# Patient Record
Sex: FEMALE | Race: WHITE | NOT HISPANIC OR LATINO | Employment: OTHER | URBAN - METROPOLITAN AREA
[De-identification: names, ages, dates, MRNs, and addresses within clinical notes are randomized per-mention and may not be internally consistent; named-entity substitution may affect disease eponyms.]

---

## 2017-01-11 ENCOUNTER — TRANSCRIBE ORDERS (OUTPATIENT)
Dept: ADMINISTRATIVE | Facility: HOSPITAL | Age: 72
End: 2017-01-11

## 2017-01-11 DIAGNOSIS — N63.0 BREAST MASS: Primary | ICD-10-CM

## 2017-01-11 DIAGNOSIS — M79.651 RIGHT THIGH PAIN: ICD-10-CM

## 2017-01-11 DIAGNOSIS — IMO0002 MASS: Primary | ICD-10-CM

## 2017-01-18 ENCOUNTER — HOSPITAL ENCOUNTER (OUTPATIENT)
Dept: MAMMOGRAPHY | Facility: HOSPITAL | Age: 72
Discharge: HOME | End: 2017-01-18
Attending: INTERNAL MEDICINE | Admitting: INTERNAL MEDICINE

## 2017-01-18 DIAGNOSIS — N63.0 BREAST MASS: ICD-10-CM

## 2017-01-18 PROCEDURE — G0204 DX MAMMO INCL CAD BI: HCPCS

## 2017-01-20 ENCOUNTER — HOSPITAL ENCOUNTER (OUTPATIENT)
Dept: MRI IMAGING | Facility: HOSPITAL | Age: 72
Discharge: HOME | End: 2017-01-20
Attending: INTERNAL MEDICINE | Admitting: INTERNAL MEDICINE

## 2017-01-20 DIAGNOSIS — M79.651 RIGHT THIGH PAIN: ICD-10-CM

## 2017-01-20 PROCEDURE — 72148 MRI LUMBAR SPINE W/O DYE: CPT

## 2017-02-07 ENCOUNTER — TRANSCRIBE ORDERS (OUTPATIENT)
Dept: ADMINISTRATIVE | Facility: HOSPITAL | Age: 72
End: 2017-02-07

## 2017-02-07 DIAGNOSIS — N63.10 MASS OF RIGHT BREAST: Primary | ICD-10-CM

## 2017-04-30 ENCOUNTER — HOSPITAL ENCOUNTER (INPATIENT)
Facility: HOSPITAL | Age: 72
LOS: 28 days | Discharge: HOME HEALTH | End: 2017-05-28
Attending: EMERGENCY MEDICINE | Admitting: INTERNAL MEDICINE

## 2017-04-30 ENCOUNTER — APPOINTMENT (OUTPATIENT)
Dept: CT IMAGING | Facility: HOSPITAL | Age: 72
End: 2017-04-30

## 2017-04-30 DIAGNOSIS — I10 ESSENTIAL HYPERTENSION: ICD-10-CM

## 2017-04-30 DIAGNOSIS — K57.32 DIVERTICULITIS LARGE INTESTINE: ICD-10-CM

## 2017-04-30 DIAGNOSIS — N39.0 ACUTE UTI (URINARY TRACT INFECTION): ICD-10-CM

## 2017-04-30 DIAGNOSIS — K56.609 COLON OBSTRUCTION (HCC): Primary | ICD-10-CM

## 2017-04-30 DIAGNOSIS — IMO0002 ABDOMINAL ABSCESS: ICD-10-CM

## 2017-04-30 DIAGNOSIS — R53.1 GENERALIZED WEAKNESS: ICD-10-CM

## 2017-04-30 DIAGNOSIS — K56.7 ILEUS (HCC): ICD-10-CM

## 2017-04-30 DIAGNOSIS — K57.32 SIGMOID DIVERTICULITIS: ICD-10-CM

## 2017-04-30 DIAGNOSIS — C90.00 ANEMIA ASSOCIATED WITH MULTIPLE MYELOMA TREATED WITH ERYTHROPOIETIN (HCC): ICD-10-CM

## 2017-04-30 DIAGNOSIS — D63.0 ANEMIA ASSOCIATED WITH MULTIPLE MYELOMA TREATED WITH ERYTHROPOIETIN (HCC): ICD-10-CM

## 2017-04-30 DIAGNOSIS — D72.829 LEUKOCYTOSIS, UNSPECIFIED TYPE: ICD-10-CM

## 2017-04-30 LAB
ALBUMIN SERPL-MCNC: 4.1 G/DL (ref 3.5–5.2)
ALBUMIN/GLOB SERPL: 1.1 G/DL
ALP SERPL-CCNC: 64 U/L (ref 39–117)
ALT SERPL W P-5'-P-CCNC: 25 U/L (ref 1–33)
ANION GAP SERPL CALCULATED.3IONS-SCNC: 13.3 MMOL/L
AST SERPL-CCNC: 38 U/L (ref 1–32)
BACTERIA UR QL AUTO: ABNORMAL /HPF
BASOPHILS # BLD AUTO: 0.02 10*3/MM3 (ref 0–0.2)
BASOPHILS NFR BLD AUTO: 0.1 % (ref 0–1.5)
BILIRUB SERPL-MCNC: 0.6 MG/DL (ref 0.1–1.2)
BILIRUB UR QL STRIP: NEGATIVE
BUN BLD-MCNC: 15 MG/DL (ref 8–23)
BUN/CREAT SERPL: 21.1 (ref 7–25)
CALCIUM SPEC-SCNC: 9.6 MG/DL (ref 8.6–10.5)
CEA SERPL-MCNC: 2.42 NG/ML
CHLORIDE SERPL-SCNC: 99 MMOL/L (ref 98–107)
CLARITY UR: ABNORMAL
CO2 SERPL-SCNC: 26.7 MMOL/L (ref 22–29)
COLOR UR: ABNORMAL
CREAT BLD-MCNC: 0.71 MG/DL (ref 0.57–1)
D-LACTATE SERPL-SCNC: 2 MMOL/L (ref 0.5–2)
D-LACTATE SERPL-SCNC: 2.5 MMOL/L (ref 0.5–2)
DEPRECATED RDW RBC AUTO: 43.6 FL (ref 37–54)
EOSINOPHIL # BLD AUTO: 0.05 10*3/MM3 (ref 0–0.7)
EOSINOPHIL NFR BLD AUTO: 0.3 % (ref 0.3–6.2)
ERYTHROCYTE [DISTWIDTH] IN BLOOD BY AUTOMATED COUNT: 13.2 % (ref 11.7–13)
GFR SERPL CREATININE-BSD FRML MDRD: 81 ML/MIN/1.73
GLOBULIN UR ELPH-MCNC: 3.8 GM/DL
GLUCOSE BLD-MCNC: 142 MG/DL (ref 65–99)
GLUCOSE UR STRIP-MCNC: NEGATIVE MG/DL
HCT VFR BLD AUTO: 44.1 % (ref 35.6–45.5)
HGB BLD-MCNC: 14.4 G/DL (ref 11.9–15.5)
HGB UR QL STRIP.AUTO: NEGATIVE
HOLD SPECIMEN: NORMAL
HYALINE CASTS UR QL AUTO: ABNORMAL /LPF
IMM GRANULOCYTES # BLD: 0.06 10*3/MM3 (ref 0–0.03)
IMM GRANULOCYTES NFR BLD: 0.3 % (ref 0–0.5)
KETONES UR QL STRIP: ABNORMAL
LEUKOCYTE ESTERASE UR QL STRIP.AUTO: ABNORMAL
LIPASE SERPL-CCNC: 24 U/L (ref 13–60)
LYMPHOCYTES # BLD AUTO: 1.51 10*3/MM3 (ref 0.9–4.8)
LYMPHOCYTES NFR BLD AUTO: 8 % (ref 19.6–45.3)
MCH RBC QN AUTO: 29.8 PG (ref 26.9–32)
MCHC RBC AUTO-ENTMCNC: 32.7 G/DL (ref 32.4–36.3)
MCV RBC AUTO: 91.1 FL (ref 80.5–98.2)
MONOCYTES # BLD AUTO: 1.73 10*3/MM3 (ref 0.2–1.2)
MONOCYTES NFR BLD AUTO: 9.1 % (ref 5–12)
NEUTROPHILS # BLD AUTO: 15.6 10*3/MM3 (ref 1.9–8.1)
NEUTROPHILS NFR BLD AUTO: 82.2 % (ref 42.7–76)
NITRITE UR QL STRIP: POSITIVE
PH UR STRIP.AUTO: 6 [PH] (ref 5–8)
PLATELET # BLD AUTO: 227 10*3/MM3 (ref 140–500)
PMV BLD AUTO: 12.9 FL (ref 6–12)
POTASSIUM BLD-SCNC: 4.9 MMOL/L (ref 3.5–5.2)
PROCALCITONIN SERPL-MCNC: 0.04 NG/ML (ref 0.1–0.25)
PROT SERPL-MCNC: 7.9 G/DL (ref 6–8.5)
PROT UR QL STRIP: ABNORMAL
RBC # BLD AUTO: 4.84 10*6/MM3 (ref 3.9–5.2)
RBC # UR: ABNORMAL /HPF
REF LAB TEST METHOD: ABNORMAL
SODIUM BLD-SCNC: 139 MMOL/L (ref 136–145)
SP GR UR STRIP: 1.02 (ref 1–1.03)
SQUAMOUS #/AREA URNS HPF: ABNORMAL /HPF
UROBILINOGEN UR QL STRIP: ABNORMAL
WBC NRBC COR # BLD: 18.97 10*3/MM3 (ref 4.5–10.7)
WBC UR QL AUTO: ABNORMAL /HPF
WHOLE BLOOD HOLD SPECIMEN: NORMAL

## 2017-04-30 PROCEDURE — 99283 EMERGENCY DEPT VISIT LOW MDM: CPT

## 2017-04-30 PROCEDURE — 87186 SC STD MICRODIL/AGAR DIL: CPT | Performed by: EMERGENCY MEDICINE

## 2017-04-30 PROCEDURE — 82378 CARCINOEMBRYONIC ANTIGEN: CPT | Performed by: INTERNAL MEDICINE

## 2017-04-30 PROCEDURE — 84145 PROCALCITONIN (PCT): CPT | Performed by: EMERGENCY MEDICINE

## 2017-04-30 PROCEDURE — 87040 BLOOD CULTURE FOR BACTERIA: CPT | Performed by: EMERGENCY MEDICINE

## 2017-04-30 PROCEDURE — 36415 COLL VENOUS BLD VENIPUNCTURE: CPT | Performed by: EMERGENCY MEDICINE

## 2017-04-30 PROCEDURE — 85025 COMPLETE CBC W/AUTO DIFF WBC: CPT | Performed by: EMERGENCY MEDICINE

## 2017-04-30 PROCEDURE — 81001 URINALYSIS AUTO W/SCOPE: CPT | Performed by: EMERGENCY MEDICINE

## 2017-04-30 PROCEDURE — 25010000002 HEPARIN (PORCINE) PER 1000 UNITS: Performed by: INTERNAL MEDICINE

## 2017-04-30 PROCEDURE — 25010000002 ONDANSETRON PER 1 MG: Performed by: INTERNAL MEDICINE

## 2017-04-30 PROCEDURE — 0 IOPAMIDOL 61 % SOLUTION: Performed by: EMERGENCY MEDICINE

## 2017-04-30 PROCEDURE — 83605 ASSAY OF LACTIC ACID: CPT | Performed by: EMERGENCY MEDICINE

## 2017-04-30 PROCEDURE — 87086 URINE CULTURE/COLONY COUNT: CPT | Performed by: EMERGENCY MEDICINE

## 2017-04-30 PROCEDURE — 25010000003 CEFTRIAXONE PER 250 MG: Performed by: EMERGENCY MEDICINE

## 2017-04-30 PROCEDURE — 83690 ASSAY OF LIPASE: CPT | Performed by: EMERGENCY MEDICINE

## 2017-04-30 PROCEDURE — 80053 COMPREHEN METABOLIC PANEL: CPT | Performed by: EMERGENCY MEDICINE

## 2017-04-30 PROCEDURE — 74177 CT ABD & PELVIS W/CONTRAST: CPT

## 2017-04-30 RX ORDER — HEPARIN SODIUM 5000 [USP'U]/ML
5000 INJECTION, SOLUTION INTRAVENOUS; SUBCUTANEOUS EVERY 12 HOURS SCHEDULED
Status: DISCONTINUED | OUTPATIENT
Start: 2017-04-30 | End: 2017-05-02

## 2017-04-30 RX ORDER — HYDROCODONE BITARTRATE AND ACETAMINOPHEN 10; 325 MG/1; MG/1
1 TABLET ORAL
COMMUNITY
Start: 2017-04-27 | End: 2017-05-28 | Stop reason: HOSPADM

## 2017-04-30 RX ORDER — SODIUM CHLORIDE 0.9 % (FLUSH) 0.9 %
10 SYRINGE (ML) INJECTION AS NEEDED
Status: DISCONTINUED | OUTPATIENT
Start: 2017-04-30 | End: 2017-05-28 | Stop reason: HOSPADM

## 2017-04-30 RX ORDER — LANOLIN ALCOHOL/MO/W.PET/CERES
5000 CREAM (GRAM) TOPICAL
COMMUNITY
End: 2017-09-11 | Stop reason: SDUPTHER

## 2017-04-30 RX ORDER — ASPIRIN 325 MG
325 TABLET ORAL DAILY
Status: DISCONTINUED | OUTPATIENT
Start: 2017-04-30 | End: 2017-05-01

## 2017-04-30 RX ORDER — ACETAMINOPHEN,DIPHENHYDRAMINE HCL 500; 25 MG/1; MG/1
1 TABLET, FILM COATED ORAL NIGHTLY PRN
Status: DISCONTINUED | OUTPATIENT
Start: 2017-04-30 | End: 2017-05-02 | Stop reason: CLARIF

## 2017-04-30 RX ORDER — CEFTRIAXONE SODIUM 1 G/50ML
1 INJECTION, SOLUTION INTRAVENOUS EVERY 24 HOURS
Status: DISCONTINUED | OUTPATIENT
Start: 2017-05-01 | End: 2017-05-01

## 2017-04-30 RX ORDER — MORPHINE SULFATE 2 MG/ML
1 INJECTION, SOLUTION INTRAMUSCULAR; INTRAVENOUS EVERY 4 HOURS PRN
Status: DISCONTINUED | OUTPATIENT
Start: 2017-04-30 | End: 2017-05-02

## 2017-04-30 RX ORDER — ASPIRIN 325 MG
325 TABLET ORAL DAILY PRN
COMMUNITY
End: 2017-05-28 | Stop reason: HOSPADM

## 2017-04-30 RX ORDER — SODIUM CHLORIDE 9 MG/ML
125 INJECTION, SOLUTION INTRAVENOUS CONTINUOUS
Status: DISCONTINUED | OUTPATIENT
Start: 2017-04-30 | End: 2017-05-03

## 2017-04-30 RX ORDER — NALOXONE HCL 0.4 MG/ML
0.4 VIAL (ML) INJECTION
Status: DISCONTINUED | OUTPATIENT
Start: 2017-04-30 | End: 2017-05-09 | Stop reason: SDUPTHER

## 2017-04-30 RX ORDER — ATENOLOL 25 MG/1
25 TABLET ORAL DAILY
Status: DISCONTINUED | OUTPATIENT
Start: 2017-04-30 | End: 2017-05-08

## 2017-04-30 RX ORDER — ONDANSETRON 2 MG/ML
4 INJECTION INTRAMUSCULAR; INTRAVENOUS EVERY 6 HOURS PRN
Status: DISCONTINUED | OUTPATIENT
Start: 2017-04-30 | End: 2017-05-02

## 2017-04-30 RX ORDER — ACETAMINOPHEN 325 MG/1
650 TABLET ORAL EVERY 4 HOURS PRN
Status: DISCONTINUED | OUTPATIENT
Start: 2017-04-30 | End: 2017-05-28 | Stop reason: HOSPADM

## 2017-04-30 RX ORDER — MECLIZINE HYDROCHLORIDE 25 MG/1
25 TABLET ORAL 3 TIMES DAILY PRN
Status: DISCONTINUED | OUTPATIENT
Start: 2017-04-30 | End: 2017-05-28 | Stop reason: HOSPADM

## 2017-04-30 RX ORDER — HYDROCODONE BITARTRATE AND ACETAMINOPHEN 5; 325 MG/1; MG/1
1 TABLET ORAL EVERY 6 HOURS PRN
Status: DISCONTINUED | OUTPATIENT
Start: 2017-04-30 | End: 2017-05-02

## 2017-04-30 RX ORDER — SODIUM CHLORIDE 0.9 % (FLUSH) 0.9 %
1-10 SYRINGE (ML) INJECTION AS NEEDED
Status: DISCONTINUED | OUTPATIENT
Start: 2017-04-30 | End: 2017-05-28 | Stop reason: HOSPADM

## 2017-04-30 RX ORDER — ATENOLOL 25 MG/1
25 TABLET ORAL
COMMUNITY
End: 2017-05-28 | Stop reason: HOSPADM

## 2017-04-30 RX ORDER — CEFTRIAXONE SODIUM 1 G/50ML
1 INJECTION, SOLUTION INTRAVENOUS ONCE
Status: COMPLETED | OUTPATIENT
Start: 2017-04-30 | End: 2017-04-30

## 2017-04-30 RX ORDER — ACETAMINOPHEN,DIPHENHYDRAMINE HCL 500; 25 MG/1; MG/1
1 TABLET, FILM COATED ORAL NIGHTLY PRN
COMMUNITY
End: 2017-05-28 | Stop reason: HOSPADM

## 2017-04-30 RX ORDER — CEFTRIAXONE SODIUM 1 G/50ML
1 INJECTION, SOLUTION INTRAVENOUS EVERY 24 HOURS
Status: DISCONTINUED | OUTPATIENT
Start: 2017-04-30 | End: 2017-04-30

## 2017-04-30 RX ADMIN — CEFTRIAXONE SODIUM 1 G: 1 INJECTION, SOLUTION INTRAVENOUS at 16:26

## 2017-04-30 RX ADMIN — SODIUM CHLORIDE 125 ML/HR: 9 INJECTION, SOLUTION INTRAVENOUS at 16:27

## 2017-04-30 RX ADMIN — SODIUM CHLORIDE 500 ML: 9 INJECTION, SOLUTION INTRAVENOUS at 16:27

## 2017-04-30 RX ADMIN — SODIUM CHLORIDE 125 ML/HR: 9 INJECTION, SOLUTION INTRAVENOUS at 20:47

## 2017-04-30 RX ADMIN — METRONIDAZOLE 500 MG: 500 INJECTION, SOLUTION INTRAVENOUS at 17:28

## 2017-04-30 RX ADMIN — IOPAMIDOL 85 ML: 612 INJECTION, SOLUTION INTRAVENOUS at 16:12

## 2017-04-30 RX ADMIN — HEPARIN SODIUM 5000 UNITS: 5000 INJECTION, SOLUTION INTRAVENOUS; SUBCUTANEOUS at 20:47

## 2017-04-30 RX ADMIN — ONDANSETRON 4 MG: 2 INJECTION INTRAMUSCULAR; INTRAVENOUS at 20:49

## 2017-04-30 RX ADMIN — METRONIDAZOLE 500 MG: 500 INJECTION, SOLUTION INTRAVENOUS at 23:53

## 2017-05-01 PROBLEM — I10 HTN (HYPERTENSION): Status: ACTIVE | Noted: 2017-05-01

## 2017-05-01 LAB
ALBUMIN SERPL-MCNC: 3.3 G/DL (ref 3.5–5.2)
ALBUMIN/GLOB SERPL: 1 G/DL
ALP SERPL-CCNC: 54 U/L (ref 39–117)
ALT SERPL W P-5'-P-CCNC: 24 U/L (ref 1–33)
ANION GAP SERPL CALCULATED.3IONS-SCNC: 14.5 MMOL/L
AST SERPL-CCNC: 27 U/L (ref 1–32)
BASOPHILS # BLD AUTO: 0.01 10*3/MM3 (ref 0–0.2)
BASOPHILS NFR BLD AUTO: 0.1 % (ref 0–1.5)
BILIRUB SERPL-MCNC: 0.6 MG/DL (ref 0.1–1.2)
BUN BLD-MCNC: 14 MG/DL (ref 8–23)
BUN/CREAT SERPL: 24.1 (ref 7–25)
CALCIUM SPEC-SCNC: 8.6 MG/DL (ref 8.6–10.5)
CHLORIDE SERPL-SCNC: 103 MMOL/L (ref 98–107)
CO2 SERPL-SCNC: 24.5 MMOL/L (ref 22–29)
CREAT BLD-MCNC: 0.58 MG/DL (ref 0.57–1)
DEPRECATED RDW RBC AUTO: 44.2 FL (ref 37–54)
EOSINOPHIL # BLD AUTO: 0.15 10*3/MM3 (ref 0–0.7)
EOSINOPHIL NFR BLD AUTO: 1.2 % (ref 0.3–6.2)
ERYTHROCYTE [DISTWIDTH] IN BLOOD BY AUTOMATED COUNT: 13.3 % (ref 11.7–13)
GFR SERPL CREATININE-BSD FRML MDRD: 102 ML/MIN/1.73
GLOBULIN UR ELPH-MCNC: 3.4 GM/DL
GLUCOSE BLD-MCNC: 126 MG/DL (ref 65–99)
HCT VFR BLD AUTO: 38.1 % (ref 35.6–45.5)
HGB BLD-MCNC: 12.3 G/DL (ref 11.9–15.5)
IMM GRANULOCYTES # BLD: 0.02 10*3/MM3 (ref 0–0.03)
IMM GRANULOCYTES NFR BLD: 0.2 % (ref 0–0.5)
LYMPHOCYTES # BLD AUTO: 1.9 10*3/MM3 (ref 0.9–4.8)
LYMPHOCYTES NFR BLD AUTO: 14.6 % (ref 19.6–45.3)
MCH RBC QN AUTO: 29.6 PG (ref 26.9–32)
MCHC RBC AUTO-ENTMCNC: 32.3 G/DL (ref 32.4–36.3)
MCV RBC AUTO: 91.6 FL (ref 80.5–98.2)
MONOCYTES # BLD AUTO: 1.27 10*3/MM3 (ref 0.2–1.2)
MONOCYTES NFR BLD AUTO: 9.8 % (ref 5–12)
NEUTROPHILS # BLD AUTO: 9.63 10*3/MM3 (ref 1.9–8.1)
NEUTROPHILS NFR BLD AUTO: 74.1 % (ref 42.7–76)
PLATELET # BLD AUTO: 187 10*3/MM3 (ref 140–500)
PMV BLD AUTO: 13.1 FL (ref 6–12)
POTASSIUM BLD-SCNC: 4.1 MMOL/L (ref 3.5–5.2)
PROT SERPL-MCNC: 6.7 G/DL (ref 6–8.5)
RBC # BLD AUTO: 4.16 10*6/MM3 (ref 3.9–5.2)
SODIUM BLD-SCNC: 142 MMOL/L (ref 136–145)
WBC NRBC COR # BLD: 12.98 10*3/MM3 (ref 4.5–10.7)

## 2017-05-01 PROCEDURE — 85025 COMPLETE CBC W/AUTO DIFF WBC: CPT | Performed by: INTERNAL MEDICINE

## 2017-05-01 PROCEDURE — 25010000002 ONDANSETRON PER 1 MG: Performed by: INTERNAL MEDICINE

## 2017-05-01 PROCEDURE — 25010000002 HEPARIN (PORCINE) PER 1000 UNITS: Performed by: INTERNAL MEDICINE

## 2017-05-01 PROCEDURE — 80053 COMPREHEN METABOLIC PANEL: CPT | Performed by: INTERNAL MEDICINE

## 2017-05-01 PROCEDURE — 25010000002 LEVOFLOXACIN PER 250 MG: Performed by: SURGERY

## 2017-05-01 PROCEDURE — 25010000002 MORPHINE PER 10 MG: Performed by: INTERNAL MEDICINE

## 2017-05-01 RX ORDER — POLYETHYLENE GLYCOL 3350 17 G/17G
34 POWDER, FOR SOLUTION ORAL 2 TIMES DAILY
Status: DISCONTINUED | OUTPATIENT
Start: 2017-05-01 | End: 2017-05-02

## 2017-05-01 RX ORDER — SENNA AND DOCUSATE SODIUM 50; 8.6 MG/1; MG/1
2 TABLET, FILM COATED ORAL NIGHTLY
Status: DISCONTINUED | OUTPATIENT
Start: 2017-05-01 | End: 2017-05-01

## 2017-05-01 RX ORDER — LEVOFLOXACIN 5 MG/ML
500 INJECTION, SOLUTION INTRAVENOUS EVERY 24 HOURS
Status: DISCONTINUED | OUTPATIENT
Start: 2017-05-01 | End: 2017-05-11

## 2017-05-01 RX ADMIN — LEVOFLOXACIN 500 MG: 5 INJECTION, SOLUTION INTRAVENOUS at 12:14

## 2017-05-01 RX ADMIN — METRONIDAZOLE 500 MG: 500 INJECTION, SOLUTION INTRAVENOUS at 15:56

## 2017-05-01 RX ADMIN — MORPHINE SULFATE 1 MG: 2 INJECTION, SOLUTION INTRAMUSCULAR; INTRAVENOUS at 22:55

## 2017-05-01 RX ADMIN — SODIUM CHLORIDE 100 ML/HR: 9 INJECTION, SOLUTION INTRAVENOUS at 18:52

## 2017-05-01 RX ADMIN — METRONIDAZOLE 500 MG: 500 INJECTION, SOLUTION INTRAVENOUS at 08:55

## 2017-05-01 RX ADMIN — ATENOLOL 25 MG: 25 TABLET ORAL at 08:55

## 2017-05-01 RX ADMIN — HEPARIN SODIUM 5000 UNITS: 5000 INJECTION, SOLUTION INTRAVENOUS; SUBCUTANEOUS at 20:14

## 2017-05-01 RX ADMIN — HEPARIN SODIUM 5000 UNITS: 5000 INJECTION, SOLUTION INTRAVENOUS; SUBCUTANEOUS at 08:55

## 2017-05-01 RX ADMIN — POLYETHYLENE GLYCOL 3350 34 G: 17 POWDER, FOR SOLUTION ORAL at 08:54

## 2017-05-01 RX ADMIN — METRONIDAZOLE 500 MG: 500 INJECTION, SOLUTION INTRAVENOUS at 23:59

## 2017-05-01 RX ADMIN — MORPHINE SULFATE 1 MG: 2 INJECTION, SOLUTION INTRAMUSCULAR; INTRAVENOUS at 06:04

## 2017-05-01 RX ADMIN — HYDROCODONE BITARTRATE AND ACETAMINOPHEN 1 TABLET: 5; 325 TABLET ORAL at 12:14

## 2017-05-01 RX ADMIN — ONDANSETRON 4 MG: 2 INJECTION INTRAMUSCULAR; INTRAVENOUS at 20:21

## 2017-05-01 RX ADMIN — HYDROCODONE BITARTRATE AND ACETAMINOPHEN 1 TABLET: 5; 325 TABLET ORAL at 20:14

## 2017-05-01 RX ADMIN — SODIUM CHLORIDE 125 ML/HR: 9 INJECTION, SOLUTION INTRAVENOUS at 05:59

## 2017-05-01 RX ADMIN — POLYETHYLENE GLYCOL 3350 34 G: 17 POWDER, FOR SOLUTION ORAL at 17:31

## 2017-05-02 ENCOUNTER — APPOINTMENT (OUTPATIENT)
Dept: GENERAL RADIOLOGY | Facility: HOSPITAL | Age: 72
End: 2017-05-02
Attending: SURGERY

## 2017-05-02 LAB
ABO GROUP BLD: NORMAL
ANION GAP SERPL CALCULATED.3IONS-SCNC: 13.4 MMOL/L
BACTERIA SPEC AEROBE CULT: ABNORMAL
BASOPHILS # BLD AUTO: 0.01 10*3/MM3 (ref 0–0.2)
BASOPHILS NFR BLD AUTO: 0.1 % (ref 0–1.5)
BLD GP AB SCN SERPL QL: NEGATIVE
BUN BLD-MCNC: 8 MG/DL (ref 8–23)
BUN/CREAT SERPL: 15.7 (ref 7–25)
CALCIUM SPEC-SCNC: 8.4 MG/DL (ref 8.6–10.5)
CHLORIDE SERPL-SCNC: 104 MMOL/L (ref 98–107)
CO2 SERPL-SCNC: 21.6 MMOL/L (ref 22–29)
CREAT BLD-MCNC: 0.51 MG/DL (ref 0.57–1)
DEPRECATED RDW RBC AUTO: 44.8 FL (ref 37–54)
EOSINOPHIL # BLD AUTO: 0.06 10*3/MM3 (ref 0–0.7)
EOSINOPHIL NFR BLD AUTO: 0.5 % (ref 0.3–6.2)
ERYTHROCYTE [DISTWIDTH] IN BLOOD BY AUTOMATED COUNT: 13.4 % (ref 11.7–13)
GFR SERPL CREATININE-BSD FRML MDRD: 119 ML/MIN/1.73
GLUCOSE BLD-MCNC: 134 MG/DL (ref 65–99)
HCT VFR BLD AUTO: 40.2 % (ref 35.6–45.5)
HGB BLD-MCNC: 12.7 G/DL (ref 11.9–15.5)
IMM GRANULOCYTES # BLD: 0.04 10*3/MM3 (ref 0–0.03)
IMM GRANULOCYTES NFR BLD: 0.3 % (ref 0–0.5)
LYMPHOCYTES # BLD AUTO: 1.86 10*3/MM3 (ref 0.9–4.8)
LYMPHOCYTES NFR BLD AUTO: 14.6 % (ref 19.6–45.3)
MCH RBC QN AUTO: 29.4 PG (ref 26.9–32)
MCHC RBC AUTO-ENTMCNC: 31.6 G/DL (ref 32.4–36.3)
MCV RBC AUTO: 93.1 FL (ref 80.5–98.2)
MONOCYTES # BLD AUTO: 0.96 10*3/MM3 (ref 0.2–1.2)
MONOCYTES NFR BLD AUTO: 7.5 % (ref 5–12)
NEUTROPHILS # BLD AUTO: 9.84 10*3/MM3 (ref 1.9–8.1)
NEUTROPHILS NFR BLD AUTO: 77 % (ref 42.7–76)
PLATELET # BLD AUTO: 189 10*3/MM3 (ref 140–500)
PMV BLD AUTO: 13.2 FL (ref 6–12)
POTASSIUM BLD-SCNC: 3.9 MMOL/L (ref 3.5–5.2)
RBC # BLD AUTO: 4.32 10*6/MM3 (ref 3.9–5.2)
RH BLD: POSITIVE
SODIUM BLD-SCNC: 139 MMOL/L (ref 136–145)
WBC NRBC COR # BLD: 12.77 10*3/MM3 (ref 4.5–10.7)

## 2017-05-02 PROCEDURE — 80048 BASIC METABOLIC PNL TOTAL CA: CPT | Performed by: INTERNAL MEDICINE

## 2017-05-02 PROCEDURE — 74270 X-RAY XM COLON 1CNTRST STD: CPT

## 2017-05-02 PROCEDURE — 86850 RBC ANTIBODY SCREEN: CPT | Performed by: SURGERY

## 2017-05-02 PROCEDURE — 25010000002 ONDANSETRON PER 1 MG: Performed by: INTERNAL MEDICINE

## 2017-05-02 PROCEDURE — 25010000002 LEVOFLOXACIN PER 250 MG: Performed by: SURGERY

## 2017-05-02 PROCEDURE — 63710000001 DIPHENHYDRAMINE PER 50 MG: Performed by: INTERNAL MEDICINE

## 2017-05-02 PROCEDURE — 25010000002 HEPARIN (PORCINE) PER 1000 UNITS: Performed by: INTERNAL MEDICINE

## 2017-05-02 PROCEDURE — 86900 BLOOD TYPING SEROLOGIC ABO: CPT | Performed by: SURGERY

## 2017-05-02 PROCEDURE — 85025 COMPLETE CBC W/AUTO DIFF WBC: CPT | Performed by: SURGERY

## 2017-05-02 PROCEDURE — 25010000002 HYDROMORPHONE PER 4 MG: Performed by: SURGERY

## 2017-05-02 PROCEDURE — 86901 BLOOD TYPING SEROLOGIC RH(D): CPT | Performed by: SURGERY

## 2017-05-02 RX ORDER — HYDROMORPHONE HYDROCHLORIDE 1 MG/ML
0.5 INJECTION, SOLUTION INTRAMUSCULAR; INTRAVENOUS; SUBCUTANEOUS
Status: DISCONTINUED | OUTPATIENT
Start: 2017-05-02 | End: 2017-05-03

## 2017-05-02 RX ORDER — DIPHENHYDRAMINE HCL 25 MG
25 CAPSULE ORAL NIGHTLY PRN
Status: DISCONTINUED | OUTPATIENT
Start: 2017-05-02 | End: 2017-05-28 | Stop reason: HOSPADM

## 2017-05-02 RX ORDER — HYDROCODONE BITARTRATE AND ACETAMINOPHEN 5; 325 MG/1; MG/1
1 TABLET ORAL EVERY 4 HOURS PRN
Status: DISCONTINUED | OUTPATIENT
Start: 2017-05-02 | End: 2017-05-02

## 2017-05-02 RX ORDER — ONDANSETRON 2 MG/ML
4 INJECTION INTRAMUSCULAR; INTRAVENOUS EVERY 4 HOURS PRN
Status: DISCONTINUED | OUTPATIENT
Start: 2017-05-02 | End: 2017-05-17

## 2017-05-02 RX ORDER — ACETAMINOPHEN 500 MG
500 TABLET ORAL NIGHTLY PRN
Status: DISCONTINUED | OUTPATIENT
Start: 2017-05-02 | End: 2017-05-28 | Stop reason: HOSPADM

## 2017-05-02 RX ADMIN — ONDANSETRON 4 MG: 2 INJECTION INTRAMUSCULAR; INTRAVENOUS at 13:56

## 2017-05-02 RX ADMIN — ACETAMINOPHEN, DIPHENHYDRAMINE HYDROCHLORIDE 1 TABLET: 500; 25 TABLET, FILM COATED ORAL at 01:51

## 2017-05-02 RX ADMIN — ONDANSETRON 4 MG: 2 INJECTION INTRAMUSCULAR; INTRAVENOUS at 09:27

## 2017-05-02 RX ADMIN — ONDANSETRON 4 MG: 2 INJECTION INTRAMUSCULAR; INTRAVENOUS at 05:19

## 2017-05-02 RX ADMIN — LEVOFLOXACIN 500 MG: 5 INJECTION, SOLUTION INTRAVENOUS at 07:45

## 2017-05-02 RX ADMIN — ATENOLOL 25 MG: 25 TABLET ORAL at 09:13

## 2017-05-02 RX ADMIN — HYDROCODONE BITARTRATE AND ACETAMINOPHEN 1 TABLET: 5; 325 TABLET ORAL at 01:12

## 2017-05-02 RX ADMIN — METRONIDAZOLE 500 MG: 500 INJECTION, SOLUTION INTRAVENOUS at 09:13

## 2017-05-02 RX ADMIN — HYDROMORPHONE HYDROCHLORIDE 0.5 MG: 1 INJECTION, SOLUTION INTRAMUSCULAR; INTRAVENOUS; SUBCUTANEOUS at 17:01

## 2017-05-02 RX ADMIN — ACETAMINOPHEN 500 MG: 500 TABLET ORAL at 23:29

## 2017-05-02 RX ADMIN — HEPARIN SODIUM 5000 UNITS: 5000 INJECTION, SOLUTION INTRAVENOUS; SUBCUTANEOUS at 09:13

## 2017-05-02 RX ADMIN — ONDANSETRON 4 MG: 2 INJECTION INTRAMUSCULAR; INTRAVENOUS at 01:12

## 2017-05-02 RX ADMIN — HYDROMORPHONE HYDROCHLORIDE 0.5 MG: 1 INJECTION, SOLUTION INTRAMUSCULAR; INTRAVENOUS; SUBCUTANEOUS at 07:02

## 2017-05-02 RX ADMIN — METRONIDAZOLE 500 MG: 500 INJECTION, SOLUTION INTRAVENOUS at 17:01

## 2017-05-02 RX ADMIN — DIPHENHYDRAMINE HYDROCHLORIDE 25 MG: 25 CAPSULE ORAL at 23:30

## 2017-05-02 RX ADMIN — SODIUM CHLORIDE 125 ML/HR: 9 INJECTION, SOLUTION INTRAVENOUS at 19:55

## 2017-05-02 RX ADMIN — HYDROMORPHONE HYDROCHLORIDE 0.5 MG: 1 INJECTION, SOLUTION INTRAMUSCULAR; INTRAVENOUS; SUBCUTANEOUS at 22:20

## 2017-05-02 RX ADMIN — HYDROCODONE BITARTRATE AND ACETAMINOPHEN 1 TABLET: 5; 325 TABLET ORAL at 05:19

## 2017-05-02 RX ADMIN — METRONIDAZOLE 500 MG: 500 INJECTION, SOLUTION INTRAVENOUS at 23:31

## 2017-05-02 RX ADMIN — HYDROMORPHONE HYDROCHLORIDE 0.5 MG: 1 INJECTION, SOLUTION INTRAMUSCULAR; INTRAVENOUS; SUBCUTANEOUS at 13:56

## 2017-05-02 RX ADMIN — SODIUM CHLORIDE 100 ML/HR: 9 INJECTION, SOLUTION INTRAVENOUS at 06:46

## 2017-05-02 RX ADMIN — ONDANSETRON 4 MG: 2 INJECTION INTRAMUSCULAR; INTRAVENOUS at 22:20

## 2017-05-02 RX ADMIN — ONDANSETRON 4 MG: 2 INJECTION INTRAMUSCULAR; INTRAVENOUS at 18:15

## 2017-05-02 RX ADMIN — HYDROMORPHONE HYDROCHLORIDE 0.5 MG: 1 INJECTION, SOLUTION INTRAMUSCULAR; INTRAVENOUS; SUBCUTANEOUS at 09:27

## 2017-05-03 ENCOUNTER — ANESTHESIA (OUTPATIENT)
Dept: PERIOP | Facility: HOSPITAL | Age: 72
End: 2017-05-03

## 2017-05-03 ENCOUNTER — ANESTHESIA EVENT (OUTPATIENT)
Dept: PERIOP | Facility: HOSPITAL | Age: 72
End: 2017-05-03

## 2017-05-03 PROBLEM — K56.609 COLON OBSTRUCTION (HCC): Status: ACTIVE | Noted: 2017-05-03

## 2017-05-03 LAB
ANION GAP SERPL CALCULATED.3IONS-SCNC: 15.2 MMOL/L
BASOPHILS # BLD AUTO: 0.01 10*3/MM3 (ref 0–0.2)
BASOPHILS NFR BLD AUTO: 0.1 % (ref 0–1.5)
BUN BLD-MCNC: 10 MG/DL (ref 8–23)
BUN/CREAT SERPL: 18.2 (ref 7–25)
CALCIUM SPEC-SCNC: 8.3 MG/DL (ref 8.6–10.5)
CHLORIDE SERPL-SCNC: 102 MMOL/L (ref 98–107)
CO2 SERPL-SCNC: 22.8 MMOL/L (ref 22–29)
CREAT BLD-MCNC: 0.55 MG/DL (ref 0.57–1)
DEPRECATED RDW RBC AUTO: 44.7 FL (ref 37–54)
EOSINOPHIL # BLD AUTO: 0.35 10*3/MM3 (ref 0–0.7)
EOSINOPHIL NFR BLD AUTO: 3.9 % (ref 0.3–6.2)
ERYTHROCYTE [DISTWIDTH] IN BLOOD BY AUTOMATED COUNT: 13.4 % (ref 11.7–13)
GFR SERPL CREATININE-BSD FRML MDRD: 109 ML/MIN/1.73
GLUCOSE BLD-MCNC: 120 MG/DL (ref 65–99)
HCT VFR BLD AUTO: 39.3 % (ref 35.6–45.5)
HGB BLD-MCNC: 12.5 G/DL (ref 11.9–15.5)
IMM GRANULOCYTES # BLD: 0.02 10*3/MM3 (ref 0–0.03)
IMM GRANULOCYTES NFR BLD: 0.2 % (ref 0–0.5)
LYMPHOCYTES # BLD AUTO: 1.6 10*3/MM3 (ref 0.9–4.8)
LYMPHOCYTES NFR BLD AUTO: 17.9 % (ref 19.6–45.3)
MCH RBC QN AUTO: 29.3 PG (ref 26.9–32)
MCHC RBC AUTO-ENTMCNC: 31.8 G/DL (ref 32.4–36.3)
MCV RBC AUTO: 92 FL (ref 80.5–98.2)
MONOCYTES # BLD AUTO: 1.11 10*3/MM3 (ref 0.2–1.2)
MONOCYTES NFR BLD AUTO: 12.4 % (ref 5–12)
NEUTROPHILS # BLD AUTO: 5.87 10*3/MM3 (ref 1.9–8.1)
NEUTROPHILS NFR BLD AUTO: 65.5 % (ref 42.7–76)
PLATELET # BLD AUTO: 203 10*3/MM3 (ref 140–500)
PMV BLD AUTO: 13 FL (ref 6–12)
POTASSIUM BLD-SCNC: 3.4 MMOL/L (ref 3.5–5.2)
RBC # BLD AUTO: 4.27 10*6/MM3 (ref 3.9–5.2)
SODIUM BLD-SCNC: 140 MMOL/L (ref 136–145)
WBC NRBC COR # BLD: 8.96 10*3/MM3 (ref 4.5–10.7)

## 2017-05-03 PROCEDURE — 25010000002 ONDANSETRON PER 1 MG: Performed by: INTERNAL MEDICINE

## 2017-05-03 PROCEDURE — 88307 TISSUE EXAM BY PATHOLOGIST: CPT | Performed by: SURGERY

## 2017-05-03 PROCEDURE — C1755 CATHETER, INTRASPINAL: HCPCS | Performed by: ANESTHESIOLOGY

## 2017-05-03 PROCEDURE — 25010000002 FENTANYL CITRATE (PF) 100 MCG/2ML SOLUTION 5 ML AMPULE: Performed by: ANESTHESIOLOGY

## 2017-05-03 PROCEDURE — 25010000002 PROMETHAZINE PER 50 MG: Performed by: NURSE ANESTHETIST, CERTIFIED REGISTERED

## 2017-05-03 PROCEDURE — 25010000003 POTASSIUM CHLORIDE 10 MEQ/100ML SOLUTION: Performed by: INTERNAL MEDICINE

## 2017-05-03 PROCEDURE — 85025 COMPLETE CBC W/AUTO DIFF WBC: CPT | Performed by: INTERNAL MEDICINE

## 2017-05-03 PROCEDURE — 25010000002 MIDAZOLAM PER 1 MG: Performed by: ANESTHESIOLOGY

## 2017-05-03 PROCEDURE — 25810000003 SODIUM CHLORIDE 0.9 % WITH KCL 20 MEQ 20-0.9 MEQ/L-% SOLUTION: Performed by: INTERNAL MEDICINE

## 2017-05-03 PROCEDURE — 0D1N0Z4 BYPASS SIGMOID COLON TO CUTANEOUS, OPEN APPROACH: ICD-10-PCS | Performed by: SURGERY

## 2017-05-03 PROCEDURE — 25010000002 PHENYLEPHRINE PER 1 ML: Performed by: NURSE ANESTHETIST, CERTIFIED REGISTERED

## 2017-05-03 PROCEDURE — 25010000002 ONDANSETRON PER 1 MG: Performed by: NURSE ANESTHETIST, CERTIFIED REGISTERED

## 2017-05-03 PROCEDURE — 25010000002 PROPOFOL 10 MG/ML EMULSION: Performed by: NURSE ANESTHETIST, CERTIFIED REGISTERED

## 2017-05-03 PROCEDURE — 25010000002 SUCCINYLCHOLINE PER 20 MG: Performed by: NURSE ANESTHETIST, CERTIFIED REGISTERED

## 2017-05-03 PROCEDURE — 0DTN0ZZ RESECTION OF SIGMOID COLON, OPEN APPROACH: ICD-10-PCS | Performed by: SURGERY

## 2017-05-03 PROCEDURE — 25010000002 FENTANYL CITRATE (PF) 100 MCG/2ML SOLUTION 20 ML AMPULE: Performed by: ANESTHESIOLOGY

## 2017-05-03 PROCEDURE — 25010000002 FENTANYL CITRATE (PF) 100 MCG/2ML SOLUTION: Performed by: ANESTHESIOLOGY

## 2017-05-03 PROCEDURE — 80048 BASIC METABOLIC PNL TOTAL CA: CPT | Performed by: INTERNAL MEDICINE

## 2017-05-03 PROCEDURE — 25010000002 FENTANYL CITRATE (PF) 100 MCG/2ML SOLUTION: Performed by: NURSE ANESTHETIST, CERTIFIED REGISTERED

## 2017-05-03 PROCEDURE — 25010000002 LEVOFLOXACIN PER 250 MG: Performed by: SURGERY

## 2017-05-03 PROCEDURE — 25010000002 HYDROMORPHONE PER 4 MG: Performed by: SURGERY

## 2017-05-03 RX ORDER — POTASSIUM CHLORIDE 7.45 MG/ML
10 INJECTION INTRAVENOUS
Status: DISCONTINUED | OUTPATIENT
Start: 2017-05-03 | End: 2017-05-09 | Stop reason: SDUPTHER

## 2017-05-03 RX ORDER — MIDAZOLAM HYDROCHLORIDE 1 MG/ML
1 INJECTION INTRAMUSCULAR; INTRAVENOUS
Status: DISCONTINUED | OUTPATIENT
Start: 2017-05-03 | End: 2017-05-03 | Stop reason: HOSPADM

## 2017-05-03 RX ORDER — FENTANYL CITRATE 50 UG/ML
INJECTION, SOLUTION INTRAMUSCULAR; INTRAVENOUS AS NEEDED
Status: DISCONTINUED | OUTPATIENT
Start: 2017-05-03 | End: 2017-05-03 | Stop reason: SURG

## 2017-05-03 RX ORDER — NALOXONE HCL 0.4 MG/ML
0.4 VIAL (ML) INJECTION
Status: DISCONTINUED | OUTPATIENT
Start: 2017-05-03 | End: 2017-05-09 | Stop reason: SDUPTHER

## 2017-05-03 RX ORDER — FAMOTIDINE 10 MG/ML
20 INJECTION, SOLUTION INTRAVENOUS ONCE
Status: COMPLETED | OUTPATIENT
Start: 2017-05-03 | End: 2017-05-03

## 2017-05-03 RX ORDER — PROMETHAZINE HYDROCHLORIDE 25 MG/1
25 TABLET ORAL ONCE AS NEEDED
Status: COMPLETED | OUTPATIENT
Start: 2017-05-03 | End: 2017-05-03

## 2017-05-03 RX ORDER — PROMETHAZINE HYDROCHLORIDE 25 MG/ML
INJECTION, SOLUTION INTRAMUSCULAR; INTRAVENOUS AS NEEDED
Status: DISCONTINUED | OUTPATIENT
Start: 2017-05-03 | End: 2017-05-03 | Stop reason: SURG

## 2017-05-03 RX ORDER — SODIUM CHLORIDE AND POTASSIUM CHLORIDE 150; 900 MG/100ML; MG/100ML
90 INJECTION, SOLUTION INTRAVENOUS CONTINUOUS
Status: DISCONTINUED | OUTPATIENT
Start: 2017-05-03 | End: 2017-05-15

## 2017-05-03 RX ORDER — SUCCINYLCHOLINE CHLORIDE 20 MG/ML
INJECTION INTRAMUSCULAR; INTRAVENOUS AS NEEDED
Status: DISCONTINUED | OUTPATIENT
Start: 2017-05-03 | End: 2017-05-03 | Stop reason: SURG

## 2017-05-03 RX ORDER — SODIUM CHLORIDE, SODIUM LACTATE, POTASSIUM CHLORIDE, CALCIUM CHLORIDE 600; 310; 30; 20 MG/100ML; MG/100ML; MG/100ML; MG/100ML
9 INJECTION, SOLUTION INTRAVENOUS CONTINUOUS
Status: DISCONTINUED | OUTPATIENT
Start: 2017-05-03 | End: 2017-05-13

## 2017-05-03 RX ORDER — LIDOCAINE HYDROCHLORIDE 20 MG/ML
INJECTION, SOLUTION INFILTRATION; PERINEURAL AS NEEDED
Status: DISCONTINUED | OUTPATIENT
Start: 2017-05-03 | End: 2017-05-03 | Stop reason: SURG

## 2017-05-03 RX ORDER — FENTANYL CITRATE 50 UG/ML
50 INJECTION, SOLUTION INTRAMUSCULAR; INTRAVENOUS
Status: DISCONTINUED | OUTPATIENT
Start: 2017-05-03 | End: 2017-05-03 | Stop reason: HOSPADM

## 2017-05-03 RX ORDER — PROPOFOL 10 MG/ML
VIAL (ML) INTRAVENOUS AS NEEDED
Status: DISCONTINUED | OUTPATIENT
Start: 2017-05-03 | End: 2017-05-03 | Stop reason: SURG

## 2017-05-03 RX ORDER — ONDANSETRON 2 MG/ML
INJECTION INTRAMUSCULAR; INTRAVENOUS AS NEEDED
Status: DISCONTINUED | OUTPATIENT
Start: 2017-05-03 | End: 2017-05-03 | Stop reason: SURG

## 2017-05-03 RX ORDER — POTASSIUM CHLORIDE 1.5 G/1.77G
40 POWDER, FOR SOLUTION ORAL AS NEEDED
Status: DISCONTINUED | OUTPATIENT
Start: 2017-05-03 | End: 2017-05-09 | Stop reason: SDUPTHER

## 2017-05-03 RX ORDER — NITROFURANTOIN 25; 75 MG/1; MG/1
100 CAPSULE ORAL EVERY 12 HOURS SCHEDULED
Status: DISCONTINUED | OUTPATIENT
Start: 2017-05-03 | End: 2017-05-05

## 2017-05-03 RX ORDER — LABETALOL HYDROCHLORIDE 5 MG/ML
5 INJECTION, SOLUTION INTRAVENOUS
Status: DISCONTINUED | OUTPATIENT
Start: 2017-05-03 | End: 2017-05-03 | Stop reason: HOSPADM

## 2017-05-03 RX ORDER — METOCLOPRAMIDE HYDROCHLORIDE 5 MG/ML
10 INJECTION INTRAMUSCULAR; INTRAVENOUS EVERY 6 HOURS
Status: DISCONTINUED | OUTPATIENT
Start: 2017-05-03 | End: 2017-05-21

## 2017-05-03 RX ORDER — PROMETHAZINE HYDROCHLORIDE 25 MG/1
25 SUPPOSITORY RECTAL ONCE AS NEEDED
Status: COMPLETED | OUTPATIENT
Start: 2017-05-03 | End: 2017-05-03

## 2017-05-03 RX ORDER — PROMETHAZINE HYDROCHLORIDE 25 MG/ML
6.25 INJECTION, SOLUTION INTRAMUSCULAR; INTRAVENOUS ONCE AS NEEDED
Status: COMPLETED | OUTPATIENT
Start: 2017-05-03 | End: 2017-05-03

## 2017-05-03 RX ORDER — MIDAZOLAM HYDROCHLORIDE 1 MG/ML
2 INJECTION INTRAMUSCULAR; INTRAVENOUS
Status: DISCONTINUED | OUTPATIENT
Start: 2017-05-03 | End: 2017-05-03 | Stop reason: HOSPADM

## 2017-05-03 RX ORDER — HYDROMORPHONE HYDROCHLORIDE 1 MG/ML
0.5 INJECTION, SOLUTION INTRAMUSCULAR; INTRAVENOUS; SUBCUTANEOUS EVERY 4 HOURS PRN
Status: DISCONTINUED | OUTPATIENT
Start: 2017-05-03 | End: 2017-05-06

## 2017-05-03 RX ORDER — MAGNESIUM HYDROXIDE 1200 MG/15ML
LIQUID ORAL AS NEEDED
Status: DISCONTINUED | OUTPATIENT
Start: 2017-05-03 | End: 2017-05-03 | Stop reason: HOSPADM

## 2017-05-03 RX ORDER — HYDROMORPHONE HYDROCHLORIDE 1 MG/ML
0.25 INJECTION, SOLUTION INTRAMUSCULAR; INTRAVENOUS; SUBCUTANEOUS
Status: DISCONTINUED | OUTPATIENT
Start: 2017-05-03 | End: 2017-05-03 | Stop reason: HOSPADM

## 2017-05-03 RX ORDER — POTASSIUM CHLORIDE 750 MG/1
40 CAPSULE, EXTENDED RELEASE ORAL AS NEEDED
Status: DISCONTINUED | OUTPATIENT
Start: 2017-05-03 | End: 2017-05-09 | Stop reason: SDUPTHER

## 2017-05-03 RX ORDER — HYDRALAZINE HYDROCHLORIDE 20 MG/ML
5 INJECTION INTRAMUSCULAR; INTRAVENOUS
Status: DISCONTINUED | OUTPATIENT
Start: 2017-05-03 | End: 2017-05-03 | Stop reason: HOSPADM

## 2017-05-03 RX ORDER — IPRATROPIUM BROMIDE AND ALBUTEROL SULFATE 2.5; .5 MG/3ML; MG/3ML
3 SOLUTION RESPIRATORY (INHALATION) ONCE AS NEEDED
Status: DISCONTINUED | OUTPATIENT
Start: 2017-05-03 | End: 2017-05-03 | Stop reason: HOSPADM

## 2017-05-03 RX ORDER — FENTANYL CITRATE 50 UG/ML
25 INJECTION, SOLUTION INTRAMUSCULAR; INTRAVENOUS
Status: DISCONTINUED | OUTPATIENT
Start: 2017-05-03 | End: 2017-05-03 | Stop reason: HOSPADM

## 2017-05-03 RX ORDER — SODIUM CHLORIDE 0.9 % (FLUSH) 0.9 %
1-10 SYRINGE (ML) INJECTION AS NEEDED
Status: DISCONTINUED | OUTPATIENT
Start: 2017-05-03 | End: 2017-05-03 | Stop reason: HOSPADM

## 2017-05-03 RX ORDER — BUPIVACAINE HYDROCHLORIDE AND EPINEPHRINE 2.5; 5 MG/ML; UG/ML
INJECTION, SOLUTION INFILTRATION; PERINEURAL AS NEEDED
Status: DISCONTINUED | OUTPATIENT
Start: 2017-05-03 | End: 2017-05-03 | Stop reason: HOSPADM

## 2017-05-03 RX ADMIN — BUPIVACAINE HYDROCHLORIDE: 5 INJECTION, SOLUTION EPIDURAL; INTRACAUDAL at 16:50

## 2017-05-03 RX ADMIN — SODIUM CHLORIDE, POTASSIUM CHLORIDE, SODIUM LACTATE AND CALCIUM CHLORIDE 9 ML/HR: 600; 310; 30; 20 INJECTION, SOLUTION INTRAVENOUS at 15:29

## 2017-05-03 RX ADMIN — SODIUM CHLORIDE 125 ML/HR: 9 INJECTION, SOLUTION INTRAVENOUS at 05:31

## 2017-05-03 RX ADMIN — POTASSIUM CHLORIDE 10 MEQ: 7.46 INJECTION, SOLUTION INTRAVENOUS at 12:24

## 2017-05-03 RX ADMIN — ATENOLOL 25 MG: 25 TABLET ORAL at 09:04

## 2017-05-03 RX ADMIN — SODIUM CHLORIDE, POTASSIUM CHLORIDE, SODIUM LACTATE AND CALCIUM CHLORIDE: 600; 310; 30; 20 INJECTION, SOLUTION INTRAVENOUS at 17:30

## 2017-05-03 RX ADMIN — PROPOFOL 150 MG: 10 INJECTION, EMULSION INTRAVENOUS at 17:00

## 2017-05-03 RX ADMIN — POTASSIUM CHLORIDE AND SODIUM CHLORIDE 100 ML/HR: 900; 150 INJECTION, SOLUTION INTRAVENOUS at 13:26

## 2017-05-03 RX ADMIN — MIDAZOLAM HYDROCHLORIDE 1 MG: 1 INJECTION, SOLUTION INTRAMUSCULAR; INTRAVENOUS at 15:48

## 2017-05-03 RX ADMIN — LIDOCAINE HYDROCHLORIDE 60 MG: 20 INJECTION, SOLUTION INFILTRATION; PERINEURAL at 17:00

## 2017-05-03 RX ADMIN — FENTANYL CITRATE 100 MCG: 50 INJECTION INTRAMUSCULAR; INTRAVENOUS at 17:00

## 2017-05-03 RX ADMIN — ONDANSETRON 4 MG: 2 INJECTION INTRAMUSCULAR; INTRAVENOUS at 09:29

## 2017-05-03 RX ADMIN — ONDANSETRON 4 MG: 2 INJECTION INTRAMUSCULAR; INTRAVENOUS at 18:14

## 2017-05-03 RX ADMIN — ONDANSETRON 4 MG: 2 INJECTION INTRAMUSCULAR; INTRAVENOUS at 06:03

## 2017-05-03 RX ADMIN — HYDROMORPHONE HYDROCHLORIDE 0.5 MG: 1 INJECTION, SOLUTION INTRAMUSCULAR; INTRAVENOUS; SUBCUTANEOUS at 13:26

## 2017-05-03 RX ADMIN — FAMOTIDINE 20 MG: 10 INJECTION, SOLUTION INTRAVENOUS at 15:36

## 2017-05-03 RX ADMIN — HYDROMORPHONE HYDROCHLORIDE 0.5 MG: 1 INJECTION, SOLUTION INTRAMUSCULAR; INTRAVENOUS; SUBCUTANEOUS at 09:29

## 2017-05-03 RX ADMIN — METRONIDAZOLE 500 MG: 500 INJECTION, SOLUTION INTRAVENOUS at 08:12

## 2017-05-03 RX ADMIN — METRONIDAZOLE 500 MG: 500 INJECTION, SOLUTION INTRAVENOUS at 15:11

## 2017-05-03 RX ADMIN — PROMETHAZINE HYDROCHLORIDE 6.25 MG: 25 INJECTION INTRAMUSCULAR; INTRAVENOUS at 18:20

## 2017-05-03 RX ADMIN — SUGAMMADEX 500 MG: 100 INJECTION, SOLUTION INTRAVENOUS at 18:08

## 2017-05-03 RX ADMIN — HYDROMORPHONE HYDROCHLORIDE 0.5 MG: 1 INJECTION, SOLUTION INTRAMUSCULAR; INTRAVENOUS; SUBCUTANEOUS at 06:02

## 2017-05-03 RX ADMIN — ONDANSETRON 4 MG: 2 INJECTION INTRAMUSCULAR; INTRAVENOUS at 13:27

## 2017-05-03 RX ADMIN — PHENYLEPHRINE HYDROCHLORIDE 100 MCG: 10 INJECTION INTRAVENOUS at 17:20

## 2017-05-03 RX ADMIN — PROMETHAZINE HYDROCHLORIDE 6.25 MG: 25 INJECTION INTRAMUSCULAR; INTRAVENOUS at 19:06

## 2017-05-03 RX ADMIN — SUCCINYLCHOLINE CHLORIDE 100 MG: 20 INJECTION, SOLUTION INTRAMUSCULAR; INTRAVENOUS; PARENTERAL at 17:00

## 2017-05-03 RX ADMIN — FENTANYL CITRATE 50 MCG: 50 INJECTION INTRAMUSCULAR; INTRAVENOUS at 15:48

## 2017-05-03 RX ADMIN — ONDANSETRON 4 MG: 2 INJECTION INTRAMUSCULAR; INTRAVENOUS at 21:54

## 2017-05-03 RX ADMIN — EPHEDRINE SULFATE 10 MG: 50 INJECTION INTRAMUSCULAR; INTRAVENOUS; SUBCUTANEOUS at 17:20

## 2017-05-03 RX ADMIN — LEVOFLOXACIN 500 MG: 5 INJECTION, SOLUTION INTRAVENOUS at 09:03

## 2017-05-04 ENCOUNTER — APPOINTMENT (OUTPATIENT)
Dept: GENERAL RADIOLOGY | Facility: HOSPITAL | Age: 72
End: 2017-05-04

## 2017-05-04 LAB
ANION GAP SERPL CALCULATED.3IONS-SCNC: 19.1 MMOL/L
BACTERIA UR QL AUTO: ABNORMAL /HPF
BASOPHILS # BLD AUTO: 0.01 10*3/MM3 (ref 0–0.2)
BASOPHILS NFR BLD AUTO: 0.1 % (ref 0–1.5)
BILIRUB UR QL STRIP: NEGATIVE
BUN BLD-MCNC: 9 MG/DL (ref 8–23)
BUN/CREAT SERPL: 19.1 (ref 7–25)
CALCIUM SPEC-SCNC: 7.9 MG/DL (ref 8.6–10.5)
CHLORIDE SERPL-SCNC: 100 MMOL/L (ref 98–107)
CLARITY UR: CLEAR
CO2 SERPL-SCNC: 18.9 MMOL/L (ref 22–29)
COLOR UR: ABNORMAL
CREAT BLD-MCNC: 0.47 MG/DL (ref 0.57–1)
DEPRECATED RDW RBC AUTO: 45.2 FL (ref 37–54)
EOSINOPHIL # BLD AUTO: 0 10*3/MM3 (ref 0–0.7)
EOSINOPHIL NFR BLD AUTO: 0 % (ref 0.3–6.2)
ERYTHROCYTE [DISTWIDTH] IN BLOOD BY AUTOMATED COUNT: 13.3 % (ref 11.7–13)
GFR SERPL CREATININE-BSD FRML MDRD: 130 ML/MIN/1.73
GLUCOSE BLD-MCNC: 139 MG/DL (ref 65–99)
GLUCOSE UR STRIP-MCNC: NEGATIVE MG/DL
HCT VFR BLD AUTO: 38.5 % (ref 35.6–45.5)
HGB BLD-MCNC: 12.1 G/DL (ref 11.9–15.5)
HGB UR QL STRIP.AUTO: ABNORMAL
HYALINE CASTS UR QL AUTO: ABNORMAL /LPF
IMM GRANULOCYTES # BLD: 0.04 10*3/MM3 (ref 0–0.03)
IMM GRANULOCYTES NFR BLD: 0.3 % (ref 0–0.5)
KETONES UR QL STRIP: ABNORMAL
LEUKOCYTE ESTERASE UR QL STRIP.AUTO: ABNORMAL
LYMPHOCYTES # BLD AUTO: 0.83 10*3/MM3 (ref 0.9–4.8)
LYMPHOCYTES NFR BLD AUTO: 6.5 % (ref 19.6–45.3)
MCH RBC QN AUTO: 29.3 PG (ref 26.9–32)
MCHC RBC AUTO-ENTMCNC: 31.4 G/DL (ref 32.4–36.3)
MCV RBC AUTO: 93.2 FL (ref 80.5–98.2)
MONOCYTES # BLD AUTO: 1.19 10*3/MM3 (ref 0.2–1.2)
MONOCYTES NFR BLD AUTO: 9.3 % (ref 5–12)
NEUTROPHILS # BLD AUTO: 10.66 10*3/MM3 (ref 1.9–8.1)
NEUTROPHILS NFR BLD AUTO: 83.8 % (ref 42.7–76)
NITRITE UR QL STRIP: NEGATIVE
PH UR STRIP.AUTO: 6 [PH] (ref 5–8)
PLATELET # BLD AUTO: 184 10*3/MM3 (ref 140–500)
PMV BLD AUTO: 12.8 FL (ref 6–12)
POTASSIUM BLD-SCNC: 2.8 MMOL/L (ref 3.5–5.2)
PROT UR QL STRIP: ABNORMAL
RBC # BLD AUTO: 4.13 10*6/MM3 (ref 3.9–5.2)
RBC # UR: ABNORMAL /HPF
REF LAB TEST METHOD: ABNORMAL
SODIUM BLD-SCNC: 138 MMOL/L (ref 136–145)
SP GR UR STRIP: >=1.03 (ref 1–1.03)
SQUAMOUS #/AREA URNS HPF: ABNORMAL /HPF
UROBILINOGEN UR QL STRIP: ABNORMAL
WBC NRBC COR # BLD: 12.73 10*3/MM3 (ref 4.5–10.7)
WBC UR QL AUTO: ABNORMAL /HPF

## 2017-05-04 PROCEDURE — 85025 COMPLETE CBC W/AUTO DIFF WBC: CPT | Performed by: INTERNAL MEDICINE

## 2017-05-04 PROCEDURE — 80048 BASIC METABOLIC PNL TOTAL CA: CPT | Performed by: INTERNAL MEDICINE

## 2017-05-04 PROCEDURE — 81001 URINALYSIS AUTO W/SCOPE: CPT | Performed by: HOSPITALIST

## 2017-05-04 PROCEDURE — 25010000002 FENTANYL CITRATE (PF) 100 MCG/2ML SOLUTION 20 ML VIAL: Performed by: ANESTHESIOLOGY

## 2017-05-04 PROCEDURE — 71010 HC CHEST PA OR AP: CPT

## 2017-05-04 PROCEDURE — 25010000003 POTASSIUM CHLORIDE 10 MEQ/100ML SOLUTION: Performed by: INTERNAL MEDICINE

## 2017-05-04 PROCEDURE — 25010000002 FENTANYL CITRATE (PF) 100 MCG/2ML SOLUTION 5 ML AMPULE: Performed by: ANESTHESIOLOGY

## 2017-05-04 PROCEDURE — 87040 BLOOD CULTURE FOR BACTERIA: CPT | Performed by: HOSPITALIST

## 2017-05-04 PROCEDURE — 25010000002 PROMETHAZINE PER 50 MG: Performed by: HOSPITALIST

## 2017-05-04 PROCEDURE — 25810000003 SODIUM CHLORIDE 0.9 % WITH KCL 20 MEQ 20-0.9 MEQ/L-% SOLUTION: Performed by: INTERNAL MEDICINE

## 2017-05-04 PROCEDURE — 25010000002 ONDANSETRON PER 1 MG: Performed by: INTERNAL MEDICINE

## 2017-05-04 PROCEDURE — 25010000002 METOCLOPRAMIDE PER 10 MG: Performed by: SURGERY

## 2017-05-04 PROCEDURE — 25010000002 LEVOFLOXACIN PER 250 MG: Performed by: SURGERY

## 2017-05-04 RX ORDER — POTASSIUM CHLORIDE 750 MG/1
40 CAPSULE, EXTENDED RELEASE ORAL AS NEEDED
Status: DISCONTINUED | OUTPATIENT
Start: 2017-05-04 | End: 2017-05-28 | Stop reason: HOSPADM

## 2017-05-04 RX ORDER — PROMETHAZINE HYDROCHLORIDE 25 MG/ML
12.5 INJECTION, SOLUTION INTRAMUSCULAR; INTRAVENOUS EVERY 6 HOURS PRN
Status: DISCONTINUED | OUTPATIENT
Start: 2017-05-04 | End: 2017-05-17

## 2017-05-04 RX ORDER — PANTOPRAZOLE SODIUM 40 MG/10ML
40 INJECTION, POWDER, LYOPHILIZED, FOR SOLUTION INTRAVENOUS
Status: DISCONTINUED | OUTPATIENT
Start: 2017-05-04 | End: 2017-05-05

## 2017-05-04 RX ORDER — POTASSIUM CHLORIDE 1.5 G/1.77G
40 POWDER, FOR SOLUTION ORAL AS NEEDED
Status: DISCONTINUED | OUTPATIENT
Start: 2017-05-04 | End: 2017-05-04 | Stop reason: CLARIF

## 2017-05-04 RX ORDER — POTASSIUM CHLORIDE 7.45 MG/ML
10 INJECTION INTRAVENOUS
Status: DISCONTINUED | OUTPATIENT
Start: 2017-05-04 | End: 2017-05-28 | Stop reason: HOSPADM

## 2017-05-04 RX ADMIN — METOCLOPRAMIDE 10 MG: 5 INJECTION, SOLUTION INTRAMUSCULAR; INTRAVENOUS at 03:04

## 2017-05-04 RX ADMIN — METRONIDAZOLE 500 MG: 500 INJECTION, SOLUTION INTRAVENOUS at 09:54

## 2017-05-04 RX ADMIN — METOCLOPRAMIDE 10 MG: 5 INJECTION, SOLUTION INTRAMUSCULAR; INTRAVENOUS at 21:07

## 2017-05-04 RX ADMIN — POTASSIUM CHLORIDE AND SODIUM CHLORIDE 100 ML/HR: 900; 150 INJECTION, SOLUTION INTRAVENOUS at 14:56

## 2017-05-04 RX ADMIN — ONDANSETRON 4 MG: 2 INJECTION INTRAMUSCULAR; INTRAVENOUS at 13:47

## 2017-05-04 RX ADMIN — POTASSIUM CHLORIDE 10 MEQ: 7.46 INJECTION, SOLUTION INTRAVENOUS at 22:03

## 2017-05-04 RX ADMIN — PROMETHAZINE HYDROCHLORIDE 12.5 MG: 25 INJECTION, SOLUTION INTRAMUSCULAR; INTRAVENOUS at 22:03

## 2017-05-04 RX ADMIN — BUPIVACAINE HYDROCHLORIDE: 5 INJECTION, SOLUTION EPIDURAL; INTRACAUDAL at 14:54

## 2017-05-04 RX ADMIN — NITROFURANTOIN MONOHYDRATE/MACROCRYSTALLINE 100 MG: 25; 75 CAPSULE ORAL at 02:28

## 2017-05-04 RX ADMIN — POTASSIUM CHLORIDE 40 MEQ: 750 CAPSULE, EXTENDED RELEASE ORAL at 13:24

## 2017-05-04 RX ADMIN — METOCLOPRAMIDE 10 MG: 5 INJECTION, SOLUTION INTRAMUSCULAR; INTRAVENOUS at 15:19

## 2017-05-04 RX ADMIN — METRONIDAZOLE 500 MG: 500 INJECTION, SOLUTION INTRAVENOUS at 01:57

## 2017-05-04 RX ADMIN — METOCLOPRAMIDE 10 MG: 5 INJECTION, SOLUTION INTRAMUSCULAR; INTRAVENOUS at 09:54

## 2017-05-04 RX ADMIN — ONDANSETRON 4 MG: 2 INJECTION INTRAMUSCULAR; INTRAVENOUS at 17:31

## 2017-05-04 RX ADMIN — ONDANSETRON 4 MG: 2 INJECTION INTRAMUSCULAR; INTRAVENOUS at 09:11

## 2017-05-04 RX ADMIN — POTASSIUM CHLORIDE AND SODIUM CHLORIDE 100 ML/HR: 900; 150 INJECTION, SOLUTION INTRAVENOUS at 04:55

## 2017-05-04 RX ADMIN — PANTOPRAZOLE SODIUM 40 MG: 40 INJECTION, POWDER, FOR SOLUTION INTRAVENOUS at 09:55

## 2017-05-04 RX ADMIN — SODIUM CHLORIDE 500 ML: 9 INJECTION, SOLUTION INTRAVENOUS at 18:29

## 2017-05-04 RX ADMIN — METRONIDAZOLE 500 MG: 500 INJECTION, SOLUTION INTRAVENOUS at 17:28

## 2017-05-04 RX ADMIN — POTASSIUM CHLORIDE 40 MEQ: 750 CAPSULE, EXTENDED RELEASE ORAL at 08:27

## 2017-05-04 RX ADMIN — ATENOLOL 25 MG: 25 TABLET ORAL at 08:27

## 2017-05-04 RX ADMIN — NITROFURANTOIN MONOHYDRATE/MACROCRYSTALLINE 100 MG: 25; 75 CAPSULE ORAL at 13:22

## 2017-05-04 RX ADMIN — LEVOFLOXACIN 500 MG: 5 INJECTION, SOLUTION INTRAVENOUS at 08:27

## 2017-05-05 ENCOUNTER — APPOINTMENT (OUTPATIENT)
Dept: GENERAL RADIOLOGY | Facility: HOSPITAL | Age: 72
End: 2017-05-05
Attending: SURGERY

## 2017-05-05 LAB
ANION GAP SERPL CALCULATED.3IONS-SCNC: 13.1 MMOL/L
BACTERIA SPEC AEROBE CULT: NORMAL
BACTERIA SPEC AEROBE CULT: NORMAL
BASOPHILS # BLD AUTO: 0.01 10*3/MM3 (ref 0–0.2)
BASOPHILS NFR BLD AUTO: 0.1 % (ref 0–1.5)
BUN BLD-MCNC: 14 MG/DL (ref 8–23)
BUN/CREAT SERPL: 30.4 (ref 7–25)
CALCIUM SPEC-SCNC: 8 MG/DL (ref 8.6–10.5)
CHLORIDE SERPL-SCNC: 105 MMOL/L (ref 98–107)
CO2 SERPL-SCNC: 21.9 MMOL/L (ref 22–29)
CREAT BLD-MCNC: 0.46 MG/DL (ref 0.57–1)
CYTO UR: NORMAL
DEPRECATED RDW RBC AUTO: 46 FL (ref 37–54)
EOSINOPHIL # BLD AUTO: 0 10*3/MM3 (ref 0–0.7)
EOSINOPHIL NFR BLD AUTO: 0 % (ref 0.3–6.2)
ERYTHROCYTE [DISTWIDTH] IN BLOOD BY AUTOMATED COUNT: 13.8 % (ref 11.7–13)
GFR SERPL CREATININE-BSD FRML MDRD: 134 ML/MIN/1.73
GLUCOSE BLD-MCNC: 149 MG/DL (ref 65–99)
HCT VFR BLD AUTO: 33.8 % (ref 35.6–45.5)
HGB BLD-MCNC: 11.1 G/DL (ref 11.9–15.5)
IMM GRANULOCYTES # BLD: 0.06 10*3/MM3 (ref 0–0.03)
IMM GRANULOCYTES NFR BLD: 0.5 % (ref 0–0.5)
LAB AP CASE REPORT: NORMAL
LYMPHOCYTES # BLD AUTO: 0.86 10*3/MM3 (ref 0.9–4.8)
LYMPHOCYTES NFR BLD AUTO: 6.9 % (ref 19.6–45.3)
Lab: NORMAL
MCH RBC QN AUTO: 29.9 PG (ref 26.9–32)
MCHC RBC AUTO-ENTMCNC: 32.8 G/DL (ref 32.4–36.3)
MCV RBC AUTO: 91.1 FL (ref 80.5–98.2)
MONOCYTES # BLD AUTO: 1.68 10*3/MM3 (ref 0.2–1.2)
MONOCYTES NFR BLD AUTO: 13.4 % (ref 5–12)
NEUTROPHILS # BLD AUTO: 9.94 10*3/MM3 (ref 1.9–8.1)
NEUTROPHILS NFR BLD AUTO: 79.1 % (ref 42.7–76)
PATH REPORT.FINAL DX SPEC: NORMAL
PATH REPORT.GROSS SPEC: NORMAL
PLATELET # BLD AUTO: 162 10*3/MM3 (ref 140–500)
PMV BLD AUTO: 12.7 FL (ref 6–12)
POTASSIUM BLD-SCNC: 3.5 MMOL/L (ref 3.5–5.2)
POTASSIUM BLD-SCNC: 3.5 MMOL/L (ref 3.5–5.2)
RBC # BLD AUTO: 3.71 10*6/MM3 (ref 3.9–5.2)
SODIUM BLD-SCNC: 140 MMOL/L (ref 136–145)
WBC NRBC COR # BLD: 12.55 10*3/MM3 (ref 4.5–10.7)

## 2017-05-05 PROCEDURE — 84132 ASSAY OF SERUM POTASSIUM: CPT | Performed by: INTERNAL MEDICINE

## 2017-05-05 PROCEDURE — 80048 BASIC METABOLIC PNL TOTAL CA: CPT | Performed by: INTERNAL MEDICINE

## 2017-05-05 PROCEDURE — 97110 THERAPEUTIC EXERCISES: CPT

## 2017-05-05 PROCEDURE — 25010000002 PROMETHAZINE PER 50 MG: Performed by: HOSPITALIST

## 2017-05-05 PROCEDURE — 97161 PT EVAL LOW COMPLEX 20 MIN: CPT

## 2017-05-05 PROCEDURE — 25010000002 ONDANSETRON PER 1 MG: Performed by: INTERNAL MEDICINE

## 2017-05-05 PROCEDURE — 85025 COMPLETE CBC W/AUTO DIFF WBC: CPT | Performed by: SURGERY

## 2017-05-05 PROCEDURE — 74000 HC ABDOMEN KUB: CPT

## 2017-05-05 PROCEDURE — 25010000003 POTASSIUM CHLORIDE 10 MEQ/100ML SOLUTION: Performed by: SURGERY

## 2017-05-05 PROCEDURE — 25010000002 FENTANYL CITRATE (PF) 100 MCG/2ML SOLUTION 5 ML AMPULE: Performed by: ANESTHESIOLOGY

## 2017-05-05 PROCEDURE — 25010000002 LEVOFLOXACIN PER 250 MG: Performed by: SURGERY

## 2017-05-05 PROCEDURE — 25810000003 SODIUM CHLORIDE 0.9 % WITH KCL 20 MEQ 20-0.9 MEQ/L-% SOLUTION: Performed by: INTERNAL MEDICINE

## 2017-05-05 PROCEDURE — 25010000002 METOCLOPRAMIDE PER 10 MG: Performed by: SURGERY

## 2017-05-05 PROCEDURE — 25010000003 POTASSIUM CHLORIDE 10 MEQ/100ML SOLUTION: Performed by: INTERNAL MEDICINE

## 2017-05-05 RX ADMIN — POTASSIUM CHLORIDE AND SODIUM CHLORIDE 100 ML/HR: 900; 150 INJECTION, SOLUTION INTRAVENOUS at 23:42

## 2017-05-05 RX ADMIN — ATENOLOL 25 MG: 25 TABLET ORAL at 08:57

## 2017-05-05 RX ADMIN — METOCLOPRAMIDE 10 MG: 5 INJECTION, SOLUTION INTRAMUSCULAR; INTRAVENOUS at 20:37

## 2017-05-05 RX ADMIN — ONDANSETRON 4 MG: 2 INJECTION INTRAMUSCULAR; INTRAVENOUS at 06:27

## 2017-05-05 RX ADMIN — POTASSIUM CHLORIDE 40 MEQ: 750 CAPSULE, EXTENDED RELEASE ORAL at 13:02

## 2017-05-05 RX ADMIN — ONDANSETRON 4 MG: 2 INJECTION INTRAMUSCULAR; INTRAVENOUS at 00:48

## 2017-05-05 RX ADMIN — POTASSIUM CHLORIDE 10 MEQ: 7.46 INJECTION, SOLUTION INTRAVENOUS at 09:00

## 2017-05-05 RX ADMIN — POTASSIUM CHLORIDE 10 MEQ: 7.46 INJECTION, SOLUTION INTRAVENOUS at 01:05

## 2017-05-05 RX ADMIN — POTASSIUM CHLORIDE AND SODIUM CHLORIDE 100 ML/HR: 900; 150 INJECTION, SOLUTION INTRAVENOUS at 15:10

## 2017-05-05 RX ADMIN — PANTOPRAZOLE SODIUM 40 MG: 40 INJECTION, POWDER, FOR SOLUTION INTRAVENOUS at 06:27

## 2017-05-05 RX ADMIN — POTASSIUM CHLORIDE 10 MEQ: 7.46 INJECTION, SOLUTION INTRAVENOUS at 11:35

## 2017-05-05 RX ADMIN — POTASSIUM CHLORIDE 40 MEQ: 750 CAPSULE, EXTENDED RELEASE ORAL at 19:14

## 2017-05-05 RX ADMIN — BUPIVACAINE HYDROCHLORIDE: 5 INJECTION, SOLUTION EPIDURAL; INTRACAUDAL at 11:19

## 2017-05-05 RX ADMIN — METOCLOPRAMIDE 10 MG: 5 INJECTION, SOLUTION INTRAMUSCULAR; INTRAVENOUS at 15:12

## 2017-05-05 RX ADMIN — POTASSIUM CHLORIDE 10 MEQ: 7.46 INJECTION, SOLUTION INTRAVENOUS at 03:28

## 2017-05-05 RX ADMIN — PROMETHAZINE HYDROCHLORIDE 12.5 MG: 25 INJECTION, SOLUTION INTRAMUSCULAR; INTRAVENOUS at 03:45

## 2017-05-05 RX ADMIN — POTASSIUM CHLORIDE AND SODIUM CHLORIDE 100 ML/HR: 900; 150 INJECTION, SOLUTION INTRAVENOUS at 00:59

## 2017-05-05 RX ADMIN — METOCLOPRAMIDE 10 MG: 5 INJECTION, SOLUTION INTRAMUSCULAR; INTRAVENOUS at 03:28

## 2017-05-05 RX ADMIN — METOCLOPRAMIDE 10 MG: 5 INJECTION, SOLUTION INTRAMUSCULAR; INTRAVENOUS at 09:02

## 2017-05-05 RX ADMIN — POTASSIUM CHLORIDE 10 MEQ: 7.46 INJECTION, SOLUTION INTRAVENOUS at 06:27

## 2017-05-05 RX ADMIN — LEVOFLOXACIN 500 MG: 5 INJECTION, SOLUTION INTRAVENOUS at 08:08

## 2017-05-05 RX ADMIN — PROMETHAZINE HYDROCHLORIDE 12.5 MG: 25 INJECTION, SOLUTION INTRAMUSCULAR; INTRAVENOUS at 21:55

## 2017-05-05 RX ADMIN — METRONIDAZOLE 500 MG: 500 INJECTION, SOLUTION INTRAVENOUS at 00:49

## 2017-05-06 ENCOUNTER — APPOINTMENT (OUTPATIENT)
Dept: GENERAL RADIOLOGY | Facility: HOSPITAL | Age: 72
End: 2017-05-06

## 2017-05-06 LAB
ANION GAP SERPL CALCULATED.3IONS-SCNC: 13.5 MMOL/L
BASOPHILS # BLD AUTO: 0.01 10*3/MM3 (ref 0–0.2)
BASOPHILS NFR BLD AUTO: 0.1 % (ref 0–1.5)
BUN BLD-MCNC: 10 MG/DL (ref 8–23)
BUN/CREAT SERPL: 26.3 (ref 7–25)
CALCIUM SPEC-SCNC: 8 MG/DL (ref 8.6–10.5)
CHLORIDE SERPL-SCNC: 104 MMOL/L (ref 98–107)
CO2 SERPL-SCNC: 22.5 MMOL/L (ref 22–29)
CREAT BLD-MCNC: 0.38 MG/DL (ref 0.57–1)
DEPRECATED RDW RBC AUTO: 45.5 FL (ref 37–54)
EOSINOPHIL # BLD AUTO: 0.04 10*3/MM3 (ref 0–0.7)
EOSINOPHIL NFR BLD AUTO: 0.4 % (ref 0.3–6.2)
ERYTHROCYTE [DISTWIDTH] IN BLOOD BY AUTOMATED COUNT: 13.6 % (ref 11.7–13)
GFR SERPL CREATININE-BSD FRML MDRD: >150 ML/MIN/1.73
GLUCOSE BLD-MCNC: 150 MG/DL (ref 65–99)
HCT VFR BLD AUTO: 32.8 % (ref 35.6–45.5)
HGB BLD-MCNC: 10.4 G/DL (ref 11.9–15.5)
IMM GRANULOCYTES # BLD: 0.04 10*3/MM3 (ref 0–0.03)
IMM GRANULOCYTES NFR BLD: 0.4 % (ref 0–0.5)
LYMPHOCYTES # BLD AUTO: 0.76 10*3/MM3 (ref 0.9–4.8)
LYMPHOCYTES NFR BLD AUTO: 7.4 % (ref 19.6–45.3)
MAGNESIUM SERPL-MCNC: 1.9 MG/DL (ref 1.6–2.4)
MCH RBC QN AUTO: 29.1 PG (ref 26.9–32)
MCHC RBC AUTO-ENTMCNC: 31.7 G/DL (ref 32.4–36.3)
MCV RBC AUTO: 91.6 FL (ref 80.5–98.2)
MONOCYTES # BLD AUTO: 1.67 10*3/MM3 (ref 0.2–1.2)
MONOCYTES NFR BLD AUTO: 16.2 % (ref 5–12)
NEUTROPHILS # BLD AUTO: 7.76 10*3/MM3 (ref 1.9–8.1)
NEUTROPHILS NFR BLD AUTO: 75.5 % (ref 42.7–76)
PHOSPHATE SERPL-MCNC: 1.5 MG/DL (ref 2.5–4.5)
PLATELET # BLD AUTO: 183 10*3/MM3 (ref 140–500)
PMV BLD AUTO: 12.8 FL (ref 6–12)
POTASSIUM BLD-SCNC: 3.8 MMOL/L (ref 3.5–5.2)
RBC # BLD AUTO: 3.58 10*6/MM3 (ref 3.9–5.2)
SODIUM BLD-SCNC: 140 MMOL/L (ref 136–145)
WBC NRBC COR # BLD: 10.28 10*3/MM3 (ref 4.5–10.7)

## 2017-05-06 PROCEDURE — 85025 COMPLETE CBC W/AUTO DIFF WBC: CPT | Performed by: SURGERY

## 2017-05-06 PROCEDURE — 25010000002 MORPHINE PER 10 MG: Performed by: SURGERY

## 2017-05-06 PROCEDURE — 74000 HC ABDOMEN KUB: CPT

## 2017-05-06 PROCEDURE — 94799 UNLISTED PULMONARY SVC/PX: CPT

## 2017-05-06 PROCEDURE — 80048 BASIC METABOLIC PNL TOTAL CA: CPT | Performed by: INTERNAL MEDICINE

## 2017-05-06 PROCEDURE — 25010000002 METOCLOPRAMIDE PER 10 MG: Performed by: SURGERY

## 2017-05-06 PROCEDURE — 25810000003 SODIUM CHLORIDE 0.9 % WITH KCL 20 MEQ 20-0.9 MEQ/L-% SOLUTION: Performed by: INTERNAL MEDICINE

## 2017-05-06 PROCEDURE — 25010000002 LEVOFLOXACIN PER 250 MG: Performed by: SURGERY

## 2017-05-06 PROCEDURE — 84100 ASSAY OF PHOSPHORUS: CPT | Performed by: SURGERY

## 2017-05-06 PROCEDURE — 25010000002 ONDANSETRON PER 1 MG: Performed by: INTERNAL MEDICINE

## 2017-05-06 PROCEDURE — 83735 ASSAY OF MAGNESIUM: CPT | Performed by: SURGERY

## 2017-05-06 PROCEDURE — 25010000002 PROMETHAZINE PER 50 MG: Performed by: HOSPITALIST

## 2017-05-06 RX ORDER — MORPHINE SULFATE IN 0.9 % NACL 50 MG/50ML
PATIENT CONTROLLED ANALGESIA SYRINGE INTRAVENOUS CONTINUOUS
Status: DISCONTINUED | OUTPATIENT
Start: 2017-05-06 | End: 2017-05-07

## 2017-05-06 RX ORDER — NALOXONE HCL 0.4 MG/ML
0.1 VIAL (ML) INJECTION
Status: DISCONTINUED | OUTPATIENT
Start: 2017-05-06 | End: 2017-05-28 | Stop reason: HOSPADM

## 2017-05-06 RX ORDER — PANTOPRAZOLE SODIUM 40 MG/10ML
40 INJECTION, POWDER, LYOPHILIZED, FOR SOLUTION INTRAVENOUS
Status: DISCONTINUED | OUTPATIENT
Start: 2017-05-06 | End: 2017-05-21

## 2017-05-06 RX ADMIN — PROMETHAZINE HYDROCHLORIDE 12.5 MG: 25 INJECTION, SOLUTION INTRAMUSCULAR; INTRAVENOUS at 09:11

## 2017-05-06 RX ADMIN — METOCLOPRAMIDE 10 MG: 5 INJECTION, SOLUTION INTRAMUSCULAR; INTRAVENOUS at 02:28

## 2017-05-06 RX ADMIN — METOCLOPRAMIDE 10 MG: 5 INJECTION, SOLUTION INTRAMUSCULAR; INTRAVENOUS at 10:50

## 2017-05-06 RX ADMIN — ATENOLOL 25 MG: 25 TABLET ORAL at 12:17

## 2017-05-06 RX ADMIN — PANTOPRAZOLE SODIUM 40 MG: 40 INJECTION, POWDER, FOR SOLUTION INTRAVENOUS at 10:56

## 2017-05-06 RX ADMIN — LEVOFLOXACIN 500 MG: 5 INJECTION, SOLUTION INTRAVENOUS at 09:10

## 2017-05-06 RX ADMIN — POTASSIUM CHLORIDE AND SODIUM CHLORIDE 100 ML/HR: 900; 150 INJECTION, SOLUTION INTRAVENOUS at 19:37

## 2017-05-06 RX ADMIN — Medication: at 10:39

## 2017-05-06 RX ADMIN — METOCLOPRAMIDE 10 MG: 5 INJECTION, SOLUTION INTRAMUSCULAR; INTRAVENOUS at 15:11

## 2017-05-06 RX ADMIN — METOCLOPRAMIDE 10 MG: 5 INJECTION, SOLUTION INTRAMUSCULAR; INTRAVENOUS at 20:55

## 2017-05-06 RX ADMIN — ONDANSETRON 4 MG: 2 INJECTION INTRAMUSCULAR; INTRAVENOUS at 11:03

## 2017-05-06 RX ADMIN — PROMETHAZINE HYDROCHLORIDE 12.5 MG: 25 INJECTION, SOLUTION INTRAMUSCULAR; INTRAVENOUS at 15:11

## 2017-05-07 ENCOUNTER — APPOINTMENT (OUTPATIENT)
Dept: GENERAL RADIOLOGY | Facility: HOSPITAL | Age: 72
End: 2017-05-07

## 2017-05-07 LAB
ANION GAP SERPL CALCULATED.3IONS-SCNC: 10.6 MMOL/L
BASOPHILS # BLD AUTO: 0.01 10*3/MM3 (ref 0–0.2)
BASOPHILS NFR BLD AUTO: 0.1 % (ref 0–1.5)
BUN BLD-MCNC: 9 MG/DL (ref 8–23)
BUN/CREAT SERPL: 24.3 (ref 7–25)
CALCIUM SPEC-SCNC: 8.2 MG/DL (ref 8.6–10.5)
CHLORIDE SERPL-SCNC: 102 MMOL/L (ref 98–107)
CO2 SERPL-SCNC: 28.4 MMOL/L (ref 22–29)
CREAT BLD-MCNC: 0.37 MG/DL (ref 0.57–1)
DEPRECATED RDW RBC AUTO: 46.9 FL (ref 37–54)
EOSINOPHIL # BLD AUTO: 0.38 10*3/MM3 (ref 0–0.7)
EOSINOPHIL NFR BLD AUTO: 4.6 % (ref 0.3–6.2)
ERYTHROCYTE [DISTWIDTH] IN BLOOD BY AUTOMATED COUNT: 13.9 % (ref 11.7–13)
GFR SERPL CREATININE-BSD FRML MDRD: >150 ML/MIN/1.73
GLUCOSE BLD-MCNC: 118 MG/DL (ref 65–99)
HCT VFR BLD AUTO: 32.4 % (ref 35.6–45.5)
HGB BLD-MCNC: 10.1 G/DL (ref 11.9–15.5)
IMM GRANULOCYTES # BLD: 0.03 10*3/MM3 (ref 0–0.03)
IMM GRANULOCYTES NFR BLD: 0.4 % (ref 0–0.5)
LYMPHOCYTES # BLD AUTO: 1.12 10*3/MM3 (ref 0.9–4.8)
LYMPHOCYTES NFR BLD AUTO: 13.4 % (ref 19.6–45.3)
MCH RBC QN AUTO: 29 PG (ref 26.9–32)
MCHC RBC AUTO-ENTMCNC: 31.2 G/DL (ref 32.4–36.3)
MCV RBC AUTO: 93.1 FL (ref 80.5–98.2)
MONOCYTES # BLD AUTO: 1.59 10*3/MM3 (ref 0.2–1.2)
MONOCYTES NFR BLD AUTO: 19 % (ref 5–12)
NEUTROPHILS # BLD AUTO: 5.22 10*3/MM3 (ref 1.9–8.1)
NEUTROPHILS NFR BLD AUTO: 62.5 % (ref 42.7–76)
PLATELET # BLD AUTO: 200 10*3/MM3 (ref 140–500)
PMV BLD AUTO: 12.4 FL (ref 6–12)
POTASSIUM BLD-SCNC: 3.9 MMOL/L (ref 3.5–5.2)
RBC # BLD AUTO: 3.48 10*6/MM3 (ref 3.9–5.2)
SODIUM BLD-SCNC: 141 MMOL/L (ref 136–145)
WBC NRBC COR # BLD: 8.35 10*3/MM3 (ref 4.5–10.7)

## 2017-05-07 PROCEDURE — 25010000002 LEVOFLOXACIN PER 250 MG: Performed by: SURGERY

## 2017-05-07 PROCEDURE — 25010000002 MORPHINE PER 10 MG: Performed by: SURGERY

## 2017-05-07 PROCEDURE — 74000 HC ABDOMEN KUB: CPT

## 2017-05-07 PROCEDURE — 97110 THERAPEUTIC EXERCISES: CPT | Performed by: PHYSICAL THERAPIST

## 2017-05-07 PROCEDURE — 25010000002 METOCLOPRAMIDE PER 10 MG: Performed by: SURGERY

## 2017-05-07 PROCEDURE — 85025 COMPLETE CBC W/AUTO DIFF WBC: CPT | Performed by: SURGERY

## 2017-05-07 PROCEDURE — 25810000003 SODIUM CHLORIDE 0.9 % WITH KCL 20 MEQ 20-0.9 MEQ/L-% SOLUTION: Performed by: INTERNAL MEDICINE

## 2017-05-07 PROCEDURE — 80048 BASIC METABOLIC PNL TOTAL CA: CPT | Performed by: SURGERY

## 2017-05-07 PROCEDURE — 84443 ASSAY THYROID STIM HORMONE: CPT | Performed by: INTERNAL MEDICINE

## 2017-05-07 RX ORDER — MORPHINE SULFATE IN 0.9 % NACL 50 MG/50ML
PATIENT CONTROLLED ANALGESIA SYRINGE INTRAVENOUS CONTINUOUS
Status: DISCONTINUED | OUTPATIENT
Start: 2017-05-07 | End: 2017-05-09

## 2017-05-07 RX ADMIN — LEVOFLOXACIN 500 MG: 5 INJECTION, SOLUTION INTRAVENOUS at 10:49

## 2017-05-07 RX ADMIN — POTASSIUM CHLORIDE AND SODIUM CHLORIDE 100 ML/HR: 900; 150 INJECTION, SOLUTION INTRAVENOUS at 04:48

## 2017-05-07 RX ADMIN — METOCLOPRAMIDE 10 MG: 5 INJECTION, SOLUTION INTRAMUSCULAR; INTRAVENOUS at 04:30

## 2017-05-07 RX ADMIN — ATENOLOL 25 MG: 25 TABLET ORAL at 10:17

## 2017-05-07 RX ADMIN — METOCLOPRAMIDE 10 MG: 5 INJECTION, SOLUTION INTRAMUSCULAR; INTRAVENOUS at 10:17

## 2017-05-07 RX ADMIN — PANTOPRAZOLE SODIUM 40 MG: 40 INJECTION, POWDER, FOR SOLUTION INTRAVENOUS at 05:48

## 2017-05-07 RX ADMIN — METOCLOPRAMIDE 10 MG: 5 INJECTION, SOLUTION INTRAMUSCULAR; INTRAVENOUS at 22:40

## 2017-05-07 RX ADMIN — METOCLOPRAMIDE 10 MG: 5 INJECTION, SOLUTION INTRAMUSCULAR; INTRAVENOUS at 16:12

## 2017-05-07 RX ADMIN — Medication: at 13:45

## 2017-05-07 RX ADMIN — POTASSIUM CHLORIDE AND SODIUM CHLORIDE 100 ML/HR: 900; 150 INJECTION, SOLUTION INTRAVENOUS at 16:10

## 2017-05-08 ENCOUNTER — APPOINTMENT (OUTPATIENT)
Dept: CARDIOLOGY | Facility: HOSPITAL | Age: 72
End: 2017-05-08
Attending: INTERNAL MEDICINE

## 2017-05-08 PROBLEM — I48.91 ATRIAL FIBRILLATION WITH RAPID VENTRICULAR RESPONSE: Status: ACTIVE | Noted: 2017-05-08

## 2017-05-08 LAB
CHOLEST SERPL-MCNC: 106 MG/DL (ref 0–200)
GLUCOSE BLDC GLUCOMTR-MCNC: 93 MG/DL (ref 70–130)
HDLC SERPL-MCNC: 28 MG/DL (ref 40–60)
LDLC SERPL CALC-MCNC: 48 MG/DL (ref 0–100)
LDLC/HDLC SERPL: 1.73 {RATIO}
MAGNESIUM SERPL-MCNC: 1.9 MG/DL (ref 1.6–2.4)
NT-PROBNP SERPL-MCNC: 437.2 PG/ML (ref 5–900)
T4 FREE SERPL-MCNC: 1.26 NG/DL (ref 0.93–1.7)
TRIGL SERPL-MCNC: 148 MG/DL (ref 0–150)
TROPONIN T SERPL-MCNC: <0.01 NG/ML (ref 0–0.03)
TSH SERPL DL<=0.05 MIU/L-ACNC: 3.61 MIU/ML (ref 0.27–4.2)
VLDLC SERPL-MCNC: 29.6 MG/DL (ref 5–40)

## 2017-05-08 PROCEDURE — 93306 TTE W/DOPPLER COMPLETE: CPT | Performed by: INTERNAL MEDICINE

## 2017-05-08 PROCEDURE — 99222 1ST HOSP IP/OBS MODERATE 55: CPT | Performed by: INTERNAL MEDICINE

## 2017-05-08 PROCEDURE — 93010 ELECTROCARDIOGRAM REPORT: CPT | Performed by: INTERNAL MEDICINE

## 2017-05-08 PROCEDURE — 25010000002 PROMETHAZINE PER 50 MG: Performed by: HOSPITALIST

## 2017-05-08 PROCEDURE — 25010000002 ENOXAPARIN PER 10 MG: Performed by: SURGERY

## 2017-05-08 PROCEDURE — 25010000002 METOCLOPRAMIDE PER 10 MG: Performed by: SURGERY

## 2017-05-08 PROCEDURE — 93306 TTE W/DOPPLER COMPLETE: CPT

## 2017-05-08 PROCEDURE — 84484 ASSAY OF TROPONIN QUANT: CPT | Performed by: INTERNAL MEDICINE

## 2017-05-08 PROCEDURE — 84439 ASSAY OF FREE THYROXINE: CPT | Performed by: NURSE PRACTITIONER

## 2017-05-08 PROCEDURE — 80061 LIPID PANEL: CPT | Performed by: NURSE PRACTITIONER

## 2017-05-08 PROCEDURE — 93005 ELECTROCARDIOGRAM TRACING: CPT | Performed by: INTERNAL MEDICINE

## 2017-05-08 PROCEDURE — 83880 ASSAY OF NATRIURETIC PEPTIDE: CPT | Performed by: NURSE PRACTITIONER

## 2017-05-08 PROCEDURE — 82962 GLUCOSE BLOOD TEST: CPT

## 2017-05-08 PROCEDURE — 25810000003 SODIUM CHLORIDE 0.9 % WITH KCL 20 MEQ 20-0.9 MEQ/L-% SOLUTION: Performed by: INTERNAL MEDICINE

## 2017-05-08 PROCEDURE — 25010000002 LEVOFLOXACIN PER 250 MG: Performed by: SURGERY

## 2017-05-08 PROCEDURE — 83735 ASSAY OF MAGNESIUM: CPT | Performed by: NURSE PRACTITIONER

## 2017-05-08 RX ORDER — DILTIAZEM HYDROCHLORIDE 5 MG/ML
5 INJECTION INTRAVENOUS ONCE
Status: COMPLETED | OUTPATIENT
Start: 2017-05-08 | End: 2017-05-08

## 2017-05-08 RX ADMIN — METOCLOPRAMIDE 10 MG: 5 INJECTION, SOLUTION INTRAMUSCULAR; INTRAVENOUS at 09:06

## 2017-05-08 RX ADMIN — METOCLOPRAMIDE 10 MG: 5 INJECTION, SOLUTION INTRAMUSCULAR; INTRAVENOUS at 04:05

## 2017-05-08 RX ADMIN — DILTIAZEM HYDROCHLORIDE 5 MG: 5 INJECTION INTRAVENOUS at 00:40

## 2017-05-08 RX ADMIN — METOROPROLOL TARTRATE 2.5 MG: 5 INJECTION, SOLUTION INTRAVENOUS at 23:21

## 2017-05-08 RX ADMIN — POTASSIUM CHLORIDE AND SODIUM CHLORIDE 100 ML/HR: 900; 150 INJECTION, SOLUTION INTRAVENOUS at 14:24

## 2017-05-08 RX ADMIN — POTASSIUM CHLORIDE AND SODIUM CHLORIDE 100 ML/HR: 900; 150 INJECTION, SOLUTION INTRAVENOUS at 02:22

## 2017-05-08 RX ADMIN — METOROPROLOL TARTRATE 2.5 MG: 5 INJECTION, SOLUTION INTRAVENOUS at 09:20

## 2017-05-08 RX ADMIN — LEVOFLOXACIN 500 MG: 5 INJECTION, SOLUTION INTRAVENOUS at 09:06

## 2017-05-08 RX ADMIN — PROMETHAZINE HYDROCHLORIDE 12.5 MG: 25 INJECTION, SOLUTION INTRAMUSCULAR; INTRAVENOUS at 23:22

## 2017-05-08 RX ADMIN — PANTOPRAZOLE SODIUM 40 MG: 40 INJECTION, POWDER, FOR SOLUTION INTRAVENOUS at 06:45

## 2017-05-08 RX ADMIN — METOCLOPRAMIDE 10 MG: 5 INJECTION, SOLUTION INTRAMUSCULAR; INTRAVENOUS at 23:21

## 2017-05-08 RX ADMIN — ENOXAPARIN SODIUM 90 MG: 100 INJECTION SUBCUTANEOUS at 23:21

## 2017-05-08 RX ADMIN — DILTIAZEM HYDROCHLORIDE 5 MG/HR: 100 INJECTION, POWDER, LYOPHILIZED, FOR SOLUTION INTRAVENOUS at 00:47

## 2017-05-08 RX ADMIN — METOROPROLOL TARTRATE 2.5 MG: 5 INJECTION, SOLUTION INTRAVENOUS at 18:11

## 2017-05-08 RX ADMIN — DILTIAZEM HYDROCHLORIDE 15 MG/HR: 5 INJECTION INTRAVENOUS at 09:18

## 2017-05-08 RX ADMIN — METOCLOPRAMIDE 10 MG: 5 INJECTION, SOLUTION INTRAMUSCULAR; INTRAVENOUS at 18:11

## 2017-05-08 RX ADMIN — DILTIAZEM HYDROCHLORIDE 15 MG/HR: 5 INJECTION INTRAVENOUS at 16:37

## 2017-05-08 RX ADMIN — METOROPROLOL TARTRATE 2.5 MG: 5 INJECTION, SOLUTION INTRAVENOUS at 12:31

## 2017-05-09 LAB
BACTERIA SPEC AEROBE CULT: NORMAL
BACTERIA SPEC AEROBE CULT: NORMAL
BASOPHILS # BLD AUTO: 0.01 10*3/MM3 (ref 0–0.2)
BASOPHILS NFR BLD AUTO: 0.1 % (ref 0–1.5)
BH CV ECHO MEAS - ACS: 2 CM
BH CV ECHO MEAS - AO MEAN PG (FULL): 2 MMHG
BH CV ECHO MEAS - AO MEAN PG: 6 MMHG
BH CV ECHO MEAS - AO ROOT AREA (BSA CORRECTED): 1.5
BH CV ECHO MEAS - AO ROOT AREA: 7.1 CM^2
BH CV ECHO MEAS - AO ROOT DIAM: 3 CM
BH CV ECHO MEAS - AO V2 MEAN: 110 CM/SEC
BH CV ECHO MEAS - AO V2 VTI: 24.6 CM
BH CV ECHO MEAS - AVA(I,A): 3 CM^2
BH CV ECHO MEAS - AVA(I,D): 3 CM^2
BH CV ECHO MEAS - BSA(HAYCOCK): 2.1 M^2
BH CV ECHO MEAS - BSA: 2 M^2
BH CV ECHO MEAS - BZI_BMI: 33.3 KILOGRAMS/M^2
BH CV ECHO MEAS - BZI_METRIC_HEIGHT: 165.1 CM
BH CV ECHO MEAS - BZI_METRIC_WEIGHT: 90.7 KG
BH CV ECHO MEAS - CONTRAST EF (2CH): 66.7 ML/M^2
BH CV ECHO MEAS - CONTRAST EF 4CH: 66.7 ML/M^2
BH CV ECHO MEAS - EDV(CUBED): 85.2 ML
BH CV ECHO MEAS - EDV(MOD-SP2): 66 ML
BH CV ECHO MEAS - EDV(MOD-SP4): 111 ML
BH CV ECHO MEAS - EDV(TEICH): 87.7 ML
BH CV ECHO MEAS - EF(CUBED): 76.9 %
BH CV ECHO MEAS - EF(MOD-SP2): 66.7 %
BH CV ECHO MEAS - EF(MOD-SP4): 66.7 %
BH CV ECHO MEAS - EF(TEICH): 69.2 %
BH CV ECHO MEAS - ESV(CUBED): 19.7 ML
BH CV ECHO MEAS - ESV(MOD-SP2): 22 ML
BH CV ECHO MEAS - ESV(MOD-SP4): 37 ML
BH CV ECHO MEAS - ESV(TEICH): 27 ML
BH CV ECHO MEAS - FS: 38.6 %
BH CV ECHO MEAS - IVS/LVPW: 1.2
BH CV ECHO MEAS - IVSD: 1.5 CM
BH CV ECHO MEAS - LAT PEAK E' VEL: 9 CM/SEC
BH CV ECHO MEAS - LV DIASTOLIC VOL/BSA (35-75): 56.1 ML/M^2
BH CV ECHO MEAS - LV MASS(C)D: 240.3 GRAMS
BH CV ECHO MEAS - LV MASS(C)DI: 121.5 GRAMS/M^2
BH CV ECHO MEAS - LV MEAN PG: 4 MMHG
BH CV ECHO MEAS - LV SYSTOLIC VOL/BSA (12-30): 18.7 ML/M^2
BH CV ECHO MEAS - LV V1 MEAN: 91 CM/SEC
BH CV ECHO MEAS - LV V1 VTI: 23.4 CM
BH CV ECHO MEAS - LVIDD: 4.4 CM
BH CV ECHO MEAS - LVIDS: 2.7 CM
BH CV ECHO MEAS - LVLD AP2: 6.1 CM
BH CV ECHO MEAS - LVLD AP4: 7.8 CM
BH CV ECHO MEAS - LVLS AP2: 4.9 CM
BH CV ECHO MEAS - LVLS AP4: 5.9 CM
BH CV ECHO MEAS - LVOT AREA (M): 3.1 CM^2
BH CV ECHO MEAS - LVOT AREA: 3.1 CM^2
BH CV ECHO MEAS - LVOT DIAM: 2 CM
BH CV ECHO MEAS - LVPWD: 1.3 CM
BH CV ECHO MEAS - MED PEAK E' VEL: 8 CM/SEC
BH CV ECHO MEAS - MV DEC SLOPE: 854 CM/SEC^2
BH CV ECHO MEAS - MV DEC TIME: 0.16 SEC
BH CV ECHO MEAS - MV E MAX VEL: 99.2 CM/SEC
BH CV ECHO MEAS - MV MEAN PG: 3 MMHG
BH CV ECHO MEAS - MV P1/2T MAX VEL: 128 CM/SEC
BH CV ECHO MEAS - MV P1/2T: 43.9 MSEC
BH CV ECHO MEAS - MV V2 MEAN: 81.3 CM/SEC
BH CV ECHO MEAS - MV V2 VTI: 21.3 CM
BH CV ECHO MEAS - MVA P1/2T LCG: 1.7 CM^2
BH CV ECHO MEAS - MVA(P1/2T): 5 CM^2
BH CV ECHO MEAS - MVA(VTI): 3.5 CM^2
BH CV ECHO MEAS - PA ACC SLOPE: 129 CM/SEC^2
BH CV ECHO MEAS - PA ACC TIME: 0.05 SEC
BH CV ECHO MEAS - PA MAX PG (FULL): 2.2 MMHG
BH CV ECHO MEAS - PA MAX PG: 5.5 MMHG
BH CV ECHO MEAS - PA PR(ACCEL): 55.2 MMHG
BH CV ECHO MEAS - PA V2 MAX: 117 CM/SEC
BH CV ECHO MEAS - PVA(V,A): 2.4 CM^2
BH CV ECHO MEAS - PVA(V,D): 2.4 CM^2
BH CV ECHO MEAS - QP/QS: 0.75
BH CV ECHO MEAS - RV MAX PG: 3.3 MMHG
BH CV ECHO MEAS - RV MEAN PG: 2 MMHG
BH CV ECHO MEAS - RV V1 MAX: 90.2 CM/SEC
BH CV ECHO MEAS - RV V1 MEAN: 61.5 CM/SEC
BH CV ECHO MEAS - RV V1 VTI: 17.5 CM
BH CV ECHO MEAS - RVOT AREA: 3.1 CM^2
BH CV ECHO MEAS - RVOT DIAM: 2 CM
BH CV ECHO MEAS - SI(AO): 87.9 ML/M^2
BH CV ECHO MEAS - SI(CUBED): 33.1 ML/M^2
BH CV ECHO MEAS - SI(LVOT): 37.2 ML/M^2
BH CV ECHO MEAS - SI(MOD-SP2): 22.2 ML/M^2
BH CV ECHO MEAS - SI(MOD-SP4): 37.4 ML/M^2
BH CV ECHO MEAS - SI(TEICH): 30.7 ML/M^2
BH CV ECHO MEAS - SUP REN AO DIAM: 2.06 CM
BH CV ECHO MEAS - SV(AO): 173.9 ML
BH CV ECHO MEAS - SV(CUBED): 65.5 ML
BH CV ECHO MEAS - SV(LVOT): 73.5 ML
BH CV ECHO MEAS - SV(MOD-SP2): 44 ML
BH CV ECHO MEAS - SV(MOD-SP4): 74 ML
BH CV ECHO MEAS - SV(RVOT): 55 ML
BH CV ECHO MEAS - SV(TEICH): 60.7 ML
BH CV ECHO MEAS - TAPSE (>1.6): 1.3 CM2
BH CV XLRA - RV BASE: 2.8 CM
BH CV XLRA - TDI S': 17 CM/SEC
DEPRECATED RDW RBC AUTO: 47.9 FL (ref 37–54)
E/E' RATIO: 12
EOSINOPHIL # BLD AUTO: 0.22 10*3/MM3 (ref 0–0.7)
EOSINOPHIL NFR BLD AUTO: 1.5 % (ref 0.3–6.2)
ERYTHROCYTE [DISTWIDTH] IN BLOOD BY AUTOMATED COUNT: 14.1 % (ref 11.7–13)
HCT VFR BLD AUTO: 32.6 % (ref 35.6–45.5)
HGB BLD-MCNC: 10.4 G/DL (ref 11.9–15.5)
IMM GRANULOCYTES # BLD: 0.11 10*3/MM3 (ref 0–0.03)
IMM GRANULOCYTES NFR BLD: 0.7 % (ref 0–0.5)
LEFT ATRIUM VOLUME INDEX: 27 ML/M2
LV EF 2D ECHO EST: 65 %
LYMPHOCYTES # BLD AUTO: 1.3 10*3/MM3 (ref 0.9–4.8)
LYMPHOCYTES NFR BLD AUTO: 8.6 % (ref 19.6–45.3)
MCH RBC QN AUTO: 29.4 PG (ref 26.9–32)
MCHC RBC AUTO-ENTMCNC: 31.9 G/DL (ref 32.4–36.3)
MCV RBC AUTO: 92.1 FL (ref 80.5–98.2)
MONOCYTES # BLD AUTO: 1.96 10*3/MM3 (ref 0.2–1.2)
MONOCYTES NFR BLD AUTO: 13 % (ref 5–12)
NEUTROPHILS # BLD AUTO: 11.51 10*3/MM3 (ref 1.9–8.1)
NEUTROPHILS NFR BLD AUTO: 76.1 % (ref 42.7–76)
PLATELET # BLD AUTO: 268 10*3/MM3 (ref 140–500)
PMV BLD AUTO: 12 FL (ref 6–12)
RBC # BLD AUTO: 3.54 10*6/MM3 (ref 3.9–5.2)
WBC NRBC COR # BLD: 15.11 10*3/MM3 (ref 4.5–10.7)

## 2017-05-09 PROCEDURE — 25010000002 PROMETHAZINE PER 50 MG: Performed by: HOSPITALIST

## 2017-05-09 PROCEDURE — 93005 ELECTROCARDIOGRAM TRACING: CPT | Performed by: NURSE PRACTITIONER

## 2017-05-09 PROCEDURE — 99233 SBSQ HOSP IP/OBS HIGH 50: CPT | Performed by: INTERNAL MEDICINE

## 2017-05-09 PROCEDURE — 93010 ELECTROCARDIOGRAM REPORT: CPT | Performed by: INTERNAL MEDICINE

## 2017-05-09 PROCEDURE — 25010000002 ENOXAPARIN PER 10 MG: Performed by: SURGERY

## 2017-05-09 PROCEDURE — 85025 COMPLETE CBC W/AUTO DIFF WBC: CPT | Performed by: NURSE PRACTITIONER

## 2017-05-09 PROCEDURE — 25810000003 SODIUM CHLORIDE 0.9 % WITH KCL 20 MEQ 20-0.9 MEQ/L-% SOLUTION: Performed by: SURGERY

## 2017-05-09 PROCEDURE — 97110 THERAPEUTIC EXERCISES: CPT

## 2017-05-09 PROCEDURE — 25010000002 METOCLOPRAMIDE PER 10 MG: Performed by: SURGERY

## 2017-05-09 PROCEDURE — 25010000002 LEVOFLOXACIN PER 250 MG: Performed by: SURGERY

## 2017-05-09 RX ORDER — BISACODYL 10 MG
10 SUPPOSITORY, RECTAL RECTAL ONCE
Status: COMPLETED | OUTPATIENT
Start: 2017-05-09 | End: 2017-05-09

## 2017-05-09 RX ORDER — MORPHINE SULFATE IN 0.9 % NACL 50 MG/50ML
PATIENT CONTROLLED ANALGESIA SYRINGE INTRAVENOUS CONTINUOUS
Status: DISCONTINUED | OUTPATIENT
Start: 2017-05-09 | End: 2017-05-10

## 2017-05-09 RX ADMIN — BISACODYL 10 MG: 10 SUPPOSITORY RECTAL at 08:43

## 2017-05-09 RX ADMIN — METOCLOPRAMIDE 10 MG: 5 INJECTION, SOLUTION INTRAMUSCULAR; INTRAVENOUS at 12:02

## 2017-05-09 RX ADMIN — DILTIAZEM HYDROCHLORIDE 15 MG/HR: 5 INJECTION INTRAVENOUS at 23:10

## 2017-05-09 RX ADMIN — DILTIAZEM HYDROCHLORIDE 15 MG/HR: 5 INJECTION INTRAVENOUS at 08:41

## 2017-05-09 RX ADMIN — PANTOPRAZOLE SODIUM 40 MG: 40 INJECTION, POWDER, FOR SOLUTION INTRAVENOUS at 06:27

## 2017-05-09 RX ADMIN — METOCLOPRAMIDE 10 MG: 5 INJECTION, SOLUTION INTRAMUSCULAR; INTRAVENOUS at 23:11

## 2017-05-09 RX ADMIN — PROMETHAZINE HYDROCHLORIDE 12.5 MG: 25 INJECTION, SOLUTION INTRAMUSCULAR; INTRAVENOUS at 23:10

## 2017-05-09 RX ADMIN — METOCLOPRAMIDE 10 MG: 5 INJECTION, SOLUTION INTRAMUSCULAR; INTRAVENOUS at 06:27

## 2017-05-09 RX ADMIN — LEVOFLOXACIN 500 MG: 5 INJECTION, SOLUTION INTRAVENOUS at 08:46

## 2017-05-09 RX ADMIN — METOROPROLOL TARTRATE 2.5 MG: 5 INJECTION, SOLUTION INTRAVENOUS at 18:37

## 2017-05-09 RX ADMIN — ENOXAPARIN SODIUM 90 MG: 100 INJECTION SUBCUTANEOUS at 08:45

## 2017-05-09 RX ADMIN — METOROPROLOL TARTRATE 2.5 MG: 5 INJECTION, SOLUTION INTRAVENOUS at 12:02

## 2017-05-09 RX ADMIN — METOROPROLOL TARTRATE 5 MG: 5 INJECTION, SOLUTION INTRAVENOUS at 23:10

## 2017-05-09 RX ADMIN — METOCLOPRAMIDE 10 MG: 5 INJECTION, SOLUTION INTRAMUSCULAR; INTRAVENOUS at 18:38

## 2017-05-09 RX ADMIN — ENOXAPARIN SODIUM 90 MG: 100 INJECTION SUBCUTANEOUS at 23:11

## 2017-05-09 RX ADMIN — DILTIAZEM HYDROCHLORIDE 15 MG/HR: 5 INJECTION INTRAVENOUS at 02:08

## 2017-05-09 RX ADMIN — DILTIAZEM HYDROCHLORIDE 15 MG/HR: 5 INJECTION INTRAVENOUS at 15:34

## 2017-05-09 RX ADMIN — POTASSIUM CHLORIDE AND SODIUM CHLORIDE 90 ML/HR: 900; 150 INJECTION, SOLUTION INTRAVENOUS at 23:10

## 2017-05-09 RX ADMIN — POTASSIUM CHLORIDE AND SODIUM CHLORIDE 90 ML/HR: 900; 150 INJECTION, SOLUTION INTRAVENOUS at 15:34

## 2017-05-09 RX ADMIN — METOROPROLOL TARTRATE 2.5 MG: 5 INJECTION, SOLUTION INTRAVENOUS at 06:26

## 2017-05-10 ENCOUNTER — APPOINTMENT (OUTPATIENT)
Dept: CT IMAGING | Facility: HOSPITAL | Age: 72
End: 2017-05-10
Attending: SURGERY

## 2017-05-10 ENCOUNTER — APPOINTMENT (OUTPATIENT)
Dept: GENERAL RADIOLOGY | Facility: HOSPITAL | Age: 72
End: 2017-05-10

## 2017-05-10 LAB
ALBUMIN SERPL-MCNC: 2.3 G/DL (ref 3.5–5.2)
ALBUMIN/GLOB SERPL: 0.7 G/DL
ALP SERPL-CCNC: 53 U/L (ref 39–117)
ALT SERPL W P-5'-P-CCNC: 25 U/L (ref 1–33)
ANION GAP SERPL CALCULATED.3IONS-SCNC: 16 MMOL/L
AST SERPL-CCNC: 33 U/L (ref 1–32)
BASOPHILS # BLD AUTO: 0.02 10*3/MM3 (ref 0–0.2)
BASOPHILS NFR BLD AUTO: 0.1 % (ref 0–1.5)
BILIRUB SERPL-MCNC: 0.3 MG/DL (ref 0.1–1.2)
BUN BLD-MCNC: 4 MG/DL (ref 8–23)
BUN/CREAT SERPL: 11.8 (ref 7–25)
CALCIUM SPEC-SCNC: 8.2 MG/DL (ref 8.6–10.5)
CHLORIDE SERPL-SCNC: 101 MMOL/L (ref 98–107)
CO2 SERPL-SCNC: 23 MMOL/L (ref 22–29)
CREAT BLD-MCNC: 0.34 MG/DL (ref 0.57–1)
DEPRECATED RDW RBC AUTO: 46.8 FL (ref 37–54)
EOSINOPHIL # BLD AUTO: 0.18 10*3/MM3 (ref 0–0.7)
EOSINOPHIL NFR BLD AUTO: 1.2 % (ref 0.3–6.2)
ERYTHROCYTE [DISTWIDTH] IN BLOOD BY AUTOMATED COUNT: 13.8 % (ref 11.7–13)
GFR SERPL CREATININE-BSD FRML MDRD: >150 ML/MIN/1.73
GLOBULIN UR ELPH-MCNC: 3.3 GM/DL
GLUCOSE BLD-MCNC: 105 MG/DL (ref 65–99)
HCT VFR BLD AUTO: 33.6 % (ref 35.6–45.5)
HGB BLD-MCNC: 10.6 G/DL (ref 11.9–15.5)
IMM GRANULOCYTES # BLD: 0.14 10*3/MM3 (ref 0–0.03)
IMM GRANULOCYTES NFR BLD: 0.9 % (ref 0–0.5)
INR PPP: 1.3 (ref 0.9–1.1)
LYMPHOCYTES # BLD AUTO: 1.47 10*3/MM3 (ref 0.9–4.8)
LYMPHOCYTES NFR BLD AUTO: 9.7 % (ref 19.6–45.3)
MCH RBC QN AUTO: 29.3 PG (ref 26.9–32)
MCHC RBC AUTO-ENTMCNC: 31.5 G/DL (ref 32.4–36.3)
MCV RBC AUTO: 92.8 FL (ref 80.5–98.2)
MONOCYTES # BLD AUTO: 1.84 10*3/MM3 (ref 0.2–1.2)
MONOCYTES NFR BLD AUTO: 12.1 % (ref 5–12)
NEUTROPHILS # BLD AUTO: 11.5 10*3/MM3 (ref 1.9–8.1)
NEUTROPHILS NFR BLD AUTO: 76 % (ref 42.7–76)
PLATELET # BLD AUTO: 292 10*3/MM3 (ref 140–500)
PMV BLD AUTO: 11.8 FL (ref 6–12)
POTASSIUM BLD-SCNC: 3.7 MMOL/L (ref 3.5–5.2)
PROT SERPL-MCNC: 5.6 G/DL (ref 6–8.5)
PROTHROMBIN TIME: 15.7 SECONDS (ref 11.7–14.2)
RBC # BLD AUTO: 3.62 10*6/MM3 (ref 3.9–5.2)
SODIUM BLD-SCNC: 140 MMOL/L (ref 136–145)
WBC NRBC COR # BLD: 15.15 10*3/MM3 (ref 4.5–10.7)

## 2017-05-10 PROCEDURE — C1751 CATH, INF, PER/CENT/MIDLINE: HCPCS

## 2017-05-10 PROCEDURE — 93010 ELECTROCARDIOGRAM REPORT: CPT | Performed by: INTERNAL MEDICINE

## 2017-05-10 PROCEDURE — 87015 SPECIMEN INFECT AGNT CONCNTJ: CPT | Performed by: INTERNAL MEDICINE

## 2017-05-10 PROCEDURE — 0W9G3ZX DRAINAGE OF PERITONEAL CAVITY, PERCUTANEOUS APPROACH, DIAGNOSTIC: ICD-10-PCS | Performed by: SURGERY

## 2017-05-10 PROCEDURE — 87205 SMEAR GRAM STAIN: CPT | Performed by: INTERNAL MEDICINE

## 2017-05-10 PROCEDURE — 80053 COMPREHEN METABOLIC PANEL: CPT | Performed by: INTERNAL MEDICINE

## 2017-05-10 PROCEDURE — 25010000002 ENOXAPARIN PER 10 MG: Performed by: SURGERY

## 2017-05-10 PROCEDURE — 87070 CULTURE OTHR SPECIMN AEROBIC: CPT | Performed by: INTERNAL MEDICINE

## 2017-05-10 PROCEDURE — 75989 ABSCESS DRAINAGE UNDER X-RAY: CPT

## 2017-05-10 PROCEDURE — 25810000003 SODIUM CHLORIDE 0.9 % WITH KCL 20 MEQ 20-0.9 MEQ/L-% SOLUTION: Performed by: SURGERY

## 2017-05-10 PROCEDURE — 74177 CT ABD & PELVIS W/CONTRAST: CPT

## 2017-05-10 PROCEDURE — 97110 THERAPEUTIC EXERCISES: CPT

## 2017-05-10 PROCEDURE — 25010000002 METOCLOPRAMIDE PER 10 MG: Performed by: SURGERY

## 2017-05-10 PROCEDURE — 85025 COMPLETE CBC W/AUTO DIFF WBC: CPT | Performed by: INTERNAL MEDICINE

## 2017-05-10 PROCEDURE — 25010000002 LEVOFLOXACIN PER 250 MG: Performed by: SURGERY

## 2017-05-10 PROCEDURE — 0 IOPAMIDOL 61 % SOLUTION: Performed by: INTERNAL MEDICINE

## 2017-05-10 PROCEDURE — 99233 SBSQ HOSP IP/OBS HIGH 50: CPT | Performed by: INTERNAL MEDICINE

## 2017-05-10 PROCEDURE — 85610 PROTHROMBIN TIME: CPT | Performed by: SURGERY

## 2017-05-10 PROCEDURE — 25010000002 PROMETHAZINE PER 50 MG: Performed by: HOSPITALIST

## 2017-05-10 PROCEDURE — 87186 SC STD MICRODIL/AGAR DIL: CPT | Performed by: INTERNAL MEDICINE

## 2017-05-10 PROCEDURE — 25010000002 MORPHINE PER 10 MG: Performed by: SURGERY

## 2017-05-10 PROCEDURE — 93005 ELECTROCARDIOGRAM TRACING: CPT | Performed by: NURSE PRACTITIONER

## 2017-05-10 RX ORDER — MORPHINE SULFATE IN 0.9 % NACL 50 MG/50ML
PATIENT CONTROLLED ANALGESIA SYRINGE INTRAVENOUS CONTINUOUS
Status: DISCONTINUED | OUTPATIENT
Start: 2017-05-10 | End: 2017-05-12

## 2017-05-10 RX ORDER — LIDOCAINE HYDROCHLORIDE 10 MG/ML
20 INJECTION, SOLUTION INFILTRATION; PERINEURAL ONCE
Status: COMPLETED | OUTPATIENT
Start: 2017-05-10 | End: 2017-05-10

## 2017-05-10 RX ORDER — SODIUM CHLORIDE 0.9 % (FLUSH) 0.9 %
10 SYRINGE (ML) INJECTION EVERY 12 HOURS SCHEDULED
Status: DISCONTINUED | OUTPATIENT
Start: 2017-05-10 | End: 2017-05-28 | Stop reason: HOSPADM

## 2017-05-10 RX ORDER — SODIUM CHLORIDE 0.9 % (FLUSH) 0.9 %
10 SYRINGE (ML) INJECTION AS NEEDED
Status: DISCONTINUED | OUTPATIENT
Start: 2017-05-10 | End: 2017-05-28 | Stop reason: HOSPADM

## 2017-05-10 RX ADMIN — METOROPROLOL TARTRATE 5 MG: 5 INJECTION, SOLUTION INTRAVENOUS at 11:46

## 2017-05-10 RX ADMIN — POTASSIUM CHLORIDE AND SODIUM CHLORIDE 90 ML/HR: 900; 150 INJECTION, SOLUTION INTRAVENOUS at 23:24

## 2017-05-10 RX ADMIN — ENOXAPARIN SODIUM 90 MG: 100 INJECTION SUBCUTANEOUS at 21:13

## 2017-05-10 RX ADMIN — DILTIAZEM HYDROCHLORIDE 15 MG/HR: 5 INJECTION INTRAVENOUS at 15:30

## 2017-05-10 RX ADMIN — PROMETHAZINE HYDROCHLORIDE 12.5 MG: 25 INJECTION, SOLUTION INTRAMUSCULAR; INTRAVENOUS at 15:52

## 2017-05-10 RX ADMIN — LIDOCAINE HYDROCHLORIDE 20 ML: 10 INJECTION, SOLUTION INFILTRATION; PERINEURAL at 15:16

## 2017-05-10 RX ADMIN — METOROPROLOL TARTRATE 5 MG: 5 INJECTION, SOLUTION INTRAVENOUS at 18:24

## 2017-05-10 RX ADMIN — ACETAMINOPHEN 650 MG: 325 TABLET ORAL at 23:25

## 2017-05-10 RX ADMIN — LEVOFLOXACIN 500 MG: 5 INJECTION, SOLUTION INTRAVENOUS at 08:53

## 2017-05-10 RX ADMIN — DILTIAZEM HYDROCHLORIDE 15 MG/HR: 5 INJECTION INTRAVENOUS at 05:44

## 2017-05-10 RX ADMIN — METOROPROLOL TARTRATE 5 MG: 5 INJECTION, SOLUTION INTRAVENOUS at 05:44

## 2017-05-10 RX ADMIN — Medication: at 08:32

## 2017-05-10 RX ADMIN — PROMETHAZINE HYDROCHLORIDE 12.5 MG: 25 INJECTION, SOLUTION INTRAMUSCULAR; INTRAVENOUS at 23:02

## 2017-05-10 RX ADMIN — PANTOPRAZOLE SODIUM 40 MG: 40 INJECTION, POWDER, FOR SOLUTION INTRAVENOUS at 06:27

## 2017-05-10 RX ADMIN — IOPAMIDOL 85 ML: 612 INJECTION, SOLUTION INTRAVENOUS at 10:48

## 2017-05-10 RX ADMIN — METOCLOPRAMIDE 10 MG: 5 INJECTION, SOLUTION INTRAMUSCULAR; INTRAVENOUS at 05:44

## 2017-05-10 RX ADMIN — METOCLOPRAMIDE 10 MG: 5 INJECTION, SOLUTION INTRAMUSCULAR; INTRAVENOUS at 18:24

## 2017-05-11 LAB
ALBUMIN SERPL-MCNC: 2.1 G/DL (ref 3.5–5.2)
ALBUMIN/GLOB SERPL: 0.6 G/DL
ALP SERPL-CCNC: 57 U/L (ref 39–117)
ALT SERPL W P-5'-P-CCNC: 42 U/L (ref 1–33)
ANION GAP SERPL CALCULATED.3IONS-SCNC: 18.7 MMOL/L
AST SERPL-CCNC: 49 U/L (ref 1–32)
BASOPHILS # BLD AUTO: 0.01 10*3/MM3 (ref 0–0.2)
BASOPHILS NFR BLD AUTO: 0.1 % (ref 0–1.5)
BILIRUB SERPL-MCNC: 0.5 MG/DL (ref 0.1–1.2)
BUN BLD-MCNC: 6 MG/DL (ref 8–23)
BUN/CREAT SERPL: 19.4 (ref 7–25)
CALCIUM SPEC-SCNC: 8.4 MG/DL (ref 8.6–10.5)
CHLORIDE SERPL-SCNC: 101 MMOL/L (ref 98–107)
CO2 SERPL-SCNC: 21.3 MMOL/L (ref 22–29)
CREAT BLD-MCNC: 0.31 MG/DL (ref 0.57–1)
D-LACTATE SERPL-SCNC: 0.9 MMOL/L (ref 0.5–2)
DEPRECATED RDW RBC AUTO: 45.1 FL (ref 37–54)
EOSINOPHIL # BLD AUTO: 0.01 10*3/MM3 (ref 0–0.7)
EOSINOPHIL NFR BLD AUTO: 0.1 % (ref 0.3–6.2)
ERYTHROCYTE [DISTWIDTH] IN BLOOD BY AUTOMATED COUNT: 13.7 % (ref 11.7–13)
GFR SERPL CREATININE-BSD FRML MDRD: >150 ML/MIN/1.73
GLOBULIN UR ELPH-MCNC: 3.6 GM/DL
GLUCOSE BLD-MCNC: 117 MG/DL (ref 65–99)
HCT VFR BLD AUTO: 35.2 % (ref 35.6–45.5)
HGB BLD-MCNC: 11.3 G/DL (ref 11.9–15.5)
IMM GRANULOCYTES # BLD: 0.21 10*3/MM3 (ref 0–0.03)
IMM GRANULOCYTES NFR BLD: 1.1 % (ref 0–0.5)
LYMPHOCYTES # BLD AUTO: 1.13 10*3/MM3 (ref 0.9–4.8)
LYMPHOCYTES NFR BLD AUTO: 5.9 % (ref 19.6–45.3)
MAGNESIUM SERPL-MCNC: 1.8 MG/DL (ref 1.6–2.4)
MCH RBC QN AUTO: 29 PG (ref 26.9–32)
MCHC RBC AUTO-ENTMCNC: 32.1 G/DL (ref 32.4–36.3)
MCV RBC AUTO: 90.3 FL (ref 80.5–98.2)
MONOCYTES # BLD AUTO: 1.65 10*3/MM3 (ref 0.2–1.2)
MONOCYTES NFR BLD AUTO: 8.6 % (ref 5–12)
NEUTROPHILS # BLD AUTO: 16.26 10*3/MM3 (ref 1.9–8.1)
NEUTROPHILS NFR BLD AUTO: 84.2 % (ref 42.7–76)
PHOSPHATE SERPL-MCNC: 3.3 MG/DL (ref 2.5–4.5)
PLATELET # BLD AUTO: 306 10*3/MM3 (ref 140–500)
PMV BLD AUTO: 12.2 FL (ref 6–12)
POTASSIUM BLD-SCNC: 3.4 MMOL/L (ref 3.5–5.2)
PROCALCITONIN SERPL-MCNC: 0.35 NG/ML (ref 0.1–0.25)
PROT SERPL-MCNC: 5.7 G/DL (ref 6–8.5)
RBC # BLD AUTO: 3.9 10*6/MM3 (ref 3.9–5.2)
SODIUM BLD-SCNC: 141 MMOL/L (ref 136–145)
WBC NRBC COR # BLD: 19.27 10*3/MM3 (ref 4.5–10.7)

## 2017-05-11 PROCEDURE — 25010000002 METOCLOPRAMIDE PER 10 MG: Performed by: SURGERY

## 2017-05-11 PROCEDURE — 87040 BLOOD CULTURE FOR BACTERIA: CPT | Performed by: HOSPITALIST

## 2017-05-11 PROCEDURE — 93010 ELECTROCARDIOGRAM REPORT: CPT | Performed by: INTERNAL MEDICINE

## 2017-05-11 PROCEDURE — 25010000002 MORPHINE PER 10 MG: Performed by: SURGERY

## 2017-05-11 PROCEDURE — 80053 COMPREHEN METABOLIC PANEL: CPT | Performed by: SURGERY

## 2017-05-11 PROCEDURE — 83735 ASSAY OF MAGNESIUM: CPT | Performed by: NURSE PRACTITIONER

## 2017-05-11 PROCEDURE — 25010000002 MAGNESIUM SULFATE PER 500 MG OF MAGNESIUM: Performed by: SURGERY

## 2017-05-11 PROCEDURE — 84100 ASSAY OF PHOSPHORUS: CPT | Performed by: SURGERY

## 2017-05-11 PROCEDURE — 93005 ELECTROCARDIOGRAM TRACING: CPT | Performed by: NURSE PRACTITIONER

## 2017-05-11 PROCEDURE — 83605 ASSAY OF LACTIC ACID: CPT | Performed by: HOSPITALIST

## 2017-05-11 PROCEDURE — 99232 SBSQ HOSP IP/OBS MODERATE 35: CPT | Performed by: INTERNAL MEDICINE

## 2017-05-11 PROCEDURE — 99222 1ST HOSP IP/OBS MODERATE 55: CPT | Performed by: SURGERY

## 2017-05-11 PROCEDURE — 25010000002 ONDANSETRON PER 1 MG: Performed by: INTERNAL MEDICINE

## 2017-05-11 PROCEDURE — 25010000002 FLUCONAZOLE PER 200 MG: Performed by: INTERNAL MEDICINE

## 2017-05-11 PROCEDURE — 25810000003 SODIUM CHLORIDE 0.9 % WITH KCL 20 MEQ 20-0.9 MEQ/L-% SOLUTION: Performed by: SURGERY

## 2017-05-11 PROCEDURE — 25010000002 ENOXAPARIN PER 10 MG: Performed by: SURGERY

## 2017-05-11 PROCEDURE — 25010000002 POTASSIUM CHLORIDE PER 2 MEQ OF POTASSIUM: Performed by: SURGERY

## 2017-05-11 PROCEDURE — 25010000002 LEVOFLOXACIN PER 250 MG: Performed by: SURGERY

## 2017-05-11 PROCEDURE — 85025 COMPLETE CBC W/AUTO DIFF WBC: CPT | Performed by: SURGERY

## 2017-05-11 PROCEDURE — 25010000002 CALCIUM GLUCONATE PER 10 ML: Performed by: SURGERY

## 2017-05-11 PROCEDURE — 25010000002 LINEZOLID 600 MG/300ML SOLUTION: Performed by: INTERNAL MEDICINE

## 2017-05-11 PROCEDURE — 84145 PROCALCITONIN (PCT): CPT | Performed by: HOSPITALIST

## 2017-05-11 RX ORDER — FLUCONAZOLE 2 MG/ML
400 INJECTION, SOLUTION INTRAVENOUS DAILY
Status: DISCONTINUED | OUTPATIENT
Start: 2017-05-11 | End: 2017-05-26

## 2017-05-11 RX ORDER — DEXTROSE MONOHYDRATE 100 MG/ML
75 INJECTION, SOLUTION INTRAVENOUS CONTINUOUS
Status: DISCONTINUED | OUTPATIENT
Start: 2017-05-11 | End: 2017-05-13

## 2017-05-11 RX ORDER — LINEZOLID 2 MG/ML
600 INJECTION, SOLUTION INTRAVENOUS EVERY 12 HOURS
Status: DISCONTINUED | OUTPATIENT
Start: 2017-05-11 | End: 2017-05-26

## 2017-05-11 RX ADMIN — ERTAPENEM SODIUM 1 G: 1 INJECTION, POWDER, LYOPHILIZED, FOR SOLUTION INTRAMUSCULAR; INTRAVENOUS at 12:39

## 2017-05-11 RX ADMIN — METOPROLOL TARTRATE 5 MG: 5 INJECTION, SOLUTION INTRAVENOUS at 15:58

## 2017-05-11 RX ADMIN — METRONIDAZOLE 500 MG: 500 INJECTION, SOLUTION INTRAVENOUS at 21:36

## 2017-05-11 RX ADMIN — Medication 10 ML: at 09:16

## 2017-05-11 RX ADMIN — METOPROLOL TARTRATE 5 MG: 5 INJECTION, SOLUTION INTRAVENOUS at 00:52

## 2017-05-11 RX ADMIN — LINEZOLID 600 MG: 600 INJECTION, SOLUTION INTRAVENOUS at 13:44

## 2017-05-11 RX ADMIN — METOPROLOL TARTRATE 5 MG: 5 INJECTION, SOLUTION INTRAVENOUS at 21:36

## 2017-05-11 RX ADMIN — LEVOFLOXACIN 500 MG: 5 INJECTION, SOLUTION INTRAVENOUS at 09:16

## 2017-05-11 RX ADMIN — Medication 10 ML: at 22:09

## 2017-05-11 RX ADMIN — ONDANSETRON 4 MG: 2 INJECTION INTRAMUSCULAR; INTRAVENOUS at 06:19

## 2017-05-11 RX ADMIN — DILTIAZEM HYDROCHLORIDE 5 MG/HR: 5 INJECTION INTRAVENOUS at 01:47

## 2017-05-11 RX ADMIN — ENOXAPARIN SODIUM 90 MG: 100 INJECTION SUBCUTANEOUS at 21:35

## 2017-05-11 RX ADMIN — POTASSIUM CHLORIDE: 2 INJECTION, SOLUTION, CONCENTRATE INTRAVENOUS at 18:50

## 2017-05-11 RX ADMIN — METOCLOPRAMIDE 10 MG: 5 INJECTION, SOLUTION INTRAMUSCULAR; INTRAVENOUS at 00:52

## 2017-05-11 RX ADMIN — FLUCONAZOLE 400 MG: 2 INJECTION INTRAVENOUS at 12:39

## 2017-05-11 RX ADMIN — ACETAMINOPHEN 650 MG: 325 TABLET ORAL at 22:09

## 2017-05-11 RX ADMIN — Medication 10 ML: at 00:52

## 2017-05-11 RX ADMIN — POTASSIUM CHLORIDE AND SODIUM CHLORIDE 90 ML/HR: 900; 150 INJECTION, SOLUTION INTRAVENOUS at 11:34

## 2017-05-11 RX ADMIN — METOCLOPRAMIDE 10 MG: 5 INJECTION, SOLUTION INTRAMUSCULAR; INTRAVENOUS at 12:38

## 2017-05-11 RX ADMIN — METRONIDAZOLE 500 MG: 500 INJECTION, SOLUTION INTRAVENOUS at 13:53

## 2017-05-11 RX ADMIN — ENOXAPARIN SODIUM 90 MG: 100 INJECTION SUBCUTANEOUS at 09:02

## 2017-05-11 RX ADMIN — PANTOPRAZOLE SODIUM 40 MG: 40 INJECTION, POWDER, FOR SOLUTION INTRAVENOUS at 06:19

## 2017-05-11 RX ADMIN — METOCLOPRAMIDE 10 MG: 5 INJECTION, SOLUTION INTRAMUSCULAR; INTRAVENOUS at 06:19

## 2017-05-11 RX ADMIN — METOPROLOL TARTRATE 5 MG: 5 INJECTION, SOLUTION INTRAVENOUS at 06:19

## 2017-05-11 RX ADMIN — METOPROLOL TARTRATE 5 MG: 5 INJECTION, SOLUTION INTRAVENOUS at 11:01

## 2017-05-11 RX ADMIN — Medication: at 16:46

## 2017-05-11 RX ADMIN — METOCLOPRAMIDE 10 MG: 5 INJECTION, SOLUTION INTRAMUSCULAR; INTRAVENOUS at 18:50

## 2017-05-11 RX ADMIN — Medication 10 ML: at 09:04

## 2017-05-12 LAB
ALBUMIN SERPL-MCNC: 1.8 G/DL (ref 3.5–5.2)
ALBUMIN/GLOB SERPL: 0.6 G/DL
ALP SERPL-CCNC: 50 U/L (ref 39–117)
ALT SERPL W P-5'-P-CCNC: 27 U/L (ref 1–33)
ANION GAP SERPL CALCULATED.3IONS-SCNC: 10.1 MMOL/L
AST SERPL-CCNC: 27 U/L (ref 1–32)
BACTERIA FLD CULT: ABNORMAL
BASOPHILS # BLD AUTO: 0.01 10*3/MM3 (ref 0–0.2)
BASOPHILS NFR BLD AUTO: 0.1 % (ref 0–1.5)
BILIRUB SERPL-MCNC: 0.3 MG/DL (ref 0.1–1.2)
BUN BLD-MCNC: 7 MG/DL (ref 8–23)
BUN/CREAT SERPL: 18.4 (ref 7–25)
CA-I BLD-MCNC: 4.7 MG/DL (ref 4.6–5.4)
CA-I SERPL ISE-MCNC: 1.18 MMOL/L (ref 1.1–1.35)
CALCIUM SPEC-SCNC: 7.5 MG/DL (ref 8.6–10.5)
CHLORIDE SERPL-SCNC: 102 MMOL/L (ref 98–107)
CHOLEST SERPL-MCNC: 82 MG/DL (ref 0–200)
CO2 SERPL-SCNC: 28.9 MMOL/L (ref 22–29)
CREAT BLD-MCNC: 0.38 MG/DL (ref 0.57–1)
CRP SERPL-MCNC: 24.3 MG/DL (ref 0–0.5)
DEPRECATED RDW RBC AUTO: 45.9 FL (ref 37–54)
EOSINOPHIL # BLD AUTO: 0.17 10*3/MM3 (ref 0–0.7)
EOSINOPHIL NFR BLD AUTO: 1.2 % (ref 0.3–6.2)
ERYTHROCYTE [DISTWIDTH] IN BLOOD BY AUTOMATED COUNT: 13.7 % (ref 11.7–13)
GFR SERPL CREATININE-BSD FRML MDRD: >150 ML/MIN/1.73
GLOBULIN UR ELPH-MCNC: 3 GM/DL
GLUCOSE BLD-MCNC: 157 MG/DL (ref 65–99)
GLUCOSE BLDC GLUCOMTR-MCNC: 146 MG/DL (ref 70–130)
GRAM STN SPEC: ABNORMAL
GRAM STN SPEC: ABNORMAL
HCT VFR BLD AUTO: 29.6 % (ref 35.6–45.5)
HGB BLD-MCNC: 9.3 G/DL (ref 11.9–15.5)
IMM GRANULOCYTES # BLD: 0.15 10*3/MM3 (ref 0–0.03)
IMM GRANULOCYTES NFR BLD: 1.1 % (ref 0–0.5)
LYMPHOCYTES # BLD AUTO: 0.81 10*3/MM3 (ref 0.9–4.8)
LYMPHOCYTES NFR BLD AUTO: 5.8 % (ref 19.6–45.3)
MAGNESIUM SERPL-MCNC: 1.9 MG/DL (ref 1.6–2.4)
MCH RBC QN AUTO: 28.5 PG (ref 26.9–32)
MCHC RBC AUTO-ENTMCNC: 31.4 G/DL (ref 32.4–36.3)
MCV RBC AUTO: 90.8 FL (ref 80.5–98.2)
MONOCYTES # BLD AUTO: 1.46 10*3/MM3 (ref 0.2–1.2)
MONOCYTES NFR BLD AUTO: 10.5 % (ref 5–12)
NEUTROPHILS # BLD AUTO: 11.26 10*3/MM3 (ref 1.9–8.1)
NEUTROPHILS NFR BLD AUTO: 81.3 % (ref 42.7–76)
PHOSPHATE SERPL-MCNC: 2.4 MG/DL (ref 2.5–4.5)
PLATELET # BLD AUTO: 234 10*3/MM3 (ref 140–500)
PMV BLD AUTO: 12.6 FL (ref 6–12)
POTASSIUM BLD-SCNC: 3 MMOL/L (ref 3.5–5.2)
PREALB SERPL-MCNC: 3.9 MG/DL (ref 20–40)
PROT SERPL-MCNC: 4.8 G/DL (ref 6–8.5)
RBC # BLD AUTO: 3.26 10*6/MM3 (ref 3.9–5.2)
SODIUM BLD-SCNC: 141 MMOL/L (ref 136–145)
TRIGL SERPL-MCNC: 137 MG/DL (ref 0–150)
WBC NRBC COR # BLD: 13.86 10*3/MM3 (ref 4.5–10.7)

## 2017-05-12 PROCEDURE — 93005 ELECTROCARDIOGRAM TRACING: CPT | Performed by: NURSE PRACTITIONER

## 2017-05-12 PROCEDURE — 97110 THERAPEUTIC EXERCISES: CPT

## 2017-05-12 PROCEDURE — 84478 ASSAY OF TRIGLYCERIDES: CPT | Performed by: SURGERY

## 2017-05-12 PROCEDURE — 82330 ASSAY OF CALCIUM: CPT | Performed by: SURGERY

## 2017-05-12 PROCEDURE — 86140 C-REACTIVE PROTEIN: CPT | Performed by: SURGERY

## 2017-05-12 PROCEDURE — 25010000002 PROMETHAZINE PER 50 MG: Performed by: HOSPITALIST

## 2017-05-12 PROCEDURE — 25010000003 POTASSIUM CHLORIDE 10 MEQ/100ML SOLUTION: Performed by: HOSPITALIST

## 2017-05-12 PROCEDURE — 82962 GLUCOSE BLOOD TEST: CPT

## 2017-05-12 PROCEDURE — 25010000002 MAGNESIUM SULFATE PER 500 MG OF MAGNESIUM: Performed by: SURGERY

## 2017-05-12 PROCEDURE — 25010000002 ENOXAPARIN PER 10 MG: Performed by: SURGERY

## 2017-05-12 PROCEDURE — 80053 COMPREHEN METABOLIC PANEL: CPT | Performed by: SURGERY

## 2017-05-12 PROCEDURE — 82465 ASSAY BLD/SERUM CHOLESTEROL: CPT | Performed by: SURGERY

## 2017-05-12 PROCEDURE — 25010000002 METOCLOPRAMIDE PER 10 MG: Performed by: SURGERY

## 2017-05-12 PROCEDURE — 99232 SBSQ HOSP IP/OBS MODERATE 35: CPT | Performed by: INTERNAL MEDICINE

## 2017-05-12 PROCEDURE — 84100 ASSAY OF PHOSPHORUS: CPT | Performed by: SURGERY

## 2017-05-12 PROCEDURE — 93010 ELECTROCARDIOGRAM REPORT: CPT | Performed by: INTERNAL MEDICINE

## 2017-05-12 PROCEDURE — 25010000002 LINEZOLID 600 MG/300ML SOLUTION: Performed by: INTERNAL MEDICINE

## 2017-05-12 PROCEDURE — 84134 ASSAY OF PREALBUMIN: CPT | Performed by: SURGERY

## 2017-05-12 PROCEDURE — 83735 ASSAY OF MAGNESIUM: CPT | Performed by: SURGERY

## 2017-05-12 PROCEDURE — 85025 COMPLETE CBC W/AUTO DIFF WBC: CPT | Performed by: SURGERY

## 2017-05-12 PROCEDURE — 25010000002 CALCIUM GLUCONATE PER 10 ML: Performed by: SURGERY

## 2017-05-12 PROCEDURE — 25010000003 POTASSIUM CHLORIDE 10 MEQ/100ML SOLUTION: Performed by: SURGERY

## 2017-05-12 PROCEDURE — 25010000002 POTASSIUM CHLORIDE PER 2 MEQ OF POTASSIUM: Performed by: SURGERY

## 2017-05-12 PROCEDURE — 25010000002 FLUCONAZOLE PER 200 MG: Performed by: INTERNAL MEDICINE

## 2017-05-12 PROCEDURE — 25010000002 MORPHINE PER 10 MG: Performed by: SURGERY

## 2017-05-12 PROCEDURE — 25810000003 SODIUM CHLORIDE 0.9 % WITH KCL 20 MEQ 20-0.9 MEQ/L-% SOLUTION: Performed by: SURGERY

## 2017-05-12 RX ORDER — LORAZEPAM 0.5 MG/1
0.25 TABLET ORAL EVERY 6 HOURS PRN
Status: DISPENSED | OUTPATIENT
Start: 2017-05-12 | End: 2017-05-22

## 2017-05-12 RX ORDER — MORPHINE SULFATE IN 0.9 % NACL 50 MG/50ML
PATIENT CONTROLLED ANALGESIA SYRINGE INTRAVENOUS CONTINUOUS
Status: DISCONTINUED | OUTPATIENT
Start: 2017-05-12 | End: 2017-05-15

## 2017-05-12 RX ORDER — POTASSIUM CHLORIDE 7.45 MG/ML
10 INJECTION INTRAVENOUS
Status: DISCONTINUED | OUTPATIENT
Start: 2017-05-12 | End: 2017-05-28 | Stop reason: HOSPADM

## 2017-05-12 RX ADMIN — PROMETHAZINE HYDROCHLORIDE 12.5 MG: 25 INJECTION, SOLUTION INTRAMUSCULAR; INTRAVENOUS at 00:31

## 2017-05-12 RX ADMIN — METOCLOPRAMIDE 10 MG: 5 INJECTION, SOLUTION INTRAMUSCULAR; INTRAVENOUS at 06:29

## 2017-05-12 RX ADMIN — METOPROLOL TARTRATE 5 MG: 5 INJECTION, SOLUTION INTRAVENOUS at 01:15

## 2017-05-12 RX ADMIN — METOCLOPRAMIDE 10 MG: 5 INJECTION, SOLUTION INTRAMUSCULAR; INTRAVENOUS at 00:35

## 2017-05-12 RX ADMIN — METOPROLOL TARTRATE 5 MG: 5 INJECTION, SOLUTION INTRAVENOUS at 04:38

## 2017-05-12 RX ADMIN — METRONIDAZOLE 500 MG: 500 INJECTION, SOLUTION INTRAVENOUS at 14:46

## 2017-05-12 RX ADMIN — ENOXAPARIN SODIUM 90 MG: 100 INJECTION SUBCUTANEOUS at 22:25

## 2017-05-12 RX ADMIN — LINEZOLID 600 MG: 600 INJECTION, SOLUTION INTRAVENOUS at 23:48

## 2017-05-12 RX ADMIN — POTASSIUM CHLORIDE: 2 INJECTION, SOLUTION, CONCENTRATE INTRAVENOUS at 17:51

## 2017-05-12 RX ADMIN — Medication: at 12:31

## 2017-05-12 RX ADMIN — METOPROLOL TARTRATE 5 MG: 5 INJECTION, SOLUTION INTRAVENOUS at 23:44

## 2017-05-12 RX ADMIN — LINEZOLID 600 MG: 600 INJECTION, SOLUTION INTRAVENOUS at 11:30

## 2017-05-12 RX ADMIN — POTASSIUM CHLORIDE 10 MEQ: 7.46 INJECTION, SOLUTION INTRAVENOUS at 19:51

## 2017-05-12 RX ADMIN — METRONIDAZOLE 500 MG: 500 INJECTION, SOLUTION INTRAVENOUS at 06:29

## 2017-05-12 RX ADMIN — METOPROLOL TARTRATE 5 MG: 5 INJECTION, SOLUTION INTRAVENOUS at 18:43

## 2017-05-12 RX ADMIN — Medication 10 ML: at 09:00

## 2017-05-12 RX ADMIN — FLUCONAZOLE 400 MG: 2 INJECTION INTRAVENOUS at 09:00

## 2017-05-12 RX ADMIN — METOPROLOL TARTRATE 5 MG: 5 INJECTION, SOLUTION INTRAVENOUS at 09:00

## 2017-05-12 RX ADMIN — METOPROLOL TARTRATE 5 MG: 5 INJECTION, SOLUTION INTRAVENOUS at 14:45

## 2017-05-12 RX ADMIN — PANTOPRAZOLE SODIUM 40 MG: 40 INJECTION, POWDER, FOR SOLUTION INTRAVENOUS at 06:29

## 2017-05-12 RX ADMIN — ENOXAPARIN SODIUM 100 MG: 100 INJECTION SUBCUTANEOUS at 11:30

## 2017-05-12 RX ADMIN — METRONIDAZOLE 500 MG: 500 INJECTION, SOLUTION INTRAVENOUS at 22:46

## 2017-05-12 RX ADMIN — POTASSIUM CHLORIDE AND SODIUM CHLORIDE 90 ML/HR: 900; 150 INJECTION, SOLUTION INTRAVENOUS at 14:34

## 2017-05-12 RX ADMIN — METOCLOPRAMIDE 10 MG: 5 INJECTION, SOLUTION INTRAMUSCULAR; INTRAVENOUS at 23:52

## 2017-05-12 RX ADMIN — POTASSIUM CHLORIDE 10 MEQ: 7.46 INJECTION, SOLUTION INTRAVENOUS at 22:36

## 2017-05-12 RX ADMIN — Medication 10 ML: at 22:25

## 2017-05-12 RX ADMIN — POTASSIUM CHLORIDE 10 MEQ: 7.46 INJECTION, SOLUTION INTRAVENOUS at 23:44

## 2017-05-12 RX ADMIN — POTASSIUM CHLORIDE AND SODIUM CHLORIDE 90 ML/HR: 900; 150 INJECTION, SOLUTION INTRAVENOUS at 00:31

## 2017-05-12 RX ADMIN — POTASSIUM CHLORIDE 10 MEQ: 7.46 INJECTION, SOLUTION INTRAVENOUS at 15:54

## 2017-05-12 RX ADMIN — METOCLOPRAMIDE 10 MG: 5 INJECTION, SOLUTION INTRAMUSCULAR; INTRAVENOUS at 11:31

## 2017-05-12 RX ADMIN — POTASSIUM CHLORIDE 10 MEQ: 7.46 INJECTION, SOLUTION INTRAVENOUS at 18:43

## 2017-05-12 RX ADMIN — LINEZOLID 600 MG: 600 INJECTION, SOLUTION INTRAVENOUS at 00:32

## 2017-05-12 RX ADMIN — Medication: at 16:01

## 2017-05-12 RX ADMIN — ERTAPENEM SODIUM 1 G: 1 INJECTION, POWDER, LYOPHILIZED, FOR SOLUTION INTRAMUSCULAR; INTRAVENOUS at 12:30

## 2017-05-12 RX ADMIN — METOCLOPRAMIDE 10 MG: 5 INJECTION, SOLUTION INTRAMUSCULAR; INTRAVENOUS at 18:43

## 2017-05-13 LAB
ALBUMIN SERPL-MCNC: 1.8 G/DL (ref 3.5–5.2)
ALBUMIN/GLOB SERPL: 0.6 G/DL
ALP SERPL-CCNC: 51 U/L (ref 39–117)
ALT SERPL W P-5'-P-CCNC: 20 U/L (ref 1–33)
ANION GAP SERPL CALCULATED.3IONS-SCNC: 10.2 MMOL/L
AST SERPL-CCNC: 17 U/L (ref 1–32)
BASOPHILS # BLD AUTO: 0.02 10*3/MM3 (ref 0–0.2)
BASOPHILS NFR BLD AUTO: 0.2 % (ref 0–1.5)
BILIRUB SERPL-MCNC: 0.2 MG/DL (ref 0.1–1.2)
BUN BLD-MCNC: 8 MG/DL (ref 8–23)
BUN/CREAT SERPL: 22.9 (ref 7–25)
CA-I BLD-MCNC: 4.6 MG/DL (ref 4.6–5.4)
CA-I SERPL ISE-MCNC: 1.16 MMOL/L (ref 1.1–1.35)
CALCIUM SPEC-SCNC: 7.7 MG/DL (ref 8.6–10.5)
CHLORIDE SERPL-SCNC: 101 MMOL/L (ref 98–107)
CO2 SERPL-SCNC: 28.8 MMOL/L (ref 22–29)
CREAT BLD-MCNC: 0.35 MG/DL (ref 0.57–1)
CRP SERPL-MCNC: 18.48 MG/DL (ref 0–0.5)
DEPRECATED RDW RBC AUTO: 46.6 FL (ref 37–54)
EOSINOPHIL # BLD AUTO: 0.33 10*3/MM3 (ref 0–0.7)
EOSINOPHIL NFR BLD AUTO: 2.7 % (ref 0.3–6.2)
ERYTHROCYTE [DISTWIDTH] IN BLOOD BY AUTOMATED COUNT: 13.9 % (ref 11.7–13)
GFR SERPL CREATININE-BSD FRML MDRD: >150 ML/MIN/1.73
GLOBULIN UR ELPH-MCNC: 3.2 GM/DL
GLUCOSE BLD-MCNC: 160 MG/DL (ref 65–99)
GLUCOSE BLDC GLUCOMTR-MCNC: 123 MG/DL (ref 70–130)
GLUCOSE BLDC GLUCOMTR-MCNC: 128 MG/DL (ref 70–130)
GLUCOSE BLDC GLUCOMTR-MCNC: 151 MG/DL (ref 70–130)
GLUCOSE BLDC GLUCOMTR-MCNC: 160 MG/DL (ref 70–130)
HCT VFR BLD AUTO: 29.4 % (ref 35.6–45.5)
HGB BLD-MCNC: 9.3 G/DL (ref 11.9–15.5)
IMM GRANULOCYTES # BLD: 0.17 10*3/MM3 (ref 0–0.03)
IMM GRANULOCYTES NFR BLD: 1.4 % (ref 0–0.5)
LYMPHOCYTES # BLD AUTO: 1.15 10*3/MM3 (ref 0.9–4.8)
LYMPHOCYTES NFR BLD AUTO: 9.3 % (ref 19.6–45.3)
MAGNESIUM SERPL-MCNC: 2 MG/DL (ref 1.6–2.4)
MCH RBC QN AUTO: 28.8 PG (ref 26.9–32)
MCHC RBC AUTO-ENTMCNC: 31.6 G/DL (ref 32.4–36.3)
MCV RBC AUTO: 91 FL (ref 80.5–98.2)
MONOCYTES # BLD AUTO: 1.27 10*3/MM3 (ref 0.2–1.2)
MONOCYTES NFR BLD AUTO: 10.2 % (ref 5–12)
NEUTROPHILS # BLD AUTO: 9.49 10*3/MM3 (ref 1.9–8.1)
NEUTROPHILS NFR BLD AUTO: 76.2 % (ref 42.7–76)
PHOSPHATE SERPL-MCNC: 2.7 MG/DL (ref 2.5–4.5)
PLATELET # BLD AUTO: 262 10*3/MM3 (ref 140–500)
PMV BLD AUTO: 12.7 FL (ref 6–12)
POTASSIUM BLD-SCNC: 3.7 MMOL/L (ref 3.5–5.2)
PROCALCITONIN SERPL-MCNC: 0.31 NG/ML (ref 0.1–0.25)
PROT SERPL-MCNC: 5 G/DL (ref 6–8.5)
RBC # BLD AUTO: 3.23 10*6/MM3 (ref 3.9–5.2)
SODIUM BLD-SCNC: 140 MMOL/L (ref 136–145)
WBC NRBC COR # BLD: 12.43 10*3/MM3 (ref 4.5–10.7)

## 2017-05-13 PROCEDURE — 84100 ASSAY OF PHOSPHORUS: CPT | Performed by: SURGERY

## 2017-05-13 PROCEDURE — 93005 ELECTROCARDIOGRAM TRACING: CPT | Performed by: NURSE PRACTITIONER

## 2017-05-13 PROCEDURE — 25810000003 SODIUM CHLORIDE 0.9 % WITH KCL 20 MEQ 20-0.9 MEQ/L-% SOLUTION: Performed by: SURGERY

## 2017-05-13 PROCEDURE — 25010000002 METOCLOPRAMIDE PER 10 MG: Performed by: SURGERY

## 2017-05-13 PROCEDURE — 25010000002 LINEZOLID 600 MG/300ML SOLUTION: Performed by: INTERNAL MEDICINE

## 2017-05-13 PROCEDURE — 25010000002 ENOXAPARIN PER 10 MG: Performed by: SURGERY

## 2017-05-13 PROCEDURE — 97110 THERAPEUTIC EXERCISES: CPT

## 2017-05-13 PROCEDURE — 80053 COMPREHEN METABOLIC PANEL: CPT | Performed by: SURGERY

## 2017-05-13 PROCEDURE — 25010000003 POTASSIUM CHLORIDE 10 MEQ/100ML SOLUTION: Performed by: SURGERY

## 2017-05-13 PROCEDURE — 83735 ASSAY OF MAGNESIUM: CPT | Performed by: SURGERY

## 2017-05-13 PROCEDURE — 85025 COMPLETE CBC W/AUTO DIFF WBC: CPT | Performed by: SURGERY

## 2017-05-13 PROCEDURE — 99232 SBSQ HOSP IP/OBS MODERATE 35: CPT | Performed by: INTERNAL MEDICINE

## 2017-05-13 PROCEDURE — 25010000002 CALCIUM GLUCONATE PER 10 ML: Performed by: SURGERY

## 2017-05-13 PROCEDURE — 84145 PROCALCITONIN (PCT): CPT | Performed by: HOSPITALIST

## 2017-05-13 PROCEDURE — 25010000002 FLUCONAZOLE PER 200 MG: Performed by: INTERNAL MEDICINE

## 2017-05-13 PROCEDURE — 93010 ELECTROCARDIOGRAM REPORT: CPT | Performed by: INTERNAL MEDICINE

## 2017-05-13 PROCEDURE — 86140 C-REACTIVE PROTEIN: CPT | Performed by: HOSPITALIST

## 2017-05-13 PROCEDURE — 25010000002 MAGNESIUM SULFATE PER 500 MG OF MAGNESIUM: Performed by: SURGERY

## 2017-05-13 PROCEDURE — 25010000002 POTASSIUM CHLORIDE PER 2 MEQ OF POTASSIUM: Performed by: SURGERY

## 2017-05-13 PROCEDURE — 82330 ASSAY OF CALCIUM: CPT | Performed by: SURGERY

## 2017-05-13 PROCEDURE — 25010000002 MORPHINE PER 10 MG: Performed by: SURGERY

## 2017-05-13 PROCEDURE — 82962 GLUCOSE BLOOD TEST: CPT

## 2017-05-13 RX ADMIN — METOCLOPRAMIDE 10 MG: 5 INJECTION, SOLUTION INTRAMUSCULAR; INTRAVENOUS at 11:58

## 2017-05-13 RX ADMIN — METOPROLOL TARTRATE 5 MG: 5 INJECTION, SOLUTION INTRAVENOUS at 12:54

## 2017-05-13 RX ADMIN — POTASSIUM CHLORIDE AND SODIUM CHLORIDE 90 ML/HR: 900; 150 INJECTION, SOLUTION INTRAVENOUS at 10:05

## 2017-05-13 RX ADMIN — METRONIDAZOLE 500 MG: 500 INJECTION, SOLUTION INTRAVENOUS at 22:09

## 2017-05-13 RX ADMIN — Medication 10 ML: at 22:08

## 2017-05-13 RX ADMIN — METOPROLOL TARTRATE 5 MG: 5 INJECTION, SOLUTION INTRAVENOUS at 16:47

## 2017-05-13 RX ADMIN — METOPROLOL TARTRATE 5 MG: 5 INJECTION, SOLUTION INTRAVENOUS at 05:19

## 2017-05-13 RX ADMIN — Medication: at 07:14

## 2017-05-13 RX ADMIN — POTASSIUM CHLORIDE: 2 INJECTION, SOLUTION, CONCENTRATE INTRAVENOUS at 18:05

## 2017-05-13 RX ADMIN — FLUCONAZOLE 400 MG: 2 INJECTION INTRAVENOUS at 10:05

## 2017-05-13 RX ADMIN — METRONIDAZOLE 500 MG: 500 INJECTION, SOLUTION INTRAVENOUS at 14:19

## 2017-05-13 RX ADMIN — POTASSIUM CHLORIDE 10 MEQ: 7.46 INJECTION, SOLUTION INTRAVENOUS at 02:21

## 2017-05-13 RX ADMIN — LINEZOLID 600 MG: 600 INJECTION, SOLUTION INTRAVENOUS at 12:54

## 2017-05-13 RX ADMIN — METOPROLOL TARTRATE 5 MG: 5 INJECTION, SOLUTION INTRAVENOUS at 10:05

## 2017-05-13 RX ADMIN — ENOXAPARIN SODIUM 90 MG: 100 INJECTION SUBCUTANEOUS at 10:06

## 2017-05-13 RX ADMIN — PANTOPRAZOLE SODIUM 40 MG: 40 INJECTION, POWDER, FOR SOLUTION INTRAVENOUS at 05:19

## 2017-05-13 RX ADMIN — ERTAPENEM SODIUM 1 G: 1 INJECTION, POWDER, LYOPHILIZED, FOR SOLUTION INTRAMUSCULAR; INTRAVENOUS at 11:58

## 2017-05-13 RX ADMIN — Medication: at 17:54

## 2017-05-13 RX ADMIN — I.V. FAT EMULSION 20 G: 20 EMULSION INTRAVENOUS at 18:05

## 2017-05-13 RX ADMIN — Medication 10 ML: at 10:06

## 2017-05-13 RX ADMIN — METOCLOPRAMIDE 10 MG: 5 INJECTION, SOLUTION INTRAMUSCULAR; INTRAVENOUS at 18:05

## 2017-05-13 RX ADMIN — ACETAMINOPHEN 650 MG: 325 TABLET ORAL at 22:09

## 2017-05-13 RX ADMIN — METOPROLOL TARTRATE 5 MG: 5 INJECTION, SOLUTION INTRAVENOUS at 19:57

## 2017-05-13 RX ADMIN — METOCLOPRAMIDE 10 MG: 5 INJECTION, SOLUTION INTRAMUSCULAR; INTRAVENOUS at 05:20

## 2017-05-13 RX ADMIN — METRONIDAZOLE 500 MG: 500 INJECTION, SOLUTION INTRAVENOUS at 05:20

## 2017-05-13 RX ADMIN — ENOXAPARIN SODIUM 90 MG: 100 INJECTION SUBCUTANEOUS at 22:09

## 2017-05-14 LAB
ALBUMIN SERPL-MCNC: 1.7 G/DL (ref 3.5–5.2)
ALBUMIN/GLOB SERPL: 0.5 G/DL
ALP SERPL-CCNC: 58 U/L (ref 39–117)
ALT SERPL W P-5'-P-CCNC: 46 U/L (ref 1–33)
ANION GAP SERPL CALCULATED.3IONS-SCNC: 10.1 MMOL/L
AST SERPL-CCNC: 100 U/L (ref 1–32)
BASOPHILS # BLD AUTO: 0.01 10*3/MM3 (ref 0–0.2)
BASOPHILS NFR BLD AUTO: 0.1 % (ref 0–1.5)
BILIRUB SERPL-MCNC: 0.2 MG/DL (ref 0.1–1.2)
BUN BLD-MCNC: 8 MG/DL (ref 8–23)
BUN/CREAT SERPL: 23.5 (ref 7–25)
CALCIUM SPEC-SCNC: 7.6 MG/DL (ref 8.6–10.5)
CHLORIDE SERPL-SCNC: 97 MMOL/L (ref 98–107)
CO2 SERPL-SCNC: 28.9 MMOL/L (ref 22–29)
CREAT BLD-MCNC: 0.34 MG/DL (ref 0.57–1)
DEPRECATED RDW RBC AUTO: 47.1 FL (ref 37–54)
EOSINOPHIL # BLD AUTO: 0.27 10*3/MM3 (ref 0–0.7)
EOSINOPHIL NFR BLD AUTO: 3 % (ref 0.3–6.2)
ERYTHROCYTE [DISTWIDTH] IN BLOOD BY AUTOMATED COUNT: 14 % (ref 11.7–13)
GFR SERPL CREATININE-BSD FRML MDRD: >150 ML/MIN/1.73
GLOBULIN UR ELPH-MCNC: 3.1 GM/DL
GLUCOSE BLD-MCNC: 151 MG/DL (ref 65–99)
GLUCOSE BLDC GLUCOMTR-MCNC: 137 MG/DL (ref 70–130)
GLUCOSE BLDC GLUCOMTR-MCNC: 145 MG/DL (ref 70–130)
HCT VFR BLD AUTO: 28.4 % (ref 35.6–45.5)
HGB BLD-MCNC: 8.9 G/DL (ref 11.9–15.5)
IMM GRANULOCYTES # BLD: 0.12 10*3/MM3 (ref 0–0.03)
IMM GRANULOCYTES NFR BLD: 1.3 % (ref 0–0.5)
LYMPHOCYTES # BLD AUTO: 1.14 10*3/MM3 (ref 0.9–4.8)
LYMPHOCYTES NFR BLD AUTO: 12.6 % (ref 19.6–45.3)
MAGNESIUM SERPL-MCNC: 1.9 MG/DL (ref 1.6–2.4)
MCH RBC QN AUTO: 28.9 PG (ref 26.9–32)
MCHC RBC AUTO-ENTMCNC: 31.3 G/DL (ref 32.4–36.3)
MCV RBC AUTO: 92.2 FL (ref 80.5–98.2)
MONOCYTES # BLD AUTO: 1.01 10*3/MM3 (ref 0.2–1.2)
MONOCYTES NFR BLD AUTO: 11.1 % (ref 5–12)
NEUTROPHILS # BLD AUTO: 6.53 10*3/MM3 (ref 1.9–8.1)
NEUTROPHILS NFR BLD AUTO: 71.9 % (ref 42.7–76)
PHOSPHATE SERPL-MCNC: 3.8 MG/DL (ref 2.5–4.5)
PLATELET # BLD AUTO: 221 10*3/MM3 (ref 140–500)
PMV BLD AUTO: 11.7 FL (ref 6–12)
POTASSIUM BLD-SCNC: 4 MMOL/L (ref 3.5–5.2)
PROT SERPL-MCNC: 4.8 G/DL (ref 6–8.5)
RBC # BLD AUTO: 3.08 10*6/MM3 (ref 3.9–5.2)
SODIUM BLD-SCNC: 136 MMOL/L (ref 136–145)
WBC NRBC COR # BLD: 9.08 10*3/MM3 (ref 4.5–10.7)

## 2017-05-14 PROCEDURE — 84100 ASSAY OF PHOSPHORUS: CPT | Performed by: SURGERY

## 2017-05-14 PROCEDURE — 80053 COMPREHEN METABOLIC PANEL: CPT | Performed by: HOSPITALIST

## 2017-05-14 PROCEDURE — 93010 ELECTROCARDIOGRAM REPORT: CPT | Performed by: INTERNAL MEDICINE

## 2017-05-14 PROCEDURE — 93005 ELECTROCARDIOGRAM TRACING: CPT | Performed by: NURSE PRACTITIONER

## 2017-05-14 PROCEDURE — 25010000002 LINEZOLID 600 MG/300ML SOLUTION: Performed by: INTERNAL MEDICINE

## 2017-05-14 PROCEDURE — 25010000002 POTASSIUM CHLORIDE PER 2 MEQ OF POTASSIUM: Performed by: SURGERY

## 2017-05-14 PROCEDURE — 25810000003 SODIUM CHLORIDE 0.9 % WITH KCL 20 MEQ 20-0.9 MEQ/L-% SOLUTION: Performed by: SURGERY

## 2017-05-14 PROCEDURE — 25010000002 METOCLOPRAMIDE PER 10 MG: Performed by: SURGERY

## 2017-05-14 PROCEDURE — 25010000002 MAGNESIUM SULFATE PER 500 MG OF MAGNESIUM: Performed by: SURGERY

## 2017-05-14 PROCEDURE — 82962 GLUCOSE BLOOD TEST: CPT

## 2017-05-14 PROCEDURE — 25010000002 FLUCONAZOLE PER 200 MG: Performed by: INTERNAL MEDICINE

## 2017-05-14 PROCEDURE — 83735 ASSAY OF MAGNESIUM: CPT | Performed by: SURGERY

## 2017-05-14 PROCEDURE — 97110 THERAPEUTIC EXERCISES: CPT

## 2017-05-14 PROCEDURE — 85025 COMPLETE CBC W/AUTO DIFF WBC: CPT | Performed by: SURGERY

## 2017-05-14 PROCEDURE — 99231 SBSQ HOSP IP/OBS SF/LOW 25: CPT | Performed by: INTERNAL MEDICINE

## 2017-05-14 PROCEDURE — 25010000002 CALCIUM GLUCONATE PER 10 ML: Performed by: SURGERY

## 2017-05-14 PROCEDURE — 25010000002 ENOXAPARIN PER 10 MG: Performed by: SURGERY

## 2017-05-14 PROCEDURE — 25010000002 MORPHINE PER 10 MG: Performed by: SURGERY

## 2017-05-14 RX ADMIN — METRONIDAZOLE 500 MG: 500 INJECTION, SOLUTION INTRAVENOUS at 21:10

## 2017-05-14 RX ADMIN — METOPROLOL TARTRATE 5 MG: 5 INJECTION, SOLUTION INTRAVENOUS at 00:18

## 2017-05-14 RX ADMIN — METOPROLOL TARTRATE 5 MG: 5 INJECTION, SOLUTION INTRAVENOUS at 05:29

## 2017-05-14 RX ADMIN — LINEZOLID 600 MG: 600 INJECTION, SOLUTION INTRAVENOUS at 00:17

## 2017-05-14 RX ADMIN — METRONIDAZOLE 500 MG: 500 INJECTION, SOLUTION INTRAVENOUS at 05:29

## 2017-05-14 RX ADMIN — METOCLOPRAMIDE 10 MG: 5 INJECTION, SOLUTION INTRAMUSCULAR; INTRAVENOUS at 11:23

## 2017-05-14 RX ADMIN — METOPROLOL TARTRATE 5 MG: 5 INJECTION, SOLUTION INTRAVENOUS at 12:59

## 2017-05-14 RX ADMIN — LINEZOLID 600 MG: 600 INJECTION, SOLUTION INTRAVENOUS at 11:23

## 2017-05-14 RX ADMIN — METOCLOPRAMIDE 10 MG: 5 INJECTION, SOLUTION INTRAMUSCULAR; INTRAVENOUS at 17:46

## 2017-05-14 RX ADMIN — PANTOPRAZOLE SODIUM 40 MG: 40 INJECTION, POWDER, FOR SOLUTION INTRAVENOUS at 05:29

## 2017-05-14 RX ADMIN — POTASSIUM CHLORIDE: 2 INJECTION, SOLUTION, CONCENTRATE INTRAVENOUS at 17:46

## 2017-05-14 RX ADMIN — Medication 10 ML: at 08:30

## 2017-05-14 RX ADMIN — ERTAPENEM SODIUM 1 G: 1 INJECTION, POWDER, LYOPHILIZED, FOR SOLUTION INTRAMUSCULAR; INTRAVENOUS at 12:59

## 2017-05-14 RX ADMIN — Medication 10 ML: at 21:10

## 2017-05-14 RX ADMIN — METOPROLOL TARTRATE 5 MG: 5 INJECTION, SOLUTION INTRAVENOUS at 08:30

## 2017-05-14 RX ADMIN — METOPROLOL TARTRATE 5 MG: 5 INJECTION, SOLUTION INTRAVENOUS at 16:54

## 2017-05-14 RX ADMIN — FLUCONAZOLE 400 MG: 2 INJECTION INTRAVENOUS at 08:29

## 2017-05-14 RX ADMIN — METRONIDAZOLE 500 MG: 500 INJECTION, SOLUTION INTRAVENOUS at 14:36

## 2017-05-14 RX ADMIN — METOCLOPRAMIDE 10 MG: 5 INJECTION, SOLUTION INTRAMUSCULAR; INTRAVENOUS at 05:29

## 2017-05-14 RX ADMIN — ENOXAPARIN SODIUM 90 MG: 100 INJECTION SUBCUTANEOUS at 08:30

## 2017-05-14 RX ADMIN — POTASSIUM CHLORIDE AND SODIUM CHLORIDE 90 ML/HR: 900; 150 INJECTION, SOLUTION INTRAVENOUS at 17:46

## 2017-05-14 RX ADMIN — METOPROLOL TARTRATE 5 MG: 5 INJECTION, SOLUTION INTRAVENOUS at 21:10

## 2017-05-14 RX ADMIN — ENOXAPARIN SODIUM 100 MG: 100 INJECTION SUBCUTANEOUS at 21:10

## 2017-05-14 RX ADMIN — METOCLOPRAMIDE 10 MG: 5 INJECTION, SOLUTION INTRAMUSCULAR; INTRAVENOUS at 00:18

## 2017-05-14 RX ADMIN — I.V. FAT EMULSION 20 G: 20 EMULSION INTRAVENOUS at 17:46

## 2017-05-14 RX ADMIN — POTASSIUM CHLORIDE AND SODIUM CHLORIDE 90 ML/HR: 900; 150 INJECTION, SOLUTION INTRAVENOUS at 05:31

## 2017-05-14 RX ADMIN — Medication: at 17:41

## 2017-05-14 RX ADMIN — Medication: at 00:26

## 2017-05-15 LAB
ALBUMIN SERPL-MCNC: 1.8 G/DL (ref 3.5–5.2)
ALBUMIN/GLOB SERPL: 0.5 G/DL
ALP SERPL-CCNC: 59 U/L (ref 39–117)
ALT SERPL W P-5'-P-CCNC: 44 U/L (ref 1–33)
ANION GAP SERPL CALCULATED.3IONS-SCNC: 8.6 MMOL/L
AST SERPL-CCNC: 60 U/L (ref 1–32)
BASOPHILS # BLD AUTO: 0.02 10*3/MM3 (ref 0–0.2)
BASOPHILS NFR BLD AUTO: 0.2 % (ref 0–1.5)
BILIRUB SERPL-MCNC: 0.2 MG/DL (ref 0.1–1.2)
BUN BLD-MCNC: 9 MG/DL (ref 8–23)
BUN/CREAT SERPL: 24.3 (ref 7–25)
CA-I BLD-MCNC: 4.4 MG/DL (ref 4.6–5.4)
CA-I SERPL ISE-MCNC: 1.1 MMOL/L (ref 1.1–1.35)
CALCIUM SPEC-SCNC: 7.8 MG/DL (ref 8.6–10.5)
CHLORIDE SERPL-SCNC: 97 MMOL/L (ref 98–107)
CO2 SERPL-SCNC: 27.4 MMOL/L (ref 22–29)
CREAT BLD-MCNC: 0.37 MG/DL (ref 0.57–1)
CRP SERPL-MCNC: 12.53 MG/DL (ref 0–0.5)
DEPRECATED RDW RBC AUTO: 46.3 FL (ref 37–54)
EOSINOPHIL # BLD AUTO: 0.29 10*3/MM3 (ref 0–0.7)
EOSINOPHIL NFR BLD AUTO: 3 % (ref 0.3–6.2)
ERYTHROCYTE [DISTWIDTH] IN BLOOD BY AUTOMATED COUNT: 13.9 % (ref 11.7–13)
GFR SERPL CREATININE-BSD FRML MDRD: >150 ML/MIN/1.73
GLOBULIN UR ELPH-MCNC: 3.4 GM/DL
GLUCOSE BLD-MCNC: 326 MG/DL (ref 65–99)
GLUCOSE BLDC GLUCOMTR-MCNC: 147 MG/DL (ref 70–130)
GLUCOSE BLDC GLUCOMTR-MCNC: 172 MG/DL (ref 70–130)
GLUCOSE BLDC GLUCOMTR-MCNC: 183 MG/DL (ref 70–130)
GLUCOSE BLDC GLUCOMTR-MCNC: 195 MG/DL (ref 70–130)
GLUCOSE BLDC GLUCOMTR-MCNC: 88 MG/DL (ref 70–130)
HCT VFR BLD AUTO: 28.9 % (ref 35.6–45.5)
HGB BLD-MCNC: 9 G/DL (ref 11.9–15.5)
IMM GRANULOCYTES # BLD: 0.16 10*3/MM3 (ref 0–0.03)
IMM GRANULOCYTES NFR BLD: 1.6 % (ref 0–0.5)
LYMPHOCYTES # BLD AUTO: 1.19 10*3/MM3 (ref 0.9–4.8)
LYMPHOCYTES NFR BLD AUTO: 12.2 % (ref 19.6–45.3)
MAGNESIUM SERPL-MCNC: 2 MG/DL (ref 1.6–2.4)
MCH RBC QN AUTO: 28 PG (ref 26.9–32)
MCHC RBC AUTO-ENTMCNC: 31.1 G/DL (ref 32.4–36.3)
MCV RBC AUTO: 90 FL (ref 80.5–98.2)
MONOCYTES # BLD AUTO: 1.3 10*3/MM3 (ref 0.2–1.2)
MONOCYTES NFR BLD AUTO: 13.3 % (ref 5–12)
NEUTROPHILS # BLD AUTO: 6.8 10*3/MM3 (ref 1.9–8.1)
NEUTROPHILS NFR BLD AUTO: 69.7 % (ref 42.7–76)
PHOSPHATE SERPL-MCNC: 3.7 MG/DL (ref 2.5–4.5)
PLATELET # BLD AUTO: 249 10*3/MM3 (ref 140–500)
PMV BLD AUTO: 12 FL (ref 6–12)
POTASSIUM BLD-SCNC: 4.4 MMOL/L (ref 3.5–5.2)
POTASSIUM BLD-SCNC: 5.3 MMOL/L (ref 3.5–5.2)
PREALB SERPL-MCNC: 7.2 MG/DL (ref 20–40)
PROT SERPL-MCNC: 5.2 G/DL (ref 6–8.5)
RBC # BLD AUTO: 3.21 10*6/MM3 (ref 3.9–5.2)
SODIUM BLD-SCNC: 133 MMOL/L (ref 136–145)
WBC NRBC COR # BLD: 9.76 10*3/MM3 (ref 4.5–10.7)

## 2017-05-15 PROCEDURE — 25010000002 MAGNESIUM SULFATE PER 500 MG OF MAGNESIUM: Performed by: SURGERY

## 2017-05-15 PROCEDURE — 25010000002 CALCIUM GLUCONATE PER 10 ML: Performed by: SURGERY

## 2017-05-15 PROCEDURE — 80053 COMPREHEN METABOLIC PANEL: CPT | Performed by: HOSPITALIST

## 2017-05-15 PROCEDURE — 82330 ASSAY OF CALCIUM: CPT | Performed by: SURGERY

## 2017-05-15 PROCEDURE — 86140 C-REACTIVE PROTEIN: CPT | Performed by: SURGERY

## 2017-05-15 PROCEDURE — 25010000002 FLUCONAZOLE PER 200 MG: Performed by: INTERNAL MEDICINE

## 2017-05-15 PROCEDURE — 25010000002 POTASSIUM CHLORIDE PER 2 MEQ OF POTASSIUM: Performed by: SURGERY

## 2017-05-15 PROCEDURE — 97110 THERAPEUTIC EXERCISES: CPT

## 2017-05-15 PROCEDURE — 83735 ASSAY OF MAGNESIUM: CPT | Performed by: SURGERY

## 2017-05-15 PROCEDURE — 99232 SBSQ HOSP IP/OBS MODERATE 35: CPT | Performed by: INTERNAL MEDICINE

## 2017-05-15 PROCEDURE — 25010000002 ENOXAPARIN PER 10 MG: Performed by: SURGERY

## 2017-05-15 PROCEDURE — 85025 COMPLETE CBC W/AUTO DIFF WBC: CPT | Performed by: SURGERY

## 2017-05-15 PROCEDURE — 82962 GLUCOSE BLOOD TEST: CPT

## 2017-05-15 PROCEDURE — 25010000002 MORPHINE PER 10 MG: Performed by: SURGERY

## 2017-05-15 PROCEDURE — 25010000002 METOCLOPRAMIDE PER 10 MG: Performed by: SURGERY

## 2017-05-15 PROCEDURE — 84132 ASSAY OF SERUM POTASSIUM: CPT | Performed by: HOSPITALIST

## 2017-05-15 PROCEDURE — 84100 ASSAY OF PHOSPHORUS: CPT | Performed by: SURGERY

## 2017-05-15 PROCEDURE — 25010000002 LINEZOLID 600 MG/300ML SOLUTION: Performed by: INTERNAL MEDICINE

## 2017-05-15 PROCEDURE — 84134 ASSAY OF PREALBUMIN: CPT | Performed by: SURGERY

## 2017-05-15 RX ORDER — SODIUM CHLORIDE 9 MG/ML
50 INJECTION, SOLUTION INTRAVENOUS CONTINUOUS
Status: DISCONTINUED | OUTPATIENT
Start: 2017-05-15 | End: 2017-05-25

## 2017-05-15 RX ORDER — MORPHINE SULFATE IN 0.9 % NACL 50 MG/50ML
PATIENT CONTROLLED ANALGESIA SYRINGE INTRAVENOUS CONTINUOUS
Status: DISCONTINUED | OUTPATIENT
Start: 2017-05-15 | End: 2017-05-16

## 2017-05-15 RX ADMIN — PANTOPRAZOLE SODIUM 40 MG: 40 INJECTION, POWDER, FOR SOLUTION INTRAVENOUS at 05:17

## 2017-05-15 RX ADMIN — POTASSIUM CHLORIDE: 2 INJECTION, SOLUTION, CONCENTRATE INTRAVENOUS at 17:54

## 2017-05-15 RX ADMIN — METRONIDAZOLE 500 MG: 500 INJECTION, SOLUTION INTRAVENOUS at 21:07

## 2017-05-15 RX ADMIN — METOCLOPRAMIDE 10 MG: 5 INJECTION, SOLUTION INTRAMUSCULAR; INTRAVENOUS at 17:55

## 2017-05-15 RX ADMIN — ENOXAPARIN SODIUM 100 MG: 100 INJECTION SUBCUTANEOUS at 20:55

## 2017-05-15 RX ADMIN — FLUCONAZOLE 400 MG: 2 INJECTION INTRAVENOUS at 09:31

## 2017-05-15 RX ADMIN — METRONIDAZOLE 500 MG: 500 INJECTION, SOLUTION INTRAVENOUS at 15:23

## 2017-05-15 RX ADMIN — ERTAPENEM SODIUM 1 G: 1 INJECTION, POWDER, LYOPHILIZED, FOR SOLUTION INTRAMUSCULAR; INTRAVENOUS at 12:34

## 2017-05-15 RX ADMIN — ACETAMINOPHEN 650 MG: 325 TABLET ORAL at 22:55

## 2017-05-15 RX ADMIN — I.V. FAT EMULSION 20 G: 20 EMULSION INTRAVENOUS at 18:24

## 2017-05-15 RX ADMIN — METOPROLOL TARTRATE 5 MG: 5 INJECTION, SOLUTION INTRAVENOUS at 09:16

## 2017-05-15 RX ADMIN — ENOXAPARIN SODIUM 100 MG: 100 INJECTION SUBCUTANEOUS at 09:16

## 2017-05-15 RX ADMIN — METOPROLOL TARTRATE 5 MG: 5 INJECTION, SOLUTION INTRAVENOUS at 20:55

## 2017-05-15 RX ADMIN — Medication: at 10:52

## 2017-05-15 RX ADMIN — METOPROLOL TARTRATE 5 MG: 5 INJECTION, SOLUTION INTRAVENOUS at 00:21

## 2017-05-15 RX ADMIN — LINEZOLID 600 MG: 600 INJECTION, SOLUTION INTRAVENOUS at 00:22

## 2017-05-15 RX ADMIN — METOCLOPRAMIDE 10 MG: 5 INJECTION, SOLUTION INTRAMUSCULAR; INTRAVENOUS at 12:34

## 2017-05-15 RX ADMIN — Medication 10 ML: at 09:17

## 2017-05-15 RX ADMIN — SODIUM CHLORIDE 50 ML/HR: 9 INJECTION, SOLUTION INTRAVENOUS at 15:22

## 2017-05-15 RX ADMIN — LINEZOLID 600 MG: 600 INJECTION, SOLUTION INTRAVENOUS at 12:48

## 2017-05-15 RX ADMIN — METOPROLOL TARTRATE 5 MG: 5 INJECTION, SOLUTION INTRAVENOUS at 05:17

## 2017-05-15 RX ADMIN — METOPROLOL TARTRATE 5 MG: 5 INJECTION, SOLUTION INTRAVENOUS at 12:34

## 2017-05-15 RX ADMIN — METOCLOPRAMIDE 10 MG: 5 INJECTION, SOLUTION INTRAMUSCULAR; INTRAVENOUS at 00:21

## 2017-05-15 RX ADMIN — Medication 10 ML: at 20:56

## 2017-05-15 RX ADMIN — METOCLOPRAMIDE 10 MG: 5 INJECTION, SOLUTION INTRAMUSCULAR; INTRAVENOUS at 05:17

## 2017-05-15 RX ADMIN — METRONIDAZOLE 500 MG: 500 INJECTION, SOLUTION INTRAVENOUS at 05:17

## 2017-05-15 RX ADMIN — METOPROLOL TARTRATE 5 MG: 5 INJECTION, SOLUTION INTRAVENOUS at 17:54

## 2017-05-16 ENCOUNTER — APPOINTMENT (OUTPATIENT)
Dept: GENERAL RADIOLOGY | Facility: HOSPITAL | Age: 72
End: 2017-05-16
Attending: INTERNAL MEDICINE

## 2017-05-16 LAB
ALBUMIN SERPL-MCNC: 1.9 G/DL (ref 3.5–5.2)
ALBUMIN/GLOB SERPL: 0.6 G/DL
ALP SERPL-CCNC: 56 U/L (ref 39–117)
ALT SERPL W P-5'-P-CCNC: 31 U/L (ref 1–33)
ANION GAP SERPL CALCULATED.3IONS-SCNC: 9.3 MMOL/L
AST SERPL-CCNC: 35 U/L (ref 1–32)
BACTERIA SPEC AEROBE CULT: NORMAL
BACTERIA SPEC AEROBE CULT: NORMAL
BASOPHILS # BLD AUTO: 0.02 10*3/MM3 (ref 0–0.2)
BASOPHILS NFR BLD AUTO: 0.2 % (ref 0–1.5)
BILIRUB SERPL-MCNC: <0.2 MG/DL (ref 0.1–1.2)
BUN BLD-MCNC: 10 MG/DL (ref 8–23)
BUN/CREAT SERPL: 33.3 (ref 7–25)
CALCIUM SPEC-SCNC: 7.6 MG/DL (ref 8.6–10.5)
CHLORIDE SERPL-SCNC: 99 MMOL/L (ref 98–107)
CO2 SERPL-SCNC: 26.7 MMOL/L (ref 22–29)
CREAT BLD-MCNC: 0.3 MG/DL (ref 0.57–1)
DEPRECATED RDW RBC AUTO: 47.5 FL (ref 37–54)
EOSINOPHIL # BLD AUTO: 0.34 10*3/MM3 (ref 0–0.7)
EOSINOPHIL NFR BLD AUTO: 3.7 % (ref 0.3–6.2)
ERYTHROCYTE [DISTWIDTH] IN BLOOD BY AUTOMATED COUNT: 14 % (ref 11.7–13)
GFR SERPL CREATININE-BSD FRML MDRD: >150 ML/MIN/1.73
GLOBULIN UR ELPH-MCNC: 3.1 GM/DL
GLUCOSE BLD-MCNC: 177 MG/DL (ref 65–99)
GLUCOSE BLDC GLUCOMTR-MCNC: 148 MG/DL (ref 70–130)
GLUCOSE BLDC GLUCOMTR-MCNC: 174 MG/DL (ref 70–130)
GLUCOSE BLDC GLUCOMTR-MCNC: 176 MG/DL (ref 70–130)
GLUCOSE BLDC GLUCOMTR-MCNC: 192 MG/DL (ref 70–130)
HCT VFR BLD AUTO: 27.9 % (ref 35.6–45.5)
HGB BLD-MCNC: 9 G/DL (ref 11.9–15.5)
IMM GRANULOCYTES # BLD: 0.2 10*3/MM3 (ref 0–0.03)
IMM GRANULOCYTES NFR BLD: 2.2 % (ref 0–0.5)
LYMPHOCYTES # BLD AUTO: 1.18 10*3/MM3 (ref 0.9–4.8)
LYMPHOCYTES NFR BLD AUTO: 12.8 % (ref 19.6–45.3)
MAGNESIUM SERPL-MCNC: 2.4 MG/DL (ref 1.6–2.4)
MCH RBC QN AUTO: 30 PG (ref 26.9–32)
MCHC RBC AUTO-ENTMCNC: 32.3 G/DL (ref 32.4–36.3)
MCV RBC AUTO: 93 FL (ref 80.5–98.2)
MONOCYTES # BLD AUTO: 1.31 10*3/MM3 (ref 0.2–1.2)
MONOCYTES NFR BLD AUTO: 14.2 % (ref 5–12)
NEUTROPHILS # BLD AUTO: 6.2 10*3/MM3 (ref 1.9–8.1)
NEUTROPHILS NFR BLD AUTO: 66.9 % (ref 42.7–76)
PHOSPHATE SERPL-MCNC: 3.6 MG/DL (ref 2.5–4.5)
PLATELET # BLD AUTO: 238 10*3/MM3 (ref 140–500)
PMV BLD AUTO: 11.9 FL (ref 6–12)
POTASSIUM BLD-SCNC: 4.3 MMOL/L (ref 3.5–5.2)
PROT SERPL-MCNC: 5 G/DL (ref 6–8.5)
RBC # BLD AUTO: 3 10*6/MM3 (ref 3.9–5.2)
SODIUM BLD-SCNC: 135 MMOL/L (ref 136–145)
WBC NRBC COR # BLD: 9.25 10*3/MM3 (ref 4.5–10.7)

## 2017-05-16 PROCEDURE — 97110 THERAPEUTIC EXERCISES: CPT

## 2017-05-16 PROCEDURE — 25010000002 LINEZOLID 600 MG/300ML SOLUTION: Performed by: INTERNAL MEDICINE

## 2017-05-16 PROCEDURE — 25010000002 METOCLOPRAMIDE PER 10 MG: Performed by: SURGERY

## 2017-05-16 PROCEDURE — 63710000001 INSULIN REGULAR HUMAN PER 5 UNITS: Performed by: INTERNAL MEDICINE

## 2017-05-16 PROCEDURE — 85025 COMPLETE CBC W/AUTO DIFF WBC: CPT | Performed by: SURGERY

## 2017-05-16 PROCEDURE — 25010000002 CALCIUM GLUCONATE PER 10 ML: Performed by: SURGERY

## 2017-05-16 PROCEDURE — 84100 ASSAY OF PHOSPHORUS: CPT | Performed by: SURGERY

## 2017-05-16 PROCEDURE — 25010000002 ENOXAPARIN PER 10 MG: Performed by: SURGERY

## 2017-05-16 PROCEDURE — 80053 COMPREHEN METABOLIC PANEL: CPT | Performed by: HOSPITALIST

## 2017-05-16 PROCEDURE — 71010 HC CHEST PA OR AP: CPT

## 2017-05-16 PROCEDURE — 25010000002 FLUCONAZOLE PER 200 MG: Performed by: INTERNAL MEDICINE

## 2017-05-16 PROCEDURE — 83735 ASSAY OF MAGNESIUM: CPT | Performed by: SURGERY

## 2017-05-16 PROCEDURE — 82962 GLUCOSE BLOOD TEST: CPT

## 2017-05-16 PROCEDURE — 93010 ELECTROCARDIOGRAM REPORT: CPT | Performed by: INTERNAL MEDICINE

## 2017-05-16 PROCEDURE — 25010000002 POTASSIUM CHLORIDE PER 2 MEQ OF POTASSIUM: Performed by: SURGERY

## 2017-05-16 PROCEDURE — 25010000002 MAGNESIUM SULFATE PER 500 MG OF MAGNESIUM: Performed by: SURGERY

## 2017-05-16 PROCEDURE — 93005 ELECTROCARDIOGRAM TRACING: CPT | Performed by: NURSE PRACTITIONER

## 2017-05-16 PROCEDURE — 99231 SBSQ HOSP IP/OBS SF/LOW 25: CPT | Performed by: INTERNAL MEDICINE

## 2017-05-16 PROCEDURE — 25010000002 MORPHINE PER 10 MG: Performed by: SURGERY

## 2017-05-16 RX ORDER — BISACODYL 10 MG
10 SUPPOSITORY, RECTAL RECTAL ONCE
Status: COMPLETED | OUTPATIENT
Start: 2017-05-16 | End: 2017-05-16

## 2017-05-16 RX ORDER — DEXTROSE MONOHYDRATE 25 G/50ML
25 INJECTION, SOLUTION INTRAVENOUS
Status: DISCONTINUED | OUTPATIENT
Start: 2017-05-16 | End: 2017-05-28 | Stop reason: HOSPADM

## 2017-05-16 RX ORDER — MORPHINE SULFATE IN 0.9 % NACL 50 MG/50ML
PATIENT CONTROLLED ANALGESIA SYRINGE INTRAVENOUS CONTINUOUS
Status: DISCONTINUED | OUTPATIENT
Start: 2017-05-16 | End: 2017-05-19

## 2017-05-16 RX ORDER — NICOTINE POLACRILEX 4 MG
15 LOZENGE BUCCAL
Status: DISCONTINUED | OUTPATIENT
Start: 2017-05-16 | End: 2017-05-28 | Stop reason: HOSPADM

## 2017-05-16 RX ADMIN — METOCLOPRAMIDE 10 MG: 5 INJECTION, SOLUTION INTRAMUSCULAR; INTRAVENOUS at 05:15

## 2017-05-16 RX ADMIN — BISACODYL 10 MG: 10 SUPPOSITORY RECTAL at 10:07

## 2017-05-16 RX ADMIN — METOCLOPRAMIDE 10 MG: 5 INJECTION, SOLUTION INTRAMUSCULAR; INTRAVENOUS at 00:03

## 2017-05-16 RX ADMIN — ACETAMINOPHEN 650 MG: 325 TABLET ORAL at 20:23

## 2017-05-16 RX ADMIN — METRONIDAZOLE 500 MG: 500 INJECTION, SOLUTION INTRAVENOUS at 15:01

## 2017-05-16 RX ADMIN — I.V. FAT EMULSION 20 G: 20 EMULSION INTRAVENOUS at 18:33

## 2017-05-16 RX ADMIN — FLUCONAZOLE 400 MG: 2 INJECTION INTRAVENOUS at 10:08

## 2017-05-16 RX ADMIN — METOPROLOL TARTRATE 12.5 MG: 25 TABLET ORAL at 16:43

## 2017-05-16 RX ADMIN — LINEZOLID 600 MG: 600 INJECTION, SOLUTION INTRAVENOUS at 12:45

## 2017-05-16 RX ADMIN — METOPROLOL TARTRATE 5 MG: 5 INJECTION, SOLUTION INTRAVENOUS at 05:15

## 2017-05-16 RX ADMIN — METOPROLOL TARTRATE 12.5 MG: 25 TABLET ORAL at 20:22

## 2017-05-16 RX ADMIN — Medication 10 ML: at 20:23

## 2017-05-16 RX ADMIN — METOCLOPRAMIDE 10 MG: 5 INJECTION, SOLUTION INTRAMUSCULAR; INTRAVENOUS at 18:34

## 2017-05-16 RX ADMIN — SODIUM CHLORIDE 50 ML/HR: 9 INJECTION, SOLUTION INTRAVENOUS at 05:38

## 2017-05-16 RX ADMIN — METOCLOPRAMIDE 10 MG: 5 INJECTION, SOLUTION INTRAMUSCULAR; INTRAVENOUS at 12:56

## 2017-05-16 RX ADMIN — METRONIDAZOLE 500 MG: 500 INJECTION, SOLUTION INTRAVENOUS at 22:33

## 2017-05-16 RX ADMIN — Medication: at 05:35

## 2017-05-16 RX ADMIN — LINEZOLID 600 MG: 600 INJECTION, SOLUTION INTRAVENOUS at 23:46

## 2017-05-16 RX ADMIN — ENOXAPARIN SODIUM 100 MG: 100 INJECTION SUBCUTANEOUS at 10:07

## 2017-05-16 RX ADMIN — ENOXAPARIN SODIUM 100 MG: 100 INJECTION SUBCUTANEOUS at 20:23

## 2017-05-16 RX ADMIN — METRONIDAZOLE 500 MG: 500 INJECTION, SOLUTION INTRAVENOUS at 05:15

## 2017-05-16 RX ADMIN — PANTOPRAZOLE SODIUM 40 MG: 40 INJECTION, POWDER, FOR SOLUTION INTRAVENOUS at 05:15

## 2017-05-16 RX ADMIN — Medication 10 ML: at 13:01

## 2017-05-16 RX ADMIN — METOPROLOL TARTRATE 12.5 MG: 25 TABLET ORAL at 10:07

## 2017-05-16 RX ADMIN — HUMAN INSULIN 2 UNITS: 100 INJECTION, SOLUTION SUBCUTANEOUS at 17:32

## 2017-05-16 RX ADMIN — ERTAPENEM SODIUM 1 G: 1 INJECTION, POWDER, LYOPHILIZED, FOR SOLUTION INTRAMUSCULAR; INTRAVENOUS at 12:46

## 2017-05-16 RX ADMIN — POTASSIUM CHLORIDE: 2 INJECTION, SOLUTION, CONCENTRATE INTRAVENOUS at 18:33

## 2017-05-16 RX ADMIN — METOPROLOL TARTRATE 5 MG: 5 INJECTION, SOLUTION INTRAVENOUS at 00:03

## 2017-05-16 RX ADMIN — LINEZOLID 600 MG: 600 INJECTION, SOLUTION INTRAVENOUS at 00:03

## 2017-05-16 RX ADMIN — METOCLOPRAMIDE 10 MG: 5 INJECTION, SOLUTION INTRAMUSCULAR; INTRAVENOUS at 23:46

## 2017-05-17 ENCOUNTER — APPOINTMENT (OUTPATIENT)
Dept: CT IMAGING | Facility: HOSPITAL | Age: 72
End: 2017-05-17

## 2017-05-17 LAB
ALBUMIN SERPL-MCNC: 2.3 G/DL (ref 3.5–5.2)
ALBUMIN/GLOB SERPL: 0.7 G/DL
ALP SERPL-CCNC: 61 U/L (ref 39–117)
ALT SERPL W P-5'-P-CCNC: 26 U/L (ref 1–33)
ANION GAP SERPL CALCULATED.3IONS-SCNC: 10.2 MMOL/L
AST SERPL-CCNC: 24 U/L (ref 1–32)
BASOPHILS # BLD AUTO: 0.02 10*3/MM3 (ref 0–0.2)
BASOPHILS NFR BLD AUTO: 0.2 % (ref 0–1.5)
BILIRUB SERPL-MCNC: 0.2 MG/DL (ref 0.1–1.2)
BUN BLD-MCNC: 9 MG/DL (ref 8–23)
BUN/CREAT SERPL: 24.3 (ref 7–25)
CALCIUM SPEC-SCNC: 8.1 MG/DL (ref 8.6–10.5)
CHLORIDE SERPL-SCNC: 94 MMOL/L (ref 98–107)
CO2 SERPL-SCNC: 26.8 MMOL/L (ref 22–29)
CREAT BLD-MCNC: 0.37 MG/DL (ref 0.57–1)
DEPRECATED RDW RBC AUTO: 45.5 FL (ref 37–54)
EOSINOPHIL # BLD AUTO: 0.32 10*3/MM3 (ref 0–0.7)
EOSINOPHIL NFR BLD AUTO: 2.8 % (ref 0.3–6.2)
ERYTHROCYTE [DISTWIDTH] IN BLOOD BY AUTOMATED COUNT: 13.6 % (ref 11.7–13)
GFR SERPL CREATININE-BSD FRML MDRD: >150 ML/MIN/1.73
GLOBULIN UR ELPH-MCNC: 3.5 GM/DL
GLUCOSE BLD-MCNC: 190 MG/DL (ref 65–99)
GLUCOSE BLDC GLUCOMTR-MCNC: 146 MG/DL (ref 70–130)
GLUCOSE BLDC GLUCOMTR-MCNC: 168 MG/DL (ref 70–130)
GLUCOSE BLDC GLUCOMTR-MCNC: 180 MG/DL (ref 70–130)
GLUCOSE BLDC GLUCOMTR-MCNC: 188 MG/DL (ref 70–130)
HBA1C MFR BLD: 5.83 % (ref 4.8–5.6)
HCT VFR BLD AUTO: 31.1 % (ref 35.6–45.5)
HGB BLD-MCNC: 9.6 G/DL (ref 11.9–15.5)
IMM GRANULOCYTES # BLD: 0.17 10*3/MM3 (ref 0–0.03)
IMM GRANULOCYTES NFR BLD: 1.5 % (ref 0–0.5)
LYMPHOCYTES # BLD AUTO: 1.18 10*3/MM3 (ref 0.9–4.8)
LYMPHOCYTES NFR BLD AUTO: 10.2 % (ref 19.6–45.3)
MAGNESIUM SERPL-MCNC: 2.2 MG/DL (ref 1.6–2.4)
MCH RBC QN AUTO: 28.4 PG (ref 26.9–32)
MCHC RBC AUTO-ENTMCNC: 30.9 G/DL (ref 32.4–36.3)
MCV RBC AUTO: 92 FL (ref 80.5–98.2)
MONOCYTES # BLD AUTO: 1.51 10*3/MM3 (ref 0.2–1.2)
MONOCYTES NFR BLD AUTO: 13.1 % (ref 5–12)
NEUTROPHILS # BLD AUTO: 8.33 10*3/MM3 (ref 1.9–8.1)
NEUTROPHILS NFR BLD AUTO: 72.2 % (ref 42.7–76)
PHOSPHATE SERPL-MCNC: 3.2 MG/DL (ref 2.5–4.5)
PLATELET # BLD AUTO: 285 10*3/MM3 (ref 140–500)
PMV BLD AUTO: 11.8 FL (ref 6–12)
POTASSIUM BLD-SCNC: 4.1 MMOL/L (ref 3.5–5.2)
PROT SERPL-MCNC: 5.8 G/DL (ref 6–8.5)
RBC # BLD AUTO: 3.38 10*6/MM3 (ref 3.9–5.2)
SODIUM BLD-SCNC: 131 MMOL/L (ref 136–145)
WBC NRBC COR # BLD: 11.53 10*3/MM3 (ref 4.5–10.7)

## 2017-05-17 PROCEDURE — 87205 SMEAR GRAM STAIN: CPT | Performed by: SURGERY

## 2017-05-17 PROCEDURE — 25010000002 LINEZOLID 600 MG/300ML SOLUTION: Performed by: INTERNAL MEDICINE

## 2017-05-17 PROCEDURE — 74177 CT ABD & PELVIS W/CONTRAST: CPT

## 2017-05-17 PROCEDURE — 0 IOPAMIDOL 61 % SOLUTION: Performed by: INTERNAL MEDICINE

## 2017-05-17 PROCEDURE — 25010000002 PROMETHAZINE PER 50 MG: Performed by: HOSPITALIST

## 2017-05-17 PROCEDURE — 87186 SC STD MICRODIL/AGAR DIL: CPT | Performed by: SURGERY

## 2017-05-17 PROCEDURE — 97110 THERAPEUTIC EXERCISES: CPT

## 2017-05-17 PROCEDURE — 25010000002 ENOXAPARIN PER 10 MG: Performed by: SURGERY

## 2017-05-17 PROCEDURE — 25010000002 PROMETHAZINE PER 50 MG: Performed by: SURGERY

## 2017-05-17 PROCEDURE — 25010000002 POTASSIUM CHLORIDE PER 2 MEQ OF POTASSIUM: Performed by: SURGERY

## 2017-05-17 PROCEDURE — 25010000002 ONDANSETRON PER 1 MG: Performed by: INTERNAL MEDICINE

## 2017-05-17 PROCEDURE — 94799 UNLISTED PULMONARY SVC/PX: CPT

## 2017-05-17 PROCEDURE — 83036 HEMOGLOBIN GLYCOSYLATED A1C: CPT | Performed by: INTERNAL MEDICINE

## 2017-05-17 PROCEDURE — 25010000002 FLUCONAZOLE PER 200 MG: Performed by: INTERNAL MEDICINE

## 2017-05-17 PROCEDURE — 25010000002 MORPHINE PER 10 MG: Performed by: SURGERY

## 2017-05-17 PROCEDURE — 25010000002 CALCIUM GLUCONATE PER 10 ML: Performed by: SURGERY

## 2017-05-17 PROCEDURE — 87070 CULTURE OTHR SPECIMN AEROBIC: CPT | Performed by: SURGERY

## 2017-05-17 PROCEDURE — 63710000001 INSULIN REGULAR HUMAN PER 5 UNITS: Performed by: INTERNAL MEDICINE

## 2017-05-17 PROCEDURE — 25010000002 METOCLOPRAMIDE PER 10 MG: Performed by: SURGERY

## 2017-05-17 PROCEDURE — 84100 ASSAY OF PHOSPHORUS: CPT | Performed by: SURGERY

## 2017-05-17 PROCEDURE — 85025 COMPLETE CBC W/AUTO DIFF WBC: CPT | Performed by: SURGERY

## 2017-05-17 PROCEDURE — 82962 GLUCOSE BLOOD TEST: CPT

## 2017-05-17 PROCEDURE — 83735 ASSAY OF MAGNESIUM: CPT | Performed by: SURGERY

## 2017-05-17 PROCEDURE — 25010000002 MAGNESIUM SULFATE PER 500 MG OF MAGNESIUM: Performed by: SURGERY

## 2017-05-17 PROCEDURE — 80053 COMPREHEN METABOLIC PANEL: CPT | Performed by: HOSPITALIST

## 2017-05-17 RX ORDER — PROMETHAZINE HYDROCHLORIDE 25 MG/ML
12.5 INJECTION, SOLUTION INTRAMUSCULAR; INTRAVENOUS EVERY 4 HOURS PRN
Status: DISCONTINUED | OUTPATIENT
Start: 2017-05-17 | End: 2017-05-28 | Stop reason: HOSPADM

## 2017-05-17 RX ADMIN — LINEZOLID 600 MG: 600 INJECTION, SOLUTION INTRAVENOUS at 12:54

## 2017-05-17 RX ADMIN — PROMETHAZINE HYDROCHLORIDE 12.5 MG: 25 INJECTION, SOLUTION INTRAMUSCULAR; INTRAVENOUS at 09:02

## 2017-05-17 RX ADMIN — METRONIDAZOLE 500 MG: 500 INJECTION, SOLUTION INTRAVENOUS at 06:47

## 2017-05-17 RX ADMIN — METRONIDAZOLE 500 MG: 500 INJECTION, SOLUTION INTRAVENOUS at 14:22

## 2017-05-17 RX ADMIN — PANTOPRAZOLE SODIUM 40 MG: 40 INJECTION, POWDER, FOR SOLUTION INTRAVENOUS at 06:47

## 2017-05-17 RX ADMIN — METOCLOPRAMIDE 10 MG: 5 INJECTION, SOLUTION INTRAMUSCULAR; INTRAVENOUS at 06:47

## 2017-05-17 RX ADMIN — HUMAN INSULIN 2 UNITS: 100 INJECTION, SOLUTION SUBCUTANEOUS at 12:43

## 2017-05-17 RX ADMIN — ERTAPENEM SODIUM 1 G: 1 INJECTION, POWDER, LYOPHILIZED, FOR SOLUTION INTRAMUSCULAR; INTRAVENOUS at 12:43

## 2017-05-17 RX ADMIN — ENOXAPARIN SODIUM 100 MG: 100 INJECTION SUBCUTANEOUS at 12:55

## 2017-05-17 RX ADMIN — METOPROLOL TARTRATE 12.5 MG: 25 TABLET ORAL at 09:02

## 2017-05-17 RX ADMIN — Medication: at 00:38

## 2017-05-17 RX ADMIN — METOPROLOL TARTRATE 12.5 MG: 25 TABLET ORAL at 21:14

## 2017-05-17 RX ADMIN — ONDANSETRON 4 MG: 2 INJECTION INTRAMUSCULAR; INTRAVENOUS at 12:44

## 2017-05-17 RX ADMIN — ONDANSETRON 4 MG: 2 INJECTION INTRAMUSCULAR; INTRAVENOUS at 03:47

## 2017-05-17 RX ADMIN — METOPROLOL TARTRATE 12.5 MG: 25 TABLET ORAL at 01:13

## 2017-05-17 RX ADMIN — PROMETHAZINE HYDROCHLORIDE 12.5 MG: 25 INJECTION, SOLUTION INTRAMUSCULAR; INTRAVENOUS at 14:22

## 2017-05-17 RX ADMIN — LORAZEPAM 0.25 MG: 0.5 TABLET ORAL at 12:43

## 2017-05-17 RX ADMIN — POTASSIUM CHLORIDE: 2 INJECTION, SOLUTION, CONCENTRATE INTRAVENOUS at 18:50

## 2017-05-17 RX ADMIN — METRONIDAZOLE 500 MG: 500 INJECTION, SOLUTION INTRAVENOUS at 21:14

## 2017-05-17 RX ADMIN — HUMAN INSULIN 2 UNITS: 100 INJECTION, SOLUTION SUBCUTANEOUS at 09:03

## 2017-05-17 RX ADMIN — LORAZEPAM 0.25 MG: 0.5 TABLET ORAL at 19:01

## 2017-05-17 RX ADMIN — I.V. FAT EMULSION 20 G: 20 EMULSION INTRAVENOUS at 18:50

## 2017-05-17 RX ADMIN — Medication 10 ML: at 09:03

## 2017-05-17 RX ADMIN — METOCLOPRAMIDE 10 MG: 5 INJECTION, SOLUTION INTRAMUSCULAR; INTRAVENOUS at 12:53

## 2017-05-17 RX ADMIN — FLUCONAZOLE 400 MG: 2 INJECTION INTRAVENOUS at 09:02

## 2017-05-17 RX ADMIN — IOPAMIDOL 85 ML: 612 INJECTION, SOLUTION INTRAVENOUS at 15:30

## 2017-05-17 RX ADMIN — PROMETHAZINE HYDROCHLORIDE 12.5 MG: 25 INJECTION, SOLUTION INTRAMUSCULAR; INTRAVENOUS at 21:14

## 2017-05-17 RX ADMIN — METOCLOPRAMIDE 10 MG: 5 INJECTION, SOLUTION INTRAMUSCULAR; INTRAVENOUS at 19:01

## 2017-05-17 RX ADMIN — HUMAN INSULIN 2 UNITS: 100 INJECTION, SOLUTION SUBCUTANEOUS at 18:51

## 2017-05-17 RX ADMIN — METOPROLOL TARTRATE 12.5 MG: 25 TABLET ORAL at 12:54

## 2017-05-17 RX ADMIN — METRONIDAZOLE 500 MG: 500 INJECTION, SOLUTION INTRAVENOUS at 12:43

## 2017-05-18 ENCOUNTER — APPOINTMENT (OUTPATIENT)
Dept: CT IMAGING | Facility: HOSPITAL | Age: 72
End: 2017-05-18

## 2017-05-18 LAB
ALBUMIN SERPL-MCNC: 2.4 G/DL (ref 3.5–5.2)
ALBUMIN/GLOB SERPL: 0.7 G/DL
ALP SERPL-CCNC: 64 U/L (ref 39–117)
ALT SERPL W P-5'-P-CCNC: 21 U/L (ref 1–33)
ANION GAP SERPL CALCULATED.3IONS-SCNC: 12.5 MMOL/L
APTT PPP: 33.1 SECONDS (ref 22.7–35.4)
AST SERPL-CCNC: 22 U/L (ref 1–32)
BASOPHILS # BLD AUTO: 0.02 10*3/MM3 (ref 0–0.2)
BASOPHILS NFR BLD AUTO: 0.2 % (ref 0–1.5)
BILIRUB SERPL-MCNC: <0.2 MG/DL (ref 0.1–1.2)
BUN BLD-MCNC: 10 MG/DL (ref 8–23)
BUN/CREAT SERPL: 27.8 (ref 7–25)
CALCIUM SPEC-SCNC: 8.1 MG/DL (ref 8.6–10.5)
CHLORIDE SERPL-SCNC: 95 MMOL/L (ref 98–107)
CO2 SERPL-SCNC: 25.5 MMOL/L (ref 22–29)
CREAT BLD-MCNC: 0.36 MG/DL (ref 0.57–1)
DEPRECATED RDW RBC AUTO: 45.6 FL (ref 37–54)
EOSINOPHIL # BLD AUTO: 0.17 10*3/MM3 (ref 0–0.7)
EOSINOPHIL NFR BLD AUTO: 1.4 % (ref 0.3–6.2)
ERYTHROCYTE [DISTWIDTH] IN BLOOD BY AUTOMATED COUNT: 13.7 % (ref 11.7–13)
GFR SERPL CREATININE-BSD FRML MDRD: >150 ML/MIN/1.73
GLOBULIN UR ELPH-MCNC: 3.4 GM/DL
GLUCOSE BLD-MCNC: 176 MG/DL (ref 65–99)
GLUCOSE BLDC GLUCOMTR-MCNC: 144 MG/DL (ref 70–130)
GLUCOSE BLDC GLUCOMTR-MCNC: 173 MG/DL (ref 70–130)
GLUCOSE BLDC GLUCOMTR-MCNC: 228 MG/DL (ref 70–130)
GLUCOSE BLDC GLUCOMTR-MCNC: 83 MG/DL (ref 70–130)
HCT VFR BLD AUTO: 30.5 % (ref 35.6–45.5)
HGB BLD-MCNC: 9.6 G/DL (ref 11.9–15.5)
IMM GRANULOCYTES # BLD: 0.13 10*3/MM3 (ref 0–0.03)
IMM GRANULOCYTES NFR BLD: 1.1 % (ref 0–0.5)
INR PPP: 1.17 (ref 0.9–1.1)
LYMPHOCYTES # BLD AUTO: 1.24 10*3/MM3 (ref 0.9–4.8)
LYMPHOCYTES NFR BLD AUTO: 10.3 % (ref 19.6–45.3)
MAGNESIUM SERPL-MCNC: 2.3 MG/DL (ref 1.6–2.4)
MCH RBC QN AUTO: 28.9 PG (ref 26.9–32)
MCHC RBC AUTO-ENTMCNC: 31.5 G/DL (ref 32.4–36.3)
MCV RBC AUTO: 91.9 FL (ref 80.5–98.2)
MONOCYTES # BLD AUTO: 1.41 10*3/MM3 (ref 0.2–1.2)
MONOCYTES NFR BLD AUTO: 11.7 % (ref 5–12)
NEUTROPHILS # BLD AUTO: 9.04 10*3/MM3 (ref 1.9–8.1)
NEUTROPHILS NFR BLD AUTO: 75.3 % (ref 42.7–76)
PHOSPHATE SERPL-MCNC: 3 MG/DL (ref 2.5–4.5)
PLATELET # BLD AUTO: 283 10*3/MM3 (ref 140–500)
PLATELET # BLD AUTO: 297 10*3/MM3 (ref 140–500)
PMV BLD AUTO: 11.5 FL (ref 6–12)
POTASSIUM BLD-SCNC: 4 MMOL/L (ref 3.5–5.2)
PROT SERPL-MCNC: 5.8 G/DL (ref 6–8.5)
PROTHROMBIN TIME: 14.4 SECONDS (ref 11.7–14.2)
RBC # BLD AUTO: 3.32 10*6/MM3 (ref 3.9–5.2)
SODIUM BLD-SCNC: 133 MMOL/L (ref 136–145)
WBC NRBC COR # BLD: 12.01 10*3/MM3 (ref 4.5–10.7)

## 2017-05-18 PROCEDURE — 0W9G3ZX DRAINAGE OF PERITONEAL CAVITY, PERCUTANEOUS APPROACH, DIAGNOSTIC: ICD-10-PCS | Performed by: SURGERY

## 2017-05-18 PROCEDURE — 25010000002 POTASSIUM CHLORIDE PER 2 MEQ OF POTASSIUM: Performed by: SURGERY

## 2017-05-18 PROCEDURE — 85049 AUTOMATED PLATELET COUNT: CPT | Performed by: SURGERY

## 2017-05-18 PROCEDURE — 85610 PROTHROMBIN TIME: CPT | Performed by: SURGERY

## 2017-05-18 PROCEDURE — 82962 GLUCOSE BLOOD TEST: CPT

## 2017-05-18 PROCEDURE — 63710000001 INSULIN REGULAR HUMAN PER 5 UNITS: Performed by: INTERNAL MEDICINE

## 2017-05-18 PROCEDURE — 80053 COMPREHEN METABOLIC PANEL: CPT | Performed by: HOSPITALIST

## 2017-05-18 PROCEDURE — 87015 SPECIMEN INFECT AGNT CONCNTJ: CPT | Performed by: INTERNAL MEDICINE

## 2017-05-18 PROCEDURE — 83735 ASSAY OF MAGNESIUM: CPT | Performed by: SURGERY

## 2017-05-18 PROCEDURE — 87070 CULTURE OTHR SPECIMN AEROBIC: CPT | Performed by: INTERNAL MEDICINE

## 2017-05-18 PROCEDURE — 25010000002 LINEZOLID 600 MG/300ML SOLUTION: Performed by: INTERNAL MEDICINE

## 2017-05-18 PROCEDURE — 75989 ABSCESS DRAINAGE UNDER X-RAY: CPT

## 2017-05-18 PROCEDURE — 85730 THROMBOPLASTIN TIME PARTIAL: CPT | Performed by: SURGERY

## 2017-05-18 PROCEDURE — 85025 COMPLETE CBC W/AUTO DIFF WBC: CPT | Performed by: SURGERY

## 2017-05-18 PROCEDURE — 25010000002 METOCLOPRAMIDE PER 10 MG: Performed by: SURGERY

## 2017-05-18 PROCEDURE — 84100 ASSAY OF PHOSPHORUS: CPT | Performed by: SURGERY

## 2017-05-18 PROCEDURE — 25010000002 MORPHINE PER 10 MG: Performed by: SURGERY

## 2017-05-18 PROCEDURE — 87205 SMEAR GRAM STAIN: CPT | Performed by: INTERNAL MEDICINE

## 2017-05-18 PROCEDURE — 25010000002 ALTEPLASE: Performed by: INTERNAL MEDICINE

## 2017-05-18 PROCEDURE — 25010000002 PROMETHAZINE PER 50 MG: Performed by: SURGERY

## 2017-05-18 PROCEDURE — 25010000002 CALCIUM GLUCONATE PER 10 ML: Performed by: SURGERY

## 2017-05-18 PROCEDURE — 25010000002 MAGNESIUM SULFATE PER 500 MG OF MAGNESIUM: Performed by: SURGERY

## 2017-05-18 PROCEDURE — 25010000002 ONDANSETRON PER 1 MG: Performed by: SURGERY

## 2017-05-18 PROCEDURE — 25010000002 FLUCONAZOLE PER 200 MG: Performed by: INTERNAL MEDICINE

## 2017-05-18 PROCEDURE — 97110 THERAPEUTIC EXERCISES: CPT

## 2017-05-18 RX ORDER — LIDOCAINE HYDROCHLORIDE 10 MG/ML
30 INJECTION, SOLUTION INFILTRATION; PERINEURAL ONCE
Status: COMPLETED | OUTPATIENT
Start: 2017-05-18 | End: 2017-05-18

## 2017-05-18 RX ORDER — LIDOCAINE HYDROCHLORIDE 10 MG/ML
20 INJECTION, SOLUTION INFILTRATION; PERINEURAL ONCE
Status: COMPLETED | OUTPATIENT
Start: 2017-05-18 | End: 2017-05-18

## 2017-05-18 RX ADMIN — LIDOCAINE HYDROCHLORIDE 20 ML: 10 INJECTION, SOLUTION INFILTRATION; PERINEURAL at 16:02

## 2017-05-18 RX ADMIN — METOCLOPRAMIDE 10 MG: 5 INJECTION, SOLUTION INTRAMUSCULAR; INTRAVENOUS at 23:54

## 2017-05-18 RX ADMIN — HUMAN INSULIN 3 UNITS: 100 INJECTION, SOLUTION SUBCUTANEOUS at 06:37

## 2017-05-18 RX ADMIN — METOCLOPRAMIDE 10 MG: 5 INJECTION, SOLUTION INTRAMUSCULAR; INTRAVENOUS at 00:39

## 2017-05-18 RX ADMIN — ALTEPLASE 2 MG: 2.2 INJECTION, POWDER, LYOPHILIZED, FOR SOLUTION INTRAVENOUS at 19:35

## 2017-05-18 RX ADMIN — Medication 10 ML: at 21:15

## 2017-05-18 RX ADMIN — Medication 10 ML: at 01:02

## 2017-05-18 RX ADMIN — POTASSIUM CHLORIDE: 2 INJECTION, SOLUTION, CONCENTRATE INTRAVENOUS at 23:55

## 2017-05-18 RX ADMIN — PROMETHAZINE HYDROCHLORIDE 12.5 MG: 25 INJECTION, SOLUTION INTRAMUSCULAR; INTRAVENOUS at 22:21

## 2017-05-18 RX ADMIN — ALTEPLASE 2 MG: 2.2 INJECTION, POWDER, LYOPHILIZED, FOR SOLUTION INTRAVENOUS at 22:04

## 2017-05-18 RX ADMIN — Medication: at 14:53

## 2017-05-18 RX ADMIN — METOCLOPRAMIDE 10 MG: 5 INJECTION, SOLUTION INTRAMUSCULAR; INTRAVENOUS at 17:26

## 2017-05-18 RX ADMIN — ONDANSETRON 8 MG: 2 INJECTION INTRAMUSCULAR; INTRAVENOUS at 16:01

## 2017-05-18 RX ADMIN — FLUCONAZOLE 400 MG: 2 INJECTION INTRAVENOUS at 08:10

## 2017-05-18 RX ADMIN — METOPROLOL TARTRATE 12.5 MG: 25 TABLET ORAL at 00:39

## 2017-05-18 RX ADMIN — METRONIDAZOLE 500 MG: 500 INJECTION, SOLUTION INTRAVENOUS at 06:18

## 2017-05-18 RX ADMIN — METOPROLOL TARTRATE 12.5 MG: 25 TABLET ORAL at 16:01

## 2017-05-18 RX ADMIN — ERTAPENEM SODIUM 1 G: 1 INJECTION, POWDER, LYOPHILIZED, FOR SOLUTION INTRAMUSCULAR; INTRAVENOUS at 14:02

## 2017-05-18 RX ADMIN — METOCLOPRAMIDE 10 MG: 5 INJECTION, SOLUTION INTRAMUSCULAR; INTRAVENOUS at 14:02

## 2017-05-18 RX ADMIN — PANTOPRAZOLE SODIUM 40 MG: 40 INJECTION, POWDER, FOR SOLUTION INTRAVENOUS at 06:19

## 2017-05-18 RX ADMIN — I.V. FAT EMULSION 20 G: 20 EMULSION INTRAVENOUS at 23:54

## 2017-05-18 RX ADMIN — METOPROLOL TARTRATE 12.5 MG: 25 TABLET ORAL at 10:29

## 2017-05-18 RX ADMIN — LINEZOLID 600 MG: 600 INJECTION, SOLUTION INTRAVENOUS at 15:27

## 2017-05-18 RX ADMIN — METOPROLOL TARTRATE 12.5 MG: 25 TABLET ORAL at 06:20

## 2017-05-18 RX ADMIN — LINEZOLID 600 MG: 600 INJECTION, SOLUTION INTRAVENOUS at 00:39

## 2017-05-18 RX ADMIN — METOPROLOL TARTRATE 12.5 MG: 25 TABLET ORAL at 21:15

## 2017-05-18 RX ADMIN — METOCLOPRAMIDE 10 MG: 5 INJECTION, SOLUTION INTRAMUSCULAR; INTRAVENOUS at 06:21

## 2017-05-18 RX ADMIN — Medication 10 ML: at 09:20

## 2017-05-18 RX ADMIN — HUMAN INSULIN 2 UNITS: 100 INJECTION, SOLUTION SUBCUTANEOUS at 17:26

## 2017-05-18 RX ADMIN — LIDOCAINE HYDROCHLORIDE 40 ML: 10 INJECTION, SOLUTION INFILTRATION; PERINEURAL at 12:32

## 2017-05-18 RX ADMIN — METRONIDAZOLE 500 MG: 500 INJECTION, SOLUTION INTRAVENOUS at 17:26

## 2017-05-18 RX ADMIN — PROMETHAZINE HYDROCHLORIDE 12.5 MG: 25 INJECTION, SOLUTION INTRAMUSCULAR; INTRAVENOUS at 13:37

## 2017-05-19 ENCOUNTER — APPOINTMENT (OUTPATIENT)
Dept: CT IMAGING | Facility: HOSPITAL | Age: 72
End: 2017-05-19

## 2017-05-19 ENCOUNTER — APPOINTMENT (OUTPATIENT)
Dept: GENERAL RADIOLOGY | Facility: HOSPITAL | Age: 72
End: 2017-05-19

## 2017-05-19 LAB
ALBUMIN SERPL-MCNC: 2.4 G/DL (ref 3.5–5.2)
ALBUMIN/GLOB SERPL: 0.6 G/DL
ALP SERPL-CCNC: 80 U/L (ref 39–117)
ALT SERPL W P-5'-P-CCNC: 23 U/L (ref 1–33)
ANION GAP SERPL CALCULATED.3IONS-SCNC: 13.7 MMOL/L
AST SERPL-CCNC: 48 U/L (ref 1–32)
BASOPHILS # BLD AUTO: 0.05 10*3/MM3 (ref 0–0.2)
BASOPHILS NFR BLD AUTO: 0.5 % (ref 0–1.5)
BILIRUB SERPL-MCNC: 0.2 MG/DL (ref 0.1–1.2)
BUN BLD-MCNC: 12 MG/DL (ref 8–23)
BUN/CREAT SERPL: 28.6 (ref 7–25)
CALCIUM SPEC-SCNC: 8.1 MG/DL (ref 8.6–10.5)
CHLORIDE SERPL-SCNC: 93 MMOL/L (ref 98–107)
CO2 SERPL-SCNC: 23.3 MMOL/L (ref 22–29)
CREAT BLD-MCNC: 0.42 MG/DL (ref 0.57–1)
D DIMER PPP FEU-MCNC: 9.22 MCGFEU/ML (ref 0–0.49)
DEPRECATED RDW RBC AUTO: 47.4 FL (ref 37–54)
EOSINOPHIL # BLD AUTO: 0.39 10*3/MM3 (ref 0–0.7)
EOSINOPHIL NFR BLD AUTO: 3.9 % (ref 0.3–6.2)
ERYTHROCYTE [DISTWIDTH] IN BLOOD BY AUTOMATED COUNT: 14.2 % (ref 11.7–13)
GFR SERPL CREATININE-BSD FRML MDRD: 148 ML/MIN/1.73
GLOBULIN UR ELPH-MCNC: 3.9 GM/DL
GLUCOSE BLD-MCNC: 133 MG/DL (ref 65–99)
GLUCOSE BLDC GLUCOMTR-MCNC: 119 MG/DL (ref 70–130)
GLUCOSE BLDC GLUCOMTR-MCNC: 124 MG/DL (ref 70–130)
GLUCOSE BLDC GLUCOMTR-MCNC: 141 MG/DL (ref 70–130)
GLUCOSE BLDC GLUCOMTR-MCNC: 158 MG/DL (ref 70–130)
GLUCOSE BLDC GLUCOMTR-MCNC: 187 MG/DL (ref 70–130)
GLUCOSE BLDC GLUCOMTR-MCNC: 91 MG/DL (ref 70–130)
HCT VFR BLD AUTO: 33 % (ref 35.6–45.5)
HGB BLD-MCNC: 10 G/DL (ref 11.9–15.5)
IMM GRANULOCYTES # BLD: 0.13 10*3/MM3 (ref 0–0.03)
IMM GRANULOCYTES NFR BLD: 1.3 % (ref 0–0.5)
LYMPHOCYTES # BLD AUTO: 1.43 10*3/MM3 (ref 0.9–4.8)
LYMPHOCYTES NFR BLD AUTO: 14.4 % (ref 19.6–45.3)
MAGNESIUM SERPL-MCNC: 2.1 MG/DL (ref 1.6–2.4)
MCH RBC QN AUTO: 27.5 PG (ref 26.9–32)
MCHC RBC AUTO-ENTMCNC: 30.3 G/DL (ref 32.4–36.3)
MCV RBC AUTO: 90.9 FL (ref 80.5–98.2)
MONOCYTES # BLD AUTO: 1.33 10*3/MM3 (ref 0.2–1.2)
MONOCYTES NFR BLD AUTO: 13.4 % (ref 5–12)
NEUTROPHILS # BLD AUTO: 6.62 10*3/MM3 (ref 1.9–8.1)
NEUTROPHILS NFR BLD AUTO: 66.5 % (ref 42.7–76)
PHOSPHATE SERPL-MCNC: 3.8 MG/DL (ref 2.5–4.5)
PLATELET # BLD AUTO: 302 10*3/MM3 (ref 140–500)
PMV BLD AUTO: 10.8 FL (ref 6–12)
POTASSIUM BLD-SCNC: 4.7 MMOL/L (ref 3.5–5.2)
PROT SERPL-MCNC: 6.3 G/DL (ref 6–8.5)
RBC # BLD AUTO: 3.63 10*6/MM3 (ref 3.9–5.2)
SODIUM BLD-SCNC: 130 MMOL/L (ref 136–145)
TRIGL SERPL-MCNC: 302 MG/DL (ref 0–150)
TROPONIN T SERPL-MCNC: <0.01 NG/ML (ref 0–0.03)
WBC NRBC COR # BLD: 9.95 10*3/MM3 (ref 4.5–10.7)

## 2017-05-19 PROCEDURE — 93010 ELECTROCARDIOGRAM REPORT: CPT | Performed by: INTERNAL MEDICINE

## 2017-05-19 PROCEDURE — 97110 THERAPEUTIC EXERCISES: CPT

## 2017-05-19 PROCEDURE — 80053 COMPREHEN METABOLIC PANEL: CPT | Performed by: HOSPITALIST

## 2017-05-19 PROCEDURE — 25010000002 CALCIUM GLUCONATE PER 10 ML: Performed by: SURGERY

## 2017-05-19 PROCEDURE — 25010000002 MORPHINE PER 10 MG: Performed by: SURGERY

## 2017-05-19 PROCEDURE — 84484 ASSAY OF TROPONIN QUANT: CPT | Performed by: INTERNAL MEDICINE

## 2017-05-19 PROCEDURE — 25010000002 FLUCONAZOLE PER 200 MG: Performed by: INTERNAL MEDICINE

## 2017-05-19 PROCEDURE — 71275 CT ANGIOGRAPHY CHEST: CPT

## 2017-05-19 PROCEDURE — 85025 COMPLETE CBC W/AUTO DIFF WBC: CPT | Performed by: SURGERY

## 2017-05-19 PROCEDURE — 82962 GLUCOSE BLOOD TEST: CPT

## 2017-05-19 PROCEDURE — 84478 ASSAY OF TRIGLYCERIDES: CPT | Performed by: SURGERY

## 2017-05-19 PROCEDURE — 25010000002 ENOXAPARIN PER 10 MG: Performed by: INTERNAL MEDICINE

## 2017-05-19 PROCEDURE — 63710000001 INSULIN REGULAR HUMAN PER 5 UNITS: Performed by: INTERNAL MEDICINE

## 2017-05-19 PROCEDURE — 25010000002 LINEZOLID 600 MG/300ML SOLUTION: Performed by: INTERNAL MEDICINE

## 2017-05-19 PROCEDURE — 83735 ASSAY OF MAGNESIUM: CPT | Performed by: SURGERY

## 2017-05-19 PROCEDURE — 25010000002 MAGNESIUM SULFATE PER 500 MG OF MAGNESIUM: Performed by: SURGERY

## 2017-05-19 PROCEDURE — 84100 ASSAY OF PHOSPHORUS: CPT | Performed by: SURGERY

## 2017-05-19 PROCEDURE — 85379 FIBRIN DEGRADATION QUANT: CPT | Performed by: INTERNAL MEDICINE

## 2017-05-19 PROCEDURE — 25010000002 ALTEPLASE: Performed by: INTERNAL MEDICINE

## 2017-05-19 PROCEDURE — 25010000002 METOCLOPRAMIDE PER 10 MG: Performed by: SURGERY

## 2017-05-19 PROCEDURE — 25010000002 PROMETHAZINE PER 50 MG: Performed by: SURGERY

## 2017-05-19 PROCEDURE — 93005 ELECTROCARDIOGRAM TRACING: CPT | Performed by: INTERNAL MEDICINE

## 2017-05-19 PROCEDURE — C1894 INTRO/SHEATH, NON-LASER: HCPCS

## 2017-05-19 PROCEDURE — 25010000002 POTASSIUM CHLORIDE PER 2 MEQ OF POTASSIUM: Performed by: SURGERY

## 2017-05-19 PROCEDURE — 0 IOPAMIDOL 61 % SOLUTION: Performed by: INTERNAL MEDICINE

## 2017-05-19 RX ORDER — MORPHINE SULFATE IN 0.9 % NACL 50 MG/50ML
PATIENT CONTROLLED ANALGESIA SYRINGE INTRAVENOUS CONTINUOUS
Status: DISCONTINUED | OUTPATIENT
Start: 2017-05-19 | End: 2017-05-21

## 2017-05-19 RX ORDER — SODIUM CHLORIDE 0.9 % (FLUSH) 0.9 %
10 SYRINGE (ML) INJECTION AS NEEDED
Status: DISCONTINUED | OUTPATIENT
Start: 2017-05-19 | End: 2017-05-28 | Stop reason: HOSPADM

## 2017-05-19 RX ORDER — NITROGLYCERIN 0.4 MG/1
0.4 TABLET SUBLINGUAL
Status: DISCONTINUED | OUTPATIENT
Start: 2017-05-19 | End: 2017-05-28 | Stop reason: HOSPADM

## 2017-05-19 RX ORDER — SODIUM CHLORIDE 0.9 % (FLUSH) 0.9 %
10 SYRINGE (ML) INJECTION EVERY 12 HOURS SCHEDULED
Status: DISCONTINUED | OUTPATIENT
Start: 2017-05-19 | End: 2017-05-28 | Stop reason: HOSPADM

## 2017-05-19 RX ADMIN — HUMAN INSULIN 2 UNITS: 100 INJECTION, SOLUTION SUBCUTANEOUS at 23:39

## 2017-05-19 RX ADMIN — METOPROLOL TARTRATE 12.5 MG: 25 TABLET ORAL at 00:32

## 2017-05-19 RX ADMIN — METOCLOPRAMIDE 10 MG: 5 INJECTION, SOLUTION INTRAMUSCULAR; INTRAVENOUS at 11:56

## 2017-05-19 RX ADMIN — METOPROLOL TARTRATE 12.5 MG: 25 TABLET ORAL at 08:01

## 2017-05-19 RX ADMIN — Medication 10 ML: at 15:40

## 2017-05-19 RX ADMIN — METRONIDAZOLE 500 MG: 500 INJECTION, SOLUTION INTRAVENOUS at 04:32

## 2017-05-19 RX ADMIN — PROMETHAZINE HYDROCHLORIDE 12.5 MG: 25 INJECTION, SOLUTION INTRAMUSCULAR; INTRAVENOUS at 03:02

## 2017-05-19 RX ADMIN — LORAZEPAM 0.25 MG: 0.5 TABLET ORAL at 16:26

## 2017-05-19 RX ADMIN — PROMETHAZINE HYDROCHLORIDE 12.5 MG: 25 INJECTION, SOLUTION INTRAMUSCULAR; INTRAVENOUS at 21:50

## 2017-05-19 RX ADMIN — Medication 10 ML: at 23:40

## 2017-05-19 RX ADMIN — ENOXAPARIN SODIUM 40 MG: 40 INJECTION SUBCUTANEOUS at 12:09

## 2017-05-19 RX ADMIN — LINEZOLID 600 MG: 600 INJECTION, SOLUTION INTRAVENOUS at 14:47

## 2017-05-19 RX ADMIN — I.V. FAT EMULSION 20 G: 20 EMULSION INTRAVENOUS at 18:33

## 2017-05-19 RX ADMIN — ACETAMINOPHEN 650 MG: 325 TABLET ORAL at 23:04

## 2017-05-19 RX ADMIN — IOPAMIDOL 95 ML: 612 INJECTION, SOLUTION INTRAVENOUS at 21:15

## 2017-05-19 RX ADMIN — METOCLOPRAMIDE 10 MG: 5 INJECTION, SOLUTION INTRAMUSCULAR; INTRAVENOUS at 18:34

## 2017-05-19 RX ADMIN — METOPROLOL TARTRATE 12.5 MG: 25 TABLET ORAL at 11:56

## 2017-05-19 RX ADMIN — LINEZOLID 600 MG: 600 INJECTION, SOLUTION INTRAVENOUS at 03:09

## 2017-05-19 RX ADMIN — HUMAN INSULIN 2 UNITS: 100 INJECTION, SOLUTION SUBCUTANEOUS at 11:55

## 2017-05-19 RX ADMIN — FLUCONAZOLE 400 MG: 2 INJECTION INTRAVENOUS at 10:20

## 2017-05-19 RX ADMIN — NITROGLYCERIN 0.4 MG: 0.4 TABLET SUBLINGUAL at 15:55

## 2017-05-19 RX ADMIN — POTASSIUM CHLORIDE: 2 INJECTION, SOLUTION, CONCENTRATE INTRAVENOUS at 18:34

## 2017-05-19 RX ADMIN — ACETAMINOPHEN 650 MG: 325 TABLET ORAL at 08:00

## 2017-05-19 RX ADMIN — METOPROLOL TARTRATE 12.5 MG: 25 TABLET ORAL at 23:00

## 2017-05-19 RX ADMIN — METOCLOPRAMIDE 10 MG: 5 INJECTION, SOLUTION INTRAMUSCULAR; INTRAVENOUS at 23:41

## 2017-05-19 RX ADMIN — METOCLOPRAMIDE 10 MG: 5 INJECTION, SOLUTION INTRAMUSCULAR; INTRAVENOUS at 06:43

## 2017-05-19 RX ADMIN — Medication: at 06:57

## 2017-05-19 RX ADMIN — Medication 10 ML: at 23:41

## 2017-05-19 RX ADMIN — ERTAPENEM SODIUM 1 G: 1 INJECTION, POWDER, LYOPHILIZED, FOR SOLUTION INTRAMUSCULAR; INTRAVENOUS at 12:45

## 2017-05-19 RX ADMIN — METRONIDAZOLE 500 MG: 500 INJECTION, SOLUTION INTRAVENOUS at 19:35

## 2017-05-19 RX ADMIN — PANTOPRAZOLE SODIUM 40 MG: 40 INJECTION, POWDER, FOR SOLUTION INTRAVENOUS at 06:43

## 2017-05-19 RX ADMIN — METOPROLOL TARTRATE 12.5 MG: 25 TABLET ORAL at 16:26

## 2017-05-19 RX ADMIN — ALTEPLASE 2 MG: 2.2 INJECTION, POWDER, LYOPHILIZED, FOR SOLUTION INTRAVENOUS at 05:40

## 2017-05-19 RX ADMIN — METRONIDAZOLE 500 MG: 500 INJECTION, SOLUTION INTRAVENOUS at 11:56

## 2017-05-19 RX ADMIN — METOPROLOL TARTRATE 12.5 MG: 25 TABLET ORAL at 04:32

## 2017-05-20 ENCOUNTER — APPOINTMENT (OUTPATIENT)
Dept: GENERAL RADIOLOGY | Facility: HOSPITAL | Age: 72
End: 2017-05-20

## 2017-05-20 LAB
ALBUMIN SERPL-MCNC: 2.5 G/DL (ref 3.5–5.2)
ALBUMIN/GLOB SERPL: 0.8 G/DL
ALP SERPL-CCNC: 75 U/L (ref 39–117)
ALT SERPL W P-5'-P-CCNC: 17 U/L (ref 1–33)
ANION GAP SERPL CALCULATED.3IONS-SCNC: 8.5 MMOL/L
AST SERPL-CCNC: 22 U/L (ref 1–32)
BACTERIA SPEC AEROBE CULT: ABNORMAL
BASOPHILS # BLD AUTO: 0.02 10*3/MM3 (ref 0–0.2)
BASOPHILS NFR BLD AUTO: 0.3 % (ref 0–1.5)
BILIRUB SERPL-MCNC: <0.2 MG/DL (ref 0.1–1.2)
BUN BLD-MCNC: 12 MG/DL (ref 8–23)
BUN/CREAT SERPL: 41.4 (ref 7–25)
CALCIUM SPEC-SCNC: 8.1 MG/DL (ref 8.6–10.5)
CHLORIDE SERPL-SCNC: 96 MMOL/L (ref 98–107)
CO2 SERPL-SCNC: 26.5 MMOL/L (ref 22–29)
CREAT BLD-MCNC: 0.29 MG/DL (ref 0.57–1)
DEPRECATED RDW RBC AUTO: 45 FL (ref 37–54)
EOSINOPHIL # BLD AUTO: 0.33 10*3/MM3 (ref 0–0.7)
EOSINOPHIL NFR BLD AUTO: 4.4 % (ref 0.3–6.2)
ERYTHROCYTE [DISTWIDTH] IN BLOOD BY AUTOMATED COUNT: 14 % (ref 11.7–13)
GFR SERPL CREATININE-BSD FRML MDRD: >150 ML/MIN/1.73
GLOBULIN UR ELPH-MCNC: 3.3 GM/DL
GLUCOSE BLD-MCNC: 153 MG/DL (ref 65–99)
GLUCOSE BLDC GLUCOMTR-MCNC: 121 MG/DL (ref 70–130)
GLUCOSE BLDC GLUCOMTR-MCNC: 190 MG/DL (ref 70–130)
GLUCOSE BLDC GLUCOMTR-MCNC: 194 MG/DL (ref 70–130)
GLUCOSE BLDC GLUCOMTR-MCNC: 196 MG/DL (ref 70–130)
GRAM STN SPEC: ABNORMAL
GRAM STN SPEC: ABNORMAL
HCT VFR BLD AUTO: 28.1 % (ref 35.6–45.5)
HGB BLD-MCNC: 8.6 G/DL (ref 11.9–15.5)
IMM GRANULOCYTES # BLD: 0.06 10*3/MM3 (ref 0–0.03)
IMM GRANULOCYTES NFR BLD: 0.8 % (ref 0–0.5)
LYMPHOCYTES # BLD AUTO: 1.02 10*3/MM3 (ref 0.9–4.8)
LYMPHOCYTES NFR BLD AUTO: 13.5 % (ref 19.6–45.3)
MAGNESIUM SERPL-MCNC: 2.4 MG/DL (ref 1.6–2.4)
MCH RBC QN AUTO: 27.1 PG (ref 26.9–32)
MCHC RBC AUTO-ENTMCNC: 30.6 G/DL (ref 32.4–36.3)
MCV RBC AUTO: 88.6 FL (ref 80.5–98.2)
MONOCYTES # BLD AUTO: 1.45 10*3/MM3 (ref 0.2–1.2)
MONOCYTES NFR BLD AUTO: 19.2 % (ref 5–12)
NEUTROPHILS # BLD AUTO: 4.68 10*3/MM3 (ref 1.9–8.1)
NEUTROPHILS NFR BLD AUTO: 61.8 % (ref 42.7–76)
NRBC BLD MANUAL-RTO: 0 /100 WBC (ref 0–0)
PHOSPHATE SERPL-MCNC: 3.2 MG/DL (ref 2.5–4.5)
PLATELET # BLD AUTO: 273 10*3/MM3 (ref 140–500)
PMV BLD AUTO: 10.7 FL (ref 6–12)
POTASSIUM BLD-SCNC: 4 MMOL/L (ref 3.5–5.2)
PROT SERPL-MCNC: 5.8 G/DL (ref 6–8.5)
RBC # BLD AUTO: 3.17 10*6/MM3 (ref 3.9–5.2)
SODIUM BLD-SCNC: 131 MMOL/L (ref 136–145)
WBC NRBC COR # BLD: 7.56 10*3/MM3 (ref 4.5–10.7)

## 2017-05-20 PROCEDURE — 94799 UNLISTED PULMONARY SVC/PX: CPT

## 2017-05-20 PROCEDURE — 25010000002 PROMETHAZINE PER 50 MG: Performed by: SURGERY

## 2017-05-20 PROCEDURE — 25010000002 MORPHINE PER 10 MG: Performed by: SURGERY

## 2017-05-20 PROCEDURE — 63710000001 INSULIN REGULAR HUMAN PER 5 UNITS: Performed by: INTERNAL MEDICINE

## 2017-05-20 PROCEDURE — 83735 ASSAY OF MAGNESIUM: CPT | Performed by: SURGERY

## 2017-05-20 PROCEDURE — 25010000002 LINEZOLID 600 MG/300ML SOLUTION: Performed by: INTERNAL MEDICINE

## 2017-05-20 PROCEDURE — 25010000002 METOCLOPRAMIDE PER 10 MG: Performed by: SURGERY

## 2017-05-20 PROCEDURE — 80053 COMPREHEN METABOLIC PANEL: CPT | Performed by: HOSPITALIST

## 2017-05-20 PROCEDURE — 99223 1ST HOSP IP/OBS HIGH 75: CPT | Performed by: THORACIC SURGERY (CARDIOTHORACIC VASCULAR SURGERY)

## 2017-05-20 PROCEDURE — 25010000002 MAGNESIUM SULFATE PER 500 MG OF MAGNESIUM: Performed by: SURGERY

## 2017-05-20 PROCEDURE — 85025 COMPLETE CBC W/AUTO DIFF WBC: CPT | Performed by: SURGERY

## 2017-05-20 PROCEDURE — 82962 GLUCOSE BLOOD TEST: CPT

## 2017-05-20 PROCEDURE — 25010000002 ENOXAPARIN PER 10 MG: Performed by: INTERNAL MEDICINE

## 2017-05-20 PROCEDURE — 74220 X-RAY XM ESOPHAGUS 1CNTRST: CPT

## 2017-05-20 PROCEDURE — 25010000002 FLUCONAZOLE PER 200 MG: Performed by: INTERNAL MEDICINE

## 2017-05-20 PROCEDURE — 25010000002 CALCIUM GLUCONATE PER 10 ML: Performed by: SURGERY

## 2017-05-20 PROCEDURE — 25010000002 POTASSIUM CHLORIDE PER 2 MEQ OF POTASSIUM: Performed by: SURGERY

## 2017-05-20 PROCEDURE — 84100 ASSAY OF PHOSPHORUS: CPT | Performed by: SURGERY

## 2017-05-20 RX ADMIN — METOCLOPRAMIDE 10 MG: 5 INJECTION, SOLUTION INTRAMUSCULAR; INTRAVENOUS at 11:19

## 2017-05-20 RX ADMIN — HUMAN INSULIN 2 UNITS: 100 INJECTION, SOLUTION SUBCUTANEOUS at 11:18

## 2017-05-20 RX ADMIN — METOCLOPRAMIDE 10 MG: 5 INJECTION, SOLUTION INTRAMUSCULAR; INTRAVENOUS at 18:03

## 2017-05-20 RX ADMIN — PANTOPRAZOLE SODIUM 40 MG: 40 INJECTION, POWDER, FOR SOLUTION INTRAVENOUS at 05:57

## 2017-05-20 RX ADMIN — METOPROLOL TARTRATE 12.5 MG: 25 TABLET ORAL at 05:59

## 2017-05-20 RX ADMIN — Medication 10 ML: at 22:10

## 2017-05-20 RX ADMIN — Medication 10 ML: at 22:11

## 2017-05-20 RX ADMIN — LINEZOLID 600 MG: 600 INJECTION, SOLUTION INTRAVENOUS at 03:42

## 2017-05-20 RX ADMIN — METRONIDAZOLE 500 MG: 500 INJECTION, SOLUTION INTRAVENOUS at 18:03

## 2017-05-20 RX ADMIN — METOCLOPRAMIDE 10 MG: 5 INJECTION, SOLUTION INTRAMUSCULAR; INTRAVENOUS at 07:45

## 2017-05-20 RX ADMIN — METOPROLOL TARTRATE 12.5 MG: 25 TABLET ORAL at 22:09

## 2017-05-20 RX ADMIN — Medication 10 ML: at 12:41

## 2017-05-20 RX ADMIN — METOPROLOL TARTRATE 12.5 MG: 25 TABLET ORAL at 16:04

## 2017-05-20 RX ADMIN — HUMAN INSULIN 2 UNITS: 100 INJECTION, SOLUTION SUBCUTANEOUS at 18:12

## 2017-05-20 RX ADMIN — METOPROLOL TARTRATE 12.5 MG: 25 TABLET ORAL at 02:22

## 2017-05-20 RX ADMIN — FLUCONAZOLE 400 MG: 2 INJECTION INTRAVENOUS at 11:17

## 2017-05-20 RX ADMIN — PROMETHAZINE HYDROCHLORIDE 12.5 MG: 25 INJECTION, SOLUTION INTRAMUSCULAR; INTRAVENOUS at 16:04

## 2017-05-20 RX ADMIN — METRONIDAZOLE 500 MG: 500 INJECTION, SOLUTION INTRAVENOUS at 02:23

## 2017-05-20 RX ADMIN — METOPROLOL TARTRATE 12.5 MG: 25 TABLET ORAL at 11:18

## 2017-05-20 RX ADMIN — ENOXAPARIN SODIUM 40 MG: 40 INJECTION SUBCUTANEOUS at 11:18

## 2017-05-20 RX ADMIN — POTASSIUM CHLORIDE: 2 INJECTION, SOLUTION, CONCENTRATE INTRAVENOUS at 18:02

## 2017-05-20 RX ADMIN — LINEZOLID 600 MG: 600 INJECTION, SOLUTION INTRAVENOUS at 15:17

## 2017-05-20 RX ADMIN — METRONIDAZOLE 500 MG: 500 INJECTION, SOLUTION INTRAVENOUS at 11:18

## 2017-05-20 RX ADMIN — Medication: at 16:24

## 2017-05-20 RX ADMIN — ERTAPENEM SODIUM 1 G: 1 INJECTION, POWDER, LYOPHILIZED, FOR SOLUTION INTRAMUSCULAR; INTRAVENOUS at 12:41

## 2017-05-21 ENCOUNTER — APPOINTMENT (OUTPATIENT)
Dept: ULTRASOUND IMAGING | Facility: HOSPITAL | Age: 72
End: 2017-05-21

## 2017-05-21 ENCOUNTER — APPOINTMENT (OUTPATIENT)
Dept: GENERAL RADIOLOGY | Facility: HOSPITAL | Age: 72
End: 2017-05-21

## 2017-05-21 LAB
ALBUMIN SERPL-MCNC: 2.4 G/DL (ref 3.5–5.2)
ALBUMIN/GLOB SERPL: 0.7 G/DL
ALP SERPL-CCNC: 67 U/L (ref 39–117)
ALT SERPL W P-5'-P-CCNC: 16 U/L (ref 1–33)
ANION GAP SERPL CALCULATED.3IONS-SCNC: 12.9 MMOL/L
APPEARANCE FLD: ABNORMAL
APTT PPP: 32.2 SECONDS (ref 22.7–35.4)
AST SERPL-CCNC: 21 U/L (ref 1–32)
BACTERIA FLD CULT: NORMAL
BASOPHILS # BLD AUTO: 0.02 10*3/MM3 (ref 0–0.2)
BASOPHILS NFR BLD AUTO: 0.3 % (ref 0–1.5)
BILIRUB SERPL-MCNC: <0.2 MG/DL (ref 0.1–1.2)
BUN BLD-MCNC: 11 MG/DL (ref 8–23)
BUN/CREAT SERPL: 29.7 (ref 7–25)
CALCIUM SPEC-SCNC: 8 MG/DL (ref 8.6–10.5)
CHLORIDE SERPL-SCNC: 98 MMOL/L (ref 98–107)
CO2 SERPL-SCNC: 25.1 MMOL/L (ref 22–29)
COLOR FLD: ABNORMAL
CREAT BLD-MCNC: 0.37 MG/DL (ref 0.57–1)
DEPRECATED RDW RBC AUTO: 47.1 FL (ref 37–54)
EOSINOPHIL # BLD AUTO: 0.37 10*3/MM3 (ref 0–0.7)
EOSINOPHIL NFR BLD AUTO: 5.9 % (ref 0.3–6.2)
ERYTHROCYTE [DISTWIDTH] IN BLOOD BY AUTOMATED COUNT: 14 % (ref 11.7–13)
GFR SERPL CREATININE-BSD FRML MDRD: >150 ML/MIN/1.73
GLOBULIN UR ELPH-MCNC: 3.4 GM/DL
GLUCOSE BLD-MCNC: 128 MG/DL (ref 65–99)
GLUCOSE BLDC GLUCOMTR-MCNC: 139 MG/DL (ref 70–130)
GLUCOSE BLDC GLUCOMTR-MCNC: 166 MG/DL (ref 70–130)
GLUCOSE BLDC GLUCOMTR-MCNC: 166 MG/DL (ref 70–130)
GLUCOSE BLDC GLUCOMTR-MCNC: 173 MG/DL (ref 70–130)
GLUCOSE BLDC GLUCOMTR-MCNC: 183 MG/DL (ref 70–130)
GLUCOSE FLD-MCNC: 183 MG/DL
GRAM STN SPEC: NORMAL
HCT VFR BLD AUTO: 27.7 % (ref 35.6–45.5)
HGB BLD-MCNC: 8.5 G/DL (ref 11.9–15.5)
IMM GRANULOCYTES # BLD: 0.05 10*3/MM3 (ref 0–0.03)
IMM GRANULOCYTES NFR BLD: 0.8 % (ref 0–0.5)
INR PPP: 1.26 (ref 0.9–1.1)
LDH FLD-CCNC: 64 U/L
LYMPHOCYTES # BLD AUTO: 1.2 10*3/MM3 (ref 0.9–4.8)
LYMPHOCYTES NFR BLD AUTO: 19.2 % (ref 19.6–45.3)
LYMPHOCYTES NFR FLD MANUAL: 4 %
MAGNESIUM SERPL-MCNC: 2 MG/DL (ref 1.6–2.4)
MCH RBC QN AUTO: 28.4 PG (ref 26.9–32)
MCHC RBC AUTO-ENTMCNC: 30.7 G/DL (ref 32.4–36.3)
MCV RBC AUTO: 92.6 FL (ref 80.5–98.2)
METHOD: ABNORMAL
MONOCYTES # BLD AUTO: 1.18 10*3/MM3 (ref 0.2–1.2)
MONOCYTES NFR BLD AUTO: 18.9 % (ref 5–12)
MONOCYTES NFR FLD: 2 %
MONOS+MACROS NFR FLD: 85 %
NEUTROPHILS # BLD AUTO: 3.43 10*3/MM3 (ref 1.9–8.1)
NEUTROPHILS NFR BLD AUTO: 54.9 % (ref 42.7–76)
NEUTROPHILS NFR FLD MANUAL: 9 %
NUC CELL # FLD: 733 /MM3
PH FLD: 7.5 [PH]
PHOSPHATE SERPL-MCNC: 2.7 MG/DL (ref 2.5–4.5)
PLATELET # BLD AUTO: 262 10*3/MM3 (ref 140–500)
PMV BLD AUTO: 10.9 FL (ref 6–12)
POTASSIUM BLD-SCNC: 3.6 MMOL/L (ref 3.5–5.2)
PROT FLD-MCNC: <1 G/DL
PROT SERPL-MCNC: 5.8 G/DL (ref 6–8.5)
PROTHROMBIN TIME: 15.3 SECONDS (ref 11.7–14.2)
RBC # BLD AUTO: 2.99 10*6/MM3 (ref 3.9–5.2)
RBC # FLD AUTO: 3000 /MM3
SODIUM BLD-SCNC: 136 MMOL/L (ref 136–145)
WBC NRBC COR # BLD: 6.25 10*3/MM3 (ref 4.5–10.7)

## 2017-05-21 PROCEDURE — 87070 CULTURE OTHR SPECIMN AEROBIC: CPT | Performed by: HOSPITALIST

## 2017-05-21 PROCEDURE — 25010000002 ONDANSETRON PER 1 MG: Performed by: SURGERY

## 2017-05-21 PROCEDURE — 80053 COMPREHEN METABOLIC PANEL: CPT | Performed by: HOSPITALIST

## 2017-05-21 PROCEDURE — 85610 PROTHROMBIN TIME: CPT | Performed by: THORACIC SURGERY (CARDIOTHORACIC VASCULAR SURGERY)

## 2017-05-21 PROCEDURE — 87015 SPECIMEN INFECT AGNT CONCNTJ: CPT | Performed by: HOSPITALIST

## 2017-05-21 PROCEDURE — 88112 CYTOPATH CELL ENHANCE TECH: CPT | Performed by: THORACIC SURGERY (CARDIOTHORACIC VASCULAR SURGERY)

## 2017-05-21 PROCEDURE — 76942 ECHO GUIDE FOR BIOPSY: CPT

## 2017-05-21 PROCEDURE — 85730 THROMBOPLASTIN TIME PARTIAL: CPT | Performed by: THORACIC SURGERY (CARDIOTHORACIC VASCULAR SURGERY)

## 2017-05-21 PROCEDURE — 99232 SBSQ HOSP IP/OBS MODERATE 35: CPT | Performed by: THORACIC SURGERY (CARDIOTHORACIC VASCULAR SURGERY)

## 2017-05-21 PROCEDURE — 94799 UNLISTED PULMONARY SVC/PX: CPT

## 2017-05-21 PROCEDURE — 83615 LACTATE (LD) (LDH) ENZYME: CPT | Performed by: THORACIC SURGERY (CARDIOTHORACIC VASCULAR SURGERY)

## 2017-05-21 PROCEDURE — 82945 GLUCOSE OTHER FLUID: CPT | Performed by: THORACIC SURGERY (CARDIOTHORACIC VASCULAR SURGERY)

## 2017-05-21 PROCEDURE — 88305 TISSUE EXAM BY PATHOLOGIST: CPT | Performed by: THORACIC SURGERY (CARDIOTHORACIC VASCULAR SURGERY)

## 2017-05-21 PROCEDURE — 83986 ASSAY PH BODY FLUID NOS: CPT | Performed by: THORACIC SURGERY (CARDIOTHORACIC VASCULAR SURGERY)

## 2017-05-21 PROCEDURE — 25010000002 LINEZOLID 600 MG/300ML SOLUTION: Performed by: INTERNAL MEDICINE

## 2017-05-21 PROCEDURE — 25010000002 FLUCONAZOLE PER 200 MG: Performed by: INTERNAL MEDICINE

## 2017-05-21 PROCEDURE — 85025 COMPLETE CBC W/AUTO DIFF WBC: CPT | Performed by: SURGERY

## 2017-05-21 PROCEDURE — 71010 HC CHEST PA OR AP: CPT

## 2017-05-21 PROCEDURE — 97110 THERAPEUTIC EXERCISES: CPT | Performed by: PHYSICAL THERAPIST

## 2017-05-21 PROCEDURE — 25010000002 POTASSIUM CHLORIDE PER 2 MEQ OF POTASSIUM: Performed by: SURGERY

## 2017-05-21 PROCEDURE — 89051 BODY FLUID CELL COUNT: CPT | Performed by: THORACIC SURGERY (CARDIOTHORACIC VASCULAR SURGERY)

## 2017-05-21 PROCEDURE — 84100 ASSAY OF PHOSPHORUS: CPT | Performed by: SURGERY

## 2017-05-21 PROCEDURE — 83735 ASSAY OF MAGNESIUM: CPT | Performed by: SURGERY

## 2017-05-21 PROCEDURE — 25010000002 METOCLOPRAMIDE PER 10 MG: Performed by: SURGERY

## 2017-05-21 PROCEDURE — 63710000001 INSULIN REGULAR HUMAN PER 5 UNITS: Performed by: INTERNAL MEDICINE

## 2017-05-21 PROCEDURE — 94640 AIRWAY INHALATION TREATMENT: CPT

## 2017-05-21 PROCEDURE — 87205 SMEAR GRAM STAIN: CPT | Performed by: HOSPITALIST

## 2017-05-21 PROCEDURE — 25010000002 MAGNESIUM SULFATE PER 500 MG OF MAGNESIUM: Performed by: SURGERY

## 2017-05-21 PROCEDURE — 87102 FUNGUS ISOLATION CULTURE: CPT | Performed by: THORACIC SURGERY (CARDIOTHORACIC VASCULAR SURGERY)

## 2017-05-21 PROCEDURE — 0W993ZZ DRAINAGE OF RIGHT PLEURAL CAVITY, PERCUTANEOUS APPROACH: ICD-10-PCS | Performed by: HOSPITALIST

## 2017-05-21 PROCEDURE — 25010000002 CALCIUM GLUCONATE PER 10 ML: Performed by: SURGERY

## 2017-05-21 PROCEDURE — 82962 GLUCOSE BLOOD TEST: CPT

## 2017-05-21 PROCEDURE — 84157 ASSAY OF PROTEIN OTHER: CPT | Performed by: THORACIC SURGERY (CARDIOTHORACIC VASCULAR SURGERY)

## 2017-05-21 RX ORDER — HYDROMORPHONE HYDROCHLORIDE 1 MG/ML
0.5 INJECTION, SOLUTION INTRAMUSCULAR; INTRAVENOUS; SUBCUTANEOUS
Status: DISCONTINUED | OUTPATIENT
Start: 2017-05-21 | End: 2017-05-26

## 2017-05-21 RX ORDER — PANTOPRAZOLE SODIUM 40 MG/1
40 TABLET, DELAYED RELEASE ORAL
Status: DISCONTINUED | OUTPATIENT
Start: 2017-05-21 | End: 2017-05-28 | Stop reason: HOSPADM

## 2017-05-21 RX ORDER — LIDOCAINE WITH 8.4% SOD BICARB 0.9%(10ML)
20 SYRINGE (ML) INJECTION ONCE
Status: COMPLETED | OUTPATIENT
Start: 2017-05-21 | End: 2017-05-21

## 2017-05-21 RX ORDER — OXYCODONE HYDROCHLORIDE AND ACETAMINOPHEN 5; 325 MG/1; MG/1
2 TABLET ORAL EVERY 4 HOURS PRN
Status: DISCONTINUED | OUTPATIENT
Start: 2017-05-21 | End: 2017-05-28 | Stop reason: HOSPADM

## 2017-05-21 RX ORDER — IPRATROPIUM BROMIDE AND ALBUTEROL SULFATE 2.5; .5 MG/3ML; MG/3ML
3 SOLUTION RESPIRATORY (INHALATION) EVERY 4 HOURS PRN
Status: DISCONTINUED | OUTPATIENT
Start: 2017-05-21 | End: 2017-05-26

## 2017-05-21 RX ORDER — LIDOCAINE HYDROCHLORIDE 10 MG/ML
20 INJECTION, SOLUTION INFILTRATION; PERINEURAL ONCE
Status: COMPLETED | OUTPATIENT
Start: 2017-05-21 | End: 2017-05-21

## 2017-05-21 RX ORDER — METOCLOPRAMIDE 10 MG/1
10 TABLET ORAL
Status: DISCONTINUED | OUTPATIENT
Start: 2017-05-21 | End: 2017-05-25

## 2017-05-21 RX ORDER — IPRATROPIUM BROMIDE AND ALBUTEROL SULFATE 2.5; .5 MG/3ML; MG/3ML
3 SOLUTION RESPIRATORY (INHALATION)
Status: DISCONTINUED | OUTPATIENT
Start: 2017-05-22 | End: 2017-05-26

## 2017-05-21 RX ORDER — ALBUTEROL SULFATE 2.5 MG/3ML
2.5 SOLUTION RESPIRATORY (INHALATION) EVERY 6 HOURS PRN
Status: DISCONTINUED | OUTPATIENT
Start: 2017-05-21 | End: 2017-05-26

## 2017-05-21 RX ADMIN — HUMAN INSULIN 2 UNITS: 100 INJECTION, SOLUTION SUBCUTANEOUS at 13:13

## 2017-05-21 RX ADMIN — METOPROLOL TARTRATE 12.5 MG: 25 TABLET ORAL at 01:59

## 2017-05-21 RX ADMIN — OXYCODONE HYDROCHLORIDE AND ACETAMINOPHEN 1 TABLET: 5; 325 TABLET ORAL at 13:39

## 2017-05-21 RX ADMIN — PANTOPRAZOLE SODIUM 40 MG: 40 INJECTION, POWDER, FOR SOLUTION INTRAVENOUS at 06:12

## 2017-05-21 RX ADMIN — LINEZOLID 600 MG: 600 INJECTION, SOLUTION INTRAVENOUS at 03:59

## 2017-05-21 RX ADMIN — LIDOCAINE HYDROCHLORIDE 10 ML: 10 INJECTION, SOLUTION INFILTRATION; PERINEURAL at 09:50

## 2017-05-21 RX ADMIN — HUMAN INSULIN 2 UNITS: 100 INJECTION, SOLUTION SUBCUTANEOUS at 18:37

## 2017-05-21 RX ADMIN — IPRATROPIUM BROMIDE AND ALBUTEROL SULFATE 3 ML: .5; 3 SOLUTION RESPIRATORY (INHALATION) at 12:53

## 2017-05-21 RX ADMIN — METRONIDAZOLE 500 MG: 500 INJECTION, SOLUTION INTRAVENOUS at 12:09

## 2017-05-21 RX ADMIN — ERTAPENEM SODIUM 1 G: 1 INJECTION, POWDER, LYOPHILIZED, FOR SOLUTION INTRAMUSCULAR; INTRAVENOUS at 13:13

## 2017-05-21 RX ADMIN — METOPROLOL TARTRATE 12.5 MG: 25 TABLET ORAL at 12:09

## 2017-05-21 RX ADMIN — METOPROLOL TARTRATE 12.5 MG: 25 TABLET ORAL at 16:27

## 2017-05-21 RX ADMIN — METOCLOPRAMIDE 10 MG: 5 INJECTION, SOLUTION INTRAMUSCULAR; INTRAVENOUS at 06:12

## 2017-05-21 RX ADMIN — METRONIDAZOLE 500 MG: 500 INJECTION, SOLUTION INTRAVENOUS at 02:00

## 2017-05-21 RX ADMIN — OXYCODONE HYDROCHLORIDE AND ACETAMINOPHEN 2 TABLET: 5; 325 TABLET ORAL at 23:26

## 2017-05-21 RX ADMIN — HUMAN INSULIN 2 UNITS: 100 INJECTION, SOLUTION SUBCUTANEOUS at 00:53

## 2017-05-21 RX ADMIN — METOPROLOL TARTRATE 12.5 MG: 25 TABLET ORAL at 22:01

## 2017-05-21 RX ADMIN — HUMAN INSULIN 2 UNITS: 100 INJECTION, SOLUTION SUBCUTANEOUS at 06:13

## 2017-05-21 RX ADMIN — ONDANSETRON 8 MG: 2 INJECTION INTRAMUSCULAR; INTRAVENOUS at 00:00

## 2017-05-21 RX ADMIN — METOCLOPRAMIDE 10 MG: 10 TABLET ORAL at 18:37

## 2017-05-21 RX ADMIN — Medication 10 ML: at 12:31

## 2017-05-21 RX ADMIN — METOPROLOL TARTRATE 12.5 MG: 25 TABLET ORAL at 06:12

## 2017-05-21 RX ADMIN — OXYCODONE HYDROCHLORIDE AND ACETAMINOPHEN 1 TABLET: 5; 325 TABLET ORAL at 14:32

## 2017-05-21 RX ADMIN — IPRATROPIUM BROMIDE AND ALBUTEROL SULFATE 3 ML: .5; 3 SOLUTION RESPIRATORY (INHALATION) at 17:41

## 2017-05-21 RX ADMIN — POTASSIUM CHLORIDE: 2 INJECTION, SOLUTION, CONCENTRATE INTRAVENOUS at 18:37

## 2017-05-21 RX ADMIN — Medication 10 ML: at 12:32

## 2017-05-21 RX ADMIN — Medication 10 ML: at 10:21

## 2017-05-21 RX ADMIN — METRONIDAZOLE 500 MG: 500 INJECTION, SOLUTION INTRAVENOUS at 18:37

## 2017-05-21 RX ADMIN — METOCLOPRAMIDE 10 MG: 10 TABLET ORAL at 12:09

## 2017-05-21 RX ADMIN — ACETAMINOPHEN 650 MG: 325 TABLET ORAL at 00:54

## 2017-05-21 RX ADMIN — LINEZOLID 600 MG: 600 INJECTION, SOLUTION INTRAVENOUS at 16:27

## 2017-05-21 RX ADMIN — IPRATROPIUM BROMIDE AND ALBUTEROL SULFATE 3 ML: .5; 3 SOLUTION RESPIRATORY (INHALATION) at 05:31

## 2017-05-21 RX ADMIN — FLUCONAZOLE 400 MG: 2 INJECTION INTRAVENOUS at 12:09

## 2017-05-21 RX ADMIN — METOCLOPRAMIDE 10 MG: 5 INJECTION, SOLUTION INTRAMUSCULAR; INTRAVENOUS at 00:54

## 2017-05-21 RX ADMIN — IPRATROPIUM BROMIDE AND ALBUTEROL SULFATE 3 ML: .5; 3 SOLUTION RESPIRATORY (INHALATION) at 01:17

## 2017-05-21 RX ADMIN — LORAZEPAM 0.25 MG: 0.5 TABLET ORAL at 22:00

## 2017-05-22 ENCOUNTER — APPOINTMENT (OUTPATIENT)
Dept: GENERAL RADIOLOGY | Facility: HOSPITAL | Age: 72
End: 2017-05-22

## 2017-05-22 LAB
ALBUMIN SERPL-MCNC: 2.7 G/DL (ref 3.5–5.2)
ALBUMIN/GLOB SERPL: 0.9 G/DL
ALP SERPL-CCNC: 65 U/L (ref 39–117)
ALT SERPL W P-5'-P-CCNC: 12 U/L (ref 1–33)
ANION GAP SERPL CALCULATED.3IONS-SCNC: 8.9 MMOL/L
AST SERPL-CCNC: 19 U/L (ref 1–32)
BASOPHILS # BLD AUTO: 0.01 10*3/MM3 (ref 0–0.2)
BASOPHILS NFR BLD AUTO: 0.1 % (ref 0–1.5)
BILIRUB SERPL-MCNC: <0.2 MG/DL (ref 0.1–1.2)
BUN BLD-MCNC: 12 MG/DL (ref 8–23)
BUN/CREAT SERPL: 29.3 (ref 7–25)
CALCIUM SPEC-SCNC: 8.3 MG/DL (ref 8.6–10.5)
CHLORIDE SERPL-SCNC: 103 MMOL/L (ref 98–107)
CO2 SERPL-SCNC: 29.1 MMOL/L (ref 22–29)
CREAT BLD-MCNC: 0.41 MG/DL (ref 0.57–1)
DEPRECATED RDW RBC AUTO: 48.6 FL (ref 37–54)
EOSINOPHIL # BLD AUTO: 0.52 10*3/MM3 (ref 0–0.7)
EOSINOPHIL NFR BLD AUTO: 7.4 % (ref 0.3–6.2)
ERYTHROCYTE [DISTWIDTH] IN BLOOD BY AUTOMATED COUNT: 14.3 % (ref 11.7–13)
GFR SERPL CREATININE-BSD FRML MDRD: >150 ML/MIN/1.73
GLOBULIN UR ELPH-MCNC: 3 GM/DL
GLUCOSE BLD-MCNC: 158 MG/DL (ref 65–99)
GLUCOSE BLDC GLUCOMTR-MCNC: 168 MG/DL (ref 70–130)
GLUCOSE BLDC GLUCOMTR-MCNC: 177 MG/DL (ref 70–130)
GLUCOSE BLDC GLUCOMTR-MCNC: 188 MG/DL (ref 70–130)
HCT VFR BLD AUTO: 28.6 % (ref 35.6–45.5)
HGB BLD-MCNC: 8.5 G/DL (ref 11.9–15.5)
IMM GRANULOCYTES # BLD: 0.04 10*3/MM3 (ref 0–0.03)
IMM GRANULOCYTES NFR BLD: 0.6 % (ref 0–0.5)
LYMPHOCYTES # BLD AUTO: 1.27 10*3/MM3 (ref 0.9–4.8)
LYMPHOCYTES NFR BLD AUTO: 18.1 % (ref 19.6–45.3)
MCH RBC QN AUTO: 28 PG (ref 26.9–32)
MCHC RBC AUTO-ENTMCNC: 29.7 G/DL (ref 32.4–36.3)
MCV RBC AUTO: 94.1 FL (ref 80.5–98.2)
MONOCYTES # BLD AUTO: 1.04 10*3/MM3 (ref 0.2–1.2)
MONOCYTES NFR BLD AUTO: 14.9 % (ref 5–12)
NEUTROPHILS # BLD AUTO: 4.12 10*3/MM3 (ref 1.9–8.1)
NEUTROPHILS NFR BLD AUTO: 58.9 % (ref 42.7–76)
PLATELET # BLD AUTO: 249 10*3/MM3 (ref 140–500)
PMV BLD AUTO: 10.3 FL (ref 6–12)
POTASSIUM BLD-SCNC: 4.1 MMOL/L (ref 3.5–5.2)
PROT SERPL-MCNC: 5.7 G/DL (ref 6–8.5)
RBC # BLD AUTO: 3.04 10*6/MM3 (ref 3.9–5.2)
SODIUM BLD-SCNC: 141 MMOL/L (ref 136–145)
WBC NRBC COR # BLD: 7 10*3/MM3 (ref 4.5–10.7)

## 2017-05-22 PROCEDURE — 99231 SBSQ HOSP IP/OBS SF/LOW 25: CPT | Performed by: NURSE PRACTITIONER

## 2017-05-22 PROCEDURE — 25010000002 LINEZOLID 600 MG/300ML SOLUTION: Performed by: INTERNAL MEDICINE

## 2017-05-22 PROCEDURE — 82962 GLUCOSE BLOOD TEST: CPT

## 2017-05-22 PROCEDURE — 25010000002 ENOXAPARIN PER 10 MG: Performed by: INTERNAL MEDICINE

## 2017-05-22 PROCEDURE — 25010000002 CALCIUM GLUCONATE PER 10 ML: Performed by: SURGERY

## 2017-05-22 PROCEDURE — 25010000002 FLUCONAZOLE PER 200 MG: Performed by: INTERNAL MEDICINE

## 2017-05-22 PROCEDURE — 94799 UNLISTED PULMONARY SVC/PX: CPT

## 2017-05-22 PROCEDURE — 25010000002 POTASSIUM CHLORIDE PER 2 MEQ OF POTASSIUM: Performed by: SURGERY

## 2017-05-22 PROCEDURE — 97110 THERAPEUTIC EXERCISES: CPT

## 2017-05-22 PROCEDURE — 63710000001 INSULIN REGULAR HUMAN PER 5 UNITS: Performed by: INTERNAL MEDICINE

## 2017-05-22 PROCEDURE — 71010 HC CHEST PA OR AP: CPT

## 2017-05-22 PROCEDURE — 25010000002 MAGNESIUM SULFATE PER 500 MG OF MAGNESIUM: Performed by: SURGERY

## 2017-05-22 PROCEDURE — 85025 COMPLETE CBC W/AUTO DIFF WBC: CPT | Performed by: SURGERY

## 2017-05-22 PROCEDURE — 80053 COMPREHEN METABOLIC PANEL: CPT | Performed by: HOSPITALIST

## 2017-05-22 RX ORDER — BENZONATATE 100 MG/1
100 CAPSULE ORAL 3 TIMES DAILY PRN
Status: DISCONTINUED | OUTPATIENT
Start: 2017-05-22 | End: 2017-05-28 | Stop reason: HOSPADM

## 2017-05-22 RX ORDER — SACCHAROMYCES BOULARDII 250 MG
250 CAPSULE ORAL 2 TIMES DAILY
Status: DISCONTINUED | OUTPATIENT
Start: 2017-05-22 | End: 2017-05-28 | Stop reason: HOSPADM

## 2017-05-22 RX ADMIN — METOPROLOL TARTRATE 12.5 MG: 25 TABLET ORAL at 20:58

## 2017-05-22 RX ADMIN — OXYCODONE HYDROCHLORIDE AND ACETAMINOPHEN 2 TABLET: 5; 325 TABLET ORAL at 20:59

## 2017-05-22 RX ADMIN — BENZONATATE 100 MG: 100 CAPSULE ORAL at 03:50

## 2017-05-22 RX ADMIN — Medication 10 ML: at 12:34

## 2017-05-22 RX ADMIN — FLUCONAZOLE 400 MG: 2 INJECTION INTRAVENOUS at 11:01

## 2017-05-22 RX ADMIN — OXYCODONE HYDROCHLORIDE AND ACETAMINOPHEN 2 TABLET: 5; 325 TABLET ORAL at 14:30

## 2017-05-22 RX ADMIN — IPRATROPIUM BROMIDE AND ALBUTEROL SULFATE 3 ML: .5; 3 SOLUTION RESPIRATORY (INHALATION) at 05:40

## 2017-05-22 RX ADMIN — IPRATROPIUM BROMIDE AND ALBUTEROL SULFATE 3 ML: .5; 3 SOLUTION RESPIRATORY (INHALATION) at 08:25

## 2017-05-22 RX ADMIN — Medication 10 ML: at 05:37

## 2017-05-22 RX ADMIN — METOCLOPRAMIDE 10 MG: 10 TABLET ORAL at 18:25

## 2017-05-22 RX ADMIN — METRONIDAZOLE 500 MG: 500 INJECTION, SOLUTION INTRAVENOUS at 12:33

## 2017-05-22 RX ADMIN — LINEZOLID 600 MG: 600 INJECTION, SOLUTION INTRAVENOUS at 05:30

## 2017-05-22 RX ADMIN — METOPROLOL TARTRATE 12.5 MG: 25 TABLET ORAL at 03:50

## 2017-05-22 RX ADMIN — PANTOPRAZOLE SODIUM 40 MG: 40 TABLET, DELAYED RELEASE ORAL at 07:54

## 2017-05-22 RX ADMIN — HUMAN INSULIN 2 UNITS: 100 INJECTION, SOLUTION SUBCUTANEOUS at 12:34

## 2017-05-22 RX ADMIN — METOPROLOL TARTRATE 12.5 MG: 25 TABLET ORAL at 11:01

## 2017-05-22 RX ADMIN — POTASSIUM CHLORIDE: 2 INJECTION, SOLUTION, CONCENTRATE INTRAVENOUS at 18:26

## 2017-05-22 RX ADMIN — IPRATROPIUM BROMIDE AND ALBUTEROL SULFATE 3 ML: .5; 3 SOLUTION RESPIRATORY (INHALATION) at 17:55

## 2017-05-22 RX ADMIN — METOPROLOL TARTRATE 12.5 MG: 25 TABLET ORAL at 15:35

## 2017-05-22 RX ADMIN — Medication 10 ML: at 21:02

## 2017-05-22 RX ADMIN — ENOXAPARIN SODIUM 40 MG: 40 INJECTION SUBCUTANEOUS at 12:33

## 2017-05-22 RX ADMIN — Medication 250 MG: at 11:01

## 2017-05-22 RX ADMIN — BENZONATATE 100 MG: 100 CAPSULE ORAL at 22:33

## 2017-05-22 RX ADMIN — Medication 10 ML: at 21:03

## 2017-05-22 RX ADMIN — METRONIDAZOLE 500 MG: 500 INJECTION, SOLUTION INTRAVENOUS at 18:25

## 2017-05-22 RX ADMIN — Medication 250 MG: at 18:25

## 2017-05-22 RX ADMIN — IPRATROPIUM BROMIDE AND ALBUTEROL SULFATE 3 ML: .5; 3 SOLUTION RESPIRATORY (INHALATION) at 13:47

## 2017-05-22 RX ADMIN — HUMAN INSULIN 2 UNITS: 100 INJECTION, SOLUTION SUBCUTANEOUS at 07:54

## 2017-05-22 RX ADMIN — METOCLOPRAMIDE 10 MG: 10 TABLET ORAL at 11:01

## 2017-05-22 RX ADMIN — HUMAN INSULIN 2 UNITS: 100 INJECTION, SOLUTION SUBCUTANEOUS at 18:26

## 2017-05-22 RX ADMIN — ERTAPENEM SODIUM 1 G: 1 INJECTION, POWDER, LYOPHILIZED, FOR SOLUTION INTRAMUSCULAR; INTRAVENOUS at 12:33

## 2017-05-22 RX ADMIN — OXYCODONE HYDROCHLORIDE AND ACETAMINOPHEN 2 TABLET: 5; 325 TABLET ORAL at 07:53

## 2017-05-22 RX ADMIN — LINEZOLID 600 MG: 600 INJECTION, SOLUTION INTRAVENOUS at 15:35

## 2017-05-22 RX ADMIN — BENZONATATE 100 MG: 100 CAPSULE ORAL at 16:36

## 2017-05-22 RX ADMIN — METRONIDAZOLE 500 MG: 500 INJECTION, SOLUTION INTRAVENOUS at 03:47

## 2017-05-23 ENCOUNTER — APPOINTMENT (OUTPATIENT)
Dept: GENERAL RADIOLOGY | Facility: HOSPITAL | Age: 72
End: 2017-05-23

## 2017-05-23 LAB
ALBUMIN SERPL-MCNC: 2.3 G/DL (ref 3.5–5.2)
ALBUMIN/GLOB SERPL: 0.7 G/DL
ALP SERPL-CCNC: 65 U/L (ref 39–117)
ALT SERPL W P-5'-P-CCNC: 13 U/L (ref 1–33)
ANION GAP SERPL CALCULATED.3IONS-SCNC: 8.3 MMOL/L
AST SERPL-CCNC: 23 U/L (ref 1–32)
BILIRUB SERPL-MCNC: <0.2 MG/DL (ref 0.1–1.2)
BUN BLD-MCNC: 11 MG/DL (ref 8–23)
BUN/CREAT SERPL: 30.6 (ref 7–25)
CALCIUM SPEC-SCNC: 8.5 MG/DL (ref 8.6–10.5)
CHLORIDE SERPL-SCNC: 97 MMOL/L (ref 98–107)
CO2 SERPL-SCNC: 29.7 MMOL/L (ref 22–29)
CREAT BLD-MCNC: 0.36 MG/DL (ref 0.57–1)
CYTO UR: NORMAL
GFR SERPL CREATININE-BSD FRML MDRD: >150 ML/MIN/1.73
GLOBULIN UR ELPH-MCNC: 3.5 GM/DL
GLUCOSE BLD-MCNC: 139 MG/DL (ref 65–99)
GLUCOSE BLDC GLUCOMTR-MCNC: 127 MG/DL (ref 70–130)
GLUCOSE BLDC GLUCOMTR-MCNC: 130 MG/DL (ref 70–130)
GLUCOSE BLDC GLUCOMTR-MCNC: 131 MG/DL (ref 70–130)
GLUCOSE BLDC GLUCOMTR-MCNC: 132 MG/DL (ref 70–130)
GLUCOSE BLDC GLUCOMTR-MCNC: 146 MG/DL (ref 70–130)
LAB AP CASE REPORT: NORMAL
Lab: NORMAL
MAGNESIUM SERPL-MCNC: 2 MG/DL (ref 1.6–2.4)
PATH REPORT.FINAL DX SPEC: NORMAL
PATH REPORT.GROSS SPEC: NORMAL
PHOSPHATE SERPL-MCNC: 4.2 MG/DL (ref 2.5–4.5)
POTASSIUM BLD-SCNC: 4 MMOL/L (ref 3.5–5.2)
PROT SERPL-MCNC: 5.8 G/DL (ref 6–8.5)
SODIUM BLD-SCNC: 135 MMOL/L (ref 136–145)

## 2017-05-23 PROCEDURE — 80053 COMPREHEN METABOLIC PANEL: CPT | Performed by: HOSPITALIST

## 2017-05-23 PROCEDURE — 94799 UNLISTED PULMONARY SVC/PX: CPT

## 2017-05-23 PROCEDURE — 83735 ASSAY OF MAGNESIUM: CPT | Performed by: SURGERY

## 2017-05-23 PROCEDURE — 25010000002 POTASSIUM CHLORIDE PER 2 MEQ OF POTASSIUM: Performed by: SURGERY

## 2017-05-23 PROCEDURE — 97110 THERAPEUTIC EXERCISES: CPT

## 2017-05-23 PROCEDURE — 84100 ASSAY OF PHOSPHORUS: CPT | Performed by: SURGERY

## 2017-05-23 PROCEDURE — 25010000002 MAGNESIUM SULFATE PER 500 MG OF MAGNESIUM: Performed by: SURGERY

## 2017-05-23 PROCEDURE — 71010 HC CHEST PA OR AP: CPT

## 2017-05-23 PROCEDURE — 25010000002 LINEZOLID 600 MG/300ML SOLUTION: Performed by: INTERNAL MEDICINE

## 2017-05-23 PROCEDURE — 25010000002 FLUCONAZOLE PER 200 MG: Performed by: INTERNAL MEDICINE

## 2017-05-23 PROCEDURE — 82962 GLUCOSE BLOOD TEST: CPT

## 2017-05-23 PROCEDURE — 25010000002 CALCIUM GLUCONATE PER 10 ML: Performed by: SURGERY

## 2017-05-23 PROCEDURE — 99231 SBSQ HOSP IP/OBS SF/LOW 25: CPT | Performed by: NURSE PRACTITIONER

## 2017-05-23 PROCEDURE — 25010000002 ENOXAPARIN PER 10 MG: Performed by: INTERNAL MEDICINE

## 2017-05-23 RX ADMIN — OXYCODONE HYDROCHLORIDE AND ACETAMINOPHEN 2 TABLET: 5; 325 TABLET ORAL at 04:02

## 2017-05-23 RX ADMIN — IPRATROPIUM BROMIDE AND ALBUTEROL SULFATE 3 ML: .5; 3 SOLUTION RESPIRATORY (INHALATION) at 14:16

## 2017-05-23 RX ADMIN — METOPROLOL TARTRATE 12.5 MG: 25 TABLET ORAL at 23:17

## 2017-05-23 RX ADMIN — METRONIDAZOLE 500 MG: 500 INJECTION, SOLUTION INTRAVENOUS at 18:25

## 2017-05-23 RX ADMIN — OXYCODONE HYDROCHLORIDE AND ACETAMINOPHEN 2 TABLET: 5; 325 TABLET ORAL at 12:27

## 2017-05-23 RX ADMIN — FLUCONAZOLE 400 MG: 2 INJECTION INTRAVENOUS at 10:37

## 2017-05-23 RX ADMIN — METOPROLOL TARTRATE 12.5 MG: 25 TABLET ORAL at 12:02

## 2017-05-23 RX ADMIN — METOPROLOL TARTRATE 12.5 MG: 25 TABLET ORAL at 00:51

## 2017-05-23 RX ADMIN — METRONIDAZOLE 500 MG: 500 INJECTION, SOLUTION INTRAVENOUS at 10:37

## 2017-05-23 RX ADMIN — OXYCODONE HYDROCHLORIDE AND ACETAMINOPHEN 2 TABLET: 5; 325 TABLET ORAL at 23:17

## 2017-05-23 RX ADMIN — Medication 250 MG: at 10:37

## 2017-05-23 RX ADMIN — METOCLOPRAMIDE 10 MG: 10 TABLET ORAL at 18:25

## 2017-05-23 RX ADMIN — POTASSIUM CHLORIDE: 2 INJECTION, SOLUTION, CONCENTRATE INTRAVENOUS at 18:25

## 2017-05-23 RX ADMIN — METOPROLOL TARTRATE 12.5 MG: 25 TABLET ORAL at 07:55

## 2017-05-23 RX ADMIN — METOCLOPRAMIDE 10 MG: 10 TABLET ORAL at 07:55

## 2017-05-23 RX ADMIN — Medication 10 ML: at 23:19

## 2017-05-23 RX ADMIN — METOPROLOL TARTRATE 12.5 MG: 25 TABLET ORAL at 18:25

## 2017-05-23 RX ADMIN — IPRATROPIUM BROMIDE AND ALBUTEROL SULFATE 3 ML: .5; 3 SOLUTION RESPIRATORY (INHALATION) at 07:00

## 2017-05-23 RX ADMIN — LINEZOLID 600 MG: 600 INJECTION, SOLUTION INTRAVENOUS at 03:51

## 2017-05-23 RX ADMIN — IPRATROPIUM BROMIDE AND ALBUTEROL SULFATE 3 ML: .5; 3 SOLUTION RESPIRATORY (INHALATION) at 19:59

## 2017-05-23 RX ADMIN — LINEZOLID 600 MG: 600 INJECTION, SOLUTION INTRAVENOUS at 15:28

## 2017-05-23 RX ADMIN — METOCLOPRAMIDE 10 MG: 10 TABLET ORAL at 10:37

## 2017-05-23 RX ADMIN — BENZONATATE 100 MG: 100 CAPSULE ORAL at 23:17

## 2017-05-23 RX ADMIN — PANTOPRAZOLE SODIUM 40 MG: 40 TABLET, DELAYED RELEASE ORAL at 06:08

## 2017-05-23 RX ADMIN — Medication 10 ML: at 10:37

## 2017-05-23 RX ADMIN — OXYCODONE HYDROCHLORIDE AND ACETAMINOPHEN 2 TABLET: 5; 325 TABLET ORAL at 18:49

## 2017-05-23 RX ADMIN — Medication 10 ML: at 10:38

## 2017-05-23 RX ADMIN — Medication 250 MG: at 18:25

## 2017-05-23 RX ADMIN — ENOXAPARIN SODIUM 40 MG: 40 INJECTION SUBCUTANEOUS at 12:03

## 2017-05-23 RX ADMIN — METRONIDAZOLE 500 MG: 500 INJECTION, SOLUTION INTRAVENOUS at 02:05

## 2017-05-23 RX ADMIN — ERTAPENEM SODIUM 1 G: 1 INJECTION, POWDER, LYOPHILIZED, FOR SOLUTION INTRAMUSCULAR; INTRAVENOUS at 12:03

## 2017-05-23 RX ADMIN — Medication 10 ML: at 23:18

## 2017-05-23 RX ADMIN — METOPROLOL TARTRATE 12.5 MG: 25 TABLET ORAL at 05:17

## 2017-05-24 ENCOUNTER — APPOINTMENT (OUTPATIENT)
Dept: GENERAL RADIOLOGY | Facility: HOSPITAL | Age: 72
End: 2017-05-24
Attending: HOSPITALIST

## 2017-05-24 LAB
ALBUMIN SERPL-MCNC: 2.8 G/DL (ref 3.5–5.2)
ALBUMIN/GLOB SERPL: 0.8 G/DL
ALP SERPL-CCNC: 73 U/L (ref 39–117)
ALT SERPL W P-5'-P-CCNC: 14 U/L (ref 1–33)
ANION GAP SERPL CALCULATED.3IONS-SCNC: 10.2 MMOL/L
AST SERPL-CCNC: 21 U/L (ref 1–32)
BILIRUB SERPL-MCNC: <0.2 MG/DL (ref 0.1–1.2)
BUN BLD-MCNC: 11 MG/DL (ref 8–23)
BUN/CREAT SERPL: 25.6 (ref 7–25)
CALCIUM SPEC-SCNC: 8.6 MG/DL (ref 8.6–10.5)
CHLORIDE SERPL-SCNC: 95 MMOL/L (ref 98–107)
CO2 SERPL-SCNC: 29.8 MMOL/L (ref 22–29)
CREAT BLD-MCNC: 0.43 MG/DL (ref 0.57–1)
GFR SERPL CREATININE-BSD FRML MDRD: 144 ML/MIN/1.73
GLOBULIN UR ELPH-MCNC: 3.4 GM/DL
GLUCOSE BLD-MCNC: 173 MG/DL (ref 65–99)
GLUCOSE BLDC GLUCOMTR-MCNC: 109 MG/DL (ref 70–130)
GLUCOSE BLDC GLUCOMTR-MCNC: 125 MG/DL (ref 70–130)
GLUCOSE BLDC GLUCOMTR-MCNC: 144 MG/DL (ref 70–130)
GLUCOSE BLDC GLUCOMTR-MCNC: 160 MG/DL (ref 70–130)
PHOSPHATE SERPL-MCNC: 4.3 MG/DL (ref 2.5–4.5)
POTASSIUM BLD-SCNC: 4.2 MMOL/L (ref 3.5–5.2)
PROT SERPL-MCNC: 6.2 G/DL (ref 6–8.5)
SODIUM BLD-SCNC: 135 MMOL/L (ref 136–145)

## 2017-05-24 PROCEDURE — 71010 HC CHEST PA OR AP: CPT

## 2017-05-24 PROCEDURE — 25010000002 FLUCONAZOLE PER 200 MG: Performed by: INTERNAL MEDICINE

## 2017-05-24 PROCEDURE — 97110 THERAPEUTIC EXERCISES: CPT

## 2017-05-24 PROCEDURE — 25010000002 PROMETHAZINE PER 50 MG: Performed by: SURGERY

## 2017-05-24 PROCEDURE — 80053 COMPREHEN METABOLIC PANEL: CPT | Performed by: HOSPITALIST

## 2017-05-24 PROCEDURE — 63710000001 DIPHENHYDRAMINE PER 50 MG: Performed by: INTERNAL MEDICINE

## 2017-05-24 PROCEDURE — 94799 UNLISTED PULMONARY SVC/PX: CPT

## 2017-05-24 PROCEDURE — 25010000002 LINEZOLID 600 MG/300ML SOLUTION: Performed by: INTERNAL MEDICINE

## 2017-05-24 PROCEDURE — 63710000001 INSULIN REGULAR HUMAN PER 5 UNITS: Performed by: INTERNAL MEDICINE

## 2017-05-24 PROCEDURE — 25010000002 ENOXAPARIN PER 10 MG: Performed by: INTERNAL MEDICINE

## 2017-05-24 PROCEDURE — 82962 GLUCOSE BLOOD TEST: CPT

## 2017-05-24 PROCEDURE — 84100 ASSAY OF PHOSPHORUS: CPT | Performed by: SURGERY

## 2017-05-24 PROCEDURE — 25010000002 FUROSEMIDE PER 20 MG: Performed by: HOSPITALIST

## 2017-05-24 RX ORDER — FUROSEMIDE 10 MG/ML
20 INJECTION INTRAMUSCULAR; INTRAVENOUS ONCE
Status: COMPLETED | OUTPATIENT
Start: 2017-05-24 | End: 2017-05-24

## 2017-05-24 RX ADMIN — OXYCODONE HYDROCHLORIDE AND ACETAMINOPHEN 2 TABLET: 5; 325 TABLET ORAL at 14:17

## 2017-05-24 RX ADMIN — FUROSEMIDE 20 MG: 10 INJECTION, SOLUTION INTRAMUSCULAR; INTRAVENOUS at 11:50

## 2017-05-24 RX ADMIN — METOPROLOL TARTRATE 12.5 MG: 25 TABLET ORAL at 07:05

## 2017-05-24 RX ADMIN — METOPROLOL TARTRATE 12.5 MG: 25 TABLET ORAL at 02:50

## 2017-05-24 RX ADMIN — METOPROLOL TARTRATE 12.5 MG: 25 TABLET ORAL at 16:56

## 2017-05-24 RX ADMIN — IPRATROPIUM BROMIDE AND ALBUTEROL SULFATE 3 ML: .5; 3 SOLUTION RESPIRATORY (INHALATION) at 16:37

## 2017-05-24 RX ADMIN — METRONIDAZOLE 500 MG: 500 INJECTION, SOLUTION INTRAVENOUS at 02:52

## 2017-05-24 RX ADMIN — FLUCONAZOLE 400 MG: 2 INJECTION INTRAVENOUS at 10:09

## 2017-05-24 RX ADMIN — ACETAMINOPHEN 650 MG: 325 TABLET ORAL at 12:01

## 2017-05-24 RX ADMIN — Medication 250 MG: at 18:56

## 2017-05-24 RX ADMIN — METOPROLOL TARTRATE 12.5 MG: 25 TABLET ORAL at 11:49

## 2017-05-24 RX ADMIN — LINEZOLID 600 MG: 600 INJECTION, SOLUTION INTRAVENOUS at 16:51

## 2017-05-24 RX ADMIN — ERTAPENEM SODIUM 1 G: 1 INJECTION, POWDER, LYOPHILIZED, FOR SOLUTION INTRAMUSCULAR; INTRAVENOUS at 14:17

## 2017-05-24 RX ADMIN — Medication 250 MG: at 10:08

## 2017-05-24 RX ADMIN — DIPHENHYDRAMINE HYDROCHLORIDE 25 MG: 25 CAPSULE ORAL at 02:49

## 2017-05-24 RX ADMIN — METOPROLOL TARTRATE 12.5 MG: 25 TABLET ORAL at 20:54

## 2017-05-24 RX ADMIN — BENZONATATE 100 MG: 100 CAPSULE ORAL at 20:54

## 2017-05-24 RX ADMIN — HUMAN INSULIN 2 UNITS: 100 INJECTION, SOLUTION SUBCUTANEOUS at 07:14

## 2017-05-24 RX ADMIN — ACETAMINOPHEN 500 MG: 500 TABLET ORAL at 02:50

## 2017-05-24 RX ADMIN — IPRATROPIUM BROMIDE AND ALBUTEROL SULFATE 3 ML: .5; 3 SOLUTION RESPIRATORY (INHALATION) at 09:07

## 2017-05-24 RX ADMIN — METOCLOPRAMIDE 10 MG: 10 TABLET ORAL at 16:51

## 2017-05-24 RX ADMIN — ENOXAPARIN SODIUM 40 MG: 40 INJECTION SUBCUTANEOUS at 11:48

## 2017-05-24 RX ADMIN — Medication 10 ML: at 10:10

## 2017-05-24 RX ADMIN — LINEZOLID 600 MG: 600 INJECTION, SOLUTION INTRAVENOUS at 04:17

## 2017-05-24 RX ADMIN — Medication 10 ML: at 10:11

## 2017-05-24 RX ADMIN — OXYCODONE HYDROCHLORIDE AND ACETAMINOPHEN 2 TABLET: 5; 325 TABLET ORAL at 20:55

## 2017-05-24 RX ADMIN — PROMETHAZINE HYDROCHLORIDE 12.5 MG: 25 INJECTION, SOLUTION INTRAMUSCULAR; INTRAVENOUS at 04:30

## 2017-05-24 RX ADMIN — METOCLOPRAMIDE 10 MG: 10 TABLET ORAL at 11:48

## 2017-05-24 RX ADMIN — Medication 10 ML: at 20:57

## 2017-05-24 RX ADMIN — METOCLOPRAMIDE 10 MG: 10 TABLET ORAL at 10:09

## 2017-05-24 RX ADMIN — METRONIDAZOLE 500 MG: 500 INJECTION, SOLUTION INTRAVENOUS at 12:46

## 2017-05-24 RX ADMIN — Medication 10 ML: at 20:56

## 2017-05-24 RX ADMIN — IPRATROPIUM BROMIDE AND ALBUTEROL SULFATE 3 ML: .5; 3 SOLUTION RESPIRATORY (INHALATION) at 19:38

## 2017-05-24 RX ADMIN — PANTOPRAZOLE SODIUM 40 MG: 40 TABLET, DELAYED RELEASE ORAL at 07:05

## 2017-05-24 RX ADMIN — METRONIDAZOLE 500 MG: 500 INJECTION, SOLUTION INTRAVENOUS at 20:54

## 2017-05-25 ENCOUNTER — APPOINTMENT (OUTPATIENT)
Dept: CT IMAGING | Facility: HOSPITAL | Age: 72
End: 2017-05-25

## 2017-05-25 LAB
ALBUMIN SERPL-MCNC: 2.9 G/DL (ref 3.5–5.2)
ALBUMIN/GLOB SERPL: 0.9 G/DL
ALP SERPL-CCNC: 75 U/L (ref 39–117)
ALT SERPL W P-5'-P-CCNC: 14 U/L (ref 1–33)
ANION GAP SERPL CALCULATED.3IONS-SCNC: 10.5 MMOL/L
AST SERPL-CCNC: 24 U/L (ref 1–32)
BASOPHILS # BLD AUTO: 0.02 10*3/MM3 (ref 0–0.2)
BASOPHILS NFR BLD AUTO: 0.3 % (ref 0–1.5)
BILIRUB SERPL-MCNC: 0.2 MG/DL (ref 0.1–1.2)
BUN BLD-MCNC: 10 MG/DL (ref 8–23)
BUN/CREAT SERPL: 20.4 (ref 7–25)
CALCIUM SPEC-SCNC: 8.8 MG/DL (ref 8.6–10.5)
CHLORIDE SERPL-SCNC: 97 MMOL/L (ref 98–107)
CO2 SERPL-SCNC: 28.5 MMOL/L (ref 22–29)
CREAT BLD-MCNC: 0.49 MG/DL (ref 0.57–1)
DEPRECATED RDW RBC AUTO: 49.4 FL (ref 37–54)
EOSINOPHIL # BLD AUTO: 0.76 10*3/MM3 (ref 0–0.7)
EOSINOPHIL NFR BLD AUTO: 10.2 % (ref 0.3–6.2)
ERYTHROCYTE [DISTWIDTH] IN BLOOD BY AUTOMATED COUNT: 14.8 % (ref 11.7–13)
GFR SERPL CREATININE-BSD FRML MDRD: 124 ML/MIN/1.73
GLOBULIN UR ELPH-MCNC: 3.3 GM/DL
GLUCOSE BLD-MCNC: 119 MG/DL (ref 65–99)
GLUCOSE BLDC GLUCOMTR-MCNC: 100 MG/DL (ref 70–130)
GLUCOSE BLDC GLUCOMTR-MCNC: 109 MG/DL (ref 70–130)
GLUCOSE BLDC GLUCOMTR-MCNC: 118 MG/DL (ref 70–130)
GLUCOSE BLDC GLUCOMTR-MCNC: 123 MG/DL (ref 70–130)
HCT VFR BLD AUTO: 27.9 % (ref 35.6–45.5)
HGB BLD-MCNC: 8.6 G/DL (ref 11.9–15.5)
IMM GRANULOCYTES # BLD: 0 10*3/MM3 (ref 0–0.03)
IMM GRANULOCYTES NFR BLD: 0 % (ref 0–0.5)
LYMPHOCYTES # BLD AUTO: 1.11 10*3/MM3 (ref 0.9–4.8)
LYMPHOCYTES NFR BLD AUTO: 14.9 % (ref 19.6–45.3)
MCH RBC QN AUTO: 28.7 PG (ref 26.9–32)
MCHC RBC AUTO-ENTMCNC: 30.8 G/DL (ref 32.4–36.3)
MCV RBC AUTO: 93 FL (ref 80.5–98.2)
MONOCYTES # BLD AUTO: 0.9 10*3/MM3 (ref 0.2–1.2)
MONOCYTES NFR BLD AUTO: 12.1 % (ref 5–12)
NEUTROPHILS # BLD AUTO: 4.64 10*3/MM3 (ref 1.9–8.1)
NEUTROPHILS NFR BLD AUTO: 62.5 % (ref 42.7–76)
NT-PROBNP SERPL-MCNC: 54.5 PG/ML (ref 5–900)
PLATELET # BLD AUTO: 279 10*3/MM3 (ref 140–500)
PMV BLD AUTO: 10.5 FL (ref 6–12)
POTASSIUM BLD-SCNC: 4 MMOL/L (ref 3.5–5.2)
PROT SERPL-MCNC: 6.2 G/DL (ref 6–8.5)
RBC # BLD AUTO: 3 10*6/MM3 (ref 3.9–5.2)
SODIUM BLD-SCNC: 136 MMOL/L (ref 136–145)
WBC NRBC COR # BLD: 7.43 10*3/MM3 (ref 4.5–10.7)

## 2017-05-25 PROCEDURE — 25010000002 LINEZOLID 600 MG/300ML SOLUTION: Performed by: INTERNAL MEDICINE

## 2017-05-25 PROCEDURE — 94799 UNLISTED PULMONARY SVC/PX: CPT

## 2017-05-25 PROCEDURE — 63710000001 DIPHENHYDRAMINE PER 50 MG: Performed by: INTERNAL MEDICINE

## 2017-05-25 PROCEDURE — 83880 ASSAY OF NATRIURETIC PEPTIDE: CPT | Performed by: HOSPITALIST

## 2017-05-25 PROCEDURE — 74177 CT ABD & PELVIS W/CONTRAST: CPT

## 2017-05-25 PROCEDURE — 25010000002 PROMETHAZINE PER 50 MG: Performed by: SURGERY

## 2017-05-25 PROCEDURE — 85025 COMPLETE CBC W/AUTO DIFF WBC: CPT | Performed by: HOSPITALIST

## 2017-05-25 PROCEDURE — 0 DIATRIZOATE MEGLUMINE & SODIUM PER 1 ML: Performed by: HOSPITALIST

## 2017-05-25 PROCEDURE — 97110 THERAPEUTIC EXERCISES: CPT

## 2017-05-25 PROCEDURE — 0 IOPAMIDOL 61 % SOLUTION: Performed by: HOSPITALIST

## 2017-05-25 PROCEDURE — 25010000002 FLUCONAZOLE PER 200 MG: Performed by: INTERNAL MEDICINE

## 2017-05-25 PROCEDURE — 80053 COMPREHEN METABOLIC PANEL: CPT | Performed by: HOSPITALIST

## 2017-05-25 PROCEDURE — 82962 GLUCOSE BLOOD TEST: CPT

## 2017-05-25 PROCEDURE — 25010000002 ENOXAPARIN PER 10 MG: Performed by: INTERNAL MEDICINE

## 2017-05-25 RX ORDER — FERROUS SULFATE 325(65) MG
325 TABLET ORAL 2 TIMES DAILY WITH MEALS
Status: DISCONTINUED | OUTPATIENT
Start: 2017-05-25 | End: 2017-05-28 | Stop reason: HOSPADM

## 2017-05-25 RX ORDER — METOCLOPRAMIDE 5 MG/1
5 TABLET ORAL
Status: DISCONTINUED | OUTPATIENT
Start: 2017-05-25 | End: 2017-05-26

## 2017-05-25 RX ADMIN — DIATRIZOATE MEGLUMINE AND DIATRIZOATE SODIUM 30 ML: 660; 100 LIQUID ORAL; RECTAL at 07:29

## 2017-05-25 RX ADMIN — Medication 10 ML: at 22:14

## 2017-05-25 RX ADMIN — PROMETHAZINE HYDROCHLORIDE 12.5 MG: 25 INJECTION, SOLUTION INTRAMUSCULAR; INTRAVENOUS at 05:29

## 2017-05-25 RX ADMIN — METOPROLOL TARTRATE 12.5 MG: 25 TABLET ORAL at 22:09

## 2017-05-25 RX ADMIN — Medication 10 ML: at 09:15

## 2017-05-25 RX ADMIN — FERROUS SULFATE TAB 325 MG (65 MG ELEMENTAL FE) 325 MG: 325 (65 FE) TAB at 18:04

## 2017-05-25 RX ADMIN — PANTOPRAZOLE SODIUM 40 MG: 40 TABLET, DELAYED RELEASE ORAL at 05:26

## 2017-05-25 RX ADMIN — METOCLOPRAMIDE 5 MG: 5 TABLET ORAL at 18:04

## 2017-05-25 RX ADMIN — OXYCODONE HYDROCHLORIDE AND ACETAMINOPHEN 2 TABLET: 5; 325 TABLET ORAL at 03:22

## 2017-05-25 RX ADMIN — METOCLOPRAMIDE 5 MG: 5 TABLET ORAL at 10:37

## 2017-05-25 RX ADMIN — METOPROLOL TARTRATE 12.5 MG: 25 TABLET ORAL at 16:06

## 2017-05-25 RX ADMIN — METRONIDAZOLE 500 MG: 500 INJECTION, SOLUTION INTRAVENOUS at 12:43

## 2017-05-25 RX ADMIN — DIPHENHYDRAMINE HYDROCHLORIDE 25 MG: 25 CAPSULE ORAL at 22:15

## 2017-05-25 RX ADMIN — FLUCONAZOLE 400 MG: 2 INJECTION INTRAVENOUS at 10:36

## 2017-05-25 RX ADMIN — OXYCODONE HYDROCHLORIDE AND ACETAMINOPHEN 2 TABLET: 5; 325 TABLET ORAL at 10:23

## 2017-05-25 RX ADMIN — LINEZOLID 600 MG: 600 INJECTION, SOLUTION INTRAVENOUS at 16:05

## 2017-05-25 RX ADMIN — IPRATROPIUM BROMIDE AND ALBUTEROL SULFATE 3 ML: .5; 3 SOLUTION RESPIRATORY (INHALATION) at 20:49

## 2017-05-25 RX ADMIN — ERTAPENEM SODIUM 1 G: 1 INJECTION, POWDER, LYOPHILIZED, FOR SOLUTION INTRAMUSCULAR; INTRAVENOUS at 12:47

## 2017-05-25 RX ADMIN — METRONIDAZOLE 500 MG: 500 INJECTION, SOLUTION INTRAVENOUS at 05:26

## 2017-05-25 RX ADMIN — METOPROLOL TARTRATE 12.5 MG: 25 TABLET ORAL at 12:44

## 2017-05-25 RX ADMIN — Medication 10 ML: at 10:24

## 2017-05-25 RX ADMIN — METOPROLOL TARTRATE 12.5 MG: 25 TABLET ORAL at 10:21

## 2017-05-25 RX ADMIN — IPRATROPIUM BROMIDE AND ALBUTEROL SULFATE 3 ML: .5; 3 SOLUTION RESPIRATORY (INHALATION) at 11:37

## 2017-05-25 RX ADMIN — LINEZOLID 600 MG: 600 INJECTION, SOLUTION INTRAVENOUS at 03:22

## 2017-05-25 RX ADMIN — METRONIDAZOLE 500 MG: 500 INJECTION, SOLUTION INTRAVENOUS at 22:14

## 2017-05-25 RX ADMIN — BENZONATATE 100 MG: 100 CAPSULE ORAL at 03:22

## 2017-05-25 RX ADMIN — IOPAMIDOL 85 ML: 612 INJECTION, SOLUTION INTRAVENOUS at 09:32

## 2017-05-25 RX ADMIN — ENOXAPARIN SODIUM 40 MG: 40 INJECTION SUBCUTANEOUS at 10:24

## 2017-05-25 RX ADMIN — OXYCODONE HYDROCHLORIDE AND ACETAMINOPHEN 2 TABLET: 5; 325 TABLET ORAL at 18:04

## 2017-05-25 RX ADMIN — FERROUS SULFATE TAB 325 MG (65 MG ELEMENTAL FE) 325 MG: 325 (65 FE) TAB at 10:21

## 2017-05-25 RX ADMIN — METOPROLOL TARTRATE 12.5 MG: 25 TABLET ORAL at 05:26

## 2017-05-25 RX ADMIN — Medication 250 MG: at 10:23

## 2017-05-25 RX ADMIN — Medication 250 MG: at 18:04

## 2017-05-25 RX ADMIN — METOPROLOL TARTRATE 12.5 MG: 25 TABLET ORAL at 01:12

## 2017-05-25 RX ADMIN — ACETAMINOPHEN 500 MG: 500 TABLET ORAL at 22:15

## 2017-05-26 PROBLEM — C90.00 ANEMIA ASSOCIATED WITH MULTIPLE MYELOMA TREATED WITH ERYTHROPOIETIN: Status: ACTIVE | Noted: 2017-05-26

## 2017-05-26 PROBLEM — J44.9 COPD (CHRONIC OBSTRUCTIVE PULMONARY DISEASE) (HCC): Status: ACTIVE | Noted: 2017-05-26

## 2017-05-26 PROBLEM — K57.92 DIVERTICULITIS: Status: ACTIVE | Noted: 2017-05-26

## 2017-05-26 PROBLEM — K57.32 SIGMOID DIVERTICULITIS: Status: RESOLVED | Noted: 2017-04-30 | Resolved: 2017-05-26

## 2017-05-26 PROBLEM — IMO0002 ABDOMINAL ABSCESS: Status: ACTIVE | Noted: 2017-05-26

## 2017-05-26 PROBLEM — D63.0 ANEMIA ASSOCIATED WITH MULTIPLE MYELOMA TREATED WITH ERYTHROPOIETIN: Status: ACTIVE | Noted: 2017-05-26

## 2017-05-26 PROBLEM — A41.9 SEPSIS (HCC): Status: ACTIVE | Noted: 2017-05-26

## 2017-05-26 PROBLEM — K56.609 COLON OBSTRUCTION: Status: RESOLVED | Noted: 2017-05-03 | Resolved: 2017-05-26

## 2017-05-26 PROBLEM — K56.7 ILEUS: Status: ACTIVE | Noted: 2017-05-26

## 2017-05-26 PROBLEM — J90 PLEURAL EFFUSION: Status: ACTIVE | Noted: 2017-05-26

## 2017-05-26 LAB
ALBUMIN SERPL-MCNC: 2.8 G/DL (ref 3.5–5.2)
ALBUMIN/GLOB SERPL: 0.8 G/DL
ALP SERPL-CCNC: 75 U/L (ref 39–117)
ALT SERPL W P-5'-P-CCNC: 19 U/L (ref 1–33)
ANION GAP SERPL CALCULATED.3IONS-SCNC: 11.3 MMOL/L
AST SERPL-CCNC: 30 U/L (ref 1–32)
BACTERIA FLD CULT: NORMAL
BILIRUB SERPL-MCNC: 0.2 MG/DL (ref 0.1–1.2)
BUN BLD-MCNC: 7 MG/DL (ref 8–23)
BUN/CREAT SERPL: 16.3 (ref 7–25)
CALCIUM SPEC-SCNC: 9 MG/DL (ref 8.6–10.5)
CHLORIDE SERPL-SCNC: 99 MMOL/L (ref 98–107)
CO2 SERPL-SCNC: 27.7 MMOL/L (ref 22–29)
CREAT BLD-MCNC: 0.43 MG/DL (ref 0.57–1)
GFR SERPL CREATININE-BSD FRML MDRD: 144 ML/MIN/1.73
GLOBULIN UR ELPH-MCNC: 3.4 GM/DL
GLUCOSE BLD-MCNC: 117 MG/DL (ref 65–99)
GLUCOSE BLDC GLUCOMTR-MCNC: 107 MG/DL (ref 70–130)
GLUCOSE BLDC GLUCOMTR-MCNC: 151 MG/DL (ref 70–130)
GLUCOSE BLDC GLUCOMTR-MCNC: 177 MG/DL (ref 70–130)
GLUCOSE BLDC GLUCOMTR-MCNC: 89 MG/DL (ref 70–130)
GRAM STN SPEC: NORMAL
GRAM STN SPEC: NORMAL
POTASSIUM BLD-SCNC: 4 MMOL/L (ref 3.5–5.2)
PROT SERPL-MCNC: 6.2 G/DL (ref 6–8.5)
SODIUM BLD-SCNC: 138 MMOL/L (ref 136–145)

## 2017-05-26 PROCEDURE — 25010000002 FLUCONAZOLE PER 200 MG: Performed by: INTERNAL MEDICINE

## 2017-05-26 PROCEDURE — 25010000002 ENOXAPARIN PER 10 MG: Performed by: INTERNAL MEDICINE

## 2017-05-26 PROCEDURE — 94799 UNLISTED PULMONARY SVC/PX: CPT

## 2017-05-26 PROCEDURE — 63710000001 INSULIN REGULAR HUMAN PER 5 UNITS: Performed by: SURGERY

## 2017-05-26 PROCEDURE — 97110 THERAPEUTIC EXERCISES: CPT

## 2017-05-26 PROCEDURE — 63710000001 PREDNISONE PER 5 MG: Performed by: HOSPITALIST

## 2017-05-26 PROCEDURE — 80053 COMPREHEN METABOLIC PANEL: CPT | Performed by: HOSPITALIST

## 2017-05-26 PROCEDURE — 82962 GLUCOSE BLOOD TEST: CPT

## 2017-05-26 PROCEDURE — 25010000002 PROMETHAZINE PER 50 MG: Performed by: SURGERY

## 2017-05-26 PROCEDURE — 25010000002 LINEZOLID 600 MG/300ML SOLUTION: Performed by: INTERNAL MEDICINE

## 2017-05-26 RX ORDER — PREDNISONE 50 MG/1
50 TABLET ORAL ONCE
Status: COMPLETED | OUTPATIENT
Start: 2017-05-26 | End: 2017-05-26

## 2017-05-26 RX ORDER — BUDESONIDE AND FORMOTEROL FUMARATE DIHYDRATE 160; 4.5 UG/1; UG/1
2 AEROSOL RESPIRATORY (INHALATION)
Status: DISCONTINUED | OUTPATIENT
Start: 2017-05-26 | End: 2017-05-28 | Stop reason: HOSPADM

## 2017-05-26 RX ORDER — ALBUTEROL SULFATE 2.5 MG/3ML
2.5 SOLUTION RESPIRATORY (INHALATION) EVERY 4 HOURS PRN
Status: DISCONTINUED | OUTPATIENT
Start: 2017-05-26 | End: 2017-05-28 | Stop reason: HOSPADM

## 2017-05-26 RX ADMIN — METOPROLOL TARTRATE 12.5 MG: 25 TABLET ORAL at 05:37

## 2017-05-26 RX ADMIN — OXYCODONE HYDROCHLORIDE AND ACETAMINOPHEN 2 TABLET: 5; 325 TABLET ORAL at 20:03

## 2017-05-26 RX ADMIN — Medication 250 MG: at 17:40

## 2017-05-26 RX ADMIN — PROMETHAZINE HYDROCHLORIDE 12.5 MG: 25 INJECTION, SOLUTION INTRAMUSCULAR; INTRAVENOUS at 11:26

## 2017-05-26 RX ADMIN — LINEZOLID 600 MG: 600 INJECTION, SOLUTION INTRAVENOUS at 14:48

## 2017-05-26 RX ADMIN — METRONIDAZOLE 500 MG: 500 INJECTION, SOLUTION INTRAVENOUS at 06:37

## 2017-05-26 RX ADMIN — TIOTROPIUM BROMIDE 1 CAPSULE: 18 CAPSULE ORAL; RESPIRATORY (INHALATION) at 14:00

## 2017-05-26 RX ADMIN — FLUCONAZOLE 400 MG: 2 INJECTION INTRAVENOUS at 08:40

## 2017-05-26 RX ADMIN — PREDNISONE 50 MG: 50 TABLET ORAL at 12:49

## 2017-05-26 RX ADMIN — Medication 10 ML: at 08:40

## 2017-05-26 RX ADMIN — FERROUS SULFATE TAB 325 MG (65 MG ELEMENTAL FE) 325 MG: 325 (65 FE) TAB at 17:40

## 2017-05-26 RX ADMIN — METRONIDAZOLE 500 MG: 500 INJECTION, SOLUTION INTRAVENOUS at 12:50

## 2017-05-26 RX ADMIN — Medication 10 ML: at 21:20

## 2017-05-26 RX ADMIN — Medication 250 MG: at 08:37

## 2017-05-26 RX ADMIN — METOPROLOL TARTRATE 12.5 MG: 25 TABLET ORAL at 08:39

## 2017-05-26 RX ADMIN — ENOXAPARIN SODIUM 40 MG: 40 INJECTION SUBCUTANEOUS at 14:47

## 2017-05-26 RX ADMIN — ERTAPENEM SODIUM 1 G: 1 INJECTION, POWDER, LYOPHILIZED, FOR SOLUTION INTRAMUSCULAR; INTRAVENOUS at 12:49

## 2017-05-26 RX ADMIN — BUDESONIDE AND FORMOTEROL FUMARATE DIHYDRATE 2 PUFF: 160; 4.5 AEROSOL RESPIRATORY (INHALATION) at 13:59

## 2017-05-26 RX ADMIN — IPRATROPIUM BROMIDE AND ALBUTEROL SULFATE 3 ML: .5; 3 SOLUTION RESPIRATORY (INHALATION) at 07:32

## 2017-05-26 RX ADMIN — METOPROLOL TARTRATE 12.5 MG: 25 TABLET ORAL at 01:00

## 2017-05-26 RX ADMIN — Medication 10 ML: at 21:19

## 2017-05-26 RX ADMIN — METOPROLOL TARTRATE 12.5 MG: 25 TABLET ORAL at 12:49

## 2017-05-26 RX ADMIN — FERROUS SULFATE TAB 325 MG (65 MG ELEMENTAL FE) 325 MG: 325 (65 FE) TAB at 08:37

## 2017-05-26 RX ADMIN — PANTOPRAZOLE SODIUM 40 MG: 40 TABLET, DELAYED RELEASE ORAL at 06:37

## 2017-05-26 RX ADMIN — LINEZOLID 600 MG: 600 INJECTION, SOLUTION INTRAVENOUS at 03:39

## 2017-05-26 RX ADMIN — HUMAN INSULIN 2 UNITS: 100 INJECTION, SOLUTION SUBCUTANEOUS at 21:19

## 2017-05-26 RX ADMIN — OXYCODONE HYDROCHLORIDE AND ACETAMINOPHEN 2 TABLET: 5; 325 TABLET ORAL at 06:36

## 2017-05-27 LAB
GLUCOSE BLDC GLUCOMTR-MCNC: 100 MG/DL (ref 70–130)
GLUCOSE BLDC GLUCOMTR-MCNC: 130 MG/DL (ref 70–130)
GLUCOSE BLDC GLUCOMTR-MCNC: 140 MG/DL (ref 70–130)

## 2017-05-27 PROCEDURE — 63710000001 DIPHENHYDRAMINE PER 50 MG: Performed by: INTERNAL MEDICINE

## 2017-05-27 PROCEDURE — 94799 UNLISTED PULMONARY SVC/PX: CPT

## 2017-05-27 PROCEDURE — 25010000002 ENOXAPARIN PER 10 MG: Performed by: INTERNAL MEDICINE

## 2017-05-27 PROCEDURE — 82962 GLUCOSE BLOOD TEST: CPT

## 2017-05-27 RX ADMIN — ACETAMINOPHEN 500 MG: 500 TABLET ORAL at 21:51

## 2017-05-27 RX ADMIN — DIPHENHYDRAMINE HYDROCHLORIDE 25 MG: 25 CAPSULE ORAL at 21:50

## 2017-05-27 RX ADMIN — METOPROLOL TARTRATE 25 MG: 25 TABLET ORAL at 21:50

## 2017-05-27 RX ADMIN — Medication 10 ML: at 13:07

## 2017-05-27 RX ADMIN — Medication 250 MG: at 17:41

## 2017-05-27 RX ADMIN — Medication 250 MG: at 09:12

## 2017-05-27 RX ADMIN — Medication 10 ML: at 23:48

## 2017-05-27 RX ADMIN — TIOTROPIUM BROMIDE 1 CAPSULE: 18 CAPSULE ORAL; RESPIRATORY (INHALATION) at 08:13

## 2017-05-27 RX ADMIN — PANTOPRAZOLE SODIUM 40 MG: 40 TABLET, DELAYED RELEASE ORAL at 06:45

## 2017-05-27 RX ADMIN — FERROUS SULFATE TAB 325 MG (65 MG ELEMENTAL FE) 325 MG: 325 (65 FE) TAB at 09:12

## 2017-05-27 RX ADMIN — OXYCODONE HYDROCHLORIDE AND ACETAMINOPHEN 2 TABLET: 5; 325 TABLET ORAL at 13:18

## 2017-05-27 RX ADMIN — Medication 10 ML: at 23:47

## 2017-05-27 RX ADMIN — Medication 10 ML: at 13:06

## 2017-05-27 RX ADMIN — OXYCODONE HYDROCHLORIDE AND ACETAMINOPHEN 2 TABLET: 5; 325 TABLET ORAL at 09:12

## 2017-05-27 RX ADMIN — ENOXAPARIN SODIUM 40 MG: 40 INJECTION SUBCUTANEOUS at 09:13

## 2017-05-27 RX ADMIN — BUDESONIDE AND FORMOTEROL FUMARATE DIHYDRATE 2 PUFF: 160; 4.5 AEROSOL RESPIRATORY (INHALATION) at 20:13

## 2017-05-27 RX ADMIN — BUDESONIDE AND FORMOTEROL FUMARATE DIHYDRATE 2 PUFF: 160; 4.5 AEROSOL RESPIRATORY (INHALATION) at 08:15

## 2017-05-27 RX ADMIN — METOPROLOL TARTRATE 25 MG: 25 TABLET ORAL at 09:12

## 2017-05-27 RX ADMIN — OXYCODONE HYDROCHLORIDE AND ACETAMINOPHEN 2 TABLET: 5; 325 TABLET ORAL at 17:54

## 2017-05-27 RX ADMIN — FERROUS SULFATE TAB 325 MG (65 MG ELEMENTAL FE) 325 MG: 325 (65 FE) TAB at 17:41

## 2017-05-28 VITALS
RESPIRATION RATE: 16 BRPM | HEART RATE: 77 BPM | TEMPERATURE: 97.7 F | HEIGHT: 65 IN | OXYGEN SATURATION: 95 % | WEIGHT: 206.3 LBS | SYSTOLIC BLOOD PRESSURE: 168 MMHG | BODY MASS INDEX: 34.37 KG/M2 | DIASTOLIC BLOOD PRESSURE: 77 MMHG

## 2017-05-28 LAB
ANION GAP SERPL CALCULATED.3IONS-SCNC: 11.7 MMOL/L
BUN BLD-MCNC: 11 MG/DL (ref 8–23)
BUN/CREAT SERPL: 22.4 (ref 7–25)
CALCIUM SPEC-SCNC: 9 MG/DL (ref 8.6–10.5)
CHLORIDE SERPL-SCNC: 101 MMOL/L (ref 98–107)
CO2 SERPL-SCNC: 27.3 MMOL/L (ref 22–29)
CREAT BLD-MCNC: 0.49 MG/DL (ref 0.57–1)
GFR SERPL CREATININE-BSD FRML MDRD: 124 ML/MIN/1.73
GLUCOSE BLD-MCNC: 100 MG/DL (ref 65–99)
GLUCOSE BLDC GLUCOMTR-MCNC: 121 MG/DL (ref 70–130)
POTASSIUM BLD-SCNC: 4.2 MMOL/L (ref 3.5–5.2)
SODIUM BLD-SCNC: 140 MMOL/L (ref 136–145)

## 2017-05-28 PROCEDURE — 94799 UNLISTED PULMONARY SVC/PX: CPT

## 2017-05-28 PROCEDURE — 82962 GLUCOSE BLOOD TEST: CPT

## 2017-05-28 PROCEDURE — 80048 BASIC METABOLIC PNL TOTAL CA: CPT | Performed by: HOSPITALIST

## 2017-05-28 PROCEDURE — 25010000002 ENOXAPARIN PER 10 MG: Performed by: INTERNAL MEDICINE

## 2017-05-28 RX ORDER — ASPIRIN 81 MG/1
81 TABLET ORAL DAILY
Qty: 30 TABLET | Refills: 0 | Status: SHIPPED | OUTPATIENT
Start: 2017-05-28 | End: 2017-09-11 | Stop reason: SDUPTHER

## 2017-05-28 RX ORDER — BUDESONIDE AND FORMOTEROL FUMARATE DIHYDRATE 160; 4.5 UG/1; UG/1
2 AEROSOL RESPIRATORY (INHALATION)
Qty: 2 INHALER | Refills: 0 | Status: SHIPPED | OUTPATIENT
Start: 2017-05-28 | End: 2017-09-11

## 2017-05-28 RX ORDER — SACCHAROMYCES BOULARDII 250 MG
250 CAPSULE ORAL 2 TIMES DAILY
Qty: 60 CAPSULE | Refills: 0 | Status: SHIPPED | OUTPATIENT
Start: 2017-05-28 | End: 2017-09-11 | Stop reason: DRUGHIGH

## 2017-05-28 RX ORDER — ONDANSETRON 4 MG/1
4 TABLET, FILM COATED ORAL EVERY 8 HOURS PRN
Qty: 24 TABLET | Refills: 0 | Status: SHIPPED | OUTPATIENT
Start: 2017-05-28 | End: 2017-09-11

## 2017-05-28 RX ORDER — PANTOPRAZOLE SODIUM 40 MG/1
40 TABLET, DELAYED RELEASE ORAL DAILY
Qty: 30 TABLET | Refills: 0 | Status: SHIPPED | OUTPATIENT
Start: 2017-05-28 | End: 2017-09-11 | Stop reason: DRUGHIGH

## 2017-05-28 RX ORDER — ALBUTEROL SULFATE 90 UG/1
2 AEROSOL, METERED RESPIRATORY (INHALATION) EVERY 4 HOURS PRN
Qty: 2 INHALER | Refills: 0 | Status: SHIPPED | OUTPATIENT
Start: 2017-05-28 | End: 2017-09-11

## 2017-05-28 RX ORDER — FERROUS SULFATE 325(65) MG
325 TABLET ORAL 2 TIMES DAILY WITH MEALS
Qty: 60 TABLET | Refills: 0 | Status: SHIPPED | OUTPATIENT
Start: 2017-05-28 | End: 2017-09-11 | Stop reason: SDUPTHER

## 2017-05-28 RX ADMIN — BUDESONIDE AND FORMOTEROL FUMARATE DIHYDRATE 2 PUFF: 160; 4.5 AEROSOL RESPIRATORY (INHALATION) at 07:48

## 2017-05-28 RX ADMIN — METOPROLOL TARTRATE 25 MG: 25 TABLET ORAL at 09:22

## 2017-05-28 RX ADMIN — Medication 250 MG: at 09:22

## 2017-05-28 RX ADMIN — OXYCODONE HYDROCHLORIDE AND ACETAMINOPHEN 2 TABLET: 5; 325 TABLET ORAL at 05:27

## 2017-05-28 RX ADMIN — PANTOPRAZOLE SODIUM 40 MG: 40 TABLET, DELAYED RELEASE ORAL at 05:12

## 2017-05-28 RX ADMIN — TIOTROPIUM BROMIDE 1 CAPSULE: 18 CAPSULE ORAL; RESPIRATORY (INHALATION) at 07:49

## 2017-05-28 RX ADMIN — Medication 10 ML: at 12:51

## 2017-05-28 RX ADMIN — OXYCODONE HYDROCHLORIDE AND ACETAMINOPHEN 2 TABLET: 5; 325 TABLET ORAL at 15:41

## 2017-05-28 RX ADMIN — FERROUS SULFATE TAB 325 MG (65 MG ELEMENTAL FE) 325 MG: 325 (65 FE) TAB at 09:21

## 2017-05-28 RX ADMIN — OXYCODONE HYDROCHLORIDE AND ACETAMINOPHEN 2 TABLET: 5; 325 TABLET ORAL at 09:22

## 2017-05-28 RX ADMIN — ENOXAPARIN SODIUM 40 MG: 40 INJECTION SUBCUTANEOUS at 12:48

## 2017-06-18 LAB — FUNGUS WND CULT: NORMAL

## 2017-07-05 ENCOUNTER — HOSPITAL ENCOUNTER (OUTPATIENT)
Dept: MAMMOGRAPHY | Facility: HOSPITAL | Age: 72
Discharge: HOME | End: 2017-07-05
Attending: INTERNAL MEDICINE | Admitting: INTERNAL MEDICINE

## 2017-07-05 DIAGNOSIS — N63.10 MASS OF RIGHT BREAST: ICD-10-CM

## 2017-07-05 PROCEDURE — G0206 DX MAMMO INCL CAD UNI: HCPCS

## 2017-08-25 ENCOUNTER — PREP FOR SURGERY (OUTPATIENT)
Dept: OTHER | Facility: HOSPITAL | Age: 72
End: 2017-08-25

## 2017-08-25 DIAGNOSIS — Z93.3 COLOSTOMY PRESENT (HCC): Primary | ICD-10-CM

## 2017-08-25 DIAGNOSIS — Z80.0 FAMILY HX OF COLON CANCER: Primary | ICD-10-CM

## 2017-08-25 RX ORDER — LEVOFLOXACIN 5 MG/ML
500 INJECTION, SOLUTION INTRAVENOUS
Status: CANCELLED | OUTPATIENT
Start: 2017-08-25

## 2017-08-28 PROBLEM — Z80.0 FAMILY HX OF COLON CANCER: Status: ACTIVE | Noted: 2017-08-28

## 2017-08-28 PROBLEM — Z93.3 COLOSTOMY PRESENT: Status: ACTIVE | Noted: 2017-08-28

## 2017-09-11 ENCOUNTER — APPOINTMENT (OUTPATIENT)
Dept: PREADMISSION TESTING | Facility: HOSPITAL | Age: 72
End: 2017-09-11

## 2017-09-11 VITALS
DIASTOLIC BLOOD PRESSURE: 86 MMHG | HEART RATE: 83 BPM | OXYGEN SATURATION: 95 % | TEMPERATURE: 96.7 F | SYSTOLIC BLOOD PRESSURE: 168 MMHG | RESPIRATION RATE: 20 BRPM | HEIGHT: 65 IN | BODY MASS INDEX: 32.72 KG/M2 | WEIGHT: 196.4 LBS

## 2017-09-11 DIAGNOSIS — Z93.3 COLOSTOMY PRESENT (HCC): ICD-10-CM

## 2017-09-11 LAB
ABO GROUP BLD: NORMAL
ANION GAP SERPL CALCULATED.3IONS-SCNC: 11.3 MMOL/L
BLD GP AB SCN SERPL QL: NEGATIVE
BUN BLD-MCNC: 17 MG/DL (ref 8–23)
BUN/CREAT SERPL: 29.3 (ref 7–25)
CALCIUM SPEC-SCNC: 9.8 MG/DL (ref 8.6–10.5)
CHLORIDE SERPL-SCNC: 102 MMOL/L (ref 98–107)
CO2 SERPL-SCNC: 25.7 MMOL/L (ref 22–29)
CREAT BLD-MCNC: 0.58 MG/DL (ref 0.57–1)
DEPRECATED RDW RBC AUTO: 43.3 FL (ref 37–54)
ERYTHROCYTE [DISTWIDTH] IN BLOOD BY AUTOMATED COUNT: 13.2 % (ref 11.7–13)
GFR SERPL CREATININE-BSD FRML MDRD: 102 ML/MIN/1.73
GLUCOSE BLD-MCNC: 103 MG/DL (ref 65–99)
HCT VFR BLD AUTO: 42.6 % (ref 35.6–45.5)
HGB BLD-MCNC: 13.4 G/DL (ref 11.9–15.5)
MCH RBC QN AUTO: 28.2 PG (ref 26.9–32)
MCHC RBC AUTO-ENTMCNC: 31.5 G/DL (ref 32.4–36.3)
MCV RBC AUTO: 89.5 FL (ref 80.5–98.2)
PLATELET # BLD AUTO: 190 10*3/MM3 (ref 140–500)
PMV BLD AUTO: 13.1 FL (ref 6–12)
POTASSIUM BLD-SCNC: 4.3 MMOL/L (ref 3.5–5.2)
RBC # BLD AUTO: 4.76 10*6/MM3 (ref 3.9–5.2)
RH BLD: POSITIVE
SODIUM BLD-SCNC: 139 MMOL/L (ref 136–145)
WBC NRBC COR # BLD: 9.57 10*3/MM3 (ref 4.5–10.7)

## 2017-09-11 RX ORDER — FERROUS SULFATE 325(65) MG
325 TABLET ORAL
COMMUNITY
End: 2018-08-09

## 2017-09-11 RX ORDER — METOPROLOL SUCCINATE 25 MG/1
25 TABLET, EXTENDED RELEASE ORAL DAILY
COMMUNITY
End: 2019-09-23 | Stop reason: HOSPADM

## 2017-09-11 RX ORDER — PANTOPRAZOLE SODIUM 20 MG/1
20 TABLET, DELAYED RELEASE ORAL DAILY
COMMUNITY
End: 2018-08-09

## 2017-09-13 ENCOUNTER — HOSPITAL ENCOUNTER (OUTPATIENT)
Facility: HOSPITAL | Age: 72
Setting detail: HOSPITAL OUTPATIENT SURGERY
Discharge: HOME | End: 2017-09-13
Attending: SURGERY | Admitting: SURGERY

## 2017-09-13 ENCOUNTER — ANESTHESIA (OUTPATIENT)
Dept: GASTROENTEROLOGY | Facility: HOSPITAL | Age: 72
End: 2017-09-13

## 2017-09-13 ENCOUNTER — ANESTHESIA EVENT (OUTPATIENT)
Dept: GASTROENTEROLOGY | Facility: HOSPITAL | Age: 72
End: 2017-09-13

## 2017-09-13 VITALS
OXYGEN SATURATION: 97 % | TEMPERATURE: 97.5 F | SYSTOLIC BLOOD PRESSURE: 136 MMHG | BODY MASS INDEX: 31.87 KG/M2 | HEIGHT: 65 IN | DIASTOLIC BLOOD PRESSURE: 74 MMHG | RESPIRATION RATE: 16 BRPM | HEART RATE: 72 BPM | WEIGHT: 191.3 LBS

## 2017-09-13 PROBLEM — Z80.0 FAMILY HX OF COLON CANCER: Status: ACTIVE | Noted: 2017-08-28

## 2017-09-13 RX ORDER — METRONIDAZOLE 500 MG/1
500 TABLET ORAL 3 TIMES DAILY
COMMUNITY
End: 2017-09-22 | Stop reason: HOSPADM

## 2017-09-13 RX ORDER — LIDOCAINE HYDROCHLORIDE 20 MG/ML
INJECTION, SOLUTION INFILTRATION; PERINEURAL AS NEEDED
Status: DISCONTINUED | OUTPATIENT
Start: 2017-09-13 | End: 2017-09-13 | Stop reason: SURG

## 2017-09-13 RX ORDER — PROPOFOL 10 MG/ML
VIAL (ML) INTRAVENOUS CONTINUOUS PRN
Status: DISCONTINUED | OUTPATIENT
Start: 2017-09-13 | End: 2017-09-13 | Stop reason: SURG

## 2017-09-13 RX ORDER — SODIUM CHLORIDE, SODIUM LACTATE, POTASSIUM CHLORIDE, CALCIUM CHLORIDE 600; 310; 30; 20 MG/100ML; MG/100ML; MG/100ML; MG/100ML
30 INJECTION, SOLUTION INTRAVENOUS CONTINUOUS PRN
Status: DISCONTINUED | OUTPATIENT
Start: 2017-09-13 | End: 2017-09-13 | Stop reason: HOSPADM

## 2017-09-13 RX ORDER — PROPOFOL 10 MG/ML
VIAL (ML) INTRAVENOUS AS NEEDED
Status: DISCONTINUED | OUTPATIENT
Start: 2017-09-13 | End: 2017-09-13 | Stop reason: SURG

## 2017-09-13 RX ORDER — SODIUM CHLORIDE 0.9 % (FLUSH) 0.9 %
1-10 SYRINGE (ML) INJECTION AS NEEDED
Status: DISCONTINUED | OUTPATIENT
Start: 2017-09-13 | End: 2017-09-13 | Stop reason: HOSPADM

## 2017-09-13 RX ORDER — ERYTHROMYCIN 500 MG/1
500 TABLET, DELAYED RELEASE ORAL 3 TIMES DAILY
COMMUNITY
End: 2017-09-22 | Stop reason: HOSPADM

## 2017-09-13 RX ADMIN — PROPOFOL 50 MG: 10 INJECTION, EMULSION INTRAVENOUS at 12:44

## 2017-09-13 RX ADMIN — PROPOFOL 200 MCG/KG/MIN: 10 INJECTION, EMULSION INTRAVENOUS at 12:44

## 2017-09-13 RX ADMIN — SODIUM CHLORIDE, POTASSIUM CHLORIDE, SODIUM LACTATE AND CALCIUM CHLORIDE: 600; 310; 30; 20 INJECTION, SOLUTION INTRAVENOUS at 12:42

## 2017-09-13 RX ADMIN — SODIUM CHLORIDE, POTASSIUM CHLORIDE, SODIUM LACTATE AND CALCIUM CHLORIDE 30 ML/HR: 600; 310; 30; 20 INJECTION, SOLUTION INTRAVENOUS at 12:11

## 2017-09-13 RX ADMIN — LIDOCAINE HYDROCHLORIDE 60 MG: 20 INJECTION, SOLUTION INFILTRATION; PERINEURAL at 12:44

## 2017-09-13 NOTE — ANESTHESIA PREPROCEDURE EVALUATION
Anesthesia Evaluation     history of anesthetic complications: PONV         Airway   Mallampati: II  TM distance: >3 FB  Neck ROM: full  Dental - normal exam     Pulmonary     breath sounds clear to auscultation  (+) asthma,   (-) COPD  Cardiovascular     Rhythm: regular  Rate: normal    (+) hypertension, dysrhythmias (history of a-fib during previous hospitalization, sinus now) Atrial Fib, hyperlipidemia      Neuro/Psych  (+) dizziness/light headedness,    GI/Hepatic/Renal/Endo      Musculoskeletal     Abdominal    Substance History      OB/GYN          Other   (+) arthritis                                     Anesthesia Plan    ASA 3     MAC     Anesthetic plan and risks discussed with patient.

## 2017-09-13 NOTE — ANESTHESIA POSTPROCEDURE EVALUATION
Patient: Kiley Last    Procedure Summary     Date Anesthesia Start Anesthesia Stop Room / Location    09/13/17 1242 1302  [facility] /  [facility]       Procedure Diagnosis Surgeon Provider    COLONOSCOPY VIA COLOSTOMY TO CECUM (N/A ) Family hx of colon cancer  (Family hx of colon cancer [Z80.0]) MD Juan Westfall MD          Anesthesia Type: MAC  Last vitals  BP   136/74 (09/13/17 1331)    Temp   36.4 °C (97.5 °F) (09/13/17 1314)    Pulse   72 (09/13/17 1331)   Resp   16 (09/13/17 1331)    SpO2   97 % (09/13/17 1331)      Post Anesthesia Care and Evaluation    Patient location during evaluation: PACU  Patient participation: complete - patient participated  Level of consciousness: awake and alert  Pain score: 0  Pain management: adequate  Airway patency: patent  Anesthetic complications: No anesthetic complications    Cardiovascular status: acceptable  Respiratory status: acceptable  Hydration status: acceptable

## 2017-09-14 ENCOUNTER — ANESTHESIA (OUTPATIENT)
Dept: PERIOP | Facility: HOSPITAL | Age: 72
End: 2017-09-14

## 2017-09-14 ENCOUNTER — HOSPITAL ENCOUNTER (INPATIENT)
Facility: HOSPITAL | Age: 72
LOS: 8 days | Discharge: HOME | End: 2017-09-22
Attending: SURGERY | Admitting: SURGERY

## 2017-09-14 ENCOUNTER — ANESTHESIA EVENT (OUTPATIENT)
Dept: PERIOP | Facility: HOSPITAL | Age: 72
End: 2017-09-14

## 2017-09-14 DIAGNOSIS — Z93.3 COLOSTOMY PRESENT (HCC): ICD-10-CM

## 2017-09-14 PROCEDURE — 25010000002 FENTANYL CITRATE (PF) 100 MCG/2ML SOLUTION 2 ML VIAL: Performed by: ANESTHESIOLOGY

## 2017-09-14 PROCEDURE — 25010000002 PROMETHAZINE PER 50 MG: Performed by: NURSE ANESTHETIST, CERTIFIED REGISTERED

## 2017-09-14 PROCEDURE — 25010000002 NEOSTIGMINE PER 0.5 MG: Performed by: NURSE ANESTHETIST, CERTIFIED REGISTERED

## 2017-09-14 PROCEDURE — 0DBP0ZZ EXCISION OF RECTUM, OPEN APPROACH: ICD-10-PCS | Performed by: SURGERY

## 2017-09-14 PROCEDURE — 25010000002 MIDAZOLAM PER 1 MG: Performed by: ANESTHESIOLOGY

## 2017-09-14 PROCEDURE — 25010000002 HYDROMORPHONE PER 4 MG: Performed by: NURSE ANESTHETIST, CERTIFIED REGISTERED

## 2017-09-14 PROCEDURE — 25010000002 PROPOFOL 10 MG/ML EMULSION: Performed by: NURSE ANESTHETIST, CERTIFIED REGISTERED

## 2017-09-14 PROCEDURE — 25010000002 FENTANYL CITRATE (PF) 100 MCG/2ML SOLUTION: Performed by: NURSE ANESTHETIST, CERTIFIED REGISTERED

## 2017-09-14 PROCEDURE — 25010000002 FENTANYL CITRATE (PF) 100 MCG/2ML SOLUTION: Performed by: ANESTHESIOLOGY

## 2017-09-14 PROCEDURE — 0DBM0ZZ EXCISION OF DESCENDING COLON, OPEN APPROACH: ICD-10-PCS | Performed by: SURGERY

## 2017-09-14 PROCEDURE — 25010000002 PROMETHAZINE PER 50 MG: Performed by: ANESTHESIOLOGY

## 2017-09-14 PROCEDURE — 25010000002 DEXAMETHASONE PER 1 MG: Performed by: NURSE ANESTHETIST, CERTIFIED REGISTERED

## 2017-09-14 PROCEDURE — C1755 CATHETER, INTRASPINAL: HCPCS | Performed by: ANESTHESIOLOGY

## 2017-09-14 PROCEDURE — 25010000002 KETOROLAC TROMETHAMINE PER 15 MG

## 2017-09-14 PROCEDURE — 0WJP0ZZ INSPECTION OF GASTROINTESTINAL TRACT, OPEN APPROACH: ICD-10-PCS | Performed by: SURGERY

## 2017-09-14 PROCEDURE — 94799 UNLISTED PULMONARY SVC/PX: CPT

## 2017-09-14 PROCEDURE — 88304 TISSUE EXAM BY PATHOLOGIST: CPT | Performed by: SURGERY

## 2017-09-14 PROCEDURE — 25010000002 LEVOFLOXACIN PER 250 MG: Performed by: SURGERY

## 2017-09-14 DEVICE — SEALANT FIBRIN TISSEEL FZ 4ML: Type: IMPLANTABLE DEVICE | Site: ABDOMEN | Status: FUNCTIONAL

## 2017-09-14 RX ORDER — ONDANSETRON 4 MG/1
4 TABLET, FILM COATED ORAL EVERY 6 HOURS PRN
Status: DISCONTINUED | OUTPATIENT
Start: 2017-09-14 | End: 2017-09-22 | Stop reason: HOSPADM

## 2017-09-14 RX ORDER — HYDROMORPHONE HYDROCHLORIDE 1 MG/ML
0.5 INJECTION, SOLUTION INTRAMUSCULAR; INTRAVENOUS; SUBCUTANEOUS
Status: DISCONTINUED | OUTPATIENT
Start: 2017-09-14 | End: 2017-09-20

## 2017-09-14 RX ORDER — DIPHENHYDRAMINE HCL 25 MG
25 CAPSULE ORAL EVERY 6 HOURS PRN
Status: COMPLETED | OUTPATIENT
Start: 2017-09-14 | End: 2017-09-17

## 2017-09-14 RX ORDER — PROMETHAZINE HYDROCHLORIDE 25 MG/1
25 SUPPOSITORY RECTAL ONCE AS NEEDED
Status: COMPLETED | OUTPATIENT
Start: 2017-09-14 | End: 2017-09-14

## 2017-09-14 RX ORDER — FLUMAZENIL 0.1 MG/ML
0.2 INJECTION INTRAVENOUS AS NEEDED
Status: DISCONTINUED | OUTPATIENT
Start: 2017-09-14 | End: 2017-09-14 | Stop reason: HOSPADM

## 2017-09-14 RX ORDER — ONDANSETRON 4 MG/1
4 TABLET, ORALLY DISINTEGRATING ORAL EVERY 6 HOURS PRN
Status: DISCONTINUED | OUTPATIENT
Start: 2017-09-14 | End: 2017-09-22 | Stop reason: HOSPADM

## 2017-09-14 RX ORDER — MIDAZOLAM HYDROCHLORIDE 1 MG/ML
1 INJECTION INTRAMUSCULAR; INTRAVENOUS
Status: DISCONTINUED | OUTPATIENT
Start: 2017-09-14 | End: 2017-09-14 | Stop reason: HOSPADM

## 2017-09-14 RX ORDER — MECLIZINE HYDROCHLORIDE 25 MG/1
25 TABLET ORAL 3 TIMES DAILY PRN
Status: DISCONTINUED | OUTPATIENT
Start: 2017-09-14 | End: 2017-09-22 | Stop reason: HOSPADM

## 2017-09-14 RX ORDER — HYDRALAZINE HYDROCHLORIDE 20 MG/ML
5 INJECTION INTRAMUSCULAR; INTRAVENOUS
Status: DISCONTINUED | OUTPATIENT
Start: 2017-09-14 | End: 2017-09-14 | Stop reason: HOSPADM

## 2017-09-14 RX ORDER — DEXAMETHASONE SODIUM PHOSPHATE 10 MG/ML
INJECTION INTRAMUSCULAR; INTRAVENOUS AS NEEDED
Status: DISCONTINUED | OUTPATIENT
Start: 2017-09-14 | End: 2017-09-14 | Stop reason: SURG

## 2017-09-14 RX ORDER — LEVOFLOXACIN 5 MG/ML
500 INJECTION, SOLUTION INTRAVENOUS
Status: COMPLETED | OUTPATIENT
Start: 2017-09-14 | End: 2017-09-14

## 2017-09-14 RX ORDER — NALOXONE HCL 0.4 MG/ML
0.2 VIAL (ML) INJECTION AS NEEDED
Status: DISCONTINUED | OUTPATIENT
Start: 2017-09-14 | End: 2017-09-14 | Stop reason: HOSPADM

## 2017-09-14 RX ORDER — PANTOPRAZOLE SODIUM 20 MG/1
20 TABLET, DELAYED RELEASE ORAL DAILY
Status: CANCELLED | OUTPATIENT
Start: 2017-09-14

## 2017-09-14 RX ORDER — EPHEDRINE SULFATE 50 MG/ML
5 INJECTION, SOLUTION INTRAVENOUS ONCE AS NEEDED
Status: DISCONTINUED | OUTPATIENT
Start: 2017-09-14 | End: 2017-09-14 | Stop reason: HOSPADM

## 2017-09-14 RX ORDER — SODIUM CHLORIDE, SODIUM LACTATE, POTASSIUM CHLORIDE, CALCIUM CHLORIDE 600; 310; 30; 20 MG/100ML; MG/100ML; MG/100ML; MG/100ML
9 INJECTION, SOLUTION INTRAVENOUS CONTINUOUS
Status: DISCONTINUED | OUTPATIENT
Start: 2017-09-14 | End: 2017-09-22 | Stop reason: HOSPADM

## 2017-09-14 RX ORDER — HYDROMORPHONE HCL 110MG/55ML
PATIENT CONTROLLED ANALGESIA SYRINGE INTRAVENOUS AS NEEDED
Status: DISCONTINUED | OUTPATIENT
Start: 2017-09-14 | End: 2017-09-14 | Stop reason: SURG

## 2017-09-14 RX ORDER — PROPOFOL 10 MG/ML
VIAL (ML) INTRAVENOUS AS NEEDED
Status: DISCONTINUED | OUTPATIENT
Start: 2017-09-14 | End: 2017-09-14 | Stop reason: SURG

## 2017-09-14 RX ORDER — LABETALOL HYDROCHLORIDE 5 MG/ML
5 INJECTION, SOLUTION INTRAVENOUS
Status: DISCONTINUED | OUTPATIENT
Start: 2017-09-14 | End: 2017-09-14 | Stop reason: HOSPADM

## 2017-09-14 RX ORDER — OXYCODONE AND ACETAMINOPHEN 7.5; 325 MG/1; MG/1
1 TABLET ORAL ONCE AS NEEDED
Status: DISCONTINUED | OUTPATIENT
Start: 2017-09-14 | End: 2017-09-14 | Stop reason: HOSPADM

## 2017-09-14 RX ORDER — METOCLOPRAMIDE HYDROCHLORIDE 5 MG/ML
10 INJECTION INTRAMUSCULAR; INTRAVENOUS EVERY 6 HOURS
Status: DISCONTINUED | OUTPATIENT
Start: 2017-09-14 | End: 2017-09-20

## 2017-09-14 RX ORDER — FENTANYL CITRATE 50 UG/ML
50 INJECTION, SOLUTION INTRAMUSCULAR; INTRAVENOUS
Status: DISCONTINUED | OUTPATIENT
Start: 2017-09-14 | End: 2017-09-14 | Stop reason: HOSPADM

## 2017-09-14 RX ORDER — LIDOCAINE HYDROCHLORIDE 20 MG/ML
INJECTION, SOLUTION INFILTRATION; PERINEURAL AS NEEDED
Status: DISCONTINUED | OUTPATIENT
Start: 2017-09-14 | End: 2017-09-14 | Stop reason: SURG

## 2017-09-14 RX ORDER — SODIUM CHLORIDE, SODIUM LACTATE, POTASSIUM CHLORIDE, CALCIUM CHLORIDE 600; 310; 30; 20 MG/100ML; MG/100ML; MG/100ML; MG/100ML
50 INJECTION, SOLUTION INTRAVENOUS CONTINUOUS
Status: DISCONTINUED | OUTPATIENT
Start: 2017-09-14 | End: 2017-09-21

## 2017-09-14 RX ORDER — METOPROLOL SUCCINATE 25 MG/1
25 TABLET, EXTENDED RELEASE ORAL DAILY
Status: DISCONTINUED | OUTPATIENT
Start: 2017-09-14 | End: 2017-09-17

## 2017-09-14 RX ORDER — ACETAMINOPHEN 650 MG/1
650 SUPPOSITORY RECTAL EVERY 4 HOURS PRN
Status: DISCONTINUED | OUTPATIENT
Start: 2017-09-14 | End: 2017-09-22 | Stop reason: HOSPADM

## 2017-09-14 RX ORDER — ONDANSETRON 2 MG/ML
4 INJECTION INTRAMUSCULAR; INTRAVENOUS EVERY 4 HOURS PRN
Status: DISCONTINUED | OUTPATIENT
Start: 2017-09-14 | End: 2017-09-19 | Stop reason: SDUPTHER

## 2017-09-14 RX ORDER — HYDROMORPHONE HYDROCHLORIDE 1 MG/ML
0.5 INJECTION, SOLUTION INTRAMUSCULAR; INTRAVENOUS; SUBCUTANEOUS
Status: DISCONTINUED | OUTPATIENT
Start: 2017-09-14 | End: 2017-09-14 | Stop reason: HOSPADM

## 2017-09-14 RX ORDER — ACETAMINOPHEN 325 MG/1
650 TABLET ORAL EVERY 4 HOURS PRN
Status: DISCONTINUED | OUTPATIENT
Start: 2017-09-14 | End: 2017-09-22 | Stop reason: HOSPADM

## 2017-09-14 RX ORDER — GLYCOPYRROLATE 0.2 MG/ML
INJECTION INTRAMUSCULAR; INTRAVENOUS AS NEEDED
Status: DISCONTINUED | OUTPATIENT
Start: 2017-09-14 | End: 2017-09-14 | Stop reason: SURG

## 2017-09-14 RX ORDER — HYDROCODONE BITARTRATE AND ACETAMINOPHEN 7.5; 325 MG/1; MG/1
1 TABLET ORAL ONCE AS NEEDED
Status: DISCONTINUED | OUTPATIENT
Start: 2017-09-14 | End: 2017-09-14 | Stop reason: HOSPADM

## 2017-09-14 RX ORDER — ONDANSETRON 2 MG/ML
4 INJECTION INTRAMUSCULAR; INTRAVENOUS EVERY 6 HOURS PRN
Status: DISCONTINUED | OUTPATIENT
Start: 2017-09-14 | End: 2017-09-22 | Stop reason: HOSPADM

## 2017-09-14 RX ORDER — ROCURONIUM BROMIDE 10 MG/ML
INJECTION, SOLUTION INTRAVENOUS AS NEEDED
Status: DISCONTINUED | OUTPATIENT
Start: 2017-09-14 | End: 2017-09-14 | Stop reason: SURG

## 2017-09-14 RX ORDER — PROMETHAZINE HYDROCHLORIDE 25 MG/1
25 TABLET ORAL ONCE AS NEEDED
Status: COMPLETED | OUTPATIENT
Start: 2017-09-14 | End: 2017-09-14

## 2017-09-14 RX ORDER — MIDAZOLAM HYDROCHLORIDE 1 MG/ML
2 INJECTION INTRAMUSCULAR; INTRAVENOUS
Status: DISCONTINUED | OUTPATIENT
Start: 2017-09-14 | End: 2017-09-14 | Stop reason: HOSPADM

## 2017-09-14 RX ORDER — LEVOFLOXACIN 5 MG/ML
500 INJECTION, SOLUTION INTRAVENOUS EVERY 24 HOURS
Status: COMPLETED | OUTPATIENT
Start: 2017-09-15 | End: 2017-09-15

## 2017-09-14 RX ORDER — PROMETHAZINE HYDROCHLORIDE 25 MG/ML
12.5 INJECTION, SOLUTION INTRAMUSCULAR; INTRAVENOUS ONCE AS NEEDED
Status: COMPLETED | OUTPATIENT
Start: 2017-09-14 | End: 2017-09-14

## 2017-09-14 RX ORDER — LIDOCAINE HYDROCHLORIDE 40 MG/ML
SOLUTION TOPICAL AS NEEDED
Status: DISCONTINUED | OUTPATIENT
Start: 2017-09-14 | End: 2017-09-14 | Stop reason: SURG

## 2017-09-14 RX ORDER — NALOXONE HCL 0.4 MG/ML
0.2 VIAL (ML) INJECTION
Status: DISCONTINUED | OUTPATIENT
Start: 2017-09-14 | End: 2017-09-22 | Stop reason: HOSPADM

## 2017-09-14 RX ORDER — BUPIVACAINE HYDROCHLORIDE AND EPINEPHRINE 2.5; 5 MG/ML; UG/ML
INJECTION, SOLUTION EPIDURAL; INFILTRATION; INTRACAUDAL; PERINEURAL AS NEEDED
Status: DISCONTINUED | OUTPATIENT
Start: 2017-09-14 | End: 2017-09-14 | Stop reason: SURG

## 2017-09-14 RX ORDER — FENTANYL CITRATE 50 UG/ML
INJECTION, SOLUTION INTRAMUSCULAR; INTRAVENOUS AS NEEDED
Status: DISCONTINUED | OUTPATIENT
Start: 2017-09-14 | End: 2017-09-14 | Stop reason: SURG

## 2017-09-14 RX ORDER — DIPHENHYDRAMINE HYDROCHLORIDE 50 MG/ML
12.5 INJECTION INTRAMUSCULAR; INTRAVENOUS EVERY 6 HOURS PRN
Status: COMPLETED | OUTPATIENT
Start: 2017-09-14 | End: 2017-09-20

## 2017-09-14 RX ORDER — FERROUS SULFATE 325(65) MG
325 TABLET ORAL
Status: CANCELLED | OUTPATIENT
Start: 2017-09-15

## 2017-09-14 RX ORDER — SODIUM CHLORIDE, SODIUM LACTATE, POTASSIUM CHLORIDE, CALCIUM CHLORIDE 600; 310; 30; 20 MG/100ML; MG/100ML; MG/100ML; MG/100ML
150 INJECTION, SOLUTION INTRAVENOUS CONTINUOUS
Status: DISCONTINUED | OUTPATIENT
Start: 2017-09-14 | End: 2017-09-15

## 2017-09-14 RX ORDER — SODIUM CHLORIDE 0.9 % (FLUSH) 0.9 %
1-10 SYRINGE (ML) INJECTION AS NEEDED
Status: DISCONTINUED | OUTPATIENT
Start: 2017-09-14 | End: 2017-09-22 | Stop reason: HOSPADM

## 2017-09-14 RX ORDER — ONDANSETRON 2 MG/ML
4 INJECTION INTRAMUSCULAR; INTRAVENOUS ONCE AS NEEDED
Status: DISCONTINUED | OUTPATIENT
Start: 2017-09-14 | End: 2017-09-14 | Stop reason: HOSPADM

## 2017-09-14 RX ORDER — PROMETHAZINE HYDROCHLORIDE 25 MG/ML
12.5 INJECTION, SOLUTION INTRAMUSCULAR; INTRAVENOUS EVERY 6 HOURS PRN
Status: DISCONTINUED | OUTPATIENT
Start: 2017-09-14 | End: 2017-09-18

## 2017-09-14 RX ORDER — FAMOTIDINE 10 MG/ML
20 INJECTION, SOLUTION INTRAVENOUS ONCE
Status: COMPLETED | OUTPATIENT
Start: 2017-09-14 | End: 2017-09-14

## 2017-09-14 RX ORDER — SODIUM CHLORIDE 0.9 % (FLUSH) 0.9 %
1-10 SYRINGE (ML) INJECTION AS NEEDED
Status: DISCONTINUED | OUTPATIENT
Start: 2017-09-14 | End: 2017-09-14 | Stop reason: HOSPADM

## 2017-09-14 RX ORDER — PROMETHAZINE HYDROCHLORIDE 25 MG/1
12.5 TABLET ORAL ONCE AS NEEDED
Status: DISCONTINUED | OUTPATIENT
Start: 2017-09-14 | End: 2017-09-14 | Stop reason: HOSPADM

## 2017-09-14 RX ORDER — KETOROLAC TROMETHAMINE 15 MG/ML
INJECTION, SOLUTION INTRAMUSCULAR; INTRAVENOUS
Status: COMPLETED
Start: 2017-09-14 | End: 2017-09-14

## 2017-09-14 RX ORDER — DIPHENHYDRAMINE HYDROCHLORIDE 50 MG/ML
12.5 INJECTION INTRAMUSCULAR; INTRAVENOUS
Status: DISCONTINUED | OUTPATIENT
Start: 2017-09-14 | End: 2017-09-14 | Stop reason: HOSPADM

## 2017-09-14 RX ORDER — KETOROLAC TROMETHAMINE 30 MG/ML
15 INJECTION, SOLUTION INTRAMUSCULAR; INTRAVENOUS EVERY 6 HOURS PRN
Status: DISCONTINUED | OUTPATIENT
Start: 2017-09-14 | End: 2017-09-16

## 2017-09-14 RX ADMIN — LIDOCAINE HYDROCHLORIDE 60 MG: 20 INJECTION, SOLUTION INFILTRATION; PERINEURAL at 13:44

## 2017-09-14 RX ADMIN — METOPROLOL SUCCINATE 25 MG: 25 TABLET, FILM COATED, EXTENDED RELEASE ORAL at 20:30

## 2017-09-14 RX ADMIN — DEXAMETHASONE SODIUM PHOSPHATE 10 MG: 10 INJECTION INTRAMUSCULAR; INTRAVENOUS at 13:51

## 2017-09-14 RX ADMIN — SODIUM CHLORIDE, POTASSIUM CHLORIDE, SODIUM LACTATE AND CALCIUM CHLORIDE 9 ML/HR: 600; 310; 30; 20 INJECTION, SOLUTION INTRAVENOUS at 11:52

## 2017-09-14 RX ADMIN — HYDROMORPHONE HYDROCHLORIDE 0.5 MG: 2 INJECTION, SOLUTION INTRAMUSCULAR; INTRAVENOUS; SUBCUTANEOUS at 15:04

## 2017-09-14 RX ADMIN — FAMOTIDINE 20 MG: 10 INJECTION, SOLUTION INTRAVENOUS at 20:30

## 2017-09-14 RX ADMIN — SODIUM CHLORIDE, POTASSIUM CHLORIDE, SODIUM LACTATE AND CALCIUM CHLORIDE 100 ML/HR: 600; 310; 30; 20 INJECTION, SOLUTION INTRAVENOUS at 17:53

## 2017-09-14 RX ADMIN — FENTANYL CITRATE 50 MCG: 50 INJECTION INTRAMUSCULAR; INTRAVENOUS at 12:33

## 2017-09-14 RX ADMIN — SODIUM CHLORIDE, POTASSIUM CHLORIDE, SODIUM LACTATE AND CALCIUM CHLORIDE: 600; 310; 30; 20 INJECTION, SOLUTION INTRAVENOUS at 14:20

## 2017-09-14 RX ADMIN — GLYCOPYRROLATE 0.6 MG: 0.2 INJECTION INTRAMUSCULAR; INTRAVENOUS at 15:21

## 2017-09-14 RX ADMIN — LIDOCAINE HYDROCHLORIDE 1 EACH: 40 SPRAY LARYNGEAL; TRANSTRACHEAL at 13:49

## 2017-09-14 RX ADMIN — FAMOTIDINE 20 MG: 10 INJECTION, SOLUTION INTRAVENOUS at 11:52

## 2017-09-14 RX ADMIN — BUPIVACAINE HYDROCHLORIDE AND EPINEPHRINE BITARTRATE 6 ML: 2.5; .0091 INJECTION, SOLUTION EPIDURAL; INFILTRATION; INTRACAUDAL; PERINEURAL at 15:17

## 2017-09-14 RX ADMIN — FENTANYL CITRATE 50 MCG: 50 INJECTION INTRAMUSCULAR; INTRAVENOUS at 14:39

## 2017-09-14 RX ADMIN — LEVOFLOXACIN 500 MG: 5 INJECTION, SOLUTION INTRAVENOUS at 13:51

## 2017-09-14 RX ADMIN — SODIUM CHLORIDE, POTASSIUM CHLORIDE, SODIUM LACTATE AND CALCIUM CHLORIDE 150 ML/HR: 600; 310; 30; 20 INJECTION, SOLUTION INTRAVENOUS at 18:25

## 2017-09-14 RX ADMIN — HYDROMORPHONE HYDROCHLORIDE 0.5 MG: 1 INJECTION, SOLUTION INTRAMUSCULAR; INTRAVENOUS; SUBCUTANEOUS at 16:06

## 2017-09-14 RX ADMIN — MIDAZOLAM 1 MG: 1 INJECTION INTRAMUSCULAR; INTRAVENOUS at 11:53

## 2017-09-14 RX ADMIN — METRONIDAZOLE 500 MG: 500 INJECTION, SOLUTION INTRAVENOUS at 20:30

## 2017-09-14 RX ADMIN — NEOSTIGMINE METHYLSULFATE 3 MG: 1 INJECTION INTRAMUSCULAR; INTRAVENOUS; SUBCUTANEOUS at 15:21

## 2017-09-14 RX ADMIN — KETOROLAC TROMETHAMINE 15 MG: 15 INJECTION, SOLUTION INTRAMUSCULAR; INTRAVENOUS at 16:06

## 2017-09-14 RX ADMIN — METRONIDAZOLE 500 MG: 500 INJECTION, SOLUTION INTRAVENOUS at 11:52

## 2017-09-14 RX ADMIN — MIDAZOLAM 1 MG: 1 INJECTION INTRAMUSCULAR; INTRAVENOUS at 12:33

## 2017-09-14 RX ADMIN — PROMETHAZINE HYDROCHLORIDE 12.5 MG: 25 INJECTION INTRAMUSCULAR; INTRAVENOUS at 16:05

## 2017-09-14 RX ADMIN — PROMETHAZINE HYDROCHLORIDE 12.5 MG: 25 INJECTION INTRAMUSCULAR; INTRAVENOUS at 16:00

## 2017-09-14 RX ADMIN — PROPOFOL 150 MG: 10 INJECTION, EMULSION INTRAVENOUS at 13:44

## 2017-09-14 RX ADMIN — ROCURONIUM BROMIDE 35 MG: 10 INJECTION INTRAVENOUS at 13:44

## 2017-09-14 RX ADMIN — KETOROLAC TROMETHAMINE 15 MG: 30 INJECTION, SOLUTION INTRAMUSCULAR; INTRAVENOUS at 16:06

## 2017-09-14 RX ADMIN — FENTANYL CITRATE: 50 INJECTION INTRAMUSCULAR; INTRAVENOUS at 15:55

## 2017-09-14 RX ADMIN — FENTANYL CITRATE 100 MCG: 50 INJECTION INTRAMUSCULAR; INTRAVENOUS at 13:39

## 2017-09-14 NOTE — ANESTHESIA POSTPROCEDURE EVALUATION
"Patient: Kiley Last    Procedure Summary     Date Anesthesia Start Anesthesia Stop Room / Location    09/14/17 2342 [id number]  [facility] / [facility]       Procedure Diagnosis Surgeon Provider    COLOSTOMY TAKEDOWN AND CLOSURE, WITH RESECTION (N/A Abdomen) Colostomy present  (Colostomy present [Z93.3]) MD Jose Westfall MD          Anesthesia Type: general, epidural  Last vitals  BP   127/65 (09/14/17 1705)    Temp        Pulse   67 (09/14/17 1705)   Resp   20 (09/14/17 1705)    SpO2   100 % (09/14/17 1705)      Post Anesthesia Care and Evaluation    Patient location during evaluation: bedside  Patient participation: complete - patient participated  Level of consciousness: awake  Pain score: 2  Pain management: adequate  Airway patency: patent  Anesthetic complications: No anesthetic complications  PONV Status: none  Cardiovascular status: acceptable  Respiratory status: acceptable  Hydration status: acceptable    Comments: /65  Pulse 67  Temp 36.5 °C (97.7 °F) (Oral)   Resp 20  Ht 65\" (165.1 cm)  Wt 192 lb 6 oz (87.3 kg)  LMP  (LMP Unknown)  SpO2 100%  BMI 32.01 kg/m2        "

## 2017-09-14 NOTE — ANESTHESIA PREPROCEDURE EVALUATION
Anesthesia Evaluation     Patient summary reviewed and Nursing notes reviewed   history of anesthetic complications: PONV  NPO Solid Status: > 8 hours       Airway   Mallampati: II  TM distance: >3 FB  Neck ROM: full  Dental - normal exam     Pulmonary - normal exam    breath sounds clear to auscultation  (+) COPD, asthma,   Cardiovascular - normal exam    ECG reviewed  Patient on routine beta blocker  Rhythm: regular  Rate: normal    (+) hypertension, dysrhythmias Atrial Fib,   (-) angina, orthopnea, PND, OLSON      Neuro/Psych  (+) dizziness/light headedness,    GI/Hepatic/Renal/Endo    (+) obesity,      Musculoskeletal     Abdominal    Substance History - negative use     OB/GYN negative ob/gyn ROS         Other   (+) arthritis                                   Anesthesia Plan    ASA 3     general and epidural   (Epidural for PO pain)  intravenous induction   Anesthetic plan and risks discussed with patient.

## 2017-09-14 NOTE — ANESTHESIA PROCEDURE NOTES
Airway  Urgency: elective    Date/Time: 9/14/2017 1:49 PM  Airway not difficult    General Information and Staff    Patient location during procedure: OR  Anesthesiologist: FLEX DANGELO  CRNA: MAYRA NIELSEN    Indications and Patient Condition  Indications for airway management: airway protection    Preoxygenated: yes  Mask difficulty assessment: 2 - vent by mask + OA or adjuvant +/- NMBA    Final Airway Details  Final airway type: endotracheal airway      Successful airway: ETT  Cuffed: yes   Successful intubation technique: direct laryngoscopy  Facilitating devices/methods: intubating stylet and cricoid pressure  Endotracheal tube insertion site: oral  Blade: Bradley  Blade size: #2  ETT size: 7.0 mm  Cormack-Lehane Classification: grade I - full view of glottis  Placement verified by: chest auscultation and capnometry   Measured from: teeth  ETT to teeth (cm): 19  Number of attempts at approach: 1    Additional Comments  Atraumatic. No dental damage noted.

## 2017-09-14 NOTE — ANESTHESIA PROCEDURE NOTES
Epidural Block    Patient location during procedure: holding area  Start Time: 9/14/2017 12:38 PM  Stop Time: 9/14/2017 12:52 PM  Indication:at surgeon's request and post-op pain management  Performed By  Anesthesiologist: CARRIE TAPIA  Preanesthetic Checklist  Completed: patient identified, site marked, surgical consent, pre-op evaluation, timeout performed, IV checked, risks and benefits discussed and monitors and equipment checked  Prep:  Pt Position:sitting  Sterile Tech:cap, gloves, mask and sterile barrier  Prep:chlorhexidine gluconate and isopropyl alcohol  Monitoring:blood pressure monitoring, continuous pulse oximetry and EKG  Epidural Block Procedure:  Approach:right paramedian  Guidance:landmark technique  Location:thoracic  Level:11-12  Needle Type:Tuohy  Needle Gauge:17  Loss of Resistance: 7cm  Paresthesia: none  Aspiration:negative  Test Dose:negative  Post Assessment:  Dressing:occlusive dressing applied and secured with tape  Pt Tolerance:patient tolerated the procedure well with no apparent complications  Complications:no

## 2017-09-15 LAB
ANION GAP SERPL CALCULATED.3IONS-SCNC: 13.6 MMOL/L
BASOPHILS # BLD AUTO: 0 10*3/MM3 (ref 0–0.2)
BASOPHILS NFR BLD AUTO: 0 % (ref 0–1.5)
BUN BLD-MCNC: 11 MG/DL (ref 8–23)
BUN/CREAT SERPL: 25 (ref 7–25)
CALCIUM SPEC-SCNC: 8.7 MG/DL (ref 8.6–10.5)
CHLORIDE SERPL-SCNC: 103 MMOL/L (ref 98–107)
CO2 SERPL-SCNC: 23.4 MMOL/L (ref 22–29)
CREAT BLD-MCNC: 0.44 MG/DL (ref 0.57–1)
DEPRECATED RDW RBC AUTO: 43.6 FL (ref 37–54)
EOSINOPHIL # BLD AUTO: 0 10*3/MM3 (ref 0–0.7)
EOSINOPHIL NFR BLD AUTO: 0 % (ref 0.3–6.2)
ERYTHROCYTE [DISTWIDTH] IN BLOOD BY AUTOMATED COUNT: 13.2 % (ref 11.7–13)
GFR SERPL CREATININE-BSD FRML MDRD: 141 ML/MIN/1.73
GLUCOSE BLD-MCNC: 144 MG/DL (ref 65–99)
HCT VFR BLD AUTO: 37.3 % (ref 35.6–45.5)
HGB BLD-MCNC: 11.7 G/DL (ref 11.9–15.5)
IMM GRANULOCYTES # BLD: 0.03 10*3/MM3 (ref 0–0.03)
IMM GRANULOCYTES NFR BLD: 0.2 % (ref 0–0.5)
LYMPHOCYTES # BLD AUTO: 0.84 10*3/MM3 (ref 0.9–4.8)
LYMPHOCYTES NFR BLD AUTO: 6.2 % (ref 19.6–45.3)
MCH RBC QN AUTO: 28.4 PG (ref 26.9–32)
MCHC RBC AUTO-ENTMCNC: 31.4 G/DL (ref 32.4–36.3)
MCV RBC AUTO: 90.5 FL (ref 80.5–98.2)
MONOCYTES # BLD AUTO: 1.08 10*3/MM3 (ref 0.2–1.2)
MONOCYTES NFR BLD AUTO: 7.9 % (ref 5–12)
NEUTROPHILS # BLD AUTO: 11.66 10*3/MM3 (ref 1.9–8.1)
NEUTROPHILS NFR BLD AUTO: 85.7 % (ref 42.7–76)
PLATELET # BLD AUTO: 170 10*3/MM3 (ref 140–500)
PMV BLD AUTO: 12.7 FL (ref 6–12)
POTASSIUM BLD-SCNC: 3.8 MMOL/L (ref 3.5–5.2)
RBC # BLD AUTO: 4.12 10*6/MM3 (ref 3.9–5.2)
SODIUM BLD-SCNC: 140 MMOL/L (ref 136–145)
WBC NRBC COR # BLD: 13.61 10*3/MM3 (ref 4.5–10.7)

## 2017-09-15 PROCEDURE — 25010000002 FENTANYL CITRATE (PF) 100 MCG/2ML SOLUTION 2 ML VIAL: Performed by: ANESTHESIOLOGY

## 2017-09-15 PROCEDURE — 25010000002 LEVOFLOXACIN PER 250 MG: Performed by: SURGERY

## 2017-09-15 PROCEDURE — 25010000002 DIPHENHYDRAMINE PER 50 MG: Performed by: ANESTHESIOLOGY

## 2017-09-15 PROCEDURE — 85025 COMPLETE CBC W/AUTO DIFF WBC: CPT | Performed by: SURGERY

## 2017-09-15 PROCEDURE — 80048 BASIC METABOLIC PNL TOTAL CA: CPT | Performed by: SURGERY

## 2017-09-15 PROCEDURE — 63710000001 DIPHENHYDRAMINE PER 50 MG: Performed by: ANESTHESIOLOGY

## 2017-09-15 PROCEDURE — 25010000002 METOCLOPRAMIDE PER 10 MG: Performed by: SURGERY

## 2017-09-15 RX ADMIN — LEVOFLOXACIN 500 MG: 5 INJECTION, SOLUTION INTRAVENOUS at 12:27

## 2017-09-15 RX ADMIN — DIPHENHYDRAMINE HYDROCHLORIDE 25 MG: 25 CAPSULE ORAL at 21:09

## 2017-09-15 RX ADMIN — FAMOTIDINE 20 MG: 10 INJECTION, SOLUTION INTRAVENOUS at 09:30

## 2017-09-15 RX ADMIN — DIPHENHYDRAMINE HYDROCHLORIDE 12.5 MG: 50 INJECTION, SOLUTION INTRAMUSCULAR; INTRAVENOUS at 01:35

## 2017-09-15 RX ADMIN — METRONIDAZOLE 500 MG: 500 INJECTION, SOLUTION INTRAVENOUS at 15:19

## 2017-09-15 RX ADMIN — METOCLOPRAMIDE 10 MG: 5 INJECTION, SOLUTION INTRAMUSCULAR; INTRAVENOUS at 12:27

## 2017-09-15 RX ADMIN — SODIUM CHLORIDE, POTASSIUM CHLORIDE, SODIUM LACTATE AND CALCIUM CHLORIDE 100 ML/HR: 600; 310; 30; 20 INJECTION, SOLUTION INTRAVENOUS at 09:30

## 2017-09-15 RX ADMIN — SODIUM CHLORIDE, POTASSIUM CHLORIDE, SODIUM LACTATE AND CALCIUM CHLORIDE 100 ML/HR: 600; 310; 30; 20 INJECTION, SOLUTION INTRAVENOUS at 20:07

## 2017-09-15 RX ADMIN — SODIUM CHLORIDE, POTASSIUM CHLORIDE, SODIUM LACTATE AND CALCIUM CHLORIDE 150 ML/HR: 600; 310; 30; 20 INJECTION, SOLUTION INTRAVENOUS at 01:44

## 2017-09-15 RX ADMIN — METRONIDAZOLE 500 MG: 500 INJECTION, SOLUTION INTRAVENOUS at 09:30

## 2017-09-15 RX ADMIN — FENTANYL CITRATE: 50 INJECTION INTRAMUSCULAR; INTRAVENOUS at 21:10

## 2017-09-15 RX ADMIN — METRONIDAZOLE 500 MG: 500 INJECTION, SOLUTION INTRAVENOUS at 01:42

## 2017-09-15 RX ADMIN — FAMOTIDINE 20 MG: 10 INJECTION, SOLUTION INTRAVENOUS at 20:09

## 2017-09-16 LAB
ANION GAP SERPL CALCULATED.3IONS-SCNC: 10.2 MMOL/L
BASOPHILS # BLD AUTO: 0.01 10*3/MM3 (ref 0–0.2)
BASOPHILS NFR BLD AUTO: 0.1 % (ref 0–1.5)
BUN BLD-MCNC: 11 MG/DL (ref 8–23)
BUN/CREAT SERPL: 19.3 (ref 7–25)
CALCIUM SPEC-SCNC: 8.5 MG/DL (ref 8.6–10.5)
CHLORIDE SERPL-SCNC: 105 MMOL/L (ref 98–107)
CO2 SERPL-SCNC: 26.8 MMOL/L (ref 22–29)
CREAT BLD-MCNC: 0.57 MG/DL (ref 0.57–1)
DEPRECATED RDW RBC AUTO: 45.3 FL (ref 37–54)
EOSINOPHIL # BLD AUTO: 0.11 10*3/MM3 (ref 0–0.7)
EOSINOPHIL NFR BLD AUTO: 0.9 % (ref 0.3–6.2)
ERYTHROCYTE [DISTWIDTH] IN BLOOD BY AUTOMATED COUNT: 13.5 % (ref 11.7–13)
GFR SERPL CREATININE-BSD FRML MDRD: 104 ML/MIN/1.73
GLUCOSE BLD-MCNC: 98 MG/DL (ref 65–99)
HCT VFR BLD AUTO: 34.3 % (ref 35.6–45.5)
HGB BLD-MCNC: 10.5 G/DL (ref 11.9–15.5)
IMM GRANULOCYTES # BLD: 0.03 10*3/MM3 (ref 0–0.03)
IMM GRANULOCYTES NFR BLD: 0.2 % (ref 0–0.5)
LAB AP CASE REPORT: NORMAL
LYMPHOCYTES # BLD AUTO: 1.92 10*3/MM3 (ref 0.9–4.8)
LYMPHOCYTES NFR BLD AUTO: 16 % (ref 19.6–45.3)
Lab: NORMAL
MCH RBC QN AUTO: 28.3 PG (ref 26.9–32)
MCHC RBC AUTO-ENTMCNC: 30.6 G/DL (ref 32.4–36.3)
MCV RBC AUTO: 92.5 FL (ref 80.5–98.2)
MONOCYTES # BLD AUTO: 1.38 10*3/MM3 (ref 0.2–1.2)
MONOCYTES NFR BLD AUTO: 11.5 % (ref 5–12)
NEUTROPHILS # BLD AUTO: 8.56 10*3/MM3 (ref 1.9–8.1)
NEUTROPHILS NFR BLD AUTO: 71.3 % (ref 42.7–76)
PATH REPORT.FINAL DX SPEC: NORMAL
PATH REPORT.GROSS SPEC: NORMAL
PLATELET # BLD AUTO: 144 10*3/MM3 (ref 140–500)
PMV BLD AUTO: 12.9 FL (ref 6–12)
POTASSIUM BLD-SCNC: 4 MMOL/L (ref 3.5–5.2)
RBC # BLD AUTO: 3.71 10*6/MM3 (ref 3.9–5.2)
SODIUM BLD-SCNC: 142 MMOL/L (ref 136–145)
WBC NRBC COR # BLD: 12.01 10*3/MM3 (ref 4.5–10.7)

## 2017-09-16 PROCEDURE — 25010000002 KETOROLAC TROMETHAMINE PER 15 MG: Performed by: ANESTHESIOLOGY

## 2017-09-16 PROCEDURE — 80048 BASIC METABOLIC PNL TOTAL CA: CPT | Performed by: SURGERY

## 2017-09-16 PROCEDURE — 85025 COMPLETE CBC W/AUTO DIFF WBC: CPT | Performed by: SURGERY

## 2017-09-16 RX ORDER — KETOROLAC TROMETHAMINE 30 MG/ML
15 INJECTION, SOLUTION INTRAMUSCULAR; INTRAVENOUS EVERY 6 HOURS
Status: DISCONTINUED | OUTPATIENT
Start: 2017-09-16 | End: 2017-09-16

## 2017-09-16 RX ORDER — KETOROLAC TROMETHAMINE 30 MG/ML
15 INJECTION, SOLUTION INTRAMUSCULAR; INTRAVENOUS EVERY 6 HOURS
Status: COMPLETED | OUTPATIENT
Start: 2017-09-16 | End: 2017-09-17

## 2017-09-16 RX ADMIN — KETOROLAC TROMETHAMINE 15 MG: 30 INJECTION, SOLUTION INTRAMUSCULAR at 20:50

## 2017-09-16 RX ADMIN — FAMOTIDINE 20 MG: 10 INJECTION, SOLUTION INTRAVENOUS at 20:50

## 2017-09-16 RX ADMIN — SODIUM CHLORIDE, POTASSIUM CHLORIDE, SODIUM LACTATE AND CALCIUM CHLORIDE 100 ML/HR: 600; 310; 30; 20 INJECTION, SOLUTION INTRAVENOUS at 05:18

## 2017-09-16 RX ADMIN — FAMOTIDINE 20 MG: 10 INJECTION, SOLUTION INTRAVENOUS at 09:03

## 2017-09-16 RX ADMIN — KETOROLAC TROMETHAMINE 15 MG: 30 INJECTION, SOLUTION INTRAMUSCULAR at 16:15

## 2017-09-16 RX ADMIN — KETOROLAC TROMETHAMINE 15 MG: 30 INJECTION, SOLUTION INTRAMUSCULAR at 09:15

## 2017-09-16 RX ADMIN — METOPROLOL SUCCINATE 25 MG: 25 TABLET, FILM COATED, EXTENDED RELEASE ORAL at 09:03

## 2017-09-16 RX ADMIN — SODIUM CHLORIDE, POTASSIUM CHLORIDE, SODIUM LACTATE AND CALCIUM CHLORIDE 100 ML/HR: 600; 310; 30; 20 INJECTION, SOLUTION INTRAVENOUS at 15:09

## 2017-09-16 NOTE — ADDENDUM NOTE
Addendum  created 09/16/17 0921 by Juan Ramirez MD    Anesthesia Event edited, Procedure Event Log accessed

## 2017-09-17 LAB
ANION GAP SERPL CALCULATED.3IONS-SCNC: 17 MMOL/L
BASOPHILS # BLD AUTO: 0.02 10*3/MM3 (ref 0–0.2)
BASOPHILS NFR BLD AUTO: 0.2 % (ref 0–1.5)
BUN BLD-MCNC: 7 MG/DL (ref 8–23)
BUN/CREAT SERPL: 14.9 (ref 7–25)
CALCIUM SPEC-SCNC: 8.8 MG/DL (ref 8.6–10.5)
CHLORIDE SERPL-SCNC: 101 MMOL/L (ref 98–107)
CO2 SERPL-SCNC: 24 MMOL/L (ref 22–29)
CREAT BLD-MCNC: 0.47 MG/DL (ref 0.57–1)
DEPRECATED RDW RBC AUTO: 44.5 FL (ref 37–54)
EOSINOPHIL # BLD AUTO: 0.26 10*3/MM3 (ref 0–0.7)
EOSINOPHIL NFR BLD AUTO: 2.2 % (ref 0.3–6.2)
ERYTHROCYTE [DISTWIDTH] IN BLOOD BY AUTOMATED COUNT: 13.4 % (ref 11.7–13)
GFR SERPL CREATININE-BSD FRML MDRD: 130 ML/MIN/1.73
GLUCOSE BLD-MCNC: 103 MG/DL (ref 65–99)
HCT VFR BLD AUTO: 36.6 % (ref 35.6–45.5)
HGB BLD-MCNC: 11.3 G/DL (ref 11.9–15.5)
IMM GRANULOCYTES # BLD: 0.02 10*3/MM3 (ref 0–0.03)
IMM GRANULOCYTES NFR BLD: 0.2 % (ref 0–0.5)
LYMPHOCYTES # BLD AUTO: 1.44 10*3/MM3 (ref 0.9–4.8)
LYMPHOCYTES NFR BLD AUTO: 12.2 % (ref 19.6–45.3)
MAGNESIUM SERPL-MCNC: 1.7 MG/DL (ref 1.6–2.4)
MCH RBC QN AUTO: 28.1 PG (ref 26.9–32)
MCHC RBC AUTO-ENTMCNC: 30.9 G/DL (ref 32.4–36.3)
MCV RBC AUTO: 91 FL (ref 80.5–98.2)
MONOCYTES # BLD AUTO: 1.31 10*3/MM3 (ref 0.2–1.2)
MONOCYTES NFR BLD AUTO: 11.1 % (ref 5–12)
NEUTROPHILS # BLD AUTO: 8.79 10*3/MM3 (ref 1.9–8.1)
NEUTROPHILS NFR BLD AUTO: 74.1 % (ref 42.7–76)
PLATELET # BLD AUTO: 160 10*3/MM3 (ref 140–500)
PMV BLD AUTO: 12.8 FL (ref 6–12)
POTASSIUM BLD-SCNC: 3.2 MMOL/L (ref 3.5–5.2)
RBC # BLD AUTO: 4.02 10*6/MM3 (ref 3.9–5.2)
SODIUM BLD-SCNC: 142 MMOL/L (ref 136–145)
WBC NRBC COR # BLD: 11.84 10*3/MM3 (ref 4.5–10.7)

## 2017-09-17 PROCEDURE — 99221 1ST HOSP IP/OBS SF/LOW 40: CPT | Performed by: INTERNAL MEDICINE

## 2017-09-17 PROCEDURE — 25010000002 PROMETHAZINE PER 50 MG: Performed by: ANESTHESIOLOGY

## 2017-09-17 PROCEDURE — 93010 ELECTROCARDIOGRAM REPORT: CPT | Performed by: INTERNAL MEDICINE

## 2017-09-17 PROCEDURE — 93005 ELECTROCARDIOGRAM TRACING: CPT | Performed by: SURGERY

## 2017-09-17 PROCEDURE — 25010000002 KETOROLAC TROMETHAMINE PER 15 MG: Performed by: ANESTHESIOLOGY

## 2017-09-17 PROCEDURE — 85025 COMPLETE CBC W/AUTO DIFF WBC: CPT | Performed by: SURGERY

## 2017-09-17 PROCEDURE — 25010000002 ONDANSETRON PER 1 MG: Performed by: ANESTHESIOLOGY

## 2017-09-17 PROCEDURE — 80048 BASIC METABOLIC PNL TOTAL CA: CPT | Performed by: SURGERY

## 2017-09-17 PROCEDURE — 25010000002 FENTANYL CITRATE (PF) 100 MCG/2ML SOLUTION 2 ML VIAL: Performed by: ANESTHESIOLOGY

## 2017-09-17 PROCEDURE — 25010000002 DIGOXIN PER 500 MCG: Performed by: INTERNAL MEDICINE

## 2017-09-17 PROCEDURE — 25010000002 METOCLOPRAMIDE PER 10 MG: Performed by: SURGERY

## 2017-09-17 PROCEDURE — 25010000002 HYDROMORPHONE PER 4 MG: Performed by: ANESTHESIOLOGY

## 2017-09-17 PROCEDURE — 83735 ASSAY OF MAGNESIUM: CPT | Performed by: INTERNAL MEDICINE

## 2017-09-17 PROCEDURE — 63710000001 DIPHENHYDRAMINE PER 50 MG: Performed by: ANESTHESIOLOGY

## 2017-09-17 RX ORDER — POTASSIUM CHLORIDE 7.45 MG/ML
10 INJECTION INTRAVENOUS
Status: DISCONTINUED | OUTPATIENT
Start: 2017-09-17 | End: 2017-09-22 | Stop reason: HOSPADM

## 2017-09-17 RX ORDER — DIGOXIN 0.25 MG/ML
250 INJECTION INTRAMUSCULAR; INTRAVENOUS ONCE
Status: COMPLETED | OUTPATIENT
Start: 2017-09-17 | End: 2017-09-17

## 2017-09-17 RX ORDER — METOPROLOL SUCCINATE 50 MG/1
50 TABLET, EXTENDED RELEASE ORAL DAILY
Status: DISCONTINUED | OUTPATIENT
Start: 2017-09-18 | End: 2017-09-22 | Stop reason: HOSPADM

## 2017-09-17 RX ORDER — MAGNESIUM SULFATE HEPTAHYDRATE 40 MG/ML
2 INJECTION, SOLUTION INTRAVENOUS AS NEEDED
Status: DISCONTINUED | OUTPATIENT
Start: 2017-09-17 | End: 2017-09-22 | Stop reason: HOSPADM

## 2017-09-17 RX ORDER — PANTOPRAZOLE SODIUM 40 MG/1
40 TABLET, DELAYED RELEASE ORAL
Status: DISCONTINUED | OUTPATIENT
Start: 2017-09-17 | End: 2017-09-22 | Stop reason: HOSPADM

## 2017-09-17 RX ORDER — POTASSIUM CHLORIDE 1.5 G/1.77G
40 POWDER, FOR SOLUTION ORAL AS NEEDED
Status: DISCONTINUED | OUTPATIENT
Start: 2017-09-17 | End: 2017-09-22 | Stop reason: HOSPADM

## 2017-09-17 RX ORDER — MAGNESIUM SULFATE HEPTAHYDRATE 40 MG/ML
4 INJECTION, SOLUTION INTRAVENOUS AS NEEDED
Status: DISCONTINUED | OUTPATIENT
Start: 2017-09-17 | End: 2017-09-22 | Stop reason: HOSPADM

## 2017-09-17 RX ORDER — DILTIAZEM HYDROCHLORIDE 5 MG/ML
10 INJECTION INTRAVENOUS ONCE
Status: COMPLETED | OUTPATIENT
Start: 2017-09-17 | End: 2017-09-17

## 2017-09-17 RX ORDER — POTASSIUM CHLORIDE 750 MG/1
40 CAPSULE, EXTENDED RELEASE ORAL AS NEEDED
Status: DISCONTINUED | OUTPATIENT
Start: 2017-09-17 | End: 2017-09-22 | Stop reason: HOSPADM

## 2017-09-17 RX ADMIN — DILTIAZEM HYDROCHLORIDE 10 MG/HR: 100 INJECTION, POWDER, LYOPHILIZED, FOR SOLUTION INTRAVENOUS at 03:39

## 2017-09-17 RX ADMIN — METOPROLOL SUCCINATE 25 MG: 25 TABLET, FILM COATED, EXTENDED RELEASE ORAL at 08:25

## 2017-09-17 RX ADMIN — PANTOPRAZOLE SODIUM 40 MG: 40 TABLET, DELAYED RELEASE ORAL at 11:34

## 2017-09-17 RX ADMIN — METOCLOPRAMIDE 10 MG: 5 INJECTION, SOLUTION INTRAMUSCULAR; INTRAVENOUS at 17:51

## 2017-09-17 RX ADMIN — METOCLOPRAMIDE 10 MG: 5 INJECTION, SOLUTION INTRAMUSCULAR; INTRAVENOUS at 13:03

## 2017-09-17 RX ADMIN — DIGOXIN 250 MCG: 0.25 INJECTION INTRAMUSCULAR; INTRAVENOUS at 09:58

## 2017-09-17 RX ADMIN — POTASSIUM CHLORIDE 40 MEQ: 750 CAPSULE, EXTENDED RELEASE ORAL at 11:34

## 2017-09-17 RX ADMIN — METOCLOPRAMIDE 10 MG: 5 INJECTION, SOLUTION INTRAMUSCULAR; INTRAVENOUS at 00:00

## 2017-09-17 RX ADMIN — DIPHENHYDRAMINE HYDROCHLORIDE 25 MG: 25 CAPSULE ORAL at 00:00

## 2017-09-17 RX ADMIN — DILTIAZEM HYDROCHLORIDE 10 MG: 5 INJECTION INTRAVENOUS at 03:31

## 2017-09-17 RX ADMIN — KETOROLAC TROMETHAMINE 15 MG: 30 INJECTION, SOLUTION INTRAMUSCULAR at 03:30

## 2017-09-17 RX ADMIN — POTASSIUM CHLORIDE 40 MEQ: 750 CAPSULE, EXTENDED RELEASE ORAL at 07:27

## 2017-09-17 RX ADMIN — SODIUM CHLORIDE, POTASSIUM CHLORIDE, SODIUM LACTATE AND CALCIUM CHLORIDE 75 ML/HR: 600; 310; 30; 20 INJECTION, SOLUTION INTRAVENOUS at 10:46

## 2017-09-17 RX ADMIN — METOCLOPRAMIDE 10 MG: 5 INJECTION, SOLUTION INTRAMUSCULAR; INTRAVENOUS at 07:28

## 2017-09-17 RX ADMIN — ONDANSETRON 4 MG: 2 INJECTION INTRAMUSCULAR; INTRAVENOUS at 07:28

## 2017-09-17 RX ADMIN — HYDROMORPHONE HYDROCHLORIDE 0.5 MG: 1 INJECTION, SOLUTION INTRAMUSCULAR; INTRAVENOUS; SUBCUTANEOUS at 20:42

## 2017-09-17 RX ADMIN — MAGNESIUM SULFATE HEPTAHYDRATE 4 G: 40 INJECTION, SOLUTION INTRAVENOUS at 13:02

## 2017-09-17 RX ADMIN — SODIUM CHLORIDE, POTASSIUM CHLORIDE, SODIUM LACTATE AND CALCIUM CHLORIDE 100 ML/HR: 600; 310; 30; 20 INJECTION, SOLUTION INTRAVENOUS at 01:34

## 2017-09-17 RX ADMIN — FENTANYL CITRATE: 50 INJECTION INTRAMUSCULAR; INTRAVENOUS at 08:28

## 2017-09-17 RX ADMIN — DILTIAZEM HYDROCHLORIDE 5 MG/HR: 100 INJECTION, POWDER, LYOPHILIZED, FOR SOLUTION INTRAVENOUS at 17:50

## 2017-09-17 RX ADMIN — ONDANSETRON 4 MG: 2 INJECTION INTRAMUSCULAR; INTRAVENOUS at 19:32

## 2017-09-17 RX ADMIN — PROMETHAZINE HYDROCHLORIDE 12.5 MG: 25 INJECTION INTRAMUSCULAR; INTRAVENOUS at 23:35

## 2017-09-17 RX ADMIN — DILTIAZEM HYDROCHLORIDE 12.5 MG/HR: 100 INJECTION, POWDER, LYOPHILIZED, FOR SOLUTION INTRAVENOUS at 08:24

## 2017-09-17 RX ADMIN — DIGOXIN 250 MCG: 0.25 INJECTION INTRAMUSCULAR; INTRAVENOUS at 07:27

## 2017-09-18 LAB
ANION GAP SERPL CALCULATED.3IONS-SCNC: 12.3 MMOL/L
BASOPHILS # BLD AUTO: 0.01 10*3/MM3 (ref 0–0.2)
BASOPHILS NFR BLD AUTO: 0.1 % (ref 0–1.5)
BUN BLD-MCNC: 8 MG/DL (ref 8–23)
BUN/CREAT SERPL: 16.7 (ref 7–25)
CALCIUM SPEC-SCNC: 8.4 MG/DL (ref 8.6–10.5)
CHLORIDE SERPL-SCNC: 99 MMOL/L (ref 98–107)
CO2 SERPL-SCNC: 28.7 MMOL/L (ref 22–29)
CREAT BLD-MCNC: 0.48 MG/DL (ref 0.57–1)
DEPRECATED RDW RBC AUTO: 44.5 FL (ref 37–54)
EOSINOPHIL # BLD AUTO: 0.07 10*3/MM3 (ref 0–0.7)
EOSINOPHIL NFR BLD AUTO: 0.5 % (ref 0.3–6.2)
ERYTHROCYTE [DISTWIDTH] IN BLOOD BY AUTOMATED COUNT: 13.3 % (ref 11.7–13)
GFR SERPL CREATININE-BSD FRML MDRD: 127 ML/MIN/1.73
GLUCOSE BLD-MCNC: 150 MG/DL (ref 65–99)
HCT VFR BLD AUTO: 36.7 % (ref 35.6–45.5)
HGB BLD-MCNC: 11.3 G/DL (ref 11.9–15.5)
IMM GRANULOCYTES # BLD: 0.04 10*3/MM3 (ref 0–0.03)
IMM GRANULOCYTES NFR BLD: 0.3 % (ref 0–0.5)
LYMPHOCYTES # BLD AUTO: 1.13 10*3/MM3 (ref 0.9–4.8)
LYMPHOCYTES NFR BLD AUTO: 8.5 % (ref 19.6–45.3)
MCH RBC QN AUTO: 28.3 PG (ref 26.9–32)
MCHC RBC AUTO-ENTMCNC: 30.8 G/DL (ref 32.4–36.3)
MCV RBC AUTO: 92 FL (ref 80.5–98.2)
MONOCYTES # BLD AUTO: 1.29 10*3/MM3 (ref 0.2–1.2)
MONOCYTES NFR BLD AUTO: 9.7 % (ref 5–12)
NEUTROPHILS # BLD AUTO: 10.71 10*3/MM3 (ref 1.9–8.1)
NEUTROPHILS NFR BLD AUTO: 80.9 % (ref 42.7–76)
PLATELET # BLD AUTO: 178 10*3/MM3 (ref 140–500)
PMV BLD AUTO: 13.1 FL (ref 6–12)
POTASSIUM BLD-SCNC: 4.7 MMOL/L (ref 3.5–5.2)
RBC # BLD AUTO: 3.99 10*6/MM3 (ref 3.9–5.2)
SODIUM BLD-SCNC: 140 MMOL/L (ref 136–145)
WBC NRBC COR # BLD: 13.25 10*3/MM3 (ref 4.5–10.7)

## 2017-09-18 PROCEDURE — 25010000002 PROMETHAZINE PER 50 MG: Performed by: ANESTHESIOLOGY

## 2017-09-18 PROCEDURE — 25010000002 FENTANYL CITRATE (PF) 100 MCG/2ML SOLUTION 2 ML VIAL: Performed by: ANESTHESIOLOGY

## 2017-09-18 PROCEDURE — 25010000002 METOCLOPRAMIDE PER 10 MG: Performed by: SURGERY

## 2017-09-18 PROCEDURE — 99232 SBSQ HOSP IP/OBS MODERATE 35: CPT | Performed by: INTERNAL MEDICINE

## 2017-09-18 PROCEDURE — 85025 COMPLETE CBC W/AUTO DIFF WBC: CPT | Performed by: SURGERY

## 2017-09-18 PROCEDURE — 25010000002 ONDANSETRON PER 1 MG: Performed by: ANESTHESIOLOGY

## 2017-09-18 PROCEDURE — 80048 BASIC METABOLIC PNL TOTAL CA: CPT | Performed by: SURGERY

## 2017-09-18 RX ORDER — PROMETHAZINE HYDROCHLORIDE 25 MG/ML
12.5 INJECTION, SOLUTION INTRAMUSCULAR; INTRAVENOUS EVERY 4 HOURS PRN
Status: DISCONTINUED | OUTPATIENT
Start: 2017-09-18 | End: 2017-09-22 | Stop reason: HOSPADM

## 2017-09-18 RX ORDER — BISACODYL 10 MG
10 SUPPOSITORY, RECTAL RECTAL ONCE
Status: COMPLETED | OUTPATIENT
Start: 2017-09-18 | End: 2017-09-18

## 2017-09-18 RX ADMIN — METOCLOPRAMIDE 10 MG: 5 INJECTION, SOLUTION INTRAMUSCULAR; INTRAVENOUS at 07:43

## 2017-09-18 RX ADMIN — ONDANSETRON 4 MG: 2 INJECTION INTRAMUSCULAR; INTRAVENOUS at 08:55

## 2017-09-18 RX ADMIN — PROMETHAZINE HYDROCHLORIDE 12.5 MG: 25 INJECTION INTRAMUSCULAR; INTRAVENOUS at 05:54

## 2017-09-18 RX ADMIN — ONDANSETRON 4 MG: 2 INJECTION INTRAMUSCULAR; INTRAVENOUS at 04:49

## 2017-09-18 RX ADMIN — SODIUM CHLORIDE, POTASSIUM CHLORIDE, SODIUM LACTATE AND CALCIUM CHLORIDE 75 ML/HR: 600; 310; 30; 20 INJECTION, SOLUTION INTRAVENOUS at 03:49

## 2017-09-18 RX ADMIN — SODIUM CHLORIDE, POTASSIUM CHLORIDE, SODIUM LACTATE AND CALCIUM CHLORIDE 75 ML/HR: 600; 310; 30; 20 INJECTION, SOLUTION INTRAVENOUS at 17:21

## 2017-09-18 RX ADMIN — FENTANYL CITRATE: 50 INJECTION INTRAMUSCULAR; INTRAVENOUS at 10:33

## 2017-09-18 RX ADMIN — BISACODYL 10 MG: 10 SUPPOSITORY RECTAL at 08:34

## 2017-09-18 RX ADMIN — METOCLOPRAMIDE 10 MG: 5 INJECTION, SOLUTION INTRAMUSCULAR; INTRAVENOUS at 01:07

## 2017-09-18 RX ADMIN — METOPROLOL SUCCINATE 50 MG: 50 TABLET, FILM COATED, EXTENDED RELEASE ORAL at 08:34

## 2017-09-18 RX ADMIN — ONDANSETRON 4 MG: 2 INJECTION INTRAMUSCULAR; INTRAVENOUS at 12:58

## 2017-09-19 LAB
ANION GAP SERPL CALCULATED.3IONS-SCNC: 12.8 MMOL/L
BASOPHILS # BLD AUTO: 0.01 10*3/MM3 (ref 0–0.2)
BASOPHILS NFR BLD AUTO: 0.1 % (ref 0–1.5)
BUN BLD-MCNC: 8 MG/DL (ref 8–23)
BUN/CREAT SERPL: 21.1 (ref 7–25)
CALCIUM SPEC-SCNC: 8.6 MG/DL (ref 8.6–10.5)
CHLORIDE SERPL-SCNC: 100 MMOL/L (ref 98–107)
CO2 SERPL-SCNC: 24.2 MMOL/L (ref 22–29)
CREAT BLD-MCNC: 0.38 MG/DL (ref 0.57–1)
DEPRECATED RDW RBC AUTO: 44.2 FL (ref 37–54)
EOSINOPHIL # BLD AUTO: 0.44 10*3/MM3 (ref 0–0.7)
EOSINOPHIL NFR BLD AUTO: 3.5 % (ref 0.3–6.2)
ERYTHROCYTE [DISTWIDTH] IN BLOOD BY AUTOMATED COUNT: 13.2 % (ref 11.7–13)
GFR SERPL CREATININE-BSD FRML MDRD: >150 ML/MIN/1.73
GLUCOSE BLD-MCNC: 101 MG/DL (ref 65–99)
HCT VFR BLD AUTO: 37.2 % (ref 35.6–45.5)
HGB BLD-MCNC: 11.2 G/DL (ref 11.9–15.5)
IMM GRANULOCYTES # BLD: 0.04 10*3/MM3 (ref 0–0.03)
IMM GRANULOCYTES NFR BLD: 0.3 % (ref 0–0.5)
LYMPHOCYTES # BLD AUTO: 1.88 10*3/MM3 (ref 0.9–4.8)
LYMPHOCYTES NFR BLD AUTO: 14.8 % (ref 19.6–45.3)
MCH RBC QN AUTO: 27.8 PG (ref 26.9–32)
MCHC RBC AUTO-ENTMCNC: 30.1 G/DL (ref 32.4–36.3)
MCV RBC AUTO: 92.3 FL (ref 80.5–98.2)
MONOCYTES # BLD AUTO: 1.38 10*3/MM3 (ref 0.2–1.2)
MONOCYTES NFR BLD AUTO: 10.9 % (ref 5–12)
NEUTROPHILS # BLD AUTO: 8.92 10*3/MM3 (ref 1.9–8.1)
NEUTROPHILS NFR BLD AUTO: 70.4 % (ref 42.7–76)
PLATELET # BLD AUTO: 166 10*3/MM3 (ref 140–500)
PMV BLD AUTO: 13.1 FL (ref 6–12)
POTASSIUM BLD-SCNC: 3.5 MMOL/L (ref 3.5–5.2)
RBC # BLD AUTO: 4.03 10*6/MM3 (ref 3.9–5.2)
SODIUM BLD-SCNC: 137 MMOL/L (ref 136–145)
WBC NRBC COR # BLD: 12.67 10*3/MM3 (ref 4.5–10.7)

## 2017-09-19 PROCEDURE — 99232 SBSQ HOSP IP/OBS MODERATE 35: CPT | Performed by: INTERNAL MEDICINE

## 2017-09-19 PROCEDURE — 25010000002 KETOROLAC TROMETHAMINE PER 15 MG: Performed by: SURGERY

## 2017-09-19 PROCEDURE — 25010000002 AMIODARONE IN DEXTROSE 5% 150-4.21 MG/100ML-% SOLUTION: Performed by: INTERNAL MEDICINE

## 2017-09-19 PROCEDURE — 25010000002 METOCLOPRAMIDE PER 10 MG: Performed by: SURGERY

## 2017-09-19 PROCEDURE — 80048 BASIC METABOLIC PNL TOTAL CA: CPT | Performed by: SURGERY

## 2017-09-19 PROCEDURE — 93010 ELECTROCARDIOGRAM REPORT: CPT | Performed by: INTERNAL MEDICINE

## 2017-09-19 PROCEDURE — 25010000002 AMIODARONE IN DEXTROSE 5% 360-4.14 MG/200ML-% SOLUTION: Performed by: INTERNAL MEDICINE

## 2017-09-19 PROCEDURE — 85025 COMPLETE CBC W/AUTO DIFF WBC: CPT | Performed by: SURGERY

## 2017-09-19 PROCEDURE — 93005 ELECTROCARDIOGRAM TRACING: CPT | Performed by: INTERNAL MEDICINE

## 2017-09-19 RX ORDER — KETOROLAC TROMETHAMINE 30 MG/ML
30 INJECTION, SOLUTION INTRAMUSCULAR; INTRAVENOUS EVERY 8 HOURS
Status: DISCONTINUED | OUTPATIENT
Start: 2017-09-19 | End: 2017-09-21

## 2017-09-19 RX ORDER — OXYCODONE HYDROCHLORIDE AND ACETAMINOPHEN 5; 325 MG/1; MG/1
2 TABLET ORAL EVERY 4 HOURS PRN
Status: DISCONTINUED | OUTPATIENT
Start: 2017-09-19 | End: 2017-09-22 | Stop reason: HOSPADM

## 2017-09-19 RX ORDER — OXYCODONE HYDROCHLORIDE AND ACETAMINOPHEN 5; 325 MG/1; MG/1
1 TABLET ORAL EVERY 4 HOURS PRN
Status: DISCONTINUED | OUTPATIENT
Start: 2017-09-19 | End: 2017-09-22 | Stop reason: HOSPADM

## 2017-09-19 RX ORDER — HYDROMORPHONE HYDROCHLORIDE 1 MG/ML
0.5 INJECTION, SOLUTION INTRAMUSCULAR; INTRAVENOUS; SUBCUTANEOUS EVERY 4 HOURS PRN
Status: DISCONTINUED | OUTPATIENT
Start: 2017-09-19 | End: 2017-09-22 | Stop reason: HOSPADM

## 2017-09-19 RX ADMIN — AMIODARONE HYDROCHLORIDE 0.5 MG/MIN: 1.8 INJECTION, SOLUTION INTRAVENOUS at 15:16

## 2017-09-19 RX ADMIN — METOCLOPRAMIDE 10 MG: 5 INJECTION, SOLUTION INTRAMUSCULAR; INTRAVENOUS at 01:00

## 2017-09-19 RX ADMIN — METOCLOPRAMIDE 10 MG: 5 INJECTION, SOLUTION INTRAMUSCULAR; INTRAVENOUS at 12:10

## 2017-09-19 RX ADMIN — METOROPROLOL TARTRATE 5 MG: 5 INJECTION, SOLUTION INTRAVENOUS at 06:45

## 2017-09-19 RX ADMIN — AMIODARONE HYDROCHLORIDE 150 MG: 1.5 INJECTION, SOLUTION INTRAVENOUS at 09:42

## 2017-09-19 RX ADMIN — ACETAMINOPHEN 650 MG: 325 TABLET ORAL at 05:58

## 2017-09-19 RX ADMIN — KETOROLAC TROMETHAMINE 30 MG: 30 INJECTION, SOLUTION INTRAMUSCULAR at 08:35

## 2017-09-19 RX ADMIN — DILTIAZEM HYDROCHLORIDE 10 MG/HR: 100 INJECTION, POWDER, LYOPHILIZED, FOR SOLUTION INTRAVENOUS at 14:35

## 2017-09-19 RX ADMIN — METOROPROLOL TARTRATE 5 MG: 5 INJECTION, SOLUTION INTRAVENOUS at 06:39

## 2017-09-19 RX ADMIN — METOCLOPRAMIDE 10 MG: 5 INJECTION, SOLUTION INTRAMUSCULAR; INTRAVENOUS at 08:35

## 2017-09-19 RX ADMIN — AMIODARONE HYDROCHLORIDE 1 MG/MIN: 1.8 INJECTION, SOLUTION INTRAVENOUS at 09:55

## 2017-09-19 RX ADMIN — METOPROLOL SUCCINATE 50 MG: 50 TABLET, FILM COATED, EXTENDED RELEASE ORAL at 09:43

## 2017-09-19 RX ADMIN — SODIUM CHLORIDE, POTASSIUM CHLORIDE, SODIUM LACTATE AND CALCIUM CHLORIDE 75 ML/HR: 600; 310; 30; 20 INJECTION, SOLUTION INTRAVENOUS at 05:53

## 2017-09-19 RX ADMIN — METOROPROLOL TARTRATE 5 MG: 5 INJECTION, SOLUTION INTRAVENOUS at 06:32

## 2017-09-19 RX ADMIN — METOCLOPRAMIDE 10 MG: 5 INJECTION, SOLUTION INTRAMUSCULAR; INTRAVENOUS at 18:02

## 2017-09-19 RX ADMIN — OXYCODONE HYDROCHLORIDE AND ACETAMINOPHEN 1 TABLET: 5; 325 TABLET ORAL at 15:53

## 2017-09-19 RX ADMIN — SODIUM CHLORIDE, POTASSIUM CHLORIDE, SODIUM LACTATE AND CALCIUM CHLORIDE 75 ML/HR: 600; 310; 30; 20 INJECTION, SOLUTION INTRAVENOUS at 22:15

## 2017-09-19 RX ADMIN — KETOROLAC TROMETHAMINE 30 MG: 30 INJECTION, SOLUTION INTRAMUSCULAR at 14:41

## 2017-09-19 RX ADMIN — PANTOPRAZOLE SODIUM 40 MG: 40 TABLET, DELAYED RELEASE ORAL at 05:57

## 2017-09-19 RX ADMIN — KETOROLAC TROMETHAMINE 30 MG: 30 INJECTION, SOLUTION INTRAMUSCULAR at 22:21

## 2017-09-19 RX ADMIN — OXYCODONE HYDROCHLORIDE AND ACETAMINOPHEN 2 TABLET: 5; 325 TABLET ORAL at 19:56

## 2017-09-20 LAB
ANION GAP SERPL CALCULATED.3IONS-SCNC: 12.7 MMOL/L
BASOPHILS # BLD AUTO: 0.02 10*3/MM3 (ref 0–0.2)
BASOPHILS NFR BLD AUTO: 0.2 % (ref 0–1.5)
BUN BLD-MCNC: 10 MG/DL (ref 8–23)
BUN/CREAT SERPL: 23.8 (ref 7–25)
CALCIUM SPEC-SCNC: 8.9 MG/DL (ref 8.6–10.5)
CHLORIDE SERPL-SCNC: 100 MMOL/L (ref 98–107)
CO2 SERPL-SCNC: 26.3 MMOL/L (ref 22–29)
CREAT BLD-MCNC: 0.42 MG/DL (ref 0.57–1)
DEPRECATED RDW RBC AUTO: 45 FL (ref 37–54)
EOSINOPHIL # BLD AUTO: 0.65 10*3/MM3 (ref 0–0.7)
EOSINOPHIL NFR BLD AUTO: 6.3 % (ref 0.3–6.2)
ERYTHROCYTE [DISTWIDTH] IN BLOOD BY AUTOMATED COUNT: 13.4 % (ref 11.7–13)
GFR SERPL CREATININE-BSD FRML MDRD: 148 ML/MIN/1.73
GLUCOSE BLD-MCNC: 108 MG/DL (ref 65–99)
HCT VFR BLD AUTO: 35.6 % (ref 35.6–45.5)
HGB BLD-MCNC: 10.9 G/DL (ref 11.9–15.5)
IMM GRANULOCYTES # BLD: 0.04 10*3/MM3 (ref 0–0.03)
IMM GRANULOCYTES NFR BLD: 0.4 % (ref 0–0.5)
LYMPHOCYTES # BLD AUTO: 2.12 10*3/MM3 (ref 0.9–4.8)
LYMPHOCYTES NFR BLD AUTO: 20.5 % (ref 19.6–45.3)
MCH RBC QN AUTO: 28 PG (ref 26.9–32)
MCHC RBC AUTO-ENTMCNC: 30.6 G/DL (ref 32.4–36.3)
MCV RBC AUTO: 91.5 FL (ref 80.5–98.2)
MONOCYTES # BLD AUTO: 1.17 10*3/MM3 (ref 0.2–1.2)
MONOCYTES NFR BLD AUTO: 11.3 % (ref 5–12)
NEUTROPHILS # BLD AUTO: 6.32 10*3/MM3 (ref 1.9–8.1)
NEUTROPHILS NFR BLD AUTO: 61.3 % (ref 42.7–76)
PLATELET # BLD AUTO: 192 10*3/MM3 (ref 140–500)
PMV BLD AUTO: 12.6 FL (ref 6–12)
POTASSIUM BLD-SCNC: 3.4 MMOL/L (ref 3.5–5.2)
POTASSIUM BLD-SCNC: 4.2 MMOL/L (ref 3.5–5.2)
RBC # BLD AUTO: 3.89 10*6/MM3 (ref 3.9–5.2)
SODIUM BLD-SCNC: 139 MMOL/L (ref 136–145)
WBC NRBC COR # BLD: 10.32 10*3/MM3 (ref 4.5–10.7)

## 2017-09-20 PROCEDURE — 25010000002 METOCLOPRAMIDE PER 10 MG: Performed by: SURGERY

## 2017-09-20 PROCEDURE — 84132 ASSAY OF SERUM POTASSIUM: CPT | Performed by: SURGERY

## 2017-09-20 PROCEDURE — 93010 ELECTROCARDIOGRAM REPORT: CPT | Performed by: INTERNAL MEDICINE

## 2017-09-20 PROCEDURE — 85025 COMPLETE CBC W/AUTO DIFF WBC: CPT | Performed by: SURGERY

## 2017-09-20 PROCEDURE — 25010000002 DIPHENHYDRAMINE PER 50 MG: Performed by: ANESTHESIOLOGY

## 2017-09-20 PROCEDURE — 25010000002 KETOROLAC TROMETHAMINE PER 15 MG: Performed by: SURGERY

## 2017-09-20 PROCEDURE — 80048 BASIC METABOLIC PNL TOTAL CA: CPT | Performed by: SURGERY

## 2017-09-20 PROCEDURE — 93005 ELECTROCARDIOGRAM TRACING: CPT | Performed by: INTERNAL MEDICINE

## 2017-09-20 PROCEDURE — 99232 SBSQ HOSP IP/OBS MODERATE 35: CPT | Performed by: INTERNAL MEDICINE

## 2017-09-20 PROCEDURE — 25010000002 AMIODARONE IN DEXTROSE 5% 360-4.14 MG/200ML-% SOLUTION: Performed by: INTERNAL MEDICINE

## 2017-09-20 PROCEDURE — 63710000001 DIPHENHYDRAMINE PER 50 MG: Performed by: SURGERY

## 2017-09-20 RX ORDER — DIPHENHYDRAMINE HCL 25 MG
25 CAPSULE ORAL EVERY 6 HOURS PRN
Status: DISCONTINUED | OUTPATIENT
Start: 2017-09-20 | End: 2017-09-22 | Stop reason: HOSPADM

## 2017-09-20 RX ADMIN — POTASSIUM CHLORIDE 40 MEQ: 750 CAPSULE, EXTENDED RELEASE ORAL at 09:15

## 2017-09-20 RX ADMIN — METOCLOPRAMIDE 10 MG: 5 INJECTION, SOLUTION INTRAMUSCULAR; INTRAVENOUS at 00:31

## 2017-09-20 RX ADMIN — KETOROLAC TROMETHAMINE 30 MG: 30 INJECTION, SOLUTION INTRAMUSCULAR at 23:16

## 2017-09-20 RX ADMIN — OXYCODONE HYDROCHLORIDE AND ACETAMINOPHEN 2 TABLET: 5; 325 TABLET ORAL at 19:45

## 2017-09-20 RX ADMIN — DIPHENHYDRAMINE HYDROCHLORIDE 25 MG: 25 CAPSULE ORAL at 23:16

## 2017-09-20 RX ADMIN — PANTOPRAZOLE SODIUM 40 MG: 40 TABLET, DELAYED RELEASE ORAL at 06:05

## 2017-09-20 RX ADMIN — OXYCODONE HYDROCHLORIDE AND ACETAMINOPHEN 2 TABLET: 5; 325 TABLET ORAL at 04:47

## 2017-09-20 RX ADMIN — METOCLOPRAMIDE 10 MG: 5 INJECTION, SOLUTION INTRAMUSCULAR; INTRAVENOUS at 06:05

## 2017-09-20 RX ADMIN — DIPHENHYDRAMINE HYDROCHLORIDE 12.5 MG: 50 INJECTION, SOLUTION INTRAMUSCULAR; INTRAVENOUS at 03:48

## 2017-09-20 RX ADMIN — OXYCODONE HYDROCHLORIDE AND ACETAMINOPHEN 2 TABLET: 5; 325 TABLET ORAL at 09:22

## 2017-09-20 RX ADMIN — POTASSIUM CHLORIDE 40 MEQ: 750 CAPSULE, EXTENDED RELEASE ORAL at 15:49

## 2017-09-20 RX ADMIN — OXYCODONE HYDROCHLORIDE AND ACETAMINOPHEN 2 TABLET: 5; 325 TABLET ORAL at 23:34

## 2017-09-20 RX ADMIN — METOPROLOL SUCCINATE 50 MG: 50 TABLET, FILM COATED, EXTENDED RELEASE ORAL at 09:15

## 2017-09-20 RX ADMIN — KETOROLAC TROMETHAMINE 30 MG: 30 INJECTION, SOLUTION INTRAMUSCULAR at 06:05

## 2017-09-20 RX ADMIN — AMIODARONE HYDROCHLORIDE 0.5 MG/MIN: 1.8 INJECTION, SOLUTION INTRAVENOUS at 03:45

## 2017-09-20 RX ADMIN — AMIODARONE HYDROCHLORIDE 0.5 MG/MIN: 1.8 INJECTION, SOLUTION INTRAVENOUS at 15:49

## 2017-09-20 RX ADMIN — OXYCODONE HYDROCHLORIDE AND ACETAMINOPHEN 2 TABLET: 5; 325 TABLET ORAL at 00:31

## 2017-09-20 RX ADMIN — KETOROLAC TROMETHAMINE 30 MG: 30 INJECTION, SOLUTION INTRAMUSCULAR at 15:49

## 2017-09-20 RX ADMIN — OXYCODONE HYDROCHLORIDE AND ACETAMINOPHEN 2 TABLET: 5; 325 TABLET ORAL at 14:07

## 2017-09-20 RX ADMIN — SODIUM CHLORIDE, POTASSIUM CHLORIDE, SODIUM LACTATE AND CALCIUM CHLORIDE 50 ML/HR: 600; 310; 30; 20 INJECTION, SOLUTION INTRAVENOUS at 12:33

## 2017-09-21 LAB
ANION GAP SERPL CALCULATED.3IONS-SCNC: 12.8 MMOL/L
BUN BLD-MCNC: 8 MG/DL (ref 8–23)
BUN/CREAT SERPL: 20.5 (ref 7–25)
CALCIUM SPEC-SCNC: 8.9 MG/DL (ref 8.6–10.5)
CHLORIDE SERPL-SCNC: 102 MMOL/L (ref 98–107)
CO2 SERPL-SCNC: 26.2 MMOL/L (ref 22–29)
CREAT BLD-MCNC: 0.39 MG/DL (ref 0.57–1)
GFR SERPL CREATININE-BSD FRML MDRD: >150 ML/MIN/1.73
GLUCOSE BLD-MCNC: 99 MG/DL (ref 65–99)
POTASSIUM BLD-SCNC: 4.4 MMOL/L (ref 3.5–5.2)
SODIUM BLD-SCNC: 141 MMOL/L (ref 136–145)

## 2017-09-21 PROCEDURE — 93010 ELECTROCARDIOGRAM REPORT: CPT | Performed by: INTERNAL MEDICINE

## 2017-09-21 PROCEDURE — 25010000002 KETOROLAC TROMETHAMINE PER 15 MG: Performed by: SURGERY

## 2017-09-21 PROCEDURE — 80048 BASIC METABOLIC PNL TOTAL CA: CPT | Performed by: INTERNAL MEDICINE

## 2017-09-21 PROCEDURE — 99232 SBSQ HOSP IP/OBS MODERATE 35: CPT | Performed by: INTERNAL MEDICINE

## 2017-09-21 PROCEDURE — 63710000001 DIPHENHYDRAMINE PER 50 MG: Performed by: SURGERY

## 2017-09-21 PROCEDURE — 25010000002 AMIODARONE IN DEXTROSE 5% 360-4.14 MG/200ML-% SOLUTION: Performed by: INTERNAL MEDICINE

## 2017-09-21 PROCEDURE — 93005 ELECTROCARDIOGRAM TRACING: CPT | Performed by: INTERNAL MEDICINE

## 2017-09-21 RX ORDER — BISACODYL 10 MG
10 SUPPOSITORY, RECTAL RECTAL ONCE
Status: COMPLETED | OUTPATIENT
Start: 2017-09-21 | End: 2017-09-21

## 2017-09-21 RX ORDER — AMIODARONE HYDROCHLORIDE 200 MG/1
400 TABLET ORAL EVERY 12 HOURS SCHEDULED
Status: DISCONTINUED | OUTPATIENT
Start: 2017-09-21 | End: 2017-09-22

## 2017-09-21 RX ADMIN — OXYCODONE HYDROCHLORIDE AND ACETAMINOPHEN 2 TABLET: 5; 325 TABLET ORAL at 09:02

## 2017-09-21 RX ADMIN — KETOROLAC TROMETHAMINE 30 MG: 30 INJECTION, SOLUTION INTRAMUSCULAR at 06:43

## 2017-09-21 RX ADMIN — AMIODARONE HYDROCHLORIDE 400 MG: 200 TABLET ORAL at 16:57

## 2017-09-21 RX ADMIN — OXYCODONE HYDROCHLORIDE AND ACETAMINOPHEN 2 TABLET: 5; 325 TABLET ORAL at 17:00

## 2017-09-21 RX ADMIN — DIPHENHYDRAMINE HYDROCHLORIDE 25 MG: 25 CAPSULE ORAL at 20:58

## 2017-09-21 RX ADMIN — OXYCODONE HYDROCHLORIDE AND ACETAMINOPHEN 1 TABLET: 5; 325 TABLET ORAL at 20:58

## 2017-09-21 RX ADMIN — AMIODARONE HYDROCHLORIDE 400 MG: 200 TABLET ORAL at 20:58

## 2017-09-21 RX ADMIN — OXYCODONE HYDROCHLORIDE AND ACETAMINOPHEN 2 TABLET: 5; 325 TABLET ORAL at 04:02

## 2017-09-21 RX ADMIN — OXYCODONE HYDROCHLORIDE AND ACETAMINOPHEN 2 TABLET: 5; 325 TABLET ORAL at 13:07

## 2017-09-21 RX ADMIN — PANTOPRAZOLE SODIUM 40 MG: 40 TABLET, DELAYED RELEASE ORAL at 05:21

## 2017-09-21 RX ADMIN — AMIODARONE HYDROCHLORIDE 0.5 MG/MIN: 1.8 INJECTION, SOLUTION INTRAVENOUS at 05:39

## 2017-09-21 RX ADMIN — METOPROLOL SUCCINATE 50 MG: 50 TABLET, FILM COATED, EXTENDED RELEASE ORAL at 09:03

## 2017-09-21 RX ADMIN — BISACODYL 10 MG: 10 SUPPOSITORY RECTAL at 14:21

## 2017-09-22 VITALS
HEART RATE: 61 BPM | BODY MASS INDEX: 32.05 KG/M2 | HEIGHT: 65 IN | RESPIRATION RATE: 18 BRPM | TEMPERATURE: 98.2 F | WEIGHT: 192.38 LBS | OXYGEN SATURATION: 92 % | DIASTOLIC BLOOD PRESSURE: 64 MMHG | SYSTOLIC BLOOD PRESSURE: 142 MMHG

## 2017-09-22 PROBLEM — Z93.3 COLOSTOMY PRESENT: Status: RESOLVED | Noted: 2017-08-28 | Resolved: 2017-09-22

## 2017-09-22 PROBLEM — Z93.3 COLOSTOMY PRESENT: Status: ACTIVE | Noted: 2017-08-28

## 2017-09-22 PROCEDURE — 99232 SBSQ HOSP IP/OBS MODERATE 35: CPT | Performed by: INTERNAL MEDICINE

## 2017-09-22 RX ORDER — AMIODARONE HYDROCHLORIDE 200 MG/1
200 TABLET ORAL
Qty: 30 TABLET | Refills: 0 | Status: SHIPPED | OUTPATIENT
Start: 2017-09-23 | End: 2017-10-23

## 2017-09-22 RX ORDER — OXYCODONE HYDROCHLORIDE AND ACETAMINOPHEN 5; 325 MG/1; MG/1
1-2 TABLET ORAL EVERY 4 HOURS PRN
Qty: 35 TABLET | Refills: 0 | Status: SHIPPED | OUTPATIENT
Start: 2017-09-22 | End: 2017-10-27

## 2017-09-22 RX ORDER — AMIODARONE HYDROCHLORIDE 200 MG/1
200 TABLET ORAL
Status: DISCONTINUED | OUTPATIENT
Start: 2017-09-23 | End: 2017-09-22 | Stop reason: HOSPADM

## 2017-09-22 RX ADMIN — OXYCODONE HYDROCHLORIDE AND ACETAMINOPHEN 2 TABLET: 5; 325 TABLET ORAL at 10:38

## 2017-09-22 RX ADMIN — OXYCODONE HYDROCHLORIDE AND ACETAMINOPHEN 2 TABLET: 5; 325 TABLET ORAL at 06:28

## 2017-09-22 RX ADMIN — OXYCODONE HYDROCHLORIDE AND ACETAMINOPHEN 2 TABLET: 5; 325 TABLET ORAL at 00:56

## 2017-09-22 RX ADMIN — PANTOPRAZOLE SODIUM 40 MG: 40 TABLET, DELAYED RELEASE ORAL at 06:28

## 2017-09-22 RX ADMIN — AMIODARONE HYDROCHLORIDE 200 MG: 200 TABLET ORAL at 09:14

## 2017-09-22 RX ADMIN — METOPROLOL SUCCINATE 50 MG: 50 TABLET, FILM COATED, EXTENDED RELEASE ORAL at 09:14

## 2017-09-22 RX ADMIN — OXYCODONE HYDROCHLORIDE AND ACETAMINOPHEN 2 TABLET: 5; 325 TABLET ORAL at 14:44

## 2017-10-23 ENCOUNTER — OFFICE VISIT (OUTPATIENT)
Dept: CARDIOLOGY | Facility: CLINIC | Age: 72
End: 2017-10-23

## 2017-10-23 VITALS
BODY MASS INDEX: 32.55 KG/M2 | HEIGHT: 65 IN | DIASTOLIC BLOOD PRESSURE: 78 MMHG | SYSTOLIC BLOOD PRESSURE: 150 MMHG | WEIGHT: 195.4 LBS | HEART RATE: 63 BPM

## 2017-10-23 DIAGNOSIS — I48.91 ATRIAL FIBRILLATION WITH RAPID VENTRICULAR RESPONSE (HCC): Primary | ICD-10-CM

## 2017-10-23 DIAGNOSIS — I10 ESSENTIAL HYPERTENSION: ICD-10-CM

## 2017-10-23 DIAGNOSIS — E66.09 CLASS 1 OBESITY DUE TO EXCESS CALORIES WITHOUT SERIOUS COMORBIDITY WITH BODY MASS INDEX (BMI) OF 30.0 TO 30.9 IN ADULT: ICD-10-CM

## 2017-10-23 PROBLEM — E66.811 CLASS 1 OBESITY DUE TO EXCESS CALORIES WITHOUT SERIOUS COMORBIDITY WITH BODY MASS INDEX (BMI) OF 30.0 TO 30.9 IN ADULT: Status: ACTIVE | Noted: 2017-10-23

## 2017-10-23 PROCEDURE — 99214 OFFICE O/P EST MOD 30 MIN: CPT | Performed by: INTERNAL MEDICINE

## 2017-10-23 PROCEDURE — 93000 ELECTROCARDIOGRAM COMPLETE: CPT | Performed by: INTERNAL MEDICINE

## 2017-10-23 NOTE — PROGRESS NOTES
Date of Office Visit: 10/23/2017  Encounter Provider: Rich Kearns MD  Place of Service: [facility] CARDIOLOGY  Patient Name: Kiley Last  :1945  [id number]    Chief Complaint   Patient presents with   • Atrial Fibrillation   :     HPI: Kiley Last is a 72 y.o. female  She is here for followup.  She was in the hospital and had atrial fibrillation around the time of a major belly surgery.  We had her on amiodarone for a little while.  She is following up and is doing better.  She is recovering.  She has lost a total of about 25 pounds and still has some more to go she says.  She is not having any PND, orthopnea, bleeding or palpitations.  In general, she is getting along pretty well.         Past Medical History:   Diagnosis Date   • Arthritis    • Asthma     NO INHALERS   • Colostomy in place    • Diverticular disease    • ESBL (extended spectrum beta-lactamase) producing bacteria infection    • History of abscess of skin and subcutaneous tissue     ABD WOUND 2017   • History of atrial fibrillation      X1 POST OP FROM COLOSTOMY 2017 - NO PROBLEMS SINCE   • Hypertension    • MDRO (multiple drug resistant organisms) resistance    • PONV (postoperative nausea and vomiting)    • Psoriasis    • Vertigo        Past Surgical History:   Procedure Laterality Date   • BLEPHAROPLASTY Bilateral 2017   • CHOLECYSTECTOMY     • COLON RESECTION N/A 5/3/2017    Procedure: OPEN SIMOID COLECTOMY WITH COLOSTOMY;  Surgeon: Renato Delgado MD;  Location: [facility] OR;  Service:    • COLONOSCOPY N/A 2017    Procedure: COLONOSCOPY VIA COLOSTOMY TO CECUM;  Surgeon: Renato Delgado MD;  Location: [facility] ENDOSCOPY;  Service:    • COLOSTOMY CLOSURE N/A 2017    Procedure: COLOSTOMY TAKEDOWN AND CLOSURE, WITH RESECTION;  Surgeon: Renato Delgado MD;  Location: [facility] OR;  Service:    • FINGER SURGERY      4TH FINGER LEFT HAND   • HYSTERECTOMY     • PELVIC FLOOR  REPAIR     • TONSILLECTOMY         Social History     Social History   • Marital status:      Spouse name: N/A   • Number of children: N/A   • Years of education: N/A     Occupational History   • Not on file.     Social History Main Topics   • Smoking status: Former Smoker     Types: Cigarettes     Quit date: 1967   • Smokeless tobacco: Not on file      Comment: SOCIAL SMOKING ONLY   • Alcohol use Yes      Comment: occasionally   • Drug use: No   • Sexual activity: Defer     Other Topics Concern   • Not on file     Social History Narrative       Family History   Problem Relation Age of Onset   • Cancer Mother    • Colon cancer Mother    • Diabetes Father    • Diabetes Son    • Malig Hyperthermia Neg Hx        Review of Systems   Constitution: Negative for decreased appetite, fever, malaise/fatigue and weight loss.   HENT: Negative for nosebleeds.    Eyes: Negative for double vision.   Cardiovascular: Negative for chest pain, claudication, cyanosis, dyspnea on exertion, irregular heartbeat, leg swelling, near-syncope, orthopnea, palpitations, paroxysmal nocturnal dyspnea and syncope.   Respiratory: Negative for cough, hemoptysis and shortness of breath.    Hematologic/Lymphatic: Negative for bleeding problem.   Skin: Negative for rash.   Musculoskeletal: Negative for falls and myalgias.   Gastrointestinal: Negative for hematochezia, jaundice, melena, nausea and vomiting.   Genitourinary: Negative for hematuria.   Neurological: Negative for dizziness and seizures.   Psychiatric/Behavioral: Negative for altered mental status and memory loss.       Allergies   Allergen Reactions   • Penicillins Other (See Comments)     BLISTERS IN MOUTH    Patient tolerated ceftriaxone during 5/2017 admission.            Current Outpatient Prescriptions:   •  Acetaminophen (TYLENOL ARTHRITIS PAIN PO), Take 650 mg by mouth 3 (Three) Times a Day As Needed., Disp: , Rfl:   •  aspirin 81 MG tablet, Take 81 mg by mouth Daily. HELD FOR  "OR, Disp: , Rfl:   •  BIOTIN 5000 PO, Take 5,000 mcg by mouth Daily., Disp: , Rfl:   •  ferrous sulfate 325 (65 FE) MG tablet, Take 325 mg by mouth Daily With Breakfast. HELD FOR OR, Disp: , Rfl:   •  meclizine (ANTIVERT) 25 MG tablet, Take 25 mg by mouth 3 (Three) Times a Day As Needed for dizziness., Disp: , Rfl:   •  metoprolol succinate XL (TOPROL-XL) 25 MG 24 hr tablet, Take 25 mg by mouth Daily., Disp: , Rfl:   •  Multiple Vitamin (MULTI VITAMIN DAILY PO), Take  by mouth., Disp: , Rfl:   •  oxyCODONE-acetaminophen (PERCOCET) 5-325 MG per tablet, Take 1-2 tablets by mouth Every 4 (Four) Hours As Needed for Moderate Pain  or Severe Pain  for up to 35 days., Disp: 35 tablet, Rfl: 0  •  pantoprazole (PROTONIX) 20 MG EC tablet, Take 20 mg by mouth Daily., Disp: , Rfl:   •  Probiotic Product (PROBIOTIC DAILY PO), Take 1 tablet by mouth Daily. HELD FOR OR, Disp: , Rfl:      Objective:     Vitals:    10/23/17 1440   BP: 150/78   Pulse: 63   Weight: 195 lb 6.4 oz (88.6 kg)   Height: 65\" (165.1 cm)     Body mass index is 32.52 kg/(m^2).    Physical Exam   Constitutional: She is oriented to person, place, and time. She appears well-developed and well-nourished.   HENT:   Head: Normocephalic.   Eyes: No scleral icterus.   Neck: No JVD present. No thyromegaly present.   Cardiovascular: Normal rate, regular rhythm and normal heart sounds.  Exam reveals no gallop and no friction rub.    No murmur heard.  Pulmonary/Chest: Effort normal and breath sounds normal. She has no wheezes. She has no rales.   Abdominal: Soft. There is no hepatosplenomegaly. There is no tenderness.   Musculoskeletal: Normal range of motion. She exhibits no edema.   Lymphadenopathy:     She has no cervical adenopathy.   Neurological: She is alert and oriented to person, place, and time.   Skin: Skin is warm and dry. No rash noted.   Psychiatric: She has a normal mood and affect.         ECG 12 Lead  Date/Time: 10/23/2017 3:32 PM  Performed by: JAYESH, " ESTHER  Authorized by: ESTHER KEARNS   Comparison: compared with previous ECG   Similar to previous ECG  Rhythm: sinus rhythm  Clinical impression: normal ECG             Assessment:       Diagnosis Plan   1. Atrial fibrillation with rapid ventricular response     2. Essential hypertension     3. Class 1 obesity due to excess calories without serious comorbidity with body mass index (BMI) of 30.0 to 30.9 in adult            Plan:       She is doing quite well.  I am not going to keep her on amiodarone.  I am going to have her come back and see us in a year.  She did have a little bit of a mild resting mid ventricular gradient on her echo so I would like a little bit of beta blockade for her, and I am going to keep her on that.  I am not going to anticoagulate her now.  I think obviously if she does this in the setting outside of abdominal surgery, we will have to think about anticoagulation.  As mentioned, we will have her come back and see us in a year.         As always, it has been a pleasure to participate in your patient's care.      Sincerely,       Esther Kearns MD

## 2017-12-27 ENCOUNTER — TRANSCRIBE ORDERS (OUTPATIENT)
Dept: PHYSICAL THERAPY | Facility: HOSPITAL | Age: 72
End: 2017-12-27

## 2017-12-27 DIAGNOSIS — M54.5 LOW BACK PAIN, UNSPECIFIED BACK PAIN LATERALITY, UNSPECIFIED CHRONICITY, WITH SCIATICA PRESENCE UNSPECIFIED: Primary | ICD-10-CM

## 2018-01-18 ENCOUNTER — APPOINTMENT (OUTPATIENT)
Dept: PHYSICAL THERAPY | Facility: HOSPITAL | Age: 73
End: 2018-01-18
Attending: INTERNAL MEDICINE

## 2018-01-26 ENCOUNTER — HOSPITAL ENCOUNTER (INPATIENT)
Facility: HOSPITAL | Age: 73
LOS: 3 days | Discharge: HOME | End: 2018-01-29
Attending: EMERGENCY MEDICINE | Admitting: SURGERY

## 2018-01-26 ENCOUNTER — APPOINTMENT (OUTPATIENT)
Dept: CT IMAGING | Facility: HOSPITAL | Age: 73
End: 2018-01-26

## 2018-01-26 DIAGNOSIS — K56.609 SMALL BOWEL OBSTRUCTION (HCC): Primary | ICD-10-CM

## 2018-01-26 LAB
ALBUMIN SERPL-MCNC: 4.4 G/DL (ref 3.5–5.2)
ALBUMIN/GLOB SERPL: 1.1 G/DL
ALP SERPL-CCNC: 80 U/L (ref 39–117)
ALT SERPL W P-5'-P-CCNC: 22 U/L (ref 1–33)
ANION GAP SERPL CALCULATED.3IONS-SCNC: 9.6 MMOL/L
AST SERPL-CCNC: 22 U/L (ref 1–32)
BACTERIA UR QL AUTO: NORMAL /HPF
BASOPHILS # BLD AUTO: 0.02 10*3/MM3 (ref 0–0.2)
BASOPHILS NFR BLD AUTO: 0.2 % (ref 0–1.5)
BILIRUB SERPL-MCNC: 0.4 MG/DL (ref 0.1–1.2)
BILIRUB UR QL STRIP: NEGATIVE
BUN BLD-MCNC: 13 MG/DL (ref 8–23)
BUN/CREAT SERPL: 19.4 (ref 7–25)
CALCIUM SPEC-SCNC: 10.3 MG/DL (ref 8.6–10.5)
CHLORIDE SERPL-SCNC: 96 MMOL/L (ref 98–107)
CLARITY UR: CLEAR
CO2 SERPL-SCNC: 31.4 MMOL/L (ref 22–29)
COLOR UR: YELLOW
CREAT BLD-MCNC: 0.67 MG/DL (ref 0.57–1)
DEPRECATED RDW RBC AUTO: 42.5 FL (ref 37–54)
EOSINOPHIL # BLD AUTO: 0.15 10*3/MM3 (ref 0–0.7)
EOSINOPHIL NFR BLD AUTO: 1.3 % (ref 0.3–6.2)
ERYTHROCYTE [DISTWIDTH] IN BLOOD BY AUTOMATED COUNT: 12.9 % (ref 11.7–13)
GFR SERPL CREATININE-BSD FRML MDRD: 86 ML/MIN/1.73
GLOBULIN UR ELPH-MCNC: 4 GM/DL
GLUCOSE BLD-MCNC: 122 MG/DL (ref 65–99)
GLUCOSE UR STRIP-MCNC: NEGATIVE MG/DL
HCT VFR BLD AUTO: 41.1 % (ref 35.6–45.5)
HGB BLD-MCNC: 13.3 G/DL (ref 11.9–15.5)
HGB UR QL STRIP.AUTO: NEGATIVE
HOLD SPECIMEN: NORMAL
HOLD SPECIMEN: NORMAL
HYALINE CASTS UR QL AUTO: NORMAL /LPF
IMM GRANULOCYTES # BLD: 0.03 10*3/MM3 (ref 0–0.03)
IMM GRANULOCYTES NFR BLD: 0.3 % (ref 0–0.5)
KETONES UR QL STRIP: ABNORMAL
LEUKOCYTE ESTERASE UR QL STRIP.AUTO: NEGATIVE
LIPASE SERPL-CCNC: 24 U/L (ref 13–60)
LYMPHOCYTES # BLD AUTO: 1.67 10*3/MM3 (ref 0.9–4.8)
LYMPHOCYTES NFR BLD AUTO: 14 % (ref 19.6–45.3)
MCH RBC QN AUTO: 29.3 PG (ref 26.9–32)
MCHC RBC AUTO-ENTMCNC: 32.4 G/DL (ref 32.4–36.3)
MCV RBC AUTO: 90.5 FL (ref 80.5–98.2)
MONOCYTES # BLD AUTO: 1 10*3/MM3 (ref 0.2–1.2)
MONOCYTES NFR BLD AUTO: 8.4 % (ref 5–12)
NEUTROPHILS # BLD AUTO: 9.07 10*3/MM3 (ref 1.9–8.1)
NEUTROPHILS NFR BLD AUTO: 75.8 % (ref 42.7–76)
NITRITE UR QL STRIP: NEGATIVE
PH UR STRIP.AUTO: 5.5 [PH] (ref 5–8)
PLATELET # BLD AUTO: 242 10*3/MM3 (ref 140–500)
PMV BLD AUTO: 12.5 FL (ref 6–12)
POTASSIUM BLD-SCNC: 4.1 MMOL/L (ref 3.5–5.2)
PROT SERPL-MCNC: 8.4 G/DL (ref 6–8.5)
PROT UR QL STRIP: ABNORMAL
RBC # BLD AUTO: 4.54 10*6/MM3 (ref 3.9–5.2)
RBC # UR: NORMAL /HPF
REF LAB TEST METHOD: NORMAL
SODIUM BLD-SCNC: 137 MMOL/L (ref 136–145)
SP GR UR STRIP: 1.02 (ref 1–1.03)
SQUAMOUS #/AREA URNS HPF: NORMAL /HPF
UROBILINOGEN UR QL STRIP: ABNORMAL
WBC NRBC COR # BLD: 11.94 10*3/MM3 (ref 4.5–10.7)
WBC UR QL AUTO: NORMAL /HPF
WHOLE BLOOD HOLD SPECIMEN: NORMAL
WHOLE BLOOD HOLD SPECIMEN: NORMAL

## 2018-01-26 PROCEDURE — 25010000002 ONDANSETRON PER 1 MG: Performed by: EMERGENCY MEDICINE

## 2018-01-26 PROCEDURE — 85025 COMPLETE CBC W/AUTO DIFF WBC: CPT | Performed by: EMERGENCY MEDICINE

## 2018-01-26 PROCEDURE — 36415 COLL VENOUS BLD VENIPUNCTURE: CPT | Performed by: EMERGENCY MEDICINE

## 2018-01-26 PROCEDURE — 0 IOPAMIDOL 61 % SOLUTION: Performed by: EMERGENCY MEDICINE

## 2018-01-26 PROCEDURE — 74177 CT ABD & PELVIS W/CONTRAST: CPT

## 2018-01-26 PROCEDURE — 83690 ASSAY OF LIPASE: CPT | Performed by: EMERGENCY MEDICINE

## 2018-01-26 PROCEDURE — 80053 COMPREHEN METABOLIC PANEL: CPT | Performed by: EMERGENCY MEDICINE

## 2018-01-26 PROCEDURE — 81001 URINALYSIS AUTO W/SCOPE: CPT | Performed by: EMERGENCY MEDICINE

## 2018-01-26 PROCEDURE — 99285 EMERGENCY DEPT VISIT HI MDM: CPT

## 2018-01-26 RX ORDER — SODIUM CHLORIDE, SODIUM LACTATE, POTASSIUM CHLORIDE, CALCIUM CHLORIDE 600; 310; 30; 20 MG/100ML; MG/100ML; MG/100ML; MG/100ML
75 INJECTION, SOLUTION INTRAVENOUS CONTINUOUS
Status: DISCONTINUED | OUTPATIENT
Start: 2018-01-26 | End: 2018-01-29

## 2018-01-26 RX ORDER — ONDANSETRON 2 MG/ML
4 INJECTION INTRAMUSCULAR; INTRAVENOUS ONCE
Status: COMPLETED | OUTPATIENT
Start: 2018-01-26 | End: 2018-01-26

## 2018-01-26 RX ORDER — HYDROMORPHONE HYDROCHLORIDE 1 MG/ML
0.5 INJECTION, SOLUTION INTRAMUSCULAR; INTRAVENOUS; SUBCUTANEOUS ONCE
Status: COMPLETED | OUTPATIENT
Start: 2018-01-26 | End: 2018-01-26

## 2018-01-26 RX ORDER — ACETAMINOPHEN 325 MG/1
650 TABLET ORAL EVERY 4 HOURS PRN
Status: DISCONTINUED | OUTPATIENT
Start: 2018-01-26 | End: 2018-01-29 | Stop reason: HOSPADM

## 2018-01-26 RX ORDER — ONDANSETRON 4 MG/1
4 TABLET, FILM COATED ORAL EVERY 6 HOURS PRN
Status: DISCONTINUED | OUTPATIENT
Start: 2018-01-26 | End: 2018-01-29 | Stop reason: HOSPADM

## 2018-01-26 RX ORDER — ONDANSETRON 2 MG/ML
4 INJECTION INTRAMUSCULAR; INTRAVENOUS EVERY 6 HOURS PRN
Status: DISCONTINUED | OUTPATIENT
Start: 2018-01-26 | End: 2018-01-29 | Stop reason: HOSPADM

## 2018-01-26 RX ORDER — SODIUM CHLORIDE 9 MG/ML
125 INJECTION, SOLUTION INTRAVENOUS CONTINUOUS
Status: DISCONTINUED | OUTPATIENT
Start: 2018-01-26 | End: 2018-01-27

## 2018-01-26 RX ORDER — NALOXONE HCL 0.4 MG/ML
0.4 VIAL (ML) INJECTION
Status: DISCONTINUED | OUTPATIENT
Start: 2018-01-26 | End: 2018-01-28

## 2018-01-26 RX ORDER — BISACODYL 10 MG
10 SUPPOSITORY, RECTAL RECTAL DAILY
Status: DISCONTINUED | OUTPATIENT
Start: 2018-01-27 | End: 2018-01-29 | Stop reason: HOSPADM

## 2018-01-26 RX ORDER — SODIUM CHLORIDE 0.9 % (FLUSH) 0.9 %
1-10 SYRINGE (ML) INJECTION AS NEEDED
Status: DISCONTINUED | OUTPATIENT
Start: 2018-01-26 | End: 2018-01-29 | Stop reason: HOSPADM

## 2018-01-26 RX ORDER — METOPROLOL SUCCINATE 25 MG/1
25 TABLET, EXTENDED RELEASE ORAL DAILY
Status: DISCONTINUED | OUTPATIENT
Start: 2018-01-27 | End: 2018-01-29 | Stop reason: HOSPADM

## 2018-01-26 RX ORDER — HYDROMORPHONE HYDROCHLORIDE 1 MG/ML
0.5 INJECTION, SOLUTION INTRAMUSCULAR; INTRAVENOUS; SUBCUTANEOUS
Status: DISCONTINUED | OUTPATIENT
Start: 2018-01-26 | End: 2018-01-28

## 2018-01-26 RX ORDER — SODIUM CHLORIDE 0.9 % (FLUSH) 0.9 %
10 SYRINGE (ML) INJECTION AS NEEDED
Status: DISCONTINUED | OUTPATIENT
Start: 2018-01-26 | End: 2018-01-29 | Stop reason: HOSPADM

## 2018-01-26 RX ORDER — ONDANSETRON 4 MG/1
4 TABLET, ORALLY DISINTEGRATING ORAL EVERY 6 HOURS PRN
Status: DISCONTINUED | OUTPATIENT
Start: 2018-01-26 | End: 2018-01-29 | Stop reason: HOSPADM

## 2018-01-26 RX ORDER — MECLIZINE HYDROCHLORIDE 25 MG/1
25 TABLET ORAL 3 TIMES DAILY PRN
Status: DISCONTINUED | OUTPATIENT
Start: 2018-01-26 | End: 2018-01-29 | Stop reason: HOSPADM

## 2018-01-26 RX ADMIN — ONDANSETRON 4 MG: 2 INJECTION INTRAMUSCULAR; INTRAVENOUS at 17:04

## 2018-01-26 RX ADMIN — IOPAMIDOL 85 ML: 612 INJECTION, SOLUTION INTRAVENOUS at 17:35

## 2018-01-26 RX ADMIN — SODIUM CHLORIDE 1000 ML: 9 INJECTION, SOLUTION INTRAVENOUS at 16:56

## 2018-01-26 RX ADMIN — HYDROMORPHONE HYDROCHLORIDE 0.5 MG: 10 INJECTION INTRAMUSCULAR; INTRAVENOUS; SUBCUTANEOUS at 22:38

## 2018-01-26 RX ADMIN — HYDROMORPHONE HYDROCHLORIDE 0.5 MG: 10 INJECTION INTRAMUSCULAR; INTRAVENOUS; SUBCUTANEOUS at 19:47

## 2018-01-26 RX ADMIN — SODIUM CHLORIDE, POTASSIUM CHLORIDE, SODIUM LACTATE AND CALCIUM CHLORIDE 150 ML/HR: 600; 310; 30; 20 INJECTION, SOLUTION INTRAVENOUS at 23:38

## 2018-01-26 RX ADMIN — SODIUM CHLORIDE 125 ML/HR: 9 INJECTION, SOLUTION INTRAVENOUS at 19:14

## 2018-01-26 RX ADMIN — FAMOTIDINE 20 MG: 10 INJECTION INTRAVENOUS at 23:37

## 2018-01-26 RX ADMIN — HYDROMORPHONE HYDROCHLORIDE 0.5 MG: 10 INJECTION INTRAMUSCULAR; INTRAVENOUS; SUBCUTANEOUS at 17:07

## 2018-01-27 ENCOUNTER — APPOINTMENT (OUTPATIENT)
Dept: GENERAL RADIOLOGY | Facility: HOSPITAL | Age: 73
End: 2018-01-27

## 2018-01-27 LAB
ANION GAP SERPL CALCULATED.3IONS-SCNC: 15.1 MMOL/L
BASOPHILS # BLD AUTO: 0.03 10*3/MM3 (ref 0–0.2)
BASOPHILS NFR BLD AUTO: 0.3 % (ref 0–1.5)
BUN BLD-MCNC: 12 MG/DL (ref 8–23)
BUN/CREAT SERPL: 24.5 (ref 7–25)
CALCIUM SPEC-SCNC: 9.1 MG/DL (ref 8.6–10.5)
CHLORIDE SERPL-SCNC: 103 MMOL/L (ref 98–107)
CO2 SERPL-SCNC: 23.9 MMOL/L (ref 22–29)
CREAT BLD-MCNC: 0.49 MG/DL (ref 0.57–1)
DEPRECATED RDW RBC AUTO: 42.7 FL (ref 37–54)
EOSINOPHIL # BLD AUTO: 0.19 10*3/MM3 (ref 0–0.7)
EOSINOPHIL NFR BLD AUTO: 2.2 % (ref 0.3–6.2)
ERYTHROCYTE [DISTWIDTH] IN BLOOD BY AUTOMATED COUNT: 12.7 % (ref 11.7–13)
GFR SERPL CREATININE-BSD FRML MDRD: 124 ML/MIN/1.73
GLUCOSE BLD-MCNC: 102 MG/DL (ref 65–99)
HCT VFR BLD AUTO: 36.8 % (ref 35.6–45.5)
HGB BLD-MCNC: 11.3 G/DL (ref 11.9–15.5)
IMM GRANULOCYTES # BLD: 0.04 10*3/MM3 (ref 0–0.03)
IMM GRANULOCYTES NFR BLD: 0.5 % (ref 0–0.5)
LYMPHOCYTES # BLD AUTO: 2.09 10*3/MM3 (ref 0.9–4.8)
LYMPHOCYTES NFR BLD AUTO: 23.9 % (ref 19.6–45.3)
MCH RBC QN AUTO: 28.3 PG (ref 26.9–32)
MCHC RBC AUTO-ENTMCNC: 30.7 G/DL (ref 32.4–36.3)
MCV RBC AUTO: 92 FL (ref 80.5–98.2)
MONOCYTES # BLD AUTO: 0.95 10*3/MM3 (ref 0.2–1.2)
MONOCYTES NFR BLD AUTO: 10.9 % (ref 5–12)
NEUTROPHILS # BLD AUTO: 5.43 10*3/MM3 (ref 1.9–8.1)
NEUTROPHILS NFR BLD AUTO: 62.2 % (ref 42.7–76)
PLATELET # BLD AUTO: 211 10*3/MM3 (ref 140–500)
PMV BLD AUTO: 12.2 FL (ref 6–12)
POTASSIUM BLD-SCNC: 3.7 MMOL/L (ref 3.5–5.2)
RBC # BLD AUTO: 4 10*6/MM3 (ref 3.9–5.2)
SODIUM BLD-SCNC: 142 MMOL/L (ref 136–145)
WBC NRBC COR # BLD: 8.73 10*3/MM3 (ref 4.5–10.7)

## 2018-01-27 PROCEDURE — 74019 RADEX ABDOMEN 2 VIEWS: CPT

## 2018-01-27 PROCEDURE — 85025 COMPLETE CBC W/AUTO DIFF WBC: CPT | Performed by: SURGERY

## 2018-01-27 PROCEDURE — 25010000002 ONDANSETRON PER 1 MG: Performed by: SURGERY

## 2018-01-27 PROCEDURE — 80048 BASIC METABOLIC PNL TOTAL CA: CPT | Performed by: SURGERY

## 2018-01-27 RX ADMIN — ONDANSETRON 4 MG: 2 INJECTION INTRAMUSCULAR; INTRAVENOUS at 14:51

## 2018-01-27 RX ADMIN — METOPROLOL SUCCINATE 25 MG: 25 TABLET, FILM COATED, EXTENDED RELEASE ORAL at 08:49

## 2018-01-27 RX ADMIN — SODIUM CHLORIDE, POTASSIUM CHLORIDE, SODIUM LACTATE AND CALCIUM CHLORIDE 125 ML/HR: 600; 310; 30; 20 INJECTION, SOLUTION INTRAVENOUS at 17:40

## 2018-01-27 RX ADMIN — BISACODYL 10 MG: 10 SUPPOSITORY RECTAL at 08:49

## 2018-01-27 RX ADMIN — FAMOTIDINE 20 MG: 10 INJECTION INTRAVENOUS at 08:49

## 2018-01-27 RX ADMIN — FAMOTIDINE 20 MG: 10 INJECTION INTRAVENOUS at 22:09

## 2018-01-27 RX ADMIN — HYDROMORPHONE HYDROCHLORIDE 0.5 MG: 10 INJECTION INTRAMUSCULAR; INTRAVENOUS; SUBCUTANEOUS at 22:09

## 2018-01-27 RX ADMIN — HYDROMORPHONE HYDROCHLORIDE 0.5 MG: 10 INJECTION INTRAMUSCULAR; INTRAVENOUS; SUBCUTANEOUS at 14:50

## 2018-01-28 ENCOUNTER — APPOINTMENT (OUTPATIENT)
Dept: GENERAL RADIOLOGY | Facility: HOSPITAL | Age: 73
End: 2018-01-28

## 2018-01-28 LAB
ANION GAP SERPL CALCULATED.3IONS-SCNC: 10.2 MMOL/L
BASOPHILS # BLD AUTO: 0.01 10*3/MM3 (ref 0–0.2)
BASOPHILS NFR BLD AUTO: 0.2 % (ref 0–1.5)
BUN BLD-MCNC: 7 MG/DL (ref 8–23)
BUN/CREAT SERPL: 13.5 (ref 7–25)
CALCIUM SPEC-SCNC: 8.8 MG/DL (ref 8.6–10.5)
CHLORIDE SERPL-SCNC: 102 MMOL/L (ref 98–107)
CO2 SERPL-SCNC: 28.8 MMOL/L (ref 22–29)
CREAT BLD-MCNC: 0.52 MG/DL (ref 0.57–1)
DEPRECATED RDW RBC AUTO: 43.5 FL (ref 37–54)
EOSINOPHIL # BLD AUTO: 0.25 10*3/MM3 (ref 0–0.7)
EOSINOPHIL NFR BLD AUTO: 4.2 % (ref 0.3–6.2)
ERYTHROCYTE [DISTWIDTH] IN BLOOD BY AUTOMATED COUNT: 12.9 % (ref 11.7–13)
GFR SERPL CREATININE-BSD FRML MDRD: 116 ML/MIN/1.73
GLUCOSE BLD-MCNC: 90 MG/DL (ref 65–99)
HCT VFR BLD AUTO: 36.4 % (ref 35.6–45.5)
HGB BLD-MCNC: 11.3 G/DL (ref 11.9–15.5)
IMM GRANULOCYTES # BLD: 0.03 10*3/MM3 (ref 0–0.03)
IMM GRANULOCYTES NFR BLD: 0.5 % (ref 0–0.5)
LYMPHOCYTES # BLD AUTO: 1.92 10*3/MM3 (ref 0.9–4.8)
LYMPHOCYTES NFR BLD AUTO: 32.1 % (ref 19.6–45.3)
MCH RBC QN AUTO: 28.9 PG (ref 26.9–32)
MCHC RBC AUTO-ENTMCNC: 31 G/DL (ref 32.4–36.3)
MCV RBC AUTO: 93.1 FL (ref 80.5–98.2)
MONOCYTES # BLD AUTO: 0.69 10*3/MM3 (ref 0.2–1.2)
MONOCYTES NFR BLD AUTO: 11.5 % (ref 5–12)
NEUTROPHILS # BLD AUTO: 3.09 10*3/MM3 (ref 1.9–8.1)
NEUTROPHILS NFR BLD AUTO: 51.5 % (ref 42.7–76)
PLATELET # BLD AUTO: 200 10*3/MM3 (ref 140–500)
PMV BLD AUTO: 12.3 FL (ref 6–12)
POTASSIUM BLD-SCNC: 3.6 MMOL/L (ref 3.5–5.2)
RBC # BLD AUTO: 3.91 10*6/MM3 (ref 3.9–5.2)
SODIUM BLD-SCNC: 141 MMOL/L (ref 136–145)
WBC NRBC COR # BLD: 5.99 10*3/MM3 (ref 4.5–10.7)

## 2018-01-28 PROCEDURE — 85025 COMPLETE CBC W/AUTO DIFF WBC: CPT | Performed by: SURGERY

## 2018-01-28 PROCEDURE — 80048 BASIC METABOLIC PNL TOTAL CA: CPT | Performed by: SURGERY

## 2018-01-28 PROCEDURE — 74019 RADEX ABDOMEN 2 VIEWS: CPT

## 2018-01-28 RX ORDER — PANTOPRAZOLE SODIUM 40 MG/1
40 TABLET, DELAYED RELEASE ORAL
Status: DISCONTINUED | OUTPATIENT
Start: 2018-01-28 | End: 2018-01-29 | Stop reason: HOSPADM

## 2018-01-28 RX ORDER — NALOXONE HCL 0.4 MG/ML
0.4 VIAL (ML) INJECTION
Status: DISCONTINUED | OUTPATIENT
Start: 2018-01-28 | End: 2018-01-29

## 2018-01-28 RX ORDER — HYDROMORPHONE HYDROCHLORIDE 1 MG/ML
0.5 INJECTION, SOLUTION INTRAMUSCULAR; INTRAVENOUS; SUBCUTANEOUS EVERY 4 HOURS PRN
Status: DISCONTINUED | OUTPATIENT
Start: 2018-01-28 | End: 2018-01-29

## 2018-01-28 RX ORDER — HYDROCODONE BITARTRATE AND ACETAMINOPHEN 7.5; 325 MG/1; MG/1
1 TABLET ORAL EVERY 4 HOURS PRN
Status: DISCONTINUED | OUTPATIENT
Start: 2018-01-28 | End: 2018-01-29 | Stop reason: HOSPADM

## 2018-01-28 RX ADMIN — METOPROLOL SUCCINATE 25 MG: 25 TABLET, FILM COATED, EXTENDED RELEASE ORAL at 08:31

## 2018-01-28 RX ADMIN — FAMOTIDINE 20 MG: 10 INJECTION INTRAVENOUS at 08:31

## 2018-01-28 RX ADMIN — PANTOPRAZOLE SODIUM 40 MG: 40 TABLET, DELAYED RELEASE ORAL at 10:42

## 2018-01-28 RX ADMIN — BISACODYL 10 MG: 10 SUPPOSITORY RECTAL at 08:31

## 2018-01-28 RX ADMIN — ONDANSETRON 4 MG: 4 TABLET, FILM COATED ORAL at 23:14

## 2018-01-28 RX ADMIN — SODIUM CHLORIDE, POTASSIUM CHLORIDE, SODIUM LACTATE AND CALCIUM CHLORIDE 75 ML/HR: 600; 310; 30; 20 INJECTION, SOLUTION INTRAVENOUS at 18:43

## 2018-01-28 RX ADMIN — SODIUM CHLORIDE, POTASSIUM CHLORIDE, SODIUM LACTATE AND CALCIUM CHLORIDE 125 ML/HR: 600; 310; 30; 20 INJECTION, SOLUTION INTRAVENOUS at 08:46

## 2018-01-28 RX ADMIN — HYDROCODONE BITARTRATE AND ACETAMINOPHEN 1 TABLET: 7.5; 325 TABLET ORAL at 23:14

## 2018-01-29 VITALS
BODY MASS INDEX: 32.32 KG/M2 | HEART RATE: 57 BPM | HEIGHT: 65 IN | TEMPERATURE: 98.1 F | OXYGEN SATURATION: 95 % | DIASTOLIC BLOOD PRESSURE: 72 MMHG | WEIGHT: 194 LBS | SYSTOLIC BLOOD PRESSURE: 123 MMHG | RESPIRATION RATE: 16 BRPM

## 2018-01-29 PROBLEM — K56.609 BOWEL OBSTRUCTION (HCC): Status: RESOLVED | Noted: 2018-01-26 | Resolved: 2018-01-29

## 2018-01-29 PROBLEM — K56.609 SMALL BOWEL OBSTRUCTION: Status: RESOLVED | Noted: 2018-01-26 | Resolved: 2018-01-29

## 2018-01-29 RX ADMIN — PANTOPRAZOLE SODIUM 40 MG: 40 TABLET, DELAYED RELEASE ORAL at 06:50

## 2018-01-29 RX ADMIN — HYDROCODONE BITARTRATE AND ACETAMINOPHEN 1 TABLET: 7.5; 325 TABLET ORAL at 06:50

## 2018-01-29 RX ADMIN — SODIUM CHLORIDE, POTASSIUM CHLORIDE, SODIUM LACTATE AND CALCIUM CHLORIDE 75 ML/HR: 600; 310; 30; 20 INJECTION, SOLUTION INTRAVENOUS at 06:44

## 2018-02-19 ENCOUNTER — OFFICE VISIT (OUTPATIENT)
Dept: GASTROENTEROLOGY | Facility: CLINIC | Age: 73
End: 2018-02-19

## 2018-02-19 VITALS
HEIGHT: 65 IN | DIASTOLIC BLOOD PRESSURE: 80 MMHG | WEIGHT: 187.85 LBS | BODY MASS INDEX: 31.3 KG/M2 | SYSTOLIC BLOOD PRESSURE: 138 MMHG | TEMPERATURE: 98.2 F

## 2018-02-19 DIAGNOSIS — K57.30 DIVERTICULOSIS OF LARGE INTESTINE WITHOUT HEMORRHAGE: Primary | ICD-10-CM

## 2018-02-19 PROCEDURE — 99204 OFFICE O/P NEW MOD 45 MIN: CPT | Performed by: INTERNAL MEDICINE

## 2018-02-19 RX ORDER — SPIRONOLACTONE 50 MG/1
TABLET, FILM COATED ORAL
COMMUNITY
Start: 2018-01-31 | End: 2018-08-09

## 2018-02-19 NOTE — PROGRESS NOTES
Chief Complaint   Patient presents with   • Establish Care     after having surgery for blockage        Kiley Last is a 72 y.o. female who presents with Recent small bowel obstruction and has many questions    HPI Comments: 72-year-old female that had acute diverticulitis requiring partial colectomy 9 months ago.  She had colostomy for 4 months.  She had a reversal of the colostomy in September 2017.  At that time she also to full colonoscopy that was normal.  3 weeks ago she was admitted to the hospital with a small bowel obstruction that resolved with medical management.  She is here to discuss small bowel obstruction, diverticulitis and GERD.    Her GERD is well controlled with Protonix, she does not believe she needs to take the Protonix anymore.  No previous EGD      Past Medical History:   Diagnosis Date   • Arthritis    • Asthma     NO INHALERS   • Colostomy in place    • Diverticular disease    • ESBL (extended spectrum beta-lactamase) producing bacteria infection    • History of abscess of skin and subcutaneous tissue     ABD WOUND 5/2017   • History of atrial fibrillation      X1 POST OP FROM COLOSTOMY 5/2017 - NO PROBLEMS SINCE   • Hypertension    • MDRO (multiple drug resistant organisms) resistance    • PONV (postoperative nausea and vomiting)    • Psoriasis    • Vertigo        Past Surgical History:   Procedure Laterality Date   • BLEPHAROPLASTY Bilateral 04/27/2017   • CHOLECYSTECTOMY     • COLON RESECTION N/A 5/3/2017    Procedure: OPEN SIMOID COLECTOMY WITH COLOSTOMY;  Surgeon: Renato Delgado MD;  Location: [facility] OR;  Service:    • COLONOSCOPY N/A 9/13/2017    Procedure: COLONOSCOPY VIA COLOSTOMY TO CECUM;  Surgeon: Renato Delgado MD;  Location: [facility] ENDOSCOPY;  Service:    • COLOSTOMY CLOSURE N/A 9/14/2017    Procedure: COLOSTOMY TAKEDOWN AND CLOSURE, WITH RESECTION;  Surgeon: Renato Delgado MD;  Location: [facility] OR;  Service:    • FINGER SURGERY      4TH FINGER LEFT HAND   •  HYSTERECTOMY     • PELVIC FLOOR REPAIR     • TONSILLECTOMY           Current Outpatient Prescriptions:   •  Acetaminophen (TYLENOL ARTHRITIS PAIN PO), Take 650 mg by mouth 3 (Three) Times a Day As Needed., Disp: , Rfl:   •  aspirin 81 MG tablet, Take 81 mg by mouth Daily. HELD FOR OR, Disp: , Rfl:   •  BIOTIN 5000 PO, Take 5,000 mcg by mouth Daily., Disp: , Rfl:   •  meclizine (ANTIVERT) 25 MG tablet, Take 25 mg by mouth 3 (Three) Times a Day As Needed for dizziness., Disp: , Rfl:   •  metoprolol succinate XL (TOPROL-XL) 25 MG 24 hr tablet, Take 25 mg by mouth Daily., Disp: , Rfl:   •  Multiple Vitamin (MULTI VITAMIN DAILY PO), Take  by mouth., Disp: , Rfl:   •  pantoprazole (PROTONIX) 20 MG EC tablet, Take 20 mg by mouth Daily., Disp: , Rfl:   •  Probiotic Product (PROBIOTIC DAILY PO), Take 1 tablet by mouth Daily. HELD FOR OR, Disp: , Rfl:   •  spironolactone (ALDACTONE) 50 MG tablet, , Disp: , Rfl:   •  ferrous sulfate 325 (65 FE) MG tablet, Take 325 mg by mouth Daily With Breakfast. HELD FOR OR, Disp: , Rfl:     Allergies   Allergen Reactions   • Penicillins Other (See Comments)     BLISTERS IN MOUTH    Patient tolerated ceftriaxone during 5/2017 admission.          Social History     Social History   • Marital status:      Spouse name: N/A   • Number of children: N/A   • Years of education: N/A     Occupational History   • Not on file.     Social History Main Topics   • Smoking status: Former Smoker     Types: Cigarettes     Quit date: 1967   • Smokeless tobacco: Not on file      Comment: SOCIAL SMOKING ONLY   • Alcohol use Yes      Comment: occasionally   • Drug use: No   • Sexual activity: Defer     Other Topics Concern   • Not on file     Social History Narrative       Family History   Problem Relation Age of Onset   • Cancer Mother    • Colon cancer Mother    • Diabetes Father    • Diabetes Son    • Ulcerative colitis Son    • Malig Hyperthermia Neg Hx        Review of Systems   Gastrointestinal:  Negative for abdominal distention, abdominal pain, anal bleeding, blood in stool, constipation, diarrhea, nausea, rectal pain and vomiting.   All other systems reviewed and are negative.      Vitals:    02/19/18 1545   BP: 138/80   Temp: 98.2 °F (36.8 °C)       Physical Exam   Constitutional: She is oriented to person, place, and time. She appears well-developed and well-nourished.   HENT:   Head: Normocephalic and atraumatic.   Eyes: Pupils are equal, round, and reactive to light.   Cardiovascular: Normal rate, regular rhythm and normal heart sounds.    Pulmonary/Chest: Effort normal and breath sounds normal.   Abdominal: Soft. Bowel sounds are normal. She exhibits no shifting dullness, no distension, no pulsatile liver, no fluid wave, no abdominal bruit, no ascites, no pulsatile midline mass and no mass. There is no hepatosplenomegaly. There is no tenderness. There is no rigidity and no guarding. No hernia.   Musculoskeletal: Normal range of motion.   Neurological: She is alert and oriented to person, place, and time.   Skin: Skin is warm and dry.   Psychiatric: She has a normal mood and affect. Her behavior is normal. Thought content normal.   Nursing note and vitals reviewed.    Problem list    Recent small bowel obstruction likely from adhesions  Diverticulitis with partial colectomy 2017, reversal of colostomy September 2017  GERD      Assessment/Plan    She can stop Protonix daily and use Pepcid as needed  No indication for EGD  Colonoscopy in 5 years  High-fiber diet  Probiotic daily

## 2018-08-03 ENCOUNTER — TELEPHONE (OUTPATIENT)
Dept: CARDIOLOGY | Facility: CLINIC | Age: 73
End: 2018-08-03

## 2018-08-03 NOTE — TELEPHONE ENCOUNTER
Called pt and left message on vm letting her know that she doesn't need to be seen any sooner than she is scheduled.  Cinthya Neil RN  Triage nurse

## 2018-08-03 NOTE — TELEPHONE ENCOUNTER
"08/03/18  9:11 AM  Kiley Last  1945    Home Phone [phone]       Kiley Last is a patient of Dr Kearns and Kelsie. She is calling in today to report intermittent chest \"ache\", palpitations,   She stated when she lays down she can feel her heart rate beating hard\"  She also c/o a ache in her neck, but reports it doesn't happen at the same time of the chest ache.      She is feeling fine now, stating it comes and goes and has been doing so for months.  And the episodes dont last very long.    Cardiac meds reviewed with patient  spironolactone - on med list but stated she has not taken for a couple of months  ASA 81  Metoprolol succinate 25mg dialy    Cardiac hx   Afib with rapid ventricular response  Essential hypertension    She has an apt with Kelsie 8/9/18- does she need to be seen sooner?  Does she need any testing?    Cinthya Neil RN  Triage nurse    "

## 2018-08-09 ENCOUNTER — OFFICE VISIT (OUTPATIENT)
Dept: CARDIOLOGY | Facility: CLINIC | Age: 73
End: 2018-08-09

## 2018-08-09 VITALS
WEIGHT: 191 LBS | DIASTOLIC BLOOD PRESSURE: 82 MMHG | SYSTOLIC BLOOD PRESSURE: 150 MMHG | BODY MASS INDEX: 31.82 KG/M2 | OXYGEN SATURATION: 99 % | HEART RATE: 70 BPM | HEIGHT: 65 IN

## 2018-08-09 DIAGNOSIS — I48.0 PAROXYSMAL ATRIAL FIBRILLATION (HCC): Primary | ICD-10-CM

## 2018-08-09 PROBLEM — H02.833 DERMATOCHALASIS OF BOTH EYELIDS: Status: ACTIVE | Noted: 2017-04-20

## 2018-08-09 PROBLEM — H02.836 DERMATOCHALASIS OF BOTH EYELIDS: Status: ACTIVE | Noted: 2017-04-20

## 2018-08-09 PROBLEM — H57.819 EYEBROW PTOSIS: Status: ACTIVE | Noted: 2017-04-20

## 2018-08-09 PROBLEM — Z96.1 PSEUDOPHAKIA, BOTH EYES: Status: ACTIVE | Noted: 2017-04-20

## 2018-08-09 PROBLEM — H02.59 LAXITY OF EYELID: Status: ACTIVE | Noted: 2017-04-20

## 2018-08-09 PROBLEM — R00.2 AWARENESS OF HEARTBEATS: Status: ACTIVE | Noted: 2018-08-09

## 2018-08-09 PROCEDURE — 93000 ELECTROCARDIOGRAM COMPLETE: CPT | Performed by: PHYSICIAN ASSISTANT

## 2018-08-09 PROCEDURE — 99213 OFFICE O/P EST LOW 20 MIN: CPT | Performed by: PHYSICIAN ASSISTANT
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
## 2018-08-20 ENCOUNTER — TELEPHONE (OUTPATIENT)
Dept: CARDIOLOGY | Facility: CLINIC | Age: 73
End: 2018-08-20

## 2018-08-20 NOTE — TELEPHONE ENCOUNTER
Left a message asking her to call me for the results of her monitor.  It was relatively benign, some PACs and PVCs which I think explains her palpitations.  I think this is most likely secondary to the stress that she is under at home.

## 2018-10-24 ENCOUNTER — OFFICE VISIT (OUTPATIENT)
Dept: CARDIOLOGY | Facility: CLINIC | Age: 73
End: 2018-10-24

## 2018-10-24 VITALS
HEIGHT: 65 IN | DIASTOLIC BLOOD PRESSURE: 80 MMHG | OXYGEN SATURATION: 99 % | SYSTOLIC BLOOD PRESSURE: 136 MMHG | BODY MASS INDEX: 31.99 KG/M2 | WEIGHT: 192 LBS | HEART RATE: 78 BPM

## 2018-10-24 DIAGNOSIS — I48.0 PAROXYSMAL ATRIAL FIBRILLATION (HCC): Primary | ICD-10-CM

## 2018-10-24 PROCEDURE — 99213 OFFICE O/P EST LOW 20 MIN: CPT | Performed by: PHYSICIAN ASSISTANT

## 2018-10-24 PROCEDURE — 93000 ELECTROCARDIOGRAM COMPLETE: CPT | Performed by: PHYSICIAN ASSISTANT

## 2018-10-24 NOTE — PROGRESS NOTES
Date of Office Visit: 10/24/2018  Encounter Provider: FRANCA Garcia  Place of Service: [facility] CARDIOLOGY  Patient Name: Kiley Last  :1945    Chief Complaint   Patient presents with   • Atrial Fibrillation     1 year follow up   :     HPI: Kiley Last is a 73 y.o. female who presents today for follow-up.  Old records have been obtained and reviewed by me.  She is a patient of Dr. Kearns's who had paroxysmal atrial fibrillation in the setting of a major abdominal surgery.  This was in 2017.  She converted to sinus rhythm during her hospitalization.  She was on amiodarone for a time, and then eventually this was discontinued and she was kept on low-dose beta blockers.  She was not anticoagulated.  She was last in our office to see me on 2018.  At that visit, she was having episodes of palpitations.  She was under quite a bit of stress.  I placed a 48 hour Holter monitor, this showed what I suspected, PACs and PVCs.  She is here today for follow-up.   Since she was last in our office she's been doing fairly well.  She still has quite a bit of stress.  Her  is in another facility with an MRSA infection of his knee.  It sounds like he had to have surgery and has been pretty sick.  She has not heard from her estranged daughter for quite some time, she does not know where her daughter or her grandchildren are living.  Despite all this, she has a fantastically positive attitude.  She still having some palpitations and some fatigue, but she denies any chest pain, shortness of breath, edema, dizziness, or syncope.      Past Medical History:   Diagnosis Date   • Arthritis    • Asthma     NO INHALERS   • Colostomy in place (CMS/[facility])    • Diverticular disease    • ESBL (extended spectrum beta-lactamase) producing bacteria infection    • History of abscess of skin and subcutaneous tissue     ABD WOUND 2017   • History of atrial fibrillation      X1  POST OP FROM COLOSTOMY 5/2017 - NO PROBLEMS SINCE   • Hypertension    • MDRO (multiple drug resistant organisms) resistance    • PONV (postoperative nausea and vomiting)    • Psoriasis    • Vertigo        Past Surgical History:   Procedure Laterality Date   • BLEPHAROPLASTY Bilateral 04/27/2017   • CHOLECYSTECTOMY     • COLON RESECTION N/A 5/3/2017    Procedure: OPEN SIMOID COLECTOMY WITH COLOSTOMY;  Surgeon: Renato Delgado MD;  Location: [facility] MAIN OR;  Service:    • COLONOSCOPY N/A 9/13/2017    Procedure: COLONOSCOPY VIA COLOSTOMY TO CECUM;  Surgeon: Renato Delgado MD;  Location: [facility] ENDOSCOPY;  Service:    • COLOSTOMY CLOSURE N/A 9/14/2017    Procedure: COLOSTOMY TAKEDOWN AND CLOSURE, WITH RESECTION;  Surgeon: Renato Delgado MD;  Location: [facility] MAIN OR;  Service:    • FINGER SURGERY      4TH FINGER LEFT HAND   • HYSTERECTOMY     • PELVIC FLOOR REPAIR     • TONSILLECTOMY         Social History     Social History   • Marital status:      Spouse name: N/A   • Number of children: N/A   • Years of education: N/A     Occupational History   • Not on file.     Social History Main Topics   • Smoking status: Former Smoker     Types: Cigarettes     Quit date: 1967   • Smokeless tobacco: Never Used      Comment: SOCIAL SMOKING ONLY   • Alcohol use 1.2 oz/week     1 Glasses of wine, 1 Shots of liquor per week      Comment: occasionally   • Drug use: No   • Sexual activity: Defer     Other Topics Concern   • Not on file     Social History Narrative   • No narrative on file       Family History   Problem Relation Age of Onset   • Cancer Mother    • Colon cancer Mother    • Diabetes Father    • Diabetes Son    • Ulcerative colitis Son    • No Known Problems Maternal Grandmother    • No Known Problems Maternal Grandfather    • No Known Problems Paternal Grandmother    • No Known Problems Paternal Grandfather    • Malig Hyperthermia Neg Hx        Review of Systems   Constitution: Positive for malaise/fatigue.  "Negative for chills and fever.   Cardiovascular: Positive for palpitations. Negative for chest pain, dyspnea on exertion, leg swelling, near-syncope, orthopnea, paroxysmal nocturnal dyspnea and syncope.   Respiratory: Negative for cough and shortness of breath.    Musculoskeletal: Negative for joint pain, joint swelling and myalgias.   Gastrointestinal: Negative for abdominal pain, diarrhea, melena, nausea and vomiting.   Genitourinary: Negative for frequency and hematuria.   Neurological: Negative for light-headedness, numbness, paresthesias and seizures.   Allergic/Immunologic: Negative.    All other systems reviewed and are negative.      Allergies   Allergen Reactions   • Epinephrine Palpitations   • Penicillins Other (See Comments)     BLISTERS IN MOUTH    Patient tolerated ceftriaxone during 5/2017 admission.            Current Outpatient Prescriptions:   •  Acetaminophen (TYLENOL ARTHRITIS PAIN PO), Take 650 mg by mouth 3 (Three) Times a Day As Needed., Disp: , Rfl:   •  aspirin 81 MG tablet, Take 81 mg by mouth Daily., Disp: , Rfl:   •  meclizine (ANTIVERT) 25 MG tablet, Take 25 mg by mouth 3 (Three) Times a Day As Needed for dizziness., Disp: , Rfl:   •  metoprolol succinate XL (TOPROL-XL) 25 MG 24 hr tablet, Take 25 mg by mouth Daily., Disp: , Rfl:   •  Multiple Vitamin (MULTI VITAMIN DAILY PO), Take 1 tablet by mouth Daily., Disp: , Rfl:       Objective:     Vitals:    10/24/18 1245 10/24/18 1254   BP: 130/68 136/80   BP Location: Right arm Left arm   Pulse: 78    SpO2: 99%    Weight: 87.1 kg (192 lb)    Height: 165.1 cm (65\")      Body mass index is 31.95 kg/m².    PHYSICAL EXAM:    Physical Exam   Constitutional: She is oriented to person, place, and time. She appears well-developed and well-nourished. No distress.   HENT:   Head: Normocephalic and atraumatic.   Eyes: Pupils are equal, round, and reactive to light.   Neck: No JVD present. No thyromegaly present.   Cardiovascular: Normal rate, regular " rhythm, normal heart sounds and intact distal pulses.    No murmur heard.  Pulmonary/Chest: Effort normal and breath sounds normal. No respiratory distress.   Abdominal: Soft. Bowel sounds are normal. She exhibits no distension. There is no splenomegaly or hepatomegaly. There is no tenderness.   Musculoskeletal: Normal range of motion. She exhibits no edema.   Neurological: She is alert and oriented to person, place, and time.   Skin: Skin is warm and dry. She is not diaphoretic. No erythema.   Psychiatric: She has a normal mood and affect. Her behavior is normal. Judgment normal.         ECG 12 Lead  Date/Time: 10/24/2018 1:07 PM  Performed by: TOMASZ MAYS  Authorized by: TOMASZ MAYS   Comparison: compared with previous ECG from 8/9/2018  Similar to previous ECG  Rhythm: sinus rhythm  BPM: 75  Clinical impression: normal ECG  Comments: Indication: Paroxysmal atrial fibrillation.              Assessment:       Diagnosis Plan   1. Paroxysmal atrial fibrillation (CMS/HCC)  ECG 12 Lead     Orders Placed This Encounter   Procedures   • ECG 12 Lead     This order was created via procedure documentation          Plan:       1.  Atrial Fibrillation and Atrial Flutter  Assessment  • The patient has paroxysmal atrial fibrillation  • This is non-valvular in etiology  • The patient's CHADS2-VASc score is 2  • A [id number] score of 2 or more is considered a high risk for a thromboembolic event    Plan  • Attempt to maintain sinus rhythm  • Continue beta blocker for rhythm control    Subjective - Objective  • Overall she stable from a cardiac standpoint.  I do not think she is having atrial fibrillation, this was confirmed by the Holter monitor.  I think that a lot of her symptoms are secondary to stress, and we did discuss this today.  I've asked her to try to take care of herself during this difficult time.  I'm going to have her follow-up with Dr. Kearns in 6 months or sooner if needed.        As always, it has  been a pleasure to participate in your patient's care.      Sincerely,         Kelsie Billingsley PA-C

## 2018-10-25 ENCOUNTER — TELEPHONE (OUTPATIENT)
Dept: CARDIOLOGY | Facility: CLINIC | Age: 73
End: 2018-10-25

## 2018-10-25 NOTE — TELEPHONE ENCOUNTER
I spoke with the patient and she states that she is having all of the symptoms of a UTI. She has not had a urinalysis done to validate that she does have a UTI, He PCP was not in her office today and the patient thought she may be on vacation. I called Dr. Orta office about this and they said Dr Latif will be back tomorrow. The patients  is in the hospital and she does not want to leave him. I did advise her that you may not send anything in due to she needs to see her PCP and she has not been officially tested. She understood and asked me to talk to you any way.           Thanks  Cortes

## 2018-10-25 NOTE — TELEPHONE ENCOUNTER
This is correct, I cannot prescribe her an antibiotic without a urinalysis.  Please tell her I'm sorry, and to be sure to call Dr. Latif first thing in the morning to remind them.    P

## 2019-02-20 ENCOUNTER — APPOINTMENT (OUTPATIENT)
Dept: CT IMAGING | Facility: HOSPITAL | Age: 74
End: 2019-02-20

## 2019-02-20 ENCOUNTER — HOSPITAL ENCOUNTER (EMERGENCY)
Facility: HOSPITAL | Age: 74
Discharge: HOME | End: 2019-02-20
Attending: EMERGENCY MEDICINE | Admitting: EMERGENCY MEDICINE

## 2019-02-20 ENCOUNTER — APPOINTMENT (OUTPATIENT)
Dept: GENERAL RADIOLOGY | Facility: HOSPITAL | Age: 74
End: 2019-02-20

## 2019-02-20 VITALS
OXYGEN SATURATION: 93 % | BODY MASS INDEX: 31.31 KG/M2 | HEART RATE: 95 BPM | TEMPERATURE: 97.9 F | HEIGHT: 65 IN | DIASTOLIC BLOOD PRESSURE: 70 MMHG | RESPIRATION RATE: 16 BRPM | SYSTOLIC BLOOD PRESSURE: 135 MMHG | WEIGHT: 187.9 LBS

## 2019-02-20 DIAGNOSIS — J06.9 VIRAL URI: ICD-10-CM

## 2019-02-20 DIAGNOSIS — K57.92 DIVERTICULITIS: Primary | ICD-10-CM

## 2019-02-20 LAB
ALBUMIN SERPL-MCNC: 4.3 G/DL (ref 3.5–5.2)
ALBUMIN/GLOB SERPL: 1.2 G/DL
ALP SERPL-CCNC: 58 U/L (ref 39–117)
ALT SERPL W P-5'-P-CCNC: 14 U/L (ref 1–33)
ANION GAP SERPL CALCULATED.3IONS-SCNC: 15 MMOL/L
AST SERPL-CCNC: 21 U/L (ref 1–32)
B PARAPERT DNA SPEC QL NAA+PROBE: NOT DETECTED
B PERT DNA SPEC QL NAA+PROBE: NOT DETECTED
BACTERIA UR QL AUTO: ABNORMAL /HPF
BASOPHILS # BLD AUTO: 0.03 10*3/MM3 (ref 0–0.2)
BASOPHILS NFR BLD AUTO: 0.3 % (ref 0–1.5)
BILIRUB SERPL-MCNC: 0.6 MG/DL (ref 0.1–1.2)
BILIRUB UR QL STRIP: NEGATIVE
BUN BLD-MCNC: 14 MG/DL (ref 8–23)
BUN/CREAT SERPL: 25.5 (ref 7–25)
C PNEUM DNA NPH QL NAA+NON-PROBE: NOT DETECTED
CALCIUM SPEC-SCNC: 9.8 MG/DL (ref 8.6–10.5)
CHLORIDE SERPL-SCNC: 99 MMOL/L (ref 98–107)
CLARITY UR: CLEAR
CO2 SERPL-SCNC: 24 MMOL/L (ref 22–29)
COLOR UR: YELLOW
CREAT BLD-MCNC: 0.55 MG/DL (ref 0.57–1)
DEPRECATED RDW RBC AUTO: 42 FL (ref 37–54)
EOSINOPHIL # BLD AUTO: 0.12 10*3/MM3 (ref 0–0.4)
EOSINOPHIL NFR BLD AUTO: 1.3 % (ref 0.3–6.2)
ERYTHROCYTE [DISTWIDTH] IN BLOOD BY AUTOMATED COUNT: 12.7 % (ref 12.3–15.4)
FLUAV H1 2009 PAND RNA NPH QL NAA+PROBE: NOT DETECTED
FLUAV H1 HA GENE NPH QL NAA+PROBE: NOT DETECTED
FLUAV H3 RNA NPH QL NAA+PROBE: NOT DETECTED
FLUAV SUBTYP SPEC NAA+PROBE: NOT DETECTED
FLUBV RNA ISLT QL NAA+PROBE: NOT DETECTED
GFR SERPL CREATININE-BSD FRML MDRD: 108 ML/MIN/1.73
GLOBULIN UR ELPH-MCNC: 3.5 GM/DL
GLUCOSE BLD-MCNC: 112 MG/DL (ref 65–99)
GLUCOSE UR STRIP-MCNC: NEGATIVE MG/DL
HADV DNA SPEC NAA+PROBE: NOT DETECTED
HCOV 229E RNA SPEC QL NAA+PROBE: NOT DETECTED
HCOV HKU1 RNA SPEC QL NAA+PROBE: NOT DETECTED
HCOV NL63 RNA SPEC QL NAA+PROBE: NOT DETECTED
HCOV OC43 RNA SPEC QL NAA+PROBE: NOT DETECTED
HCT VFR BLD AUTO: 41.2 % (ref 34–46.6)
HGB BLD-MCNC: 12.9 G/DL (ref 12–15.9)
HGB UR QL STRIP.AUTO: NEGATIVE
HMPV RNA NPH QL NAA+NON-PROBE: NOT DETECTED
HPIV1 RNA SPEC QL NAA+PROBE: NOT DETECTED
HPIV2 RNA SPEC QL NAA+PROBE: NOT DETECTED
HPIV3 RNA NPH QL NAA+PROBE: NOT DETECTED
HPIV4 P GENE NPH QL NAA+PROBE: NOT DETECTED
HYALINE CASTS UR QL AUTO: ABNORMAL /LPF
IMM GRANULOCYTES # BLD AUTO: 0.05 10*3/MM3 (ref 0–0.05)
IMM GRANULOCYTES NFR BLD AUTO: 0.6 % (ref 0–0.5)
KETONES UR QL STRIP: NEGATIVE
LEUKOCYTE ESTERASE UR QL STRIP.AUTO: ABNORMAL
LIPASE SERPL-CCNC: 24 U/L (ref 13–60)
LYMPHOCYTES # BLD AUTO: 1.52 10*3/MM3 (ref 0.7–3.1)
LYMPHOCYTES NFR BLD AUTO: 16.9 % (ref 19.6–45.3)
M PNEUMO IGG SER IA-ACNC: NOT DETECTED
MCH RBC QN AUTO: 28.4 PG (ref 26.6–33)
MCHC RBC AUTO-ENTMCNC: 31.3 G/DL (ref 31.5–35.7)
MCV RBC AUTO: 90.7 FL (ref 79–97)
MONOCYTES # BLD AUTO: 0.75 10*3/MM3 (ref 0.1–0.9)
MONOCYTES NFR BLD AUTO: 8.3 % (ref 5–12)
NEUTROPHILS # BLD AUTO: 6.55 10*3/MM3 (ref 1.4–7)
NEUTROPHILS NFR BLD AUTO: 72.6 % (ref 42.7–76)
NITRITE UR QL STRIP: NEGATIVE
NRBC BLD AUTO-RTO: 0 /100 WBC (ref 0–0)
PH UR STRIP.AUTO: 6.5 [PH] (ref 5–8)
PLATELET # BLD AUTO: 170 10*3/MM3 (ref 140–450)
PMV BLD AUTO: 12.6 FL (ref 6–12)
POTASSIUM BLD-SCNC: 4.2 MMOL/L (ref 3.5–5.2)
PROT SERPL-MCNC: 7.8 G/DL (ref 6–8.5)
PROT UR QL STRIP: NEGATIVE
RBC # BLD AUTO: 4.54 10*6/MM3 (ref 3.77–5.28)
RBC # UR: ABNORMAL /HPF
REF LAB TEST METHOD: ABNORMAL
RHINOVIRUS RNA SPEC NAA+PROBE: NOT DETECTED
RSV RNA NPH QL NAA+NON-PROBE: NOT DETECTED
SODIUM BLD-SCNC: 138 MMOL/L (ref 136–145)
SP GR UR STRIP: 1.02 (ref 1–1.03)
SQUAMOUS #/AREA URNS HPF: ABNORMAL /HPF
UROBILINOGEN UR QL STRIP: ABNORMAL
WBC NRBC COR # BLD: 9.02 10*3/MM3 (ref 3.4–10.8)
WBC UR QL AUTO: ABNORMAL /HPF

## 2019-02-20 PROCEDURE — 99283 EMERGENCY DEPT VISIT LOW MDM: CPT

## 2019-02-20 PROCEDURE — 71045 X-RAY EXAM CHEST 1 VIEW: CPT

## 2019-02-20 PROCEDURE — 87798 DETECT AGENT NOS DNA AMP: CPT | Performed by: PHYSICIAN ASSISTANT

## 2019-02-20 PROCEDURE — 80053 COMPREHEN METABOLIC PANEL: CPT | Performed by: PHYSICIAN ASSISTANT

## 2019-02-20 PROCEDURE — 87631 RESP VIRUS 3-5 TARGETS: CPT | Performed by: PHYSICIAN ASSISTANT

## 2019-02-20 PROCEDURE — 87486 CHLMYD PNEUM DNA AMP PROBE: CPT | Performed by: PHYSICIAN ASSISTANT

## 2019-02-20 PROCEDURE — 85025 COMPLETE CBC W/AUTO DIFF WBC: CPT | Performed by: PHYSICIAN ASSISTANT

## 2019-02-20 PROCEDURE — 87581 M.PNEUMON DNA AMP PROBE: CPT | Performed by: PHYSICIAN ASSISTANT

## 2019-02-20 PROCEDURE — 25010000002 IOPAMIDOL 61 % SOLUTION: Performed by: EMERGENCY MEDICINE

## 2019-02-20 PROCEDURE — 74177 CT ABD & PELVIS W/CONTRAST: CPT

## 2019-02-20 PROCEDURE — 81001 URINALYSIS AUTO W/SCOPE: CPT | Performed by: PHYSICIAN ASSISTANT

## 2019-02-20 PROCEDURE — 83690 ASSAY OF LIPASE: CPT | Performed by: PHYSICIAN ASSISTANT

## 2019-02-20 RX ORDER — MELATONIN
1000 DAILY
COMMUNITY
End: 2020-07-10

## 2019-02-20 RX ORDER — CIPROFLOXACIN 500 MG/1
500 TABLET, FILM COATED ORAL 2 TIMES DAILY
Qty: 14 TABLET | Refills: 0 | Status: SHIPPED | OUTPATIENT
Start: 2019-02-20 | End: 2019-03-07

## 2019-02-20 RX ORDER — SODIUM CHLORIDE 0.9 % (FLUSH) 0.9 %
10 SYRINGE (ML) INJECTION AS NEEDED
Status: DISCONTINUED | OUTPATIENT
Start: 2019-02-20 | End: 2019-02-20 | Stop reason: HOSPADM

## 2019-02-20 RX ORDER — METRONIDAZOLE 500 MG/1
500 TABLET ORAL 3 TIMES DAILY
Qty: 21 TABLET | Refills: 0 | Status: SHIPPED | OUTPATIENT
Start: 2019-02-20 | End: 2019-03-07

## 2019-02-20 RX ORDER — SACCHAROMYCES BOULARDII 250 MG
250 CAPSULE ORAL 2 TIMES DAILY
COMMUNITY
End: 2019-03-07

## 2019-02-20 RX ADMIN — IOPAMIDOL 85 ML: 612 INJECTION, SOLUTION INTRAVENOUS at 11:21

## 2019-02-20 NOTE — ED NOTES
"Patient reports \"flu like symptoms and LLQ pain since Sunday.\" has history of colon surgery 1 year ago      Rich Guerrier  02/20/19 [id number]    "

## 2019-02-20 NOTE — ED PROVIDER NOTES
EMERGENCY DEPARTMENT ENCOUNTER    Room Number:  26/26  Date seen:  2/20/2019  Time seen: 10:09 AM  PCP: Amelia Latif MD    HPI:  Chief complaint: Abd pain  Context:Kiley Last is a 73 y.o. female who presents to the ED with c/o LLQ abd pain onset 4 days ago. She also c/o sneezing, generalized myalgia, nonproductive cough, and rhinorrhea, but denies fever, chills, SOA, chest pain, diarrhea, blood in stool, dysuria, nausea, and vomiting. Hx of colostomy that has been reversed by Dr. SIRISHA Guajardo. She was recently treated for a UTI with antibiotics.    Onset: Gradual  Location: LLQ abd  Duration: Onset 4 days ago  Timing: Constant  Severity: Moderate    ALLERGIES  Epinephrine and Penicillins    PAST MEDICAL HISTORY  Active Ambulatory Problems     Diagnosis Date Noted   • UTI (urinary tract infection) 04/30/2017   • HTN (hypertension) 05/01/2017   • Atrial fibrillation with rapid ventricular response (CMS/HCC) 05/08/2017   • Diverticulitis 05/26/2017   • Pleural effusion 05/26/2017   • Ileus (CMS/[facility]) 05/26/2017   • Abdominal abscess 05/26/2017   • Sepsis (CMS/[facility]) 05/26/2017   • 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 (CMS/[facility]) 05/26/2017   • COPD (chronic obstructive pulmonary disease) (CMS/[facility]) 05/26/2017   • Family hx of colon cancer 08/28/2017   • Class 1 obesity due to excess calories without serious comorbidity with body mass index (BMI) of 30.0 to 30.9 in adult 10/23/2017   • Abdominal pain, other specified site 10/31/2014   • Dermatochalasis of both eyelids 04/20/2017   • Eyebrow ptosis 04/20/2017   • Laxity of eyelid 04/20/2017   • Awareness of heartbeats 08/09/2018   • Pseudophakia, both eyes 04/20/2017   • Paroxysmal atrial fibrillation (CMS/[facility]) 08/09/2018     Resolved Ambulatory Problems     Diagnosis Date Noted   • Sigmoid diverticulitis 04/30/2017   • Colon obstruction (CMS/HCC) 05/03/2017   • Colostomy present (CMS/[facility]) 08/28/2017   • Bowel obstruction (CMS/[facility])  01/26/2018   • Small bowel obstruction (CMS/HCC) 01/26/2018     Past Medical History:   Diagnosis Date   • Arthritis    • Asthma    • Colostomy in place (CMS/[facility])    • Diverticular disease    • ESBL (extended spectrum beta-lactamase) producing bacteria infection    • History of abscess of skin and subcutaneous tissue    • History of atrial fibrillation    • Hypertension    • MDRO (multiple drug resistant organisms) resistance    • PONV (postoperative nausea and vomiting)    • Psoriasis    • Vertigo        PAST SURGICAL HISTORY  Past Surgical History:   Procedure Laterality Date   • BLEPHAROPLASTY Bilateral 04/27/2017   • CHOLECYSTECTOMY     • COLON RESECTION N/A 5/3/2017    Procedure: OPEN SIMOID COLECTOMY WITH COLOSTOMY;  Surgeon: Renato Delgado MD;  Location: [facility] MAIN OR;  Service:    • COLONOSCOPY N/A 9/13/2017    Procedure: COLONOSCOPY VIA COLOSTOMY TO CECUM;  Surgeon: Renato Delgado MD;  Location: [facility] ENDOSCOPY;  Service:    • COLOSTOMY CLOSURE N/A 9/14/2017    Procedure: COLOSTOMY TAKEDOWN AND CLOSURE, WITH RESECTION;  Surgeon: Renato Delgado MD;  Location: [facility] MAIN OR;  Service:    • FINGER SURGERY      4TH FINGER LEFT HAND   • HYSTERECTOMY     • PELVIC FLOOR REPAIR     • TONSILLECTOMY         FAMILY HISTORY  Family History   Problem Relation Age of Onset   • Cancer Mother    • Colon cancer Mother    • Diabetes Father    • Diabetes Son    • Ulcerative colitis Son    • No Known Problems Maternal Grandmother    • No Known Problems Maternal Grandfather    • No Known Problems Paternal Grandmother    • No Known Problems Paternal Grandfather    • Malig Hyperthermia Neg Hx        SOCIAL HISTORY  Social History     Socioeconomic History   • Marital status:      Spouse name: Not on file   • Number of children: Not on file   • Years of education: Not on file   • Highest education level: Not on file   Social Needs   • Financial resource strain: Not on file   • Food insecurity - worry: Not on file   •  Food insecurity - inability: Not on file   • Transportation needs - medical: Not on file   • Transportation needs - non-medical: Not on file   Occupational History   • Not on file   Tobacco Use   • Smoking status: Former Smoker     Types: Cigarettes     Last attempt to quit: 1967     Years since quittin.1   • Smokeless tobacco: Never Used   • Tobacco comment: SOCIAL SMOKING ONLY   Substance and Sexual Activity   • Alcohol use: Yes     Alcohol/week: 1.2 oz     Types: 1 Glasses of wine, 1 Shots of liquor per week     Comment: occasionally   • Drug use: No   • Sexual activity: Defer   Other Topics Concern   • Not on file   Social History Narrative   • Not on file       REVIEW OF SYSTEMS  Review of Systems   Constitutional: Negative for fever.   HENT: Positive for rhinorrhea and sneezing. Negative for sore throat.    Eyes: Negative.    Respiratory: Positive for cough (nonproductive). Negative for shortness of breath.    Cardiovascular: Negative for chest pain.   Gastrointestinal: Positive for abdominal pain (LLQ). Negative for blood in stool, diarrhea, nausea and vomiting.   Genitourinary: Negative for dysuria.   Musculoskeletal: Positive for myalgias (generalized). Negative for neck pain.   Skin: Negative for rash.   Allergic/Immunologic: Negative for immunocompromised state.   Neurological: Negative for weakness, numbness and headaches.   All other systems reviewed and are negative.      PHYSICAL EXAM  ED Triage Vitals [19 0956]   Temp Heart Rate Resp BP SpO2   97.9 °F (36.6 °C) 95 16 -- 93 %      Temp src Heart Rate Source Patient Position BP Location FiO2 (%)   -- -- -- -- --     Physical Exam   Constitutional: She is oriented to person, place, and time. No distress.   HENT:   Head: Normocephalic and atraumatic.   Eyes: EOM are normal. Pupils are equal, round, and reactive to light.   Neck: Normal range of motion. Neck supple.   Cardiovascular: Normal rate, regular rhythm and normal heart sounds.    Pulmonary/Chest: Effort normal and breath sounds normal. No respiratory distress.   Abdominal: Soft. There is tenderness (mild) in the left lower quadrant. There is no rebound and no guarding.   Musculoskeletal: Normal range of motion. She exhibits no edema.   Neurological: She is alert and oriented to person, place, and time. She has normal sensation and normal strength.   Skin: Skin is warm and dry. No rash noted.   Psychiatric: Mood and affect normal.   Nursing note and vitals reviewed.      LAB RESULTS  Recent Results (from the past 24 hour(s))   Lipase    Collection Time: 02/20/19 10:26 AM   Result Value Ref Range    Lipase 24 13 - 60 U/L   Comprehensive Metabolic Panel    Collection Time: 02/20/19 10:26 AM   Result Value Ref Range    Glucose 112 (H) 65 - 99 mg/dL    BUN 14 8 - 23 mg/dL    Creatinine 0.55 (L) 0.57 - 1.00 mg/dL    Sodium 138 136 - 145 mmol/L    Potassium 4.2 3.5 - 5.2 mmol/L    Chloride 99 98 - 107 mmol/L    CO2 24.0 22.0 - 29.0 mmol/L    Calcium 9.8 8.6 - 10.5 mg/dL    Total Protein 7.8 6.0 - 8.5 g/dL    Albumin 4.30 3.50 - 5.20 g/dL    ALT (SGPT) 14 1 - 33 U/L    AST (SGOT) 21 1 - 32 U/L    Alkaline Phosphatase 58 39 - 117 U/L    Total Bilirubin 0.6 0.1 - 1.2 mg/dL    eGFR Non African Amer 108 >60 mL/min/1.73    Globulin 3.5 gm/dL    A/G Ratio 1.2 g/dL    BUN/Creatinine Ratio 25.5 (H) 7.0 - 25.0    Anion Gap 15.0 mmol/L   Urinalysis With Microscopic If Indicated (No Culture) - Urine, Clean Catch    Collection Time: 02/20/19 10:26 AM   Result Value Ref Range    Color, UA Yellow Yellow, Straw    Appearance, UA Clear Clear    pH, UA 6.5 5.0 - 8.0    Specific Gravity, UA 1.017 1.005 - 1.030    Glucose, UA Negative Negative    Ketones, UA Negative Negative    Bilirubin, UA Negative Negative    Blood, UA Negative Negative    Protein, UA Negative Negative    Leuk Esterase, UA Moderate (2+) (A) Negative    Nitrite, UA Negative Negative    Urobilinogen, UA 0.2 E.U./dL 0.2 - 1.0 E.U./dL   Respiratory  Panel, PCR - Swab, Nasopharynx    Collection Time: 02/20/19 10:26 AM   Result Value Ref Range    ADENOVIRUS, PCR Not Detected Not Detected    Coronavirus 229E Not Detected Not Detected    Coronavirus HKU1 Not Detected Not Detected    Coronavirus NL63 Not Detected Not Detected    Coronavirus OC43 Not Detected Not Detected    Human Metapneumovirus Not Detected Not Detected    Human Rhinovirus/Enterovirus Not Detected Not Detected    Influenza B PCR Not Detected Not Detected    Parainfluenza Virus 1 Not Detected Not Detected    Parainfluenza Virus 2 Not Detected Not Detected    Parainfluenza Virus 3 Not Detected Not Detected    Parainfluenza Virus 4 Not Detected Not Detected    Bordetella pertussis pcr Not Detected Not Detected    Influenza A H1 2009 PCR Not Detected Not Detected    Chlamydophila pneumoniae PCR Not Detected Not Detected    Mycoplasma pneumo by PCR Not Detected Not Detected    Influenza A PCR Not Detected Not Detected    Influenza A H3 Not Detected Not Detected    Influenza A H1 Not Detected Not Detected    RSV, PCR Not Detected Not Detected    Bordetella parapertussis PCR Not Detected Not Detected   CBC Auto Differential    Collection Time: 02/20/19 10:26 AM   Result Value Ref Range    WBC 9.02 3.40 - 10.80 10*3/mm3    RBC 4.54 3.77 - 5.28 10*6/mm3    Hemoglobin 12.9 12.0 - 15.9 g/dL    Hematocrit 41.2 34.0 - 46.6 %    MCV 90.7 79.0 - 97.0 fL    MCH 28.4 26.6 - 33.0 pg    MCHC 31.3 (L) 31.5 - 35.7 g/dL    RDW 12.7 12.3 - 15.4 %    RDW-SD 42.0 37.0 - 54.0 fl    MPV 12.6 (H) 6.0 - 12.0 fL    Platelets 170 140 - 450 10*3/mm3    Neutrophil % 72.6 42.7 - 76.0 %    Lymphocyte % 16.9 (L) 19.6 - 45.3 %    Monocyte % 8.3 5.0 - 12.0 %    Eosinophil % 1.3 0.3 - 6.2 %    Basophil % 0.3 0.0 - 1.5 %    Immature Grans % 0.6 (H) 0.0 - 0.5 %    Neutrophils, Absolute 6.55 1.40 - 7.00 10*3/mm3    Lymphocytes, Absolute 1.52 0.70 - 3.10 10*3/mm3    Monocytes, Absolute 0.75 0.10 - 0.90 10*3/mm3    Eosinophils, Absolute 0.12  0.00 - 0.40 10*3/mm3    Basophils, Absolute 0.03 0.00 - 0.20 10*3/mm3    Immature Grans, Absolute 0.05 0.00 - 0.05 10*3/mm3    nRBC 0.0 0.0 - 0.0 /100 WBC   Urinalysis, Microscopic Only - Urine, Clean Catch    Collection Time: 02/20/19 10:26 AM   Result Value Ref Range    RBC, UA 0-2 None Seen, 0-2 /HPF    WBC, UA 3-5 (A) None Seen, 0-2 /HPF    Bacteria, UA None Seen None Seen /HPF    Squamous Epithelial Cells, UA 0-2 None Seen, 0-2 /HPF    Hyaline Casts, UA 0-2 None Seen /LPF    Methodology Automated Microscopy        I ordered the above labs and reviewed the results    RADIOLOGY  CT Abdomen Pelvis With Contrast   Preliminary Result   1.  Interval enlargement of the left-sided fat-containing ventral hernia   in the area of the previous left lower quadrant colostomy.   2.  Small area of fat stranding along the lateral aspect of the proximal   descending colon/splenic flexure likely representing mild   colitis/diverticulitis. An omental infarct is considered less likely.   3.  More prominent area of soft tissue thickening and scarring near the   previous Sly pouch and colorectal anastomosis operative bed. Given   that the patient has had no known history of colonic malignancy,   findings are favored to represent evolving postsurgical changes.       The above findings were discussed with Chino Keys in the emergency room   by telephone by Luis Duffy at 11:45 AM on 02/20/2019.          XR Chest 1 View   Final Result   FINDINGS: Cardiomediastinal silhouette is satisfactory in appearance. No  edema, effusion or active airspace disease identified.     CONCLUSION: No active disease of the chest     This report was finalized on 2/20/2019 11:02 AM by Dr. Bora Tate M.D.       I ordered the above noted radiological studies and reviewed the images on the PACS system.  Spoke with Dr. Eugene regarding CT/MRI scan results    MEDICATIONS GIVEN IN ER  Medications   sodium chloride 0.9 % flush 10 mL (not administered)  "  iopamidol (ISOVUE-300) 61 % injection 100 mL (85 mL Intravenous Given by Other 2/20/19 1121)     PROGRESS AND CONSULTS    Progress Notes:  1017 Ordered CBC, UA, CMP, lipase, respiratory panel, CT abd/pel, and CXR for further evaluation.    1250 Rechecked with pt and informed her of the results of her labs and imaging. Plan to discharge with Cipro and Flagyl. She should f/u with a surgeon. Pt understands and agrees with the plan, all questions answered.    1310 Reviewed pt's history and workup with Dr. Jaime.  After a bedside evaluation; Dr. Jaime agrees with the plan of care.     Disposition vitals:  /70   Pulse 95   Temp 97.9 °F (36.6 °C)   Resp 16   Ht 165.1 cm (65\")   Wt 85.2 kg (187 lb 14.4 oz)   LMP  (LMP Unknown)   SpO2 93%   BMI 31.27 kg/m²       DIAGNOSIS  Final diagnoses:   Diverticulitis   Viral URI       DISPOSITION  DISCHARGE    Patient discharged in stable condition.    Reviewed implications of results, diagnosis, meds, responsibility to follow up, warning signs and symptoms of possible worsening, potential complications and reasons to return to ER, including new or worsening sxs.    Patient/Family voiced understanding of above instructions.    Discussed plan for discharge, as there is no emergent indication for admission. Patient referred to primary care provider for BP management due to today's BP. Pt/family is agreeable and understands need for follow up and repeat testing.  Pt is aware that discharge does not mean that nothing is wrong but it indicates no emergency is present that requires admission and they must continue care with follow-up as given below or physician of their choice.     FOLLOW-UP  Amelia Latif MD  [address]  [place] [address]  [phone]      As needed    Dalton Vela Jr., MD  [address]  [place] [address]  [phone]      call for follow-up on ostomy hernia         Medication List      New Prescriptions  "   ciprofloxacin 500 MG tablet  Commonly known as:  CIPRO  Take 1 tablet by mouth 2 (Two) Times a Day.     metroNIDAZOLE 500 MG tablet  Commonly known as:  FLAGYL  Take 1 tablet by mouth 3 (Three) Times a Day.          Documentation assistance provided by halina Lopez for Chino Keys PA-C.  Information recorded by the scribe was done at my direction and has been verified and validated by me.     Olga Lopez  02/20/19 1312       Chino Keys PA  02/20/19 5343

## 2019-02-20 NOTE — ED PROVIDER NOTES
"Pt presents to the ED c/o LLQ pain that started 4 days ago. Pt describes the pain as a \"discomfort\" and states that it feels \"like a spasm\". Pt denies vomiting, diarrhea, and fever. Pt admits to hx of diverticulitis.     PHYSICAL EXAM  GENERAL:  not septic, not ill-appearing  CV: regular rhythm, regular rate  RESPIRATORY: lungs clear  ABDOMEN: soft, mild subjective tenderness in LLQ, no rebound, no guarding    Vital signs and nursing notes reviewed.    LAB RESULTS AND RADIOLOGY  I have reviewed the patient's labs and imaging studies. CXR and labs unremarkable.    PROGRESS NOTES    1250 Spoke to midlevel provider Chino Keys PA-C, about the pt. After performing my own physical exam, I will discharge the pt with abx for tx of diverticulitis.     Attestation:    The MADHAV and I have discussed this patient's history, physical exam, and treatment plan.  I have reviewed the documentation and personally had a face to face interaction with the patient. I affirm the documentation and agree with the treatment and plan.  The attached note describes my personal findings.    Documentation assistance provided by halina Martinez for Dr. Jaime. Information recorded by the scribe was done at my direction and has been verified and validated by me.     Jostin Martinez  02/20/19 1311       Nikolai Jaime MD  02/20/19 1932    "

## 2019-03-07 ENCOUNTER — OFFICE VISIT (OUTPATIENT)
Dept: SURGERY | Facility: CLINIC | Age: 74
End: 2019-03-07

## 2019-03-07 VITALS — HEIGHT: 65 IN | OXYGEN SATURATION: 93 % | BODY MASS INDEX: 30.66 KG/M2 | HEART RATE: 77 BPM | WEIGHT: 184 LBS

## 2019-03-07 DIAGNOSIS — K43.9 VENTRAL HERNIA WITHOUT OBSTRUCTION OR GANGRENE: Primary | ICD-10-CM

## 2019-03-07 PROCEDURE — 99203 OFFICE O/P NEW LOW 30 MIN: CPT | Performed by: SURGERY

## 2019-03-07 RX ORDER — LORAZEPAM 0.5 MG/1
0.5 TABLET ORAL EVERY 8 HOURS PRN
COMMUNITY
End: 2020-07-10

## 2019-03-07 NOTE — PROGRESS NOTES
Subjective   Kiley Last is a 73 y.o. female the patient is a very pleasant 73-year-old white female who presents to the office in surgical consultation from Amelia Latif MD for a ventral hernia.    History of Present Illness     The patient has a history of complicated diverticulitis with a stricture and underwent a sigmoid colon resection with colostomy on 5/3/2017.  This procedure was complicated by an abdominal abscess that required CT scan guided drainage.  She ultimately recovered well and underwent a colostomy takedown on 9/14/2017.  She has done well since that procedure except for an admission for small bowel obstruction that resolved.    She developed left lower quadrant pain and presented to the emergency room where a CT scan of the abdomen pelvis was performed that showed a ventral hernia involving the previous colostomy site.  It contained fat with no bowel.    Review of Systems   Constitutional: Negative for activity change, appetite change, fatigue and fever.   HENT: Negative for trouble swallowing and voice change.    Respiratory: Negative for chest tightness and shortness of breath.    Cardiovascular: Negative for chest pain and palpitations.   Gastrointestinal: Positive for abdominal pain. Negative for blood in stool, constipation, diarrhea, nausea and vomiting.   Endocrine: Negative for cold intolerance and heat intolerance.   Genitourinary: Negative for dysuria and flank pain.   Neurological: Negative for dizziness and light-headedness.   Hematological: Negative for adenopathy. Does not bruise/bleed easily.   Psychiatric/Behavioral: Negative for agitation and confusion.     Past Medical History:   Diagnosis Date   • Arthritis    • Asthma     NO INHALERS   • Colostomy in place (CMS/[facility])    • Diverticular disease    • ESBL (extended spectrum beta-lactamase) producing bacteria infection    • History of abscess of skin and subcutaneous tissue     ABD WOUND 5/2017   • History of atrial  fibrillation      X1 POST OP FROM COLOSTOMY 2017 - NO PROBLEMS SINCE   • Hypertension    • MDRO (multiple drug resistant organisms) resistance    • PONV (postoperative nausea and vomiting)    • Psoriasis    • Vertigo      Past Surgical History:   Procedure Laterality Date   • BLEPHAROPLASTY Bilateral 2017   • CHOLECYSTECTOMY     • COLON RESECTION N/A 5/3/2017    Procedure: OPEN SIMOID COLECTOMY WITH COLOSTOMY;  Surgeon: Renato Delgado MD;  Location: [facility] MAIN OR;  Service:    • COLONOSCOPY N/A 2017    Procedure: COLONOSCOPY VIA COLOSTOMY TO CECUM;  Surgeon: Renato Delgado MD;  Location: [facility] ENDOSCOPY;  Service:    • COLOSTOMY CLOSURE N/A 2017    Procedure: COLOSTOMY TAKEDOWN AND CLOSURE, WITH RESECTION;  Surgeon: Renato Delgado MD;  Location: [facility] MAIN OR;  Service:    • FINGER SURGERY      4TH FINGER LEFT HAND   • HYSTERECTOMY     • PELVIC FLOOR REPAIR     • TONSILLECTOMY       Family History   Problem Relation Age of Onset   • Cancer Mother    • Colon cancer Mother    • Diabetes Father    • Diabetes Son    • Ulcerative colitis Son    • No Known Problems Maternal Grandmother    • No Known Problems Maternal Grandfather    • No Known Problems Paternal Grandmother    • No Known Problems Paternal Grandfather    • Malig Hyperthermia Neg Hx      Social History     Socioeconomic History   • Marital status:      Spouse name: Not on file   • Number of children: Not on file   • Years of education: Not on file   • Highest education level: Not on file   Social Needs   • Financial resource strain: Not on file   • Food insecurity - worry: Not on file   • Food insecurity - inability: Not on file   • Transportation needs - medical: Not on file   • Transportation needs - non-medical: Not on file   Occupational History   • Not on file   Tobacco Use   • Smoking status: Former Smoker     Types: Cigarettes     Last attempt to quit: 1967     Years since quittin.2   • Smokeless tobacco: Never Used    • Tobacco comment: SOCIAL SMOKING ONLY   Substance and Sexual Activity   • Alcohol use: Yes     Alcohol/week: 1.2 oz     Types: 1 Glasses of wine, 1 Shots of liquor per week     Comment: occasionally   • Drug use: No   • Sexual activity: Defer   Other Topics Concern   • Not on file   Social History Narrative   • Not on file       Objective   Physical Exam   Constitutional: She is oriented to person, place, and time. She appears well-developed and well-nourished.  Non-toxic appearance.   HENT:   Head: Normocephalic and atraumatic.   Eyes: EOM are normal. No scleral icterus.   Neck: Normal range of motion. Neck supple.   Pulmonary/Chest: Effort normal. No respiratory distress.   Abdominal: Soft. Normal appearance. There is no tenderness. A hernia (There is an incarcerated ventral hernia at the previous colostomy site with no bowel present) is present. Hernia confirmed positive in the ventral area.   Neurological: She is alert and oriented to person, place, and time.   Skin: Skin is warm and dry.   Psychiatric: She has a normal mood and affect. Her behavior is normal. Judgment and thought content normal.       Assessment/Plan       The encounter diagnosis was Ventral hernia without obstruction or gangrene.    The patient has a chronically incarcerated ventral hernia at the previous colostomy site.  It contains fat and no bowel.  This was discussed at length with her.  She would like to avoid surgery at this time if possible.  Since there is no bowel contained in the ventral hernia he can be managed conservatively.  She will return to the office if the hernia gets larger or becomes more symptomatic.

## 2019-06-25 ENCOUNTER — APPOINTMENT (OUTPATIENT)
Dept: CT IMAGING | Facility: HOSPITAL | Age: 74
End: 2019-06-25

## 2019-06-25 ENCOUNTER — HOSPITAL ENCOUNTER (INPATIENT)
Facility: HOSPITAL | Age: 74
LOS: 2 days | Discharge: HOME | End: 2019-06-28
Attending: EMERGENCY MEDICINE | Admitting: SURGERY

## 2019-06-25 DIAGNOSIS — K56.609 SMALL BOWEL OBSTRUCTION (HCC): Primary | ICD-10-CM

## 2019-06-25 LAB
ALBUMIN SERPL-MCNC: 4.6 G/DL (ref 3.5–5.2)
ALBUMIN/GLOB SERPL: 1.5 G/DL
ALP SERPL-CCNC: 54 U/L (ref 39–117)
ALT SERPL W P-5'-P-CCNC: 16 U/L (ref 1–33)
ANION GAP SERPL CALCULATED.3IONS-SCNC: 16.3 MMOL/L
AST SERPL-CCNC: 19 U/L (ref 1–32)
BACTERIA UR QL AUTO: ABNORMAL /HPF
BASOPHILS # BLD AUTO: 0.01 10*3/MM3 (ref 0–0.2)
BASOPHILS NFR BLD AUTO: 0.1 % (ref 0–1.5)
BILIRUB SERPL-MCNC: 0.4 MG/DL (ref 0.2–1.2)
BILIRUB UR QL STRIP: NEGATIVE
BUN BLD-MCNC: 14 MG/DL (ref 8–23)
BUN/CREAT SERPL: 21.2 (ref 7–25)
CALCIUM SPEC-SCNC: 10.2 MG/DL (ref 8.6–10.5)
CHLORIDE SERPL-SCNC: 101 MMOL/L (ref 98–107)
CLARITY UR: ABNORMAL
CO2 SERPL-SCNC: 22.7 MMOL/L (ref 22–29)
COLOR UR: YELLOW
CREAT BLD-MCNC: 0.66 MG/DL (ref 0.57–1)
DEPRECATED RDW RBC AUTO: 42.5 FL (ref 37–54)
EOSINOPHIL # BLD AUTO: 0.04 10*3/MM3 (ref 0–0.4)
EOSINOPHIL NFR BLD AUTO: 0.3 % (ref 0.3–6.2)
ERYTHROCYTE [DISTWIDTH] IN BLOOD BY AUTOMATED COUNT: 13 % (ref 12.3–15.4)
GFR SERPL CREATININE-BSD FRML MDRD: 88 ML/MIN/1.73
GLOBULIN UR ELPH-MCNC: 3 GM/DL
GLUCOSE BLD-MCNC: 143 MG/DL (ref 65–99)
GLUCOSE UR STRIP-MCNC: NEGATIVE MG/DL
HCT VFR BLD AUTO: 43 % (ref 34–46.6)
HGB BLD-MCNC: 14.1 G/DL (ref 12–15.9)
HGB UR QL STRIP.AUTO: NEGATIVE
HYALINE CASTS UR QL AUTO: ABNORMAL /LPF
IMM GRANULOCYTES # BLD AUTO: 0.04 10*3/MM3 (ref 0–0.05)
IMM GRANULOCYTES NFR BLD AUTO: 0.3 % (ref 0–0.5)
KETONES UR QL STRIP: ABNORMAL
LEUKOCYTE ESTERASE UR QL STRIP.AUTO: ABNORMAL
LIPASE SERPL-CCNC: 28 U/L (ref 13–60)
LYMPHOCYTES # BLD AUTO: 1.49 10*3/MM3 (ref 0.7–3.1)
LYMPHOCYTES NFR BLD AUTO: 11.2 % (ref 19.6–45.3)
MCH RBC QN AUTO: 29.2 PG (ref 26.6–33)
MCHC RBC AUTO-ENTMCNC: 32.8 G/DL (ref 31.5–35.7)
MCV RBC AUTO: 89 FL (ref 79–97)
MONOCYTES # BLD AUTO: 0.93 10*3/MM3 (ref 0.1–0.9)
MONOCYTES NFR BLD AUTO: 7 % (ref 5–12)
NEUTROPHILS # BLD AUTO: 10.79 10*3/MM3 (ref 1.7–7)
NEUTROPHILS NFR BLD AUTO: 81.1 % (ref 42.7–76)
NITRITE UR QL STRIP: NEGATIVE
PH UR STRIP.AUTO: >=9 [PH] (ref 5–8)
PLATELET # BLD AUTO: 179 10*3/MM3 (ref 140–450)
PMV BLD AUTO: 12 FL (ref 6–12)
POTASSIUM BLD-SCNC: 3.6 MMOL/L (ref 3.5–5.2)
PROT SERPL-MCNC: 7.6 G/DL (ref 6–8.5)
PROT UR QL STRIP: ABNORMAL
RBC # BLD AUTO: 4.83 10*6/MM3 (ref 3.77–5.28)
RBC # UR: ABNORMAL /HPF
REF LAB TEST METHOD: ABNORMAL
SODIUM BLD-SCNC: 140 MMOL/L (ref 136–145)
SP GR UR STRIP: 1.02 (ref 1–1.03)
SQUAMOUS #/AREA URNS HPF: ABNORMAL /HPF
UROBILINOGEN UR QL STRIP: ABNORMAL
WBC NRBC COR # BLD: 13.3 10*3/MM3 (ref 3.4–10.8)
WBC UR QL AUTO: ABNORMAL /HPF

## 2019-06-25 PROCEDURE — 25010000002 IOPAMIDOL 61 % SOLUTION: Performed by: EMERGENCY MEDICINE

## 2019-06-25 PROCEDURE — 74177 CT ABD & PELVIS W/CONTRAST: CPT

## 2019-06-25 PROCEDURE — 83690 ASSAY OF LIPASE: CPT | Performed by: EMERGENCY MEDICINE

## 2019-06-25 PROCEDURE — 87086 URINE CULTURE/COLONY COUNT: CPT | Performed by: EMERGENCY MEDICINE

## 2019-06-25 PROCEDURE — 87186 SC STD MICRODIL/AGAR DIL: CPT | Performed by: EMERGENCY MEDICINE

## 2019-06-25 PROCEDURE — 87088 URINE BACTERIA CULTURE: CPT | Performed by: EMERGENCY MEDICINE

## 2019-06-25 PROCEDURE — 25010000002 ONDANSETRON PER 1 MG: Performed by: EMERGENCY MEDICINE

## 2019-06-25 PROCEDURE — 99285 EMERGENCY DEPT VISIT HI MDM: CPT

## 2019-06-25 PROCEDURE — 80053 COMPREHEN METABOLIC PANEL: CPT | Performed by: EMERGENCY MEDICINE

## 2019-06-25 PROCEDURE — 85025 COMPLETE CBC W/AUTO DIFF WBC: CPT | Performed by: EMERGENCY MEDICINE

## 2019-06-25 PROCEDURE — 25010000002 HYDROMORPHONE PER 4 MG: Performed by: EMERGENCY MEDICINE

## 2019-06-25 PROCEDURE — 81001 URINALYSIS AUTO W/SCOPE: CPT | Performed by: EMERGENCY MEDICINE

## 2019-06-25 RX ORDER — HYDROMORPHONE HYDROCHLORIDE 1 MG/ML
0.5 INJECTION, SOLUTION INTRAMUSCULAR; INTRAVENOUS; SUBCUTANEOUS ONCE
Status: COMPLETED | OUTPATIENT
Start: 2019-06-25 | End: 2019-06-25

## 2019-06-25 RX ORDER — ONDANSETRON 2 MG/ML
4 INJECTION INTRAMUSCULAR; INTRAVENOUS ONCE
Status: COMPLETED | OUTPATIENT
Start: 2019-06-25 | End: 2019-06-25

## 2019-06-25 RX ADMIN — HYDROMORPHONE HYDROCHLORIDE 0.5 MG: 1 INJECTION, SOLUTION INTRAMUSCULAR; INTRAVENOUS; SUBCUTANEOUS at 23:53

## 2019-06-25 RX ADMIN — ONDANSETRON HYDROCHLORIDE 4 MG: 2 SOLUTION INTRAMUSCULAR; INTRAVENOUS at 22:26

## 2019-06-25 RX ADMIN — HYDROMORPHONE HYDROCHLORIDE 0.5 MG: 1 INJECTION, SOLUTION INTRAMUSCULAR; INTRAVENOUS; SUBCUTANEOUS at 22:26

## 2019-06-25 RX ADMIN — IOPAMIDOL 85 ML: 612 INJECTION, SOLUTION INTRAVENOUS at 23:21

## 2019-06-26 ENCOUNTER — APPOINTMENT (OUTPATIENT)
Dept: GENERAL RADIOLOGY | Facility: HOSPITAL | Age: 74
End: 2019-06-26

## 2019-06-26 PROBLEM — K56.609 SMALL BOWEL OBSTRUCTION (HCC): Status: ACTIVE | Noted: 2019-06-26

## 2019-06-26 LAB
ALBUMIN SERPL-MCNC: 4.2 G/DL (ref 3.5–5.2)
ALBUMIN/GLOB SERPL: 1.3 G/DL
ALP SERPL-CCNC: 52 U/L (ref 39–117)
ALT SERPL W P-5'-P-CCNC: 13 U/L (ref 1–33)
AMYLASE SERPL-CCNC: 42 U/L (ref 28–100)
ANION GAP SERPL CALCULATED.3IONS-SCNC: 15.1 MMOL/L
AST SERPL-CCNC: 16 U/L (ref 1–32)
BILIRUB SERPL-MCNC: 0.4 MG/DL (ref 0.2–1.2)
BUN BLD-MCNC: 17 MG/DL (ref 8–23)
BUN/CREAT SERPL: 25.4 (ref 7–25)
CALCIUM SPEC-SCNC: 9.7 MG/DL (ref 8.6–10.5)
CHLORIDE SERPL-SCNC: 102 MMOL/L (ref 98–107)
CO2 SERPL-SCNC: 24.9 MMOL/L (ref 22–29)
CREAT BLD-MCNC: 0.67 MG/DL (ref 0.57–1)
DEPRECATED RDW RBC AUTO: 44.7 FL (ref 37–54)
ERYTHROCYTE [DISTWIDTH] IN BLOOD BY AUTOMATED COUNT: 13.4 % (ref 12.3–15.4)
GFR SERPL CREATININE-BSD FRML MDRD: 86 ML/MIN/1.73
GLOBULIN UR ELPH-MCNC: 3.2 GM/DL
GLUCOSE BLD-MCNC: 143 MG/DL (ref 65–99)
HCT VFR BLD AUTO: 42.6 % (ref 34–46.6)
HGB BLD-MCNC: 13.5 G/DL (ref 12–15.9)
MCH RBC QN AUTO: 29.1 PG (ref 26.6–33)
MCHC RBC AUTO-ENTMCNC: 31.7 G/DL (ref 31.5–35.7)
MCV RBC AUTO: 91.8 FL (ref 79–97)
PLATELET # BLD AUTO: 187 10*3/MM3 (ref 140–450)
PMV BLD AUTO: 12.5 FL (ref 6–12)
POTASSIUM BLD-SCNC: 4.2 MMOL/L (ref 3.5–5.2)
PROT SERPL-MCNC: 7.4 G/DL (ref 6–8.5)
RBC # BLD AUTO: 4.64 10*6/MM3 (ref 3.77–5.28)
SODIUM BLD-SCNC: 142 MMOL/L (ref 136–145)
WBC NRBC COR # BLD: 11.9 10*3/MM3 (ref 3.4–10.8)

## 2019-06-26 PROCEDURE — 82150 ASSAY OF AMYLASE: CPT | Performed by: SURGERY

## 2019-06-26 PROCEDURE — 25010000002 HYDROMORPHONE PER 4 MG: Performed by: SURGERY

## 2019-06-26 PROCEDURE — 99222 1ST HOSP IP/OBS MODERATE 55: CPT | Performed by: SURGERY

## 2019-06-26 PROCEDURE — 80053 COMPREHEN METABOLIC PANEL: CPT | Performed by: SURGERY

## 2019-06-26 PROCEDURE — 85027 COMPLETE CBC AUTOMATED: CPT | Performed by: SURGERY

## 2019-06-26 PROCEDURE — 94799 UNLISTED PULMONARY SVC/PX: CPT

## 2019-06-26 PROCEDURE — 25010000002 LEVOFLOXACIN PER 250 MG: Performed by: SURGERY

## 2019-06-26 PROCEDURE — 74018 RADEX ABDOMEN 1 VIEW: CPT

## 2019-06-26 PROCEDURE — 25010000002 PROMETHAZINE PER 50 MG: Performed by: SURGERY

## 2019-06-26 PROCEDURE — 25010000002 DICYCLOMINE PER 20 MG: Performed by: SURGERY

## 2019-06-26 PROCEDURE — 99024 POSTOP FOLLOW-UP VISIT: CPT | Performed by: SURGERY

## 2019-06-26 RX ORDER — LEVOFLOXACIN 5 MG/ML
500 INJECTION, SOLUTION INTRAVENOUS EVERY 24 HOURS
Status: DISCONTINUED | OUTPATIENT
Start: 2019-06-26 | End: 2019-06-27

## 2019-06-26 RX ORDER — MECLIZINE HYDROCHLORIDE 25 MG/1
25 TABLET ORAL 3 TIMES DAILY PRN
Status: DISCONTINUED | OUTPATIENT
Start: 2019-06-26 | End: 2019-06-28 | Stop reason: HOSPADM

## 2019-06-26 RX ORDER — SODIUM CHLORIDE 0.9 % (FLUSH) 0.9 %
3 SYRINGE (ML) INJECTION EVERY 12 HOURS SCHEDULED
Status: DISCONTINUED | OUTPATIENT
Start: 2019-06-26 | End: 2019-06-28 | Stop reason: HOSPADM

## 2019-06-26 RX ORDER — HYDROMORPHONE HYDROCHLORIDE 1 MG/ML
0.5 INJECTION, SOLUTION INTRAMUSCULAR; INTRAVENOUS; SUBCUTANEOUS
Status: DISCONTINUED | OUTPATIENT
Start: 2019-06-26 | End: 2019-06-28 | Stop reason: HOSPADM

## 2019-06-26 RX ORDER — SODIUM CHLORIDE 0.9 % (FLUSH) 0.9 %
3-10 SYRINGE (ML) INJECTION AS NEEDED
Status: DISCONTINUED | OUTPATIENT
Start: 2019-06-26 | End: 2019-06-28 | Stop reason: HOSPADM

## 2019-06-26 RX ORDER — FAMOTIDINE 10 MG/ML
20 INJECTION, SOLUTION INTRAVENOUS EVERY 12 HOURS SCHEDULED
Status: DISCONTINUED | OUTPATIENT
Start: 2019-06-26 | End: 2019-06-27

## 2019-06-26 RX ORDER — ONDANSETRON 2 MG/ML
4 INJECTION INTRAMUSCULAR; INTRAVENOUS EVERY 6 HOURS PRN
Status: DISCONTINUED | OUTPATIENT
Start: 2019-06-26 | End: 2019-06-28 | Stop reason: HOSPADM

## 2019-06-26 RX ORDER — METOPROLOL SUCCINATE 25 MG/1
25 TABLET, EXTENDED RELEASE ORAL DAILY
Status: DISCONTINUED | OUTPATIENT
Start: 2019-06-26 | End: 2019-06-28 | Stop reason: HOSPADM

## 2019-06-26 RX ORDER — LORAZEPAM 0.5 MG/1
0.5 TABLET ORAL EVERY 8 HOURS PRN
Status: DISCONTINUED | OUTPATIENT
Start: 2019-06-26 | End: 2019-06-28 | Stop reason: HOSPADM

## 2019-06-26 RX ORDER — SODIUM CHLORIDE 9 MG/ML
50 INJECTION, SOLUTION INTRAVENOUS CONTINUOUS
Status: DISCONTINUED | OUTPATIENT
Start: 2019-06-26 | End: 2019-06-28 | Stop reason: HOSPADM

## 2019-06-26 RX ORDER — PROMETHAZINE HYDROCHLORIDE 25 MG/ML
12.5 INJECTION, SOLUTION INTRAMUSCULAR; INTRAVENOUS EVERY 6 HOURS PRN
Status: DISCONTINUED | OUTPATIENT
Start: 2019-06-26 | End: 2019-06-28 | Stop reason: HOSPADM

## 2019-06-26 RX ORDER — NALOXONE HCL 0.4 MG/ML
0.4 VIAL (ML) INJECTION
Status: DISCONTINUED | OUTPATIENT
Start: 2019-06-26 | End: 2019-06-28 | Stop reason: HOSPADM

## 2019-06-26 RX ORDER — DICYCLOMINE HYDROCHLORIDE 10 MG/ML
20 INJECTION INTRAMUSCULAR 4 TIMES DAILY
Status: DISCONTINUED | OUTPATIENT
Start: 2019-06-26 | End: 2019-06-27

## 2019-06-26 RX ADMIN — HYDROMORPHONE HYDROCHLORIDE 0.5 MG: 1 INJECTION, SOLUTION INTRAMUSCULAR; INTRAVENOUS; SUBCUTANEOUS at 03:52

## 2019-06-26 RX ADMIN — HYDROMORPHONE HYDROCHLORIDE 0.5 MG: 1 INJECTION, SOLUTION INTRAMUSCULAR; INTRAVENOUS; SUBCUTANEOUS at 01:46

## 2019-06-26 RX ADMIN — DICYCLOMINE HYDROCHLORIDE 20 MG: 10 INJECTION INTRAMUSCULAR at 18:55

## 2019-06-26 RX ADMIN — SODIUM CHLORIDE, PRESERVATIVE FREE 3 ML: 5 INJECTION INTRAVENOUS at 00:52

## 2019-06-26 RX ADMIN — PROMETHAZINE HYDROCHLORIDE 12.5 MG: 25 INJECTION INTRAMUSCULAR; INTRAVENOUS at 21:08

## 2019-06-26 RX ADMIN — LEVOFLOXACIN 500 MG: 5 INJECTION, SOLUTION INTRAVENOUS at 02:38

## 2019-06-26 RX ADMIN — HYDROMORPHONE HYDROCHLORIDE 0.5 MG: 1 INJECTION, SOLUTION INTRAMUSCULAR; INTRAVENOUS; SUBCUTANEOUS at 08:05

## 2019-06-26 RX ADMIN — DICYCLOMINE HYDROCHLORIDE 20 MG: 10 INJECTION INTRAMUSCULAR at 08:06

## 2019-06-26 RX ADMIN — DICYCLOMINE HYDROCHLORIDE 20 MG: 10 INJECTION INTRAMUSCULAR at 21:22

## 2019-06-26 RX ADMIN — SODIUM CHLORIDE 100 ML/HR: 9 INJECTION, SOLUTION INTRAVENOUS at 00:52

## 2019-06-26 RX ADMIN — HYDROMORPHONE HYDROCHLORIDE 0.5 MG: 1 INJECTION, SOLUTION INTRAMUSCULAR; INTRAVENOUS; SUBCUTANEOUS at 16:17

## 2019-06-26 RX ADMIN — DICYCLOMINE HYDROCHLORIDE 20 MG: 10 INJECTION INTRAMUSCULAR at 11:06

## 2019-06-26 RX ADMIN — HYDROMORPHONE HYDROCHLORIDE 0.5 MG: 1 INJECTION, SOLUTION INTRAMUSCULAR; INTRAVENOUS; SUBCUTANEOUS at 21:08

## 2019-06-26 RX ADMIN — PROMETHAZINE HYDROCHLORIDE 12.5 MG: 25 INJECTION INTRAMUSCULAR; INTRAVENOUS at 01:59

## 2019-06-26 RX ADMIN — HYDROMORPHONE HYDROCHLORIDE 0.5 MG: 1 INJECTION, SOLUTION INTRAMUSCULAR; INTRAVENOUS; SUBCUTANEOUS at 11:06

## 2019-06-26 RX ADMIN — METOPROLOL SUCCINATE 25 MG: 25 TABLET, FILM COATED, EXTENDED RELEASE ORAL at 08:05

## 2019-06-26 RX ADMIN — PROMETHAZINE HYDROCHLORIDE 12.5 MG: 25 INJECTION INTRAMUSCULAR; INTRAVENOUS at 08:06

## 2019-06-26 RX ADMIN — FAMOTIDINE 20 MG: 10 INJECTION INTRAVENOUS at 08:06

## 2019-06-26 RX ADMIN — HYDROMORPHONE HYDROCHLORIDE 0.5 MG: 1 INJECTION, SOLUTION INTRAMUSCULAR; INTRAVENOUS; SUBCUTANEOUS at 05:59

## 2019-06-26 RX ADMIN — FAMOTIDINE 20 MG: 10 INJECTION INTRAVENOUS at 01:46

## 2019-06-26 RX ADMIN — SODIUM CHLORIDE, PRESERVATIVE FREE 3 ML: 5 INJECTION INTRAVENOUS at 21:08

## 2019-06-26 RX ADMIN — FAMOTIDINE 20 MG: 10 INJECTION INTRAVENOUS at 21:08

## 2019-06-26 RX ADMIN — SODIUM CHLORIDE 100 ML/HR: 9 INJECTION, SOLUTION INTRAVENOUS at 11:06

## 2019-06-27 LAB
ANION GAP SERPL CALCULATED.3IONS-SCNC: 7.4 MMOL/L (ref 5–15)
BUN BLD-MCNC: 12 MG/DL (ref 8–23)
BUN/CREAT SERPL: 22.6 (ref 7–25)
CALCIUM SPEC-SCNC: 8.2 MG/DL (ref 8.6–10.5)
CHLORIDE SERPL-SCNC: 107 MMOL/L (ref 98–107)
CO2 SERPL-SCNC: 26.6 MMOL/L (ref 22–29)
CREAT BLD-MCNC: 0.53 MG/DL (ref 0.57–1)
DEPRECATED RDW RBC AUTO: 45.7 FL (ref 37–54)
ERYTHROCYTE [DISTWIDTH] IN BLOOD BY AUTOMATED COUNT: 13.8 % (ref 12.3–15.4)
GFR SERPL CREATININE-BSD FRML MDRD: 113 ML/MIN/1.73
GLUCOSE BLD-MCNC: 99 MG/DL (ref 65–99)
HCT VFR BLD AUTO: 36.2 % (ref 34–46.6)
HGB BLD-MCNC: 11.5 G/DL (ref 12–15.9)
MCH RBC QN AUTO: 28.9 PG (ref 26.6–33)
MCHC RBC AUTO-ENTMCNC: 31.8 G/DL (ref 31.5–35.7)
MCV RBC AUTO: 91 FL (ref 79–97)
PLATELET # BLD AUTO: 140 10*3/MM3 (ref 140–450)
PMV BLD AUTO: 12.8 FL (ref 6–12)
POTASSIUM BLD-SCNC: 3.9 MMOL/L (ref 3.5–5.2)
RBC # BLD AUTO: 3.98 10*6/MM3 (ref 3.77–5.28)
SODIUM BLD-SCNC: 141 MMOL/L (ref 136–145)
WBC NRBC COR # BLD: 7.69 10*3/MM3 (ref 3.4–10.8)

## 2019-06-27 PROCEDURE — 80048 BASIC METABOLIC PNL TOTAL CA: CPT | Performed by: SURGERY

## 2019-06-27 PROCEDURE — 25010000002 PROMETHAZINE PER 50 MG: Performed by: SURGERY

## 2019-06-27 PROCEDURE — 25010000002 LEVOFLOXACIN PER 250 MG: Performed by: SURGERY

## 2019-06-27 PROCEDURE — 99231 SBSQ HOSP IP/OBS SF/LOW 25: CPT | Performed by: SURGERY

## 2019-06-27 PROCEDURE — 85027 COMPLETE CBC AUTOMATED: CPT | Performed by: SURGERY

## 2019-06-27 RX ORDER — FAMOTIDINE 20 MG/1
20 TABLET, FILM COATED ORAL 2 TIMES DAILY
Status: DISCONTINUED | OUTPATIENT
Start: 2019-06-27 | End: 2019-06-28 | Stop reason: HOSPADM

## 2019-06-27 RX ORDER — LEVOFLOXACIN 500 MG/1
500 TABLET, FILM COATED ORAL EVERY 24 HOURS
Status: DISCONTINUED | OUTPATIENT
Start: 2019-06-27 | End: 2019-06-28 | Stop reason: HOSPADM

## 2019-06-27 RX ORDER — DICYCLOMINE HYDROCHLORIDE 10 MG/1
10 CAPSULE ORAL 4 TIMES DAILY
Status: DISCONTINUED | OUTPATIENT
Start: 2019-06-27 | End: 2019-06-28 | Stop reason: HOSPADM

## 2019-06-27 RX ADMIN — DICYCLOMINE HYDROCHLORIDE 10 MG: 10 CAPSULE ORAL at 11:43

## 2019-06-27 RX ADMIN — DICYCLOMINE HYDROCHLORIDE 10 MG: 10 CAPSULE ORAL at 20:51

## 2019-06-27 RX ADMIN — HYDROCODONE BITARTRATE AND ACETAMINOPHEN 15 ML: 7.5; 325 SOLUTION ORAL at 21:59

## 2019-06-27 RX ADMIN — SODIUM CHLORIDE 100 ML/HR: 9 INJECTION, SOLUTION INTRAVENOUS at 00:18

## 2019-06-27 RX ADMIN — FAMOTIDINE 20 MG: 20 TABLET, FILM COATED ORAL at 20:51

## 2019-06-27 RX ADMIN — HYDROCODONE BITARTRATE AND ACETAMINOPHEN 15 ML: 7.5; 325 SOLUTION ORAL at 17:23

## 2019-06-27 RX ADMIN — METOPROLOL SUCCINATE 25 MG: 25 TABLET, FILM COATED, EXTENDED RELEASE ORAL at 09:22

## 2019-06-27 RX ADMIN — SODIUM CHLORIDE, PRESERVATIVE FREE 3 ML: 5 INJECTION INTRAVENOUS at 20:52

## 2019-06-27 RX ADMIN — LEVOFLOXACIN 500 MG: 5 INJECTION, SOLUTION INTRAVENOUS at 02:47

## 2019-06-27 RX ADMIN — HYDROCODONE BITARTRATE AND ACETAMINOPHEN 15 ML: 7.5; 325 SOLUTION ORAL at 11:43

## 2019-06-27 RX ADMIN — DICYCLOMINE HYDROCHLORIDE 10 MG: 10 CAPSULE ORAL at 17:23

## 2019-06-27 RX ADMIN — SODIUM CHLORIDE 50 ML/HR: 9 INJECTION, SOLUTION INTRAVENOUS at 15:09

## 2019-06-27 RX ADMIN — PROMETHAZINE HYDROCHLORIDE 12.5 MG: 25 INJECTION INTRAMUSCULAR; INTRAVENOUS at 22:00

## 2019-06-27 RX ADMIN — FAMOTIDINE 20 MG: 10 INJECTION INTRAVENOUS at 09:22

## 2019-06-27 RX ADMIN — SODIUM CHLORIDE, PRESERVATIVE FREE 3 ML: 5 INJECTION INTRAVENOUS at 09:38

## 2019-06-28 VITALS
BODY MASS INDEX: 32.14 KG/M2 | OXYGEN SATURATION: 98 % | DIASTOLIC BLOOD PRESSURE: 59 MMHG | HEIGHT: 65 IN | SYSTOLIC BLOOD PRESSURE: 112 MMHG | RESPIRATION RATE: 16 BRPM | TEMPERATURE: 97.2 F | HEART RATE: 60 BPM | WEIGHT: 192.9 LBS

## 2019-06-28 LAB
ANION GAP SERPL CALCULATED.3IONS-SCNC: 10 MMOL/L (ref 5–15)
BUN BLD-MCNC: 7 MG/DL (ref 8–23)
BUN/CREAT SERPL: 15.2 (ref 7–25)
CALCIUM SPEC-SCNC: 8.4 MG/DL (ref 8.6–10.5)
CHLORIDE SERPL-SCNC: 108 MMOL/L (ref 98–107)
CO2 SERPL-SCNC: 26 MMOL/L (ref 22–29)
CREAT BLD-MCNC: 0.46 MG/DL (ref 0.57–1)
GFR SERPL CREATININE-BSD FRML MDRD: 133 ML/MIN/1.73
GLUCOSE BLD-MCNC: 103 MG/DL (ref 65–99)
POTASSIUM BLD-SCNC: 3.2 MMOL/L (ref 3.5–5.2)
SODIUM BLD-SCNC: 144 MMOL/L (ref 136–145)

## 2019-06-28 PROCEDURE — 99238 HOSP IP/OBS DSCHRG MGMT 30/<: CPT | Performed by: SURGERY

## 2019-06-28 PROCEDURE — 80048 BASIC METABOLIC PNL TOTAL CA: CPT | Performed by: SURGERY

## 2019-06-28 RX ADMIN — LEVOFLOXACIN 500 MG: 500 TABLET, FILM COATED ORAL at 11:00

## 2019-06-28 RX ADMIN — HYDROCODONE BITARTRATE AND ACETAMINOPHEN 15 ML: 7.5; 325 SOLUTION ORAL at 04:39

## 2019-06-28 RX ADMIN — DICYCLOMINE HYDROCHLORIDE 10 MG: 10 CAPSULE ORAL at 09:04

## 2019-06-28 RX ADMIN — METOPROLOL SUCCINATE 25 MG: 25 TABLET, FILM COATED, EXTENDED RELEASE ORAL at 09:04

## 2019-06-28 RX ADMIN — HYDROCODONE BITARTRATE AND ACETAMINOPHEN 15 ML: 7.5; 325 SOLUTION ORAL at 13:33

## 2019-06-28 RX ADMIN — LEVOFLOXACIN 500 MG: 500 TABLET, FILM COATED ORAL at 02:01

## 2019-06-28 RX ADMIN — DICYCLOMINE HYDROCHLORIDE 10 MG: 10 CAPSULE ORAL at 13:35

## 2019-06-28 RX ADMIN — SODIUM CHLORIDE, PRESERVATIVE FREE 3 ML: 5 INJECTION INTRAVENOUS at 09:05

## 2019-06-28 RX ADMIN — FAMOTIDINE 20 MG: 20 TABLET, FILM COATED ORAL at 09:04

## 2019-06-29 ENCOUNTER — READMISSION MANAGEMENT (OUTPATIENT)
Dept: CALL CENTER | Facility: HOSPITAL | Age: 74
End: 2019-06-29

## 2019-06-29 LAB
BACTERIA SPEC AEROBE CULT: ABNORMAL
BACTERIA SPEC AEROBE CULT: ABNORMAL

## 2019-06-29 NOTE — OUTREACH NOTE
Prep Survey      Responses   Facility patient discharged from?  [facility]   Is patient eligible?  Yes   Discharge diagnosis  Small bowel obstruction versus gastroenteritis   Does the patient have one of the following disease processes/diagnoses(primary or secondary)?  Other   Does the patient have Home health ordered?  No   Is there a DME ordered?  No   Prep survey completed?  Yes          Miranda Peres RN

## 2019-07-02 ENCOUNTER — READMISSION MANAGEMENT (OUTPATIENT)
Dept: CALL CENTER | Facility: HOSPITAL | Age: 74
End: 2019-07-02

## 2019-07-02 NOTE — OUTREACH NOTE
Medical Week 1 Survey      Responses   Facility patient discharged from?  [facility]   Does the patient have one of the following disease processes/diagnoses(primary or secondary)?  Other   Is there a successful TCM telephone encounter documented?  No   Week 1 attempt successful?  Yes   Call start time  1103   Call end time  1106   Discharge diagnosis  Small bowel obstruction versus gastroenteritis   Meds reviewed with patient/caregiver?  Yes   Is the patient having any side effects they believe may be caused by any medication additions or changes?  No   Does the patient have all medications ordered at discharge?  N/A   Is the patient taking all medications as directed (includes completed medication regime)?  Yes   Does the patient have a primary care provider?   Yes   Does the patient have an appointment with their PCP within 7 days of discharge?  Yes   Has the patient kept scheduled appointments due by today?  N/A   Has home health visited the patient within 72 hours of discharge?  N/A   Psychosocial issues?  No   Did the patient receive a copy of their discharge instructions?  Yes   Nursing interventions  Reviewed instructions with patient   What is the patient's perception of their health status since discharge?  Improving   Is the patient/caregiver able to teach back signs and symptoms related to disease process for when to call PCP?  Yes   Is the patient/caregiver able to teach back signs and symptoms related to disease process for when to call 911?  Yes   Is the patient/caregiver able to teach back the hierarchy of who to call/visit for symptoms/problems? PCP, Specialist, Home health nurse, Urgent Care, ED, 911  Yes   Week 1 call completed?  Yes   Graduated  Yes   Did the patient feel the follow up calls were helpful during their recovery period?  Yes   Was the number of calls appropriate?  Yes          Aparna Lopes RN

## 2019-07-05 ENCOUNTER — TELEPHONE (OUTPATIENT)
Dept: SURGERY | Facility: CLINIC | Age: 74
End: 2019-07-05

## 2019-07-05 DIAGNOSIS — Z12.11 ENCOUNTER FOR SCREENING COLONOSCOPY: Primary | ICD-10-CM

## 2019-07-09 ENCOUNTER — TRANSCRIBE ORDERS (OUTPATIENT)
Dept: ADMINISTRATIVE | Facility: HOSPITAL | Age: 74
End: 2019-07-09

## 2019-07-09 DIAGNOSIS — K76.9 LIVER LESION: Primary | ICD-10-CM

## 2019-07-23 ENCOUNTER — TELEPHONE (OUTPATIENT)
Dept: CARDIOLOGY | Facility: CLINIC | Age: 74
End: 2019-07-23

## 2019-07-23 NOTE — TELEPHONE ENCOUNTER
The patients daughter has a concern that the patient will not be honest at her appointment. Patients daughter states that the patient has been having some SOA, some edema in feet and legs as well as pain, loss of strength, lack of sleep, lots of fatigue,forgets things has some confusion, the patients family has concerns. She has an appointment on on Thursday with us.

## 2019-07-25 ENCOUNTER — OFFICE VISIT (OUTPATIENT)
Dept: CARDIOLOGY | Facility: CLINIC | Age: 74
End: 2019-07-25

## 2019-07-25 VITALS
BODY MASS INDEX: 29.82 KG/M2 | WEIGHT: 179 LBS | OXYGEN SATURATION: 98 % | SYSTOLIC BLOOD PRESSURE: 144 MMHG | DIASTOLIC BLOOD PRESSURE: 80 MMHG | HEIGHT: 65 IN | HEART RATE: 72 BPM

## 2019-07-25 DIAGNOSIS — I48.0 PAROXYSMAL ATRIAL FIBRILLATION (HCC): Primary | ICD-10-CM

## 2019-07-25 PROCEDURE — 93000 ELECTROCARDIOGRAM COMPLETE: CPT | Performed by: PHYSICIAN ASSISTANT

## 2019-07-25 PROCEDURE — 99213 OFFICE O/P EST LOW 20 MIN: CPT | Performed by: PHYSICIAN ASSISTANT

## 2019-07-25 NOTE — PROGRESS NOTES
Date of Office Visit: 2019  Encounter Provider: FRANCA Garcia  Place of Service: [facility] CARDIOLOGY  Patient Name: Kiley Last  :1945    Chief Complaint   Patient presents with   • Atrial Fibrillation     6 month follow up   :     HPI: Kiley Last is a 73 y.o. female who presents today for follow-up.  Old records have been obtained and reviewed by me.  She is a patient of Dr. Chakraborty who is well-known to me.  She had atrial fibrillation during the setting of a major abdominal surgery.  She converted to sinus rhythm.  We never anticoagulated her and eventually we took her off the amiodarone and kept her on beta-blockade alone.  In 2018 she was under a lot of stress and having palpitations.  A 48-hour Holter monitor showed PACs and PVCs but no atrial fibrillation.  She has quite a lot of stress in her life.  She has a  who is very ill.  She has an estranged daughter.  She was last in our office to see me in 2018.  At that visit we talked about ways to manage her stress.  I felt she was stable from a cardiac standpoint, and my recommendation was for her to follow-up with Dr. Kearns in 6 months.  She presented to the emergency room on 2019 with abdominal pain and nausea.  A CT scan of her abdomen was concerning for small bowel obstruction.  This was managed medically and she did well.   Here today because she is very stressed out and she wants to make sure that her heart is okay.  Things with her  have gotten progressively worse.  She is not sleeping, she has a lot of anxiety, and she is starting to physically deteriorate.  She complains of weakness and fatigue and muscle aches all over.  She has some tightness in her chest and her neck that come and go.  She has shortness of breath.  She thinks that last week she had an episode of atrial fibrillation that lasted 12 hours.  She said her heart was irregular and racing.  She  states that her children are concerned about her and are trying to help alleviate some of her responsibilities.      Past Medical History:   Diagnosis Date   • Arthritis    • Asthma     NO INHALERS   • Colostomy in place (CMS/[facility])    • Diverticular disease    • ESBL (extended spectrum beta-lactamase) producing bacteria infection    • History of abscess of skin and subcutaneous tissue     ABD WOUND 2017   • History of atrial fibrillation      X1 POST OP FROM COLOSTOMY 2017 - NO PROBLEMS SINCE   • Hypertension    • MDRO (multiple drug resistant organisms) resistance    • PONV (postoperative nausea and vomiting)    • Psoriasis    • Vertigo        Past Surgical History:   Procedure Laterality Date   • BLEPHAROPLASTY Bilateral 2017   • CHOLECYSTECTOMY     • COLON RESECTION N/A 5/3/2017    Procedure: OPEN SIMOID COLECTOMY WITH COLOSTOMY;  Surgeon: Renato Delgado MD;  Location: [facility] OR;  Service:    • COLONOSCOPY N/A 2017    Procedure: COLONOSCOPY VIA COLOSTOMY TO CECUM;  Surgeon: Renato Delgado MD;  Location: [facility] ENDOSCOPY;  Service:    • COLOSTOMY CLOSURE N/A 2017    Procedure: COLOSTOMY TAKEDOWN AND CLOSURE, WITH RESECTION;  Surgeon: Renato Delgado MD;  Location: [facility] OR;  Service:    • FINGER SURGERY      4TH FINGER LEFT HAND   • HYSTERECTOMY     • PELVIC FLOOR REPAIR     • TONSILLECTOMY         Social History     Socioeconomic History   • Marital status:      Spouse name: Not on file   • Number of children: Not on file   • Years of education: Not on file   • Highest education level: Not on file   Tobacco Use   • Smoking status: Former Smoker     Types: Cigarettes     Last attempt to quit: 1967     Years since quittin.5   • Smokeless tobacco: Never Used   • Tobacco comment: SOCIAL SMOKING ONLY   Substance and Sexual Activity   • Alcohol use: Yes     Alcohol/week: 1.2 oz     Types: 1 Glasses of wine, 1 Shots of liquor per week     Comment: occasionally   • Drug use:  No   • Sexual activity: Defer       Family History   Problem Relation Age of Onset   • Cancer Mother    • Colon cancer Mother    • Diabetes Father    • Diabetes Son    • Ulcerative colitis Son    • No Known Problems Maternal Grandmother    • No Known Problems Maternal Grandfather    • No Known Problems Paternal Grandmother    • No Known Problems Paternal Grandfather    • Malig Hyperthermia Neg Hx        Review of Systems   Constitution: Positive for malaise/fatigue. Negative for chills and fever.   Cardiovascular: Positive for chest pain and dyspnea on exertion. Negative for leg swelling, near-syncope, orthopnea, palpitations, paroxysmal nocturnal dyspnea and syncope.   Respiratory: Negative for cough and shortness of breath.    Musculoskeletal: Positive for myalgias. Negative for joint pain and joint swelling.   Gastrointestinal: Negative for abdominal pain, diarrhea, melena, nausea and vomiting.   Genitourinary: Negative for frequency and hematuria.   Neurological: Negative for light-headedness, numbness, paresthesias and seizures.   Psychiatric/Behavioral: The patient has insomnia.    Allergic/Immunologic: Negative.    All other systems reviewed and are negative.      Allergies   Allergen Reactions   • Epinephrine Palpitations   • Penicillins Other (See Comments)     BLISTERS IN MOUTH    Patient tolerated ceftriaxone during 5/2017 admission.            Current Outpatient Medications:   •  Acetaminophen (TYLENOL ARTHRITIS PAIN PO), Take 650 mg by mouth 3 (Three) Times a Day As Needed., Disp: , Rfl:   •  cholecalciferol (VITAMIN D3) 1000 units tablet, Take 1,000 Units by mouth Daily., Disp: , Rfl:   •  LORazepam (ATIVAN) 0.5 MG tablet, Take 0.5 mg by mouth Every 8 (Eight) Hours As Needed for Anxiety., Disp: , Rfl:   •  meclizine (ANTIVERT) 25 MG tablet, Take 25 mg by mouth 3 (Three) Times a Day As Needed for dizziness., Disp: , Rfl:   •  metoprolol succinate XL (TOPROL-XL) 25 MG 24 hr tablet, Take 25 mg by mouth  "Daily., Disp: , Rfl:       Objective:     Vitals:    07/25/19 1229 07/25/19 1238   BP: 138/78 144/80   BP Location: Right arm Left arm   Pulse: 72    SpO2: 98%    Weight: 81.2 kg (179 lb)    Height: 165.1 cm (65\")      Body mass index is 29.79 kg/m².    PHYSICAL EXAM:    Physical Exam   Constitutional: She is oriented to person, place, and time. She appears well-developed and well-nourished. No distress.   HENT:   Head: Normocephalic and atraumatic.   Eyes: Pupils are equal, round, and reactive to light.   Neck: No JVD present. No thyromegaly present.   Cardiovascular: Normal rate, regular rhythm, normal heart sounds and intact distal pulses.   No murmur heard.  Pulmonary/Chest: Effort normal and breath sounds normal. No respiratory distress.   Abdominal: Soft. Bowel sounds are normal. She exhibits no distension. There is no splenomegaly or hepatomegaly. There is no tenderness.   Musculoskeletal: Normal range of motion. She exhibits no edema.   Neurological: She is alert and oriented to person, place, and time.   Skin: Skin is warm and dry. She is not diaphoretic. No erythema.   Psychiatric: She has a normal mood and affect. Her behavior is normal. Judgment normal.         ECG 12 Lead  Date/Time: 7/25/2019 12:48 PM  Performed by: Kelsie Billingsley PA  Authorized by: Kelsie Billingsley PA   Comparison: compared with previous ECG from 10/24/2018  Similar to previous ECG  Rhythm: sinus rhythm  BPM: 71    Clinical impression: normal ECG  Comments: Indication: Paroxysmal atrial fibrillation.              Assessment:       Diagnosis Plan   1. Paroxysmal atrial fibrillation (CMS/HCC)  ECG 12 Lead    Adult Transthoracic Echo Complete W/ Cont if Necessary Per Protocol    Holter Monitor - 72 Hour Up To 21 Days     Orders Placed This Encounter   Procedures   • Holter Monitor - 72 Hour Up To 21 Days     Standing Status:   Future     Standing Expiration Date:   7/24/2020     Order Specific Question:   Reason for exam?     Answer: "   AFib   • ECG 12 Lead     This order was created via procedure documentation   • Adult Transthoracic Echo Complete W/ Cont if Necessary Per Protocol     Standing Status:   Future     Order Specific Question:   Reason for exam?     Answer:   Dyspnea          Plan:       I am concerned about her.  She is extremely stressed out and is starting to wear on her body.  I think that the majority of her symptoms are stress related.  However, with the amount of stress that she is under I would not be surprised if she is having episodes of atrial fibrillation or if she had reduced LV function as a result.  I am going to check an echocardiogram and a Zio patch.  We had a long discussion about stress management and the need for her to give up control and allow her family to help her.  I also gave her the name of a therapist and some other suggestions for therapy so that she can find someone to talk about the emotional stress that she is under.  Further recommendations will be made pending the results of her testing.  I will have her follow-up with Dr. Kearns in 6 months or sooner if needed.    As always, it has been a pleasure to participate in your patient's care.      Sincerely,         Kelsie Billingsley PA-C

## 2019-08-01 ENCOUNTER — ANESTHESIA (OUTPATIENT)
Dept: GASTROENTEROLOGY | Facility: HOSPITAL | Age: 74
End: 2019-08-01

## 2019-08-01 ENCOUNTER — ANESTHESIA EVENT (OUTPATIENT)
Dept: GASTROENTEROLOGY | Facility: HOSPITAL | Age: 74
End: 2019-08-01

## 2019-08-01 ENCOUNTER — HOSPITAL ENCOUNTER (OUTPATIENT)
Facility: HOSPITAL | Age: 74
Setting detail: HOSPITAL OUTPATIENT SURGERY
Discharge: HOME | End: 2019-08-01
Attending: SURGERY | Admitting: SURGERY

## 2019-08-01 VITALS
DIASTOLIC BLOOD PRESSURE: 68 MMHG | TEMPERATURE: 97.5 F | BODY MASS INDEX: 29.99 KG/M2 | WEIGHT: 180 LBS | RESPIRATION RATE: 16 BRPM | SYSTOLIC BLOOD PRESSURE: 126 MMHG | HEIGHT: 65 IN | HEART RATE: 60 BPM | OXYGEN SATURATION: 100 %

## 2019-08-01 DIAGNOSIS — Z12.11 ENCOUNTER FOR SCREENING COLONOSCOPY: ICD-10-CM

## 2019-08-01 PROCEDURE — S0260 H&P FOR SURGERY: HCPCS | Performed by: SURGERY

## 2019-08-01 PROCEDURE — 45380 COLONOSCOPY AND BIOPSY: CPT | Performed by: SURGERY

## 2019-08-01 PROCEDURE — 25010000002 PROPOFOL 10 MG/ML EMULSION: Performed by: NURSE ANESTHETIST, CERTIFIED REGISTERED

## 2019-08-01 PROCEDURE — 88305 TISSUE EXAM BY PATHOLOGIST: CPT | Performed by: SURGERY

## 2019-08-01 PROCEDURE — 45385 COLONOSCOPY W/LESION REMOVAL: CPT | Performed by: SURGERY

## 2019-08-01 RX ORDER — SODIUM CHLORIDE 0.9 % (FLUSH) 0.9 %
3 SYRINGE (ML) INJECTION AS NEEDED
Status: DISCONTINUED | OUTPATIENT
Start: 2019-08-01 | End: 2019-08-01 | Stop reason: HOSPADM

## 2019-08-01 RX ORDER — LIDOCAINE HYDROCHLORIDE 10 MG/ML
0.5 INJECTION, SOLUTION INFILTRATION; PERINEURAL ONCE AS NEEDED
Status: DISCONTINUED | OUTPATIENT
Start: 2019-08-01 | End: 2019-08-01 | Stop reason: HOSPADM

## 2019-08-01 RX ORDER — LIDOCAINE HYDROCHLORIDE 20 MG/ML
INJECTION, SOLUTION INFILTRATION; PERINEURAL AS NEEDED
Status: DISCONTINUED | OUTPATIENT
Start: 2019-08-01 | End: 2019-08-01 | Stop reason: SURG

## 2019-08-01 RX ORDER — SODIUM CHLORIDE, SODIUM LACTATE, POTASSIUM CHLORIDE, CALCIUM CHLORIDE 600; 310; 30; 20 MG/100ML; MG/100ML; MG/100ML; MG/100ML
1000 INJECTION, SOLUTION INTRAVENOUS CONTINUOUS
Status: DISCONTINUED | OUTPATIENT
Start: 2019-08-01 | End: 2019-08-01 | Stop reason: HOSPADM

## 2019-08-01 RX ORDER — GLYCOPYRROLATE 0.2 MG/ML
INJECTION INTRAMUSCULAR; INTRAVENOUS AS NEEDED
Status: DISCONTINUED | OUTPATIENT
Start: 2019-08-01 | End: 2019-08-01 | Stop reason: SURG

## 2019-08-01 RX ORDER — PROPOFOL 10 MG/ML
VIAL (ML) INTRAVENOUS CONTINUOUS PRN
Status: DISCONTINUED | OUTPATIENT
Start: 2019-08-01 | End: 2019-08-01 | Stop reason: SURG

## 2019-08-01 RX ORDER — PROPOFOL 10 MG/ML
VIAL (ML) INTRAVENOUS AS NEEDED
Status: DISCONTINUED | OUTPATIENT
Start: 2019-08-01 | End: 2019-08-01 | Stop reason: SURG

## 2019-08-01 RX ADMIN — PROPOFOL 40 MG: 10 INJECTION, EMULSION INTRAVENOUS at 08:38

## 2019-08-01 RX ADMIN — SODIUM CHLORIDE, POTASSIUM CHLORIDE, SODIUM LACTATE AND CALCIUM CHLORIDE 1000 ML: 600; 310; 30; 20 INJECTION, SOLUTION INTRAVENOUS at 08:12

## 2019-08-01 RX ADMIN — PROPOFOL 200 MCG/KG/MIN: 10 INJECTION, EMULSION INTRAVENOUS at 08:39

## 2019-08-01 RX ADMIN — LIDOCAINE HYDROCHLORIDE 30 MG: 20 INJECTION, SOLUTION INFILTRATION; PERINEURAL at 08:38

## 2019-08-01 RX ADMIN — GLYCOPYRROLATE 0.2 MCG: 0.2 INJECTION INTRAMUSCULAR; INTRAVENOUS at 08:35

## 2019-08-01 NOTE — ANESTHESIA PREPROCEDURE EVALUATION
Anesthesia Evaluation     Patient summary reviewed and Nursing notes reviewed   history of anesthetic complications:  NPO Solid Status: > 8 hours  NPO Liquid Status: > 4 hours           Airway   Mallampati: I  TM distance: >3 FB  Neck ROM: full  No difficulty expected  Dental - normal exam     Pulmonary - normal exam   (+) pleural effusion, COPD, asthma,   Cardiovascular - normal exam    (+) hypertension, dysrhythmias Atrial Fib,       Neuro/Psych  (+) dizziness/light headedness,     GI/Hepatic/Renal/Endo    (+) obesity,     (-) morbid obesity    Musculoskeletal     Abdominal  - normal exam    Bowel sounds: normal.   Substance History - negative use     OB/GYN negative ob/gyn ROS         Other   (+) arthritis                     Anesthesia Plan    ASA 3     MAC     Anesthetic plan, all risks, benefits, and alternatives have been provided, discussed and informed consent has been obtained with: patient.

## 2019-08-01 NOTE — OP NOTE
Surgeon:     Dalton Vela Jr., M.D.    Preoperative Diagnosis:     1.  Family history of colon cancer    Postoperative Diagnosis:     1.  Multiple colonic polyps    Procedure Performed:     1.  Colonoscopy with hot snare polypectomy    Indications:     The patient is a pleasant 73-year-old female who has had a previous sigmoid colon resection for complicated diverticulitis.  She has a family history of colon cancer.  She presents for screening colonoscopy at high risk group.  The patient understands the indications, alternatives, risks, and benefits of the procedure and wishes to proceed.    Procedure:     The patient was identified, taken to the endoscopy suite, and place in the left side down decubitus procedure.  The patient underwent a MAC anesthesia and was appropriately monitored through the case by the anesthesia personnel.  A rectal exam was performed that was normal.  The colonoscope was introduced into the rectum and advanced very carefully to the cecum that was identified by the cecal strap, ileocecal valve, and the appendiceal orifice.  The scope was then slowly withdrawn with careful circumferential examination of the mucosa performed.  The bowel prep was good allowing adequate visualization of mucosal surfaces.  The scope was withdrawn.  The patient tolerated the procedure well and was transferred the recovery area in stable condition.    Findings:    There was a widely patent anastomosis between the sigmoid colon and the rectum.  There was a long rectal stump that was normal in appearance.    There was diverticulosis involving the sigmoid colon.    There were multiple colonic polyps with 2 in the transverse colon and 2 in the left colon that were 6 mm in size and removed by cold biopsy forceps.  There were 2 polyps in the sigmoid colon, both 8 mm in size, and both removed by hot snare polypectomy.  All the polyps were retrieved except for 1 of the benign-appearing sigmoid colon  polyps.    Recommendations:     1.  High-fiber diet.  2.  Await pathology results from the polypectomies.  3.  Repeat colonoscopy in 3 years.    Dalton Vela Jr., M.D.

## 2019-08-01 NOTE — ANESTHESIA POSTPROCEDURE EVALUATION
"Patient: Kiley Last    Procedure Summary     Date:  08/01/19 Room / Location:   [facility] /  [facility]    Anesthesia Start:  0834 Anesthesia Stop:  0912    Procedure:  COLONOSCOPY to cecum and TI with biopsy / hot snare polypectomies (N/A ) Diagnosis:       Encounter for screening colonoscopy      (Encounter for screening colonoscopy [Z12.11])    Surgeon:  Dalton Vela Jr., MD Provider:  Alfonso Costello MD    Anesthesia Type:  MAC ASA Status:  3          Anesthesia Type: MAC  Last vitals  BP   126/68 (08/01/19 0935)   Temp   36.4 °C (97.5 °F) (08/01/19 0924)   Pulse   60 (08/01/19 0935)   Resp   16 (08/01/19 0935)     SpO2   100 % (08/01/19 0935)     Post Anesthesia Care and Evaluation    Patient location during evaluation: PACU  Patient participation: complete - patient participated  Level of consciousness: awake  Pain score: 0  Pain management: adequate  Airway patency: patent  Anesthetic complications: No anesthetic complications  PONV Status: none  Cardiovascular status: acceptable  Respiratory status: acceptable  Hydration status: acceptable    Comments: /68 (BP Location: Left arm)   Pulse 60   Temp 36.4 °C (97.5 °F) (Oral)   Resp 16   Ht 165.1 cm (65\")   Wt 81.6 kg (180 lb)   LMP  (LMP Unknown)   SpO2 100%   BMI 29.95 kg/m²       "

## 2019-08-01 NOTE — DISCHARGE INSTRUCTIONS
For the next 24 hours patient needs to be with a responsible adult.    For 24 hours DO NOT drive, operate machinery, appliances, drink alcohol, make important decisions or sign legal documents.    Start with a light or bland diet if you are feeling sick to your stomach otherwise advance to regular diet as tolerated.    Follow recommendations on procedure report if provided by your doctor.    Call Dr BUTLER for problems [phone]    Problems may include but not limited to: large amounts of bleeding, trouble breathing, repeated vomiting, severe unrelieved pain, fever or chills.

## 2019-08-01 NOTE — H&P
Patient Care Team:  Amelia Latif MD as PCP - General  Amelia Latif MD as PCP - Family Medicine  Amelia Latif MD as PCP - Claims Attributed    Chief complaint: Need for screening colonoscopy    Subjective     History of Present Illness     The patient is a very pleasant 73-year-old female with a previous history of complicated diverticulitis that required a sigmoid colon resection with colostomy followed by colostomy takedown.  She has done well since that time with basically normal bowel function.  She does have a family history of colon cancer.  Her most recent colonoscopy was in 2017.  She has had no significant GI symptoms since that time.    Review of Systems   Constitutional: Negative for activity change, appetite change, fatigue and fever.   HENT: Negative for trouble swallowing and voice change.    Respiratory: Negative for chest tightness and shortness of breath.    Cardiovascular: Negative for chest pain and palpitations.   Gastrointestinal: Negative for abdominal pain, blood in stool, constipation, diarrhea, nausea and vomiting.   Endocrine: Negative for cold intolerance and heat intolerance.   Genitourinary: Negative for dysuria and flank pain.   Neurological: Negative for dizziness and light-headedness.   Hematological: Negative for adenopathy. Does not bruise/bleed easily.   Psychiatric/Behavioral: Negative for agitation and confusion.        Past Medical History:   Diagnosis Date   • Arthritis    • Asthma     NO INHALERS   • Colostomy in place (CMS/[facility])    • Diverticular disease    • ESBL (extended spectrum beta-lactamase) producing bacteria infection    • History of abscess of skin and subcutaneous tissue     ABD WOUND 5/2017   • History of atrial fibrillation      X1 POST OP FROM COLOSTOMY 5/2017 - NO PROBLEMS SINCE   • Hypertension    • MDRO (multiple drug resistant organisms) resistance    • PONV (postoperative nausea and vomiting)    • Psoriasis    • Vertigo      Past Surgical  History:   Procedure Laterality Date   • BLEPHAROPLASTY Bilateral 2017   • CATARACT EXTRACTION     • CHOLECYSTECTOMY     • COLON RESECTION N/A 5/3/2017    Procedure: OPEN SIMOID COLECTOMY WITH COLOSTOMY;  Surgeon: Renato Delgado MD;  Location: [facility] MAIN OR;  Service:    • COLONOSCOPY N/A 2017    Procedure: COLONOSCOPY VIA COLOSTOMY TO CECUM;  Surgeon: Renato Delgado MD;  Location: [facility] ENDOSCOPY;  Service:    • COLOSTOMY CLOSURE N/A 2017    Procedure: COLOSTOMY TAKEDOWN AND CLOSURE, WITH RESECTION;  Surgeon: Renato Delgado MD;  Location: [facility] MAIN OR;  Service:    • FINGER SURGERY      4TH FINGER LEFT HAND   • HYSTERECTOMY     • PELVIC FLOOR REPAIR     • TONSILLECTOMY       Family History   Problem Relation Age of Onset   • Cancer Mother    • Colon cancer Mother    • Diabetes Father    • Diabetes Son    • Ulcerative colitis Son    • No Known Problems Maternal Grandmother    • No Known Problems Maternal Grandfather    • No Known Problems Paternal Grandmother    • No Known Problems Paternal Grandfather    • Malig Hyperthermia Neg Hx      Social History     Tobacco Use   • Smoking status: Former Smoker     Types: Cigarettes     Last attempt to quit: 1967     Years since quittin.6   • Smokeless tobacco: Never Used   • Tobacco comment: SOCIAL SMOKING ONLY   Substance Use Topics   • Alcohol use: Yes     Alcohol/week: 1.2 oz     Types: 1 Glasses of wine, 1 Shots of liquor per week     Comment: occasionally   • Drug use: No     Allergies:  Epinephrine and Penicillins    Objective      Vital Signs  Temp:  [98.3 °F (36.8 °C)] 98.3 °F (36.8 °C)  Heart Rate:  [65] 65  Resp:  [16] 16  BP: (137)/(68) 137/68    Physical Exam   Constitutional: She is oriented to person, place, and time. She appears well-developed and well-nourished.  Non-toxic appearance.   Eyes: EOM are normal. No scleral icterus.   Pulmonary/Chest: Effort normal. No respiratory distress.   Abdominal: Soft. Normal appearance. There  is no tenderness.   Neurological: She is alert and oriented to person, place, and time.   Skin: Skin is warm and dry.   Psychiatric: She has a normal mood and affect. Her behavior is normal. Judgment and thought content normal.       Results Review:   I reviewed the patient's new clinical results.      Assessment/Plan       Encounter for screening colonoscopy      Assessment & Plan     1.  Family history colon cancer: Plan colonoscopy.  The patient understands the indications, alternatives, risks, and benefits of the procedure and wishes to proceed.    I discussed the patients findings and my recommendations with patient    Dalton Vela Jr., MD  08/01/19  8:37 AM

## 2019-08-02 LAB
CYTO UR: NORMAL
LAB AP CASE REPORT: NORMAL
LAB AP CLINICAL INFORMATION: NORMAL
PATH REPORT.FINAL DX SPEC: NORMAL
PATH REPORT.GROSS SPEC: NORMAL

## 2019-08-07 ENCOUNTER — HOSPITAL ENCOUNTER (OUTPATIENT)
Dept: CARDIOLOGY | Facility: HOSPITAL | Age: 74
Discharge: HOME | End: 2019-08-07
Admitting: PHYSICIAN ASSISTANT

## 2019-08-07 VITALS
BODY MASS INDEX: 29.99 KG/M2 | SYSTOLIC BLOOD PRESSURE: 157 MMHG | HEART RATE: 81 BPM | HEIGHT: 65 IN | WEIGHT: 180 LBS | OXYGEN SATURATION: 93 % | DIASTOLIC BLOOD PRESSURE: 67 MMHG

## 2019-08-07 DIAGNOSIS — I48.0 PAROXYSMAL ATRIAL FIBRILLATION (HCC): ICD-10-CM

## 2019-08-07 LAB
AORTIC ROOT ANNULUS: 1.8 CM
ASCENDING AORTA: 3.3 CM
BH CV ECHO MEAS - ACS: 2.3 CM
BH CV ECHO MEAS - AO MAX PG (FULL): 2.7 MMHG
BH CV ECHO MEAS - AO MAX PG: 9.6 MMHG
BH CV ECHO MEAS - AO MEAN PG (FULL): 1.6 MMHG
BH CV ECHO MEAS - AO MEAN PG: 4.8 MMHG
BH CV ECHO MEAS - AO V2 MAX: 155.3 CM/SEC
BH CV ECHO MEAS - AO V2 MEAN: 100.4 CM/SEC
BH CV ECHO MEAS - AO V2 VTI: 29.3 CM
BH CV ECHO MEAS - ASC AORTA: 3.3 CM
BH CV ECHO MEAS - AVA(I,A): 2.3 CM^2
BH CV ECHO MEAS - AVA(I,D): 2.3 CM^2
BH CV ECHO MEAS - AVA(V,A): 2.1 CM^2
BH CV ECHO MEAS - AVA(V,D): 2.1 CM^2
BH CV ECHO MEAS - BSA(HAYCOCK): 2 M^2
BH CV ECHO MEAS - BSA: 1.9 M^2
BH CV ECHO MEAS - BZI_BMI: 30 KILOGRAMS/M^2
BH CV ECHO MEAS - BZI_METRIC_HEIGHT: 165.1 CM
BH CV ECHO MEAS - BZI_METRIC_WEIGHT: 81.6 KG
BH CV ECHO MEAS - EDV(MOD-SP2): 55 ML
BH CV ECHO MEAS - EDV(MOD-SP4): 57 ML
BH CV ECHO MEAS - EDV(TEICH): 135.3 ML
BH CV ECHO MEAS - EF(CUBED): 80.3 %
BH CV ECHO MEAS - EF(MOD-BP): 62 %
BH CV ECHO MEAS - EF(MOD-SP2): 61.8 %
BH CV ECHO MEAS - EF(MOD-SP4): 61.4 %
BH CV ECHO MEAS - EF(TEICH): 72.3 %
BH CV ECHO MEAS - ESV(MOD-SP2): 21 ML
BH CV ECHO MEAS - ESV(MOD-SP4): 22 ML
BH CV ECHO MEAS - ESV(TEICH): 37.4 ML
BH CV ECHO MEAS - FS: 41.8 %
BH CV ECHO MEAS - IVS/LVPW: 0.96
BH CV ECHO MEAS - IVSD: 1.2 CM
BH CV ECHO MEAS - LAT PEAK E' VEL: 8 CM/SEC
BH CV ECHO MEAS - LV DIASTOLIC VOL/BSA (35-75): 30.1 ML/M^2
BH CV ECHO MEAS - LV MASS(C)D: 254.8 GRAMS
BH CV ECHO MEAS - LV MASS(C)DI: 134.7 GRAMS/M^2
BH CV ECHO MEAS - LV MAX PG: 7 MMHG
BH CV ECHO MEAS - LV MEAN PG: 3.2 MMHG
BH CV ECHO MEAS - LV SYSTOLIC VOL/BSA (12-30): 11.6 ML/M^2
BH CV ECHO MEAS - LV V1 MAX: 131.9 CM/SEC
BH CV ECHO MEAS - LV V1 MEAN: 80.5 CM/SEC
BH CV ECHO MEAS - LV V1 VTI: 27.5 CM
BH CV ECHO MEAS - LVIDD: 5.3 CM
BH CV ECHO MEAS - LVIDS: 3.1 CM
BH CV ECHO MEAS - LVLD AP2: 6.3 CM
BH CV ECHO MEAS - LVLD AP4: 6.6 CM
BH CV ECHO MEAS - LVLS AP2: 5.7 CM
BH CV ECHO MEAS - LVLS AP4: 5.5 CM
BH CV ECHO MEAS - LVOT AREA (M): 2.5 CM^2
BH CV ECHO MEAS - LVOT AREA: 2.5 CM^2
BH CV ECHO MEAS - LVOT DIAM: 1.8 CM
BH CV ECHO MEAS - LVPWD: 1.2 CM
BH CV ECHO MEAS - MED PEAK E' VEL: 6 CM/SEC
BH CV ECHO MEAS - MV A DUR: 0.13 SEC
BH CV ECHO MEAS - MV A MAX VEL: 52.9 CM/SEC
BH CV ECHO MEAS - MV DEC SLOPE: 483.4 CM/SEC^2
BH CV ECHO MEAS - MV DEC TIME: 0.2 SEC
BH CV ECHO MEAS - MV E MAX VEL: 69.4 CM/SEC
BH CV ECHO MEAS - MV E/A: 1.3
BH CV ECHO MEAS - MV MAX PG: 2.7 MMHG
BH CV ECHO MEAS - MV MEAN PG: 1.3 MMHG
BH CV ECHO MEAS - MV P1/2T MAX VEL: 83.4 CM/SEC
BH CV ECHO MEAS - MV P1/2T: 50.5 MSEC
BH CV ECHO MEAS - MV V2 MAX: 82.5 CM/SEC
BH CV ECHO MEAS - MV V2 MEAN: 54.5 CM/SEC
BH CV ECHO MEAS - MV V2 VTI: 27.8 CM
BH CV ECHO MEAS - MVA P1/2T LCG: 2.6 CM^2
BH CV ECHO MEAS - MVA(P1/2T): 4.4 CM^2
BH CV ECHO MEAS - MVA(VTI): 2.5 CM^2
BH CV ECHO MEAS - PA ACC TIME: 0.09 SEC
BH CV ECHO MEAS - PA MAX PG (FULL): 1 MMHG
BH CV ECHO MEAS - PA MAX PG: 2.7 MMHG
BH CV ECHO MEAS - PA PR(ACCEL): 39.4 MMHG
BH CV ECHO MEAS - PA V2 MAX: 81.4 CM/SEC
BH CV ECHO MEAS - PULM A REVS DUR: 0.11 SEC
BH CV ECHO MEAS - PULM A REVS VEL: 29.6 CM/SEC
BH CV ECHO MEAS - PULM DIAS VEL: 61.6 CM/SEC
BH CV ECHO MEAS - PULM S/D: 1
BH CV ECHO MEAS - PULM SYS VEL: 64 CM/SEC
BH CV ECHO MEAS - PVA(V,A): 2.3 CM^2
BH CV ECHO MEAS - PVA(V,D): 2.3 CM^2
BH CV ECHO MEAS - QP/QS: 0.68
BH CV ECHO MEAS - RAP SYSTOLE: 3 MMHG
BH CV ECHO MEAS - RV MAX PG: 1.6 MMHG
BH CV ECHO MEAS - RV MEAN PG: 1 MMHG
BH CV ECHO MEAS - RV V1 MAX: 64 CM/SEC
BH CV ECHO MEAS - RV V1 MEAN: 48.4 CM/SEC
BH CV ECHO MEAS - RV V1 VTI: 15.7 CM
BH CV ECHO MEAS - RVOT AREA: 3 CM^2
BH CV ECHO MEAS - RVOT DIAM: 1.9 CM
BH CV ECHO MEAS - RVSP: 14 MMHG
BH CV ECHO MEAS - SI(CUBED): 63.2 ML/M^2
BH CV ECHO MEAS - SI(LVOT): 36.3 ML/M^2
BH CV ECHO MEAS - SI(MOD-SP2): 18 ML/M^2
BH CV ECHO MEAS - SI(MOD-SP4): 18.5 ML/M^2
BH CV ECHO MEAS - SI(TEICH): 51.7 ML/M^2
BH CV ECHO MEAS - SUP REN AO DIAM: 2.2 CM
BH CV ECHO MEAS - SV(CUBED): 119.5 ML
BH CV ECHO MEAS - SV(LVOT): 68.6 ML
BH CV ECHO MEAS - SV(MOD-SP2): 34 ML
BH CV ECHO MEAS - SV(MOD-SP4): 35 ML
BH CV ECHO MEAS - SV(RVOT): 46.6 ML
BH CV ECHO MEAS - SV(TEICH): 97.8 ML
BH CV ECHO MEAS - TAPSE (>1.6): 2.1 CM2
BH CV ECHO MEAS - TR MAX VEL: 168.3 CM/SEC
BH CV ECHO MEASUREMENTS AVERAGE E/E' RATIO: 9.91
BH CV XLRA - RV BASE: 2.5 CM
BH CV XLRA - TDI S': 16 CM/SEC
LEFT ATRIUM VOLUME INDEX: 22 ML/M2
LEFT ATRIUM VOLUME: 42 CM3
LV EF 2D ECHO EST: 62 %
MAXIMAL PREDICTED HEART RATE: 146 BPM
SINUS: 2.7 CM
STJ: 3 CM
STRESS TARGET HR: 124 BPM

## 2019-08-07 PROCEDURE — 93306 TTE W/DOPPLER COMPLETE: CPT | Performed by: INTERNAL MEDICINE

## 2019-08-07 PROCEDURE — 93306 TTE W/DOPPLER COMPLETE: CPT

## 2019-08-08 ENCOUNTER — TELEPHONE (OUTPATIENT)
Dept: CARDIOLOGY | Facility: CLINIC | Age: 74
End: 2019-08-08

## 2019-08-21 ENCOUNTER — TELEPHONE (OUTPATIENT)
Dept: SURGERY | Facility: CLINIC | Age: 74
End: 2019-08-21

## 2019-08-21 NOTE — TELEPHONE ENCOUNTER
Spoke with the patient, informed of pathology results, recall placed in computer, HIM tab updated.

## 2019-08-21 NOTE — TELEPHONE ENCOUNTER
----- Message from Dalton Vela Jr., MD sent at 8/20/2019  7:01 AM EDT -----  Please contact this patient to inform that the polyps are benign and a repeat colonoscopy is needed in 3 years.  Please place in recall for a colonoscopy in 3 years.  Thanks.

## 2019-08-28 ENCOUNTER — TELEPHONE (OUTPATIENT)
Dept: CARDIOLOGY | Facility: CLINIC | Age: 74
End: 2019-08-28

## 2019-09-06 ENCOUNTER — APPOINTMENT (OUTPATIENT)
Dept: CT IMAGING | Facility: HOSPITAL | Age: 74
End: 2019-09-06

## 2019-09-06 ENCOUNTER — HOSPITAL ENCOUNTER (INPATIENT)
Facility: HOSPITAL | Age: 74
LOS: 17 days | Discharge: HOME HEALTH | End: 2019-09-23
Attending: EMERGENCY MEDICINE | Admitting: SURGERY

## 2019-09-06 DIAGNOSIS — I48.0 PAROXYSMAL ATRIAL FIBRILLATION (HCC): ICD-10-CM

## 2019-09-06 DIAGNOSIS — N30.00 ACUTE CYSTITIS WITHOUT HEMATURIA: ICD-10-CM

## 2019-09-06 DIAGNOSIS — K56.609 INTESTINAL OBSTRUCTION, UNSPECIFIED CAUSE, UNSPECIFIED WHETHER PARTIAL OR COMPLETE (HCC): ICD-10-CM

## 2019-09-06 DIAGNOSIS — I48.91 ATRIAL FIBRILLATION WITH RAPID VENTRICULAR RESPONSE (HCC): ICD-10-CM

## 2019-09-06 DIAGNOSIS — A04.72 CLOSTRIDIUM DIFFICILE COLITIS: ICD-10-CM

## 2019-09-06 DIAGNOSIS — R10.13 EPIGASTRIC PAIN: ICD-10-CM

## 2019-09-06 DIAGNOSIS — K56.609 SMALL BOWEL OBSTRUCTION (HCC): Primary | ICD-10-CM

## 2019-09-06 LAB
ALBUMIN SERPL-MCNC: 4.5 G/DL (ref 3.5–5.2)
ALBUMIN/GLOB SERPL: 1.5 G/DL
ALP SERPL-CCNC: 62 U/L (ref 39–117)
ALT SERPL W P-5'-P-CCNC: 12 U/L (ref 1–33)
ANION GAP SERPL CALCULATED.3IONS-SCNC: 12.3 MMOL/L (ref 5–15)
AST SERPL-CCNC: 18 U/L (ref 1–32)
BASOPHILS # BLD AUTO: 0.02 10*3/MM3 (ref 0–0.2)
BASOPHILS NFR BLD AUTO: 0.2 % (ref 0–1.5)
BILIRUB SERPL-MCNC: 0.5 MG/DL (ref 0.2–1.2)
BUN BLD-MCNC: 17 MG/DL (ref 8–23)
BUN/CREAT SERPL: 28.8 (ref 7–25)
CALCIUM SPEC-SCNC: 9.4 MG/DL (ref 8.6–10.5)
CHLORIDE SERPL-SCNC: 97 MMOL/L (ref 98–107)
CO2 SERPL-SCNC: 26.7 MMOL/L (ref 22–29)
CREAT BLD-MCNC: 0.59 MG/DL (ref 0.57–1)
DEPRECATED RDW RBC AUTO: 42.6 FL (ref 37–54)
EOSINOPHIL # BLD AUTO: 0.15 10*3/MM3 (ref 0–0.4)
EOSINOPHIL NFR BLD AUTO: 1.5 % (ref 0.3–6.2)
ERYTHROCYTE [DISTWIDTH] IN BLOOD BY AUTOMATED COUNT: 12.8 % (ref 12.3–15.4)
GFR SERPL CREATININE-BSD FRML MDRD: 100 ML/MIN/1.73
GLOBULIN UR ELPH-MCNC: 3 GM/DL
GLUCOSE BLD-MCNC: 128 MG/DL (ref 65–99)
HCT VFR BLD AUTO: 43.5 % (ref 34–46.6)
HGB BLD-MCNC: 13.5 G/DL (ref 12–15.9)
IMM GRANULOCYTES # BLD AUTO: 0.05 10*3/MM3 (ref 0–0.05)
IMM GRANULOCYTES NFR BLD AUTO: 0.5 % (ref 0–0.5)
LIPASE SERPL-CCNC: 23 U/L (ref 13–60)
LYMPHOCYTES # BLD AUTO: 1.59 10*3/MM3 (ref 0.7–3.1)
LYMPHOCYTES NFR BLD AUTO: 16.1 % (ref 19.6–45.3)
MCH RBC QN AUTO: 28.4 PG (ref 26.6–33)
MCHC RBC AUTO-ENTMCNC: 31 G/DL (ref 31.5–35.7)
MCV RBC AUTO: 91.4 FL (ref 79–97)
MONOCYTES # BLD AUTO: 0.72 10*3/MM3 (ref 0.1–0.9)
MONOCYTES NFR BLD AUTO: 7.3 % (ref 5–12)
NEUTROPHILS # BLD AUTO: 7.36 10*3/MM3 (ref 1.7–7)
NEUTROPHILS NFR BLD AUTO: 74.4 % (ref 42.7–76)
NRBC BLD AUTO-RTO: 0 /100 WBC (ref 0–0.2)
PLATELET # BLD AUTO: 176 10*3/MM3 (ref 140–450)
PMV BLD AUTO: 12.7 FL (ref 6–12)
POTASSIUM BLD-SCNC: 4.3 MMOL/L (ref 3.5–5.2)
PROT SERPL-MCNC: 7.5 G/DL (ref 6–8.5)
RBC # BLD AUTO: 4.76 10*6/MM3 (ref 3.77–5.28)
SODIUM BLD-SCNC: 136 MMOL/L (ref 136–145)
WBC NRBC COR # BLD: 9.89 10*3/MM3 (ref 3.4–10.8)

## 2019-09-06 PROCEDURE — 74177 CT ABD & PELVIS W/CONTRAST: CPT

## 2019-09-06 PROCEDURE — 85025 COMPLETE CBC W/AUTO DIFF WBC: CPT | Performed by: EMERGENCY MEDICINE

## 2019-09-06 PROCEDURE — 25010000002 HYDROMORPHONE PER 4 MG: Performed by: SURGERY

## 2019-09-06 PROCEDURE — 25010000002 HYDROMORPHONE PER 4 MG: Performed by: EMERGENCY MEDICINE

## 2019-09-06 PROCEDURE — 80053 COMPREHEN METABOLIC PANEL: CPT | Performed by: EMERGENCY MEDICINE

## 2019-09-06 PROCEDURE — 99222 1ST HOSP IP/OBS MODERATE 55: CPT | Performed by: SURGERY

## 2019-09-06 PROCEDURE — 25010000002 IOPAMIDOL 61 % SOLUTION: Performed by: EMERGENCY MEDICINE

## 2019-09-06 PROCEDURE — 25010000002 ONDANSETRON PER 1 MG: Performed by: SURGERY

## 2019-09-06 PROCEDURE — 83690 ASSAY OF LIPASE: CPT | Performed by: EMERGENCY MEDICINE

## 2019-09-06 PROCEDURE — 99284 EMERGENCY DEPT VISIT MOD MDM: CPT

## 2019-09-06 PROCEDURE — 25010000002 ONDANSETRON PER 1 MG: Performed by: EMERGENCY MEDICINE

## 2019-09-06 RX ORDER — MECLIZINE HYDROCHLORIDE 25 MG/1
25 TABLET ORAL 3 TIMES DAILY PRN
Status: DISCONTINUED | OUTPATIENT
Start: 2019-09-06 | End: 2019-09-23 | Stop reason: HOSPADM

## 2019-09-06 RX ORDER — ONDANSETRON 2 MG/ML
4 INJECTION INTRAMUSCULAR; INTRAVENOUS EVERY 6 HOURS PRN
Status: DISCONTINUED | OUTPATIENT
Start: 2019-09-06 | End: 2019-09-18

## 2019-09-06 RX ORDER — LORAZEPAM 0.5 MG/1
0.5 TABLET ORAL EVERY 8 HOURS PRN
Status: DISCONTINUED | OUTPATIENT
Start: 2019-09-06 | End: 2019-09-23 | Stop reason: HOSPADM

## 2019-09-06 RX ORDER — SODIUM CHLORIDE 0.9 % (FLUSH) 0.9 %
10 SYRINGE (ML) INJECTION AS NEEDED
Status: DISCONTINUED | OUTPATIENT
Start: 2019-09-06 | End: 2019-09-06

## 2019-09-06 RX ORDER — SODIUM CHLORIDE 0.9 % (FLUSH) 0.9 %
10 SYRINGE (ML) INJECTION EVERY 12 HOURS SCHEDULED
Status: DISCONTINUED | OUTPATIENT
Start: 2019-09-06 | End: 2019-09-23 | Stop reason: HOSPADM

## 2019-09-06 RX ORDER — SODIUM CHLORIDE 0.9 % (FLUSH) 0.9 %
10 SYRINGE (ML) INJECTION AS NEEDED
Status: DISCONTINUED | OUTPATIENT
Start: 2019-09-06 | End: 2019-09-23 | Stop reason: HOSPADM

## 2019-09-06 RX ORDER — HYDROMORPHONE HYDROCHLORIDE 1 MG/ML
0.5 INJECTION, SOLUTION INTRAMUSCULAR; INTRAVENOUS; SUBCUTANEOUS ONCE
Status: COMPLETED | OUTPATIENT
Start: 2019-09-06 | End: 2019-09-06

## 2019-09-06 RX ORDER — ONDANSETRON 2 MG/ML
4 INJECTION INTRAMUSCULAR; INTRAVENOUS ONCE
Status: COMPLETED | OUTPATIENT
Start: 2019-09-06 | End: 2019-09-06

## 2019-09-06 RX ORDER — SODIUM CHLORIDE 9 MG/ML
125 INJECTION, SOLUTION INTRAVENOUS CONTINUOUS
Status: DISCONTINUED | OUTPATIENT
Start: 2019-09-06 | End: 2019-09-06

## 2019-09-06 RX ORDER — NALOXONE HCL 0.4 MG/ML
0.4 VIAL (ML) INJECTION
Status: DISCONTINUED | OUTPATIENT
Start: 2019-09-06 | End: 2019-09-08

## 2019-09-06 RX ORDER — METOPROLOL SUCCINATE 25 MG/1
25 TABLET, EXTENDED RELEASE ORAL DAILY
Status: DISCONTINUED | OUTPATIENT
Start: 2019-09-06 | End: 2019-09-12

## 2019-09-06 RX ORDER — HYDROMORPHONE HYDROCHLORIDE 1 MG/ML
0.5 INJECTION, SOLUTION INTRAMUSCULAR; INTRAVENOUS; SUBCUTANEOUS
Status: DISCONTINUED | OUTPATIENT
Start: 2019-09-06 | End: 2019-09-08

## 2019-09-06 RX ORDER — MELATONIN
1000 DAILY
Status: DISCONTINUED | OUTPATIENT
Start: 2019-09-06 | End: 2019-09-23 | Stop reason: HOSPADM

## 2019-09-06 RX ADMIN — HYDROMORPHONE HYDROCHLORIDE 0.5 MG: 1 INJECTION, SOLUTION INTRAMUSCULAR; INTRAVENOUS; SUBCUTANEOUS at 09:27

## 2019-09-06 RX ADMIN — IOPAMIDOL 85 ML: 612 INJECTION, SOLUTION INTRAVENOUS at 10:32

## 2019-09-06 RX ADMIN — ONDANSETRON 4 MG: 2 INJECTION INTRAMUSCULAR; INTRAVENOUS at 09:27

## 2019-09-06 RX ADMIN — HYDROMORPHONE HYDROCHLORIDE 0.5 MG: 1 INJECTION, SOLUTION INTRAMUSCULAR; INTRAVENOUS; SUBCUTANEOUS at 19:38

## 2019-09-06 RX ADMIN — ONDANSETRON 4 MG: 2 INJECTION INTRAMUSCULAR; INTRAVENOUS at 15:34

## 2019-09-06 RX ADMIN — HYDROMORPHONE HYDROCHLORIDE 0.5 MG: 1 INJECTION, SOLUTION INTRAMUSCULAR; INTRAVENOUS; SUBCUTANEOUS at 15:05

## 2019-09-06 RX ADMIN — HYDROMORPHONE HYDROCHLORIDE 0.5 MG: 1 INJECTION, SOLUTION INTRAMUSCULAR; INTRAVENOUS; SUBCUTANEOUS at 17:19

## 2019-09-06 RX ADMIN — SODIUM CHLORIDE 1000 ML: 9 INJECTION, SOLUTION INTRAVENOUS at 09:27

## 2019-09-06 RX ADMIN — ONDANSETRON 4 MG: 2 INJECTION INTRAMUSCULAR; INTRAVENOUS at 21:57

## 2019-09-06 RX ADMIN — SODIUM CHLORIDE 125 ML/HR: 9 INJECTION, SOLUTION INTRAVENOUS at 13:28

## 2019-09-06 RX ADMIN — HYDROMORPHONE HYDROCHLORIDE 0.5 MG: 1 INJECTION, SOLUTION INTRAMUSCULAR; INTRAVENOUS; SUBCUTANEOUS at 21:57

## 2019-09-07 ENCOUNTER — APPOINTMENT (OUTPATIENT)
Dept: GENERAL RADIOLOGY | Facility: HOSPITAL | Age: 74
End: 2019-09-07

## 2019-09-07 LAB
ANION GAP SERPL CALCULATED.3IONS-SCNC: 11.3 MMOL/L (ref 5–15)
BASOPHILS # BLD AUTO: 0.02 10*3/MM3 (ref 0–0.2)
BASOPHILS NFR BLD AUTO: 0.2 % (ref 0–1.5)
BUN BLD-MCNC: 14 MG/DL (ref 8–23)
BUN/CREAT SERPL: 24.1 (ref 7–25)
CALCIUM SPEC-SCNC: 9.1 MG/DL (ref 8.6–10.5)
CHLORIDE SERPL-SCNC: 97 MMOL/L (ref 98–107)
CO2 SERPL-SCNC: 28.7 MMOL/L (ref 22–29)
CREAT BLD-MCNC: 0.58 MG/DL (ref 0.57–1)
DEPRECATED RDW RBC AUTO: 43.9 FL (ref 37–54)
EOSINOPHIL # BLD AUTO: 0.06 10*3/MM3 (ref 0–0.4)
EOSINOPHIL NFR BLD AUTO: 0.6 % (ref 0.3–6.2)
ERYTHROCYTE [DISTWIDTH] IN BLOOD BY AUTOMATED COUNT: 12.9 % (ref 12.3–15.4)
GFR SERPL CREATININE-BSD FRML MDRD: 102 ML/MIN/1.73
GLUCOSE BLD-MCNC: 123 MG/DL (ref 65–99)
HCT VFR BLD AUTO: 43.1 % (ref 34–46.6)
HGB BLD-MCNC: 13.2 G/DL (ref 12–15.9)
IMM GRANULOCYTES # BLD AUTO: 0.04 10*3/MM3 (ref 0–0.05)
IMM GRANULOCYTES NFR BLD AUTO: 0.4 % (ref 0–0.5)
LYMPHOCYTES # BLD AUTO: 1.33 10*3/MM3 (ref 0.7–3.1)
LYMPHOCYTES NFR BLD AUTO: 12.5 % (ref 19.6–45.3)
MCH RBC QN AUTO: 28.4 PG (ref 26.6–33)
MCHC RBC AUTO-ENTMCNC: 30.6 G/DL (ref 31.5–35.7)
MCV RBC AUTO: 92.9 FL (ref 79–97)
MONOCYTES # BLD AUTO: 1.01 10*3/MM3 (ref 0.1–0.9)
MONOCYTES NFR BLD AUTO: 9.5 % (ref 5–12)
NEUTROPHILS # BLD AUTO: 8.14 10*3/MM3 (ref 1.7–7)
NEUTROPHILS NFR BLD AUTO: 76.8 % (ref 42.7–76)
NRBC BLD AUTO-RTO: 0 /100 WBC (ref 0–0.2)
PLATELET # BLD AUTO: 173 10*3/MM3 (ref 140–450)
PMV BLD AUTO: 13 FL (ref 6–12)
POTASSIUM BLD-SCNC: 4.2 MMOL/L (ref 3.5–5.2)
RBC # BLD AUTO: 4.64 10*6/MM3 (ref 3.77–5.28)
SODIUM BLD-SCNC: 137 MMOL/L (ref 136–145)
WBC NRBC COR # BLD: 10.6 10*3/MM3 (ref 3.4–10.8)

## 2019-09-07 PROCEDURE — 74018 RADEX ABDOMEN 1 VIEW: CPT

## 2019-09-07 PROCEDURE — 25010000002 ENOXAPARIN PER 10 MG: Performed by: SURGERY

## 2019-09-07 PROCEDURE — 85025 COMPLETE CBC W/AUTO DIFF WBC: CPT | Performed by: SURGERY

## 2019-09-07 PROCEDURE — 25010000002 HYDROMORPHONE PER 4 MG: Performed by: SURGERY

## 2019-09-07 PROCEDURE — 99232 SBSQ HOSP IP/OBS MODERATE 35: CPT | Performed by: SURGERY

## 2019-09-07 PROCEDURE — 25010000002 ONDANSETRON PER 1 MG: Performed by: SURGERY

## 2019-09-07 PROCEDURE — 80048 BASIC METABOLIC PNL TOTAL CA: CPT | Performed by: SURGERY

## 2019-09-07 RX ADMIN — HYDROMORPHONE HYDROCHLORIDE 0.5 MG: 1 INJECTION, SOLUTION INTRAMUSCULAR; INTRAVENOUS; SUBCUTANEOUS at 21:03

## 2019-09-07 RX ADMIN — HYDROMORPHONE HYDROCHLORIDE 0.5 MG: 1 INJECTION, SOLUTION INTRAMUSCULAR; INTRAVENOUS; SUBCUTANEOUS at 08:40

## 2019-09-07 RX ADMIN — SODIUM CHLORIDE, PRESERVATIVE FREE 10 ML: 5 INJECTION INTRAVENOUS at 10:08

## 2019-09-07 RX ADMIN — HYDROMORPHONE HYDROCHLORIDE 0.5 MG: 1 INJECTION, SOLUTION INTRAMUSCULAR; INTRAVENOUS; SUBCUTANEOUS at 00:45

## 2019-09-07 RX ADMIN — METOPROLOL SUCCINATE 25 MG: 25 TABLET, FILM COATED, EXTENDED RELEASE ORAL at 10:09

## 2019-09-07 RX ADMIN — VITAMIN D, TAB 1000IU (100/BT) 1000 UNITS: 25 TAB at 10:09

## 2019-09-07 RX ADMIN — HYDROMORPHONE HYDROCHLORIDE 0.5 MG: 1 INJECTION, SOLUTION INTRAMUSCULAR; INTRAVENOUS; SUBCUTANEOUS at 12:43

## 2019-09-07 RX ADMIN — HYDROMORPHONE HYDROCHLORIDE 0.5 MG: 1 INJECTION, SOLUTION INTRAMUSCULAR; INTRAVENOUS; SUBCUTANEOUS at 03:18

## 2019-09-07 RX ADMIN — ONDANSETRON 4 MG: 2 INJECTION INTRAMUSCULAR; INTRAVENOUS at 10:08

## 2019-09-07 RX ADMIN — ONDANSETRON 4 MG: 2 INJECTION INTRAMUSCULAR; INTRAVENOUS at 03:28

## 2019-09-07 RX ADMIN — HYDROMORPHONE HYDROCHLORIDE 0.5 MG: 1 INJECTION, SOLUTION INTRAMUSCULAR; INTRAVENOUS; SUBCUTANEOUS at 16:44

## 2019-09-07 RX ADMIN — ENOXAPARIN SODIUM 40 MG: 40 INJECTION SUBCUTANEOUS at 10:08

## 2019-09-08 ENCOUNTER — ANESTHESIA EVENT (OUTPATIENT)
Dept: PERIOP | Facility: HOSPITAL | Age: 74
End: 2019-09-08

## 2019-09-08 ENCOUNTER — ANESTHESIA (OUTPATIENT)
Dept: PERIOP | Facility: HOSPITAL | Age: 74
End: 2019-09-08

## 2019-09-08 LAB
ANION GAP SERPL CALCULATED.3IONS-SCNC: 10.9 MMOL/L (ref 5–15)
BASOPHILS # BLD AUTO: 0.03 10*3/MM3 (ref 0–0.2)
BASOPHILS NFR BLD AUTO: 0.5 % (ref 0–1.5)
BUN BLD-MCNC: 9 MG/DL (ref 8–23)
BUN/CREAT SERPL: 19.6 (ref 7–25)
CALCIUM SPEC-SCNC: 8.7 MG/DL (ref 8.6–10.5)
CHLORIDE SERPL-SCNC: 101 MMOL/L (ref 98–107)
CO2 SERPL-SCNC: 28.1 MMOL/L (ref 22–29)
CREAT BLD-MCNC: 0.46 MG/DL (ref 0.57–1)
DEPRECATED RDW RBC AUTO: 42.9 FL (ref 37–54)
EOSINOPHIL # BLD AUTO: 0.11 10*3/MM3 (ref 0–0.4)
EOSINOPHIL NFR BLD AUTO: 1.7 % (ref 0.3–6.2)
ERYTHROCYTE [DISTWIDTH] IN BLOOD BY AUTOMATED COUNT: 12.8 % (ref 12.3–15.4)
GFR SERPL CREATININE-BSD FRML MDRD: 133 ML/MIN/1.73
GLUCOSE BLD-MCNC: 108 MG/DL (ref 65–99)
HCT VFR BLD AUTO: 38.1 % (ref 34–46.6)
HGB BLD-MCNC: 12 G/DL (ref 12–15.9)
IMM GRANULOCYTES # BLD AUTO: 0.02 10*3/MM3 (ref 0–0.05)
IMM GRANULOCYTES NFR BLD AUTO: 0.3 % (ref 0–0.5)
LYMPHOCYTES # BLD AUTO: 1.32 10*3/MM3 (ref 0.7–3.1)
LYMPHOCYTES NFR BLD AUTO: 19.9 % (ref 19.6–45.3)
MCH RBC QN AUTO: 29.1 PG (ref 26.6–33)
MCHC RBC AUTO-ENTMCNC: 31.5 G/DL (ref 31.5–35.7)
MCV RBC AUTO: 92.3 FL (ref 79–97)
MONOCYTES # BLD AUTO: 0.86 10*3/MM3 (ref 0.1–0.9)
MONOCYTES NFR BLD AUTO: 13 % (ref 5–12)
NEUTROPHILS # BLD AUTO: 4.3 10*3/MM3 (ref 1.7–7)
NEUTROPHILS NFR BLD AUTO: 64.6 % (ref 42.7–76)
NRBC BLD AUTO-RTO: 0 /100 WBC (ref 0–0.2)
PLATELET # BLD AUTO: 150 10*3/MM3 (ref 140–450)
PMV BLD AUTO: 12.8 FL (ref 6–12)
POTASSIUM BLD-SCNC: 4.1 MMOL/L (ref 3.5–5.2)
RBC # BLD AUTO: 4.13 10*6/MM3 (ref 3.77–5.28)
SODIUM BLD-SCNC: 140 MMOL/L (ref 136–145)
WBC NRBC COR # BLD: 6.64 10*3/MM3 (ref 3.4–10.8)

## 2019-09-08 PROCEDURE — 25010000002 NEOSTIGMINE PER 0.5 MG: Performed by: NURSE ANESTHETIST, CERTIFIED REGISTERED

## 2019-09-08 PROCEDURE — 25010000002 DEXAMETHASONE PER 1 MG: Performed by: NURSE ANESTHETIST, CERTIFIED REGISTERED

## 2019-09-08 PROCEDURE — 80048 BASIC METABOLIC PNL TOTAL CA: CPT | Performed by: SURGERY

## 2019-09-08 PROCEDURE — 44005 FREEING OF BOWEL ADHESION: CPT | Performed by: SURGERY

## 2019-09-08 PROCEDURE — 25010000002 HYDROMORPHONE PER 4 MG: Performed by: NURSE ANESTHETIST, CERTIFIED REGISTERED

## 2019-09-08 PROCEDURE — 25010000003 BUPIVACAINE LIPOSOME 1.3 % SUSPENSION 20 ML VIAL: Performed by: SURGERY

## 2019-09-08 PROCEDURE — 25010000002 PROPOFOL 10 MG/ML EMULSION: Performed by: NURSE ANESTHETIST, CERTIFIED REGISTERED

## 2019-09-08 PROCEDURE — 25010000002 SUCCINYLCHOLINE PER 20 MG: Performed by: NURSE ANESTHETIST, CERTIFIED REGISTERED

## 2019-09-08 PROCEDURE — 25010000002 FENTANYL CITRATE (PF) 100 MCG/2ML SOLUTION: Performed by: ANESTHESIOLOGY

## 2019-09-08 PROCEDURE — 25010000002 KETOROLAC TROMETHAMINE PER 15 MG: Performed by: NURSE ANESTHETIST, CERTIFIED REGISTERED

## 2019-09-08 PROCEDURE — 25010000002 ONDANSETRON PER 1 MG: Performed by: NURSE ANESTHETIST, CERTIFIED REGISTERED

## 2019-09-08 PROCEDURE — 25010000002 MIDAZOLAM PER 1 MG: Performed by: ANESTHESIOLOGY

## 2019-09-08 PROCEDURE — 85025 COMPLETE CBC W/AUTO DIFF WBC: CPT | Performed by: SURGERY

## 2019-09-08 PROCEDURE — C9290 INJ, BUPIVACAINE LIPOSOME: HCPCS | Performed by: SURGERY

## 2019-09-08 PROCEDURE — 0DN80ZZ RELEASE SMALL INTESTINE, OPEN APPROACH: ICD-10-PCS | Performed by: SURGERY

## 2019-09-08 PROCEDURE — 25010000002 FENTANYL CITRATE (PF) 100 MCG/2ML SOLUTION: Performed by: NURSE ANESTHETIST, CERTIFIED REGISTERED

## 2019-09-08 RX ORDER — SODIUM CHLORIDE 0.9 % (FLUSH) 0.9 %
3-10 SYRINGE (ML) INJECTION AS NEEDED
Status: DISCONTINUED | OUTPATIENT
Start: 2019-09-08 | End: 2019-09-08 | Stop reason: HOSPADM

## 2019-09-08 RX ORDER — PROMETHAZINE HYDROCHLORIDE 25 MG/ML
6.25 INJECTION, SOLUTION INTRAMUSCULAR; INTRAVENOUS
Status: DISCONTINUED | OUTPATIENT
Start: 2019-09-08 | End: 2019-09-08 | Stop reason: HOSPADM

## 2019-09-08 RX ORDER — FENTANYL CITRATE 50 UG/ML
INJECTION, SOLUTION INTRAMUSCULAR; INTRAVENOUS AS NEEDED
Status: DISCONTINUED | OUTPATIENT
Start: 2019-09-08 | End: 2019-09-08 | Stop reason: SURG

## 2019-09-08 RX ORDER — DEXAMETHASONE SODIUM PHOSPHATE 10 MG/ML
INJECTION INTRAMUSCULAR; INTRAVENOUS AS NEEDED
Status: DISCONTINUED | OUTPATIENT
Start: 2019-09-08 | End: 2019-09-08 | Stop reason: SURG

## 2019-09-08 RX ORDER — DIPHENHYDRAMINE HCL 25 MG
25 CAPSULE ORAL
Status: DISCONTINUED | OUTPATIENT
Start: 2019-09-08 | End: 2019-09-08 | Stop reason: HOSPADM

## 2019-09-08 RX ORDER — PROMETHAZINE HYDROCHLORIDE 25 MG/1
25 SUPPOSITORY RECTAL ONCE AS NEEDED
Status: DISCONTINUED | OUTPATIENT
Start: 2019-09-08 | End: 2019-09-08 | Stop reason: HOSPADM

## 2019-09-08 RX ORDER — NALOXONE HCL 0.4 MG/ML
0.2 VIAL (ML) INJECTION AS NEEDED
Status: DISCONTINUED | OUTPATIENT
Start: 2019-09-08 | End: 2019-09-08 | Stop reason: HOSPADM

## 2019-09-08 RX ORDER — FENTANYL CITRATE 50 UG/ML
50 INJECTION, SOLUTION INTRAMUSCULAR; INTRAVENOUS
Status: DISCONTINUED | OUTPATIENT
Start: 2019-09-08 | End: 2019-09-08 | Stop reason: HOSPADM

## 2019-09-08 RX ORDER — KETOROLAC TROMETHAMINE 30 MG/ML
INJECTION, SOLUTION INTRAMUSCULAR; INTRAVENOUS AS NEEDED
Status: DISCONTINUED | OUTPATIENT
Start: 2019-09-08 | End: 2019-09-08 | Stop reason: SURG

## 2019-09-08 RX ORDER — SUCCINYLCHOLINE CHLORIDE 20 MG/ML
INJECTION INTRAMUSCULAR; INTRAVENOUS AS NEEDED
Status: DISCONTINUED | OUTPATIENT
Start: 2019-09-08 | End: 2019-09-08 | Stop reason: SURG

## 2019-09-08 RX ORDER — FAMOTIDINE 10 MG/ML
20 INJECTION, SOLUTION INTRAVENOUS ONCE
Status: COMPLETED | OUTPATIENT
Start: 2019-09-08 | End: 2019-09-08

## 2019-09-08 RX ORDER — MIDAZOLAM HYDROCHLORIDE 1 MG/ML
2 INJECTION INTRAMUSCULAR; INTRAVENOUS
Status: DISCONTINUED | OUTPATIENT
Start: 2019-09-08 | End: 2019-09-08 | Stop reason: HOSPADM

## 2019-09-08 RX ORDER — FENTANYL CITRATE 50 UG/ML
INJECTION, SOLUTION INTRAMUSCULAR; INTRAVENOUS
Status: COMPLETED
Start: 2019-09-08 | End: 2019-09-08

## 2019-09-08 RX ORDER — LIDOCAINE HYDROCHLORIDE 10 MG/ML
0.5 INJECTION, SOLUTION EPIDURAL; INFILTRATION; INTRACAUDAL; PERINEURAL ONCE AS NEEDED
Status: DISCONTINUED | OUTPATIENT
Start: 2019-09-08 | End: 2019-09-08 | Stop reason: HOSPADM

## 2019-09-08 RX ORDER — GLYCOPYRROLATE 0.2 MG/ML
INJECTION INTRAMUSCULAR; INTRAVENOUS AS NEEDED
Status: DISCONTINUED | OUTPATIENT
Start: 2019-09-08 | End: 2019-09-08 | Stop reason: SURG

## 2019-09-08 RX ORDER — HYDROMORPHONE HCL IN 0.9% NACL 10 MG/50ML
PATIENT CONTROLLED ANALGESIA SYRINGE INTRAVENOUS CONTINUOUS
Status: DISCONTINUED | OUTPATIENT
Start: 2019-09-08 | End: 2019-09-11

## 2019-09-08 RX ORDER — HYDRALAZINE HYDROCHLORIDE 20 MG/ML
5 INJECTION INTRAMUSCULAR; INTRAVENOUS
Status: DISCONTINUED | OUTPATIENT
Start: 2019-09-08 | End: 2019-09-08 | Stop reason: HOSPADM

## 2019-09-08 RX ORDER — HYDROMORPHONE HCL 110MG/55ML
PATIENT CONTROLLED ANALGESIA SYRINGE INTRAVENOUS AS NEEDED
Status: DISCONTINUED | OUTPATIENT
Start: 2019-09-08 | End: 2019-09-08 | Stop reason: SURG

## 2019-09-08 RX ORDER — PROMETHAZINE HYDROCHLORIDE 25 MG/1
25 TABLET ORAL ONCE AS NEEDED
Status: DISCONTINUED | OUTPATIENT
Start: 2019-09-08 | End: 2019-09-08 | Stop reason: HOSPADM

## 2019-09-08 RX ORDER — HYDROMORPHONE HYDROCHLORIDE 1 MG/ML
0.5 INJECTION, SOLUTION INTRAMUSCULAR; INTRAVENOUS; SUBCUTANEOUS
Status: DISCONTINUED | OUTPATIENT
Start: 2019-09-08 | End: 2019-09-08 | Stop reason: HOSPADM

## 2019-09-08 RX ORDER — FLUMAZENIL 0.1 MG/ML
0.2 INJECTION INTRAVENOUS AS NEEDED
Status: DISCONTINUED | OUTPATIENT
Start: 2019-09-08 | End: 2019-09-08 | Stop reason: HOSPADM

## 2019-09-08 RX ORDER — EPHEDRINE SULFATE 50 MG/ML
5 INJECTION, SOLUTION INTRAVENOUS ONCE AS NEEDED
Status: DISCONTINUED | OUTPATIENT
Start: 2019-09-08 | End: 2019-09-08 | Stop reason: HOSPADM

## 2019-09-08 RX ORDER — MIDAZOLAM HYDROCHLORIDE 1 MG/ML
1 INJECTION INTRAMUSCULAR; INTRAVENOUS
Status: DISCONTINUED | OUTPATIENT
Start: 2019-09-08 | End: 2019-09-08 | Stop reason: HOSPADM

## 2019-09-08 RX ORDER — MAGNESIUM HYDROXIDE 1200 MG/15ML
LIQUID ORAL AS NEEDED
Status: DISCONTINUED | OUTPATIENT
Start: 2019-09-08 | End: 2019-09-08 | Stop reason: HOSPADM

## 2019-09-08 RX ORDER — ONDANSETRON 2 MG/ML
4 INJECTION INTRAMUSCULAR; INTRAVENOUS ONCE AS NEEDED
Status: DISCONTINUED | OUTPATIENT
Start: 2019-09-08 | End: 2019-09-08 | Stop reason: HOSPADM

## 2019-09-08 RX ORDER — SODIUM CHLORIDE, SODIUM LACTATE, POTASSIUM CHLORIDE, CALCIUM CHLORIDE 600; 310; 30; 20 MG/100ML; MG/100ML; MG/100ML; MG/100ML
9 INJECTION, SOLUTION INTRAVENOUS CONTINUOUS
Status: DISCONTINUED | OUTPATIENT
Start: 2019-09-08 | End: 2019-09-08

## 2019-09-08 RX ORDER — ROCURONIUM BROMIDE 10 MG/ML
INJECTION, SOLUTION INTRAVENOUS AS NEEDED
Status: DISCONTINUED | OUTPATIENT
Start: 2019-09-08 | End: 2019-09-08 | Stop reason: SURG

## 2019-09-08 RX ORDER — CLINDAMYCIN PHOSPHATE 900 MG/50ML
900 INJECTION INTRAVENOUS ONCE
Status: COMPLETED | OUTPATIENT
Start: 2019-09-08 | End: 2019-09-08

## 2019-09-08 RX ORDER — LIDOCAINE HYDROCHLORIDE 40 MG/ML
SOLUTION TOPICAL
Status: COMPLETED
Start: 2019-09-08 | End: 2019-09-08

## 2019-09-08 RX ORDER — LABETALOL HYDROCHLORIDE 5 MG/ML
5 INJECTION, SOLUTION INTRAVENOUS
Status: DISCONTINUED | OUTPATIENT
Start: 2019-09-08 | End: 2019-09-08 | Stop reason: HOSPADM

## 2019-09-08 RX ORDER — PROMETHAZINE HYDROCHLORIDE 25 MG/ML
12.5 INJECTION, SOLUTION INTRAMUSCULAR; INTRAVENOUS ONCE AS NEEDED
Status: DISCONTINUED | OUTPATIENT
Start: 2019-09-08 | End: 2019-09-08 | Stop reason: HOSPADM

## 2019-09-08 RX ORDER — SODIUM CHLORIDE 9 MG/ML
INJECTION, SOLUTION INTRAVENOUS CONTINUOUS PRN
Status: DISCONTINUED | OUTPATIENT
Start: 2019-09-08 | End: 2019-09-08 | Stop reason: SURG

## 2019-09-08 RX ORDER — OXYCODONE AND ACETAMINOPHEN 7.5; 325 MG/1; MG/1
1 TABLET ORAL ONCE AS NEEDED
Status: DISCONTINUED | OUTPATIENT
Start: 2019-09-08 | End: 2019-09-08 | Stop reason: HOSPADM

## 2019-09-08 RX ORDER — LIDOCAINE HYDROCHLORIDE 40 MG/ML
SOLUTION TOPICAL AS NEEDED
Status: DISCONTINUED | OUTPATIENT
Start: 2019-09-08 | End: 2019-09-08 | Stop reason: SURG

## 2019-09-08 RX ORDER — PROPOFOL 10 MG/ML
VIAL (ML) INTRAVENOUS AS NEEDED
Status: DISCONTINUED | OUTPATIENT
Start: 2019-09-08 | End: 2019-09-08 | Stop reason: SURG

## 2019-09-08 RX ORDER — ACETAMINOPHEN 325 MG/1
650 TABLET ORAL ONCE AS NEEDED
Status: DISCONTINUED | OUTPATIENT
Start: 2019-09-08 | End: 2019-09-08 | Stop reason: HOSPADM

## 2019-09-08 RX ORDER — ONDANSETRON 2 MG/ML
INJECTION INTRAMUSCULAR; INTRAVENOUS AS NEEDED
Status: DISCONTINUED | OUTPATIENT
Start: 2019-09-08 | End: 2019-09-08 | Stop reason: SURG

## 2019-09-08 RX ORDER — NALOXONE HCL 0.4 MG/ML
0.1 VIAL (ML) INJECTION
Status: DISCONTINUED | OUTPATIENT
Start: 2019-09-08 | End: 2019-09-11

## 2019-09-08 RX ORDER — SODIUM CHLORIDE 0.9 % (FLUSH) 0.9 %
3 SYRINGE (ML) INJECTION EVERY 12 HOURS SCHEDULED
Status: DISCONTINUED | OUTPATIENT
Start: 2019-09-08 | End: 2019-09-08 | Stop reason: HOSPADM

## 2019-09-08 RX ORDER — HYDROCODONE BITARTRATE AND ACETAMINOPHEN 7.5; 325 MG/1; MG/1
1 TABLET ORAL ONCE AS NEEDED
Status: DISCONTINUED | OUTPATIENT
Start: 2019-09-08 | End: 2019-09-08 | Stop reason: HOSPADM

## 2019-09-08 RX ORDER — DIPHENHYDRAMINE HYDROCHLORIDE 50 MG/ML
12.5 INJECTION INTRAMUSCULAR; INTRAVENOUS
Status: DISCONTINUED | OUTPATIENT
Start: 2019-09-08 | End: 2019-09-08 | Stop reason: HOSPADM

## 2019-09-08 RX ADMIN — GLYCOPYRROLATE 0.2 MG: 0.2 INJECTION INTRAMUSCULAR; INTRAVENOUS at 10:06

## 2019-09-08 RX ADMIN — MIDAZOLAM 1 MG: 1 INJECTION INTRAMUSCULAR; INTRAVENOUS at 07:50

## 2019-09-08 RX ADMIN — HYDROMORPHONE HYDROCHLORIDE 0.5 MG: 2 INJECTION INTRAMUSCULAR; INTRAVENOUS; SUBCUTANEOUS at 08:50

## 2019-09-08 RX ADMIN — DEXAMETHASONE SODIUM PHOSPHATE 8 MG: 10 INJECTION INTRAMUSCULAR; INTRAVENOUS at 08:39

## 2019-09-08 RX ADMIN — SUCCINYLCHOLINE CHLORIDE 120 MG: 20 INJECTION, SOLUTION INTRAMUSCULAR; INTRAVENOUS; PARENTERAL at 08:34

## 2019-09-08 RX ADMIN — FAMOTIDINE 20 MG: 10 INJECTION INTRAVENOUS at 07:49

## 2019-09-08 RX ADMIN — KETOROLAC TROMETHAMINE 15 MG: 30 INJECTION, SOLUTION INTRAMUSCULAR; INTRAVENOUS at 09:57

## 2019-09-08 RX ADMIN — SODIUM CHLORIDE: 9 INJECTION, SOLUTION INTRAVENOUS at 08:40

## 2019-09-08 RX ADMIN — PROPOFOL 150 MG: 10 INJECTION, EMULSION INTRAVENOUS at 08:34

## 2019-09-08 RX ADMIN — NEOSTIGMINE METHYLSULFATE 3 MG: 1 INJECTION INTRAMUSCULAR; INTRAVENOUS; SUBCUTANEOUS at 09:59

## 2019-09-08 RX ADMIN — SODIUM CHLORIDE, PRESERVATIVE FREE 10 ML: 5 INJECTION INTRAVENOUS at 21:00

## 2019-09-08 RX ADMIN — HYDROMORPHONE HYDROCHLORIDE: 10 INJECTION INTRAMUSCULAR; INTRAVENOUS; SUBCUTANEOUS at 10:44

## 2019-09-08 RX ADMIN — HYDROMORPHONE HYDROCHLORIDE 0.5 MG: 2 INJECTION INTRAMUSCULAR; INTRAVENOUS; SUBCUTANEOUS at 09:40

## 2019-09-08 RX ADMIN — METOPROLOL SUCCINATE 25 MG: 25 TABLET, FILM COATED, EXTENDED RELEASE ORAL at 06:25

## 2019-09-08 RX ADMIN — NEOSTIGMINE METHYLSULFATE 1 MG: 1 INJECTION INTRAMUSCULAR; INTRAVENOUS; SUBCUTANEOUS at 10:06

## 2019-09-08 RX ADMIN — FENTANYL CITRATE 50 MCG: 50 INJECTION INTRAMUSCULAR; INTRAVENOUS at 07:50

## 2019-09-08 RX ADMIN — LIDOCAINE HYDROCHLORIDE 1 EACH: 40 SOLUTION TOPICAL at 08:35

## 2019-09-08 RX ADMIN — ONDANSETRON 4 MG: 2 INJECTION INTRAMUSCULAR; INTRAVENOUS at 09:49

## 2019-09-08 RX ADMIN — ROCURONIUM BROMIDE 30 MG: 10 INJECTION INTRAVENOUS at 08:50

## 2019-09-08 RX ADMIN — FENTANYL CITRATE 50 MCG: 50 INJECTION INTRAMUSCULAR; INTRAVENOUS at 08:30

## 2019-09-08 RX ADMIN — SODIUM CHLORIDE, PRESERVATIVE FREE 10 ML: 5 INJECTION INTRAVENOUS at 16:49

## 2019-09-08 RX ADMIN — GLYCOPYRROLATE 0.4 MG: 0.2 INJECTION INTRAMUSCULAR; INTRAVENOUS at 09:59

## 2019-09-08 RX ADMIN — SODIUM CHLORIDE, POTASSIUM CHLORIDE, SODIUM LACTATE AND CALCIUM CHLORIDE 9 ML/HR: 600; 310; 30; 20 INJECTION, SOLUTION INTRAVENOUS at 07:51

## 2019-09-08 RX ADMIN — MIDAZOLAM 1 MG: 1 INJECTION INTRAMUSCULAR; INTRAVENOUS at 08:17

## 2019-09-08 RX ADMIN — FENTANYL CITRATE 50 MCG: 50 INJECTION INTRAMUSCULAR; INTRAVENOUS at 10:04

## 2019-09-08 RX ADMIN — CLINDAMYCIN PHOSPHATE 900 MG: 900 INJECTION INTRAVENOUS at 08:40

## 2019-09-08 NOTE — ANESTHESIA POSTPROCEDURE EVALUATION
"Patient: Kiley Last    Procedure Summary     Date:  09/08/19 Room / Location:  [facility] OR  / [facility] MAIN OR    Anesthesia Start:  0824 Anesthesia Stop:  1022    Procedure:  Exploratory laparotomy with lysis of adhesions (N/A Abdomen) Diagnosis:       Small bowel obstruction (CMS/HCC)      (Small bowel obstruction (CMS/HCC) [K56.609])    Surgeon:  Trini Wade MD Provider:  Jose Velasquez MD    Anesthesia Type:  general ASA Status:  3          Anesthesia Type: general  Last vitals  BP   134/61 (09/08/19 1115)   Temp   36.7 °C (98.1 °F) (09/08/19 1109)   Pulse   56 (09/08/19 1115)   Resp   16 (09/08/19 1115)     SpO2   95 % (09/08/19 1115)     Post Anesthesia Care and Evaluation    Patient location during evaluation: bedside  Patient participation: complete - patient participated  Level of consciousness: awake and alert  Pain management: adequate  Airway patency: patent  Anesthetic complications: No anesthetic complications    Cardiovascular status: acceptable  Respiratory status: acceptable  Hydration status: acceptable    Comments: /61   Pulse 56   Temp 36.7 °C (98.1 °F) (Oral)   Resp 16   Ht 165.1 cm (65\")   Wt 84.7 kg (186 lb 12.8 oz)   LMP  (LMP Unknown)   SpO2 95%   BMI 31.09 kg/m²       "

## 2019-09-08 NOTE — ANESTHESIA PREPROCEDURE EVALUATION
Anesthesia Evaluation     Patient summary reviewed   history of anesthetic complications: PONV  NPO Solid Status: > 8 hours  NPO Liquid Status: > 2 hours           Airway   Mallampati: I  TM distance: >3 FB  Neck ROM: full  Dental      Pulmonary     breath sounds clear to auscultation  (+) a smoker Former, COPD, asthma,   (-) shortness of breath  Cardiovascular   Exercise tolerance: good (4-7 METS)    ECG reviewed  Rhythm: regular  Rate: normal    (+) hypertension, dysrhythmias Atrial Fib,   (-) angina, OLSON    ROS comment: Echo: · Left ventricular systolic function is normal. Calculated EF = 62%. Estimated EF was in agreement with the calculated EF. Estimated EF = 62%. Normal left ventricular cavity size and wall thickness noted. All left ventricular wall segments contract normally. Left ventricular diastolic function is normal.  · Trace mitral valve regurgitation is present.  · Physiologic tricuspid valve regurgitation is present. Estimated right ventricular systolic pressure from tricuspid regurgitation is normal (<35 mmHg). Calculated right ventricular systolic pressure from tricuspid regurgitation is 14 mmHg.    Neuro/Psych  (+) dizziness/light headedness,     GI/Hepatic/Renal/Endo    (+) obesity,       Musculoskeletal     Abdominal    Substance History      OB/GYN          Other                        Anesthesia Plan    ASA 3     general     intravenous induction   Anesthetic plan, all risks, benefits, and alternatives have been provided, discussed and informed consent has been obtained with: patient.

## 2019-09-08 NOTE — ANESTHESIA PROCEDURE NOTES
Airway  Urgency: elective    Date/Time: 9/8/2019 8:35 AM  Airway not difficult    General Information and Staff    Patient location during procedure: OR  Anesthesiologist: Jose Velasquez MD  CRNA: Luann Antonio CRNA    Indications and Patient Condition  Indications for airway management: airway protection    Preoxygenated: yes  Mask difficulty assessment: 0 - not attempted    Final Airway Details  Final airway type: endotracheal airway      Successful airway: ETT  Cuffed: yes   Successful intubation technique: direct laryngoscopy  Facilitating devices/methods: intubating stylet and cricoid pressure  Endotracheal tube insertion site: oral  Blade: [place]  Blade size: 3  ETT size (mm): 7.0  Cormack-Lehane Classification: grade IIa - partial view of glottis  Placement verified by: chest auscultation and capnometry   Cuff volume (mL): 6  Measured from: teeth  ETT/EBT  to teeth (cm): 20  Number of attempts at approach: 1    Additional Comments  Atraumatic. Dentition as preop.

## 2019-09-09 LAB
ANION GAP SERPL CALCULATED.3IONS-SCNC: 10.4 MMOL/L (ref 5–15)
BASOPHILS # BLD AUTO: 0 10*3/MM3 (ref 0–0.2)
BASOPHILS NFR BLD AUTO: 0 % (ref 0–1.5)
BUN BLD-MCNC: 15 MG/DL (ref 8–23)
BUN/CREAT SERPL: 39.5 (ref 7–25)
CALCIUM SPEC-SCNC: 8.3 MG/DL (ref 8.6–10.5)
CHLORIDE SERPL-SCNC: 100 MMOL/L (ref 98–107)
CO2 SERPL-SCNC: 24.6 MMOL/L (ref 22–29)
CREAT BLD-MCNC: 0.38 MG/DL (ref 0.57–1)
DEPRECATED RDW RBC AUTO: 41.9 FL (ref 37–54)
EOSINOPHIL # BLD AUTO: 0 10*3/MM3 (ref 0–0.4)
EOSINOPHIL NFR BLD AUTO: 0 % (ref 0.3–6.2)
ERYTHROCYTE [DISTWIDTH] IN BLOOD BY AUTOMATED COUNT: 12.6 % (ref 12.3–15.4)
GFR SERPL CREATININE-BSD FRML MDRD: >150 ML/MIN/1.73
GLUCOSE BLD-MCNC: 117 MG/DL (ref 65–99)
HCT VFR BLD AUTO: 33.5 % (ref 34–46.6)
HGB BLD-MCNC: 10.5 G/DL (ref 12–15.9)
IMM GRANULOCYTES # BLD AUTO: 0.04 10*3/MM3 (ref 0–0.05)
IMM GRANULOCYTES NFR BLD AUTO: 0.4 % (ref 0–0.5)
LYMPHOCYTES # BLD AUTO: 0.93 10*3/MM3 (ref 0.7–3.1)
LYMPHOCYTES NFR BLD AUTO: 9.6 % (ref 19.6–45.3)
MCH RBC QN AUTO: 28.6 PG (ref 26.6–33)
MCHC RBC AUTO-ENTMCNC: 31.3 G/DL (ref 31.5–35.7)
MCV RBC AUTO: 91.3 FL (ref 79–97)
MONOCYTES # BLD AUTO: 1.09 10*3/MM3 (ref 0.1–0.9)
MONOCYTES NFR BLD AUTO: 11.2 % (ref 5–12)
NEUTROPHILS # BLD AUTO: 7.67 10*3/MM3 (ref 1.7–7)
NEUTROPHILS NFR BLD AUTO: 78.8 % (ref 42.7–76)
NRBC BLD AUTO-RTO: 0 /100 WBC (ref 0–0.2)
PLATELET # BLD AUTO: 137 10*3/MM3 (ref 140–450)
PMV BLD AUTO: 13.2 FL (ref 6–12)
POTASSIUM BLD-SCNC: 3.4 MMOL/L (ref 3.5–5.2)
RBC # BLD AUTO: 3.67 10*6/MM3 (ref 3.77–5.28)
SODIUM BLD-SCNC: 135 MMOL/L (ref 136–145)
WBC NRBC COR # BLD: 9.73 10*3/MM3 (ref 3.4–10.8)

## 2019-09-09 PROCEDURE — 94799 UNLISTED PULMONARY SVC/PX: CPT

## 2019-09-09 PROCEDURE — 99024 POSTOP FOLLOW-UP VISIT: CPT | Performed by: SURGERY

## 2019-09-09 PROCEDURE — 85025 COMPLETE CBC W/AUTO DIFF WBC: CPT | Performed by: SURGERY

## 2019-09-09 PROCEDURE — 25010000002 ENOXAPARIN PER 10 MG: Performed by: SURGERY

## 2019-09-09 PROCEDURE — 80048 BASIC METABOLIC PNL TOTAL CA: CPT | Performed by: SURGERY

## 2019-09-09 RX ORDER — DEXTROSE, SODIUM CHLORIDE, AND POTASSIUM CHLORIDE 5; .45; .15 G/100ML; G/100ML; G/100ML
100 INJECTION INTRAVENOUS CONTINUOUS
Status: DISCONTINUED | OUTPATIENT
Start: 2019-09-09 | End: 2019-09-13

## 2019-09-09 RX ADMIN — POTASSIUM CHLORIDE, DEXTROSE MONOHYDRATE AND SODIUM CHLORIDE 100 ML/HR: 150; 5; 450 INJECTION, SOLUTION INTRAVENOUS at 17:54

## 2019-09-09 RX ADMIN — ENOXAPARIN SODIUM 40 MG: 40 INJECTION SUBCUTANEOUS at 08:30

## 2019-09-09 RX ADMIN — METOPROLOL SUCCINATE 25 MG: 25 TABLET, FILM COATED, EXTENDED RELEASE ORAL at 08:30

## 2019-09-09 RX ADMIN — SODIUM CHLORIDE, PRESERVATIVE FREE 10 ML: 5 INJECTION INTRAVENOUS at 08:31

## 2019-09-09 RX ADMIN — LORAZEPAM 0.5 MG: 0.5 TABLET ORAL at 00:03

## 2019-09-09 RX ADMIN — SODIUM CHLORIDE, PRESERVATIVE FREE 10 ML: 5 INJECTION INTRAVENOUS at 20:24

## 2019-09-09 RX ADMIN — LORAZEPAM 0.5 MG: 0.5 TABLET ORAL at 20:34

## 2019-09-10 LAB
ANION GAP SERPL CALCULATED.3IONS-SCNC: 9.3 MMOL/L (ref 5–15)
BASOPHILS # BLD AUTO: 0.02 10*3/MM3 (ref 0–0.2)
BASOPHILS NFR BLD AUTO: 0.2 % (ref 0–1.5)
BUN BLD-MCNC: 13 MG/DL (ref 8–23)
BUN/CREAT SERPL: 37.1 (ref 7–25)
CALCIUM SPEC-SCNC: 8.2 MG/DL (ref 8.6–10.5)
CHLORIDE SERPL-SCNC: 102 MMOL/L (ref 98–107)
CO2 SERPL-SCNC: 27.7 MMOL/L (ref 22–29)
CREAT BLD-MCNC: 0.35 MG/DL (ref 0.57–1)
DEPRECATED RDW RBC AUTO: 44.3 FL (ref 37–54)
EOSINOPHIL # BLD AUTO: 0.28 10*3/MM3 (ref 0–0.4)
EOSINOPHIL NFR BLD AUTO: 3.1 % (ref 0.3–6.2)
ERYTHROCYTE [DISTWIDTH] IN BLOOD BY AUTOMATED COUNT: 12.7 % (ref 12.3–15.4)
GFR SERPL CREATININE-BSD FRML MDRD: >150 ML/MIN/1.73
GLUCOSE BLD-MCNC: 112 MG/DL (ref 65–99)
HCT VFR BLD AUTO: 34.9 % (ref 34–46.6)
HGB BLD-MCNC: 10.7 G/DL (ref 12–15.9)
IMM GRANULOCYTES # BLD AUTO: 0.05 10*3/MM3 (ref 0–0.05)
IMM GRANULOCYTES NFR BLD AUTO: 0.6 % (ref 0–0.5)
LYMPHOCYTES # BLD AUTO: 1.97 10*3/MM3 (ref 0.7–3.1)
LYMPHOCYTES NFR BLD AUTO: 22 % (ref 19.6–45.3)
MCH RBC QN AUTO: 29.3 PG (ref 26.6–33)
MCHC RBC AUTO-ENTMCNC: 30.7 G/DL (ref 31.5–35.7)
MCV RBC AUTO: 95.6 FL (ref 79–97)
MONOCYTES # BLD AUTO: 0.9 10*3/MM3 (ref 0.1–0.9)
MONOCYTES NFR BLD AUTO: 10 % (ref 5–12)
NEUTROPHILS # BLD AUTO: 5.74 10*3/MM3 (ref 1.7–7)
NEUTROPHILS NFR BLD AUTO: 64.1 % (ref 42.7–76)
NRBC BLD AUTO-RTO: 0 /100 WBC (ref 0–0.2)
PLATELET # BLD AUTO: 147 10*3/MM3 (ref 140–450)
PMV BLD AUTO: 12.9 FL (ref 6–12)
POTASSIUM BLD-SCNC: 3.3 MMOL/L (ref 3.5–5.2)
RBC # BLD AUTO: 3.65 10*6/MM3 (ref 3.77–5.28)
SODIUM BLD-SCNC: 139 MMOL/L (ref 136–145)
WBC NRBC COR # BLD: 8.96 10*3/MM3 (ref 3.4–10.8)

## 2019-09-10 PROCEDURE — 85025 COMPLETE CBC W/AUTO DIFF WBC: CPT | Performed by: SURGERY

## 2019-09-10 PROCEDURE — 25010000002 ENOXAPARIN PER 10 MG: Performed by: SURGERY

## 2019-09-10 PROCEDURE — 80048 BASIC METABOLIC PNL TOTAL CA: CPT | Performed by: SURGERY

## 2019-09-10 PROCEDURE — 99024 POSTOP FOLLOW-UP VISIT: CPT | Performed by: SURGERY

## 2019-09-10 RX ORDER — PANTOPRAZOLE SODIUM 40 MG/10ML
40 INJECTION, POWDER, LYOPHILIZED, FOR SOLUTION INTRAVENOUS EVERY 12 HOURS SCHEDULED
Status: DISCONTINUED | OUTPATIENT
Start: 2019-09-10 | End: 2019-09-16

## 2019-09-10 RX ADMIN — PANTOPRAZOLE SODIUM 40 MG: 40 INJECTION, POWDER, LYOPHILIZED, FOR SOLUTION INTRAVENOUS at 13:56

## 2019-09-10 RX ADMIN — ENOXAPARIN SODIUM 40 MG: 40 INJECTION SUBCUTANEOUS at 08:05

## 2019-09-10 RX ADMIN — POTASSIUM CHLORIDE, DEXTROSE MONOHYDRATE AND SODIUM CHLORIDE 100 ML/HR: 150; 5; 450 INJECTION, SOLUTION INTRAVENOUS at 13:56

## 2019-09-10 RX ADMIN — SODIUM CHLORIDE, PRESERVATIVE FREE 10 ML: 5 INJECTION INTRAVENOUS at 08:06

## 2019-09-10 RX ADMIN — METOPROLOL SUCCINATE 25 MG: 25 TABLET, FILM COATED, EXTENDED RELEASE ORAL at 08:05

## 2019-09-10 RX ADMIN — PANTOPRAZOLE SODIUM 40 MG: 40 INJECTION, POWDER, LYOPHILIZED, FOR SOLUTION INTRAVENOUS at 21:33

## 2019-09-10 RX ADMIN — SODIUM CHLORIDE, PRESERVATIVE FREE 10 ML: 5 INJECTION INTRAVENOUS at 21:33

## 2019-09-10 RX ADMIN — VITAMIN D, TAB 1000IU (100/BT) 1000 UNITS: 25 TAB at 08:05

## 2019-09-10 RX ADMIN — LORAZEPAM 0.5 MG: 0.5 TABLET ORAL at 21:50

## 2019-09-10 RX ADMIN — POTASSIUM CHLORIDE, DEXTROSE MONOHYDRATE AND SODIUM CHLORIDE 100 ML/HR: 150; 5; 450 INJECTION, SOLUTION INTRAVENOUS at 03:47

## 2019-09-10 RX ADMIN — HYDROMORPHONE HYDROCHLORIDE: 10 INJECTION INTRAMUSCULAR; INTRAVENOUS; SUBCUTANEOUS at 04:54

## 2019-09-11 LAB
ANION GAP SERPL CALCULATED.3IONS-SCNC: 12.3 MMOL/L (ref 5–15)
BACTERIA UR QL AUTO: ABNORMAL /HPF
BASOPHILS # BLD AUTO: 0.02 10*3/MM3 (ref 0–0.2)
BASOPHILS NFR BLD AUTO: 0.3 % (ref 0–1.5)
BILIRUB UR QL STRIP: NEGATIVE
BUN BLD-MCNC: 5 MG/DL (ref 8–23)
BUN/CREAT SERPL: 14.7 (ref 7–25)
CALCIUM SPEC-SCNC: 8.5 MG/DL (ref 8.6–10.5)
CHLORIDE SERPL-SCNC: 100 MMOL/L (ref 98–107)
CLARITY UR: ABNORMAL
CO2 SERPL-SCNC: 26.7 MMOL/L (ref 22–29)
COLOR UR: YELLOW
CREAT BLD-MCNC: 0.34 MG/DL (ref 0.57–1)
DEPRECATED RDW RBC AUTO: 43.8 FL (ref 37–54)
EOSINOPHIL # BLD AUTO: 0.33 10*3/MM3 (ref 0–0.4)
EOSINOPHIL NFR BLD AUTO: 4.6 % (ref 0.3–6.2)
ERYTHROCYTE [DISTWIDTH] IN BLOOD BY AUTOMATED COUNT: 12.6 % (ref 12.3–15.4)
GFR SERPL CREATININE-BSD FRML MDRD: >150 ML/MIN/1.73
GLUCOSE BLD-MCNC: 114 MG/DL (ref 65–99)
GLUCOSE UR STRIP-MCNC: NEGATIVE MG/DL
HCT VFR BLD AUTO: 37.4 % (ref 34–46.6)
HGB BLD-MCNC: 11.3 G/DL (ref 12–15.9)
HGB UR QL STRIP.AUTO: ABNORMAL
HYALINE CASTS UR QL AUTO: ABNORMAL /LPF
IMM GRANULOCYTES # BLD AUTO: 0.07 10*3/MM3 (ref 0–0.05)
IMM GRANULOCYTES NFR BLD AUTO: 1 % (ref 0–0.5)
KETONES UR QL STRIP: NEGATIVE
LEUKOCYTE ESTERASE UR QL STRIP.AUTO: ABNORMAL
LYMPHOCYTES # BLD AUTO: 1.5 10*3/MM3 (ref 0.7–3.1)
LYMPHOCYTES NFR BLD AUTO: 20.7 % (ref 19.6–45.3)
MCH RBC QN AUTO: 28.3 PG (ref 26.6–33)
MCHC RBC AUTO-ENTMCNC: 30.2 G/DL (ref 31.5–35.7)
MCV RBC AUTO: 93.7 FL (ref 79–97)
MONOCYTES # BLD AUTO: 0.9 10*3/MM3 (ref 0.1–0.9)
MONOCYTES NFR BLD AUTO: 12.4 % (ref 5–12)
NEUTROPHILS # BLD AUTO: 4.42 10*3/MM3 (ref 1.7–7)
NEUTROPHILS NFR BLD AUTO: 61 % (ref 42.7–76)
NITRITE UR QL STRIP: POSITIVE
NRBC BLD AUTO-RTO: 0 /100 WBC (ref 0–0.2)
PH UR STRIP.AUTO: 6.5 [PH] (ref 5–8)
PLATELET # BLD AUTO: 164 10*3/MM3 (ref 140–450)
PMV BLD AUTO: 13 FL (ref 6–12)
POTASSIUM BLD-SCNC: 3.5 MMOL/L (ref 3.5–5.2)
PROT UR QL STRIP: NEGATIVE
RBC # BLD AUTO: 3.99 10*6/MM3 (ref 3.77–5.28)
RBC # UR: ABNORMAL /HPF
REF LAB TEST METHOD: ABNORMAL
SODIUM BLD-SCNC: 139 MMOL/L (ref 136–145)
SP GR UR STRIP: 1.01 (ref 1–1.03)
SQUAMOUS #/AREA URNS HPF: ABNORMAL /HPF
UROBILINOGEN UR QL STRIP: ABNORMAL
WBC NRBC COR # BLD: 7.24 10*3/MM3 (ref 3.4–10.8)
WBC UR QL AUTO: ABNORMAL /HPF

## 2019-09-11 PROCEDURE — 87086 URINE CULTURE/COLONY COUNT: CPT | Performed by: SURGERY

## 2019-09-11 PROCEDURE — 25010000002 ENOXAPARIN PER 10 MG: Performed by: SURGERY

## 2019-09-11 PROCEDURE — 80048 BASIC METABOLIC PNL TOTAL CA: CPT | Performed by: SURGERY

## 2019-09-11 PROCEDURE — 93010 ELECTROCARDIOGRAM REPORT: CPT | Performed by: INTERNAL MEDICINE

## 2019-09-11 PROCEDURE — 87186 SC STD MICRODIL/AGAR DIL: CPT | Performed by: SURGERY

## 2019-09-11 PROCEDURE — 85025 COMPLETE CBC W/AUTO DIFF WBC: CPT | Performed by: SURGERY

## 2019-09-11 PROCEDURE — 99024 POSTOP FOLLOW-UP VISIT: CPT | Performed by: SURGERY

## 2019-09-11 PROCEDURE — 93005 ELECTROCARDIOGRAM TRACING: CPT | Performed by: SURGERY

## 2019-09-11 PROCEDURE — 81001 URINALYSIS AUTO W/SCOPE: CPT | Performed by: SURGERY

## 2019-09-11 PROCEDURE — 25010000002 HYDROMORPHONE PER 4 MG: Performed by: SURGERY

## 2019-09-11 RX ORDER — HYDROMORPHONE HYDROCHLORIDE 1 MG/ML
0.5 INJECTION, SOLUTION INTRAMUSCULAR; INTRAVENOUS; SUBCUTANEOUS
Status: DISCONTINUED | OUTPATIENT
Start: 2019-09-11 | End: 2019-09-20

## 2019-09-11 RX ADMIN — HYDROMORPHONE HYDROCHLORIDE 0.5 MG: 1 INJECTION, SOLUTION INTRAMUSCULAR; INTRAVENOUS; SUBCUTANEOUS at 17:08

## 2019-09-11 RX ADMIN — METOPROLOL SUCCINATE 25 MG: 25 TABLET, FILM COATED, EXTENDED RELEASE ORAL at 08:58

## 2019-09-11 RX ADMIN — POTASSIUM CHLORIDE, DEXTROSE MONOHYDRATE AND SODIUM CHLORIDE 100 ML/HR: 150; 5; 450 INJECTION, SOLUTION INTRAVENOUS at 20:27

## 2019-09-11 RX ADMIN — VITAMIN D, TAB 1000IU (100/BT) 1000 UNITS: 25 TAB at 08:58

## 2019-09-11 RX ADMIN — PANTOPRAZOLE SODIUM 40 MG: 40 INJECTION, POWDER, LYOPHILIZED, FOR SOLUTION INTRAVENOUS at 08:56

## 2019-09-11 RX ADMIN — SODIUM CHLORIDE, PRESERVATIVE FREE 10 ML: 5 INJECTION INTRAVENOUS at 08:59

## 2019-09-11 RX ADMIN — HYDROMORPHONE HYDROCHLORIDE 0.5 MG: 1 INJECTION, SOLUTION INTRAMUSCULAR; INTRAVENOUS; SUBCUTANEOUS at 23:35

## 2019-09-11 RX ADMIN — LORAZEPAM 0.5 MG: 0.5 TABLET ORAL at 20:22

## 2019-09-11 RX ADMIN — POTASSIUM CHLORIDE, DEXTROSE MONOHYDRATE AND SODIUM CHLORIDE 100 ML/HR: 150; 5; 450 INJECTION, SOLUTION INTRAVENOUS at 00:26

## 2019-09-11 RX ADMIN — POTASSIUM CHLORIDE, DEXTROSE MONOHYDRATE AND SODIUM CHLORIDE 100 ML/HR: 150; 5; 450 INJECTION, SOLUTION INTRAVENOUS at 08:59

## 2019-09-11 RX ADMIN — HYDROMORPHONE HYDROCHLORIDE 0.5 MG: 1 INJECTION, SOLUTION INTRAMUSCULAR; INTRAVENOUS; SUBCUTANEOUS at 20:22

## 2019-09-11 RX ADMIN — ENOXAPARIN SODIUM 40 MG: 40 INJECTION SUBCUTANEOUS at 08:56

## 2019-09-12 ENCOUNTER — APPOINTMENT (OUTPATIENT)
Dept: GENERAL RADIOLOGY | Facility: HOSPITAL | Age: 74
End: 2019-09-12

## 2019-09-12 LAB
ANION GAP SERPL CALCULATED.3IONS-SCNC: 6.8 MMOL/L (ref 5–15)
BASOPHILS # BLD AUTO: 0.03 10*3/MM3 (ref 0–0.2)
BASOPHILS NFR BLD AUTO: 0.4 % (ref 0–1.5)
BUN BLD-MCNC: 4 MG/DL (ref 8–23)
BUN/CREAT SERPL: 8.9 (ref 7–25)
CALCIUM SPEC-SCNC: 8.8 MG/DL (ref 8.6–10.5)
CHLORIDE SERPL-SCNC: 103 MMOL/L (ref 98–107)
CO2 SERPL-SCNC: 31.2 MMOL/L (ref 22–29)
CREAT BLD-MCNC: 0.45 MG/DL (ref 0.57–1)
DEPRECATED RDW RBC AUTO: 43.8 FL (ref 37–54)
EOSINOPHIL # BLD AUTO: 0.29 10*3/MM3 (ref 0–0.4)
EOSINOPHIL NFR BLD AUTO: 3.8 % (ref 0.3–6.2)
ERYTHROCYTE [DISTWIDTH] IN BLOOD BY AUTOMATED COUNT: 12.9 % (ref 12.3–15.4)
GFR SERPL CREATININE-BSD FRML MDRD: 136 ML/MIN/1.73
GLUCOSE BLD-MCNC: 132 MG/DL (ref 65–99)
HCT VFR BLD AUTO: 35.1 % (ref 34–46.6)
HGB BLD-MCNC: 10.8 G/DL (ref 12–15.9)
IMM GRANULOCYTES # BLD AUTO: 0.07 10*3/MM3 (ref 0–0.05)
IMM GRANULOCYTES NFR BLD AUTO: 0.9 % (ref 0–0.5)
LYMPHOCYTES # BLD AUTO: 1.28 10*3/MM3 (ref 0.7–3.1)
LYMPHOCYTES NFR BLD AUTO: 16.6 % (ref 19.6–45.3)
MCH RBC QN AUTO: 28.7 PG (ref 26.6–33)
MCHC RBC AUTO-ENTMCNC: 30.8 G/DL (ref 31.5–35.7)
MCV RBC AUTO: 93.4 FL (ref 79–97)
MONOCYTES # BLD AUTO: 0.98 10*3/MM3 (ref 0.1–0.9)
MONOCYTES NFR BLD AUTO: 12.7 % (ref 5–12)
NEUTROPHILS # BLD AUTO: 5.06 10*3/MM3 (ref 1.7–7)
NEUTROPHILS NFR BLD AUTO: 65.6 % (ref 42.7–76)
NRBC BLD AUTO-RTO: 0 /100 WBC (ref 0–0.2)
PLATELET # BLD AUTO: 188 10*3/MM3 (ref 140–450)
PMV BLD AUTO: 12.7 FL (ref 6–12)
POTASSIUM BLD-SCNC: 3.7 MMOL/L (ref 3.5–5.2)
RBC # BLD AUTO: 3.76 10*6/MM3 (ref 3.77–5.28)
SODIUM BLD-SCNC: 141 MMOL/L (ref 136–145)
WBC NRBC COR # BLD: 7.71 10*3/MM3 (ref 3.4–10.8)

## 2019-09-12 PROCEDURE — 99222 1ST HOSP IP/OBS MODERATE 55: CPT | Performed by: INTERNAL MEDICINE

## 2019-09-12 PROCEDURE — 85025 COMPLETE CBC W/AUTO DIFF WBC: CPT | Performed by: SURGERY

## 2019-09-12 PROCEDURE — 25010000002 ONDANSETRON PER 1 MG: Performed by: SURGERY

## 2019-09-12 PROCEDURE — 25010000002 MEROPENEM PER 100 MG: Performed by: SURGERY

## 2019-09-12 PROCEDURE — 99024 POSTOP FOLLOW-UP VISIT: CPT | Performed by: SURGERY

## 2019-09-12 PROCEDURE — 80048 BASIC METABOLIC PNL TOTAL CA: CPT | Performed by: SURGERY

## 2019-09-12 PROCEDURE — 25010000002 ENOXAPARIN PER 10 MG: Performed by: SURGERY

## 2019-09-12 PROCEDURE — 74018 RADEX ABDOMEN 1 VIEW: CPT

## 2019-09-12 PROCEDURE — 25010000002 HYDROMORPHONE PER 4 MG: Performed by: SURGERY

## 2019-09-12 RX ORDER — METOPROLOL SUCCINATE 50 MG/1
50 TABLET, EXTENDED RELEASE ORAL DAILY
Status: DISCONTINUED | OUTPATIENT
Start: 2019-09-13 | End: 2019-09-23 | Stop reason: HOSPADM

## 2019-09-12 RX ORDER — OXYCODONE HYDROCHLORIDE AND ACETAMINOPHEN 5; 325 MG/1; MG/1
1 TABLET ORAL EVERY 4 HOURS PRN
Status: DISCONTINUED | OUTPATIENT
Start: 2019-09-12 | End: 2019-09-23 | Stop reason: HOSPADM

## 2019-09-12 RX ORDER — DOCUSATE SODIUM 100 MG/1
100 CAPSULE, LIQUID FILLED ORAL 2 TIMES DAILY
Status: DISCONTINUED | OUTPATIENT
Start: 2019-09-12 | End: 2019-09-20

## 2019-09-12 RX ADMIN — METOPROLOL SUCCINATE 25 MG: 25 TABLET, FILM COATED, EXTENDED RELEASE ORAL at 10:19

## 2019-09-12 RX ADMIN — DOCUSATE SODIUM 100 MG: 100 CAPSULE, LIQUID FILLED ORAL at 20:50

## 2019-09-12 RX ADMIN — MEROPENEM 1 G: 1 INJECTION INTRAVENOUS at 18:05

## 2019-09-12 RX ADMIN — SODIUM CHLORIDE, PRESERVATIVE FREE 10 ML: 5 INJECTION INTRAVENOUS at 10:21

## 2019-09-12 RX ADMIN — HYDROMORPHONE HYDROCHLORIDE 0.5 MG: 1 INJECTION, SOLUTION INTRAMUSCULAR; INTRAVENOUS; SUBCUTANEOUS at 03:57

## 2019-09-12 RX ADMIN — OXYCODONE HYDROCHLORIDE AND ACETAMINOPHEN 1 TABLET: 5; 325 TABLET ORAL at 18:53

## 2019-09-12 RX ADMIN — POTASSIUM CHLORIDE, DEXTROSE MONOHYDRATE AND SODIUM CHLORIDE 100 ML/HR: 150; 5; 450 INJECTION, SOLUTION INTRAVENOUS at 16:31

## 2019-09-12 RX ADMIN — SODIUM CHLORIDE, PRESERVATIVE FREE 10 ML: 5 INJECTION INTRAVENOUS at 20:51

## 2019-09-12 RX ADMIN — POTASSIUM CHLORIDE, DEXTROSE MONOHYDRATE AND SODIUM CHLORIDE 100 ML/HR: 150; 5; 450 INJECTION, SOLUTION INTRAVENOUS at 06:58

## 2019-09-12 RX ADMIN — OXYCODONE HYDROCHLORIDE AND ACETAMINOPHEN 1 TABLET: 5; 325 TABLET ORAL at 23:17

## 2019-09-12 RX ADMIN — ONDANSETRON 4 MG: 2 INJECTION INTRAMUSCULAR; INTRAVENOUS at 13:15

## 2019-09-12 RX ADMIN — LORAZEPAM 0.5 MG: 0.5 TABLET ORAL at 20:50

## 2019-09-12 RX ADMIN — MEROPENEM 500 MG: 500 INJECTION, POWDER, FOR SOLUTION INTRAVENOUS at 23:13

## 2019-09-12 RX ADMIN — ENOXAPARIN SODIUM 40 MG: 40 INJECTION SUBCUTANEOUS at 10:20

## 2019-09-12 RX ADMIN — PANTOPRAZOLE SODIUM 40 MG: 40 INJECTION, POWDER, LYOPHILIZED, FOR SOLUTION INTRAVENOUS at 10:20

## 2019-09-12 RX ADMIN — PANTOPRAZOLE SODIUM 40 MG: 40 INJECTION, POWDER, LYOPHILIZED, FOR SOLUTION INTRAVENOUS at 20:50

## 2019-09-12 RX ADMIN — HYDROMORPHONE HYDROCHLORIDE 0.5 MG: 1 INJECTION, SOLUTION INTRAMUSCULAR; INTRAVENOUS; SUBCUTANEOUS at 16:31

## 2019-09-12 RX ADMIN — VITAMIN D, TAB 1000IU (100/BT) 1000 UNITS: 25 TAB at 10:20

## 2019-09-13 LAB
ANION GAP SERPL CALCULATED.3IONS-SCNC: 12 MMOL/L (ref 5–15)
BACTERIA SPEC AEROBE CULT: ABNORMAL
BASOPHILS # BLD AUTO: 0.02 10*3/MM3 (ref 0–0.2)
BASOPHILS NFR BLD AUTO: 0.2 % (ref 0–1.5)
BUN BLD-MCNC: 5 MG/DL (ref 8–23)
BUN/CREAT SERPL: 11.9 (ref 7–25)
CALCIUM SPEC-SCNC: 8.8 MG/DL (ref 8.6–10.5)
CHLORIDE SERPL-SCNC: 104 MMOL/L (ref 98–107)
CO2 SERPL-SCNC: 24 MMOL/L (ref 22–29)
CREAT BLD-MCNC: 0.42 MG/DL (ref 0.57–1)
DEPRECATED RDW RBC AUTO: 43.4 FL (ref 37–54)
EOSINOPHIL # BLD AUTO: 0.23 10*3/MM3 (ref 0–0.4)
EOSINOPHIL NFR BLD AUTO: 2.5 % (ref 0.3–6.2)
ERYTHROCYTE [DISTWIDTH] IN BLOOD BY AUTOMATED COUNT: 12.6 % (ref 12.3–15.4)
GFR SERPL CREATININE-BSD FRML MDRD: 147 ML/MIN/1.73
GLUCOSE BLD-MCNC: 115 MG/DL (ref 65–99)
HCT VFR BLD AUTO: 36.3 % (ref 34–46.6)
HGB BLD-MCNC: 11.1 G/DL (ref 12–15.9)
IMM GRANULOCYTES # BLD AUTO: 0.06 10*3/MM3 (ref 0–0.05)
IMM GRANULOCYTES NFR BLD AUTO: 0.6 % (ref 0–0.5)
LYMPHOCYTES # BLD AUTO: 1.01 10*3/MM3 (ref 0.7–3.1)
LYMPHOCYTES NFR BLD AUTO: 10.9 % (ref 19.6–45.3)
MAGNESIUM SERPL-MCNC: 1.7 MG/DL (ref 1.6–2.4)
MCH RBC QN AUTO: 28.2 PG (ref 26.6–33)
MCHC RBC AUTO-ENTMCNC: 30.6 G/DL (ref 31.5–35.7)
MCV RBC AUTO: 92.4 FL (ref 79–97)
MONOCYTES # BLD AUTO: 0.96 10*3/MM3 (ref 0.1–0.9)
MONOCYTES NFR BLD AUTO: 10.4 % (ref 5–12)
NEUTROPHILS # BLD AUTO: 6.96 10*3/MM3 (ref 1.7–7)
NEUTROPHILS NFR BLD AUTO: 75.4 % (ref 42.7–76)
NRBC BLD AUTO-RTO: 0 /100 WBC (ref 0–0.2)
PLATELET # BLD AUTO: 187 10*3/MM3 (ref 140–450)
PMV BLD AUTO: 12.5 FL (ref 6–12)
POTASSIUM BLD-SCNC: 3.5 MMOL/L (ref 3.5–5.2)
RBC # BLD AUTO: 3.93 10*6/MM3 (ref 3.77–5.28)
SODIUM BLD-SCNC: 140 MMOL/L (ref 136–145)
WBC NRBC COR # BLD: 9.24 10*3/MM3 (ref 3.4–10.8)

## 2019-09-13 PROCEDURE — 25010000002 ONDANSETRON PER 1 MG: Performed by: SURGERY

## 2019-09-13 PROCEDURE — 83735 ASSAY OF MAGNESIUM: CPT | Performed by: INTERNAL MEDICINE

## 2019-09-13 PROCEDURE — 99232 SBSQ HOSP IP/OBS MODERATE 35: CPT | Performed by: INTERNAL MEDICINE

## 2019-09-13 PROCEDURE — 80048 BASIC METABOLIC PNL TOTAL CA: CPT | Performed by: SURGERY

## 2019-09-13 PROCEDURE — 85025 COMPLETE CBC W/AUTO DIFF WBC: CPT | Performed by: SURGERY

## 2019-09-13 PROCEDURE — 25010000002 ENOXAPARIN PER 10 MG: Performed by: SURGERY

## 2019-09-13 PROCEDURE — 25010000002 MEROPENEM PER 100 MG: Performed by: SURGERY

## 2019-09-13 PROCEDURE — 99024 POSTOP FOLLOW-UP VISIT: CPT | Performed by: SURGERY

## 2019-09-13 PROCEDURE — 99222 1ST HOSP IP/OBS MODERATE 55: CPT | Performed by: INTERNAL MEDICINE

## 2019-09-13 PROCEDURE — 25010000002 ERTAPENEM PER 500 MG: Performed by: SURGERY

## 2019-09-13 RX ADMIN — METOPROLOL SUCCINATE 50 MG: 50 TABLET, FILM COATED, EXTENDED RELEASE ORAL at 10:45

## 2019-09-13 RX ADMIN — VITAMIN D, TAB 1000IU (100/BT) 1000 UNITS: 25 TAB at 10:45

## 2019-09-13 RX ADMIN — OXYCODONE HYDROCHLORIDE AND ACETAMINOPHEN 1 TABLET: 5; 325 TABLET ORAL at 09:09

## 2019-09-13 RX ADMIN — OXYCODONE HYDROCHLORIDE AND ACETAMINOPHEN 1 TABLET: 5; 325 TABLET ORAL at 14:04

## 2019-09-13 RX ADMIN — PANTOPRAZOLE SODIUM 40 MG: 40 INJECTION, POWDER, LYOPHILIZED, FOR SOLUTION INTRAVENOUS at 10:45

## 2019-09-13 RX ADMIN — LORAZEPAM 0.5 MG: 0.5 TABLET ORAL at 21:49

## 2019-09-13 RX ADMIN — SODIUM CHLORIDE, PRESERVATIVE FREE 10 ML: 5 INJECTION INTRAVENOUS at 21:54

## 2019-09-13 RX ADMIN — ERTAPENEM SODIUM 1 G: 1 INJECTION, POWDER, LYOPHILIZED, FOR SOLUTION INTRAMUSCULAR; INTRAVENOUS at 11:01

## 2019-09-13 RX ADMIN — OXYCODONE HYDROCHLORIDE AND ACETAMINOPHEN 1 TABLET: 5; 325 TABLET ORAL at 21:49

## 2019-09-13 RX ADMIN — ONDANSETRON 4 MG: 2 INJECTION INTRAMUSCULAR; INTRAVENOUS at 01:45

## 2019-09-13 RX ADMIN — SODIUM CHLORIDE, PRESERVATIVE FREE 10 ML: 5 INJECTION INTRAVENOUS at 09:11

## 2019-09-13 RX ADMIN — POTASSIUM CHLORIDE, DEXTROSE MONOHYDRATE AND SODIUM CHLORIDE 100 ML/HR: 150; 5; 450 INJECTION, SOLUTION INTRAVENOUS at 03:42

## 2019-09-13 RX ADMIN — MEROPENEM 500 MG: 500 INJECTION, POWDER, FOR SOLUTION INTRAVENOUS at 06:34

## 2019-09-13 RX ADMIN — ENOXAPARIN SODIUM 40 MG: 40 INJECTION SUBCUTANEOUS at 09:09

## 2019-09-13 RX ADMIN — PANTOPRAZOLE SODIUM 40 MG: 40 INJECTION, POWDER, LYOPHILIZED, FOR SOLUTION INTRAVENOUS at 21:50

## 2019-09-14 LAB
ANION GAP SERPL CALCULATED.3IONS-SCNC: 11.3 MMOL/L (ref 5–15)
BASOPHILS # BLD AUTO: 0.02 10*3/MM3 (ref 0–0.2)
BASOPHILS NFR BLD AUTO: 0.4 % (ref 0–1.5)
BUN BLD-MCNC: 5 MG/DL (ref 8–23)
BUN/CREAT SERPL: 10.6 (ref 7–25)
CALCIUM SPEC-SCNC: 8.6 MG/DL (ref 8.6–10.5)
CHLORIDE SERPL-SCNC: 102 MMOL/L (ref 98–107)
CO2 SERPL-SCNC: 27.7 MMOL/L (ref 22–29)
CREAT BLD-MCNC: 0.47 MG/DL (ref 0.57–1)
DEPRECATED RDW RBC AUTO: 43.7 FL (ref 37–54)
EOSINOPHIL # BLD AUTO: 0.29 10*3/MM3 (ref 0–0.4)
EOSINOPHIL NFR BLD AUTO: 5.2 % (ref 0.3–6.2)
ERYTHROCYTE [DISTWIDTH] IN BLOOD BY AUTOMATED COUNT: 12.9 % (ref 12.3–15.4)
GFR SERPL CREATININE-BSD FRML MDRD: 130 ML/MIN/1.73
GLUCOSE BLD-MCNC: 95 MG/DL (ref 65–99)
HCT VFR BLD AUTO: 35 % (ref 34–46.6)
HGB BLD-MCNC: 10.7 G/DL (ref 12–15.9)
IMM GRANULOCYTES # BLD AUTO: 0.05 10*3/MM3 (ref 0–0.05)
IMM GRANULOCYTES NFR BLD AUTO: 0.9 % (ref 0–0.5)
LYMPHOCYTES # BLD AUTO: 1.5 10*3/MM3 (ref 0.7–3.1)
LYMPHOCYTES NFR BLD AUTO: 26.7 % (ref 19.6–45.3)
MCH RBC QN AUTO: 28.4 PG (ref 26.6–33)
MCHC RBC AUTO-ENTMCNC: 30.6 G/DL (ref 31.5–35.7)
MCV RBC AUTO: 92.8 FL (ref 79–97)
MONOCYTES # BLD AUTO: 0.75 10*3/MM3 (ref 0.1–0.9)
MONOCYTES NFR BLD AUTO: 13.4 % (ref 5–12)
NEUTROPHILS # BLD AUTO: 3 10*3/MM3 (ref 1.7–7)
NEUTROPHILS NFR BLD AUTO: 53.4 % (ref 42.7–76)
NRBC BLD AUTO-RTO: 0 /100 WBC (ref 0–0.2)
PLATELET # BLD AUTO: 194 10*3/MM3 (ref 140–450)
PMV BLD AUTO: 12.5 FL (ref 6–12)
POTASSIUM BLD-SCNC: 3.5 MMOL/L (ref 3.5–5.2)
POTASSIUM BLD-SCNC: 3.8 MMOL/L (ref 3.5–5.2)
RBC # BLD AUTO: 3.77 10*6/MM3 (ref 3.77–5.28)
SODIUM BLD-SCNC: 141 MMOL/L (ref 136–145)
WBC NRBC COR # BLD: 5.61 10*3/MM3 (ref 3.4–10.8)

## 2019-09-14 PROCEDURE — 25010000002 ERTAPENEM PER 500 MG: Performed by: INTERNAL MEDICINE

## 2019-09-14 PROCEDURE — 99024 POSTOP FOLLOW-UP VISIT: CPT | Performed by: SURGERY

## 2019-09-14 PROCEDURE — 84132 ASSAY OF SERUM POTASSIUM: CPT | Performed by: SURGERY

## 2019-09-14 PROCEDURE — 85025 COMPLETE CBC W/AUTO DIFF WBC: CPT | Performed by: SURGERY

## 2019-09-14 PROCEDURE — 80048 BASIC METABOLIC PNL TOTAL CA: CPT | Performed by: SURGERY

## 2019-09-14 PROCEDURE — 25010000002 ENOXAPARIN PER 10 MG: Performed by: SURGERY

## 2019-09-14 RX ORDER — POTASSIUM CHLORIDE 750 MG/1
40 CAPSULE, EXTENDED RELEASE ORAL AS NEEDED
Status: DISCONTINUED | OUTPATIENT
Start: 2019-09-14 | End: 2019-09-23 | Stop reason: HOSPADM

## 2019-09-14 RX ADMIN — POTASSIUM CHLORIDE 40 MEQ: 750 CAPSULE, EXTENDED RELEASE ORAL at 08:29

## 2019-09-14 RX ADMIN — VITAMIN D, TAB 1000IU (100/BT) 1000 UNITS: 25 TAB at 08:28

## 2019-09-14 RX ADMIN — SODIUM CHLORIDE, PRESERVATIVE FREE 10 ML: 5 INJECTION INTRAVENOUS at 20:18

## 2019-09-14 RX ADMIN — ENOXAPARIN SODIUM 40 MG: 40 INJECTION SUBCUTANEOUS at 08:28

## 2019-09-14 RX ADMIN — OXYCODONE HYDROCHLORIDE AND ACETAMINOPHEN 1 TABLET: 5; 325 TABLET ORAL at 11:03

## 2019-09-14 RX ADMIN — PANTOPRAZOLE SODIUM 40 MG: 40 INJECTION, POWDER, LYOPHILIZED, FOR SOLUTION INTRAVENOUS at 08:29

## 2019-09-14 RX ADMIN — METOPROLOL SUCCINATE 50 MG: 50 TABLET, FILM COATED, EXTENDED RELEASE ORAL at 08:28

## 2019-09-14 RX ADMIN — LORAZEPAM 0.5 MG: 0.5 TABLET ORAL at 20:17

## 2019-09-14 RX ADMIN — OXYCODONE HYDROCHLORIDE AND ACETAMINOPHEN 1 TABLET: 5; 325 TABLET ORAL at 16:04

## 2019-09-14 RX ADMIN — PANTOPRAZOLE SODIUM 40 MG: 40 INJECTION, POWDER, LYOPHILIZED, FOR SOLUTION INTRAVENOUS at 20:18

## 2019-09-14 RX ADMIN — POTASSIUM CHLORIDE 40 MEQ: 750 CAPSULE, EXTENDED RELEASE ORAL at 16:04

## 2019-09-14 RX ADMIN — OXYCODONE HYDROCHLORIDE AND ACETAMINOPHEN 1 TABLET: 5; 325 TABLET ORAL at 05:25

## 2019-09-14 RX ADMIN — ERTAPENEM SODIUM 1 G: 1 INJECTION, POWDER, LYOPHILIZED, FOR SOLUTION INTRAMUSCULAR; INTRAVENOUS at 11:03

## 2019-09-14 RX ADMIN — SODIUM CHLORIDE, PRESERVATIVE FREE 10 ML: 5 INJECTION INTRAVENOUS at 08:29

## 2019-09-14 RX ADMIN — OXYCODONE HYDROCHLORIDE AND ACETAMINOPHEN 1 TABLET: 5; 325 TABLET ORAL at 20:17

## 2019-09-15 LAB
ANION GAP SERPL CALCULATED.3IONS-SCNC: 12.1 MMOL/L (ref 5–15)
BASOPHILS # BLD AUTO: 0.02 10*3/MM3 (ref 0–0.2)
BASOPHILS NFR BLD AUTO: 0.3 % (ref 0–1.5)
BUN BLD-MCNC: 7 MG/DL (ref 8–23)
BUN/CREAT SERPL: 14.9 (ref 7–25)
CALCIUM SPEC-SCNC: 8.8 MG/DL (ref 8.6–10.5)
CHLORIDE SERPL-SCNC: 102 MMOL/L (ref 98–107)
CO2 SERPL-SCNC: 24.9 MMOL/L (ref 22–29)
CREAT BLD-MCNC: 0.47 MG/DL (ref 0.57–1)
DEPRECATED RDW RBC AUTO: 43.8 FL (ref 37–54)
EOSINOPHIL # BLD AUTO: 0.27 10*3/MM3 (ref 0–0.4)
EOSINOPHIL NFR BLD AUTO: 4.4 % (ref 0.3–6.2)
ERYTHROCYTE [DISTWIDTH] IN BLOOD BY AUTOMATED COUNT: 12.7 % (ref 12.3–15.4)
GFR SERPL CREATININE-BSD FRML MDRD: 130 ML/MIN/1.73
GLUCOSE BLD-MCNC: 97 MG/DL (ref 65–99)
HCT VFR BLD AUTO: 34.2 % (ref 34–46.6)
HGB BLD-MCNC: 10.5 G/DL (ref 12–15.9)
IMM GRANULOCYTES # BLD AUTO: 0.06 10*3/MM3 (ref 0–0.05)
IMM GRANULOCYTES NFR BLD AUTO: 1 % (ref 0–0.5)
LYMPHOCYTES # BLD AUTO: 1.55 10*3/MM3 (ref 0.7–3.1)
LYMPHOCYTES NFR BLD AUTO: 25.4 % (ref 19.6–45.3)
MCH RBC QN AUTO: 28.8 PG (ref 26.6–33)
MCHC RBC AUTO-ENTMCNC: 30.7 G/DL (ref 31.5–35.7)
MCV RBC AUTO: 93.7 FL (ref 79–97)
MONOCYTES # BLD AUTO: 0.71 10*3/MM3 (ref 0.1–0.9)
MONOCYTES NFR BLD AUTO: 11.6 % (ref 5–12)
NEUTROPHILS # BLD AUTO: 3.5 10*3/MM3 (ref 1.7–7)
NEUTROPHILS NFR BLD AUTO: 57.3 % (ref 42.7–76)
NRBC BLD AUTO-RTO: 0 /100 WBC (ref 0–0.2)
PLATELET # BLD AUTO: 191 10*3/MM3 (ref 140–450)
PMV BLD AUTO: 12.3 FL (ref 6–12)
POTASSIUM BLD-SCNC: 4.2 MMOL/L (ref 3.5–5.2)
RBC # BLD AUTO: 3.65 10*6/MM3 (ref 3.77–5.28)
SODIUM BLD-SCNC: 139 MMOL/L (ref 136–145)
WBC NRBC COR # BLD: 6.11 10*3/MM3 (ref 3.4–10.8)

## 2019-09-15 PROCEDURE — 25010000002 ENOXAPARIN PER 10 MG: Performed by: SURGERY

## 2019-09-15 PROCEDURE — 80048 BASIC METABOLIC PNL TOTAL CA: CPT | Performed by: SURGERY

## 2019-09-15 PROCEDURE — 99024 POSTOP FOLLOW-UP VISIT: CPT | Performed by: SURGERY

## 2019-09-15 PROCEDURE — 85025 COMPLETE CBC W/AUTO DIFF WBC: CPT | Performed by: SURGERY

## 2019-09-15 RX ADMIN — OXYCODONE HYDROCHLORIDE AND ACETAMINOPHEN 1 TABLET: 5; 325 TABLET ORAL at 03:20

## 2019-09-15 RX ADMIN — METOPROLOL SUCCINATE 50 MG: 50 TABLET, FILM COATED, EXTENDED RELEASE ORAL at 08:00

## 2019-09-15 RX ADMIN — OXYCODONE HYDROCHLORIDE AND ACETAMINOPHEN 1 TABLET: 5; 325 TABLET ORAL at 20:32

## 2019-09-15 RX ADMIN — VITAMIN D, TAB 1000IU (100/BT) 1000 UNITS: 25 TAB at 08:00

## 2019-09-15 RX ADMIN — SODIUM CHLORIDE, PRESERVATIVE FREE 10 ML: 5 INJECTION INTRAVENOUS at 08:00

## 2019-09-15 RX ADMIN — OXYCODONE HYDROCHLORIDE AND ACETAMINOPHEN 1 TABLET: 5; 325 TABLET ORAL at 16:47

## 2019-09-15 RX ADMIN — NYSTATIN 500000 UNITS: 100000 SUSPENSION ORAL at 18:26

## 2019-09-15 RX ADMIN — OXYCODONE HYDROCHLORIDE AND ACETAMINOPHEN 1 TABLET: 5; 325 TABLET ORAL at 11:24

## 2019-09-15 RX ADMIN — SODIUM CHLORIDE, PRESERVATIVE FREE 10 ML: 5 INJECTION INTRAVENOUS at 20:29

## 2019-09-15 RX ADMIN — LORAZEPAM 0.5 MG: 0.5 TABLET ORAL at 20:32

## 2019-09-15 RX ADMIN — PANTOPRAZOLE SODIUM 40 MG: 40 INJECTION, POWDER, LYOPHILIZED, FOR SOLUTION INTRAVENOUS at 20:29

## 2019-09-15 RX ADMIN — ENOXAPARIN SODIUM 40 MG: 40 INJECTION SUBCUTANEOUS at 08:00

## 2019-09-15 RX ADMIN — PANTOPRAZOLE SODIUM 40 MG: 40 INJECTION, POWDER, LYOPHILIZED, FOR SOLUTION INTRAVENOUS at 08:00

## 2019-09-16 ENCOUNTER — APPOINTMENT (OUTPATIENT)
Dept: GENERAL RADIOLOGY | Facility: HOSPITAL | Age: 74
End: 2019-09-16

## 2019-09-16 PROCEDURE — 73562 X-RAY EXAM OF KNEE 3: CPT

## 2019-09-16 PROCEDURE — 97110 THERAPEUTIC EXERCISES: CPT

## 2019-09-16 PROCEDURE — 99024 POSTOP FOLLOW-UP VISIT: CPT | Performed by: SURGERY

## 2019-09-16 PROCEDURE — 97161 PT EVAL LOW COMPLEX 20 MIN: CPT

## 2019-09-16 PROCEDURE — 25010000002 ENOXAPARIN PER 10 MG: Performed by: SURGERY

## 2019-09-16 RX ORDER — PANTOPRAZOLE SODIUM 40 MG/1
40 TABLET, DELAYED RELEASE ORAL
Status: DISCONTINUED | OUTPATIENT
Start: 2019-09-16 | End: 2019-09-20 | Stop reason: ALTCHOICE

## 2019-09-16 RX ADMIN — OXYCODONE HYDROCHLORIDE AND ACETAMINOPHEN 1 TABLET: 5; 325 TABLET ORAL at 15:08

## 2019-09-16 RX ADMIN — DOCUSATE SODIUM 100 MG: 100 CAPSULE, LIQUID FILLED ORAL at 20:02

## 2019-09-16 RX ADMIN — METOPROLOL SUCCINATE 50 MG: 50 TABLET, FILM COATED, EXTENDED RELEASE ORAL at 07:46

## 2019-09-16 RX ADMIN — DOCUSATE SODIUM 100 MG: 100 CAPSULE, LIQUID FILLED ORAL at 07:46

## 2019-09-16 RX ADMIN — NYSTATIN 500000 UNITS: 100000 SUSPENSION ORAL at 20:02

## 2019-09-16 RX ADMIN — SODIUM CHLORIDE, PRESERVATIVE FREE 10 ML: 5 INJECTION INTRAVENOUS at 07:47

## 2019-09-16 RX ADMIN — NYSTATIN 500000 UNITS: 100000 SUSPENSION ORAL at 17:32

## 2019-09-16 RX ADMIN — PANTOPRAZOLE SODIUM 40 MG: 40 INJECTION, POWDER, LYOPHILIZED, FOR SOLUTION INTRAVENOUS at 07:46

## 2019-09-16 RX ADMIN — OXYCODONE HYDROCHLORIDE AND ACETAMINOPHEN 1 TABLET: 5; 325 TABLET ORAL at 20:02

## 2019-09-16 RX ADMIN — NYSTATIN 500000 UNITS: 100000 SUSPENSION ORAL at 12:55

## 2019-09-16 RX ADMIN — OXYCODONE HYDROCHLORIDE AND ACETAMINOPHEN 1 TABLET: 5; 325 TABLET ORAL at 01:33

## 2019-09-16 RX ADMIN — PANTOPRAZOLE SODIUM 40 MG: 40 TABLET, DELAYED RELEASE ORAL at 17:32

## 2019-09-16 RX ADMIN — NYSTATIN 500000 UNITS: 100000 SUSPENSION ORAL at 07:47

## 2019-09-16 RX ADMIN — OXYCODONE HYDROCHLORIDE AND ACETAMINOPHEN 1 TABLET: 5; 325 TABLET ORAL at 07:45

## 2019-09-16 RX ADMIN — ENOXAPARIN SODIUM 40 MG: 40 INJECTION SUBCUTANEOUS at 07:46

## 2019-09-16 RX ADMIN — VITAMIN D, TAB 1000IU (100/BT) 1000 UNITS: 25 TAB at 07:46

## 2019-09-16 RX ADMIN — LORAZEPAM 0.5 MG: 0.5 TABLET ORAL at 20:09

## 2019-09-16 RX ADMIN — SODIUM CHLORIDE, PRESERVATIVE FREE 10 ML: 5 INJECTION INTRAVENOUS at 20:02

## 2019-09-17 ENCOUNTER — APPOINTMENT (OUTPATIENT)
Dept: CT IMAGING | Facility: HOSPITAL | Age: 74
End: 2019-09-17

## 2019-09-17 PROCEDURE — 25010000002 HYDROMORPHONE PER 4 MG: Performed by: SURGERY

## 2019-09-17 PROCEDURE — 99024 POSTOP FOLLOW-UP VISIT: CPT | Performed by: SURGERY

## 2019-09-17 PROCEDURE — 25010000002 ONDANSETRON PER 1 MG: Performed by: SURGERY

## 2019-09-17 PROCEDURE — 25010000002 IOPAMIDOL 61 % SOLUTION: Performed by: SURGERY

## 2019-09-17 PROCEDURE — 73701 CT LOWER EXTREMITY W/DYE: CPT

## 2019-09-17 PROCEDURE — 25010000002 ENOXAPARIN PER 10 MG: Performed by: SURGERY

## 2019-09-17 RX ADMIN — OXYCODONE HYDROCHLORIDE AND ACETAMINOPHEN 1 TABLET: 5; 325 TABLET ORAL at 08:06

## 2019-09-17 RX ADMIN — PANTOPRAZOLE SODIUM 40 MG: 40 TABLET, DELAYED RELEASE ORAL at 08:06

## 2019-09-17 RX ADMIN — SODIUM CHLORIDE, PRESERVATIVE FREE 10 ML: 5 INJECTION INTRAVENOUS at 19:59

## 2019-09-17 RX ADMIN — OXYCODONE HYDROCHLORIDE AND ACETAMINOPHEN 1 TABLET: 5; 325 TABLET ORAL at 00:10

## 2019-09-17 RX ADMIN — HYDROMORPHONE HYDROCHLORIDE 0.5 MG: 1 INJECTION, SOLUTION INTRAMUSCULAR; INTRAVENOUS; SUBCUTANEOUS at 23:02

## 2019-09-17 RX ADMIN — DOCUSATE SODIUM 100 MG: 100 CAPSULE, LIQUID FILLED ORAL at 19:59

## 2019-09-17 RX ADMIN — PANTOPRAZOLE SODIUM 40 MG: 40 TABLET, DELAYED RELEASE ORAL at 17:05

## 2019-09-17 RX ADMIN — OXYCODONE HYDROCHLORIDE AND ACETAMINOPHEN 1 TABLET: 5; 325 TABLET ORAL at 19:59

## 2019-09-17 RX ADMIN — NYSTATIN 500000 UNITS: 100000 SUSPENSION ORAL at 12:19

## 2019-09-17 RX ADMIN — ONDANSETRON 4 MG: 2 INJECTION INTRAMUSCULAR; INTRAVENOUS at 20:04

## 2019-09-17 RX ADMIN — NYSTATIN 500000 UNITS: 100000 SUSPENSION ORAL at 19:59

## 2019-09-17 RX ADMIN — NYSTATIN 500000 UNITS: 100000 SUSPENSION ORAL at 08:06

## 2019-09-17 RX ADMIN — DOCUSATE SODIUM 100 MG: 100 CAPSULE, LIQUID FILLED ORAL at 08:06

## 2019-09-17 RX ADMIN — LORAZEPAM 0.5 MG: 0.5 TABLET ORAL at 20:11

## 2019-09-17 RX ADMIN — NYSTATIN 500000 UNITS: 100000 SUSPENSION ORAL at 17:05

## 2019-09-17 RX ADMIN — METOPROLOL SUCCINATE 50 MG: 50 TABLET, FILM COATED, EXTENDED RELEASE ORAL at 08:06

## 2019-09-17 RX ADMIN — IOPAMIDOL 100 ML: 612 INJECTION, SOLUTION INTRAVENOUS at 10:17

## 2019-09-17 RX ADMIN — SODIUM CHLORIDE, PRESERVATIVE FREE 10 ML: 5 INJECTION INTRAVENOUS at 08:07

## 2019-09-17 RX ADMIN — ENOXAPARIN SODIUM 40 MG: 40 INJECTION SUBCUTANEOUS at 08:06

## 2019-09-17 RX ADMIN — VITAMIN D, TAB 1000IU (100/BT) 1000 UNITS: 25 TAB at 08:06

## 2019-09-17 RX ADMIN — OXYCODONE HYDROCHLORIDE AND ACETAMINOPHEN 1 TABLET: 5; 325 TABLET ORAL at 13:32

## 2019-09-18 ENCOUNTER — APPOINTMENT (OUTPATIENT)
Dept: GENERAL RADIOLOGY | Facility: HOSPITAL | Age: 74
End: 2019-09-18

## 2019-09-18 LAB
ANION GAP SERPL CALCULATED.3IONS-SCNC: 14.8 MMOL/L (ref 5–15)
BASOPHILS # BLD AUTO: 0.03 10*3/MM3 (ref 0–0.2)
BASOPHILS NFR BLD AUTO: 0.3 % (ref 0–1.5)
BUN BLD-MCNC: 16 MG/DL (ref 8–23)
BUN/CREAT SERPL: 34.8 (ref 7–25)
CALCIUM SPEC-SCNC: 9.2 MG/DL (ref 8.6–10.5)
CHLORIDE SERPL-SCNC: 100 MMOL/L (ref 98–107)
CO2 SERPL-SCNC: 24.2 MMOL/L (ref 22–29)
CREAT BLD-MCNC: 0.46 MG/DL (ref 0.57–1)
DEPRECATED RDW RBC AUTO: 38.3 FL (ref 37–54)
EOSINOPHIL # BLD AUTO: 0.07 10*3/MM3 (ref 0–0.4)
EOSINOPHIL NFR BLD AUTO: 0.6 % (ref 0.3–6.2)
ERYTHROCYTE [DISTWIDTH] IN BLOOD BY AUTOMATED COUNT: 12.1 % (ref 12.3–15.4)
GFR SERPL CREATININE-BSD FRML MDRD: 133 ML/MIN/1.73
GLUCOSE BLD-MCNC: 123 MG/DL (ref 65–99)
HCT VFR BLD AUTO: 41.2 % (ref 34–46.6)
HGB BLD-MCNC: 13.4 G/DL (ref 12–15.9)
IMM GRANULOCYTES # BLD AUTO: 0.08 10*3/MM3 (ref 0–0.05)
IMM GRANULOCYTES NFR BLD AUTO: 0.7 % (ref 0–0.5)
LYMPHOCYTES # BLD AUTO: 1.36 10*3/MM3 (ref 0.7–3.1)
LYMPHOCYTES NFR BLD AUTO: 11.7 % (ref 19.6–45.3)
MCH RBC QN AUTO: 28.3 PG (ref 26.6–33)
MCHC RBC AUTO-ENTMCNC: 32.5 G/DL (ref 31.5–35.7)
MCV RBC AUTO: 86.9 FL (ref 79–97)
MONOCYTES # BLD AUTO: 0.67 10*3/MM3 (ref 0.1–0.9)
MONOCYTES NFR BLD AUTO: 5.8 % (ref 5–12)
NEUTROPHILS # BLD AUTO: 9.44 10*3/MM3 (ref 1.7–7)
NEUTROPHILS NFR BLD AUTO: 80.9 % (ref 42.7–76)
NRBC BLD AUTO-RTO: 0 /100 WBC (ref 0–0.2)
PLATELET # BLD AUTO: 231 10*3/MM3 (ref 140–450)
PMV BLD AUTO: 12.1 FL (ref 6–12)
POTASSIUM BLD-SCNC: 3.8 MMOL/L (ref 3.5–5.2)
RBC # BLD AUTO: 4.74 10*6/MM3 (ref 3.77–5.28)
SODIUM BLD-SCNC: 139 MMOL/L (ref 136–145)
WBC NRBC COR # BLD: 11.65 10*3/MM3 (ref 3.4–10.8)

## 2019-09-18 PROCEDURE — 85025 COMPLETE CBC W/AUTO DIFF WBC: CPT | Performed by: SURGERY

## 2019-09-18 PROCEDURE — 97110 THERAPEUTIC EXERCISES: CPT

## 2019-09-18 PROCEDURE — 25010000002 ENOXAPARIN PER 10 MG: Performed by: SURGERY

## 2019-09-18 PROCEDURE — 25010000002 ONDANSETRON PER 1 MG: Performed by: SURGERY

## 2019-09-18 PROCEDURE — 74018 RADEX ABDOMEN 1 VIEW: CPT

## 2019-09-18 PROCEDURE — 99024 POSTOP FOLLOW-UP VISIT: CPT | Performed by: SURGERY

## 2019-09-18 PROCEDURE — 80048 BASIC METABOLIC PNL TOTAL CA: CPT | Performed by: SURGERY

## 2019-09-18 PROCEDURE — 25010000002 PROMETHAZINE PER 50 MG: Performed by: SURGERY

## 2019-09-18 RX ORDER — ONDANSETRON 2 MG/ML
4 INJECTION INTRAMUSCULAR; INTRAVENOUS EVERY 4 HOURS PRN
Status: DISCONTINUED | OUTPATIENT
Start: 2019-09-18 | End: 2019-09-23 | Stop reason: HOSPADM

## 2019-09-18 RX ORDER — PROMETHAZINE HYDROCHLORIDE 25 MG/ML
12.5 INJECTION, SOLUTION INTRAMUSCULAR; INTRAVENOUS EVERY 6 HOURS PRN
Status: DISCONTINUED | OUTPATIENT
Start: 2019-09-18 | End: 2019-09-23 | Stop reason: HOSPADM

## 2019-09-18 RX ORDER — SODIUM CHLORIDE 9 MG/ML
50 INJECTION, SOLUTION INTRAVENOUS CONTINUOUS
Status: DISCONTINUED | OUTPATIENT
Start: 2019-09-18 | End: 2019-09-20

## 2019-09-18 RX ADMIN — OXYCODONE HYDROCHLORIDE AND ACETAMINOPHEN 1 TABLET: 5; 325 TABLET ORAL at 20:33

## 2019-09-18 RX ADMIN — ONDANSETRON 4 MG: 2 INJECTION INTRAMUSCULAR; INTRAVENOUS at 08:14

## 2019-09-18 RX ADMIN — NYSTATIN 500000 UNITS: 100000 SUSPENSION ORAL at 08:14

## 2019-09-18 RX ADMIN — PROMETHAZINE HYDROCHLORIDE 12.5 MG: 25 INJECTION INTRAMUSCULAR; INTRAVENOUS at 23:02

## 2019-09-18 RX ADMIN — PANTOPRAZOLE SODIUM 40 MG: 40 TABLET, DELAYED RELEASE ORAL at 06:30

## 2019-09-18 RX ADMIN — ONDANSETRON 4 MG: 2 INJECTION INTRAMUSCULAR; INTRAVENOUS at 14:05

## 2019-09-18 RX ADMIN — NYSTATIN 500000 UNITS: 100000 SUSPENSION ORAL at 17:14

## 2019-09-18 RX ADMIN — SODIUM CHLORIDE, PRESERVATIVE FREE 10 ML: 5 INJECTION INTRAVENOUS at 20:34

## 2019-09-18 RX ADMIN — PANTOPRAZOLE SODIUM 40 MG: 40 TABLET, DELAYED RELEASE ORAL at 17:14

## 2019-09-18 RX ADMIN — ONDANSETRON 4 MG: 2 INJECTION INTRAMUSCULAR; INTRAVENOUS at 02:04

## 2019-09-18 RX ADMIN — ENOXAPARIN SODIUM 40 MG: 40 INJECTION SUBCUTANEOUS at 08:15

## 2019-09-18 RX ADMIN — METOPROLOL SUCCINATE 50 MG: 50 TABLET, FILM COATED, EXTENDED RELEASE ORAL at 08:15

## 2019-09-18 RX ADMIN — SODIUM CHLORIDE 75 ML/HR: 9 INJECTION, SOLUTION INTRAVENOUS at 15:15

## 2019-09-18 RX ADMIN — NYSTATIN 500000 UNITS: 100000 SUSPENSION ORAL at 20:33

## 2019-09-18 RX ADMIN — LORAZEPAM 0.5 MG: 0.5 TABLET ORAL at 20:33

## 2019-09-18 RX ADMIN — OXYCODONE HYDROCHLORIDE AND ACETAMINOPHEN 1 TABLET: 5; 325 TABLET ORAL at 02:04

## 2019-09-18 RX ADMIN — ONDANSETRON 4 MG: 2 INJECTION INTRAMUSCULAR; INTRAVENOUS at 20:34

## 2019-09-18 RX ADMIN — SODIUM CHLORIDE, PRESERVATIVE FREE 10 ML: 5 INJECTION INTRAVENOUS at 08:15

## 2019-09-18 RX ADMIN — VITAMIN D, TAB 1000IU (100/BT) 1000 UNITS: 25 TAB at 08:15

## 2019-09-19 PROCEDURE — 97110 THERAPEUTIC EXERCISES: CPT

## 2019-09-19 PROCEDURE — 25010000002 PROMETHAZINE PER 50 MG: Performed by: SURGERY

## 2019-09-19 PROCEDURE — 99024 POSTOP FOLLOW-UP VISIT: CPT | Performed by: SURGERY

## 2019-09-19 PROCEDURE — 25010000002 ENOXAPARIN PER 10 MG: Performed by: SURGERY

## 2019-09-19 RX ADMIN — VITAMIN D, TAB 1000IU (100/BT) 1000 UNITS: 25 TAB at 08:00

## 2019-09-19 RX ADMIN — NYSTATIN 500000 UNITS: 100000 SUSPENSION ORAL at 11:49

## 2019-09-19 RX ADMIN — SODIUM CHLORIDE, PRESERVATIVE FREE 10 ML: 5 INJECTION INTRAVENOUS at 20:54

## 2019-09-19 RX ADMIN — PROMETHAZINE HYDROCHLORIDE 12.5 MG: 25 INJECTION INTRAMUSCULAR; INTRAVENOUS at 20:51

## 2019-09-19 RX ADMIN — NYSTATIN 500000 UNITS: 100000 SUSPENSION ORAL at 17:19

## 2019-09-19 RX ADMIN — NYSTATIN 500000 UNITS: 100000 SUSPENSION ORAL at 20:51

## 2019-09-19 RX ADMIN — METOPROLOL SUCCINATE 50 MG: 50 TABLET, FILM COATED, EXTENDED RELEASE ORAL at 08:00

## 2019-09-19 RX ADMIN — LORAZEPAM 0.5 MG: 0.5 TABLET ORAL at 20:51

## 2019-09-19 RX ADMIN — OXYCODONE HYDROCHLORIDE AND ACETAMINOPHEN 1 TABLET: 5; 325 TABLET ORAL at 14:37

## 2019-09-19 RX ADMIN — PANTOPRAZOLE SODIUM 40 MG: 40 TABLET, DELAYED RELEASE ORAL at 06:23

## 2019-09-19 RX ADMIN — ENOXAPARIN SODIUM 40 MG: 40 INJECTION SUBCUTANEOUS at 08:00

## 2019-09-19 RX ADMIN — OXYCODONE HYDROCHLORIDE AND ACETAMINOPHEN 1 TABLET: 5; 325 TABLET ORAL at 20:52

## 2019-09-19 RX ADMIN — PANTOPRAZOLE SODIUM 40 MG: 40 TABLET, DELAYED RELEASE ORAL at 17:19

## 2019-09-19 RX ADMIN — SODIUM CHLORIDE 75 ML/HR: 9 INJECTION, SOLUTION INTRAVENOUS at 03:16

## 2019-09-19 RX ADMIN — SODIUM CHLORIDE, PRESERVATIVE FREE 10 ML: 5 INJECTION INTRAVENOUS at 08:00

## 2019-09-19 RX ADMIN — NYSTATIN 500000 UNITS: 100000 SUSPENSION ORAL at 07:57

## 2019-09-20 ENCOUNTER — APPOINTMENT (OUTPATIENT)
Dept: CARDIOLOGY | Facility: HOSPITAL | Age: 74
End: 2019-09-20

## 2019-09-20 PROBLEM — A04.72 CLOSTRIDIUM DIFFICILE COLITIS: Status: ACTIVE | Noted: 2019-09-20

## 2019-09-20 LAB
ANION GAP SERPL CALCULATED.3IONS-SCNC: 11.9 MMOL/L (ref 5–15)
BH CV LOWER VASCULAR LEFT COMMON FEMORAL AUGMENT: NORMAL
BH CV LOWER VASCULAR LEFT COMMON FEMORAL COMPETENT: NORMAL
BH CV LOWER VASCULAR LEFT COMMON FEMORAL COMPRESS: NORMAL
BH CV LOWER VASCULAR LEFT COMMON FEMORAL PHASIC: NORMAL
BH CV LOWER VASCULAR LEFT COMMON FEMORAL SPONT: NORMAL
BH CV LOWER VASCULAR LEFT DISTAL FEMORAL COMPRESS: NORMAL
BH CV LOWER VASCULAR LEFT GASTRONEMIUS COMPRESS: NORMAL
BH CV LOWER VASCULAR LEFT GREATER SAPH AK COMPRESS: NORMAL
BH CV LOWER VASCULAR LEFT GREATER SAPH BK COMPRESS: NORMAL
BH CV LOWER VASCULAR LEFT MID FEMORAL AUGMENT: NORMAL
BH CV LOWER VASCULAR LEFT MID FEMORAL COMPETENT: NORMAL
BH CV LOWER VASCULAR LEFT MID FEMORAL COMPRESS: NORMAL
BH CV LOWER VASCULAR LEFT MID FEMORAL PHASIC: NORMAL
BH CV LOWER VASCULAR LEFT MID FEMORAL SPONT: NORMAL
BH CV LOWER VASCULAR LEFT PERONEAL COMPRESS: NORMAL
BH CV LOWER VASCULAR LEFT POPLITEAL AUGMENT: NORMAL
BH CV LOWER VASCULAR LEFT POPLITEAL COMPETENT: NORMAL
BH CV LOWER VASCULAR LEFT POPLITEAL COMPRESS: NORMAL
BH CV LOWER VASCULAR LEFT POPLITEAL PHASIC: NORMAL
BH CV LOWER VASCULAR LEFT POPLITEAL SPONT: NORMAL
BH CV LOWER VASCULAR LEFT POSTERIOR TIBIAL COMPRESS: NORMAL
BH CV LOWER VASCULAR LEFT PROXIMAL FEMORAL COMPRESS: NORMAL
BH CV LOWER VASCULAR LEFT SAPHENOFEMORAL JUNCTION COMPRESS: NORMAL
BH CV LOWER VASCULAR RIGHT COMMON FEMORAL AUGMENT: NORMAL
BH CV LOWER VASCULAR RIGHT COMMON FEMORAL COMPETENT: NORMAL
BH CV LOWER VASCULAR RIGHT COMMON FEMORAL COMPRESS: NORMAL
BH CV LOWER VASCULAR RIGHT COMMON FEMORAL PHASIC: NORMAL
BH CV LOWER VASCULAR RIGHT COMMON FEMORAL SPONT: NORMAL
BUN BLD-MCNC: 10 MG/DL (ref 8–23)
BUN/CREAT SERPL: 23.8 (ref 7–25)
C DIFF TOX GENS STL QL NAA+PROBE: POSITIVE
CALCIUM SPEC-SCNC: 8.6 MG/DL (ref 8.6–10.5)
CHLORIDE SERPL-SCNC: 106 MMOL/L (ref 98–107)
CO2 SERPL-SCNC: 24.1 MMOL/L (ref 22–29)
CREAT BLD-MCNC: 0.42 MG/DL (ref 0.57–1)
DEPRECATED RDW RBC AUTO: 41.3 FL (ref 37–54)
ERYTHROCYTE [DISTWIDTH] IN BLOOD BY AUTOMATED COUNT: 12.5 % (ref 12.3–15.4)
GFR SERPL CREATININE-BSD FRML MDRD: 147 ML/MIN/1.73
GLUCOSE BLD-MCNC: 95 MG/DL (ref 65–99)
HCT VFR BLD AUTO: 33.8 % (ref 34–46.6)
HGB BLD-MCNC: 10.6 G/DL (ref 12–15.9)
MCH RBC QN AUTO: 28 PG (ref 26.6–33)
MCHC RBC AUTO-ENTMCNC: 31.4 G/DL (ref 31.5–35.7)
MCV RBC AUTO: 89.2 FL (ref 79–97)
PLATELET # BLD AUTO: 177 10*3/MM3 (ref 140–450)
PMV BLD AUTO: 12.6 FL (ref 6–12)
POTASSIUM BLD-SCNC: 3.5 MMOL/L (ref 3.5–5.2)
RBC # BLD AUTO: 3.79 10*6/MM3 (ref 3.77–5.28)
SODIUM BLD-SCNC: 142 MMOL/L (ref 136–145)
WBC NRBC COR # BLD: 6.82 10*3/MM3 (ref 3.4–10.8)

## 2019-09-20 PROCEDURE — 25010000002 PROMETHAZINE PER 50 MG: Performed by: SURGERY

## 2019-09-20 PROCEDURE — 25010000002 ENOXAPARIN PER 10 MG: Performed by: SURGERY

## 2019-09-20 PROCEDURE — 85027 COMPLETE CBC AUTOMATED: CPT | Performed by: SURGERY

## 2019-09-20 PROCEDURE — 80048 BASIC METABOLIC PNL TOTAL CA: CPT | Performed by: SURGERY

## 2019-09-20 PROCEDURE — 93971 EXTREMITY STUDY: CPT

## 2019-09-20 PROCEDURE — 97110 THERAPEUTIC EXERCISES: CPT

## 2019-09-20 PROCEDURE — 87493 C DIFF AMPLIFIED PROBE: CPT | Performed by: SURGERY

## 2019-09-20 PROCEDURE — 99024 POSTOP FOLLOW-UP VISIT: CPT | Performed by: SURGERY

## 2019-09-20 RX ORDER — L.ACID,PARA/B.BIFIDUM/S.THERM 8B CELL
1 CAPSULE ORAL DAILY
Status: DISCONTINUED | OUTPATIENT
Start: 2019-09-20 | End: 2019-09-23 | Stop reason: HOSPADM

## 2019-09-20 RX ADMIN — VANCOMYCIN 125 MG: KIT at 17:42

## 2019-09-20 RX ADMIN — OXYCODONE HYDROCHLORIDE AND ACETAMINOPHEN 1 TABLET: 5; 325 TABLET ORAL at 19:44

## 2019-09-20 RX ADMIN — METOPROLOL SUCCINATE 50 MG: 50 TABLET, FILM COATED, EXTENDED RELEASE ORAL at 08:47

## 2019-09-20 RX ADMIN — ENOXAPARIN SODIUM 40 MG: 40 INJECTION SUBCUTANEOUS at 08:47

## 2019-09-20 RX ADMIN — VITAMIN D, TAB 1000IU (100/BT) 1000 UNITS: 25 TAB at 08:47

## 2019-09-20 RX ADMIN — OXYCODONE HYDROCHLORIDE AND ACETAMINOPHEN 1 TABLET: 5; 325 TABLET ORAL at 23:58

## 2019-09-20 RX ADMIN — PROMETHAZINE HYDROCHLORIDE 12.5 MG: 25 INJECTION INTRAMUSCULAR; INTRAVENOUS at 22:17

## 2019-09-20 RX ADMIN — OXYCODONE HYDROCHLORIDE AND ACETAMINOPHEN 1 TABLET: 5; 325 TABLET ORAL at 08:47

## 2019-09-20 RX ADMIN — VANCOMYCIN 125 MG: KIT at 11:48

## 2019-09-20 RX ADMIN — SODIUM CHLORIDE, PRESERVATIVE FREE 10 ML: 5 INJECTION INTRAVENOUS at 08:48

## 2019-09-20 RX ADMIN — Medication 1 CAPSULE: at 08:47

## 2019-09-20 RX ADMIN — PANTOPRAZOLE SODIUM 40 MG: 40 TABLET, DELAYED RELEASE ORAL at 08:47

## 2019-09-20 RX ADMIN — VANCOMYCIN 125 MG: KIT at 22:48

## 2019-09-21 PROCEDURE — 97110 THERAPEUTIC EXERCISES: CPT

## 2019-09-21 PROCEDURE — 25010000002 ONDANSETRON PER 1 MG: Performed by: SURGERY

## 2019-09-21 PROCEDURE — 99024 POSTOP FOLLOW-UP VISIT: CPT | Performed by: SURGERY

## 2019-09-21 PROCEDURE — 25010000002 ENOXAPARIN PER 10 MG: Performed by: SURGERY

## 2019-09-21 RX ORDER — FAMOTIDINE 20 MG/1
20 TABLET, FILM COATED ORAL
Status: DISCONTINUED | OUTPATIENT
Start: 2019-09-21 | End: 2019-09-23 | Stop reason: HOSPADM

## 2019-09-21 RX ADMIN — FAMOTIDINE 20 MG: 20 TABLET, FILM COATED ORAL at 13:09

## 2019-09-21 RX ADMIN — VANCOMYCIN 125 MG: KIT at 05:52

## 2019-09-21 RX ADMIN — SODIUM CHLORIDE, PRESERVATIVE FREE 10 ML: 5 INJECTION INTRAVENOUS at 21:34

## 2019-09-21 RX ADMIN — Medication 1 CAPSULE: at 08:38

## 2019-09-21 RX ADMIN — VANCOMYCIN 125 MG: KIT at 13:09

## 2019-09-21 RX ADMIN — ENOXAPARIN SODIUM 40 MG: 40 INJECTION SUBCUTANEOUS at 08:38

## 2019-09-21 RX ADMIN — OXYCODONE HYDROCHLORIDE AND ACETAMINOPHEN 1 TABLET: 5; 325 TABLET ORAL at 08:51

## 2019-09-21 RX ADMIN — VANCOMYCIN 125 MG: KIT at 17:57

## 2019-09-21 RX ADMIN — OXYCODONE HYDROCHLORIDE AND ACETAMINOPHEN 1 TABLET: 5; 325 TABLET ORAL at 22:12

## 2019-09-21 RX ADMIN — OXYCODONE HYDROCHLORIDE AND ACETAMINOPHEN 1 TABLET: 5; 325 TABLET ORAL at 03:48

## 2019-09-21 RX ADMIN — VITAMIN D, TAB 1000IU (100/BT) 1000 UNITS: 25 TAB at 08:38

## 2019-09-21 RX ADMIN — METOPROLOL SUCCINATE 50 MG: 50 TABLET, FILM COATED, EXTENDED RELEASE ORAL at 08:38

## 2019-09-21 RX ADMIN — ONDANSETRON 4 MG: 2 INJECTION INTRAMUSCULAR; INTRAVENOUS at 02:57

## 2019-09-21 RX ADMIN — SODIUM CHLORIDE, PRESERVATIVE FREE 10 ML: 5 INJECTION INTRAVENOUS at 08:38

## 2019-09-21 RX ADMIN — LORAZEPAM 0.5 MG: 0.5 TABLET ORAL at 22:12

## 2019-09-21 RX ADMIN — OXYCODONE HYDROCHLORIDE AND ACETAMINOPHEN 1 TABLET: 5; 325 TABLET ORAL at 17:57

## 2019-09-21 RX ADMIN — FAMOTIDINE 20 MG: 20 TABLET, FILM COATED ORAL at 17:57

## 2019-09-21 RX ADMIN — OXYCODONE HYDROCHLORIDE AND ACETAMINOPHEN 1 TABLET: 5; 325 TABLET ORAL at 13:13

## 2019-09-22 PROCEDURE — 25010000002 ONDANSETRON PER 1 MG: Performed by: SURGERY

## 2019-09-22 PROCEDURE — 99024 POSTOP FOLLOW-UP VISIT: CPT | Performed by: SURGERY

## 2019-09-22 PROCEDURE — 25010000002 ENOXAPARIN PER 10 MG: Performed by: SURGERY

## 2019-09-22 RX ADMIN — FAMOTIDINE 20 MG: 20 TABLET, FILM COATED ORAL at 18:13

## 2019-09-22 RX ADMIN — Medication 1 CAPSULE: at 09:23

## 2019-09-22 RX ADMIN — OXYCODONE HYDROCHLORIDE AND ACETAMINOPHEN 1 TABLET: 5; 325 TABLET ORAL at 15:27

## 2019-09-22 RX ADMIN — OXYCODONE HYDROCHLORIDE AND ACETAMINOPHEN 1 TABLET: 5; 325 TABLET ORAL at 09:28

## 2019-09-22 RX ADMIN — VANCOMYCIN 125 MG: KIT at 18:13

## 2019-09-22 RX ADMIN — ONDANSETRON 4 MG: 2 INJECTION INTRAMUSCULAR; INTRAVENOUS at 15:27

## 2019-09-22 RX ADMIN — METOPROLOL SUCCINATE 50 MG: 50 TABLET, FILM COATED, EXTENDED RELEASE ORAL at 09:23

## 2019-09-22 RX ADMIN — ENOXAPARIN SODIUM 40 MG: 40 INJECTION SUBCUTANEOUS at 09:23

## 2019-09-22 RX ADMIN — OXYCODONE HYDROCHLORIDE AND ACETAMINOPHEN 1 TABLET: 5; 325 TABLET ORAL at 21:16

## 2019-09-22 RX ADMIN — SODIUM CHLORIDE, PRESERVATIVE FREE 10 ML: 5 INJECTION INTRAVENOUS at 15:29

## 2019-09-22 RX ADMIN — ONDANSETRON 4 MG: 2 INJECTION INTRAMUSCULAR; INTRAVENOUS at 23:50

## 2019-09-22 RX ADMIN — VITAMIN D, TAB 1000IU (100/BT) 1000 UNITS: 25 TAB at 09:23

## 2019-09-22 RX ADMIN — FAMOTIDINE 20 MG: 20 TABLET, FILM COATED ORAL at 06:31

## 2019-09-22 RX ADMIN — VANCOMYCIN 125 MG: KIT at 12:40

## 2019-09-22 RX ADMIN — VANCOMYCIN 125 MG: KIT at 23:46

## 2019-09-22 RX ADMIN — VANCOMYCIN 125 MG: KIT at 00:11

## 2019-09-22 RX ADMIN — VANCOMYCIN 125 MG: KIT at 06:06

## 2019-09-22 RX ADMIN — LORAZEPAM 0.5 MG: 0.5 TABLET ORAL at 21:16

## 2019-09-22 RX ADMIN — SODIUM CHLORIDE, PRESERVATIVE FREE 10 ML: 5 INJECTION INTRAVENOUS at 21:17

## 2019-09-22 RX ADMIN — SODIUM CHLORIDE, PRESERVATIVE FREE 10 ML: 5 INJECTION INTRAVENOUS at 09:47

## 2019-09-23 VITALS
SYSTOLIC BLOOD PRESSURE: 115 MMHG | WEIGHT: 206.5 LBS | OXYGEN SATURATION: 94 % | HEIGHT: 65 IN | TEMPERATURE: 97.1 F | RESPIRATION RATE: 16 BRPM | HEART RATE: 62 BPM | DIASTOLIC BLOOD PRESSURE: 87 MMHG | BODY MASS INDEX: 34.41 KG/M2

## 2019-09-23 PROCEDURE — 97110 THERAPEUTIC EXERCISES: CPT

## 2019-09-23 PROCEDURE — 99024 POSTOP FOLLOW-UP VISIT: CPT | Performed by: SURGERY

## 2019-09-23 PROCEDURE — 25010000002 ENOXAPARIN PER 10 MG: Performed by: SURGERY

## 2019-09-23 RX ORDER — METOPROLOL SUCCINATE 50 MG/1
50 TABLET, EXTENDED RELEASE ORAL DAILY
Qty: 30 TABLET | Refills: 11 | Status: SHIPPED | OUTPATIENT
Start: 2019-09-24 | End: 2019-11-01 | Stop reason: SDUPTHER

## 2019-09-23 RX ORDER — VANCOMYCIN HYDROCHLORIDE 125 MG/1
125 CAPSULE ORAL EVERY 6 HOURS SCHEDULED
Qty: 28 CAPSULE | Refills: 0 | Status: SHIPPED | OUTPATIENT
Start: 2019-09-23 | End: 2019-09-30

## 2019-09-23 RX ORDER — OXYCODONE HYDROCHLORIDE AND ACETAMINOPHEN 5; 325 MG/1; MG/1
1 TABLET ORAL EVERY 4 HOURS PRN
Qty: 15 TABLET | Refills: 0 | Status: SHIPPED | OUTPATIENT
Start: 2019-09-23 | End: 2019-09-27 | Stop reason: SDUPTHER

## 2019-09-23 RX ADMIN — ENOXAPARIN SODIUM 40 MG: 40 INJECTION SUBCUTANEOUS at 08:13

## 2019-09-23 RX ADMIN — SODIUM CHLORIDE, PRESERVATIVE FREE 10 ML: 5 INJECTION INTRAVENOUS at 08:14

## 2019-09-23 RX ADMIN — VITAMIN D, TAB 1000IU (100/BT) 1000 UNITS: 25 TAB at 08:13

## 2019-09-23 RX ADMIN — VANCOMYCIN 125 MG: KIT at 06:29

## 2019-09-23 RX ADMIN — OXYCODONE HYDROCHLORIDE AND ACETAMINOPHEN 1 TABLET: 5; 325 TABLET ORAL at 12:58

## 2019-09-23 RX ADMIN — VANCOMYCIN 125 MG: KIT at 11:37

## 2019-09-23 RX ADMIN — FAMOTIDINE 20 MG: 20 TABLET, FILM COATED ORAL at 06:30

## 2019-09-23 RX ADMIN — METOPROLOL SUCCINATE 50 MG: 50 TABLET, FILM COATED, EXTENDED RELEASE ORAL at 08:13

## 2019-09-23 RX ADMIN — Medication 1 CAPSULE: at 08:13

## 2019-09-24 ENCOUNTER — READMISSION MANAGEMENT (OUTPATIENT)
Dept: CALL CENTER | Facility: HOSPITAL | Age: 74
End: 2019-09-24

## 2019-09-25 ENCOUNTER — NURSE TRIAGE (OUTPATIENT)
Dept: CALL CENTER | Facility: HOSPITAL | Age: 74
End: 2019-09-25

## 2019-09-25 NOTE — OUTREACH NOTE
Prep Survey      Responses   Facility patient discharged from?  [place]   Is patient eligible?  Yes   Discharge diagnosis  Small bowel obstruction due to adhesions,    Recurrent urinary tract infection,    Atrial fibrillation with rapid ventricular response,     C. difficile colitis,   Exploratory laparotomy with lysis of adhesions      Does the patient have one of the following disease processes/diagnoses(primary or secondary)?  General Surgery   Does the patient have Home health ordered?  Yes   [facility]     Is there a DME ordered?  No   Prep survey completed?  Yes          Miranda Peres RN

## 2019-09-26 ENCOUNTER — READMISSION MANAGEMENT (OUTPATIENT)
Dept: CALL CENTER | Facility: HOSPITAL | Age: 74
End: 2019-09-26

## 2019-09-26 NOTE — OUTREACH NOTE
General Surgery Week 1 Survey      Responses   Facility patient discharged from?  [place]   Does the patient have one of the following disease processes/diagnoses(primary or secondary)?  General Surgery   Is there a successful TCM telephone encounter documented?  No   Week 1 attempt successful?  Yes   Call start time  1522   Call end time  1529   Discharge diagnosis  Small bowel obstruction due to adhesions,    Recurrent urinary tract infection,    Atrial fibrillation with rapid ventricular response,     C. difficile colitis,   Exploratory laparotomy with lysis of adhesions      Meds reviewed with patient/caregiver?  Yes   Is the patient having any side effects they believe may be caused by any medication additions or changes?  No   Does the patient have all medications related to this admission filled (includes all antibiotics, pain medications, etc.)  Yes   Is the patient taking all medications as directed (includes completed medication regime)?  Yes   Does the patient have a follow up appointment scheduled with their surgeon?  Yes   Has the patient kept scheduled appointments due by today?  N/A   What is the Home health agency?   [facility]    Has home health visited the patient within 72 hours of discharge?  Yes   Psychosocial issues?  No   Did the patient receive a copy of their discharge instructions?  Yes   Nursing interventions  Reviewed instructions with patient   What is the patient's perception of their health status since discharge?  Improving   Nursing interventions  Nurse provided patient education   Is the patient /caregiver able to teach back basic post-op care?  Continue use of incentive spirometry at least 1 week post discharge, Practice 'cough and deep breath', Lifting as instructed by MD in discharge instructions, Keep incision areas clean,dry and protected, No tub bath, swimming, or hot tub until instructed by MD, Take showers only when approved by MD-sponge bathe until then, Drive as  instructed by MD in discharge instructions   Is the patient/caregiver able to teach back signs and symptoms of incisional infection?  Increased redness, swelling or pain at the incisonal site, Increased drainage or bleeding, Incisional warmth, Pus or odor from incision, Fever   Is the patient/caregiver able to teach back steps to recovery at home?  Set small, achievable goals for return to baseline health, Rest and rebuild strength, gradually increase activity, Weigh daily, Eat a well-balance diet, Make a list of questions for surgeon's appointment   Is the patient/caregiver able to teach back the hierarchy of who to call/visit for symptoms/problems? PCP, Specialist, Home health nurse, Urgent Care, ED, 911  Yes   Additional teach back comments  Encouraged percocet at bed for better sleep, incision looks good, no red streaks, no fever.   Week 1 call completed?  Yes          Eveline Clifford, RN

## 2019-09-27 ENCOUNTER — TELEPHONE (OUTPATIENT)
Dept: INFECTIOUS DISEASES | Facility: CLINIC | Age: 74
End: 2019-09-27

## 2019-09-27 RX ORDER — OXYCODONE HYDROCHLORIDE AND ACETAMINOPHEN 5; 325 MG/1; MG/1
1 TABLET ORAL EVERY 4 HOURS PRN
Qty: 15 TABLET | Refills: 0 | Status: SHIPPED | OUTPATIENT
Start: 2019-09-27 | End: 2019-10-08

## 2019-09-27 NOTE — TELEPHONE ENCOUNTER
"FYI: Phone with patient. She is recently out of the hospital and having some urinary symptoms again. She said Dr. Evans told her to call if she did. She is only having occasional Pressure sensation and no fever, no chills. Urine \"looks clear\". States she want to avoid ABX due to history of C. Diff and I told her she was zapien in doing so unless symptoms get worse. She will contact her PCP if symptoms worsen since Dr. Evans is out of office. Advised may go to Urgent care over the weekend if symptoms worsen in the meantime. Encouraged fluids as well. Kathrin Alfonso RN  "

## 2019-10-03 ENCOUNTER — READMISSION MANAGEMENT (OUTPATIENT)
Dept: CALL CENTER | Facility: HOSPITAL | Age: 74
End: 2019-10-03

## 2019-10-03 NOTE — OUTREACH NOTE
General Surgery Week 2 Survey      Responses   Facility patient discharged from?  [facility]   Does the patient have one of the following disease processes/diagnoses(primary or secondary)?  General Surgery   Week 2 attempt successful?  Yes   Call start time  1226   Call end time  1229   Discharge diagnosis  Small bowel obstruction due to adhesions,    Recurrent urinary tract infection,    Atrial fibrillation with rapid ventricular response,     C. difficile colitis,   Exploratory laparotomy with lysis of adhesions      Is patient permission given to speak with other caregiver?  No   Meds reviewed with patient/caregiver?  Yes   Is the patient having any side effects they believe may be caused by any medication additions or changes?  No   Does the patient have all medications related to this admission filled (includes all antibiotics, pain medications, etc.)  Yes   Is the patient taking all medications as directed (includes completed medication regime)?  Yes   Does the patient have a follow up appointment scheduled with their surgeon?  Yes [Tuesday Oct 8, 2019 9:30 AM ]   Has the patient kept scheduled appointments due by today?  N/A   What is the [facility] health agency?   [facility]     Has home health visited the patient within 72 hours of discharge?  Yes   Psychosocial issues?  No   Did the patient receive a copy of their discharge instructions?  Yes   Nursing interventions  Reviewed instructions with patient   What is the patient's perception of their health status since discharge?  Improving   Nursing interventions  Nurse provided patient education   Is the patient /caregiver able to teach back basic post-op care?  Keep incision areas clean,dry and protected, Lifting as instructed by MD in discharge instructions   Is the patient/caregiver able to teach back signs and symptoms of incisional infection?  Increased redness, swelling or pain at the incisonal site, Increased drainage or bleeding, Incisional warmth, Pus or  odor from incision, Fever [Patient denies any signs of infection. ]   Is the patient/caregiver able to teach back steps to recovery at home?  Set small, achievable goals for return to baseline health, Rest and rebuild strength, gradually increase activity   Is the patient/caregiver able to teach back the hierarchy of who to call/visit for symptoms/problems? PCP, Specialist, Home health nurse, Urgent Care, ED, 911  Yes   Week 2 call completed?  Yes          Kiley Ibrahim RN

## 2019-10-07 ENCOUNTER — OFFICE VISIT (OUTPATIENT)
Dept: CARDIOLOGY | Facility: CLINIC | Age: 74
End: 2019-10-07

## 2019-10-07 VITALS
HEIGHT: 65 IN | HEART RATE: 68 BPM | DIASTOLIC BLOOD PRESSURE: 74 MMHG | BODY MASS INDEX: 29.66 KG/M2 | SYSTOLIC BLOOD PRESSURE: 148 MMHG | WEIGHT: 178 LBS

## 2019-10-07 DIAGNOSIS — I48.0 PAROXYSMAL ATRIAL FIBRILLATION (HCC): Primary | ICD-10-CM

## 2019-10-07 PROCEDURE — 93000 ELECTROCARDIOGRAM COMPLETE: CPT | Performed by: PHYSICIAN ASSISTANT

## 2019-10-07 PROCEDURE — 99213 OFFICE O/P EST LOW 20 MIN: CPT | Performed by: PHYSICIAN ASSISTANT

## 2019-10-07 NOTE — PROGRESS NOTES
Date of Office Visit: 10/07/2019  Encounter Provider: FRANCA Garcia  Place of Service: [facility] CARDIOLOGY  Patient Name: Kiley Last  :1945    Chief Complaint   Patient presents with   • Atrial Fibrillation   :     HPI: Kiley Last is a 73 y.o. female who presents today for follow-up.  Old records have been obtained and reviewed by me.  She is a patient of Dr. Chakraborty who is well-known to me.  The only cardiac issue that we have been able to find with her is some paroxysmal atrial fibrillation that she had during the setting of a major abdominal surgery.  She converted to sinus rhythm and has remained in sinus rhythm ever since.  For this reason we have never anticoagulated her.  We eventually discontinued her amiodarone and kept her on beta-blockade alone.  She had a 48-hour Holter monitor in 2018 with no evidence of atrial fibrillation.  She was having PACs and PVCs.  Her main issue has been stress.  She has a very sick  who is also a patient of mine.  She has a daughter who is estranged.  I have had multiple discussions with her about her stress and the need for her to ask for help from her family, however she tends to take this kind of all on herself.  I saw her at the end of July of this year.  She was not sleeping and having a lot of anxiety because of the stress in her life.  She thought she may have had an episode of atrial fibrillation.  I did an echocardiogram as well as a ZIO patch, these were all normal and there was no evidence of atrial fibrillation.  At the beginning of August of this year she had what was felt to be a small bowel obstruction, this was managed medically.  She then presented to the emergency room on 2019 with abdominal pain and was felt to have another small bowel obstruction.  Initially the plan was to be conservative, however because of persistent abdominal pain she was taken for exploratory laparotomy.  She  had a significant amount of adhesions that were lysed.  There was no bowel ischemia.  Postoperatively she had a prolonged paralytic ileus.  She then tested positive for C. difficile.  She was treated with oral vancomycin and improved dramatically.  She also got a UTI when she was in the hospital.  In this setting, she had atrial fibrillation with RVR.  She was started on Eliquis and her beta-blockade was increased.  She is here today for follow-up.   She is been home she is been doing well.  She is in sinus rhythm today.  She is tolerating the Eliquis without any melena or bleeding issues.  She states that she is finally letting her sons and daughters help her with caring for her .  She denies any chest pain, shortness of breath, edema, dizziness, or syncope.    Past Medical History:   Diagnosis Date   • Arthritis    • Asthma     NO INHALERS   • Diverticular disease    • ESBL (extended spectrum beta-lactamase) producing bacteria infection    • History of abscess of skin and subcutaneous tissue     ABD WOUND 5/2017   • History of atrial fibrillation      X1 POST OP FROM COLOSTOMY 5/2017 - NO PROBLEMS SINCE   • Hypertension    • MDRO (multiple drug resistant organisms) resistance    • PONV (postoperative nausea and vomiting)    • Psoriasis    • UTI (urinary tract infection)    • Vertigo        Past Surgical History:   Procedure Laterality Date   • BLEPHAROPLASTY Bilateral 04/27/2017   • CATARACT EXTRACTION     • CHOLECYSTECTOMY     • COLON RESECTION N/A 5/3/2017    Procedure: OPEN SIMOID COLECTOMY WITH COLOSTOMY;  Surgeon: Renato Delgado MD;  Location: [facility] OR;  Service:    • COLONOSCOPY N/A 9/13/2017    Procedure: COLONOSCOPY VIA COLOSTOMY TO CECUM;  Surgeon: Renato Delgado MD;  Location: [facility] ENDOSCOPY;  Service:    • COLONOSCOPY N/A 8/1/2019    Procedure: COLONOSCOPY to cecum and TI with biopsy / hot snare polypectomies;  Surgeon: Dalton Vela Jr., MD;  Location: [facility] ENDOSCOPY;  Service:  General   • COLOSTOMY CLOSURE N/A 2017    Procedure: COLOSTOMY TAKEDOWN AND CLOSURE, WITH RESECTION;  Surgeon: Renato Delgado MD;  Location: [facility] OR;  Service:    • EXPLORATORY LAPAROTOMY N/A 2019    Procedure: Exploratory laparotomy with lysis of adhesions;  Surgeon: Trini Wade MD;  Location: [facility] OR;  Service: General   • FINGER SURGERY      4TH FINGER LEFT HAND   • HYSTERECTOMY     • PELVIC FLOOR REPAIR     • TONSILLECTOMY         Social History     Socioeconomic History   • Marital status:      Spouse name: Not on file   • Number of children: Not on file   • Years of education: Not on file   • Highest education level: Not on file   Tobacco Use   • Smoking status: Former Smoker     Types: Cigarettes     Last attempt to quit: 1967     Years since quittin.8   • Smokeless tobacco: Never Used   • Tobacco comment: SOCIAL SMOKING ONLY   Substance and Sexual Activity   • Alcohol use: Yes     Alcohol/week: 1.2 oz     Types: 1 Glasses of wine, 1 Shots of liquor per week     Comment: occasionally   • Drug use: No   • Sexual activity: Defer       Family History   Problem Relation Age of Onset   • Cancer Mother    • Colon cancer Mother    • Diabetes Father    • Diabetes Son    • Ulcerative colitis Son    • No Known Problems Maternal Grandmother    • No Known Problems Maternal Grandfather    • No Known Problems Paternal Grandmother    • No Known Problems Paternal Grandfather    • Malig Hyperthermia Neg Hx        Review of Systems   Constitution: Negative for chills, fever and malaise/fatigue.   Cardiovascular: Positive for palpitations. Negative for chest pain, dyspnea on exertion, leg swelling, near-syncope, orthopnea, paroxysmal nocturnal dyspnea and syncope.   Respiratory: Negative for cough and shortness of breath.    Musculoskeletal: Negative for joint pain, joint swelling and myalgias.   Gastrointestinal: Negative for abdominal pain, diarrhea, melena, nausea and vomiting.  "  Genitourinary: Negative for frequency and hematuria.   Neurological: Negative for light-headedness, numbness, paresthesias and seizures.   Allergic/Immunologic: Negative.    All other systems reviewed and are negative.      Allergies   Allergen Reactions   • Epinephrine Palpitations   • Penicillins Other (See Comments)     BLISTERS IN MOUTH    Patient tolerated ceftriaxone during 5/2017 admission.            Current Outpatient Medications:   •  Acetaminophen (TYLENOL ARTHRITIS PAIN PO), Take 650 mg by mouth 3 (Three) Times a Day As Needed., Disp: , Rfl:   •  apixaban (ELIQUIS) 5 MG tablet tablet, Take 1 tablet by mouth Every 12 (Twelve) Hours., Disp: 60 tablet, Rfl: 11  •  LORazepam (ATIVAN) 0.5 MG tablet, Take 0.5 mg by mouth Every 8 (Eight) Hours As Needed for Anxiety., Disp: , Rfl:   •  meclizine (ANTIVERT) 25 MG tablet, Take 25 mg by mouth 3 (Three) Times a Day As Needed for dizziness., Disp: , Rfl:   •  metoprolol succinate XL (TOPROL-XL) 50 MG 24 hr tablet, Take 1 tablet by mouth Daily., Disp: 30 tablet, Rfl: 11  •  oxyCODONE-acetaminophen (PERCOCET) 5-325 MG per tablet, Take 1 tablet by mouth Every 4 (Four) Hours As Needed (pain)., Disp: 15 tablet, Rfl: 0  •  cholecalciferol (VITAMIN D3) 1000 units tablet, Take 1,000 Units by mouth Daily., Disp: , Rfl:       Objective:     Vitals:    10/07/19 1318   BP: 148/74   BP Location: Right arm   Pulse: 68   Weight: 80.7 kg (178 lb)   Height: 165.1 cm (65\")     Body mass index is 29.62 kg/m².    PHYSICAL EXAM:    Physical Exam   Constitutional: She is oriented to person, place, and time. She appears well-developed and well-nourished. No distress.   HENT:   Head: Normocephalic and atraumatic.   Eyes: Pupils are equal, round, and reactive to light.   Neck: No JVD present. No thyromegaly present.   Cardiovascular: Normal rate, regular rhythm, normal heart sounds and intact distal pulses.   No murmur heard.  Pulmonary/Chest: Effort normal and breath sounds normal. No " respiratory distress.   Abdominal: Soft. Bowel sounds are normal. She exhibits no distension. There is no splenomegaly or hepatomegaly. There is no tenderness.   Abdominal incision well-healed   Musculoskeletal: Normal range of motion. She exhibits no edema.   Neurological: She is alert and oriented to person, place, and time.   Skin: Skin is warm and dry. She is not diaphoretic. No erythema.   Psychiatric: She has a normal mood and affect. Her behavior is normal. Judgment normal.         ECG 12 Lead  Date/Time: 10/7/2019 1:18 PM  Performed by: Kelsie Billingsley PA  Authorized by: Kelsie Billingsley PA   Comparison: compared with previous ECG from 9/11/2019  Rhythm: sinus rhythm  BPM: 68    Clinical impression: normal ECG  Comments: Indication: Atrial fibrillation              Assessment:       Diagnosis Plan   1. Paroxysmal atrial fibrillation (CMS/HCC)  ECG 12 Lead     Orders Placed This Encounter   Procedures   • ECG 12 Lead     This order was created via procedure documentation          Plan:       Overall she stable and doing well.  She is in sinus rhythm.  She is tolerating the Eliquis without any bleeding issues or melena.  Her daughter came with her today which is fantastic.  We had a long discussion about stress management and the need to let her children help her out.  She indicated that she understood.  Hopefully she will start taking better care of herself.  Otherwise she is stable and doing well and I am not going to make any changes.  She will see Dr. Kearns for her regular appointment in February.    As always, it has been a pleasure to participate in your patient's care.      Sincerely,         Kelsie Billingsley PA-C

## 2019-10-08 ENCOUNTER — OFFICE VISIT (OUTPATIENT)
Dept: SURGERY | Facility: CLINIC | Age: 74
End: 2019-10-08

## 2019-10-08 DIAGNOSIS — Z09 SURGICAL FOLLOWUP: Primary | ICD-10-CM

## 2019-10-08 DIAGNOSIS — A04.72 CLOSTRIDIUM DIFFICILE COLITIS: ICD-10-CM

## 2019-10-08 DIAGNOSIS — K56.609 SMALL BOWEL OBSTRUCTION (HCC): ICD-10-CM

## 2019-10-08 PROCEDURE — 99024 POSTOP FOLLOW-UP VISIT: CPT | Performed by: SURGERY

## 2019-10-10 ENCOUNTER — TRANSCRIBE ORDERS (OUTPATIENT)
Dept: ADMINISTRATIVE | Facility: HOSPITAL | Age: 74
End: 2019-10-10

## 2019-10-10 DIAGNOSIS — Z29.9 ENCOUNTER FOR PREVENTIVE MEASURE: Primary | ICD-10-CM

## 2019-10-11 ENCOUNTER — READMISSION MANAGEMENT (OUTPATIENT)
Dept: CALL CENTER | Facility: HOSPITAL | Age: 74
End: 2019-10-11

## 2019-10-11 NOTE — OUTREACH NOTE
General Surgery Week 3 Survey      Responses   Facility patient discharged from?  [place]   Does the patient have one of the following disease processes/diagnoses(primary or secondary)?  General Surgery   Week 3 attempt successful?  No   Unsuccessful attempts  Attempt 1          Huong Perry RN

## 2019-10-14 ENCOUNTER — READMISSION MANAGEMENT (OUTPATIENT)
Dept: CALL CENTER | Facility: HOSPITAL | Age: 74
End: 2019-10-14

## 2019-10-14 NOTE — OUTREACH NOTE
General Surgery Week 3 Survey      Responses   Facility patient discharged from?  [place]   Does the patient have one of the following disease processes/diagnoses(primary or secondary)?  General Surgery   Week 3 attempt successful?  No   Unsuccessful attempts  Attempt 2          Minna Stoner RN

## 2019-10-22 NOTE — PROGRESS NOTES
CHIEF COMPLAINT:   Chief Complaint   Patient presents with   • Post-op     Exploratory laparotomy with lysis of adhesions 9/8/19       HISTORY OF PRESENT ILLNESS:  This is a 74 y.o. female who presents for a post-operative visit after undergoing exploratory laparotomy with lysis of adhesions for small bowel obstruction on 9/8/2019.  Postoperatively, she struggled with an ileus followed then by C. difficile colitis requiring oral vancomycin treatment.  She was eventually discharged home returns today for her first follow-up.  She does experience some intermittent very mild abdominal discomfort that she describes as not being pain necessarily but more of a soreness.  She has had no diarrhea or nausea and is tolerating a regular diet.  She is taking MiraLAX every now and then whenever she feels constipated.    PHYSICAL EXAM:  Lungs: Clear  Heart: RRR  ABD: Incision is healing well without any erythema or signs of infection.  Ext: no significant edema, calves nontender    A/P:  This is a 74 y.o. female patient who is S/P exploratory laparotomy with lysis of adhesions on 9/8/2019    She is healing beautifully and can follow-up with me as needed.    Trini Wade MD  General and Endoscopic Surgery  [facility], [place], operated by [facility]    [address], [address]  [place], [place], [address]  P: [phone]  F: [phone]

## 2019-11-01 RX ORDER — METOPROLOL SUCCINATE 50 MG/1
50 TABLET, EXTENDED RELEASE ORAL DAILY
Qty: 90 TABLET | Refills: 2 | Status: SHIPPED | OUTPATIENT
Start: 2019-11-01 | End: 2020-01-26 | Stop reason: HOSPADM

## 2019-12-10 ENCOUNTER — TELEPHONE (OUTPATIENT)
Dept: CARDIOLOGY | Facility: CLINIC | Age: 74
End: 2019-12-10

## 2019-12-10 NOTE — TELEPHONE ENCOUNTER
12/10/19  12:08 PM  Kiley Last  1945  Home Phone [phone]   Mobile [phone]       Kiley Last is a patient of Dr arizmendi.  She is calling in requesting an apt for worsening SOA.  She said this has been going on for the last 2 weeks.  Scheduled her to see CB tomorrow for further evaluation.  Cinthya Neil RN  Triage nurse

## 2019-12-11 ENCOUNTER — OFFICE VISIT (OUTPATIENT)
Dept: CARDIOLOGY | Facility: CLINIC | Age: 74
End: 2019-12-11

## 2019-12-11 VITALS
HEIGHT: 65 IN | WEIGHT: 183.4 LBS | BODY MASS INDEX: 30.56 KG/M2 | DIASTOLIC BLOOD PRESSURE: 72 MMHG | OXYGEN SATURATION: 98 % | HEART RATE: 63 BPM | SYSTOLIC BLOOD PRESSURE: 142 MMHG

## 2019-12-11 DIAGNOSIS — R06.09 DYSPNEA ON EXERTION: ICD-10-CM

## 2019-12-11 DIAGNOSIS — Z87.898 HISTORY OF PREDIABETES: ICD-10-CM

## 2019-12-11 DIAGNOSIS — E66.09 CLASS 1 OBESITY DUE TO EXCESS CALORIES WITHOUT SERIOUS COMORBIDITY WITH BODY MASS INDEX (BMI) OF 30.0 TO 30.9 IN ADULT: ICD-10-CM

## 2019-12-11 DIAGNOSIS — I48.0 PAROXYSMAL ATRIAL FIBRILLATION (HCC): Primary | ICD-10-CM

## 2019-12-11 PROCEDURE — 99214 OFFICE O/P EST MOD 30 MIN: CPT | Performed by: NURSE PRACTITIONER

## 2019-12-11 PROCEDURE — 93000 ELECTROCARDIOGRAM COMPLETE: CPT | Performed by: NURSE PRACTITIONER

## 2019-12-11 NOTE — PROGRESS NOTES
Date of Office Visit: 19  Encounter Provider: SARITHA Dunne  Primary Cardiologist: Dr. Kearns  Place of Service: [facility] CARDIOLOGY  Patient Name: Kiley Last  :1945      Subjective:     Chief Complaint:  Dyspnea    History of Present Illness:  Kiley Last is a very pleasant 74 y.o. female who is new to me .  Outside records have been obtained and reviewed by me.     This is a patient of Dr. Kearns with a history of paroxysmal atrial fibrillation.    She initially was found to have atrial fibrillation in the setting of major abdominal surgery but converted to sinus rhythm and was not anticoagulated at that time.  Her amiodarone had eventually been discontinued but she was kept on beta-blocker.  2018 she had a 48-hour Holter that showed no evidence of atrial fibrillation but was having PACs and PVCs.    2019 patient saw the patient and she was having a lot of stress with an estranged daughter and a very sick .  She thought she may have had an episode of atrial fibrillation.  She had an echocardiogram as well as a ZIO patch which were both normal and there was no evidence of atrial fibrillation.    2019 she presented with abdominal pain that was felt to be related to a small bowel obstruction that was managed medically but then she presented back to the ED 2019 with abdominal pain was felt to have another small bowel obstruction and because of persistent pain she was taken for exploratory laparotomy and was found to have a significant amount of adhesions that were lysed.  There is no reported bowel ischemia.  Postoperatively she had a prolonged paralytic ileus and tested positive for C. difficile and had been treated with oral vancomycin and reportedly improved dramatically.  She also had a UTI while hospitalized and in this setting she had atrial fibrillation with rapid ventricular response.  She was started on Eliquis and  her beta-blocker was increased.    10/7/2019 patient was last in the office to see FRANCA Salazar.  She was home and doing well.  She was in sinus rhythm and was tolerating Eliquis twice without melena or bleeding issues.  She denied chest pain, shortness of breath, edema, dizziness, syncope.  Patient did not want to make any changes and wanted her to see Dr. Kearns for her regular scheduled appointment in February.    She is presenting today for earlier follow-up for shortness of breath.  She reports over the last 2 weeks she has noticed she is getting out of breath with exertion specifically when helping her  who requires a lot of physical care and lifting.  She denies any chest pain, PND, orthopnea, lower extremity edema.  She reports she is not having shortness of breath at rest and resting typically resolves with rest of breath.  She cannot really give me a timeframe of how long her shortness of breath is lasting.  She denies any cough, fever, chills or illnesses recently.  She denies any diaphoresis or nausea.  She reports occasionally at nighttime she will feel a slight flutter but this is not new or worsening in her symptoms do not feel similar to how she was feeling when she was in atrial fibrillation in the past.  She has intermittent vertigo and occasionally feels off balance.  This may be a little worse recently but she has not tried any of her meclizine.  She does not measure her blood pressure or heart rate at home.  Blood pressure slightly elevated in the office today.  She denies any headaches or vision changes.  She denies any syncope or near syncope.  She has not had recent labs.  In 2017 her lipid panel was under good control and she had a elevated hemoglobin A1c of 5.8%.  She has not had follow-up labs since then.  She believes her father may have had issues with his heart and believes she had a murmur.  He also had carotid disease and a stroke.  She reports several years ago she had  [facility] screening and was told she had some mild abnormalities of her carotid arteries.      Past Medical History:   Diagnosis Date   • Arthritis    • Asthma     NO INHALERS   • Clostridium difficile infection 09/20/2019   • Colon polyps 08/01/2019    Transverse colon: tubular adenoma, descending colon: fragments of tubular adenoma, sigmoid colon: ischemic eroded hyperplastic polyp   • Diverticulitis    • Diverticulosis    • ESBL (extended spectrum beta-lactamase) producing bacteria infection    • History of abscess of skin and subcutaneous tissue     ABD WOUND 5/2017   • History of atrial fibrillation      X1 POST OP FROM COLOSTOMY 5/2017 - NO PROBLEMS SINCE   • Hypertension    • MDRO (multiple drug resistant organisms) resistance    • PONV (postoperative nausea and vomiting)    • Psoriasis    • UTI (urinary tract infection)    • Vertigo      Past Surgical History:   Procedure Laterality Date   • BELPHAROPTOSIS REPAIR Bilateral 04/27/2017    Bilateral brow ptosis repair via the anterior hairline approach under monitored local anesthesia-Andrez Craven   • BLEPHAROPLASTY Bilateral 04/27/2017   • CATARACT EXTRACTION     • COLON RESECTION N/A 5/3/2017    Procedure: OPEN SIMOID COLECTOMY WITH COLOSTOMY;  Surgeon: Renato Delgado MD;  Location: [facility] OR;  Service:    • COLONOSCOPY N/A 9/13/2017    Procedure: COLONOSCOPY VIA COLOSTOMY TO CECUM;  Surgeon: Renato Delgado MD;  Location: [facility] ENDOSCOPY;  Service:    • COLONOSCOPY N/A 8/1/2019    Procedure: COLONOSCOPY to cecum and TI with biopsy / hot snare polypectomies;  Surgeon: Dalton Vela Jr., MD;  Location: [facility] ENDOSCOPY;  Service: General   • COLONOSCOPY N/A 01/04/2010    Andrez Trinidad   • COLOSTOMY CLOSURE N/A 9/14/2017    Procedure: COLOSTOMY TAKEDOWN AND CLOSURE, WITH RESECTION;  Surgeon: Renato Delgado MD;  Location: [facility] MAIN OR;  Service:    • ENDOSCOPY AND COLONOSCOPY N/A 10/31/2014    Normal EGD and colonoscopy,  repeat c-scope in 5 years-Andrez Trinidad   • EXPLORATORY LAPAROTOMY N/A 9/8/2019    Procedure: Exploratory laparotomy with lysis of adhesions;  Surgeon: Trini Wade MD;  Location: [facility] OR;  Service: General   • FINGER SURGERY Left     4TH FINGER LEFT HAND   • HYSTERECTOMY Bilateral    • LAPAROSCOPIC CHOLECYSTECTOMY W/ CHOLANGIOGRAPHY N/A 07/15/2003    Dr. Amrit Martinez, [facility]   • SALPINGO OOPHORECTOMY Bilateral 02/02/2006    Abdominal sacral colpopexy, bilateral salpingo-oophorectomy, tension free vaginal tape, cystoscopy-Dr. Devika Bradley, [facility]   • THORACENTESIS Right 05/21/2017    US-guided right thoracentesis-Dr. Juan Yuen, [facility]   • TONSILLECTOMY Bilateral    • UPPER GASTROINTESTINAL ENDOSCOPY N/A 10/08/2007    Esophageal mucosal changes suspicious for short-segment Olivera's esphagus, gastric mucosal abnormality characterized by erythema: biopsied, NERD disease present, high likelihood of gastritis-Dr. Mayank Knapp, [facility]     Outpatient Medications Prior to Visit   Medication Sig Dispense Refill   • Acetaminophen (TYLENOL ARTHRITIS PAIN PO) Take 650 mg by mouth 3 (Three) Times a Day As Needed.     • apixaban (ELIQUIS) 5 MG tablet tablet Take 1 tablet by mouth Every 12 (Twelve) Hours. 60 tablet 11   • cholecalciferol (VITAMIN D3) 1000 units tablet Take 1,000 Units by mouth Daily.     • LORazepam (ATIVAN) 0.5 MG tablet Take 0.5 mg by mouth Every 8 (Eight) Hours As Needed for Anxiety.     • meclizine (ANTIVERT) 25 MG tablet Take 25 mg by mouth 3 (Three) Times a Day As Needed for dizziness.     • metoprolol succinate XL (TOPROL-XL) 50 MG 24 hr tablet Take 1 tablet by mouth Daily. 90 tablet 2     No facility-administered medications prior to visit.        Allergies as of 12/11/2019 - Reviewed 12/11/2019   Allergen Reaction Noted   • Epinephrine Palpitations 08/09/2018   • Penicillins Other (See Comments) 10/29/2016     Social History     Socioeconomic History   • Marital status:       Spouse name: Not on file   • Number of children: Not on file   • Years of education: Not on file   • Highest education level: Not on file   Tobacco Use   • Smoking status: Former Smoker     Types: Cigarettes     Last attempt to quit: 1967     Years since quittin.9   • Smokeless tobacco: Never Used   • Tobacco comment: SOCIAL SMOKING ONLY   Substance and Sexual Activity   • Alcohol use: Yes     Alcohol/week: 2.0 standard drinks     Types: 1 Glasses of wine, 1 Shots of liquor per week     Comment: occasionally/caffeine use    • Drug use: No   • Sexual activity: Defer     Family History   Problem Relation Age of Onset   • Cancer Mother    • Colon cancer Mother    • Diabetes Father    • Diabetes Son    • Ulcerative colitis Son    • No Known Problems Maternal Grandmother    • No Known Problems Maternal Grandfather    • No Known Problems Paternal Grandmother    • No Known Problems Paternal Grandfather    • Malig Hyperthermia Neg Hx      Review of Systems   Constitution: Positive for malaise/fatigue. Negative for chills and fever.   HENT: Negative for ear pain, hearing loss, nosebleeds and sore throat.    Eyes: Negative for double vision, pain, vision loss in left eye and vision loss in right eye.   Cardiovascular: Positive for dyspnea on exertion. Negative for chest pain, claudication, irregular heartbeat, leg swelling, near-syncope, orthopnea, palpitations, paroxysmal nocturnal dyspnea and syncope.   Respiratory: Positive for shortness of breath. Negative for cough, snoring and wheezing.    Endocrine: Negative for cold intolerance and heat intolerance.   Hematologic/Lymphatic: Negative for bleeding problem.   Skin: Negative for color change, itching, rash and unusual hair distribution.   Musculoskeletal: Positive for joint pain (leg fatigue with walking). Negative for joint swelling.   Gastrointestinal: Negative for abdominal pain, diarrhea, hematochezia, melena, nausea and vomiting.   Genitourinary: Positive  "for frequency. Negative for decreased libido, hematuria, hesitancy and incomplete emptying.   Neurological: Positive for excessive daytime sleepiness, loss of balance (intermittent with vertigo) and vertigo. Negative for dizziness, headaches, light-headedness, numbness, paresthesias and seizures.   Psychiatric/Behavioral: Negative for depression. The patient is nervous/anxious.           Objective:     Vitals:    12/11/19 1447 12/11/19 1519   BP: 142/72    BP Location: Left arm    Patient Position: Sitting    Cuff Size: Adult    Pulse: 62 63   SpO2:  98%   Weight: 83.2 kg (183 lb 6.4 oz)    Height: 165.1 cm (65\")      Body mass index is 30.52 kg/m².    PHYSICAL EXAM:  Physical Exam   Constitutional: She is oriented to person, place, and time. She appears well-developed and well-nourished. No distress.   HENT:   Head: Normocephalic and atraumatic.   Eyes: Pupils are equal, round, and reactive to light. Conjunctivae and EOM are normal.   Neck: No JVD present. Carotid bruit is not present.   Cardiovascular: Normal rate, regular rhythm, normal heart sounds and intact distal pulses.   No murmur heard.  Pulses:       Radial pulses are 2+ on the right side, and 2+ on the left side.        Posterior tibial pulses are 2+ on the right side, and 2+ on the left side.   Pulmonary/Chest: Effort normal and breath sounds normal. No accessory muscle usage. No tachypnea. No respiratory distress. She has no decreased breath sounds. She has no wheezes. She has no rhonchi. She has no rales. She exhibits no tenderness.   Abdominal: Soft. Bowel sounds are normal. She exhibits no distension. There is no tenderness. There is no rebound and no guarding.   Musculoskeletal: Normal range of motion. She exhibits no edema.   Neurological: She is alert and oriented to person, place, and time.   Skin: Skin is warm, dry and intact. She is not diaphoretic. No erythema.   Psychiatric: She has a normal mood and affect. Her speech is normal and behavior " is normal. Judgment and thought content normal. Cognition and memory are normal.   Nursing note and vitals reviewed.        ECG 12 Lead  Date/Time: 12/11/2019 2:54 PM  Performed by: Mercedez Martinez APRN  Authorized by: Mercedez Martinez APRN   Comparison: compared with previous ECG from 10/7/2019  Rhythm: sinus rhythm  Rate: normal  BPM: 62  T flattening: aVL and V2  QRS axis: normal  Other findings: non-specific ST-T wave changes    Clinical impression: abnormal EKG  Comments: Unchanged ECG  Indication: Dyspnea on exertion, paroxysmal atrial fibrillation            Assessment:       Diagnosis Plan   1. Paroxysmal atrial fibrillation (CMS/HCC)  Stress Test With Myocardial Perfusion One Day    Vascular Screening   2. Dyspnea on exertion  Stress Test With Myocardial Perfusion One Day    Vascular Screening   3. History of prediabetes  Vascular Screening   4. Class 1 obesity due to excess calories without serious comorbidity with body mass index (BMI) of 30.0 to 30.9 in adult         Plan:     1. Dyspnea on exertion: I have recommended she have repeat labs with her PCP including CBC, CMP, thyroid panel, A1c and lipid panel.  She had a recent echo that was pretty unremarkable.  She was a former smoker though quit 50 years ago.  She has a history of prediabetes on previous labs.  She has not had a recent ischemic work-up therefore I will check a stress test.  2. Paroxysmal atrial fibrillation: She is in sinus rhythm today.  She is maintained on metoprolol succinate and apixaban.  She denies any bleeding issues on her apixaban.  3. Prediabetes: Encouraged her to get repeat labs with her PCP.  She is due to see Dr. Latif soon.  4. History of abnormal [facility] screening: Will order a vascular screen for her.  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 Former smoker: Quit 50 years ago  6. Hypertension: Blood pressure is borderline elevated today.  I have  encouraged her to monitor at home.  7. Caregiver role strain: She has quite a bit of fatigue and has a lot of responsibility with caring for her very ill  at home.  She tries to use her grandson for help but does not like to call them as they have jobs during the day.  She is going to discuss with her PCP about home health care to help her out.    I advised the patient that if they have not heard from our office within 2-3 days after they have completed their testing (stress test and vascular screening) to please call our office to obtain their results.  She demonstrated understanding of the plan of care.        Follow up with Dr. Kearns on 2/5/2020, unless otherwise needed sooner based on worsening symptoms or abnormal testing.  I advised the patient to contact our office with any questions or concerns.       It has been a pleasure to participate in this patient's care. Please feel free to contact me with any questions or concerns.     Mercedez Martinez, SARITHA  12/11/19             Your medication list           Accurate as of December 11, 2019  4:26 PM. If you have any questions, ask your nurse or doctor.               CONTINUE taking these medications      Instructions Last Dose Given Next Dose Due   apixaban 5 MG tablet tablet  Commonly known as:  ELIQUIS      Take 1 tablet by mouth Every 12 (Twelve) Hours.       cholecalciferol 25 MCG (1000 UT) tablet  Commonly known as:  VITAMIN D3      Take 1,000 Units by mouth Daily.       LORazepam 0.5 MG tablet  Commonly known as:  ATIVAN      Take 0.5 mg by mouth Every 8 (Eight) Hours As Needed for Anxiety.       meclizine 25 MG tablet  Commonly known as:  ANTIVERT      Take 25 mg by mouth 3 (Three) Times a Day As Needed for dizziness.       metoprolol succinate XL 50 MG 24 hr tablet  Commonly known as:  TOPROL-XL      Take 1 tablet by mouth Daily.       TYLENOL ARTHRITIS PAIN PO      Take 650 mg by mouth 3 (Three) Times a Day As Needed.              The above  medication changes may not have been made by this provider.  Medication list was updated to reflect medications patient is currently taking including medication changes and discontinuations made by other healthcare providers.     Dictated utilizing Dragon Dictation System.

## 2019-12-12 ENCOUNTER — TRANSCRIBE ORDERS (OUTPATIENT)
Dept: ADMINISTRATIVE | Facility: HOSPITAL | Age: 74
End: 2019-12-12

## 2019-12-12 ENCOUNTER — HOSPITAL ENCOUNTER (OUTPATIENT)
Dept: CARDIOLOGY | Facility: HOSPITAL | Age: 74
Discharge: HOME | End: 2019-12-12
Admitting: NURSE PRACTITIONER

## 2019-12-12 VITALS — HEIGHT: 65 IN | WEIGHT: 183.42 LBS | BODY MASS INDEX: 30.56 KG/M2

## 2019-12-12 DIAGNOSIS — I48.0 PAROXYSMAL ATRIAL FIBRILLATION (HCC): ICD-10-CM

## 2019-12-12 DIAGNOSIS — R06.09 DYSPNEA ON EXERTION: ICD-10-CM

## 2019-12-12 DIAGNOSIS — Z13.6 ENCOUNTER FOR SCREENING FOR VASCULAR DISEASE: Primary | ICD-10-CM

## 2019-12-12 LAB
BH CV STRESS BP STAGE 1: NORMAL
BH CV STRESS COMMENTS STAGE 1: NORMAL
BH CV STRESS DOSE REGADENOSON STAGE 1: 0.4
BH CV STRESS DURATION MIN STAGE 1: 0
BH CV STRESS DURATION SEC STAGE 1: 10
BH CV STRESS HR STAGE 1: 111
BH CV STRESS PROTOCOL 1: NORMAL
BH CV STRESS RECOVERY BP: NORMAL MMHG
BH CV STRESS RECOVERY HR: 86 BPM
BH CV STRESS STAGE 1: 1
LV EF NUC BP: 73 %
MAXIMAL PREDICTED HEART RATE: 145 BPM
PERCENT MAX PREDICTED HR: 76.55 %
STRESS BASELINE BP: NORMAL MMHG
STRESS BASELINE HR: 64 BPM
STRESS PERCENT HR: 90 %
STRESS POST EXERCISE DUR SEC: 10 SEC
STRESS POST PEAK BP: NORMAL MMHG
STRESS POST PEAK HR: 111 BPM
STRESS TARGET HR: 123 BPM

## 2019-12-12 PROCEDURE — 0 TECHNETIUM TETROFOSMIN KIT: Performed by: NURSE PRACTITIONER

## 2019-12-12 PROCEDURE — 93017 CV STRESS TEST TRACING ONLY: CPT

## 2019-12-12 PROCEDURE — 78452 HT MUSCLE IMAGE SPECT MULT: CPT | Performed by: INTERNAL MEDICINE

## 2019-12-12 PROCEDURE — 93018 CV STRESS TEST I&R ONLY: CPT | Performed by: INTERNAL MEDICINE

## 2019-12-12 PROCEDURE — 78452 HT MUSCLE IMAGE SPECT MULT: CPT

## 2019-12-12 PROCEDURE — 93016 CV STRESS TEST SUPVJ ONLY: CPT | Performed by: INTERNAL MEDICINE

## 2019-12-12 PROCEDURE — A9502 TC99M TETROFOSMIN: HCPCS | Performed by: NURSE PRACTITIONER

## 2019-12-12 PROCEDURE — 25010000002 REGADENOSON 0.4 MG/5ML SOLUTION: Performed by: NURSE PRACTITIONER

## 2019-12-12 RX ADMIN — TETROFOSMIN 1 DOSE: 1.38 INJECTION, POWDER, LYOPHILIZED, FOR SOLUTION INTRAVENOUS at 13:24

## 2019-12-12 RX ADMIN — REGADENOSON 0.4 MG: 0.08 INJECTION, SOLUTION INTRAVENOUS at 12:22

## 2019-12-12 RX ADMIN — TETROFOSMIN 1 DOSE: 1.38 INJECTION, POWDER, LYOPHILIZED, FOR SOLUTION INTRAVENOUS at 12:21

## 2019-12-17 ENCOUNTER — TRANSCRIBE ORDERS (OUTPATIENT)
Dept: ADMINISTRATIVE | Facility: HOSPITAL | Age: 74
End: 2019-12-17

## 2019-12-17 ENCOUNTER — APPOINTMENT (OUTPATIENT)
Dept: CARDIOLOGY | Facility: HOSPITAL | Age: 74
End: 2019-12-17

## 2019-12-17 DIAGNOSIS — K76.9 LIVER LESION: Primary | ICD-10-CM

## 2019-12-23 ENCOUNTER — HOSPITAL ENCOUNTER (OUTPATIENT)
Dept: CARDIOLOGY | Facility: HOSPITAL | Age: 74
Discharge: HOME | End: 2019-12-23
Admitting: NURSE PRACTITIONER

## 2019-12-23 VITALS
SYSTOLIC BLOOD PRESSURE: 143 MMHG | WEIGHT: 182 LBS | DIASTOLIC BLOOD PRESSURE: 63 MMHG | HEART RATE: 63 BPM | HEIGHT: 64 IN | BODY MASS INDEX: 31.07 KG/M2

## 2019-12-23 DIAGNOSIS — Z13.6 ENCOUNTER FOR SCREENING FOR VASCULAR DISEASE: ICD-10-CM

## 2019-12-23 LAB
BH CV ECHO MEAS - DIST AO DIAM: 1.36 CM
BH CV VAS BP LEFT ARM: NORMAL MMHG
BH CV VAS BP RIGHT ARM: NORMAL MMHG
BH CV XLRA MEAS - MID AO DIAM: 1.78 CM
BH CV XLRA MEAS - PAD LEFT ABI DP: 1.12
BH CV XLRA MEAS - PAD LEFT ABI PT: 1.12
BH CV XLRA MEAS - PAD LEFT ARM: 143 MMHG
BH CV XLRA MEAS - PAD LEFT LEG DP: 160 MMHG
BH CV XLRA MEAS - PAD LEFT LEG PT: 160 MMHG
BH CV XLRA MEAS - PAD RIGHT ABI DP: 1.12
BH CV XLRA MEAS - PAD RIGHT ABI PT: 1.12
BH CV XLRA MEAS - PAD RIGHT ARM: 142 MMHG
BH CV XLRA MEAS - PAD RIGHT LEG DP: 160 MMHG
BH CV XLRA MEAS - PAD RIGHT LEG PT: 160 MMHG
BH CV XLRA MEAS - PROX AO DIAM: 1.96 CM
BH CV XLRA MEAS LEFT ICA/CCA RATIO: 1.11
BH CV XLRA MEAS LEFT MID CCA PSV: NORMAL CM/SEC
BH CV XLRA MEAS LEFT MID ICA PSV: NORMAL CM/SEC
BH CV XLRA MEAS LEFT PROX ECA PSV: NORMAL CM/SEC
BH CV XLRA MEAS RIGHT ICA/CCA RATIO: 1.08
BH CV XLRA MEAS RIGHT MID CCA PSV: NORMAL CM/SEC
BH CV XLRA MEAS RIGHT MID ICA PSV: NORMAL CM/SEC
BH CV XLRA MEAS RIGHT PROX ECA PSV: NORMAL CM/SEC

## 2019-12-23 PROCEDURE — 93799 UNLISTED CV SVC/PROCEDURE: CPT

## 2019-12-24 ENCOUNTER — TELEPHONE (OUTPATIENT)
Dept: CARDIOLOGY | Facility: CLINIC | Age: 74
End: 2019-12-24

## 2020-01-03 ENCOUNTER — HOSPITAL ENCOUNTER (OUTPATIENT)
Dept: MAMMOGRAPHY | Facility: HOSPITAL | Age: 75
Discharge: HOME | End: 2020-01-03
Admitting: INTERNAL MEDICINE

## 2020-01-03 DIAGNOSIS — Z29.9 ENCOUNTER FOR PREVENTIVE MEASURE: ICD-10-CM

## 2020-01-03 PROCEDURE — 77063 BREAST TOMOSYNTHESIS BI: CPT

## 2020-01-03 PROCEDURE — 77067 SCR MAMMO BI INCL CAD: CPT

## 2020-01-17 ENCOUNTER — OFFICE VISIT (OUTPATIENT)
Dept: SURGERY | Facility: CLINIC | Age: 75
End: 2020-01-17

## 2020-01-17 VITALS — OXYGEN SATURATION: 95 % | BODY MASS INDEX: 31.31 KG/M2 | HEIGHT: 64 IN | WEIGHT: 183.4 LBS | HEART RATE: 80 BPM

## 2020-01-17 DIAGNOSIS — Z87.19 HISTORY OF SMALL BOWEL OBSTRUCTION: ICD-10-CM

## 2020-01-17 DIAGNOSIS — R14.0 ABDOMINAL BLOATING: Primary | ICD-10-CM

## 2020-01-17 PROCEDURE — 99213 OFFICE O/P EST LOW 20 MIN: CPT | Performed by: SURGERY

## 2020-01-17 RX ORDER — ATORVASTATIN CALCIUM 20 MG/1
20 TABLET, FILM COATED ORAL DAILY
COMMUNITY
Start: 2019-12-26 | End: 2020-07-10

## 2020-01-23 ENCOUNTER — APPOINTMENT (OUTPATIENT)
Dept: GENERAL RADIOLOGY | Facility: HOSPITAL | Age: 75
End: 2020-01-23

## 2020-01-23 ENCOUNTER — HOSPITAL ENCOUNTER (INPATIENT)
Facility: HOSPITAL | Age: 75
LOS: 1 days | Discharge: HOME | End: 2020-01-26
Attending: EMERGENCY MEDICINE | Admitting: HOSPITALIST

## 2020-01-23 ENCOUNTER — APPOINTMENT (OUTPATIENT)
Dept: CT IMAGING | Facility: HOSPITAL | Age: 75
End: 2020-01-23

## 2020-01-23 DIAGNOSIS — R11.2 NON-INTRACTABLE VOMITING WITH NAUSEA, UNSPECIFIED VOMITING TYPE: ICD-10-CM

## 2020-01-23 DIAGNOSIS — R42 DIZZINESS: Primary | ICD-10-CM

## 2020-01-23 PROBLEM — Z91.81 RISK FOR FALLS: Status: ACTIVE | Noted: 2020-01-23

## 2020-01-23 PROBLEM — Z87.19 HX OF SMALL BOWEL OBSTRUCTION: Status: ACTIVE | Noted: 2020-01-23

## 2020-01-23 LAB
ALBUMIN SERPL-MCNC: 4.2 G/DL (ref 3.5–5.2)
ALBUMIN/GLOB SERPL: 1.4 G/DL
ALP SERPL-CCNC: 55 U/L (ref 39–117)
ALT SERPL W P-5'-P-CCNC: 16 U/L (ref 1–33)
ANION GAP SERPL CALCULATED.3IONS-SCNC: 15.5 MMOL/L (ref 5–15)
AST SERPL-CCNC: 18 U/L (ref 1–32)
BACTERIA UR QL AUTO: ABNORMAL /HPF
BASOPHILS # BLD AUTO: 0.02 10*3/MM3 (ref 0–0.2)
BASOPHILS NFR BLD AUTO: 0.2 % (ref 0–1.5)
BILIRUB SERPL-MCNC: 0.3 MG/DL (ref 0.2–1.2)
BILIRUB UR QL STRIP: NEGATIVE
BUN BLD-MCNC: 20 MG/DL (ref 8–23)
BUN/CREAT SERPL: 36.4 (ref 7–25)
CALCIUM SPEC-SCNC: 9.4 MG/DL (ref 8.6–10.5)
CHLORIDE SERPL-SCNC: 103 MMOL/L (ref 98–107)
CLARITY UR: ABNORMAL
CO2 SERPL-SCNC: 24.5 MMOL/L (ref 22–29)
COLOR UR: YELLOW
CREAT BLD-MCNC: 0.55 MG/DL (ref 0.57–1)
DEPRECATED RDW RBC AUTO: 41.9 FL (ref 37–54)
EOSINOPHIL # BLD AUTO: 0.04 10*3/MM3 (ref 0–0.4)
EOSINOPHIL NFR BLD AUTO: 0.4 % (ref 0.3–6.2)
ERYTHROCYTE [DISTWIDTH] IN BLOOD BY AUTOMATED COUNT: 13.2 % (ref 12.3–15.4)
GFR SERPL CREATININE-BSD FRML MDRD: 108 ML/MIN/1.73
GLOBULIN UR ELPH-MCNC: 3.1 GM/DL
GLUCOSE BLD-MCNC: 160 MG/DL (ref 65–99)
GLUCOSE UR STRIP-MCNC: NEGATIVE MG/DL
HCT VFR BLD AUTO: 39.8 % (ref 34–46.6)
HGB BLD-MCNC: 13.2 G/DL (ref 12–15.9)
HGB UR QL STRIP.AUTO: NEGATIVE
HYALINE CASTS UR QL AUTO: ABNORMAL /LPF
IMM GRANULOCYTES # BLD AUTO: 0.05 10*3/MM3 (ref 0–0.05)
IMM GRANULOCYTES NFR BLD AUTO: 0.6 % (ref 0–0.5)
KETONES UR QL STRIP: NEGATIVE
LEUKOCYTE ESTERASE UR QL STRIP.AUTO: ABNORMAL
LYMPHOCYTES # BLD AUTO: 0.96 10*3/MM3 (ref 0.7–3.1)
LYMPHOCYTES NFR BLD AUTO: 10.7 % (ref 19.6–45.3)
MCH RBC QN AUTO: 28.8 PG (ref 26.6–33)
MCHC RBC AUTO-ENTMCNC: 33.2 G/DL (ref 31.5–35.7)
MCV RBC AUTO: 86.9 FL (ref 79–97)
MONOCYTES # BLD AUTO: 0.48 10*3/MM3 (ref 0.1–0.9)
MONOCYTES NFR BLD AUTO: 5.4 % (ref 5–12)
NEUTROPHILS # BLD AUTO: 7.42 10*3/MM3 (ref 1.7–7)
NEUTROPHILS NFR BLD AUTO: 82.7 % (ref 42.7–76)
NITRITE UR QL STRIP: POSITIVE
NRBC BLD AUTO-RTO: 0 /100 WBC (ref 0–0.2)
PH UR STRIP.AUTO: 7.5 [PH] (ref 5–8)
PLATELET # BLD AUTO: 162 10*3/MM3 (ref 140–450)
PMV BLD AUTO: 11.7 FL (ref 6–12)
POTASSIUM BLD-SCNC: 3.6 MMOL/L (ref 3.5–5.2)
PROT SERPL-MCNC: 7.3 G/DL (ref 6–8.5)
PROT UR QL STRIP: ABNORMAL
RBC # BLD AUTO: 4.58 10*6/MM3 (ref 3.77–5.28)
RBC # UR: ABNORMAL /HPF
REF LAB TEST METHOD: ABNORMAL
SODIUM BLD-SCNC: 143 MMOL/L (ref 136–145)
SP GR UR STRIP: 1.02 (ref 1–1.03)
SQUAMOUS #/AREA URNS HPF: ABNORMAL /HPF
TROPONIN T SERPL-MCNC: <0.01 NG/ML (ref 0–0.03)
TSH SERPL DL<=0.05 MIU/L-ACNC: 1.33 UIU/ML (ref 0.27–4.2)
UROBILINOGEN UR QL STRIP: ABNORMAL
WBC NRBC COR # BLD: 8.97 10*3/MM3 (ref 3.4–10.8)
WBC UR QL AUTO: ABNORMAL /HPF

## 2020-01-23 PROCEDURE — 99285 EMERGENCY DEPT VISIT HI MDM: CPT

## 2020-01-23 PROCEDURE — 93005 ELECTROCARDIOGRAM TRACING: CPT | Performed by: NURSE PRACTITIONER

## 2020-01-23 PROCEDURE — G0378 HOSPITAL OBSERVATION PER HR: HCPCS

## 2020-01-23 PROCEDURE — 85025 COMPLETE CBC W/AUTO DIFF WBC: CPT | Performed by: NURSE PRACTITIONER

## 2020-01-23 PROCEDURE — 70450 CT HEAD/BRAIN W/O DYE: CPT

## 2020-01-23 PROCEDURE — 25010000002 LORAZEPAM PER 2 MG: Performed by: NURSE PRACTITIONER

## 2020-01-23 PROCEDURE — 93010 ELECTROCARDIOGRAM REPORT: CPT | Performed by: INTERNAL MEDICINE

## 2020-01-23 PROCEDURE — 25010000002 ONDANSETRON PER 1 MG: Performed by: NURSE PRACTITIONER

## 2020-01-23 PROCEDURE — 84443 ASSAY THYROID STIM HORMONE: CPT | Performed by: NURSE PRACTITIONER

## 2020-01-23 PROCEDURE — 25010000002 DEXAMETHASONE PER 1 MG: Performed by: HOSPITALIST

## 2020-01-23 PROCEDURE — 84484 ASSAY OF TROPONIN QUANT: CPT | Performed by: NURSE PRACTITIONER

## 2020-01-23 PROCEDURE — 81001 URINALYSIS AUTO W/SCOPE: CPT | Performed by: NURSE PRACTITIONER

## 2020-01-23 PROCEDURE — 63710000001 METHYLPREDNISOLONE 4 MG TABLET THERAPY PACK 21 EACH DISP PACK: Performed by: HOSPITALIST

## 2020-01-23 PROCEDURE — 80053 COMPREHEN METABOLIC PANEL: CPT | Performed by: NURSE PRACTITIONER

## 2020-01-23 PROCEDURE — 71046 X-RAY EXAM CHEST 2 VIEWS: CPT

## 2020-01-23 RX ORDER — DEXAMETHASONE SODIUM PHOSPHATE 4 MG/ML
4 INJECTION, SOLUTION INTRA-ARTICULAR; INTRALESIONAL; INTRAMUSCULAR; INTRAVENOUS; SOFT TISSUE ONCE
Status: COMPLETED | OUTPATIENT
Start: 2020-01-23 | End: 2020-01-23

## 2020-01-23 RX ORDER — METOPROLOL SUCCINATE 50 MG/1
50 TABLET, EXTENDED RELEASE ORAL DAILY
Status: DISCONTINUED | OUTPATIENT
Start: 2020-01-23 | End: 2020-01-25

## 2020-01-23 RX ORDER — ACETAMINOPHEN 325 MG/1
650 TABLET ORAL EVERY 4 HOURS PRN
Status: DISCONTINUED | OUTPATIENT
Start: 2020-01-23 | End: 2020-01-26 | Stop reason: HOSPADM

## 2020-01-23 RX ORDER — METHYLPREDNISOLONE 4 MG/1
8 TABLET ORAL
Status: COMPLETED | OUTPATIENT
Start: 2020-01-23 | End: 2020-01-23

## 2020-01-23 RX ORDER — METHYLPREDNISOLONE 4 MG/1
4 TABLET ORAL
Status: COMPLETED | OUTPATIENT
Start: 2020-01-23 | End: 2020-01-23

## 2020-01-23 RX ORDER — LORAZEPAM 2 MG/ML
0.5 INJECTION INTRAMUSCULAR ONCE
Status: COMPLETED | OUTPATIENT
Start: 2020-01-23 | End: 2020-01-23

## 2020-01-23 RX ORDER — SODIUM CHLORIDE 9 MG/ML
100 INJECTION, SOLUTION INTRAVENOUS CONTINUOUS
Status: DISCONTINUED | OUTPATIENT
Start: 2020-01-23 | End: 2020-01-24

## 2020-01-23 RX ORDER — METHYLPREDNISOLONE 4 MG/1
4 TABLET ORAL
Status: COMPLETED | OUTPATIENT
Start: 2020-01-26 | End: 2020-01-26

## 2020-01-23 RX ORDER — MELATONIN
1000 DAILY
Status: DISCONTINUED | OUTPATIENT
Start: 2020-01-23 | End: 2020-01-26 | Stop reason: HOSPADM

## 2020-01-23 RX ORDER — SODIUM CHLORIDE 0.9 % (FLUSH) 0.9 %
10 SYRINGE (ML) INJECTION AS NEEDED
Status: DISCONTINUED | OUTPATIENT
Start: 2020-01-23 | End: 2020-01-23

## 2020-01-23 RX ORDER — GUAIFENESIN 600 MG/1
600 TABLET, EXTENDED RELEASE ORAL EVERY 12 HOURS SCHEDULED
Status: DISCONTINUED | OUTPATIENT
Start: 2020-01-23 | End: 2020-01-26 | Stop reason: HOSPADM

## 2020-01-23 RX ORDER — MECLIZINE HYDROCHLORIDE 25 MG/1
25 TABLET ORAL 3 TIMES DAILY PRN
Status: DISCONTINUED | OUTPATIENT
Start: 2020-01-23 | End: 2020-01-23

## 2020-01-23 RX ORDER — METHYLPREDNISOLONE 4 MG/1
4 TABLET ORAL
Status: COMPLETED | OUTPATIENT
Start: 2020-01-28 | End: 2020-01-24

## 2020-01-23 RX ORDER — LORAZEPAM 0.5 MG/1
0.5 TABLET ORAL EVERY 8 HOURS PRN
Status: DISCONTINUED | OUTPATIENT
Start: 2020-01-23 | End: 2020-01-26 | Stop reason: HOSPADM

## 2020-01-23 RX ORDER — FLUTICASONE PROPIONATE 50 MCG
2 SPRAY, SUSPENSION (ML) NASAL DAILY
Status: DISCONTINUED | OUTPATIENT
Start: 2020-01-23 | End: 2020-01-26 | Stop reason: HOSPADM

## 2020-01-23 RX ORDER — ONDANSETRON 4 MG/1
4 TABLET, FILM COATED ORAL EVERY 6 HOURS PRN
Status: DISCONTINUED | OUTPATIENT
Start: 2020-01-23 | End: 2020-01-26 | Stop reason: HOSPADM

## 2020-01-23 RX ORDER — METHYLPREDNISOLONE 4 MG/1
4 TABLET ORAL
Status: DISCONTINUED | OUTPATIENT
Start: 2020-01-27 | End: 2020-01-26 | Stop reason: HOSPADM

## 2020-01-23 RX ORDER — ONDANSETRON 2 MG/ML
4 INJECTION INTRAMUSCULAR; INTRAVENOUS ONCE
Status: COMPLETED | OUTPATIENT
Start: 2020-01-23 | End: 2020-01-23

## 2020-01-23 RX ORDER — MECLIZINE HYDROCHLORIDE 25 MG/1
25 TABLET ORAL EVERY 8 HOURS SCHEDULED
Status: DISCONTINUED | OUTPATIENT
Start: 2020-01-23 | End: 2020-01-26 | Stop reason: HOSPADM

## 2020-01-23 RX ORDER — FAMOTIDINE 20 MG/1
20 TABLET, FILM COATED ORAL
Status: DISCONTINUED | OUTPATIENT
Start: 2020-01-23 | End: 2020-01-26 | Stop reason: HOSPADM

## 2020-01-23 RX ORDER — MECLIZINE HYDROCHLORIDE 25 MG/1
25 TABLET ORAL ONCE
Status: COMPLETED | OUTPATIENT
Start: 2020-01-23 | End: 2020-01-23

## 2020-01-23 RX ORDER — METHYLPREDNISOLONE 4 MG/1
4 TABLET ORAL
Status: COMPLETED | OUTPATIENT
Start: 2020-01-25 | End: 2020-01-25

## 2020-01-23 RX ORDER — SODIUM CHLORIDE 0.9 % (FLUSH) 0.9 %
10 SYRINGE (ML) INJECTION EVERY 12 HOURS SCHEDULED
Status: DISCONTINUED | OUTPATIENT
Start: 2020-01-23 | End: 2020-01-23

## 2020-01-23 RX ORDER — ATORVASTATIN CALCIUM 20 MG/1
20 TABLET, FILM COATED ORAL DAILY
Status: DISCONTINUED | OUTPATIENT
Start: 2020-01-23 | End: 2020-01-24

## 2020-01-23 RX ORDER — METHYLPREDNISOLONE 4 MG/1
8 TABLET ORAL
Status: COMPLETED | OUTPATIENT
Start: 2020-01-24 | End: 2020-01-24

## 2020-01-23 RX ORDER — METHYLPREDNISOLONE 4 MG/1
4 TABLET ORAL
Status: ACTIVE | OUTPATIENT
Start: 2020-01-24 | End: 2020-01-25

## 2020-01-23 RX ORDER — DOCUSATE SODIUM 100 MG/1
100 CAPSULE, LIQUID FILLED ORAL 2 TIMES DAILY PRN
Status: DISCONTINUED | OUTPATIENT
Start: 2020-01-23 | End: 2020-01-26 | Stop reason: HOSPADM

## 2020-01-23 RX ADMIN — SODIUM CHLORIDE 125 ML/HR: 9 INJECTION, SOLUTION INTRAVENOUS at 12:21

## 2020-01-23 RX ADMIN — ATORVASTATIN CALCIUM 20 MG: 20 TABLET, FILM COATED ORAL at 17:56

## 2020-01-23 RX ADMIN — METHYLPREDNISOLONE 8 MG: 4 TABLET ORAL at 22:05

## 2020-01-23 RX ADMIN — MECLIZINE HYDROCHLORIDE 25 MG: 25 TABLET ORAL at 16:06

## 2020-01-23 RX ADMIN — VITAMIN D, TAB 1000IU (100/BT) 1000 UNITS: 25 TAB at 17:56

## 2020-01-23 RX ADMIN — APIXABAN 5 MG: 5 TABLET, FILM COATED ORAL at 20:26

## 2020-01-23 RX ADMIN — MECLIZINE HYDROCHLORIDE 25 MG: 25 TABLET ORAL at 12:21

## 2020-01-23 RX ADMIN — MECLIZINE HYDROCHLORIDE 25 MG: 25 TABLET ORAL at 22:05

## 2020-01-23 RX ADMIN — DEXAMETHASONE SODIUM PHOSPHATE 4 MG: 4 INJECTION, SOLUTION INTRAMUSCULAR; INTRAVENOUS at 17:56

## 2020-01-23 RX ADMIN — SODIUM CHLORIDE 1000 ML: 9 INJECTION, SOLUTION INTRAVENOUS at 08:00

## 2020-01-23 RX ADMIN — FAMOTIDINE 20 MG: 20 TABLET, FILM COATED ORAL at 17:56

## 2020-01-23 RX ADMIN — GUAIFENESIN 600 MG: 600 TABLET, EXTENDED RELEASE ORAL at 16:06

## 2020-01-23 RX ADMIN — SODIUM CHLORIDE 100 ML/HR: 9 INJECTION, SOLUTION INTRAVENOUS at 21:28

## 2020-01-23 RX ADMIN — METOPROLOL SUCCINATE 50 MG: 50 TABLET, FILM COATED, EXTENDED RELEASE ORAL at 17:56

## 2020-01-23 RX ADMIN — METHYLPREDNISOLONE 4 MG: 4 TABLET ORAL at 17:56

## 2020-01-23 RX ADMIN — ONDANSETRON 4 MG: 2 INJECTION INTRAMUSCULAR; INTRAVENOUS at 08:02

## 2020-01-23 RX ADMIN — FLUTICASONE PROPIONATE 2 SPRAY: 50 SPRAY, METERED NASAL at 16:06

## 2020-01-23 RX ADMIN — METHYLPREDNISOLONE 4 MG: 4 TABLET ORAL at 19:10

## 2020-01-23 RX ADMIN — LORAZEPAM 0.5 MG: 2 INJECTION INTRAMUSCULAR; INTRAVENOUS at 08:05

## 2020-01-23 RX ADMIN — GUAIFENESIN 600 MG: 600 TABLET, EXTENDED RELEASE ORAL at 20:26

## 2020-01-24 ENCOUNTER — APPOINTMENT (OUTPATIENT)
Dept: MRI IMAGING | Facility: HOSPITAL | Age: 75
End: 2020-01-24

## 2020-01-24 PROCEDURE — 93010 ELECTROCARDIOGRAM REPORT: CPT | Performed by: INTERNAL MEDICINE

## 2020-01-24 PROCEDURE — G0378 HOSPITAL OBSERVATION PER HR: HCPCS

## 2020-01-24 PROCEDURE — 63710000001 METHYLPREDNISOLONE 4 MG TABLET THERAPY PACK 21 EACH DISP PACK: Performed by: HOSPITALIST

## 2020-01-24 PROCEDURE — 97110 THERAPEUTIC EXERCISES: CPT

## 2020-01-24 PROCEDURE — 97162 PT EVAL MOD COMPLEX 30 MIN: CPT

## 2020-01-24 PROCEDURE — 93005 ELECTROCARDIOGRAM TRACING: CPT | Performed by: NURSE PRACTITIONER

## 2020-01-24 PROCEDURE — 70551 MRI BRAIN STEM W/O DYE: CPT

## 2020-01-24 RX ORDER — ATORVASTATIN CALCIUM 20 MG/1
20 TABLET, FILM COATED ORAL NIGHTLY
Status: DISCONTINUED | OUTPATIENT
Start: 2020-01-24 | End: 2020-01-26 | Stop reason: HOSPADM

## 2020-01-24 RX ADMIN — ACETAMINOPHEN 650 MG: 325 TABLET, FILM COATED ORAL at 21:26

## 2020-01-24 RX ADMIN — FAMOTIDINE 20 MG: 20 TABLET, FILM COATED ORAL at 18:29

## 2020-01-24 RX ADMIN — APIXABAN 5 MG: 5 TABLET, FILM COATED ORAL at 08:57

## 2020-01-24 RX ADMIN — APIXABAN 5 MG: 5 TABLET, FILM COATED ORAL at 21:21

## 2020-01-24 RX ADMIN — ATORVASTATIN CALCIUM 20 MG: 20 TABLET, FILM COATED ORAL at 21:21

## 2020-01-24 RX ADMIN — METHYLPREDNISOLONE 4 MG: 4 TABLET ORAL at 06:27

## 2020-01-24 RX ADMIN — METHYLPREDNISOLONE 8 MG: 4 TABLET ORAL at 06:26

## 2020-01-24 RX ADMIN — METHYLPREDNISOLONE 8 MG: 4 TABLET ORAL at 21:22

## 2020-01-24 RX ADMIN — GUAIFENESIN 600 MG: 600 TABLET, EXTENDED RELEASE ORAL at 21:21

## 2020-01-24 RX ADMIN — FAMOTIDINE 20 MG: 20 TABLET, FILM COATED ORAL at 06:26

## 2020-01-24 RX ADMIN — VITAMIN D, TAB 1000IU (100/BT) 1000 UNITS: 25 TAB at 08:57

## 2020-01-24 RX ADMIN — GUAIFENESIN 600 MG: 600 TABLET, EXTENDED RELEASE ORAL at 08:57

## 2020-01-24 RX ADMIN — METHYLPREDNISOLONE 4 MG: 4 TABLET ORAL at 18:10

## 2020-01-24 RX ADMIN — MECLIZINE HYDROCHLORIDE 25 MG: 25 TABLET ORAL at 21:21

## 2020-01-24 RX ADMIN — METOPROLOL SUCCINATE 50 MG: 50 TABLET, FILM COATED, EXTENDED RELEASE ORAL at 08:57

## 2020-01-24 RX ADMIN — MECLIZINE HYDROCHLORIDE 25 MG: 25 TABLET ORAL at 06:26

## 2020-01-24 RX ADMIN — FLUTICASONE PROPIONATE 2 SPRAY: 50 SPRAY, METERED NASAL at 08:57

## 2020-01-24 RX ADMIN — METHYLPREDNISOLONE 4 MG: 4 TABLET ORAL at 14:18

## 2020-01-24 RX ADMIN — MECLIZINE HYDROCHLORIDE 25 MG: 25 TABLET ORAL at 14:20

## 2020-01-24 RX ADMIN — LORAZEPAM 0.5 MG: 0.5 TABLET ORAL at 21:28

## 2020-01-25 PROCEDURE — 93010 ELECTROCARDIOGRAM REPORT: CPT | Performed by: INTERNAL MEDICINE

## 2020-01-25 PROCEDURE — 93005 ELECTROCARDIOGRAM TRACING: CPT | Performed by: HOSPITALIST

## 2020-01-25 PROCEDURE — 94799 UNLISTED PULMONARY SVC/PX: CPT

## 2020-01-25 PROCEDURE — 63710000001 METHYLPREDNISOLONE 4 MG TABLET THERAPY PACK 21 EACH DISP PACK: Performed by: HOSPITALIST

## 2020-01-25 PROCEDURE — 99214 OFFICE O/P EST MOD 30 MIN: CPT | Performed by: INTERNAL MEDICINE

## 2020-01-25 PROCEDURE — 97116 GAIT TRAINING THERAPY: CPT

## 2020-01-25 RX ORDER — METHYLPREDNISOLONE 4 MG/1
4 TABLET ORAL
Status: DISCONTINUED | OUTPATIENT
Start: 2020-01-28 | End: 2020-01-26 | Stop reason: HOSPADM

## 2020-01-25 RX ORDER — PROPAFENONE HYDROCHLORIDE 150 MG/1
150 TABLET, COATED ORAL EVERY 8 HOURS SCHEDULED
Status: DISCONTINUED | OUTPATIENT
Start: 2020-01-25 | End: 2020-01-25

## 2020-01-25 RX ORDER — METOPROLOL SUCCINATE 50 MG/1
50 TABLET, EXTENDED RELEASE ORAL EVERY 12 HOURS SCHEDULED
Status: DISCONTINUED | OUTPATIENT
Start: 2020-01-25 | End: 2020-01-26 | Stop reason: HOSPADM

## 2020-01-25 RX ORDER — PROPAFENONE HYDROCHLORIDE 225 MG/1
450 TABLET, FILM COATED ORAL ONCE
Status: COMPLETED | OUTPATIENT
Start: 2020-01-25 | End: 2020-01-25

## 2020-01-25 RX ORDER — PROPAFENONE HYDROCHLORIDE 150 MG/1
150 TABLET, COATED ORAL EVERY 8 HOURS SCHEDULED
Status: DISCONTINUED | OUTPATIENT
Start: 2020-01-25 | End: 2020-01-26 | Stop reason: HOSPADM

## 2020-01-25 RX ADMIN — MECLIZINE HYDROCHLORIDE 25 MG: 25 TABLET ORAL at 21:28

## 2020-01-25 RX ADMIN — METOPROLOL SUCCINATE 50 MG: 50 TABLET, FILM COATED, EXTENDED RELEASE ORAL at 21:28

## 2020-01-25 RX ADMIN — FAMOTIDINE 20 MG: 20 TABLET, FILM COATED ORAL at 06:34

## 2020-01-25 RX ADMIN — VITAMIN D, TAB 1000IU (100/BT) 1000 UNITS: 25 TAB at 08:26

## 2020-01-25 RX ADMIN — GUAIFENESIN 600 MG: 600 TABLET, EXTENDED RELEASE ORAL at 21:28

## 2020-01-25 RX ADMIN — APIXABAN 5 MG: 5 TABLET, FILM COATED ORAL at 21:28

## 2020-01-25 RX ADMIN — GUAIFENESIN 600 MG: 600 TABLET, EXTENDED RELEASE ORAL at 08:26

## 2020-01-25 RX ADMIN — METOPROLOL TARTRATE 5 MG: 5 INJECTION, SOLUTION INTRAVENOUS at 16:00

## 2020-01-25 RX ADMIN — APIXABAN 5 MG: 5 TABLET, FILM COATED ORAL at 08:26

## 2020-01-25 RX ADMIN — METHYLPREDNISOLONE 4 MG: 4 TABLET ORAL at 21:28

## 2020-01-25 RX ADMIN — PROPAFENONE HYDROCHLORIDE 450 MG: 150 TABLET, COATED ORAL at 16:02

## 2020-01-25 RX ADMIN — METOPROLOL TARTRATE 5 MG: 5 INJECTION, SOLUTION INTRAVENOUS at 16:05

## 2020-01-25 RX ADMIN — FAMOTIDINE 20 MG: 20 TABLET, FILM COATED ORAL at 17:30

## 2020-01-25 RX ADMIN — MECLIZINE HYDROCHLORIDE 25 MG: 25 TABLET ORAL at 13:42

## 2020-01-25 RX ADMIN — METHYLPREDNISOLONE 4 MG: 4 TABLET ORAL at 06:34

## 2020-01-25 RX ADMIN — METHYLPREDNISOLONE 4 MG: 4 TABLET ORAL at 13:36

## 2020-01-25 RX ADMIN — METHYLPREDNISOLONE 4 MG: 4 TABLET ORAL at 17:31

## 2020-01-25 RX ADMIN — MECLIZINE HYDROCHLORIDE 25 MG: 25 TABLET ORAL at 06:34

## 2020-01-25 RX ADMIN — METOPROLOL TARTRATE 5 MG: 5 INJECTION, SOLUTION INTRAVENOUS at 15:52

## 2020-01-25 RX ADMIN — METOPROLOL SUCCINATE 50 MG: 50 TABLET, FILM COATED, EXTENDED RELEASE ORAL at 08:26

## 2020-01-25 RX ADMIN — ATORVASTATIN CALCIUM 20 MG: 20 TABLET, FILM COATED ORAL at 21:28

## 2020-01-25 RX ADMIN — PROPAFENONE HYDROCHLORIDE 150 MG: 150 TABLET, COATED ORAL at 21:28

## 2020-01-25 RX ADMIN — FLUTICASONE PROPIONATE 2 SPRAY: 50 SPRAY, METERED NASAL at 08:26

## 2020-01-26 VITALS
SYSTOLIC BLOOD PRESSURE: 143 MMHG | WEIGHT: 184.97 LBS | HEIGHT: 64 IN | RESPIRATION RATE: 18 BRPM | HEART RATE: 59 BPM | TEMPERATURE: 98.1 F | BODY MASS INDEX: 31.58 KG/M2 | DIASTOLIC BLOOD PRESSURE: 69 MMHG | OXYGEN SATURATION: 97 %

## 2020-01-26 LAB
ANION GAP SERPL CALCULATED.3IONS-SCNC: 12.3 MMOL/L (ref 5–15)
BASOPHILS # BLD AUTO: 0.01 10*3/MM3 (ref 0–0.2)
BASOPHILS NFR BLD AUTO: 0.1 % (ref 0–1.5)
BUN BLD-MCNC: 21 MG/DL (ref 8–23)
BUN/CREAT SERPL: 34.4 (ref 7–25)
CALCIUM SPEC-SCNC: 8.7 MG/DL (ref 8.6–10.5)
CHLORIDE SERPL-SCNC: 105 MMOL/L (ref 98–107)
CO2 SERPL-SCNC: 25.7 MMOL/L (ref 22–29)
CREAT BLD-MCNC: 0.61 MG/DL (ref 0.57–1)
DEPRECATED RDW RBC AUTO: 43 FL (ref 37–54)
EOSINOPHIL # BLD AUTO: 0.01 10*3/MM3 (ref 0–0.4)
EOSINOPHIL NFR BLD AUTO: 0.1 % (ref 0.3–6.2)
ERYTHROCYTE [DISTWIDTH] IN BLOOD BY AUTOMATED COUNT: 13.2 % (ref 12.3–15.4)
GFR SERPL CREATININE-BSD FRML MDRD: 96 ML/MIN/1.73
GLUCOSE BLD-MCNC: 130 MG/DL (ref 65–99)
HCT VFR BLD AUTO: 35.9 % (ref 34–46.6)
HGB BLD-MCNC: 11.7 G/DL (ref 12–15.9)
IMM GRANULOCYTES # BLD AUTO: 0.04 10*3/MM3 (ref 0–0.05)
IMM GRANULOCYTES NFR BLD AUTO: 0.4 % (ref 0–0.5)
LYMPHOCYTES # BLD AUTO: 1.6 10*3/MM3 (ref 0.7–3.1)
LYMPHOCYTES NFR BLD AUTO: 17.2 % (ref 19.6–45.3)
MCH RBC QN AUTO: 28.9 PG (ref 26.6–33)
MCHC RBC AUTO-ENTMCNC: 32.6 G/DL (ref 31.5–35.7)
MCV RBC AUTO: 88.6 FL (ref 79–97)
MONOCYTES # BLD AUTO: 0.62 10*3/MM3 (ref 0.1–0.9)
MONOCYTES NFR BLD AUTO: 6.7 % (ref 5–12)
NEUTROPHILS # BLD AUTO: 7.03 10*3/MM3 (ref 1.7–7)
NEUTROPHILS NFR BLD AUTO: 75.5 % (ref 42.7–76)
NRBC BLD AUTO-RTO: 0 /100 WBC (ref 0–0.2)
PLATELET # BLD AUTO: 168 10*3/MM3 (ref 140–450)
PMV BLD AUTO: 12.4 FL (ref 6–12)
POTASSIUM BLD-SCNC: 4.3 MMOL/L (ref 3.5–5.2)
RBC # BLD AUTO: 4.05 10*6/MM3 (ref 3.77–5.28)
SODIUM BLD-SCNC: 143 MMOL/L (ref 136–145)
WBC NRBC COR # BLD: 9.31 10*3/MM3 (ref 3.4–10.8)

## 2020-01-26 PROCEDURE — 93005 ELECTROCARDIOGRAM TRACING: CPT | Performed by: INTERNAL MEDICINE

## 2020-01-26 PROCEDURE — 85025 COMPLETE CBC W/AUTO DIFF WBC: CPT | Performed by: HOSPITALIST

## 2020-01-26 PROCEDURE — 99232 SBSQ HOSP IP/OBS MODERATE 35: CPT | Performed by: INTERNAL MEDICINE

## 2020-01-26 PROCEDURE — 80048 BASIC METABOLIC PNL TOTAL CA: CPT | Performed by: HOSPITALIST

## 2020-01-26 PROCEDURE — 63710000001 METHYLPREDNISOLONE 4 MG TABLET THERAPY PACK 21 EACH DISP PACK: Performed by: HOSPITALIST

## 2020-01-26 PROCEDURE — 93010 ELECTROCARDIOGRAM REPORT: CPT | Performed by: INTERNAL MEDICINE

## 2020-01-26 RX ORDER — METHYLPREDNISOLONE 4 MG/1
TABLET ORAL
Start: 2020-01-28 | End: 2020-01-31 | Stop reason: SDUPTHER

## 2020-01-26 RX ORDER — METHYLPREDNISOLONE 4 MG/1
TABLET ORAL
Start: 2020-01-27 | End: 2020-01-31 | Stop reason: SDUPTHER

## 2020-01-26 RX ORDER — METOPROLOL SUCCINATE 50 MG/1
50 TABLET, EXTENDED RELEASE ORAL EVERY 12 HOURS SCHEDULED
Qty: 60 TABLET | Refills: 0 | Status: SHIPPED | OUTPATIENT
Start: 2020-01-26 | End: 2020-01-31

## 2020-01-26 RX ORDER — METHYLPREDNISOLONE 4 MG/1
TABLET ORAL
Start: 2020-01-26 | End: 2020-01-31

## 2020-01-26 RX ORDER — PROPAFENONE HYDROCHLORIDE 150 MG/1
150 TABLET, COATED ORAL EVERY 8 HOURS SCHEDULED
Qty: 90 TABLET | Refills: 0 | Status: SHIPPED | OUTPATIENT
Start: 2020-01-26 | End: 2020-02-25

## 2020-01-26 RX ADMIN — PROPAFENONE HYDROCHLORIDE 150 MG: 150 TABLET, COATED ORAL at 06:42

## 2020-01-26 RX ADMIN — FLUTICASONE PROPIONATE 2 SPRAY: 50 SPRAY, METERED NASAL at 08:45

## 2020-01-26 RX ADMIN — METHYLPREDNISOLONE 4 MG: 4 TABLET ORAL at 17:42

## 2020-01-26 RX ADMIN — FAMOTIDINE 20 MG: 20 TABLET, FILM COATED ORAL at 06:42

## 2020-01-26 RX ADMIN — GUAIFENESIN 600 MG: 600 TABLET, EXTENDED RELEASE ORAL at 08:45

## 2020-01-26 RX ADMIN — PROPAFENONE HYDROCHLORIDE 150 MG: 150 TABLET, COATED ORAL at 14:48

## 2020-01-26 RX ADMIN — METHYLPREDNISOLONE 4 MG: 4 TABLET ORAL at 06:42

## 2020-01-26 RX ADMIN — VITAMIN D, TAB 1000IU (100/BT) 1000 UNITS: 25 TAB at 08:45

## 2020-01-26 RX ADMIN — METOPROLOL SUCCINATE 50 MG: 50 TABLET, FILM COATED, EXTENDED RELEASE ORAL at 08:45

## 2020-01-26 RX ADMIN — APIXABAN 5 MG: 5 TABLET, FILM COATED ORAL at 08:46

## 2020-01-26 RX ADMIN — METHYLPREDNISOLONE 4 MG: 4 TABLET ORAL at 12:56

## 2020-01-26 RX ADMIN — MECLIZINE HYDROCHLORIDE 25 MG: 25 TABLET ORAL at 06:42

## 2020-01-26 RX ADMIN — MECLIZINE HYDROCHLORIDE 25 MG: 25 TABLET ORAL at 14:48

## 2020-01-27 ENCOUNTER — READMISSION MANAGEMENT (OUTPATIENT)
Dept: CALL CENTER | Facility: HOSPITAL | Age: 75
End: 2020-01-27

## 2020-01-27 NOTE — OUTREACH NOTE
Prep Survey      Responses   Facility patient discharged from?  [facility]   Is patient eligible?  Yes   Discharge diagnosis  Vertigo    Does the patient have one of the following disease processes/diagnoses(primary or secondary)?  Other   Does the patient have Home health ordered?  Yes   What is the Home health agency?   [facility]    Is there a DME ordered?  No   Prep survey completed?  Yes          Liseth Kaur RN

## 2020-01-28 PROCEDURE — 63710000001 METHYLPREDNISOLONE 4 MG TABLET THERAPY PACK 21 EACH DISP PACK: Performed by: HOSPITALIST

## 2020-01-29 ENCOUNTER — READMISSION MANAGEMENT (OUTPATIENT)
Dept: CALL CENTER | Facility: HOSPITAL | Age: 75
End: 2020-01-29

## 2020-01-29 NOTE — OUTREACH NOTE
Medical Week 1 Survey      Responses   Facility patient discharged from?  [facility]   Does the patient have one of the following disease processes/diagnoses(primary or secondary)?  Other   Is there a successful TCM telephone encounter documented?  No   Week 1 attempt successful?  Yes   Call start time  1551   Call end time  1554   Discharge diagnosis  Vertigo    Meds reviewed with patient/caregiver?  Yes   Is the patient having any side effects they believe may be caused by any medication additions or changes?  No   Does the patient have all medications ordered at discharge?  Yes   Is the patient taking all medications as directed (includes completed medication regime)?  Yes   Does the patient have a primary care provider?   Yes   Does the patient have an appointment with their PCP within 7 days of discharge?  Yes   Has the patient kept scheduled appointments due by today?  N/A   What is the Home health agency?   [facility]    Has home health visited the patient within 72 hours of discharge?  No   Psychosocial issues?  No   Did the patient receive a copy of their discharge instructions?  Yes   Nursing interventions  Reviewed instructions with patient   What is the patient's perception of their health status since discharge?  Same   Is the patient/caregiver able to teach back signs and symptoms related to disease process for when to call PCP?  Yes   Is the patient/caregiver able to teach back signs and symptoms related to disease process for when to call 911?  Yes   Is the patient/caregiver able to teach back the hierarchy of who to call/visit for symptoms/problems? PCP, Specialist, Home health nurse, Urgent Care, ED, 911  Yes   Week 1 call completed?  Yes          Minna Stoner RN

## 2020-01-30 ENCOUNTER — TELEPHONE (OUTPATIENT)
Dept: CARDIOLOGY | Facility: CLINIC | Age: 75
End: 2020-01-30

## 2020-01-30 NOTE — PROGRESS NOTES
Date of Office Visit: 2020  Encounter Provider: SARITHA Richmond  Place of Service: [facility] CARDIOLOGY  Patient Name: Kiley Last  :1945    No chief complaint on file.  :     HPI: Kiley Last is a 74 y.o. female who presents today for spittle follow-up 1 week.  She is well-known to Dr. Kearns and new to me.  She has had some issues with small bowel obstructions over the last couple of months.  However last Friday the th was laying in bed and had a popping sensation in her left ear followed by a ringing.  She proceeded to have significant dizziness lightheadedness and vertigo symptoms.  She has had issues with vertigo in the past and also noted that she had lost her hearing.  She had multiple episodes of vomiting and her family called EMS and she came to the hospital for further evaluation.  She was admitted.  And she also had some urinary tract infection that was thought to be due to a previous C. difficile infection.  She had A. fib with RVR.  She was seen by Dr. Kearns in the hospital and started on Rythmol 150 mg twice a day.  However it was persistent and had to be increased to 3 times a day and this converted her to sinus rhythm.  She takes Eliquis at home for anticoagulation because she has a history of paroxysmal A. fib in the past.  She has had no previous history of coronary disease or congestive heart failure.  She had a CT of the head as well as an MRI of the brain there that did not show any neurological abnormalities.    Since being discharged from the hospital on  she is continued with profound dizziness and fatigue.  She states that she is extremely tired her beta-blocker metoprolol succinate was also increased to 50 mg twice daily in the hospital.  She has had no episodes of passing out, she has had some palpitations no shortness of breath or peripheral edema.  Yesterday she went to see her ear nose and throat specialist and got  an Epley treatment for positional vertigo.  She is to come back and see them in a week.    Past Medical History:   Diagnosis Date   • Arthritis    • Asthma     NO INHALERS   • Atrial fibrillation (CMS/HCC)    • Clostridium difficile infection 09/20/2019   • Colon polyps 08/01/2019    Transverse colon: tubular adenoma, descending colon: fragments of tubular adenoma, sigmoid colon: ischemic eroded hyperplastic polyp   • COPD (chronic obstructive pulmonary disease) (CMS/HCC)    • Diverticulitis    • Diverticulosis    • ESBL (extended spectrum beta-lactamase) producing bacteria infection    • History of abscess of skin and subcutaneous tissue     ABD WOUND 5/2017   • History of atrial fibrillation      X1 POST OP FROM COLOSTOMY 5/2017 - NO PROBLEMS SINCE   • Hypertension    • MDRO (multiple drug resistant organisms) resistance    • PONV (postoperative nausea and vomiting)    • Psoriasis    • UTI (urinary tract infection)    • Vertigo        Past Surgical History:   Procedure Laterality Date   • BELPHAROPTOSIS REPAIR Bilateral 04/27/2017    Bilateral brow ptosis repair via the anterior hairline approach under monitored local anesthesia-Andrez Craven   • BLEPHAROPLASTY Bilateral 04/27/2017   • CATARACT EXTRACTION     • COLON RESECTION N/A 5/3/2017    Procedure: OPEN SIMOID COLECTOMY WITH COLOSTOMY;  Surgeon: Renato Delgado MD;  Location: [facility] MAIN OR;  Service:    • COLONOSCOPY N/A 9/13/2017    Procedure: COLONOSCOPY VIA COLOSTOMY TO CECUM;  Surgeon: Renato Delgado MD;  Location: [facility] ENDOSCOPY;  Service:    • COLONOSCOPY N/A 8/1/2019    Procedure: COLONOSCOPY to cecum and TI with biopsy / hot snare polypectomies;  Surgeon: Dalton Vela Jr., MD;  Location: [facility] ENDOSCOPY;  Service: General   • COLONOSCOPY N/A 01/04/2010    Andrez Trinidad   • COLOSTOMY CLOSURE N/A 9/14/2017    Procedure: COLOSTOMY TAKEDOWN AND CLOSURE, WITH RESECTION;  Surgeon: Renato Delgado MD;  Location: [facility]  MAIN OR;  Service:    • ENDOSCOPY AND COLONOSCOPY N/A 10/31/2014    Normal EGD and colonoscopy, repeat c-scope in 5 years-Andrez Trinidad   • EXPLORATORY LAPAROTOMY N/A 2019    Procedure: Exploratory laparotomy with lysis of adhesions;  Surgeon: Trini Wade MD;  Location: [facility] MAIN OR;  Service: General   • FINGER SURGERY Left     4TH FINGER LEFT HAND   • HYSTERECTOMY Bilateral    • LAPAROSCOPIC CHOLECYSTECTOMY W/ CHOLANGIOGRAPHY N/A 07/15/2003    Dr. Amrit Martinez, [facility]   • SALPINGO OOPHORECTOMY Bilateral 2006    Abdominal sacral colpopexy, bilateral salpingo-oophorectomy, tension free vaginal tape, cystoscopy-Dr. Devika Bradley, [facility]   • THORACENTESIS Right 2017    US-guided right thoracentesis-Dr. Juan Yuen, [facility]   • TONSILLECTOMY Bilateral    • UPPER GASTROINTESTINAL ENDOSCOPY N/A 10/08/2007    Esophageal mucosal changes suspicious for short-segment Olivera's esphagus, gastric mucosal abnormality characterized by erythema: biopsied, NERD disease present, high likelihood of gastritis-Dr. Mayank Knapp, [facility]       Social History     Socioeconomic History   • Marital status:      Spouse name: Not on file   • Number of children: Not on file   • Years of education: Not on file   • Highest education level: Not on file   Tobacco Use   • Smoking status: Former Smoker     Types: Cigarettes     Last attempt to quit: 1967     Years since quittin.1   • Smokeless tobacco: Never Used   • Tobacco comment: SOCIAL SMOKING ONLY   Substance and Sexual Activity   • Alcohol use: Yes     Alcohol/week: 2.0 standard drinks     Types: 1 Glasses of wine, 1 Shots of liquor per week     Comment: occasionally/caffeine use    • Drug use: No   • Sexual activity: Defer       Family History   Problem Relation Age of Onset   • Cancer Mother    • Colon cancer Mother    • Diabetes Father    • Diabetes Son    • Ulcerative colitis Son    • No Known Problems Maternal Grandmother    • No Known  Problems Maternal Grandfather    • No Known Problems Paternal Grandmother    • No Known Problems Paternal Grandfather    • Malig Hyperthermia Neg Hx        Review of Systems   Constitution: Positive for malaise/fatigue. Negative for diaphoresis.   Cardiovascular: Positive for palpitations. Negative for chest pain, claudication, dyspnea on exertion, irregular heartbeat, leg swelling, near-syncope, orthopnea, paroxysmal nocturnal dyspnea and syncope.   Respiratory: Negative for cough, shortness of breath and sleep disturbances due to breathing.    Musculoskeletal: Negative for falls.   Neurological: Positive for dizziness. Negative for weakness.   Psychiatric/Behavioral: Negative for altered mental status and substance abuse.       Allergies   Allergen Reactions   • Epinephrine Palpitations   • Penicillins Other (See Comments)     BLISTERS IN MOUTH    Patient tolerated ceftriaxone during 5/2017 admission.            Current Outpatient Medications:   •  Acetaminophen (TYLENOL ARTHRITIS PAIN PO), Take 650 mg by mouth 3 (Three) Times a Day As Needed., Disp: , Rfl:   •  apixaban (ELIQUIS) 5 MG tablet tablet, Take 1 tablet by mouth Every 12 (Twelve) Hours., Disp: 60 tablet, Rfl: 11  •  atorvastatin (LIPITOR) 20 MG tablet, Take 20 mg by mouth Daily., Disp: , Rfl:   •  cholecalciferol (VITAMIN D3) 1000 units tablet, Take 1,000 Units by mouth Daily., Disp: , Rfl:   •  LORazepam (ATIVAN) 0.5 MG tablet, Take 0.5 mg by mouth Every 8 (Eight) Hours As Needed for Anxiety., Disp: , Rfl:   •  meclizine (ANTIVERT) 25 MG tablet, Take 25 mg by mouth 3 (Three) Times a Day As Needed for dizziness., Disp: , Rfl:   •  methylPREDNISolone (MEDROL, CHRISTAL,) 4 MG tablet, Take as directed on package instructions., Disp: , Rfl:   •  methylPREDNISolone (MEDROL, CHRISTAL,) 4 MG tablet, Take as directed on package instructions., Disp: , Rfl:   •  methylPREDNISolone (MEDROL, CHRISTAL,) 4 MG tablet, Take as directed on package instructions., Disp: , Rfl:   •   methylPREDNISolone (MEDROL, CHRISTAL,) 4 MG tablet, Take as directed on package instructions., Disp: , Rfl:   •  metoprolol succinate XL (TOPROL-XL) 50 MG 24 hr tablet, Take 1 tablet by mouth Every 12 (Twelve) Hours for 30 days., Disp: 60 tablet, Rfl: 0  •  propafenone (RYTHMOL) 150 MG tablet, Take 1 tablet by mouth Every 8 (Eight) Hours for 30 days., Disp: 90 tablet, Rfl: 0      Objective:     There were no vitals filed for this visit.  There is no height or weight on file to calculate BMI.    PHYSICAL EXAM:    Physical Exam   Constitutional: She is oriented to person, place, and time. She appears well-developed and well-nourished. No distress.   HENT:   Head: Normocephalic.   Eyes: Pupils are equal, round, and reactive to light. EOM are normal.   Neck: Normal range of motion. Neck supple. No JVD present. Carotid bruit is not present. No edema present.   Cardiovascular: Normal rate, regular rhythm, S1 normal, S2 normal, normal heart sounds and intact distal pulses. Exam reveals no gallop.   No murmur heard.  Pulmonary/Chest: Effort normal and breath sounds normal. No tachypnea. She has no wheezes. She has no rales.   Abdominal: Soft. Normal appearance and bowel sounds are normal. She exhibits no ascites. There is no tenderness.   Musculoskeletal: Normal range of motion. She exhibits no edema.   Neurological: She is alert and oriented to person, place, and time.   Skin: Skin is warm and dry.   Psychiatric: She has a normal mood and affect.         ECG 12 Lead  Date/Time: 1/31/2020 1:43 PM  Performed by: Miguelina Hurst APRN  Authorized by: Miguelina Hurst APRN   Comparison: compared with previous ECG from 1/26/2020  Rhythm: sinus rhythm  Rate: normal  BPM: 65  Conduction: conduction normal  QRS axis: normal    Clinical impression: normal ECG              Assessment:      No diagnosis found.  No orders of the defined types were placed in this encounter.         Plan:       So there is a few things that could be  going on.  I do not think that the Rythmol is contributing to her dizziness as the dizziness started before this medication was initiated in the hospital.  The dizziness is actually what caused her to initially come to the hospital.  However the increase in fatigue could be related to the increase beta-blocker.  I told her to cut the metoprolol back to 1250 mg at bedtime instead of twice a day.  Her heart rate is 65 in the office.  Her blood pressure stable at 118/84.  She also 2 weeks ago was started on Lipitor by her primary care doctor for elevated triglycerides.  I do not have those lab results.  However this could also be contributing to her fatigue and weakness.  But I did tell discussed with her let us try one change at a time as we might not be able to tell what is actually the problem if we cut back the metoprolol and the Lipitor.  She states that time she still is having some palpitations however she is in sinus rhythm today.  Her QTC is within normal range at 410.  I have ordered a ZIO patch for her to wear at home lets make sure she is not having any significant bradycardia arrhythmias or atrial fibrillation.  She is having some issues getting her Eliquis this month due to her deductible we have given her a 30-day card to however we are out of samples in the office today.  Follow-up with Dr. Kearns in a month.       Your medication list           Accurate as of January 30, 2020  3:53 PM. If you have any questions, ask your nurse or doctor.               CONTINUE taking these medications      Instructions Last Dose Given Next Dose Due   apixaban 5 MG tablet tablet  Commonly known as:  ELIQUIS      Take 1 tablet by mouth Every 12 (Twelve) Hours.       atorvastatin 20 MG tablet  Commonly known as:  LIPITOR      Take 20 mg by mouth Daily.       cholecalciferol 25 MCG (1000 UT) tablet  Commonly known as:  VITAMIN D3      Take 1,000 Units by mouth Daily.       LORazepam 0.5 MG tablet  Commonly known as:   ATIVAN      Take 0.5 mg by mouth Every 8 (Eight) Hours As Needed for Anxiety.       meclizine 25 MG tablet  Commonly known as:  ANTIVERT      Take 25 mg by mouth 3 (Three) Times a Day As Needed for dizziness.       methylPREDNISolone 4 MG tablet  Commonly known as:  MEDROL (CHRISTAL)      Take as directed on package instructions.       methylPREDNISolone 4 MG tablet  Commonly known as:  MEDROL (CHRISTAL)      Take as directed on package instructions.       methylPREDNISolone 4 MG tablet  Commonly known as:  MEDROL (CHRISTAL)      Take as directed on package instructions.       methylPREDNISolone 4 MG tablet  Commonly known as:  MEDROL (CHRISTAL)      Take as directed on package instructions.       metoprolol succinate XL 50 MG 24 hr tablet  Commonly known as:  TOPROL-XL      Take 1 tablet by mouth Every 12 (Twelve) Hours for 30 days.       propafenone 150 MG tablet  Commonly known as:  RYTHMOL      Take 1 tablet by mouth Every 8 (Eight) Hours for 30 days.       TYLENOL ARTHRITIS PAIN PO      Take 650 mg by mouth 3 (Three) Times a Day As Needed.                As always, it has been a pleasure to participate in your patient's care.      Sincerely,     Miguelina MELARA

## 2020-01-30 NOTE — TELEPHONE ENCOUNTER
Patients daughter calling stating her mother was D/C from the hosp on Sunday.  States her dizziness was better. But since home and started taking Rhythmol 150mg 1 po TID  Her dizziness is a lot worse, she is also extremely tired  Daughter Maritza can be reached at [phone]

## 2020-01-31 ENCOUNTER — OFFICE VISIT (OUTPATIENT)
Dept: CARDIOLOGY | Facility: CLINIC | Age: 75
End: 2020-01-31

## 2020-01-31 VITALS
WEIGHT: 179.2 LBS | HEART RATE: 64 BPM | SYSTOLIC BLOOD PRESSURE: 118 MMHG | BODY MASS INDEX: 30.59 KG/M2 | HEIGHT: 64 IN | OXYGEN SATURATION: 100 % | DIASTOLIC BLOOD PRESSURE: 84 MMHG

## 2020-01-31 DIAGNOSIS — I48.0 PAROXYSMAL A-FIB (HCC): ICD-10-CM

## 2020-01-31 DIAGNOSIS — R42 DIZZINESS: ICD-10-CM

## 2020-01-31 DIAGNOSIS — R00.2 PALPITATIONS: Primary | ICD-10-CM

## 2020-01-31 PROCEDURE — 99213 OFFICE O/P EST LOW 20 MIN: CPT | Performed by: NURSE PRACTITIONER

## 2020-01-31 PROCEDURE — 93000 ELECTROCARDIOGRAM COMPLETE: CPT | Performed by: NURSE PRACTITIONER

## 2020-01-31 RX ORDER — METOPROLOL SUCCINATE 50 MG/1
50 TABLET, EXTENDED RELEASE ORAL DAILY
Qty: 30 TABLET | Refills: 0 | Status: SHIPPED | OUTPATIENT
Start: 2020-01-31 | End: 2020-02-25

## 2020-02-04 ENCOUNTER — TELEPHONE (OUTPATIENT)
Dept: CARDIOLOGY | Facility: CLINIC | Age: 75
End: 2020-02-04

## 2020-02-04 NOTE — TELEPHONE ENCOUNTER
I tried calling her back. I don't think the dizziness is coming from the medications. She can try cutting the propafenone back to twice a day and see if this helps.

## 2020-02-04 NOTE — TELEPHONE ENCOUNTER
Called left message per Miguelina, she didn't think the dizziness is coming from the medications.  She can try cut propafenone back to twice a day to see if this helps

## 2020-02-05 ENCOUNTER — READMISSION MANAGEMENT (OUTPATIENT)
Dept: CALL CENTER | Facility: HOSPITAL | Age: 75
End: 2020-02-05

## 2020-02-05 NOTE — OUTREACH NOTE
Medical Week 2 Survey      Responses   Facility patient discharged from?  [place]   Does the patient have one of the following disease processes/diagnoses(primary or secondary)?  Other   Week 2 attempt successful?  Yes   Call start time  1528   Discharge diagnosis  Vertigo    Call end time  1534   Meds reviewed with patient/caregiver?  Yes   Is the patient taking all medications as directed (includes completed medication regime)?  Yes   Comments regarding appointments  Appt with ENT 2/3/20   Comments regarding PCP  Encouraged pt to make appt with PCP    Has the patient kept scheduled appointments due by today?  No   What is preventing the patient from keeping their appointments?  -- [Pt was too sick to go to PCP appt. ]   Nursing Interventions  Educated on importance of keeping appointment   What is the Home health agency?   [facility]    Has home health visited the patient within 72 hours of discharge?  Yes   Home health comments  Pt reports home health called her, but she has never called them back   Psychosocial issues?  No   What is the patient's perception of their health status since discharge?  Improving [Pt reports she's still weak/tired, off balance, and has lost the hearing in the left ear. ]   If the patient is a current smoker, are they able to teach back resources for cessation?  -- [Pt is a nonsmoker]   Week 2 Call Completed?  Yes          Priya Iraheta RN

## 2020-02-06 NOTE — PROGRESS NOTES
General Surgery  Established Patient Office Visit    CC: Abdominal distension    HPI: The patient is a pleasant 74 y.o. year-old lady who presents today for recheck given a recent episode of some left upper quadrant abdominal pain with associated distention that occurred from Thursday to Saturday of last week.  She had no nausea or vomiting during the symptoms, which remained constant for about 48 hours before subsiding on their own.  She had no fevers, chills, or change in her bowel habits.  She reports having normal bowel movements during that time.  She was unsure if maybe she had a recurrent bowel obstruction so her children encouraged her to come seek my advice.    Past Medical History:   History of diverticulitis  Asthma  History of multidrug-resistant bacterial infections of the skin and soft tissues  Psoriasis  Paroxysmal atrial fibrillation (occurred after acute episode of diverticulitis requiring sigmoid colectomy and colostomy)  History of small bowel obstructions requiring exploratory laparotomy with lysis of adhesions  History of C. difficile colitis treated medically     Past Surgical History:   Exploratory laparotomy with lysis of adhesions (Sept 2019 by me)  Open sigmoid colectomy with colostomy (May 2017, Dr. Delgado)  Open colostomy takedown (September 2017, Dr. Delgado)  Hysterectomy  Tonsillectomy  Colonoscopy (August 2019, Dr. Steven Vela)  Cholecystectomy  Blepharoplasty  Cataract extraction    Medications:   Tylenol as needed for arthritis pain  Atorvastatin 20 mg daily  Vitamin D3 1000 units daily  Ativan 0.5 mg every 8 hours as needed for anxiety  Meclizine 25 mg 3 times daily as needed for dizziness  Eliquis 5 mg every 12 hours  Metoprolol 50 mg daily  Rythmol 150 mg twice daily    Allergies: Penicillins (oral blisters), epinephrine (received too much and had heart palpitations)    Family History: Mother with colon cancer, son with ulcerative colitis    Social History:  and cares for  her disabled , occasional alcohol use, former smoker but quit in 1967    ROS:   Constitutional: Negative for fevers or chills  HENT: Negative for hearing loss or runny nose  Eyes: Negative for vision changes or scleral icterus  Respiratory: Negative for cough or shortness of breath  Cardiovascular: Negative for chest pain or heart palpitations  Gastrointestinal: Positive for abdominal distention; negative for abdominal pain, nausea, vomiting, constipation, melena, or hematochezia  Genitourinary: Negative for hematuria or dysuria  Musculoskeletal: Negative for joint swelling or gait instability  Neurologic: Negative for tremors or seizures  Psychiatric: Negative for suicidal ideations or depression  All other systems reviewed and negative    Physical Exam:  Vitals:    01/17/20 0927   Pulse: 80   SpO2: 95%     Height: 162 cm  Weight: 83.2 kg  BMI: 31.5  General: No acute distress, well-nourished & well-developed  HEAD: normocephalic, atraumatic  EYES: normal conjunctiva, sclera anicteric  EARS: grossly normal hearing  NECK: supple, no thyromegaly  CARDIOVASCULAR: regular rate and rhythm  RESPIRATORY: clear to auscultation bilaterally  GASTROINTESTINAL: soft, nontender, non-distended, no palpable hernias even at the left lateral abdominal wall former colostomy site, scars well-healed  MUSCULOSKELETAL: normal gait and station. No gross extremity abnormalities  PSYCHIATRIC: oriented x3, normal mood and affect    ASSESSMENT & PLAN  Mrs. Last is a 74-year-old lady with a history of bowel obstruction requiring exploratory laparotomy with lysis of adhesions in September of last year who had a recent episode of isolated abdominal distention within the left upper quadrant.  She has previously had a small fat-containing hernia at that location that I am unable to palpate today.  I did advise her that her symptoms sound reminiscent of potentially a partial small bowel obstruction that resolved on their own.  She is  of course at high risk for developing recurrent obstructions for the remainder of her life and she knows that when the symptoms come on she should back down to a clear liquid diet and come to the emergency room if her symptoms are no better with that intervention.  At this time, her symptoms have totally resolved and she is tolerating a regular diet and can follow-up with me as needed.    Trini Wade MD  General and Endoscopic Surgery  [facility]    [address], [address]  [place], [place], [address]  P: [phone]  F: [phone]

## 2020-02-12 ENCOUNTER — READMISSION MANAGEMENT (OUTPATIENT)
Dept: CALL CENTER | Facility: HOSPITAL | Age: 75
End: 2020-02-12

## 2020-02-12 NOTE — OUTREACH NOTE
Medical Week 3 Survey      Responses   Facility patient discharged from?  [place]   Does the patient have one of the following disease processes/diagnoses(primary or secondary)?  Other   Week 3 attempt successful?  Yes   Call start time  1539   Call end time  1543   Is patient permission given to speak with other caregiver?  Yes   List who call center can speak with  -Drew   Meds reviewed with patient/caregiver?  Yes   Is the patient having any side effects they believe may be caused by any medication additions or changes?  No   Does the patient have all medications ordered at discharge?  Yes   Is the patient taking all medications as directed (includes completed medication regime)?  Yes   Comments regarding appointments  Next appt with cardiologist 03/05/20.   Does the patient have a primary care provider?   Yes   Has the patient kept scheduled appointments due by today?  Yes   Has home health visited the patient within 72 hours of discharge?  Yes   Home health comments  Home health PT continues.   Psychosocial issues?  No   Did the patient receive a copy of their discharge instructions?  Yes   Nursing interventions  Reviewed instructions with patient   What is the patient's perception of their health status since discharge?  Improving   Is the patient/caregiver able to teach back signs and symptoms related to disease process for when to call PCP?  Yes   Is the patient/caregiver able to teach back signs and symptoms related to disease process for when to call 911?  Yes   Is the patient/caregiver able to teach back the hierarchy of who to call/visit for symptoms/problems? PCP, Specialist, Home health nurse, Urgent Care, ED, 911  Yes   Week 3 Call Completed?  Yes   Wrap up additional comments  States continues with home health PT-states dizziness is improving. Denies complaints today.          Robyn Pearce RN

## 2020-02-21 ENCOUNTER — READMISSION MANAGEMENT (OUTPATIENT)
Dept: CALL CENTER | Facility: HOSPITAL | Age: 75
End: 2020-02-21

## 2020-02-21 NOTE — OUTREACH NOTE
Medical Week 4 Survey      Responses   Facility patient discharged from?  [place]   Does the patient have one of the following disease processes/diagnoses(primary or secondary)?  Other   Week 4 attempt successful?  Yes   Call start time  1401   Call end time  1403   Meds reviewed with patient/caregiver?  Yes   Is the patient taking all medications as directed (includes completed medication regime)?  Yes   Has the patient kept scheduled appointments due by today?  Yes   Comments  Sees ENT and Cardiology next week   Is the patient still receiving Home Health Services?  Yes   Comments  still has vertigo,  will release her next week, Vertigo clinic will start after that with PT.    What is the patient's perception of their health status since discharge?  Improving   Week 4 Call Completed?  Yes   Would the patient like one additional call?  No   Graduated  Yes   Did the patient feel the follow up calls were helpful during their recovery period?  Yes   Was the number of calls appropriate?  Yes   Wrap up additional comments  She states will start Vertigo Clinic for PT after HH released her next week, is improving.           Huong Perry RN

## 2020-02-25 ENCOUNTER — OFFICE VISIT (OUTPATIENT)
Dept: CARDIOLOGY | Facility: CLINIC | Age: 75
End: 2020-02-25

## 2020-02-25 ENCOUNTER — TELEPHONE (OUTPATIENT)
Dept: CARDIOLOGY | Facility: CLINIC | Age: 75
End: 2020-02-25

## 2020-02-25 VITALS
SYSTOLIC BLOOD PRESSURE: 160 MMHG | BODY MASS INDEX: 31.24 KG/M2 | HEIGHT: 64 IN | DIASTOLIC BLOOD PRESSURE: 70 MMHG | WEIGHT: 183 LBS | HEART RATE: 63 BPM

## 2020-02-25 DIAGNOSIS — I10 ESSENTIAL HYPERTENSION: ICD-10-CM

## 2020-02-25 DIAGNOSIS — I48.0 PAROXYSMAL ATRIAL FIBRILLATION (HCC): Primary | ICD-10-CM

## 2020-02-25 PROBLEM — R11.2 NAUSEA & VOMITING: Status: RESOLVED | Noted: 2020-01-23 | Resolved: 2020-02-25

## 2020-02-25 PROBLEM — K56.7 ILEUS: Status: RESOLVED | Noted: 2017-05-26 | Resolved: 2020-02-25

## 2020-02-25 PROCEDURE — 93000 ELECTROCARDIOGRAM COMPLETE: CPT | Performed by: INTERNAL MEDICINE

## 2020-02-25 PROCEDURE — 99214 OFFICE O/P EST MOD 30 MIN: CPT | Performed by: INTERNAL MEDICINE

## 2020-02-25 RX ORDER — METOPROLOL SUCCINATE 25 MG/1
25 TABLET, EXTENDED RELEASE ORAL DAILY
Qty: 90 TABLET | Refills: 3 | Status: SHIPPED | OUTPATIENT
Start: 2020-02-25 | End: 2020-03-26

## 2020-02-25 RX ORDER — PROPAFENONE HYDROCHLORIDE 150 MG/1
150 TABLET, COATED ORAL 2 TIMES DAILY
COMMUNITY
End: 2020-02-25

## 2020-02-25 RX ORDER — LOSARTAN POTASSIUM 50 MG/1
50 TABLET ORAL DAILY
Qty: 90 TABLET | Refills: 3 | Status: SHIPPED | OUTPATIENT
Start: 2020-02-25 | End: 2020-06-17

## 2020-02-25 NOTE — PROGRESS NOTES
Date of Office Visit: 20  Encounter Provider: Rich Kearns MD  Place of Service: [facility] CARDIOLOGY  Patient Name: Kiley Last  :1945  [id number]    Chief Complaint   Patient presents with   • Atrial Fibrillation   :     HPI: Kiley Last is a 74 y.o. female she has a history of paroxysmal A. fib she is had an recent stress and echo that are both normal she went into A. fib in the hospital when she had bad vertigo and we put her on Rythmol to help control it as well as increase her metoprolol.  She is also been on Eliquis.  She comes in today and she is super tired and she does not feel like she has any A. fib but she just really dragon.  No PND orthopnea edema no bleeding no other problems    Past Medical History:   Diagnosis Date   • Arthritis    • Asthma     NO INHALERS   • Atrial fibrillation (CMS/[facility])    • Clostridium difficile infection 2019   • Colon polyps 2019    Transverse colon: tubular adenoma, descending colon: fragments of tubular adenoma, sigmoid colon: ischemic eroded hyperplastic polyp   • COPD (chronic obstructive pulmonary disease) (CMS/HCC)    • Diverticulitis    • Diverticulosis    • ESBL (extended spectrum beta-lactamase) producing bacteria infection    • History of abscess of skin and subcutaneous tissue     ABD WOUND 2017   • History of atrial fibrillation      X1 POST OP FROM COLOSTOMY 2017 - NO PROBLEMS SINCE   • Hypertension    • MDRO (multiple drug resistant organisms) resistance    • PONV (postoperative nausea and vomiting)    • Psoriasis    • UTI (urinary tract infection)    • Vertigo        Past Surgical History:   Procedure Laterality Date   • BELPHAROPTOSIS REPAIR Bilateral 2017    Bilateral brow ptosis repair via the anterior hairline approach under monitored local anesthesia-Andrez Craven   • BLEPHAROPLASTY Bilateral 2017   • CATARACT EXTRACTION     • COLON RESECTION N/A 5/3/2017     Procedure: OPEN SIMOID COLECTOMY WITH COLOSTOMY;  Surgeon: Renato Delgado MD;  Location: [facility] MAIN OR;  Service:    • COLONOSCOPY N/A 9/13/2017    Procedure: COLONOSCOPY VIA COLOSTOMY TO CECUM;  Surgeon: Renato Delgado MD;  [facility];  Service:    • COLONOSCOPY N/A 8/1/2019    Procedure: COLONOSCOPY to cecum and TI with biopsy / hot snare polypectomies;  Surgeon: Dalton Vela Jr., MD;  [facility];  Service: General   • COLONOSCOPY N/A 01/04/2010    Andrez Trinidad   • COLOSTOMY CLOSURE N/A 9/14/2017    Procedure: COLOSTOMY TAKEDOWN AND CLOSURE, WITH RESECTION;  Surgeon: Renato Delgado MD;  Location: [facility] MAIN OR;  Service:    • ENDOSCOPY AND COLONOSCOPY N/A 10/31/2014    Normal EGD and colonoscopy, repeat c-scope in 5 years-Andrez Trinidad   • EXPLORATORY LAPAROTOMY N/A 9/8/2019    Procedure: Exploratory laparotomy with lysis of adhesions;  Surgeon: Trini Wade MD;  Location: [facility] MAIN OR;  Service: General   • FINGER SURGERY Left     4TH FINGER LEFT HAND   • HYSTERECTOMY Bilateral    • LAPAROSCOPIC CHOLECYSTECTOMY W/ CHOLANGIOGRAPHY N/A 07/15/2003    Dr. Amrit Martinez, [facility]   • SALPINGO OOPHORECTOMY Bilateral 02/02/2006    Abdominal sacral colpopexy, bilateral salpingo-oophorectomy, tension free vaginal tape, cystoscopy-Dr. Devika Bradley, [facility]   • THORACENTESIS Right 05/21/2017    US-guided right thoracentesis-Dr. Juan Yuen, [facility]   • TONSILLECTOMY Bilateral    • UPPER GASTROINTESTINAL ENDOSCOPY N/A 10/08/2007    Esophageal mucosal changes suspicious for short-segment Olivera's esphagus, gastric mucosal abnormality characterized by erythema: biopsied, NERD disease present, high likelihood of gastritis-Dr. Mayank Knapp, [facility]       Social History     Socioeconomic History   • Marital status:      Spouse name: Not on file   • Number of children: Not on file   • Years of education: Not on file   • Highest education level: Not on  file   Tobacco Use   • Smoking status: Former Smoker     Types: Cigarettes     Last attempt to quit: 1967     Years since quittin.1   • Smokeless tobacco: Never Used   • Tobacco comment: SOCIAL SMOKING ONLY   Substance and Sexual Activity   • Alcohol use: Yes     Alcohol/week: 2.0 standard drinks     Types: 1 Glasses of wine, 1 Shots of liquor per week     Comment: occasionally/caffeine use    • Drug use: No   • Sexual activity: Defer       Family History   Problem Relation Age of Onset   • Cancer Mother    • Colon cancer Mother    • Diabetes Father    • Diabetes Son    • Ulcerative colitis Son    • No Known Problems Maternal Grandmother    • No Known Problems Maternal Grandfather    • No Known Problems Paternal Grandmother    • No Known Problems Paternal Grandfather    • Malig Hyperthermia Neg Hx        Review of Systems   Constitution: Negative for decreased appetite, fever, malaise/fatigue and weight loss.   HENT: Negative for nosebleeds.    Eyes: Negative for double vision.   Cardiovascular: Negative for chest pain, claudication, cyanosis, dyspnea on exertion, irregular heartbeat, leg swelling, near-syncope, orthopnea, palpitations, paroxysmal nocturnal dyspnea and syncope.   Respiratory: Negative for cough, hemoptysis and shortness of breath.    Hematologic/Lymphatic: Negative for bleeding problem.   Skin: Negative for rash.   Musculoskeletal: Negative for falls and myalgias.   Gastrointestinal: Negative for hematochezia, jaundice, melena, nausea and vomiting.   Genitourinary: Negative for hematuria.   Neurological: Negative for dizziness and seizures.   Psychiatric/Behavioral: Negative for altered mental status and memory loss.       Allergies   Allergen Reactions   • Epinephrine Palpitations   • Penicillins Other (See Comments)     BLISTERS IN MOUTH    Patient tolerated ceftriaxone during 2017 admission.            Current Outpatient Medications:   •  Acetaminophen (TYLENOL ARTHRITIS PAIN PO), Take 650  "mg by mouth 3 (Three) Times a Day As Needed., Disp: , Rfl:   •  apixaban (ELIQUIS) 5 MG tablet tablet, Take 1 tablet by mouth Every 12 (Twelve) Hours., Disp: 60 tablet, Rfl: 0  •  atorvastatin (LIPITOR) 20 MG tablet, Take 20 mg by mouth Daily., Disp: , Rfl:   •  cholecalciferol (VITAMIN D3) 1000 units tablet, Take 1,000 Units by mouth Daily., Disp: , Rfl:   •  LORazepam (ATIVAN) 0.5 MG tablet, Take 0.5 mg by mouth Every 8 (Eight) Hours As Needed for Anxiety., Disp: , Rfl:   •  meclizine (ANTIVERT) 25 MG tablet, Take 25 mg by mouth 3 (Three) Times a Day As Needed for dizziness., Disp: , Rfl:   •  metoprolol succinate XL (TOPROL-XL) 25 MG 24 hr tablet, Take 1 tablet by mouth Daily for 30 days., Disp: 90 tablet, Rfl: 3  •  losartan (COZAAR) 50 MG tablet, Take 1 tablet by mouth Daily., Disp: 90 tablet, Rfl: 3  •  propafenone (RYTHMOL) 150 MG tablet, Take 1 tablet by mouth Every 8 (Eight) Hours for 30 days., Disp: 90 tablet, Rfl: 0      Objective:     Vitals:    02/25/20 1109   BP: 160/70   Pulse: 63   Weight: 83 kg (183 lb)   Height: 162.6 cm (64\")     Body mass index is 31.41 kg/m².    Physical Exam   Constitutional: She is oriented to person, place, and time. She appears well-developed and well-nourished.   HENT:   Head: Normocephalic.   Eyes: No scleral icterus.   Neck: No JVD present. No thyromegaly present.   Cardiovascular: Normal rate, regular rhythm and normal heart sounds. Exam reveals no gallop and no friction rub.   No murmur heard.  Pulmonary/Chest: Effort normal and breath sounds normal. She has no wheezes. She has no rales.   Abdominal: Soft. There is no hepatosplenomegaly. There is no tenderness.   Musculoskeletal: Normal range of motion. She exhibits no edema.   Lymphadenopathy:     She has no cervical adenopathy.   Neurological: She is alert and oriented to person, place, and time.   Skin: Skin is warm and dry. No rash noted.   Psychiatric: She has a normal mood and affect.         ECG 12 " Lead  Date/Time: 2/25/2020 11:34 AM  Performed by: Rich Kearns MD  Authorized by: Rich Kearns MD   Comparison: compared with previous ECG   Similar to previous ECG  Rhythm: sinus rhythm    Clinical impression: normal ECG             Assessment:       Diagnosis Plan   1. Paroxysmal atrial fibrillation (CMS/HCC)     2. Essential hypertension            Plan:       Any so I think she is probably feeling poorly from her medications.  I am going to stop her Rythmol.  And when I decreased the metoprolol to 25 a day and I am going to start her on a little bit of losartan because her blood pressure is high.  I think there is a high likelihood she will go back and start having more A. fib and if that does happen we will add flecainide at 50 twice a day initially and see if he can control it that can be done as an outpatient check an ECG in 48 hours she has a normal stress test.  I would like her to come back and see Miguelina who she seen before in about 6 weeks with a blood pressure check and will probably get a Holter at that time before she comes back to see if she is having A. fib    As always, it has been a pleasure to participate in your patient's care.      Sincerely,       Rich Kearns MD

## 2020-02-25 NOTE — TELEPHONE ENCOUNTER
Patient called states Metoprolol succ you have written on her paper 25mg twice a day. Looks like you sent in prescription for 25mg once daily

## 2020-02-25 NOTE — TELEPHONE ENCOUNTER
Called patient regarding her holter results. She came to the office today to see Dr. Kearns. He stopped her rhythmol and told her to take the metoprolol once a day. I clarified this for her. She had no afib on the holter.

## 2020-03-09 ENCOUNTER — TELEPHONE (OUTPATIENT)
Dept: CARDIOLOGY | Facility: CLINIC | Age: 75
End: 2020-03-09

## 2020-03-09 DIAGNOSIS — R53.82 CHRONIC FATIGUE: Primary | ICD-10-CM

## 2020-03-09 NOTE — TELEPHONE ENCOUNTER
Called patient about her Holter results.  Relatively benign and there was a brief episode of atrial tachycardia her fastest heart rate was 116.  Much improved since the Ariza ZIO patch she wore in January.  She states she has some times where she has palpitations I told her if she has a day where it is happening very frequently she can take an extra metoprolol and see if this helps.  She also continues to have significant daytime fatigue.  I am getting get her set up for sleep study as she has had a negative thyroid exam and cardiac wise is relatively stable.

## 2020-03-25 ENCOUNTER — TELEPHONE (OUTPATIENT)
Dept: CARDIOLOGY | Facility: CLINIC | Age: 75
End: 2020-03-25

## 2020-03-25 NOTE — TELEPHONE ENCOUNTER
Called patient and offered her telehealth call in April 7.  She is going to do a video appointment with me

## 2020-04-02 ENCOUNTER — APPOINTMENT (OUTPATIENT)
Dept: SLEEP MEDICINE | Facility: HOSPITAL | Age: 75
End: 2020-04-02

## 2020-04-07 ENCOUNTER — TELEMEDICINE (OUTPATIENT)
Dept: CARDIOLOGY | Facility: CLINIC | Age: 75
End: 2020-04-07

## 2020-04-07 VITALS
HEART RATE: 96 BPM | BODY MASS INDEX: 31.76 KG/M2 | DIASTOLIC BLOOD PRESSURE: 61 MMHG | SYSTOLIC BLOOD PRESSURE: 127 MMHG | WEIGHT: 185 LBS

## 2020-04-07 DIAGNOSIS — I10 ESSENTIAL HYPERTENSION: Primary | ICD-10-CM

## 2020-04-07 DIAGNOSIS — E66.09 CLASS 1 OBESITY DUE TO EXCESS CALORIES WITHOUT SERIOUS COMORBIDITY WITH BODY MASS INDEX (BMI) OF 30.0 TO 30.9 IN ADULT: ICD-10-CM

## 2020-04-07 DIAGNOSIS — I48.0 PAROXYSMAL ATRIAL FIBRILLATION (HCC): ICD-10-CM

## 2020-04-07 PROCEDURE — 99214 OFFICE O/P EST MOD 30 MIN: CPT | Performed by: NURSE PRACTITIONER

## 2020-04-07 RX ORDER — METOPROLOL TARTRATE 50 MG/1
50 TABLET, FILM COATED ORAL ONCE
COMMUNITY
End: 2020-08-07

## 2020-04-07 NOTE — PROGRESS NOTES
Date of Office Visit: 2020  Encounter Provider: SARITHA Richmond  Place of Service: [facility] CARDIOLOGY  Patient Name: Kiley Last  :1945    Chief Complaint   Patient presents with   • Palpitations   :     HPI: Kiley Last is a 74-year-old female with a history of paroxysmal A. fib.  She has had a recent stress test and echo that were unremarkable.  She was in the hospital and she had a vertigo spell and she was placed on Rythmol to help control her rhythm as well as increase her metoprolol.  She is anticoagulated with Eliquis.  She was last in the office in February and was feeling tired but had not felt like she had had any A. fib.  We stopped her Rythmol and decreased her metoprolol and was started on a little losartan to see if that would make her feel better.  There was concerned that she may have more A. fib and she was brought back in 48 hours to check an EKG.  She did wear a Holter monitor that showed a brief episode of atrial tachycardia but no significant A. fib.  We are having a telephone call today and lieu of coronavirus epidemic.  We tried a video visit but there were issues with volume and I could not hear the patient.    Overall Kiley is doing well.  Her fatigue is a little less since cutting back on the medications.  However she still gets tired.  She is not having any chest pain and very rarely will have any palpitations.  Her blood pressure has been stable and today's reading looks normal.      Past Medical History:   Diagnosis Date   • Arthritis    • Asthma     NO INHALERS   • Atrial fibrillation (CMS/HCC)    • Clostridium difficile infection 2019   • Colon polyps 2019    Transverse colon: tubular adenoma, descending colon: fragments of tubular adenoma, sigmoid colon: ischemic eroded hyperplastic polyp   • COPD (chronic obstructive pulmonary disease) (CMS/HCC)    • Diverticulitis    • Diverticulosis    • ESBL (extended  spectrum beta-lactamase) producing bacteria infection    • History of abscess of skin and subcutaneous tissue     ABD WOUND 5/2017   • History of atrial fibrillation      X1 POST OP FROM COLOSTOMY 5/2017 - NO PROBLEMS SINCE   • Hypertension    • MDRO (multiple drug resistant organisms) resistance    • PAF (paroxysmal atrial fibrillation) (CMS/HCC)    • PONV (postoperative nausea and vomiting)    • Psoriasis    • UTI (urinary tract infection)    • Vertigo        Past Surgical History:   Procedure Laterality Date   • BELPHAROPTOSIS REPAIR Bilateral 04/27/2017    Bilateral brow ptosis repair via the anterior hairline approach under monitored local anesthesia-Andrez Craven   • BLEPHAROPLASTY Bilateral 04/27/2017   • CATARACT EXTRACTION     • COLON RESECTION N/A 5/3/2017    Procedure: OPEN SIMOID COLECTOMY WITH COLOSTOMY;  Surgeon: Renato Delgado MD;  Location: [facility] MAIN OR;  Service:    • COLONOSCOPY N/A 9/13/2017    Procedure: COLONOSCOPY VIA COLOSTOMY TO CECUM;  Surgeon: Renato Delgado MD;  Location: [facility] ENDOSCOPY;  Service:    • COLONOSCOPY N/A 8/1/2019    Procedure: COLONOSCOPY to cecum and TI with biopsy / hot snare polypectomies;  Surgeon: Dalton Vela Jr., MD;  [facility];  Service: General   • COLONOSCOPY N/A 01/04/2010    Andrez Trinidad   • COLOSTOMY CLOSURE N/A 9/14/2017    Procedure: COLOSTOMY TAKEDOWN AND CLOSURE, WITH RESECTION;  Surgeon: Renato Delgado MD;  Location: [facility] MAIN OR;  Service:    • ENDOSCOPY AND COLONOSCOPY N/A 10/31/2014    Normal EGD and colonoscopy, repeat c-scope in 5 years-Andrez Trinidad   • EXPLORATORY LAPAROTOMY N/A 9/8/2019    Procedure: Exploratory laparotomy with lysis of adhesions;  Surgeon: Trini Wade MD;  Location: [facility] OR;  Service: General   • FINGER SURGERY Left     4TH FINGER LEFT HAND   • HYSTERECTOMY Bilateral    • LAPAROSCOPIC CHOLECYSTECTOMY W/ CHOLANGIOGRAPHY N/A 07/15/2003      Amrit Martinez, [facility]   • SALPINGO OOPHORECTOMY Bilateral 2006    Abdominal sacral colpopexy, bilateral salpingo-oophorectomy, tension free vaginal tape, cystoscopy-Dr. Devika Bradley, [facility]   • THORACENTESIS Right 2017    US-guided right thoracentesis-Dr. Juan Yuen, [facility]   • TONSILLECTOMY Bilateral    • UPPER GASTROINTESTINAL ENDOSCOPY N/A 10/08/2007    Esophageal mucosal changes suspicious for short-segment Olivera's esphagus, gastric mucosal abnormality characterized by erythema: biopsied, NERD disease present, high likelihood of gastritis-Dr. Mayank Knapp, [facility]       Social History     Socioeconomic History   • Marital status:      Spouse name: Not on file   • Number of children: Not on file   • Years of education: Not on file   • Highest education level: Not on file   Tobacco Use   • Smoking status: Former Smoker     Types: Cigarettes     Last attempt to quit: 1967     Years since quittin.3   • Smokeless tobacco: Never Used   • Tobacco comment: SOCIAL SMOKING ONLY   Substance and Sexual Activity   • Alcohol use: Yes     Alcohol/week: 2.0 standard drinks     Types: 1 Glasses of wine, 1 Shots of liquor per week     Comment: occasionally/caffeine use    • Drug use: No   • Sexual activity: Defer       Family History   Problem Relation Age of Onset   • Cancer Mother    • Colon cancer Mother    • Diabetes Father    • Diabetes Son    • Ulcerative colitis Son    • No Known Problems Maternal Grandmother    • No Known Problems Maternal Grandfather    • No Known Problems Paternal Grandmother    • No Known Problems Paternal Grandfather    • Malig Hyperthermia Neg Hx        Review of Systems   Constitution: Negative for diaphoresis and malaise/fatigue.        Fatigue   Cardiovascular: Negative for chest pain, claudication, dyspnea on exertion, irregular heartbeat, leg swelling, near-syncope, orthopnea, palpitations, paroxysmal nocturnal dyspnea and syncope.   Respiratory: Negative for cough, shortness  of breath and sleep disturbances due to breathing.    Musculoskeletal: Negative for falls.   Neurological: Negative for dizziness and weakness.   Psychiatric/Behavioral: Negative for altered mental status and substance abuse.       Allergies   Allergen Reactions   • Epinephrine Palpitations   • Penicillins Other (See Comments)     BLISTERS IN MOUTH    Patient tolerated ceftriaxone during 5/2017 admission.            Current Outpatient Medications:   •  Acetaminophen (TYLENOL ARTHRITIS PAIN PO), Take 650 mg by mouth 3 (Three) Times a Day As Needed., Disp: , Rfl:   •  apixaban (ELIQUIS) 5 MG tablet tablet, Take 1 tablet by mouth Every 12 (Twelve) Hours., Disp: 60 tablet, Rfl: 0  •  atorvastatin (LIPITOR) 20 MG tablet, Take 20 mg by mouth Daily., Disp: , Rfl:   •  BIOTIN PO, Take  by mouth., Disp: , Rfl:   •  cholecalciferol (VITAMIN D3) 1000 units tablet, Take 1,000 Units by mouth Daily., Disp: , Rfl:   •  LORazepam (ATIVAN) 0.5 MG tablet, Take 0.5 mg by mouth Every 8 (Eight) Hours As Needed for Anxiety., Disp: , Rfl:   •  losartan (COZAAR) 50 MG tablet, Take 1 tablet by mouth Daily., Disp: 90 tablet, Rfl: 3  •  meclizine (ANTIVERT) 25 MG tablet, Take 25 mg by mouth 3 (Three) Times a Day As Needed for dizziness., Disp: , Rfl:   •  metoprolol tartrate (LOPRESSOR) 50 MG tablet, Take 50 mg by mouth 2 (Two) Times a Day., Disp: , Rfl:       Objective:     Vitals:    04/07/20 1100   BP: 127/61   BP Location: Left arm   Pulse: 96   Weight: 83.9 kg (185 lb)     Body mass index is 31.76 kg/m².    PHYSICAL EXAM:    Physical Exam    Procedures      Assessment:       Diagnosis Plan   1. Essential hypertension     2. Paroxysmal atrial fibrillation (CMS/HCC)     3. Class 1 obesity due to excess calories without serious comorbidity with body mass index (BMI) of 30.0 to 30.9 in adult       No orders of the defined types were placed in this encounter.         Plan:       I am okay to make any changes to her medicines today.  We talked  about her concerns about the coronavirus pandemic.  I think overall she is doing well and doing better.  She did not get a sleep study it was canceled because of the restrictions on appointments and testing right now.  However once things reopen she is going to get that testing done.  And follow-up in the clinic with us in a couple months.    This patient has consented to a telehealth visit via telephone. The visit was scheduled as a telephone visit to comply with patient safety concerns in accordance with CDC recommendations.  All vitals recorded within this visit are reported by the patient.  I spent 20 minutes in total including but not limited to the 15 minutes spent in direct conversation with this patient.          Your medication list           Accurate as of April 7, 2020 11:33 AM. If you have any questions, ask your nurse or doctor.               CONTINUE taking these medications      Instructions Last Dose Given Next Dose Due   apixaban 5 MG tablet tablet  Commonly known as:  ELIQUIS      Take 1 tablet by mouth Every 12 (Twelve) Hours.       atorvastatin 20 MG tablet  Commonly known as:  LIPITOR      Take 20 mg by mouth Daily.       BIOTIN PO      Take  by mouth.       cholecalciferol 25 MCG (1000 UT) tablet  Commonly known as:  VITAMIN D3      Take 1,000 Units by mouth Daily.       LORazepam 0.5 MG tablet  Commonly known as:  ATIVAN      Take 0.5 mg by mouth Every 8 (Eight) Hours As Needed for Anxiety.       losartan 50 MG tablet  Commonly known as:  COZAAR      Take 1 tablet by mouth Daily.       meclizine 25 MG tablet  Commonly known as:  ANTIVERT      Take 25 mg by mouth 3 (Three) Times a Day As Needed for dizziness.       metoprolol tartrate 50 MG tablet  Commonly known as:  LOPRESSOR      Take 50 mg by mouth 2 (Two) Times a Day.       TYLENOL ARTHRITIS PAIN PO      Take 650 mg by mouth 3 (Three) Times a Day As Needed.                As always, it has been a pleasure to participate in your patient's  care.      Sincerely,     Miguelina MELARA

## 2020-06-17 ENCOUNTER — OFFICE VISIT (OUTPATIENT)
Dept: CARDIOLOGY | Facility: CLINIC | Age: 75
End: 2020-06-17

## 2020-06-17 VITALS
WEIGHT: 184.8 LBS | HEART RATE: 69 BPM | DIASTOLIC BLOOD PRESSURE: 72 MMHG | HEIGHT: 64 IN | BODY MASS INDEX: 31.55 KG/M2 | SYSTOLIC BLOOD PRESSURE: 154 MMHG | OXYGEN SATURATION: 98 % | RESPIRATION RATE: 18 BRPM

## 2020-06-17 DIAGNOSIS — I10 ESSENTIAL HYPERTENSION: ICD-10-CM

## 2020-06-17 DIAGNOSIS — I48.0 PAROXYSMAL ATRIAL FIBRILLATION (HCC): Primary | ICD-10-CM

## 2020-06-17 PROCEDURE — 99214 OFFICE O/P EST MOD 30 MIN: CPT | Performed by: INTERNAL MEDICINE

## 2020-06-17 PROCEDURE — 93000 ELECTROCARDIOGRAM COMPLETE: CPT | Performed by: INTERNAL MEDICINE

## 2020-06-17 RX ORDER — LOSARTAN POTASSIUM 100 MG/1
100 TABLET ORAL DAILY
Qty: 90 TABLET | Refills: 3 | Status: SHIPPED | OUTPATIENT
Start: 2020-06-17 | End: 2020-07-24 | Stop reason: SDUPTHER

## 2020-06-17 RX ORDER — PHENOL 1.4 %
AEROSOL, SPRAY (ML) MUCOUS MEMBRANE
COMMUNITY
End: 2020-07-21

## 2020-06-17 RX ORDER — OXYCODONE AND ACETAMINOPHEN 7.5; 325 MG/1; MG/1
1 TABLET ORAL AS NEEDED
COMMUNITY
End: 2021-04-14 | Stop reason: ALTCHOICE

## 2020-06-17 NOTE — PROGRESS NOTES
Date of Office Visit: 20  Encounter Provider: Rich Kearns MD  Place of Service: [facility] CARDIOLOGY  Patient Name: Kiley Last  :[id number]  [id number]    Chief Complaint   Patient presents with   • Atrial Fibrillation   :     HPI: Kiley Last is a 74 y.o. female she has a history of paroxysmal A. fib she is had an recent stress and echo that are both normal she went into A. fib in the hospital when she had bad vertigo and we put her on Rythmol to help control it as well as increase her metoprolol.  She is also been on Eliquis.  She did not feel good so the last time she was here we stopped her Propafanone and metoprolol and now she is here for follow-up.    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  She says her blood pressures been a little bit high she is having A. fib she thinks at least every other day if not every day.  Coming off the Propafanone and decreasing the metoprolol did help with the fatigue but she really just needs a break from caregiving to be honest but she cannot get 1.  She is not had chest pain syncope.    Past Medical History:   Diagnosis Date   • Arthritis    • Asthma     NO INHALERS   • Atrial fibrillation (CMS/HCC)    • Clostridium difficile infection 2019   • Colon polyps 2019    Transverse colon: tubular adenoma, descending colon: fragments of tubular adenoma, sigmoid colon: ischemic eroded hyperplastic polyp   • COPD (chronic obstructive pulmonary disease) (CMS/HCC)    • Diverticulitis    • Diverticulosis    • ESBL (extended spectrum beta-lactamase) producing bacteria infection    • History of abscess of skin and subcutaneous tissue     ABD WOUND 2017   • History of atrial fibrillation      X1 POST OP FROM COLOSTOMY 2017 - NO PROBLEMS SINCE   • Hypertension    • MDRO (multiple drug resistant organisms) resistance    • PAF  (paroxysmal atrial fibrillation) (CMS/HCC)    • PONV (postoperative nausea and vomiting)    • Psoriasis    • UTI (urinary tract infection)    • Vertigo        Past Surgical History:   Procedure Laterality Date   • BELPHAROPTOSIS REPAIR Bilateral 04/27/2017    Bilateral brow ptosis repair via the anterior hairline approach under monitored local anesthesia-Andrez Craven   • BLEPHAROPLASTY Bilateral 04/27/2017   • CATARACT EXTRACTION     • COLON RESECTION N/A 5/3/2017    Procedure: OPEN SIMOID COLECTOMY WITH COLOSTOMY;  Surgeon: Renato Delgado MD;  Location: [facility] MAIN OR;  Service:    • COLONOSCOPY N/A 9/13/2017    Procedure: COLONOSCOPY VIA COLOSTOMY TO CECUM;  Surgeon: Renato Delgado MD;  [facility];  Service:    • COLONOSCOPY N/A 8/1/2019    Procedure: COLONOSCOPY to cecum and TI with biopsy / hot snare polypectomies;  Surgeon: Dalton Vela Jr., MD;  [facility];  Service: General   • COLONOSCOPY N/A 01/04/2010    Andrez Trinidad   • COLOSTOMY CLOSURE N/A 9/14/2017    Procedure: COLOSTOMY TAKEDOWN AND CLOSURE, WITH RESECTION;  Surgeon: Renato Delgado MD;  Location: [facility] MAIN OR;  Service:    • ENDOSCOPY AND COLONOSCOPY N/A 10/31/2014    Normal EGD and colonoscopy, repeat c-scope in 5 years-Andrez Trinidad   • EXPLORATORY LAPAROTOMY N/A 9/8/2019    Procedure: Exploratory laparotomy with lysis of adhesions;  Surgeon: Trini Wade MD;  Location: [facility] MAIN OR;  Service: General   • FINGER SURGERY Left     4TH FINGER LEFT HAND   • HYSTERECTOMY Bilateral    • LAPAROSCOPIC CHOLECYSTECTOMY W/ CHOLANGIOGRAPHY N/A 07/15/2003    Dr. Amrit Martinez, [facility]   • SALPINGO OOPHORECTOMY Bilateral 02/02/2006    Abdominal sacral colpopexy, bilateral salpingo-oophorectomy, tension free vaginal tape, cystoscopy-Dr. Devika Bradley, [facility]   • THORACENTESIS Right 05/21/2017    US-guided right thoracentesis-Dr. Juan Yuen, [facility]   • TONSILLECTOMY Bilateral     • UPPER GASTROINTESTINAL ENDOSCOPY N/A 10/08/2007    Esophageal mucosal changes suspicious for short-segment Olivera's esphagus, gastric mucosal abnormality characterized by erythema: biopsied, NERD disease present, high likelihood of gastritis-Dr. Mayank Knapp, [facility]       Social History     Socioeconomic History   • Marital status:      Spouse name: Not on file   • Number of children: Not on file   • Years of education: Not on file   • Highest education level: Not on file   Tobacco Use   • Smoking status: Former Smoker     Types: Cigarettes     Last attempt to quit: 1967     Years since quittin.4   • Smokeless tobacco: Never Used   • Tobacco comment: SOCIAL SMOKING ONLY   Substance and Sexual Activity   • Alcohol use: Not Currently     Alcohol/week: 2.0 standard drinks     Types: 1 Glasses of wine, 1 Shots of liquor per week     Comment: occasionally/caffeine use    • Drug use: No   • Sexual activity: Defer       Family History   Problem Relation Age of Onset   • Cancer Mother    • Colon cancer Mother    • Diabetes Father    • Diabetes Son    • Ulcerative colitis Son    • No Known Problems Maternal Grandmother    • No Known Problems Maternal Grandfather    • No Known Problems Paternal Grandmother    • No Known Problems Paternal Grandfather    • Malig Hyperthermia Neg Hx        Review of Systems   Constitution: Negative for decreased appetite, fever, malaise/fatigue and weight loss.   HENT: Negative for nosebleeds.    Eyes: Negative for double vision.   Cardiovascular: Negative for chest pain, claudication, cyanosis, dyspnea on exertion, irregular heartbeat, leg swelling, near-syncope, orthopnea, palpitations, paroxysmal nocturnal dyspnea and syncope.   Respiratory: Negative for cough, hemoptysis and shortness of breath.    Hematologic/Lymphatic: Negative for bleeding problem.   Skin: Negative for rash.   Musculoskeletal: Negative for falls and myalgias.   Gastrointestinal: Negative for hematochezia,  jaundice, melena, nausea and vomiting.   Genitourinary: Negative for hematuria.   Neurological: Negative for dizziness and seizures.   Psychiatric/Behavioral: Negative for altered mental status and memory loss.       Allergies   Allergen Reactions   • Epinephrine Palpitations   • Penicillins Other (See Comments)     BLISTERS IN MOUTH    Patient tolerated ceftriaxone during 5/2017 admission.            Current Outpatient Medications:   •  Acetaminophen (TYLENOL ARTHRITIS PAIN PO), Take 650 mg by mouth 3 (Three) Times a Day As Needed., Disp: , Rfl:   •  apixaban (ELIQUIS) 5 MG tablet tablet, Take 1 tablet by mouth Every 12 (Twelve) Hours., Disp: 60 tablet, Rfl: 0  •  atorvastatin (LIPITOR) 20 MG tablet, Take 20 mg by mouth Daily., Disp: , Rfl:   •  cholecalciferol (VITAMIN D3) 1000 units tablet, Take 1,000 Units by mouth Daily., Disp: , Rfl:   •  Cyanocobalamin (VITAMIN B-12 CR PO), Take  by mouth., Disp: , Rfl:   •  LORazepam (ATIVAN) 0.5 MG tablet, Take 0.5 mg by mouth Every 8 (Eight) Hours As Needed for Anxiety., Disp: , Rfl:   •  losartan (COZAAR) 50 MG tablet, Take 1 tablet by mouth Daily., Disp: 90 tablet, Rfl: 3  •  meclizine (ANTIVERT) 25 MG tablet, Take 25 mg by mouth 3 (Three) Times a Day As Needed for dizziness., Disp: , Rfl:   •  Melatonin 10 MG tablet, Take  by mouth., Disp: , Rfl:   •  metoprolol tartrate (LOPRESSOR) 50 MG tablet, Take 25 mg by mouth 1 (One) Time., Disp: , Rfl:   •  oxyCODONE-acetaminophen (PERCOCET) 7.5-325 MG per tablet, Take 1 tablet by mouth As Needed., Disp: , Rfl:   •  BIOTIN PO, Take  by mouth., Disp: , Rfl:       Objective:     There were no vitals filed for this visit.  There is no height or weight on file to calculate BMI.    Physical Exam   Constitutional: She is oriented to person, place, and time. She appears well-developed and well-nourished.   HENT:   Head: Normocephalic.   Eyes: No scleral icterus.   Neck: No JVD present. No thyromegaly present.   Cardiovascular: Normal  rate, regular rhythm and normal heart sounds. Exam reveals no gallop and no friction rub.   No murmur heard.  Pulmonary/Chest: Effort normal and breath sounds normal. She has no wheezes. She has no rales.   Abdominal: Soft. There is no hepatosplenomegaly. There is no tenderness.   Musculoskeletal: Normal range of motion. She exhibits no edema.   Lymphadenopathy:     She has no cervical adenopathy.   Neurological: She is alert and oriented to person, place, and time.   Skin: Skin is warm and dry. No rash noted.   Psychiatric: She has a normal mood and affect.         ECG 12 Lead  Date/Time: 6/17/2020 1:22 PM  Performed by: Rich Kearns MD  Authorized by: Rich Kearns MD   Comparison: compared with previous ECG   Similar to previous ECG  Rhythm: sinus rhythm    Clinical impression: normal ECG             Assessment:       Diagnosis Plan   1. Paroxysmal atrial fibrillation (CMS/HCC)     2. Essential hypertension            Plan:       Okay to make a couple changes today when increase her losartan 100 mg a day getting a little bit better blood pressure control that can hopefully help diminish the A. fib over the long-term.  She is having symptomatic probably paroxysmal A. fib I have not captured it but I think she probably has I went to put her on flecainide 25 twice a day she had a normal stress test and a normal echo I think this is safe we need to have her come back and get an EKG in a week when I have her come and see Miguelina in 2 weeks and see how she is doing at that point and then I will see her back in 3 months    As always, it has been a pleasure to participate in your patient's care.      Sincerely,       Rich Kearns MD

## 2020-06-19 RX ORDER — FLECAINIDE ACETATE 50 MG/1
25 TABLET ORAL 2 TIMES DAILY
Qty: 90 TABLET | Refills: 2 | Status: SHIPPED | OUTPATIENT
Start: 2020-06-19 | End: 2020-07-10

## 2020-06-24 ENCOUNTER — CLINICAL SUPPORT (OUTPATIENT)
Dept: CARDIOLOGY | Facility: CLINIC | Age: 75
End: 2020-06-24

## 2020-06-24 VITALS — DIASTOLIC BLOOD PRESSURE: 72 MMHG | HEART RATE: 64 BPM | SYSTOLIC BLOOD PRESSURE: 120 MMHG

## 2020-06-24 DIAGNOSIS — I48.0 PAROXYSMAL ATRIAL FIBRILLATION (HCC): Primary | ICD-10-CM

## 2020-06-24 PROCEDURE — 93000 ELECTROCARDIOGRAM COMPLETE: CPT | Performed by: NURSE PRACTITIONER

## 2020-06-24 NOTE — PROGRESS NOTES
Procedure     ECG 12 Lead  Date/Time: 6/24/2020 10:18 AM  Performed by: Yaneli Sierra APRN  Authorized by: Yaneli Sierra APRN   Comparison: compared with previous ECG from 6/17/2020  Similar to previous ECG  Rhythm: sinus rhythm  Rate: normal  BPM: 64    Clinical impression: normal ECG  Comments: Indication: Atrial fibrillation

## 2020-06-24 NOTE — PROGRESS NOTES
Pt present to the office for EKG. Pt reports she feels better since the initiation of Losartan 100 mg qd and Flecainide 25 mg bid.  BP today 120/72 hr 64    Verified all other medications:    Current Outpatient Medications:   •  Acetaminophen (TYLENOL ARTHRITIS PAIN PO), Take 650 mg by mouth 3 (Three) Times a Day As Needed., Disp: , Rfl:   •  apixaban (ELIQUIS) 5 MG tablet tablet, Take 1 tablet by mouth Every 12 (Twelve) Hours., Disp: 60 tablet, Rfl: 0  •  atorvastatin (LIPITOR) 20 MG tablet, Take 20 mg by mouth Daily., Disp: , Rfl:   •  BIOTIN PO, Take  by mouth., Disp: , Rfl:   •  cholecalciferol (VITAMIN D3) 1000 units tablet, Take 1,000 Units by mouth Daily., Disp: , Rfl:   •  Cyanocobalamin (VITAMIN B-12 CR PO), Take  by mouth., Disp: , Rfl:   •  flecainide (TAMBOCOR) 50 MG tablet, Take 0.5 tablets by mouth 2 (two) times a day., Disp: 90 tablet, Rfl: 2  •  LORazepam (ATIVAN) 0.5 MG tablet, Take 0.5 mg by mouth Every 8 (Eight) Hours As Needed for Anxiety., Disp: , Rfl:   •  losartan (COZAAR) 100 MG tablet, Take 1 tablet by mouth Daily., Disp: 90 tablet, Rfl: 3  •  meclizine (ANTIVERT) 25 MG tablet, Take 25 mg by mouth 3 (Three) Times a Day As Needed for dizziness., Disp: , Rfl:   •  Melatonin 10 MG tablet, Take  by mouth., Disp: , Rfl:   •  metoprolol tartrate (LOPRESSOR) 50 MG tablet, Take 25 mg by mouth 1 (One) Time., Disp: , Rfl:   •  oxyCODONE-acetaminophen (PERCOCET) 7.5-325 MG per tablet, Take 1 tablet by mouth As Needed., Disp: , Rfl:     Presented EKG to SARITHA Hinkle for review.  No changes recommended at this time and pt reminded about upcoming appt with Miguelina on 06/30/20.  Pt released from the office, no further questions.

## 2020-07-07 ENCOUNTER — TELEPHONE (OUTPATIENT)
Dept: CARDIOLOGY | Facility: CLINIC | Age: 75
End: 2020-07-07

## 2020-07-07 NOTE — TELEPHONE ENCOUNTER
See below. Can you call and get her setup to see Miguelina this Friday, when you get a chance.    Thanks,  Trini

## 2020-07-07 NOTE — TELEPHONE ENCOUNTER
Pt called. She said that she would like to speak with you specifically. She said she is continuing to have Afib, including at her visit with her PCP Dr. Latif. She feels like she is constantly needing to take a deep breath and not sure if this is just anxiety. She also said she is more tired lately and achy and is not sure if it is due to her starting the Losartan 100mg qd and the Flecainide 25mg bid. And would like to know if she should change her meds.  She can be reached at #[phone].  Please advise.    Thanks,  Trini

## 2020-07-10 ENCOUNTER — OFFICE VISIT (OUTPATIENT)
Dept: CARDIOLOGY | Facility: CLINIC | Age: 75
End: 2020-07-10

## 2020-07-10 VITALS
DIASTOLIC BLOOD PRESSURE: 68 MMHG | HEART RATE: 85 BPM | HEIGHT: 65 IN | SYSTOLIC BLOOD PRESSURE: 124 MMHG | WEIGHT: 184.4 LBS | BODY MASS INDEX: 30.72 KG/M2 | OXYGEN SATURATION: 99 %

## 2020-07-10 DIAGNOSIS — R00.2 AWARENESS OF HEARTBEATS: ICD-10-CM

## 2020-07-10 DIAGNOSIS — I10 ESSENTIAL HYPERTENSION: ICD-10-CM

## 2020-07-10 DIAGNOSIS — I48.0 PAROXYSMAL ATRIAL FIBRILLATION (HCC): ICD-10-CM

## 2020-07-10 DIAGNOSIS — I47.1 ATRIAL TACHYCARDIA (HCC): ICD-10-CM

## 2020-07-10 DIAGNOSIS — E66.09 CLASS 1 OBESITY DUE TO EXCESS CALORIES WITHOUT SERIOUS COMORBIDITY WITH BODY MASS INDEX (BMI) OF 30.0 TO 30.9 IN ADULT: ICD-10-CM

## 2020-07-10 DIAGNOSIS — R00.2 PALPITATIONS: Primary | ICD-10-CM

## 2020-07-10 PROBLEM — I47.19 ATRIAL TACHYCARDIA: Status: ACTIVE | Noted: 2020-07-10

## 2020-07-10 PROCEDURE — 99214 OFFICE O/P EST MOD 30 MIN: CPT | Performed by: NURSE PRACTITIONER

## 2020-07-10 PROCEDURE — 93000 ELECTROCARDIOGRAM COMPLETE: CPT | Performed by: NURSE PRACTITIONER

## 2020-07-10 RX ORDER — ROSUVASTATIN CALCIUM 5 MG/1
5 TABLET, COATED ORAL DAILY
Qty: 30 TABLET | Refills: 2 | Status: SHIPPED | OUTPATIENT
Start: 2020-07-10 | End: 2020-08-07

## 2020-07-10 RX ORDER — FLECAINIDE ACETATE 50 MG/1
50 TABLET ORAL 2 TIMES DAILY
Qty: 90 TABLET | Refills: 2 | Status: SHIPPED | OUTPATIENT
Start: 2020-07-10 | End: 2020-11-23 | Stop reason: SDUPTHER

## 2020-07-10 NOTE — PROGRESS NOTES
Date of Office Visit: 07/10/2020  Encounter Provider: SARITHA Richmond  Place of Service: [facility] CARDIOLOGY  Patient Name: Kiley Last  :1945    Chief Complaint   Patient presents with   • Atrial Fibrillation   :     HPI:Kiley Last is a 74-year-old female who is a patient of Dr. Kearns and is known to me from previous.  She has a history of paroxysmal A. fib.  In December of last year she had an echocardiogram and stress test that were normal.  She had gone into the A. fib in the hospital and we put her on Rythmol and metoprolol.  She is anticoagulated with Eliquis.  She did not feel so good and her propafenone was stopped.  She still complains of a lot of fatigue and some body aches.  We cut her metoprolol back and change her over to flecainide.  She was taking 25 mg of flecainide twice a day.  She had not had any syncope.  She has a lot of stress in her life, her  is very sick and lives in a hospital bed in their house.  She cares for him around-the-clock.  Over the last couple of months she has had a Holter monitor and a ZIO patch all of which showed atrial tachycardia and no A. fib.  But yesterday she was at her primary care doctor who assessed her and thought that she was in A. fib.      Past Medical History:   Diagnosis Date   • Arthritis    • Asthma     NO INHALERS   • Atrial fibrillation (CMS/[facility])    • Clostridium difficile infection 2019   • Colon polyps 2019    Transverse colon: tubular adenoma, descending colon: fragments of tubular adenoma, sigmoid colon: ischemic eroded hyperplastic polyp   • COPD (chronic obstructive pulmonary disease) (CMS/HCC)    • Diverticulitis    • Diverticulosis    • ESBL (extended spectrum beta-lactamase) producing bacteria infection    • History of abscess of skin and subcutaneous tissue     ABD WOUND 2017   • History of atrial fibrillation      X1 POST OP FROM COLOSTOMY 2017 - NO PROBLEMS SINCE      • Hypertension    • MDRO (multiple drug resistant organisms) resistance    • PAF (paroxysmal atrial fibrillation) (CMS/HCC)    • PONV (postoperative nausea and vomiting)    • Psoriasis    • UTI (urinary tract infection)    • Vertigo        Past Surgical History:   Procedure Laterality Date   • BELPHAROPTOSIS REPAIR Bilateral 04/27/2017    Bilateral brow ptosis repair via the anterior hairline approach under monitored local anesthesia-Andrez Craven   • BLEPHAROPLASTY Bilateral 04/27/2017   • CATARACT EXTRACTION     • COLON RESECTION N/A 5/3/2017    Procedure: OPEN SIMOID COLECTOMY WITH COLOSTOMY;  Surgeon: Renato Delgado MD;  Location: [facility] MAIN OR;  Service:    • COLONOSCOPY N/A 9/13/2017    Procedure: COLONOSCOPY VIA COLOSTOMY TO CECUM;  Surgeon: Renato Delgado MD;  [facility];  Service:    • COLONOSCOPY N/A 8/1/2019    Procedure: COLONOSCOPY to cecum and TI with biopsy / hot snare polypectomies;  Surgeon: Dalton Vela Jr., MD;  [facility];  Service: General   • COLONOSCOPY N/A 01/04/2010    Andrez Trinidad   • COLOSTOMY CLOSURE N/A 9/14/2017    Procedure: COLOSTOMY TAKEDOWN AND CLOSURE, WITH RESECTION;  Surgeon: Renato Delgado MD;  Location: [facility] MAIN OR;  Service:    • ENDOSCOPY AND COLONOSCOPY N/A 10/31/2014    Normal EGD and colonoscopy, repeat c-scope in 5 years-Andrez Trinidad   • EXPLORATORY LAPAROTOMY N/A 9/8/2019    Procedure: Exploratory laparotomy with lysis of adhesions;  Surgeon: Trini Wade MD;  Location: [facility] MAIN OR;  Service: General   • FINGER SURGERY Left     4TH FINGER LEFT HAND   • HYSTERECTOMY Bilateral    • LAPAROSCOPIC CHOLECYSTECTOMY W/ CHOLANGIOGRAPHY N/A 07/15/2003    Dr. Amrit Martinez, [facility]   • SALPINGO OOPHORECTOMY Bilateral 02/02/2006    Abdominal sacral colpopexy, bilateral salpingo-oophorectomy, tension free vaginal tape, cystoscopy-Dr. Devika Bradley, [facility]   • THORACENTESIS Right 05/21/2017     US-guided right thoracentesis-Dr. Juan Yuen, [facility]   • TONSILLECTOMY Bilateral    • UPPER GASTROINTESTINAL ENDOSCOPY N/A 10/08/2007    Esophageal mucosal changes suspicious for short-segment Olivera's esphagus, gastric mucosal abnormality characterized by erythema: biopsied, NERD disease present, high likelihood of gastritis-Dr. Mayank Knapp, [facility]       Social History     Socioeconomic History   • Marital status:      Spouse name: Not on file   • Number of children: Not on file   • Years of education: Not on file   • Highest education level: Not on file   Tobacco Use   • Smoking status: Former Smoker     Types: Cigarettes     Last attempt to quit: 1967     Years since quittin.5   • Smokeless tobacco: Never Used   • Tobacco comment: SOCIAL SMOKING ONLY   Substance and Sexual Activity   • Alcohol use: Not Currently     Alcohol/week: 2.0 standard drinks     Types: 1 Glasses of wine, 1 Shots of liquor per week     Comment: occasionally/caffeine use    • Drug use: No   • Sexual activity: Defer       Family History   Problem Relation Age of Onset   • Cancer Mother    • Colon cancer Mother    • Diabetes Father    • Diabetes Son    • Ulcerative colitis Son    • No Known Problems Maternal Grandmother    • No Known Problems Maternal Grandfather    • No Known Problems Paternal Grandmother    • No Known Problems Paternal Grandfather    • Malig Hyperthermia Neg Hx        Review of Systems   Constitution: Negative for diaphoresis and malaise/fatigue.   Cardiovascular: Positive for chest pain, dyspnea on exertion and palpitations. Negative for claudication, irregular heartbeat, leg swelling, near-syncope, orthopnea, paroxysmal nocturnal dyspnea and syncope.   Respiratory: Negative for cough, shortness of breath and sleep disturbances due to breathing.    Musculoskeletal: Negative for falls.   Neurological: Negative for dizziness and weakness.   Psychiatric/Behavioral: Negative for altered mental status and  "substance abuse. The patient is nervous/anxious.        Allergies   Allergen Reactions   • Epinephrine Palpitations   • Penicillins Other (See Comments)     BLISTERS IN MOUTH    Patient tolerated ceftriaxone during 5/2017 admission.            Current Outpatient Medications:   •  Acetaminophen (TYLENOL ARTHRITIS PAIN PO), Take 650 mg by mouth 3 (Three) Times a Day As Needed., Disp: , Rfl:   •  apixaban (ELIQUIS) 5 MG tablet tablet, Take 1 tablet by mouth Every 12 (Twelve) Hours., Disp: 60 tablet, Rfl: 0  •  Cyanocobalamin (VITAMIN B-12 CR PO), Take  by mouth., Disp: , Rfl:   •  flecainide (TAMBOCOR) 50 MG tablet, Take 1 tablet by mouth 2 (two) times a day., Disp: 90 tablet, Rfl: 2  •  losartan (COZAAR) 100 MG tablet, Take 1 tablet by mouth Daily., Disp: 90 tablet, Rfl: 3  •  meclizine (ANTIVERT) 25 MG tablet, Take 25 mg by mouth 3 (Three) Times a Day As Needed for dizziness., Disp: , Rfl:   •  Melatonin 10 MG tablet, Take  by mouth., Disp: , Rfl:   •  metoprolol tartrate (LOPRESSOR) 50 MG tablet, Take 50 mg by mouth 1 (One) Time., Disp: , Rfl:   •  oxyCODONE-acetaminophen (PERCOCET) 7.5-325 MG per tablet, Take 1 tablet by mouth As Needed., Disp: , Rfl:   •  rosuvastatin (CRESTOR) 5 MG tablet, Take 1 tablet by mouth Daily., Disp: 30 tablet, Rfl: 2      Objective:     Vitals:    07/10/20 1236   BP: 124/68   BP Location: Right arm   Patient Position: Sitting   Cuff Size: Large Adult   Pulse: 85   SpO2: 99%   Weight: 83.6 kg (184 lb 6.4 oz)   Height: 165.1 cm (65\")     Body mass index is 30.69 kg/m².    PHYSICAL EXAM:    Physical Exam   Constitutional: She is oriented to person, place, and time. She appears well-developed and well-nourished. No distress.   HENT:   Head: Normocephalic.   Eyes: Pupils are equal, round, and reactive to light. EOM are normal.   Neck: Normal range of motion. Neck supple. No JVD present. Carotid bruit is not present. No edema present.   Cardiovascular: Normal rate, regular rhythm, S1 normal, " S2 normal, normal heart sounds and intact distal pulses. Exam reveals no gallop.   No murmur heard.  Pulmonary/Chest: Effort normal and breath sounds normal. No tachypnea. She has no wheezes. She has no rales.   Abdominal: Soft. Normal appearance and bowel sounds are normal. She exhibits no ascites. There is no tenderness.   Musculoskeletal: Normal range of motion. She exhibits no edema.   Neurological: She is alert and oriented to person, place, and time.   Skin: Skin is warm and dry.   Psychiatric: She has a normal mood and affect.         ECG 12 Lead  Date/Time: 7/10/2020 3:26 PM  Performed by: Miguelina Hurst APRN  Authorized by: Miguelina Hurst APRN   Comparison: compared with previous ECG from 6/24/2020  Rhythm: sinus rhythm  Rate: normal  Other findings: non-specific ST-T wave changes    Clinical impression: non-specific ECG              Assessment:       Diagnosis Plan   1. Palpitations  ECG 12 Lead    Ambulatory Referral to Sleep Medicine   2. Essential hypertension     3. Paroxysmal atrial fibrillation (CMS/HCC)     4. Awareness of heartbeats     5. Class 1 obesity due to excess calories without serious comorbidity with body mass index (BMI) of 30.0 to 30.9 in adult     6. Atrial tachycardia (CMS/HCC)       Orders Placed This Encounter   Procedures   • Ambulatory Referral to Sleep Medicine     Referral Priority:   Routine     Referral Type:   Consultation     Referral Reason:   Specialty Services Required     Referred to Provider:   Patrick Dong MD     Requested Specialty:   Sleep Medicine     Number of Visits Requested:   1   • ECG 12 Lead     Standing Status:   Future     Standing Expiration Date:   7/10/2021     Order Specific Question:   Reason for Exam:     Answer:   afib, increased flecainide   • ECG 12 Lead     This order was created via procedure documentation          Plan:       So couple things.  1 of the to increase her flecainide to 50 mg twice a day and see if she has any  further palpitations.  She has had a couple monitors worn outpatient this year that just showed some atrial tachycardia.  The other thing is is some of the fatigue could be related to her statin she is also getting some muscle aches in her upper body.  I switch her over to Crestor as there is less myalgias with this.  She is also very fatigued and tired throughout the day she is getting get checked for sleep apnea as well.  We will have her come back to see Dr. Kearns.       Your medication list           Accurate as of July 10, 2020  3:39 PM. If you have any questions, ask your nurse or doctor.               START taking these medications      Instructions Last Dose Given Next Dose Due   rosuvastatin 5 MG tablet  Commonly known as:  CRESTOR  Started by:  SARITHA Richmond      Take 1 tablet by mouth Daily.          CHANGE how you take these medications      Instructions Last Dose Given Next Dose Due   flecainide 50 MG tablet  Commonly known as:  TAMBOCOR  What changed:  how much to take  Changed by:  SARITHA Richmond      Take 1 tablet by mouth 2 (two) times a day.          CONTINUE taking these medications      Instructions Last Dose Given Next Dose Due   apixaban 5 MG tablet tablet  Commonly known as:  ELIQUIS      Take 1 tablet by mouth Every 12 (Twelve) Hours.       losartan 100 MG tablet  Commonly known as:  COZAAR      Take 1 tablet by mouth Daily.       meclizine 25 MG tablet  Commonly known as:  ANTIVERT      Take 25 mg by mouth 3 (Three) Times a Day As Needed for dizziness.       Melatonin 10 MG tablet      Take  by mouth.       metoprolol tartrate 50 MG tablet  Commonly known as:  LOPRESSOR      Take 50 mg by mouth 1 (One) Time.       oxyCODONE-acetaminophen 7.5-325 MG per tablet  Commonly known as:  PERCOCET      Take 1 tablet by mouth As Needed.       TYLENOL ARTHRITIS PAIN PO      Take 650 mg by mouth 3 (Three) Times a Day As Needed.       VITAMIN B-12 CR PO      Take  by mouth.           STOP taking these medications    atorvastatin 20 MG tablet  Commonly known as:  LIPITOR  Stopped by:  SARITHA Richmond        BIOTIN PO  Stopped by:  SARITHA Richmond        cholecalciferol 25 MCG (1000 UT) tablet  Commonly known as:  VITAMIN D3  Stopped by:  SARITHA Richmond        LORazepam 0.5 MG tablet  Commonly known as:  ATIVAN  Stopped by:  SARITHA Richmond              Where to Get Your Medications      These medications were sent to [address] [place] [address] AT [facility] [phone] [facility] [phone] [address] [address]    Phone:  [phone]   · flecainide 50 MG tablet  · rosuvastatin 5 MG tablet           As always, it has been a pleasure to participate in your patient's care.      Sincerely,     Miguelina MELARA

## 2020-07-13 ENCOUNTER — TELEPHONE (OUTPATIENT)
Dept: CARDIOLOGY | Facility: CLINIC | Age: 75
End: 2020-07-13

## 2020-07-13 NOTE — TELEPHONE ENCOUNTER
Patient calling she thought you wanted her to come in for an EKG  Mon or [place]  I didn't see this in your note  Please advise if so can you put in an order

## 2020-07-21 ENCOUNTER — CLINICAL SUPPORT (OUTPATIENT)
Dept: CARDIOLOGY | Facility: CLINIC | Age: 75
End: 2020-07-21

## 2020-07-21 DIAGNOSIS — I48.0 PAROXYSMAL ATRIAL FIBRILLATION (HCC): Primary | ICD-10-CM

## 2020-07-21 PROCEDURE — 93000 ELECTROCARDIOGRAM COMPLETE: CPT | Performed by: NURSE PRACTITIONER

## 2020-07-21 NOTE — PROGRESS NOTES
Procedure     ECG 12 Lead  Date/Time: 7/21/2020 11:31 AM  Performed by: Yaneli Sierra APRN  Authorized by: Yaneli Sierra APRN   Comparison: compared with previous ECG from 6/24/2020  Similar to previous ECG  Rhythm: sinus rhythm  Rate: normal  BPM: 61    Clinical impression: normal ECG  Comments: Indication: Atrial fibrillation           Pt came in after following up with SARITHA West on 7/10/2020.  Flecainide was increased from 25mg BID to 50mg BID.  Pt stated she had no CP, no SOA,  no dizziness, no palpitations.  Reviewed with SARITHA Falk and pt was released to go.  Interp above.

## 2020-07-23 ENCOUNTER — APPOINTMENT (OUTPATIENT)
Dept: SLEEP MEDICINE | Facility: HOSPITAL | Age: 75
End: 2020-07-23

## 2020-07-24 RX ORDER — LOSARTAN POTASSIUM 100 MG/1
100 TABLET ORAL DAILY
Qty: 90 TABLET | Refills: 3 | Status: SHIPPED | OUTPATIENT
Start: 2020-07-24 | End: 2021-08-12

## 2020-08-07 ENCOUNTER — OFFICE VISIT (OUTPATIENT)
Dept: CARDIOLOGY | Facility: CLINIC | Age: 75
End: 2020-08-07

## 2020-08-07 VITALS
WEIGHT: 185 LBS | SYSTOLIC BLOOD PRESSURE: 124 MMHG | HEIGHT: 64 IN | HEART RATE: 65 BPM | OXYGEN SATURATION: 97 % | BODY MASS INDEX: 31.58 KG/M2 | DIASTOLIC BLOOD PRESSURE: 80 MMHG

## 2020-08-07 DIAGNOSIS — I10 ESSENTIAL HYPERTENSION: ICD-10-CM

## 2020-08-07 DIAGNOSIS — I48.0 PAROXYSMAL ATRIAL FIBRILLATION (HCC): Primary | ICD-10-CM

## 2020-08-07 PROCEDURE — 99214 OFFICE O/P EST MOD 30 MIN: CPT | Performed by: INTERNAL MEDICINE

## 2020-08-07 PROCEDURE — 93000 ELECTROCARDIOGRAM COMPLETE: CPT | Performed by: INTERNAL MEDICINE

## 2020-08-07 RX ORDER — SERTRALINE HYDROCHLORIDE 25 MG/1
25 TABLET, FILM COATED ORAL DAILY
COMMUNITY
End: 2021-04-14 | Stop reason: SDDI

## 2020-08-07 NOTE — PROGRESS NOTES
Date of Office Visit: 20  Encounter Provider: Rich Kearns MD  Place of Service: [facility] CARDIOLOGY  Patient Name: Kiley Last  :1945  [id number]    Chief Complaint   Patient presents with   • Atrial Fibrillation     1 month follow up   :     HPI: Kiley Last is a 74 y.o. female she has a history of paroxysmal A. fib she is had an recent stress and echo that are both normal she went into A. fib in the hospital when she had bad vertigo and we put her on Rythmol to help control it as well as increase her metoprolol.  She is also been on Eliquis.  She continued to not do well and we placed her on flecainide and that has stopped the A. fib which is great but she has a lot of complaints of muscle aches in her shoulders as well as just fatigue especially in the morning after she takes her medicines she is not happy about taking all of her medicines.  Past Medical History:   Diagnosis Date   • Arthritis    • Asthma     NO INHALERS   • Atrial fibrillation (CMS/HCC)    • Clostridium difficile infection 2019   • Colon polyps 2019    Transverse colon: tubular adenoma, descending colon: fragments of tubular adenoma, sigmoid colon: ischemic eroded hyperplastic polyp   • COPD (chronic obstructive pulmonary disease) (CMS/HCC)    • Diverticulitis    • Diverticulosis    • ESBL (extended spectrum beta-lactamase) producing bacteria infection    • History of abscess of skin and subcutaneous tissue     ABD WOUND 2017   • History of atrial fibrillation      X1 POST OP FROM COLOSTOMY 2017 - NO PROBLEMS SINCE   • Hypertension    • MDRO (multiple drug resistant organisms) resistance    • PAF (paroxysmal atrial fibrillation) (CMS/HCC)    • PONV (postoperative nausea and vomiting)    • Psoriasis    • UTI (urinary tract infection)    • Vertigo        Past Surgical History:   Procedure Laterality Date   • BELPHAROPTOSIS REPAIR Bilateral 2017    Bilateral brow  ptosis repair via the anterior hairline approach under monitored local anesthesia-Andrez Craven   • BLEPHAROPLASTY Bilateral 04/27/2017   • CATARACT EXTRACTION     • COLON RESECTION N/A 5/3/2017    Procedure: OPEN SIMOID COLECTOMY WITH COLOSTOMY;  Surgeon: Renato Delgado MD;  [facility];  Service:    • COLONOSCOPY N/A 9/13/2017    Procedure: COLONOSCOPY VIA COLOSTOMY TO CECUM;  Surgeon: Renato Delgado MD;  Location:  [facility] ENDOSCOPY;  Service:    • COLONOSCOPY N/A 8/1/2019    Procedure: COLONOSCOPY to cecum and TI with biopsy / hot snare polypectomies;  Surgeon: Dalton Vela Jr., MD;  Location:  [facility] ENDOSCOPY;  Service: General   • COLONOSCOPY N/A 01/04/2010    Andrez Trinidad   • COLOSTOMY CLOSURE N/A 9/14/2017    Procedure: COLOSTOMY TAKEDOWN AND CLOSURE, WITH RESECTION;  Surgeon: Renato Delgado MD;  Location: [facility] MAIN OR;  Service:    • ENDOSCOPY AND COLONOSCOPY N/A 10/31/2014    Normal EGD and colonoscopy, repeat c-scope in 5 years-Andrez Trinidad   • EXPLORATORY LAPAROTOMY N/A 9/8/2019    Procedure: Exploratory laparotomy with lysis of adhesions;  Surgeon: Trini Wade MD;  Location: [facility] MAIN OR;  Service: General   • FINGER SURGERY Left     4TH FINGER LEFT HAND   • HYSTERECTOMY Bilateral    • LAPAROSCOPIC CHOLECYSTECTOMY W/ CHOLANGIOGRAPHY N/A 07/15/2003    Dr. Amrit Martinez, [facility]   • SALPINGO OOPHORECTOMY Bilateral 02/02/2006    Abdominal sacral colpopexy, bilateral salpingo-oophorectomy, tension free vaginal tape, cystoscopy-Dr. Devika Bradley, [facility]   • THORACENTESIS Right 05/21/2017    US-guided right thoracentesis-Dr. Juan Yuen, [facility]   • TONSILLECTOMY Bilateral    • UPPER GASTROINTESTINAL ENDOSCOPY N/A 10/08/2007    Esophageal mucosal changes suspicious for short-segment Olivera's esphagus, gastric mucosal abnormality characterized by erythema: biopsied, NERD disease present, high likelihood of gastritis-Dr. Mayank Knapp, [facility]        Social History     Socioeconomic History   • Marital status:      Spouse name: Not on file   • Number of children: Not on file   • Years of education: Not on file   • Highest education level: Not on file   Tobacco Use   • Smoking status: Former Smoker     Types: Cigarettes     Last attempt to quit: 1967     Years since quittin.6   • Smokeless tobacco: Never Used   • Tobacco comment: SOCIAL SMOKING ONLY   Substance and Sexual Activity   • Alcohol use: Not Currently     Alcohol/week: 2.0 standard drinks     Types: 1 Glasses of wine, 1 Shots of liquor per week     Comment: occasionally/caffeine use    • Drug use: No   • Sexual activity: Defer       Family History   Problem Relation Age of Onset   • Cancer Mother    • Colon cancer Mother    • Diabetes Father    • Diabetes Son    • Ulcerative colitis Son    • No Known Problems Maternal Grandmother    • No Known Problems Maternal Grandfather    • No Known Problems Paternal Grandmother    • No Known Problems Paternal Grandfather    • Malig Hyperthermia Neg Hx        Review of Systems   Constitution: Negative for decreased appetite, fever, malaise/fatigue and weight loss.   HENT: Negative for nosebleeds.    Eyes: Negative for double vision.   Cardiovascular: Negative for chest pain, claudication, cyanosis, dyspnea on exertion, irregular heartbeat, leg swelling, near-syncope, orthopnea, palpitations, paroxysmal nocturnal dyspnea and syncope.   Respiratory: Negative for cough, hemoptysis and shortness of breath.    Hematologic/Lymphatic: Negative for bleeding problem.   Skin: Negative for rash.   Musculoskeletal: Negative for falls and myalgias.   Gastrointestinal: Negative for hematochezia, jaundice, melena, nausea and vomiting.   Genitourinary: Negative for hematuria.   Neurological: Negative for dizziness and seizures.   Psychiatric/Behavioral: Negative for altered mental status and memory loss.       Allergies   Allergen Reactions   • Epinephrine  "Palpitations   • Penicillins Other (See Comments)     BLISTERS IN MOUTH    Patient tolerated ceftriaxone during 5/2017 admission.            Current Outpatient Medications:   •  Acetaminophen (TYLENOL ARTHRITIS PAIN PO), Take 650 mg by mouth 3 (Three) Times a Day As Needed., Disp: , Rfl:   •  apixaban (ELIQUIS) 5 MG tablet tablet, Take 1 tablet by mouth Every 12 (Twelve) Hours., Disp: 60 tablet, Rfl: 0  •  Cyanocobalamin (VITAMIN B-12 CR PO), Take  by mouth., Disp: , Rfl:   •  flecainide (TAMBOCOR) 50 MG tablet, Take 1 tablet by mouth 2 (two) times a day., Disp: 90 tablet, Rfl: 2  •  losartan (COZAAR) 100 MG tablet, Take 1 tablet by mouth Daily., Disp: 90 tablet, Rfl: 3  •  meclizine (ANTIVERT) 25 MG tablet, Take 25 mg by mouth 3 (Three) Times a Day As Needed for dizziness., Disp: , Rfl:   •  metoprolol tartrate (LOPRESSOR) 25 MG tablet, Take 1 tablet by mouth Daily., Disp: , Rfl:   •  oxyCODONE-acetaminophen (PERCOCET) 7.5-325 MG per tablet, Take 1 tablet by mouth As Needed., Disp: , Rfl:   •  sertraline (ZOLOFT) 25 MG tablet, Take 25 mg by mouth Daily., Disp: , Rfl:       Objective:     Vitals:    08/07/20 1015   BP: 124/80   BP Location: Right arm   Pulse: 65   SpO2: 97%   Weight: 83.9 kg (185 lb)   Height: 162.6 cm (64\")     Body mass index is 31.76 kg/m².    Physical Exam   Constitutional: She is oriented to person, place, and time. She appears well-developed and well-nourished.   HENT:   Head: Normocephalic.   Eyes: No scleral icterus.   Neck: No JVD present. No thyromegaly present.   Cardiovascular: Normal rate, regular rhythm and normal heart sounds. Exam reveals no gallop and no friction rub.   No murmur heard.  Pulmonary/Chest: Effort normal and breath sounds normal. She has no wheezes. She has no rales.   Abdominal: Soft. There is no hepatosplenomegaly. There is no tenderness.   Musculoskeletal: Normal range of motion. She exhibits no edema.   Lymphadenopathy:     She has no cervical adenopathy. "   Neurological: She is alert and oriented to person, place, and time.   Skin: Skin is warm and dry. No rash noted.   Psychiatric: She has a normal mood and affect.         ECG 12 Lead  Date/Time: 8/7/2020 10:52 AM  Performed by: Rich Kearns MD  Authorized by: Rich Kearns MD   Comparison: compared with previous ECG   Similar to previous ECG  Rhythm: sinus rhythm    Clinical impression: normal ECG             Assessment:       Diagnosis Plan   1. Paroxysmal atrial fibrillation (CMS/HCC)     2. Essential hypertension            Plan:       I think that you know her blood pressures a little lowish possibly and when to cut her metoprolol back to 25 a day which is already a very low-dose we need a little bit with her on the flecainide and would not also stop the Crestor see if her muscle aches go away I stay on her other medicines and were just can have her come back and see us in a year sooner if she has trouble    As always, it has been a pleasure to participate in your patient's care.      Sincerely,       Rich Kearns MD

## 2020-08-25 ENCOUNTER — OFFICE VISIT (OUTPATIENT)
Dept: ORTHOPEDIC SURGERY | Facility: CLINIC | Age: 75
End: 2020-08-25

## 2020-08-25 VITALS — TEMPERATURE: 97.7 F | WEIGHT: 186.8 LBS | BODY MASS INDEX: 31.89 KG/M2 | HEIGHT: 64 IN

## 2020-08-25 DIAGNOSIS — M25.512 BILATERAL SHOULDER PAIN, UNSPECIFIED CHRONICITY: Primary | ICD-10-CM

## 2020-08-25 DIAGNOSIS — M25.511 BILATERAL SHOULDER PAIN, UNSPECIFIED CHRONICITY: Primary | ICD-10-CM

## 2020-08-25 DIAGNOSIS — M75.102 NONTRAUMATIC TEAR OF LEFT ROTATOR CUFF, UNSPECIFIED TEAR EXTENT: ICD-10-CM

## 2020-08-25 PROCEDURE — 20610 DRAIN/INJ JOINT/BURSA W/O US: CPT | Performed by: ORTHOPAEDIC SURGERY

## 2020-08-25 PROCEDURE — 99203 OFFICE O/P NEW LOW 30 MIN: CPT | Performed by: ORTHOPAEDIC SURGERY

## 2020-08-25 PROCEDURE — 73030 X-RAY EXAM OF SHOULDER: CPT | Performed by: ORTHOPAEDIC SURGERY

## 2020-08-25 RX ADMIN — METHYLPREDNISOLONE ACETATE 80 MG: 80 INJECTION, SUSPENSION INTRA-ARTICULAR; INTRALESIONAL; INTRAMUSCULAR; SOFT TISSUE at 16:24

## 2020-08-25 RX ADMIN — METHYLPREDNISOLONE ACETATE 80 MG: 80 INJECTION, SUSPENSION INTRA-ARTICULAR; INTRALESIONAL; INTRAMUSCULAR; SOFT TISSUE at 16:23

## 2020-08-25 NOTE — PROGRESS NOTES
New bilateral Shoulder      Patient: Kiley Last        YOB: 1945    Medical Record Number: [id number]        Chief Complaints: bilateral       History of Present Illness: This is a 74-year-old female presents complaining of bilateral shoulder pain left greater than right she is right-hand dominant is been ongoing 2 months her  is been quite ill she does have to lift him up and help him move she does have significant night pain symptoms are severe constant stabbing aching clicking worse with any kind of activity squatting walking better with ice and rest she is a retired caregiver past medical history for depression anxiety asthma arthritis COPD diverticulitis history of A. fib UTIs      Allergies:   Allergies   Allergen Reactions   • Epinephrine Palpitations   • Penicillins Other (See Comments)     BLISTERS IN MOUTH    Patient tolerated ceftriaxone during 5/2017 admission.          Medications:   Home Medications:  Current Outpatient Medications on File Prior to Visit   Medication Sig   • Acetaminophen (TYLENOL ARTHRITIS PAIN PO) Take 650 mg by mouth 3 (Three) Times a Day As Needed.   • apixaban (ELIQUIS) 5 MG tablet tablet Take 1 tablet by mouth Every 12 (Twelve) Hours.   • Cyanocobalamin (VITAMIN B-12 CR PO) Take  by mouth.   • flecainide (TAMBOCOR) 50 MG tablet Take 1 tablet by mouth 2 (two) times a day.   • losartan (COZAAR) 100 MG tablet Take 1 tablet by mouth Daily.   • meclizine (ANTIVERT) 25 MG tablet Take 25 mg by mouth 3 (Three) Times a Day As Needed for dizziness.   • metoprolol tartrate (LOPRESSOR) 25 MG tablet Take 1 tablet by mouth Daily.   • oxyCODONE-acetaminophen (PERCOCET) 7.5-325 MG per tablet Take 1 tablet by mouth As Needed.   • sertraline (ZOLOFT) 25 MG tablet Take 25 mg by mouth Daily.     No current facility-administered medications on file prior to visit.      Current Medications:  Scheduled Meds:  Continuous Infusions:  No current facility-administered  medications for this visit.   PRN Meds:.    Past Medical History:   Diagnosis Date   • Arthritis    • Asthma     NO INHALERS   • Atrial fibrillation (CMS/HCC)    • Clostridium difficile infection 09/20/2019   • Colon polyps 08/01/2019    Transverse colon: tubular adenoma, descending colon: fragments of tubular adenoma, sigmoid colon: ischemic eroded hyperplastic polyp   • COPD (chronic obstructive pulmonary disease) (CMS/HCC)    • Diverticulitis    • Diverticulosis    • ESBL (extended spectrum beta-lactamase) producing bacteria infection    • History of abscess of skin and subcutaneous tissue     ABD WOUND 5/2017   • History of atrial fibrillation      X1 POST OP FROM COLOSTOMY 5/2017 - NO PROBLEMS SINCE   • Hypertension    • MDRO (multiple drug resistant organisms) resistance    • PAF (paroxysmal atrial fibrillation) (CMS/HCC)    • PONV (postoperative nausea and vomiting)    • Psoriasis    • UTI (urinary tract infection)    • Vertigo         Past Surgical History:   Procedure Laterality Date   • BELPHAROPTOSIS REPAIR Bilateral 04/27/2017    Bilateral brow ptosis repair via the anterior hairline approach under monitored local anesthesia-Andrez Craven   • BLEPHAROPLASTY Bilateral 04/27/2017   • CATARACT EXTRACTION     • COLON RESECTION N/A 5/3/2017    Procedure: OPEN SIMOID COLECTOMY WITH COLOSTOMY;  Surgeon: Renato Delgado MD;  Location: [facility] MAIN OR;  Service:    • COLONOSCOPY N/A 9/13/2017    Procedure: COLONOSCOPY VIA COLOSTOMY TO CECUM;  Surgeon: Renato Delgado MD;  Location: [facility] ENDOSCOPY;  Service:    • COLONOSCOPY N/A 8/1/2019    Procedure: COLONOSCOPY to cecum and TI with biopsy / hot snare polypectomies;  Surgeon: Dalton Vela Jr., MD;  Location: [facility] ENDOSCOPY;  Service: General   • COLONOSCOPY N/A 01/04/2010    Andrez Trinidad   • COLOSTOMY CLOSURE N/A 9/14/2017    Procedure: COLOSTOMY TAKEDOWN AND CLOSURE, WITH RESECTION;  Surgeon: Renato Delgado MD;  Location:   [facility] MAIN OR;  Service:    • ENDOSCOPY AND COLONOSCOPY N/A 10/31/2014    Normal EGD and colonoscopy, repeat c-scope in 5 years-Andrez Trinidad   • EXPLORATORY LAPAROTOMY N/A 2019    Procedure: Exploratory laparotomy with lysis of adhesions;  Surgeon: Trini Wade MD;  Location: [facility] MAIN OR;  Service: General   • FINGER SURGERY Left     4TH FINGER LEFT HAND   • HYSTERECTOMY Bilateral    • LAPAROSCOPIC CHOLECYSTECTOMY W/ CHOLANGIOGRAPHY N/A 07/15/2003    [facility]   • SALPINGO OOPHORECTOMY Bilateral 2006    Abdominal sacral colpopexy, bilateral salpingo-oophorectomy, tension free vaginal tape, cystoscopy-[facility]   • THORACENTESIS Right 2017    US-guided right thoracentesis[facility]   • TONSILLECTOMY Bilateral    • UPPER GASTROINTESTINAL ENDOSCOPY N/A 10/08/2007    Esophageal mucosal changes suspicious for short-segment Olivera's esphagus, gastric mucosal abnormality characterized by erythema: biopsied, NERD disease present, high likelihood of gastritis-[facility]ank [facility]        Social History     Occupational History   • Not on file   Tobacco Use   • Smoking status: Former Smoker     Types: Cigarettes     Last attempt to quit: 1967     Years since quittin.6   • Smokeless tobacco: Never Used   • Tobacco comment: SOCIAL SMOKING ONLY   Substance and Sexual Activity   • Alcohol use: Not Currently     Alcohol/week: 2.0 standard drinks     Types: 1 Glasses of wine, 1 Shots of liquor per week     Comment: occasionally/caffeine use    • Drug use: No   • Sexual activity: Defer      Social History     Social History Narrative   • Not on file        Family History   Problem Relation Age of Onset   • Cancer Mother    • Colon cancer Mother    • Diabetes Father    • Diabetes Son    • Ulcerative colitis Son    • No Known Problems Maternal Grandmother    • No Known Problems Maternal Grandfather    • No Known Problems Paternal Grandmother    • No  "Known Problems Paternal Grandfather    • Malig Hyperthermia Neg Hx              Review of Systems: 14 point review of systems are remarkable for pertinent positives listed in the chart by the patient the remainder negative    Review of Systems      Physical Exam: 74 y.o. female  General Appearance:    Alert, cooperative, in no acute distress                   Vitals:    08/25/20 1541   Temp: 97.7 °F (36.5 °C)   Weight: 84.7 kg (186 lb 12.8 oz)   Height: 162.6 cm (64\")      Patient is alert and read ×3 no acute distress appears her above-listed at height weight and age.  Affect is normal respiratory rate is normal unlabored. Heart rate regular rate rhythm, sclera, dentition and hearing are normal for the purpose of this exam.    Ortho Exam  Physical exam of the right shoulder reveals no overlying skin changes no lymphedema no lymphadenopathy.  Patient has active flexion 180 with mild symptoms abduction is similar external rotation is to 50 and internal rotation to the upper lumbar spine with mild symptoms.  Patient has good rotator cuff strength 4+ over 5 with isometric strength testing with pain.  Patient has a positive impingement and a positive Frederick sign.  Patient has good cervical range of motion which is full and asymptomatic no radicular symptoms.  Patient has a normal elbow exam.  Good distal pulses are present  Patient has pain with overhead activity and a positive Neer sign and a positive empty can sign , a positive drop arm and a definitive painful arc    Physical exam of the left shoulder reveals no overlying skin changes no lymphedema no lymphadenopathy.  Patient has active flexion 180 with mild symptoms abduction is similar external rotation is to 50 and internal rotation to the upper lumbar spine with mild symptoms.  Patient has good rotator cuff strength 4+ over 5 with isometric strength testing with pain.  Patient has a positive impingement and a positive Frederick sign.  Patient has good cervical " range of motion which is full and asymptomatic no radicular symptoms.  Patient has a normal elbow exam.  Good distal pulses are presentPatient has pain with overhead activity and a positive Neer sign and a positive empty can sign  They have a positive drop arm any definitive painful arc    Large Joint Arthrocentesis: R subacromial bursa  Date/Time: 8/25/2020 4:23 PM  Consent given by: patient  Site marked: site marked  Timeout: Immediately prior to procedure a time out was called to verify the correct patient, procedure, equipment, support staff and site/side marked as required   Supporting Documentation  Indications: pain   Procedure Details  Location: shoulder - R subacromial bursa  Preparation: Patient was prepped and draped in the usual sterile fashion  Needle size: 22 G  Approach: posterior  Medications administered: 80 mg methylPREDNISolone acetate 80 MG/ML; 4 mL lidocaine (cardiac)  Patient tolerance: patient tolerated the procedure well with no immediate complications    Large Joint Arthrocentesis: L subacromial bursa  Date/Time: 8/25/2020 4:24 PM  Consent given by: patient  Site marked: site marked  Timeout: Immediately prior to procedure a time out was called to verify the correct patient, procedure, equipment, support staff and site/side marked as required   Supporting Documentation  Indications: pain   Procedure Details  Location: shoulder - L subacromial bursa  Preparation: Patient was prepped and draped in the usual sterile fashion  Needle size: 22 G  Approach: posterior  Medications administered: 4 mL lidocaine (cardiac); 80 mg methylPREDNISolone acetate 80 MG/ML  Patient tolerance: patient tolerated the procedure well with no immediate complications                Radiology:   AP, Scapular Y and Axillary Lateral of the right and left shoulder were ordered/reviewed to evauate shoulder pain.  I have no comparative films she does have some acromioclavicular arthritis otherwise no acute bony pathology is  appreciated  Imaging Results (Most Recent)     Procedure Component Value Units Date/Time    XR Shoulder 2+ View Bilateral [[id number]] Resulted:  08/25/20 1523     Updated:  08/25/20 1523    Impression:       Ordering physician's impression is located in the Encounter Note dated 08/25/20. X-ray performed in the DR room.          Assessment/Plan: Bilateral shoulder pain I really think this is rotator cuff in origin is probably overuse from lifting her  she does admit to taking some of his Percocet at night she is told this to her primary care as well plan is to proceed with injections try to get this calmed down that is to be diagnostic and therapeutic if her symptoms do not improve we will pursue other means of testing to rule out rotator cuff tear she is on Eliquis so we cannot do any anti-inflammatories  Cortisone Injection. See procedure note.  Cortisone Injection for DIAGNOSTIC and THERAPUTIC purposes.

## 2020-08-27 RX ORDER — METHYLPREDNISOLONE ACETATE 80 MG/ML
80 INJECTION, SUSPENSION INTRA-ARTICULAR; INTRALESIONAL; INTRAMUSCULAR; SOFT TISSUE
Status: COMPLETED | OUTPATIENT
Start: 2020-08-25 | End: 2020-08-25

## 2020-09-29 ENCOUNTER — CONSULT (OUTPATIENT)
Dept: ORTHOPEDIC SURGERY | Facility: CLINIC | Age: 75
End: 2020-09-29

## 2020-09-29 VITALS — TEMPERATURE: 97 F | HEIGHT: 65 IN | BODY MASS INDEX: 30.56 KG/M2 | WEIGHT: 183.4 LBS

## 2020-09-29 DIAGNOSIS — R52 PAIN: Primary | ICD-10-CM

## 2020-09-29 DIAGNOSIS — M47.22 CERVICAL SPONDYLOSIS WITH RADICULOPATHY: ICD-10-CM

## 2020-09-29 DIAGNOSIS — M54.2 CERVICAL PAIN (NECK): ICD-10-CM

## 2020-09-29 PROCEDURE — 72040 X-RAY EXAM NECK SPINE 2-3 VW: CPT | Performed by: ORTHOPAEDIC SURGERY

## 2020-09-29 PROCEDURE — 99213 OFFICE O/P EST LOW 20 MIN: CPT | Performed by: ORTHOPAEDIC SURGERY

## 2020-09-29 RX ORDER — METHYLPREDNISOLONE 4 MG/1
TABLET ORAL
Qty: 21 TABLET | Refills: 0 | Status: SHIPPED | OUTPATIENT
Start: 2020-09-29 | End: 2021-04-14

## 2020-09-29 NOTE — PROGRESS NOTES
New patient or new problem visit    Chief Complaint   Patient presents with   • Cervical Spine - Establish Care, Pain       HPI: She complains of neck pain radiating to the left shoulder it is been ongoing 2 months severe constant pain stabbing and aching in nature.  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  She states she is tried Percocet which helps as well as Tylenol which helps but significantly less    PFSH: See chart- reviewed    Review of Systems   Constitutional: Positive for fatigue.   HENT: Positive for sinus pressure and tinnitus.    Eyes: Positive for visual disturbance.   Respiratory: Positive for apnea.    Genitourinary: Positive for frequency.   Musculoskeletal: Positive for myalgias and neck pain.   Neurological: Positive for dizziness, tremors and light-headedness.       PE: Constitutional: Vital signs above-noted.  Awake, alert and oriented    Psychiatric: Affect and insight do not appear grossly disturbed.    Pulmonary: Breathing is unlabored, color is good.    Skin: Warm, dry and normal turgor    Cardiac: Pedal pulses intact.  No edema.    Eyesight and hearing appear adequate for examination purposes      Musculoskeletal:  There is mild tenderness to percussion and palpation of the spine. Motion appears undisturbed.  Posture is unremarkable to coronal and sagittal inspection.    The skin about the area is intact.  There is no palpable or visible deformity.  There is no local spasm.       Neurologic:   Reflexes are 2+ and symmetrical in the patellae and achilles.   Motor function is undisturbed in quadriceps, EHL, and gastrocnemius   sensation appears symmetrically intact to light touch.  In the bilateral lower extremities there is no evidence of atrophy.   Clonus is absent..  Gait appears undisturbed.  SLR test negative      MEDICAL DECISION MAKING    XRAY: Plain film x-rays of the cervical spine show multilevel spondylosis well-maintained lordosis slight retrolisthesis at see  5 6.  No comparison views are available.    Other: n/a    Impression: Cervical spondylosis possibly with radiculopathy    Plan: For now traction and Medrol Dosepak.  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  We will see

## 2020-10-20 NOTE — TELEPHONE ENCOUNTER
Patient states since she decreased Metoprolol to 50mg once daily  She is still very fatigued and tired.  Wonders if propafenone 150mg every 8hrs could cause her to be off balance and fatigued.  She does have vertigo   [phone]   Sister

## 2020-10-30 RX ORDER — APIXABAN 5 MG/1
TABLET, FILM COATED ORAL
Qty: 180 TABLET | Refills: 2 | Status: SHIPPED | OUTPATIENT
Start: 2020-10-30 | End: 2021-08-23

## 2020-11-11 ENCOUNTER — TELEPHONE (OUTPATIENT)
Dept: CARDIOLOGY | Facility: CLINIC | Age: 75
End: 2020-11-11

## 2020-11-11 NOTE — TELEPHONE ENCOUNTER
Pt calling to verify that her Crestor was d/c'd at last ov. She was just double checking because the pharmacy called her to inquire. After reviewing Dr. Kearns's last note pt was taken off the Crestor due to muscle pains.    -DA

## 2020-11-23 RX ORDER — FLECAINIDE ACETATE 50 MG/1
50 TABLET ORAL 2 TIMES DAILY
Qty: 90 TABLET | Refills: 2 | Status: SHIPPED | OUTPATIENT
Start: 2020-11-23 | End: 2021-01-22

## 2020-12-18 ENCOUNTER — TRANSCRIBE ORDERS (OUTPATIENT)
Dept: ADMINISTRATIVE | Facility: HOSPITAL | Age: 75
End: 2020-12-18

## 2020-12-18 DIAGNOSIS — Z12.31 VISIT FOR SCREENING MAMMOGRAM: Primary | ICD-10-CM

## 2020-12-21 NOTE — TELEPHONE ENCOUNTER
Last appt 8/2020 next appt 6/2021 pharmacy requesting metoprolol 50 mg but pt taking metoprolol 25 mg per last chart note.    DA

## 2021-01-19 ENCOUNTER — TELEPHONE (OUTPATIENT)
Dept: CARDIOLOGY | Facility: CLINIC | Age: 76
End: 2021-01-19

## 2021-01-19 NOTE — TELEPHONE ENCOUNTER
MAY shower NO tub bathing    LEAVE dressing on incision for 3 DAYS-->then may remove   (If dressing starts to fall off may re-secure with tape)    NO lifting anything heavier than 5LBS (or heavier than a gallon of milk!)    NO Bending, Lifting or Twisting    NO driving until directed by your doctor    Avoid sitting more than 20 - 30 minutes at a time    DO NOT take any NSAIDS (either prescribed or over the counter until directed    (Aleve, Ibuprofen, Mobic, etc) as this will interfere with bone healing    CALL the doctor if:  Fever >100.5  ([phone])                 Incision becomes red, swollen or  opens up               Incision has yellow, thick drainage or an odor               Pain is not managed with prescribed medications               Excessive nausea and/or vomiting    Avoid having pets sleep in bed with you until incision is completely healed    Discharge instructions, follow up appt, and prescriptions reviewed with pt. Opportunity for questions provided. Reinforced medications to continue and to stop. Pt on our schedule for Friday at 100 pm.    DA

## 2021-01-19 NOTE — TELEPHONE ENCOUNTER
Pt has h/o vertigo and states she feel like her flecanide and metoprolol are causing increased visual changes, irritability, dizziness, light headedness, shakiness, fatigue. 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 Pt wanting to stop medication states she rarely has trouble with her afib. She doesn't think it is the metoprolol because she has been on that for awhile before starting the flecanide.    DA

## 2021-01-22 ENCOUNTER — OFFICE VISIT (OUTPATIENT)
Dept: CARDIOLOGY | Facility: CLINIC | Age: 76
End: 2021-01-22

## 2021-01-22 VITALS — WEIGHT: 187.8 LBS | HEIGHT: 65 IN | BODY MASS INDEX: 31.29 KG/M2

## 2021-01-22 DIAGNOSIS — G47.34 IDIOPATHIC SLEEP RELATED NONOBSTRUCTIVE ALVEOLAR HYPOVENTILATION: Primary | ICD-10-CM

## 2021-01-22 DIAGNOSIS — J41.0 SIMPLE CHRONIC BRONCHITIS (HCC): ICD-10-CM

## 2021-01-22 DIAGNOSIS — I10 ESSENTIAL HYPERTENSION: ICD-10-CM

## 2021-01-22 DIAGNOSIS — R42 VERTIGO: ICD-10-CM

## 2021-01-22 DIAGNOSIS — I48.0 PAROXYSMAL ATRIAL FIBRILLATION (HCC): ICD-10-CM

## 2021-01-22 PROCEDURE — 93000 ELECTROCARDIOGRAM COMPLETE: CPT | Performed by: NURSE PRACTITIONER

## 2021-01-22 PROCEDURE — 99214 OFFICE O/P EST MOD 30 MIN: CPT | Performed by: NURSE PRACTITIONER

## 2021-01-22 RX ORDER — MULTIPLE VITAMINS W/ MINERALS TAB 9MG-400MCG
1 TAB ORAL DAILY
COMMUNITY
End: 2021-04-14

## 2021-01-22 RX ORDER — METOPROLOL SUCCINATE 50 MG/1
1 TABLET, EXTENDED RELEASE ORAL DAILY
COMMUNITY
Start: 2020-11-19 | End: 2021-01-27 | Stop reason: SDUPTHER

## 2021-01-22 RX ORDER — FERROUS SULFATE 325(65) MG
TABLET ORAL
COMMUNITY
End: 2021-04-14

## 2021-01-22 NOTE — PROGRESS NOTES
Date of Office Visit: 2021  Encounter Provider: SARITHA Richmond  Place of Service: [facility] CARDIOLOGY  Patient Name: Kiley Last  :1945    Chief Complaint   Patient presents with   • Dizziness   • irritability   • Blurred Vision   • Fatigue   • Atrial Fibrillation   :     HPI: Kiley Last is a 74-year-old female who is a patient of Dr. Kearns is known to me from previous.  She has a history of paroxysmal A. fib with recent stress test and echo that were stable.  She was in the hospital with A. fib and had been having some vertigo.  We put her on Rythmol as well as increased her metoprolol but she continued to not do well and we ended up placing her on flecainide.  That has stopped her A. fib but she has a lot of complaints of muscle aches in her shoulders as well as fatigue.  Is worse after she takes her medicines.  She also is on Eliquis for anticoagulation.  At her last appointment she was complaining of fatigue and we cut back her metoprolol to 25 a day.  We did not really want to stop her flecainide because of the stability of her heart rhythm we also discussed stopping her Crestor to see if that helped her as well.    Patient called the office earlier this week complaining of vertigo visual changes, irritability, dizziness, lightheadedness shakiness and fatigue.  She was very stressed and wanted to stop her flecainide.  I had her come into the office today for evaluation.  Kiley has been having a lot of vertigo.  Is worse with positional changes.  She finds that sometimes in her home she is holding onto walls to walk.  This is unfortunate because she is also the full-time caregiver for her .  He needs full care with his ADLs.  He recently has transition to wheelchair which has helped since that improved him from being bedbound.  I think she is got a couple things going on some of it could be medication related.  She also has a hard time  getting out of bed in the morning and is very fatigued.    Previous testing and notes have been reviewed by me.   Past Medical History:   Diagnosis Date   • Arthritis    • Asthma     NO INHALERS   • Atrial fibrillation (CMS/HCC)    • Clostridium difficile infection 09/20/2019   • Colon polyps 08/01/2019    Transverse colon: tubular adenoma, descending colon: fragments of tubular adenoma, sigmoid colon: ischemic eroded hyperplastic polyp   • COPD (chronic obstructive pulmonary disease) (CMS/HCC)    • Diverticulitis    • Diverticulosis    • ESBL (extended spectrum beta-lactamase) producing bacteria infection    • History of abscess of skin and subcutaneous tissue     ABD WOUND 5/2017   • History of atrial fibrillation      X1 POST OP FROM COLOSTOMY 5/2017 - NO PROBLEMS SINCE   • Hypertension    • MDRO (multiple drug resistant organisms) resistance    • PAF (paroxysmal atrial fibrillation) (CMS/HCC)    • PONV (postoperative nausea and vomiting)    • Psoriasis    • UTI (urinary tract infection)    • Vertigo        Past Surgical History:   Procedure Laterality Date   • BELPHAROPTOSIS REPAIR Bilateral 04/27/2017    Bilateral brow ptosis repair via the anterior hairline approach under monitored local anesthesia-Andrez Craven   • BLEPHAROPLASTY Bilateral 04/27/2017   • CATARACT EXTRACTION     • COLON RESECTION N/A 5/3/2017    Procedure: OPEN SIMOID COLECTOMY WITH COLOSTOMY;  Surgeon: Renato Delgado MD;  Location: [facility] OR;  Service:    • COLONOSCOPY N/A 9/13/2017    Procedure: COLONOSCOPY VIA COLOSTOMY TO CECUM;  Surgeon: Renato Delgado MD;  Location: [facility] ENDOSCOPY;  Service:    • COLONOSCOPY N/A 8/1/2019    Procedure: COLONOSCOPY to cecum and TI with biopsy / hot snare polypectomies;  Surgeon: Dalton Vela Jr., MD;  Location: [facility] ENDOSCOPY;  Service: General   • COLONOSCOPY N/A 01/04/2010    Andrez Trinidad   • COLOSTOMY CLOSURE N/A 9/14/2017    Procedure: COLOSTOMY TAKEDOWN AND  CLOSURE, WITH RESECTION;  Surgeon: Renato Delgado MD;  Location: [facility] MAIN OR;  Service:    • ENDOSCOPY AND COLONOSCOPY N/A 10/31/2014    Normal EGD and colonoscopy, repeat c-scope in 5 years-Andrez Trinidad   • EXPLORATORY LAPAROTOMY N/A 2019    Procedure: Exploratory laparotomy with lysis of adhesions;  Surgeon: Trini Wade MD;  Location: [facility] MAIN OR;  Service: General   • FINGER SURGERY Left     4TH FINGER LEFT HAND   • HYSTERECTOMY Bilateral    • LAPAROSCOPIC CHOLECYSTECTOMY W/ CHOLANGIOGRAPHY N/A 07/15/2003    Dr. Amrit Martinez, [facility]   • SALPINGO OOPHORECTOMY Bilateral 2006    Abdominal sacral colpopexy, bilateral salpingo-oophorectomy, tension free vaginal tape, cystoscopy-Dr. Devika Bradley, [facility]   • THORACENTESIS Right 2017    US-guided right thoracentesis-Dr. Juan Yuen, [facility]   • TONSILLECTOMY Bilateral    • UPPER GASTROINTESTINAL ENDOSCOPY N/A 10/08/2007    Esophageal mucosal changes suspicious for short-segment Olivera's esphagus, gastric mucosal abnormality characterized by erythema: biopsied, NERD disease present, high likelihood of gastritis-Dr. Mayank Knapp, [facility]       Social History     Socioeconomic History   • Marital status:      Spouse name: Not on file   • Number of children: Not on file   • Years of education: Not on file   • Highest education level: Not on file   Tobacco Use   • Smoking status: Former Smoker     Types: Cigarettes     Quit date: 1967     Years since quittin.0   • Smokeless tobacco: Never Used   • Tobacco comment: SOCIAL SMOKING ONLY   Substance and Sexual Activity   • Alcohol use: Yes     Alcohol/week: 2.0 standard drinks     Types: 1 Glasses of wine, 1 Shots of liquor per week     Comment: occasionally/caffeine use    • Drug use: No   • Sexual activity: Defer       Family History   Problem Relation Age of Onset   • Cancer Mother    • Colon cancer Mother    • Diabetes Father    • Diabetes Son    • Ulcerative colitis Son       • No Known Problems Maternal Grandmother    • No Known Problems Maternal Grandfather    • No Known Problems Paternal Grandmother    • No Known Problems Paternal Grandfather    • Malig Hyperthermia Neg Hx        Review of Systems   Constitution: Positive for malaise/fatigue. Negative for diaphoresis.   Cardiovascular: Negative for chest pain, claudication, dyspnea on exertion, irregular heartbeat, leg swelling, near-syncope, orthopnea, palpitations, paroxysmal nocturnal dyspnea and syncope.   Respiratory: Negative for cough, shortness of breath and sleep disturbances due to breathing.    Musculoskeletal: Negative for falls.   Neurological: Positive for excessive daytime sleepiness, dizziness and light-headedness. Negative for weakness.   Psychiatric/Behavioral: Negative for altered mental status and substance abuse.       Allergies   Allergen Reactions   • Epinephrine Palpitations   • Penicillins Other (See Comments)     BLISTERS IN MOUTH    Patient tolerated ceftriaxone during 5/2017 admission.            Current Outpatient Medications:   •  Acetaminophen (TYLENOL ARTHRITIS PAIN PO), Take 650 mg by mouth 3 (Three) Times a Day As Needed., Disp: , Rfl:   •  Cyanocobalamin (VITAMIN B-12 CR PO), Take  by mouth., Disp: , Rfl:   •  Eliquis 5 MG tablet tablet, TAKE ONE TABLET BY MOUTH EVERY 12 HOURS, Disp: 180 tablet, Rfl: 2  •  losartan (COZAAR) 100 MG tablet, Take 1 tablet by mouth Daily., Disp: 90 tablet, Rfl: 3  •  meclizine (ANTIVERT) 25 MG tablet, Take 25 mg by mouth 3 (Three) Times a Day As Needed for dizziness., Disp: , Rfl:   •  multivitamin with minerals tablet tablet, Take 1 tablet by mouth Daily., Disp: , Rfl:   •  oxyCODONE-acetaminophen (PERCOCET) 7.5-325 MG per tablet, Take 1 tablet by mouth As Needed., Disp: , Rfl:   •  ferrous sulfate 325 (65 FE) MG tablet, Take  by mouth., Disp: , Rfl:   •  methylPREDNISolone (MEDROL) 4 MG dose pack, Use as directed by package instructions, Disp: 21 tablet, Rfl: 0  •   "metoprolol succinate XL (TOPROL-XL) 50 MG 24 hr tablet, 1 tablet Daily., Disp: , Rfl:   •  metoprolol tartrate (LOPRESSOR) 25 MG tablet, Take 1 tablet by mouth Daily., Disp: 30 tablet, Rfl: 5  •  sertraline (ZOLOFT) 25 MG tablet, Take 25 mg by mouth Daily., Disp: , Rfl:       Objective:     Vitals:    01/22/21 1242   Weight: 85.2 kg (187 lb 12.8 oz)   Height: 165.1 cm (65\")     Body mass index is 31.25 kg/m².    PHYSICAL EXAM:    Constitutional:       General: Not in acute distress.     Appearance: Normal appearance. Well-developed.   Eyes:      Pupils: Pupils are equal, round, and reactive to light.   HENT:      Head: Normocephalic.   Neck:      Musculoskeletal: Normal range of motion and neck supple. No edema.      Vascular: No carotid bruit or JVD.   Pulmonary:      Effort: Pulmonary effort is normal. No tachypnea.      Breath sounds: Normal breath sounds. No wheezing. No rales.   Cardiovascular:      Normal rate. Regular rhythm.      No gallop.   Pulses:     Intact distal pulses.   Abdominal:      General: Bowel sounds are normal.      Palpations: Abdomen is soft.      Tenderness: There is no abdominal tenderness.   Musculoskeletal: Normal range of motion.   Skin:     General: Skin is warm and dry.   Neurological:      Mental Status: Alert and oriented to person, place, and time.           ECG 12 Lead    Date/Time: 1/22/2021 1:47 PM  Performed by: Miguelina Hurst APRN  Authorized by: Miguelina Hurst APRN   Comparison: compared with previous ECG from 8/7/2020  Similar to previous ECG  Rhythm: sinus rhythm  Rate: normal  QRS axis: normal    Clinical impression: normal ECG              Assessment:       Diagnosis Plan   1. Idiopathic sleep related nonobstructive alveolar hypoventilation  Ambulatory Referral to Sleep Medicine   2. Simple chronic bronchitis (CMS/HCC)     3. Paroxysmal atrial fibrillation (CMS/HCC)     4. Essential hypertension     5. Vertigo       Orders Placed This Encounter   Procedures   • " Ambulatory Referral to Sleep Medicine     Referral Priority:   Routine     Referral Type:   Consultation     Referral Reason:   Specialty Services Required     Referred to Provider:   Patrick Dong MD     Requested Specialty:   Sleep Medicine     Number of Visits Requested:   1   • ECG 12 Lead     This order was created via procedure documentation          Plan:       I think Kiley has a couple things going on.  I think she has some positional vertigo.  She has seen an ear nose and throat doctor in the past and was recommended to have Epley treatments.  I have encouraged her to go do that.  The other thing is some of the medicine could be contributing to her symptoms.  In terms of her fatigue she is very stressed.  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  She has not had a break or any time for herself.  She does not sleep well at night she wakes up through the night with her mind racing.  I think some of this could also be depression she also was supposed to get tested for sleep apnea last year but due to Covid it was canceled some get a recommend she do that.  Her primary care doctor also prescribed Zoloft for her and I encouraged her to give it a try.  I also encouraged her to try to get some help from her kids.  I am going to stop her flecainide we will see how she feels if she goes back in A. fib I told her there are other rhythm options such as amiodarone but she did not care for the side effects from that and would like to go back on flecainide if she goes back into A. fib.    I spent 30minutes preparing, obtaining and reviewing patient's history and previous testing, seeing the patient and examination, counseling and educating and coordinating care.       Your medication list          Accurate as of January 22, 2021  1:48 PM. If you have any questions, ask your nurse or doctor.            CONTINUE  taking these medications      Instructions Last Dose Given Next Dose Due   Eliquis 5 MG tablet tablet  Generic drug: apixaban      TAKE ONE TABLET BY MOUTH EVERY 12 HOURS       ferrous sulfate 325 (65 FE) MG tablet      Take  by mouth.       losartan 100 MG tablet  Commonly known as: COZAAR      Take 1 tablet by mouth Daily.       meclizine 25 MG tablet  Commonly known as: ANTIVERT      Take 25 mg by mouth 3 (Three) Times a Day As Needed for dizziness.       methylPREDNISolone 4 MG dose pack  Commonly known as: MEDROL      Use as directed by package instructions       metoprolol succinate XL 50 MG 24 hr tablet  Commonly known as: TOPROL-XL      1 tablet Daily.       metoprolol tartrate 25 MG tablet  Commonly known as: LOPRESSOR      Take 1 tablet by mouth Daily.       multivitamin with minerals tablet tablet      Take 1 tablet by mouth Daily.       oxyCODONE-acetaminophen 7.5-325 MG per tablet  Commonly known as: PERCOCET      Take 1 tablet by mouth As Needed.       sertraline 25 MG tablet  Commonly known as: ZOLOFT      Take 25 mg by mouth Daily.       TYLENOL ARTHRITIS PAIN PO      Take 650 mg by mouth 3 (Three) Times a Day As Needed.       VITAMIN B-12 CR PO      Take  by mouth.          STOP taking these medications    flecainide 50 MG tablet  Commonly known as: TAMBOCOR  Stopped by: SARITHA Richmond                 As always, it has been a pleasure to participate in your patient's care.      Sincerely,     Miguelina MELARA

## 2021-01-26 ENCOUNTER — TELEPHONE (OUTPATIENT)
Dept: CARDIOLOGY | Facility: CLINIC | Age: 76
End: 2021-01-26

## 2021-01-26 NOTE — TELEPHONE ENCOUNTER
Pt called stating she had rapid heartbeat monday night so she restarted her flecanide. Also needs refill on metoprolol states she will stay on the medication since it helps with her problem she will deal with the side effects. Stated eliquis is over $300 and cant afford. I called pharmacy and they stated her deductible is $325 then it would be about $35 every copay after that. Pt stated last time they went with this option they ended up in a donut hole and the price increased gradually up to over one hundred dollars throughout the year. Her  is already on eliquis and they have to pay for his medication also. Any other suggestions.  Please advise      DA

## 2021-01-27 ENCOUNTER — TELEPHONE (OUTPATIENT)
Dept: CARDIOLOGY | Facility: CLINIC | Age: 76
End: 2021-01-27

## 2021-01-27 DIAGNOSIS — D63.0 ANEMIA ASSOCIATED WITH MULTIPLE MYELOMA TREATED WITH ERYTHROPOIETIN (HCC): ICD-10-CM

## 2021-01-27 DIAGNOSIS — C90.00 ANEMIA ASSOCIATED WITH MULTIPLE MYELOMA TREATED WITH ERYTHROPOIETIN (HCC): ICD-10-CM

## 2021-01-27 RX ORDER — METOPROLOL SUCCINATE 50 MG/1
50 TABLET, EXTENDED RELEASE ORAL DAILY
Qty: 30 TABLET | Refills: 11 | Status: SHIPPED | OUTPATIENT
Start: 2021-01-27 | End: 2022-04-04

## 2021-01-27 NOTE — TELEPHONE ENCOUNTER
Spoke with patient in regards to her medications. She has stopped her Flecanide last week and saw no improvement in her vertigo. I told her to go back to Dr. Weeks who has been treating her vestibular dysfunction she may need some therapy. She wants to continue on the metoprolol and flecanide for now. I will refill them for her.

## 2021-01-28 ENCOUNTER — TELEPHONE (OUTPATIENT)
Dept: CARDIOLOGY | Facility: CLINIC | Age: 76
End: 2021-01-28

## 2021-03-04 DIAGNOSIS — Z23 IMMUNIZATION DUE: ICD-10-CM

## 2021-03-24 ENCOUNTER — OFFICE VISIT (OUTPATIENT)
Dept: SLEEP MEDICINE | Facility: HOSPITAL | Age: 76
End: 2021-03-24

## 2021-03-24 VITALS
OXYGEN SATURATION: 97 % | HEART RATE: 68 BPM | SYSTOLIC BLOOD PRESSURE: 171 MMHG | HEIGHT: 65 IN | WEIGHT: 189 LBS | BODY MASS INDEX: 31.49 KG/M2 | DIASTOLIC BLOOD PRESSURE: 80 MMHG

## 2021-03-24 DIAGNOSIS — G47.30 HYPERSOMNIA WITH SLEEP APNEA: Primary | ICD-10-CM

## 2021-03-24 DIAGNOSIS — G47.34 IDIOPATHIC SLEEP RELATED NONOBSTRUCTIVE ALVEOLAR HYPOVENTILATION: ICD-10-CM

## 2021-03-24 DIAGNOSIS — G47.10 HYPERSOMNIA WITH SLEEP APNEA: Primary | ICD-10-CM

## 2021-03-24 PROCEDURE — 99204 OFFICE O/P NEW MOD 45 MIN: CPT | Performed by: INTERNAL MEDICINE

## 2021-03-24 PROCEDURE — G0463 HOSPITAL OUTPT CLINIC VISIT: HCPCS

## 2021-03-24 NOTE — PROGRESS NOTES
[facility] New Patient[facility]Consultation       Reason for Consultation: NEW    Patient Care Team:  Amelia Latif MD as PCP - Internal Medicine  Miguelina Hurst APRN as Nurse Practitioner (Nurse Practitioner)  Patrick Dong MD as Consulting Physician (Sleep Medicine)    Chief complaint: Frequent awakenings and complaints of hypersomnolence    History of present illness:    Thank you for asking me to see your patient.  The patient is a 75 y.o. female who describes difficulty going to sleep and once asleep, she awakens every 2 hours.  It takes her some time to go back to sleep.  Therefore, she wakes up tired.  This started about 4 years ago.  The patient also has atrial fibrillation.  The patient goes to bed between 10:11 PM and awakens at 7 AM.  Flushing Sleepiness Scale is normal at 3.  Patient has lost 35 pounds in the last several years.  Recently she has been told that she stops breathing and recently she is awakened gasping for breath.  She will awaken with a dry mouth at times.  Occasionally she will sweat some.  She will use the restroom 4 times during the nighttime.    Review of Systems:    A complete review of systems was done and all were negative with the exception of frequent urination, nasal congestion and postnasal drip, some hoarseness, arthritis complaints, occasional cough, some anxiety and some depression.    History:  Past Medical History:   Diagnosis Date   • Arthritis    • Asthma     NO INHALERS   • Atrial fibrillation (CMS/[facility])    • Clostridium difficile infection 09/20/2019   • Colon polyps 08/01/2019    Transverse colon: tubular adenoma, descending colon: fragments of tubular adenoma, sigmoid colon: ischemic eroded hyperplastic polyp   • COPD (chronic obstructive pulmonary disease) (CMS/HCC)    • Diverticulitis    • Diverticulosis    • ESBL (extended spectrum beta-lactamase) producing bacteria infection    • History of abscess of skin and subcutaneous tissue     ABD WOUND  5/2017   • History of atrial fibrillation      X1 POST OP FROM COLOSTOMY 5/2017 - NO PROBLEMS SINCE   • Hypertension    • MDRO (multiple drug resistant organisms) resistance    • PAF (paroxysmal atrial fibrillation) (CMS/HCC)    • PONV (postoperative nausea and vomiting)    • Psoriasis    • UTI (urinary tract infection)    • Vertigo    ,   Past Surgical History:   Procedure Laterality Date   • BELPHAROPTOSIS REPAIR Bilateral 04/27/2017    Bilateral brow ptosis repair via the anterior hairline approach under monitored local anesthesia-Andrez Craven   • BLEPHAROPLASTY Bilateral 04/27/2017   • CATARACT EXTRACTION     • COLON RESECTION N/A 5/3/2017    Procedure: OPEN SIMOID COLECTOMY WITH COLOSTOMY;  Surgeon: Renato Delgado MD;  Location: [facility] MAIN OR;  Service:    • COLONOSCOPY N/A 9/13/2017    Procedure: COLONOSCOPY VIA COLOSTOMY TO CECUM;  Surgeon: Renato Delgado MD;  [facility];  Service:    • COLONOSCOPY N/A 8/1/2019    Procedure: COLONOSCOPY to cecum and TI with biopsy / hot snare polypectomies;  Surgeon: Dalton Vela Jr., MD;  Location: [facility] ENDOSCOPY;  Service: General   • COLONOSCOPY N/A 01/04/2010    Andrez Trinidad   • COLOSTOMY CLOSURE N/A 9/14/2017    Procedure: COLOSTOMY TAKEDOWN AND CLOSURE, WITH RESECTION;  Surgeon: Renato Delgado MD;  Location: [facility] MAIN OR;  Service:    • ENDOSCOPY AND COLONOSCOPY N/A 10/31/2014    Normal EGD and colonoscopy, repeat c-scope in 5 years-Andrez Trinidad   • EXPLORATORY LAPAROTOMY N/A 9/8/2019    Procedure: Exploratory laparotomy with lysis of adhesions;  Surgeon: Trini Wade MD;  Location: [facility] OR;  Service: General   • FINGER SURGERY Left     4TH FINGER LEFT HAND   • HYSTERECTOMY Bilateral    • LAPAROSCOPIC CHOLECYSTECTOMY W/ CHOLANGIOGRAPHY N/A 07/15/2003    Dr. Amrit Martinez, [facility]   • SALPINGO OOPHORECTOMY Bilateral 02/02/2006    Abdominal sacral colpopexy, bilateral  "salpingo-oophorectomy, tension free vaginal tape, cystoscopy-Dr. Devika Bradley, [facility]   • THORACENTESIS Right 2017    US-guided right thoracentesis-Dr. Juan Yuen, [facility]   • TONSILLECTOMY Bilateral    • UPPER GASTROINTESTINAL ENDOSCOPY N/A 10/08/2007    Esophageal mucosal changes suspicious for short-segment Olivera's esphagus, gastric mucosal abnormality characterized by erythema: biopsied, NERD disease present, high likelihood of gastritis-Dr. Mayank Knapp, [facility]   ,   Family History   Problem Relation Age of Onset   • Cancer Mother    • Colon cancer Mother    • Diabetes Father    • Diabetes Son    • Ulcerative colitis Son    • No Known Problems Maternal Grandmother    • No Known Problems Maternal Grandfather    • No Known Problems Paternal Grandmother    • No Known Problems Paternal Grandfather    • Malig Hyperthermia Neg Hx     and   Social History     Socioeconomic History   • Marital status:      Spouse name: Not on file   • Number of children: Not on file   • Years of education: Not on file   • Highest education level: Not on file   Tobacco Use   • Smoking status: Former Smoker     Types: Cigarettes     Quit date:      Years since quittin.2   • Smokeless tobacco: Never Used   • Tobacco comment: SOCIAL SMOKING ONLY   Substance and Sexual Activity   • Alcohol use: Yes     Alcohol/week: 2.0 standard drinks     Types: 1 Glasses of wine, 1 Shots of liquor per week     Comment: occasionally/caffeine use    • Drug use: No   • Sexual activity: Defer     E-cigarette/Vaping     E-cigarette/Vaping Substances     E-cigarette/Vaping Devices         Social History: 1-2 cups of coffee a day    Allergies:  Epinephrine and Penicillins     Medication Review: Her list was reviewed    Vital Signs:    Vitals:    21 0900   BP: 171/80   Pulse: 68   SpO2: 97%   Weight: 85.7 kg (189 lb)   Height: 165.1 cm (65\")      Body mass index is 31.45 kg/m².  Neck Circumference: 14.75 inches      Physical Exam:  "   Constitutional:  Well developed 75 y.o. female that appears in no apparent distress.  Awake & oriented times 3.  Normal mood with normal recent and remote memory and normal judgement.  Eyes:  Conjunctivae normal.  Oropharynx: Moist mucous membranes without exudate and a normal sized tongue and uvula and patent posterior pharyngeal opening and class II Mallampati airway  Neck: Trachea midline  Respiratory: Effort is not labored  Cardiovascular: Radial pulse regular  Musculoskeletal: Gait appears normal, no digital clubbing evident, no pre-tibial edema    Impression:   Hypersomnolence with restless sleep with symptoms and signs consistent with sleep disordered breathing; rule out obstructive sleep apnea    Plan:  Good sleep hygiene measures should be maintained.  Weight loss would be beneficial in this patient who is obese (Body mass index is 31.45 kg/m².) with obesity-related health conditions that include hypertension and atrial fibrillation.      Pathophysiology of NEW described to the patient.  Cardiovascular complications of untreated NEW also reviewed.  Additionally, described the relationship of atrial fibrillation and untreated obstructive sleep apnea    After reviewing all of the above, I would recommend and the patient is agreeable to proceed with a home sleep study.  Treatment options also described to the patient.  I answered all of her questions.    Thank you for requesting me to assist in this patient's care.    Patrick Dong MD  Sleep Medicine  03/28/21  21:17 EDT

## 2021-03-28 PROBLEM — G47.30 HYPERSOMNIA WITH SLEEP APNEA: Status: ACTIVE | Noted: 2021-03-28

## 2021-03-28 PROBLEM — G47.10 HYPERSOMNIA WITH SLEEP APNEA: Status: ACTIVE | Noted: 2021-03-28

## 2021-04-01 ENCOUNTER — APPOINTMENT (OUTPATIENT)
Dept: MAMMOGRAPHY | Facility: HOSPITAL | Age: 76
End: 2021-04-01

## 2021-04-13 ENCOUNTER — TELEPHONE (OUTPATIENT)
Dept: INTERNAL MEDICINE | Facility: CLINIC | Age: 76
End: 2021-04-13

## 2021-04-13 NOTE — TELEPHONE ENCOUNTER
Caller: Kiley Last    Relationship to patient: Self    Best call back number: [phone]    Type of visit: NEW PATIENT         Additional notes:FIRST AVAILABLE NEW PATIENT APPOINTMENT IS FEB 2022.  IF THIS CAN BE SCHEDULED ANY SOONER, PLEASE CALL PATIENT.

## 2021-04-14 ENCOUNTER — OFFICE VISIT (OUTPATIENT)
Dept: INTERNAL MEDICINE | Facility: CLINIC | Age: 76
End: 2021-04-14

## 2021-04-14 VITALS
DIASTOLIC BLOOD PRESSURE: 70 MMHG | SYSTOLIC BLOOD PRESSURE: 132 MMHG | TEMPERATURE: 97.1 F | HEIGHT: 65 IN | WEIGHT: 182 LBS | HEART RATE: 74 BPM | BODY MASS INDEX: 30.32 KG/M2

## 2021-04-14 DIAGNOSIS — I48.0 PAROXYSMAL ATRIAL FIBRILLATION (HCC): Primary | ICD-10-CM

## 2021-04-14 DIAGNOSIS — G47.10 HYPERSOMNIA WITH SLEEP APNEA: ICD-10-CM

## 2021-04-14 DIAGNOSIS — R42 VERTIGO: ICD-10-CM

## 2021-04-14 DIAGNOSIS — Z87.898 HISTORY OF PREDIABETES: ICD-10-CM

## 2021-04-14 DIAGNOSIS — F41.9 ANXIETY AND DEPRESSION: ICD-10-CM

## 2021-04-14 DIAGNOSIS — G47.30 HYPERSOMNIA WITH SLEEP APNEA: ICD-10-CM

## 2021-04-14 DIAGNOSIS — M25.511 CHRONIC PAIN OF BOTH SHOULDERS: ICD-10-CM

## 2021-04-14 DIAGNOSIS — M25.512 CHRONIC PAIN OF BOTH SHOULDERS: ICD-10-CM

## 2021-04-14 DIAGNOSIS — F32.A ANXIETY AND DEPRESSION: ICD-10-CM

## 2021-04-14 DIAGNOSIS — G89.29 CHRONIC PAIN OF BOTH SHOULDERS: ICD-10-CM

## 2021-04-14 PROBLEM — Z12.11 ENCOUNTER FOR SCREENING COLONOSCOPY: Status: RESOLVED | Noted: 2019-07-05 | Resolved: 2021-04-14

## 2021-04-14 PROBLEM — H91.22 SUDDEN IDIOPATHIC HEARING LOSS OF LEFT EAR WITH UNRESTRICTED HEARING OF RIGHT EAR: Status: ACTIVE | Noted: 2021-04-14

## 2021-04-14 PROBLEM — IMO0002 ABDOMINAL ABSCESS: Status: RESOLVED | Noted: 2017-05-26 | Resolved: 2021-04-14

## 2021-04-14 PROCEDURE — 99204 OFFICE O/P NEW MOD 45 MIN: CPT | Performed by: INTERNAL MEDICINE

## 2021-04-14 RX ORDER — CHOLECALCIFEROL (VITAMIN D3) 125 MCG
10 CAPSULE ORAL
COMMUNITY

## 2021-04-14 RX ORDER — FLECAINIDE ACETATE 50 MG/1
50 TABLET ORAL EVERY 12 HOURS
COMMUNITY
End: 2021-04-27 | Stop reason: SDUPTHER

## 2021-04-14 NOTE — PROGRESS NOTES
"Chief Complaint  Hypertension (New PT visit)    Subjective          Kiley Last presents to [facility] PRIMARY CARE  History of Present Illness  Here to establish- multiple medical issues.  She has chronic a fib being addressed by Dr. Kearns.  Tolerates well.    Recurrent UTI- due to h/o abx therapy she developed c dif- followed by Dr. Rossana Evans.    2019- SBO, partial resection, multiple abscesses.  Colostomy and reversal done.  Has some neck and arm pain chronically- had cortisone inj in both shoulders and had good relief for about 3 months.  Has to do a lot of caretaking for her , lifting, etc.  They also recommended PT for her back- traction- but she hasn't had time to do it yet.   Dr. Latif recommended she try sertraline at low dose- she didn't start for fear of it making her dizziness worse.  She does get overwhelmed but denies true depressive type symptoms.   She has a sleep study scheduled for hypersomnia, trouble maintaining sleep.    Objective   Vital Signs:   /70   Pulse 74   Temp 97.1 °F (36.2 °C)   Ht 165.1 cm (65\")   Wt 82.6 kg (182 lb)   BMI 30.29 kg/m²     Physical Exam  Constitutional:       General: She is not in acute distress.     Appearance: Normal appearance.   Cardiovascular:      Rate and Rhythm: Normal rate and regular rhythm.   Pulmonary:      Effort: Pulmonary effort is normal.      Breath sounds: Normal breath sounds.   Musculoskeletal:      Comments: Tension over upper back with diminished ROM of shoulders and neck   Neurological:      General: No focal deficit present.        Result Review :   The following data was reviewed by: Miranda Morgan MD on 04/14/2021:    Data reviewed: previous extensive records from [facility]          Assessment and Plan    Diagnoses and all orders for this visit:    1. Paroxysmal atrial fibrillation (CMS/HCC) (Primary)  Comments:  stable, no changes recommended today    2. Vertigo  Comments:  stable, could consider " vestibular rehab if returns    3. History of prediabetes  Comments:  follow labs    4. Hypersomnia with sleep apnea  Comments:  agree with sleep study    5. Chronic pain of both shoulders  Comments:  likey OA and taking care of her  related.  She will go see Dr. Vela again and talk to her about therapy    6. Anxiety and depression  Comments:  does not want meds at this point, encouraged her to get some help at home and take more time away for herself.         Follow Up   Return in about 3 months (around 7/14/2021) for Medicare Wellness.  Patient was given instructions and counseling regarding her condition or for health maintenance advice. Please see specific information pulled into the AVS if appropriate.

## 2021-04-18 NOTE — TELEPHONE ENCOUNTER
"    Reason for Disposition  • General information question, no triage required and triager able to answer question    Additional Information  • Negative: [1] Caller is not with the adult (patient) AND [2] reporting urgent symptoms  • Negative: Lab result questions  • Negative: Medication questions  • Negative: Caller cannot be reached by phone  • Negative: Caller has already spoken to PCP or another triager  • Negative: RN needs further essential information from caller in order to complete triage  • Negative: Requesting regular office appointment  • Negative: [1] Caller requesting NON-URGENT health information AND [2] PCP's office is the best resource  • Negative: Health Information question, no triage required and triager able to answer question    Answer Assessment - Initial Assessment Questions  1. REASON FOR CALL or QUESTION: \"What is your reason for calling today?\" or \"How can I best help you?\" or \"What question do you have that I can help answer?\"      Taking antibiotic Vancomycin, asking about probiotics, the  nurse recommended she take them. Asking for recommendation of brand to take.  Discussed so many different brands, advised to speak with pharmacist for suggestions.    Protocols used: INFORMATION ONLY CALL-ADULT-      "
bed rails

## 2021-04-20 ENCOUNTER — HOSPITAL ENCOUNTER (OUTPATIENT)
Dept: SLEEP MEDICINE | Facility: HOSPITAL | Age: 76
Discharge: HOME | End: 2021-04-20
Admitting: INTERNAL MEDICINE

## 2021-04-20 DIAGNOSIS — G47.30 HYPERSOMNIA WITH SLEEP APNEA: ICD-10-CM

## 2021-04-20 DIAGNOSIS — G47.10 HYPERSOMNIA WITH SLEEP APNEA: ICD-10-CM

## 2021-04-20 PROCEDURE — 95806 SLEEP STUDY UNATT&RESP EFFT: CPT

## 2021-04-20 PROCEDURE — 95806 SLEEP STUDY UNATT&RESP EFFT: CPT | Performed by: INTERNAL MEDICINE

## 2021-04-27 RX ORDER — FLECAINIDE ACETATE 50 MG/1
50 TABLET ORAL EVERY 12 HOURS
Qty: 60 TABLET | Refills: 11 | Status: SHIPPED | OUTPATIENT
Start: 2021-04-27 | End: 2022-04-25

## 2021-05-07 ENCOUNTER — OFFICE VISIT (OUTPATIENT)
Dept: CARDIOLOGY | Facility: CLINIC | Age: 76
End: 2021-05-07

## 2021-05-07 VITALS
SYSTOLIC BLOOD PRESSURE: 140 MMHG | HEIGHT: 65 IN | WEIGHT: 190 LBS | DIASTOLIC BLOOD PRESSURE: 70 MMHG | OXYGEN SATURATION: 97 % | BODY MASS INDEX: 31.65 KG/M2 | HEART RATE: 69 BPM

## 2021-05-07 DIAGNOSIS — I10 ESSENTIAL HYPERTENSION: ICD-10-CM

## 2021-05-07 DIAGNOSIS — I48.0 PAROXYSMAL ATRIAL FIBRILLATION (HCC): Primary | ICD-10-CM

## 2021-05-07 DIAGNOSIS — R42 VERTIGO: ICD-10-CM

## 2021-05-07 DIAGNOSIS — I47.1 ATRIAL TACHYCARDIA (HCC): ICD-10-CM

## 2021-05-07 PROCEDURE — 93000 ELECTROCARDIOGRAM COMPLETE: CPT | Performed by: NURSE PRACTITIONER

## 2021-05-07 PROCEDURE — 99214 OFFICE O/P EST MOD 30 MIN: CPT | Performed by: NURSE PRACTITIONER

## 2021-05-07 NOTE — PROGRESS NOTES
Date of Office Visit: 2021  Encounter Provider: SARITHA Richmond  Place of Service: [facility] CARDIOLOGY  Patient Name: Kiley Last  :1945    Chief Complaint   Patient presents with   • IRREGULAR HEARTBEAT   :     HPI:Kiley Last is a 74-year-old female who is a patient of Dr. Jonesn is known to me from previous.  She has a history of paroxysmal A. fib with recent stress test and echo that were stable.  She was in the hospital with A. fib and had been having some vertigo.  We put her on Rythmol as well as increased her metoprolol but she continued to not do well and we ended up placing her on flecainide.  That has stopped her A. fib but she has a lot of complaints of muscle aches in her shoulders as well as fatigue.  Is worse after she takes her medicines.  She also is on Eliquis for anticoagulation.  At her last appointment she was complaining of fatigue and we cut back her metoprolol to 25 a day.  We did not really want to stop her flecainide because of the stability of her heart rhythm we also discussed stopping her Crestor to see if that helped her as well.    At her last appointment she had been having some issues with dizziness and palpitations.  She had worn a heart monitor and it showed that frequent PACs.  I also thought some of her symptoms are coming from stress as well as her positional vertigo.  Center for sleep apnea test due to daytime sleepiness.  She currently is going to require an in office test which is happening in 2 weeks.  She for the most part is feeling really good her  is getting better he is able to now doing things more independently which has given her some relief.  She hopes to go to visit her sister at the end of .  She had a stress test and echo both of which were stable 2 years ago and a stable vascular screening with no significant peripheral vascular disease.  Previous testing and notes have been reviewed by  me.   Past Medical History:   Diagnosis Date   • Arthritis    • Asthma     NO INHALERS   • Atrial fibrillation (CMS/HCC)    • Clostridium difficile infection 09/20/2019   • Colon polyps 08/01/2019    Transverse colon: tubular adenoma, descending colon: fragments of tubular adenoma, sigmoid colon: ischemic eroded hyperplastic polyp   • COPD (chronic obstructive pulmonary disease) (CMS/HCC)    • Diverticulitis    • Diverticulosis    • ESBL (extended spectrum beta-lactamase) producing bacteria infection    • History of abscess of skin and subcutaneous tissue     ABD WOUND 5/2017   • History of atrial fibrillation      X1 POST OP FROM COLOSTOMY 5/2017 - NO PROBLEMS SINCE   • Hypertension    • MDRO (multiple drug resistant organisms) resistance    • PAF (paroxysmal atrial fibrillation) (CMS/HCC)    • PONV (postoperative nausea and vomiting)    • Psoriasis    • UTI (urinary tract infection)    • Vertigo        Past Surgical History:   Procedure Laterality Date   • BELPHAROPTOSIS REPAIR Bilateral 04/27/2017    Bilateral brow ptosis repair via the anterior hairline approach under monitored local anesthesia-Andrez Craven   • BLEPHAROPLASTY Bilateral 04/27/2017   • CATARACT EXTRACTION     • COLON RESECTION N/A 5/3/2017    Procedure: OPEN SIMOID COLECTOMY WITH COLOSTOMY;  Surgeon: Renato Delgado MD;  Location: [facility] OR;  Service:    • COLONOSCOPY N/A 9/13/2017    Procedure: COLONOSCOPY VIA COLOSTOMY TO CECUM;  Surgeon: Renato Delgado MD;  Location: [facility] ENDOSCOPY;  Service:    • COLONOSCOPY N/A 8/1/2019    Procedure: COLONOSCOPY to cecum and TI with biopsy / hot snare polypectomies;  Surgeon: Dalton Vela Jr., MD;  Location: [facility] ENDOSCOPY;  Service: General   • COLONOSCOPY N/A 01/04/2010    Andrez Trinidad   • COLOSTOMY CLOSURE N/A 9/14/2017    Procedure: COLOSTOMY TAKEDOWN AND CLOSURE, WITH RESECTION;  Surgeon: Renato Delgado MD;  Location: [facility] MAIN OR;  Service:    • ENDOSCOPY  AND COLONOSCOPY N/A 10/31/2014    Normal EGD and colonoscopy, repeat c-scope in 5 years-Andrez Trinidad   • EXPLORATORY LAPAROTOMY N/A 2019    Procedure: Exploratory laparotomy with lysis of adhesions;  Surgeon: Trini Wade MD;  Location: [facility] OR;  Service: General   • FINGER SURGERY Left     4TH FINGER LEFT HAND   • HYSTERECTOMY Bilateral    • LAPAROSCOPIC CHOLECYSTECTOMY W/ CHOLANGIOGRAPHY N/A 07/15/2003    Dr. Amrit Martinez, [facility]   • SALPINGO OOPHORECTOMY Bilateral 2006    Abdominal sacral colpopexy, bilateral salpingo-oophorectomy, tension free vaginal tape, cystoscopy-Dr. Devika Bradley, [facility]   • THORACENTESIS Right 2017    US-guided right thoracentesis-Dr. Juan Yuen, [facility]   • TONSILLECTOMY Bilateral    • UPPER GASTROINTESTINAL ENDOSCOPY N/A 10/08/2007    Esophageal mucosal changes suspicious for short-segment Olivera's esphagus, gastric mucosal abnormality characterized by erythema: biopsied, NERD disease present, high likelihood of gastritis-Dr. Mayank Knapp, [facility]       Social History     Socioeconomic History   • Marital status:      Spouse name: Not on file   • Number of children: Not on file   • Years of education: Not on file   • Highest education level: Not on file   Tobacco Use   • Smoking status: Former Smoker     Types: Cigarettes     Quit date: 1967     Years since quittin.3   • Smokeless tobacco: Never Used   • Tobacco comment: SOCIAL SMOKING ONLY   Substance and Sexual Activity   • Alcohol use: Yes     Alcohol/week: 2.0 standard drinks     Types: 1 Glasses of wine, 1 Shots of liquor per week     Comment: occasionally/caffeine use    • Drug use: No   • Sexual activity: Defer       Family History   Problem Relation Age of Onset   • Cancer Mother    • Colon cancer Mother    • Diabetes Father    • Diabetes Son    • Ulcerative colitis Son    • No Known Problems Maternal Grandmother    • No Known Problems Maternal Grandfather    • No Known Problems  "Paternal Grandmother    • No Known Problems Paternal Grandfather    • Malig Hyperthermia Neg Hx        Review of Systems   Constitutional: Negative for diaphoresis and malaise/fatigue.   Cardiovascular: Negative for chest pain, claudication, dyspnea on exertion, irregular heartbeat, leg swelling, near-syncope, orthopnea, palpitations, paroxysmal nocturnal dyspnea and syncope.   Respiratory: Negative for cough, shortness of breath and sleep disturbances due to breathing.    Musculoskeletal: Negative for falls.   Neurological: Negative for dizziness and weakness.   Psychiatric/Behavioral: Negative for altered mental status and substance abuse.       Allergies   Allergen Reactions   • Epinephrine Palpitations   • Penicillins Other (See Comments)     BLISTERS IN MOUTH    Patient tolerated ceftriaxone during 5/2017 admission.            Current Outpatient Medications:   •  Acetaminophen (TYLENOL ARTHRITIS PAIN PO), Take 650 mg by mouth 3 (Three) Times a Day As Needed., Disp: , Rfl:   •  Eliquis 5 MG tablet tablet, TAKE ONE TABLET BY MOUTH EVERY 12 HOURS, Disp: 180 tablet, Rfl: 2  •  flecainide (TAMBOCOR) 50 MG tablet, Take 1 tablet by mouth Every 12 (Twelve) Hours., Disp: 60 tablet, Rfl: 11  •  losartan (COZAAR) 100 MG tablet, Take 1 tablet by mouth Daily., Disp: 90 tablet, Rfl: 3  •  meclizine (ANTIVERT) 25 MG tablet, Take 25 mg by mouth 3 (Three) Times a Day As Needed for dizziness., Disp: , Rfl:   •  melatonin 5 MG tablet tablet, Take 10 mg by mouth., Disp: , Rfl:   •  metoprolol succinate XL (TOPROL-XL) 50 MG 24 hr tablet, Take 1 tablet by mouth Daily., Disp: 30 tablet, Rfl: 11      Objective:     Vitals:    05/07/21 1305   BP: 140/70   Pulse: 69   SpO2: 97%   Weight: 86.2 kg (190 lb)   Height: 165.1 cm (65\")     Body mass index is 31.62 kg/m².    PHYSICAL EXAM:    Constitutional:       General: Not in acute distress.     Appearance: Normal appearance. Well-developed.   Eyes:      Pupils: Pupils are equal, round, and " reactive to light.   HENT:      Head: Normocephalic.   Neck:      Vascular: No carotid bruit or JVD.   Pulmonary:      Effort: Pulmonary effort is normal. No tachypnea.      Breath sounds: Normal breath sounds. No wheezing. No rales.   Cardiovascular:      Normal rate. Regular rhythm.      No gallop.   Pulses:     Intact distal pulses.   Abdominal:      General: Bowel sounds are normal.      Palpations: Abdomen is soft.      Tenderness: There is no abdominal tenderness.   Musculoskeletal: Normal range of motion.      Cervical back: Normal range of motion and neck supple. No edema. Skin:     General: Skin is warm and dry.   Neurological:      Mental Status: Alert and oriented to person, place, and time.           ECG 12 Lead    Date/Time: 5/7/2021 1:43 PM  Performed by: Miguelina Hurst APRN  Authorized by: Miguelina Hurst APRN   Comparison: compared with previous ECG from 1/22/2021  Similar to previous ECG  Rhythm: sinus rhythm  Rate: normal  QRS axis: normal    Clinical impression: normal ECG              Assessment:       Diagnosis Plan   1. Paroxysmal atrial fibrillation (CMS/HCC)     2. Essential hypertension     3. Atrial tachycardia (CMS/HCC)     4. Vertigo       Orders Placed This Encounter   Procedures   • ECG 12 Lead     This order was created via procedure documentation     Order Specific Question:   Release to patient     Answer:   Immediate          Plan:       I think Kiley is doing much better.  I encouraged her to do the outpatient sleep apnea test.  Sounds like the improvement in her  has helped her feel better.  She has had a follow-up with us in 6 months time.    I spent 30minutes preparing, obtaining and reviewing patient's history and previous testing, seeing the patient and examination, counseling and educating and coordinating care.       Your medication list          Accurate as of May 7, 2021  1:44 PM. If you have any questions, ask your nurse or doctor.            CONTINUE taking  these medications      Instructions Last Dose Given Next Dose Due   Eliquis 5 MG tablet tablet  Generic drug: apixaban      TAKE ONE TABLET BY MOUTH EVERY 12 HOURS       flecainide 50 MG tablet  Commonly known as: TAMBOCOR      Take 1 tablet by mouth Every 12 (Twelve) Hours.       losartan 100 MG tablet  Commonly known as: COZAAR      Take 1 tablet by mouth Daily.       meclizine 25 MG tablet  Commonly known as: ANTIVERT      Take 25 mg by mouth 3 (Three) Times a Day As Needed for dizziness.       melatonin 5 MG tablet tablet      Take 10 mg by mouth.       metoprolol succinate XL 50 MG 24 hr tablet  Commonly known as: TOPROL-XL      Take 1 tablet by mouth Daily.       TYLENOL ARTHRITIS PAIN PO      Take 650 mg by mouth 3 (Three) Times a Day As Needed.                As always, it has been a pleasure to participate in your patient's care.      Sincerely,     Miguelina MELARA

## 2021-05-11 ENCOUNTER — TELEPHONE (OUTPATIENT)
Dept: SLEEP MEDICINE | Facility: HOSPITAL | Age: 76
End: 2021-05-11

## 2021-05-11 DIAGNOSIS — G47.10 HYPERSOMNIA WITH SLEEP APNEA: Primary | ICD-10-CM

## 2021-05-11 DIAGNOSIS — G47.30 HYPERSOMNIA WITH SLEEP APNEA: Primary | ICD-10-CM

## 2021-05-12 ENCOUNTER — TELEPHONE (OUTPATIENT)
Dept: SLEEP MEDICINE | Facility: HOSPITAL | Age: 76
End: 2021-05-12

## 2021-05-12 DIAGNOSIS — G47.30 HYPERSOMNIA WITH SLEEP APNEA: Primary | ICD-10-CM

## 2021-05-12 DIAGNOSIS — G47.10 HYPERSOMNIA WITH SLEEP APNEA: Primary | ICD-10-CM

## 2021-05-12 NOTE — TELEPHONE ENCOUNTER
Messaged Dr. Dong for an order for an In Lab study as I had made an error in requesting an HST order.

## 2021-05-20 ENCOUNTER — HOSPITAL ENCOUNTER (OUTPATIENT)
Dept: SLEEP MEDICINE | Facility: HOSPITAL | Age: 76
Discharge: HOME | End: 2021-05-20
Admitting: INTERNAL MEDICINE

## 2021-05-20 DIAGNOSIS — G47.10 HYPERSOMNIA WITH SLEEP APNEA: ICD-10-CM

## 2021-05-20 DIAGNOSIS — G47.30 HYPERSOMNIA WITH SLEEP APNEA: ICD-10-CM

## 2021-05-20 PROCEDURE — 95810 POLYSOM 6/> YRS 4/> PARAM: CPT

## 2021-05-20 PROCEDURE — 95810 POLYSOM 6/> YRS 4/> PARAM: CPT | Performed by: INTERNAL MEDICINE

## 2021-05-26 ENCOUNTER — OFFICE VISIT (OUTPATIENT)
Dept: SLEEP MEDICINE | Facility: HOSPITAL | Age: 76
End: 2021-05-26

## 2021-05-26 VITALS — BODY MASS INDEX: 31.16 KG/M2 | WEIGHT: 187 LBS | HEIGHT: 65 IN

## 2021-05-26 DIAGNOSIS — G47.33 OSA (OBSTRUCTIVE SLEEP APNEA): Primary | ICD-10-CM

## 2021-05-26 PROCEDURE — G0463 HOSPITAL OUTPT CLINIC VISIT: HCPCS

## 2021-05-26 PROCEDURE — 99213 OFFICE O/P EST LOW 20 MIN: CPT | Performed by: INTERNAL MEDICINE

## 2021-05-26 NOTE — PROGRESS NOTES
Follow Up Sleep Disorders Center Note     Chief Complaint:  NEW     Primary Care Physician: Miranda Morgan MD    Interval History:   The patient is a 75 y.o. female  who I last saw 3/24/2021 and that note was reviewed.  Initially, home sleep study performed 2021.  No NEW and no sleep-related hypoxia identified.  The patient did not have a normal night sleep.  Subsequently, an attended overnight polysomnogram performed 2021.  Mild NEW with AHI 7 events per hour for total sleep time noted.  NEW mainly REM related moderate severity NEW with AHI 16.7 events per hour.  No sleep-related hypoxia.  The patient snored 5% of total sleep time.    The patient is unchanged because no therapy is initiated.  The patient goes to bed between 9 and 10 PM and awakens between 6:30 AM and 7 AM.  She will use the bathroom during the nighttime.  She also awakens because of discomfort in her shoulders and arms.  Amelia Sleepiness Scale is normal at 2.    Review of Systems:    A complete review of systems was done and all were negative with the exception of nasal congestion postnasal drip and some anxiety; at times, she has worry.    Social History:    Social History     Socioeconomic History   • Marital status:      Spouse name: Not on file   • Number of children: Not on file   • Years of education: Not on file   • Highest education level: Not on file   Tobacco Use   • Smoking status: Former Smoker     Types: Cigarettes     Quit date: 1967     Years since quittin.4   • Smokeless tobacco: Never Used   • Tobacco comment: SOCIAL SMOKING ONLY   Substance and Sexual Activity   • Alcohol use: Yes     Alcohol/week: 2.0 standard drinks     Types: 1 Glasses of wine, 1 Shots of liquor per week     Comment: occasionally/caffeine use    • Drug use: No   • Sexual activity: Defer       Allergies:  Epinephrine and Penicillins     Medication Review: Her list was reviewed.      Vital Signs:    Vitals:    21 0900   Weight: 84.8  "kg (187 lb)   Height: 165.1 cm (65\")     Body mass index is 31.12 kg/m².    Physical Exam:    Constitutional:  Well developed 75 y.o. female that appears in no apparent distress.  Awake & oriented times 3.  Normal mood with normal recent and remote memory and normal judgement.  Eyes:  Conjunctivae normal.  Oropharynx: Previously, moist mucous membranes without exudate and a normal sized tongue and uvula and patent posterior pharyngeal opening and class II Mallampati airway, patient is wearing a facemask.     I have reviewed the results of the home sleep study with her     Impression:   Mild obstructive sleep apnea for total sleep time, moderate severity during REM, by overnight polysomnogram 5/20/2021.  The patient has no complaints of hypersomnolence.      Plan:  Good sleep hygiene measures should be maintained.  Weight loss would be beneficial in this patient who is obese by BMI.      As stated, reviewed all with the patient.  After discussion, taking into account her cardiac history and symptoms and signs and findings of this home sleep study, auto CPAP recommended.  The patient is agreeable to auto CPAP and it will be set between 7 and 16 cm water pressure.  An appropriate interface should be selected.      I answered all of the patient's questions.  The patient will call for any problems and will follow up in 2 months.      Patrick Dong MD  Sleep Medicine  05/26/21  11:04 EDT        "

## 2021-05-30 PROBLEM — G47.30 HYPERSOMNIA WITH SLEEP APNEA: Status: RESOLVED | Noted: 2021-03-28 | Resolved: 2021-05-30

## 2021-05-30 PROBLEM — G47.33 OSA (OBSTRUCTIVE SLEEP APNEA): Status: ACTIVE | Noted: 2021-05-20

## 2021-05-30 PROBLEM — G47.10 HYPERSOMNIA WITH SLEEP APNEA: Status: RESOLVED | Noted: 2021-03-28 | Resolved: 2021-05-30

## 2021-06-04 ENCOUNTER — TELEPHONE (OUTPATIENT)
Dept: INTERNAL MEDICINE | Facility: CLINIC | Age: 76
End: 2021-06-04

## 2021-06-04 ENCOUNTER — TELEPHONE (OUTPATIENT)
Dept: SLEEP MEDICINE | Facility: HOSPITAL | Age: 76
End: 2021-06-04

## 2021-06-04 NOTE — TELEPHONE ENCOUNTER
Provider: DR NEGRETE    Caller: TOVA CAMPOVERDE    Relationship to Patient: PATIENT    Phone Number: [phone]    Reason for Call: PATIENT STATED SHE RECEIVED HER PREVIOUS PRIMARY CARE PROVIDER RECORDS ON A DISC AND WANTS TO KNOW IF IT IS POSSIBLE FOR THAT TO BE COPIED SO THAT THEY CAN KEEP A RECORD OF IT FOR HERSELF.     PLEASE CALL AND ADVISE.       ATTEMPTED TO WARM TRANSFER

## 2021-06-07 ENCOUNTER — OFFICE VISIT (OUTPATIENT)
Dept: INTERNAL MEDICINE | Facility: CLINIC | Age: 76
End: 2021-06-07

## 2021-06-07 ENCOUNTER — TELEPHONE (OUTPATIENT)
Dept: INTERNAL MEDICINE | Facility: CLINIC | Age: 76
End: 2021-06-07

## 2021-06-07 VITALS
TEMPERATURE: 97.3 F | HEART RATE: 84 BPM | HEIGHT: 65 IN | DIASTOLIC BLOOD PRESSURE: 66 MMHG | WEIGHT: 182 LBS | BODY MASS INDEX: 30.32 KG/M2 | SYSTOLIC BLOOD PRESSURE: 122 MMHG

## 2021-06-07 DIAGNOSIS — N39.0 CHRONIC UTI: Primary | ICD-10-CM

## 2021-06-07 DIAGNOSIS — M54.2 NECK PAIN: ICD-10-CM

## 2021-06-07 DIAGNOSIS — Z12.11 SCREEN FOR COLON CANCER: ICD-10-CM

## 2021-06-07 DIAGNOSIS — M85.88 OTHER SPECIFIED DISORDERS OF BONE DENSITY AND STRUCTURE, OTHER SITE: ICD-10-CM

## 2021-06-07 LAB
BILIRUB BLD-MCNC: NEGATIVE MG/DL
CLARITY, POC: CLEAR
COLOR UR: YELLOW
GLUCOSE UR STRIP-MCNC: NEGATIVE MG/DL
KETONES UR QL: NEGATIVE
LEUKOCYTE EST, POC: ABNORMAL
NITRITE UR-MCNC: NEGATIVE MG/ML
PH UR: 6 [PH] (ref 5–8)
PROT UR STRIP-MCNC: NEGATIVE MG/DL
RBC # UR STRIP: ABNORMAL /UL
SP GR UR: 1.01 (ref 1–1.03)
UROBILINOGEN UR QL: NORMAL

## 2021-06-07 PROCEDURE — 81003 URINALYSIS AUTO W/O SCOPE: CPT | Performed by: INTERNAL MEDICINE

## 2021-06-07 PROCEDURE — 99214 OFFICE O/P EST MOD 30 MIN: CPT | Performed by: INTERNAL MEDICINE

## 2021-06-07 NOTE — PROGRESS NOTES
"Chief Complaint  Urinary Tract Infection    Subjective          Kiley Last presents to [facility] PRIMARY CARE  History of Present Illness  Here for UTI symptoms- she is very nervous because of her history of c. Dif colitis.  She has had recurrent symptoms on and off for a long time- year at least.  She isn't sure the UTI ever really goes away.  There is often an odor to the urine.  She has a lot of pain in her shoulders- takes occ pain pills (old rx) that she needs- about once a week.  She is not sexually active-   Has had trouble with vertigo on and off for years- went to ENT ([facility]) for acute hearing loss in one ear- unknown cause.  Did vestibular testing in the office.  Lately she has been taking the meclizine almost daily for what she describes as \"near vertigo.\"      Objective   Vital Signs:   /66   Pulse 84   Temp 97.3 °F (36.3 °C)   Ht 165.1 cm (65\")   Wt 82.6 kg (182 lb)   BMI 30.29 kg/m²     Physical Exam  Constitutional:       General: She is not in acute distress.     Appearance: Normal appearance.   Cardiovascular:      Rate and Rhythm: Normal rate and regular rhythm.   Abdominal:      Tenderness: There is no right CVA tenderness or left CVA tenderness.   Musculoskeletal:      Cervical back: Normal range of motion. No rigidity. Tenderness: L base.      Right lower leg: No edema.      Left lower leg: No edema.   Psychiatric:         Mood and Affect: Mood normal.        Result Review :   The following data was reviewed by: Miranda Morgan MD on 06/07/2021:  UA    Urinalysis 6/7/21   Ketones, UA Negative   Leukocytes, UA Large (3+) (A)   (A) Abnormal value          2            Assessment and Plan    Diagnoses and all orders for this visit:    1. Chronic UTI (Primary)  Comments:  culture urine, add d-mannose, antibiotic only if needed and will discuss with Dr. Evans.  Orders:  -     POCT urinalysis dipstick, automated    2. Neck pain  Comments:  stress/anxiety driven!  Will " try PT to help and reassess as needed.   Orders:  -     Ambulatory Referral to Physical Therapy Evaluate and treat    3. Screen for colon cancer  -     Ambulatory Referral For Screening Colonoscopy    4. Other specified disorders of bone density and structure, other site  Comments:  check dexa  Orders:  -     DEXA Bone Density Axial        Follow Up   Return for Keep previously scheduled appointment.  Patient was given instructions and counseling regarding her condition or for health maintenance advice. Please see specific information pulled into the AVS if appropriate.

## 2021-06-10 ENCOUNTER — HOSPITAL ENCOUNTER (OUTPATIENT)
Dept: MAMMOGRAPHY | Facility: HOSPITAL | Age: 76
Discharge: HOME | End: 2021-06-10
Admitting: INTERNAL MEDICINE

## 2021-06-10 DIAGNOSIS — N39.0 CHRONIC UTI: Primary | ICD-10-CM

## 2021-06-10 DIAGNOSIS — Z12.31 VISIT FOR SCREENING MAMMOGRAM: ICD-10-CM

## 2021-06-10 PROCEDURE — 77063 BREAST TOMOSYNTHESIS BI: CPT

## 2021-06-10 PROCEDURE — 77067 SCR MAMMO BI INCL CAD: CPT

## 2021-06-14 ENCOUNTER — TELEPHONE (OUTPATIENT)
Dept: INTERNAL MEDICINE | Facility: CLINIC | Age: 76
End: 2021-06-14

## 2021-06-14 LAB
BACTERIA UR CULT: ABNORMAL
BACTERIA UR CULT: ABNORMAL
OTHER ANTIBIOTIC SUSC ISLT: ABNORMAL

## 2021-06-14 NOTE — TELEPHONE ENCOUNTER
PATIENT RETURNING A CALL TO HEIKE LOYOLA WAS UNABLE TO WARM TRANSFER CALL.    BEST CALL BACK # [phone]

## 2021-06-15 RX ORDER — SULFAMETHOXAZOLE AND TRIMETHOPRIM 800; 160 MG/1; MG/1
1 TABLET ORAL 2 TIMES DAILY
Qty: 6 TABLET | Refills: 0 | Status: SHIPPED | OUTPATIENT
Start: 2021-06-15 | End: 2021-07-19

## 2021-06-21 ENCOUNTER — TELEPHONE (OUTPATIENT)
Dept: INTERNAL MEDICINE | Facility: CLINIC | Age: 76
End: 2021-06-21

## 2021-06-21 NOTE — TELEPHONE ENCOUNTER
Caller: Kiley Last    Relationship: Self    Best call back number: [phone]    What is the medical concern/diagnosis: NECK, ARMS, SHOULDER PAIN     What specialty or service is being requested: PHYSICAL THERAPY     What is the provider, practice or medical service name: [facility]     What is the office location: [address] [place] [address]     What is the office phone number: [phone]    Any additional details: PATIENT STATED A REFERRAL WAS M[facility] TO [facility], [facility].

## 2021-07-19 ENCOUNTER — OFFICE VISIT (OUTPATIENT)
Dept: INTERNAL MEDICINE | Facility: CLINIC | Age: 76
End: 2021-07-19

## 2021-07-19 VITALS
HEIGHT: 65 IN | WEIGHT: 189 LBS | BODY MASS INDEX: 31.49 KG/M2 | TEMPERATURE: 97.1 F | SYSTOLIC BLOOD PRESSURE: 128 MMHG | HEART RATE: 70 BPM | DIASTOLIC BLOOD PRESSURE: 64 MMHG

## 2021-07-19 DIAGNOSIS — R42 VERTIGO: ICD-10-CM

## 2021-07-19 DIAGNOSIS — Z00.00 MEDICARE ANNUAL WELLNESS VISIT, SUBSEQUENT: ICD-10-CM

## 2021-07-19 DIAGNOSIS — E55.9 VITAMIN D DEFICIENCY: ICD-10-CM

## 2021-07-19 DIAGNOSIS — G47.33 OSA (OBSTRUCTIVE SLEEP APNEA): ICD-10-CM

## 2021-07-19 DIAGNOSIS — I48.0 PAROXYSMAL ATRIAL FIBRILLATION (HCC): Primary | ICD-10-CM

## 2021-07-19 DIAGNOSIS — D64.9 ANEMIA, UNSPECIFIED TYPE: ICD-10-CM

## 2021-07-19 DIAGNOSIS — I10 ESSENTIAL HYPERTENSION: ICD-10-CM

## 2021-07-19 PROBLEM — D63.0 ANEMIA ASSOCIATED WITH MULTIPLE MYELOMA TREATED WITH ERYTHROPOIETIN (HCC): Status: RESOLVED | Noted: 2017-05-26 | Resolved: 2021-07-19

## 2021-07-19 PROBLEM — C90.00 ANEMIA ASSOCIATED WITH MULTIPLE MYELOMA TREATED WITH ERYTHROPOIETIN: Status: RESOLVED | Noted: 2017-05-26 | Resolved: 2021-07-19

## 2021-07-19 PROBLEM — H57.819 EYEBROW PTOSIS: Status: RESOLVED | Noted: 2017-04-20 | Resolved: 2021-07-19

## 2021-07-19 PROCEDURE — G0439 PPPS, SUBSEQ VISIT: HCPCS | Performed by: INTERNAL MEDICINE

## 2021-07-19 RX ORDER — VITAMIN B COMPLEX
CAPSULE ORAL DAILY
COMMUNITY
End: 2022-07-19

## 2021-07-19 NOTE — PROGRESS NOTES
The ABCs of the Annual Wellness Visit  Subsequent Medicare Wellness Visit    Chief Complaint   Patient presents with   • Medicare Wellness-subsequent       Subjective   History of Present Illness:  Kiley Last is a 75 y.o. female who presents for a Subsequent Medicare Wellness Visit.  She just started PT for her shoulders/arms- too soon to tell if helpful.  She is continuing to care for her .  On occ, she needs to take flecanide early for her a fib- no change.    HEALTH RISK ASSESSMENT    Recent Hospitalizations:  No hospitalization(s) within the last year.    Current Medical Providers:  Patient Care Team:  Miranda Morgan MD as PCP - General (Internal Medicine)  Miguelina Hurst APRN as Nurse Practitioner (Nurse Practitioner)  Rich Kearns MD as Consulting Physician (Cardiology)  Patrick Weeks MD as Consulting Physician (Otolaryngology)  Rossana Evans MD as Consulting Physician (Infectious Diseases)  Patrick Dong MD as Consulting Physician (Pulmonary Disease)    Smoking Status:  Social History     Tobacco Use   Smoking Status Former Smoker   • Types: Cigarettes   • Quit date:    • Years since quittin.5   Smokeless Tobacco Never Used   Tobacco Comment    SOCIAL SMOKING ONLY       Alcohol Consumption:  Social History     Substance and Sexual Activity   Alcohol Use Yes   • Alcohol/week: 2.0 standard drinks   • Types: 1 Glasses of wine, 1 Shots of liquor per week    Comment: occasionally/caffeine use        Depression Screen:   PHQ-2/PHQ-9 Depression Screening 2021   Little interest or pleasure in doing things 0   Feeling down, depressed, or hopeless 0   Trouble falling or staying asleep, or sleeping too much 0   Feeling tired or having little energy 0   Poor appetite or overeating 0   Feeling bad about yourself - or that you are a failure or have let yourself or your family down 0   Trouble concentrating on things, such as reading the newspaper or watching television 0    Moving or speaking so slowly that other people could have noticed. Or the opposite - being so fidgety or restless that you have been moving around a lot more than usual 0   Thoughts that you would be better off dead, or of hurting yourself in some way 0   Total Score 0       Fall Risk Screen:  JOSELINE Fall Risk Assessment was completed, and patient is at LOW risk for falls.Assessment completed on:7/19/2021    Health Habits and Functional and Cognitive Screening:  Functional & Cognitive Status 7/19/2021   Do you have difficulty preparing food and eating? No   Do you have difficulty bathing yourself, getting dressed or grooming yourself? No   Do you have difficulty using the toilet? No   Do you have difficulty moving around from place to place? No   Do you have trouble with steps or getting out of a bed or a chair? No   Current Diet Well Balanced Diet   Dental Exam Not up to date   Eye Exam Up to date   Exercise (times per week) 0 times per week   Current Exercises Include No Regular Exercise   Do you need help using the phone?  No   Are you deaf or do you have serious difficulty hearing?  No   Do you need help with transportation? No   Do you need help shopping? No   Do you need help preparing meals?  No   Do you need help with housework?  No   Do you need help with laundry? No   Do you need help taking your medications? No   Do you need help managing money? No   Do you ever drive or ride in a car without wearing a seat belt? No   Have you felt unusual stress, anger or loneliness in the last month? No   Who do you live with? Spouse   If you need help, do you have trouble finding someone available to you? No   Have you been bothered in the last four weeks by sexual problems? No   Do you have difficulty concentrating, remembering or making decisions? No         Does the patient have evidence of cognitive impairment? No    Asprin use counseling:Does not need ASA (and currently is not on it)    Age-appropriate Screening  Schedule:  Refer to the list below for future screening recommendations based on patient's age, sex and/or medical conditions. Orders for these recommended tests are listed in the plan section. The patient has been provided with a written plan.    Health Maintenance   Topic Date Due   • TDAP/TD VACCINES (1 - Tdap) Never done   • ZOSTER VACCINE (1 of 2) Never done   • DXA SCAN  12/02/2016   • INFLUENZA VACCINE  08/01/2021   • MAMMOGRAM  06/10/2023          The following portions of the patient's history were reviewed and updated as appropriate: allergies, current medications, past family history, past medical history, past social history, past surgical history and problem list.    Outpatient Medications Prior to Visit   Medication Sig Dispense Refill   • Acetaminophen (TYLENOL ARTHRITIS PAIN PO) Take 650 mg by mouth 3 (Three) Times a Day As Needed.     • B Complex Vitamins (vitamin b complex) capsule capsule Take  by mouth Daily.     • Eliquis 5 MG tablet tablet TAKE ONE TABLET BY MOUTH EVERY 12 HOURS 180 tablet 2   • flecainide (TAMBOCOR) 50 MG tablet Take 1 tablet by mouth Every 12 (Twelve) Hours. 60 tablet 11   • losartan (COZAAR) 100 MG tablet Take 1 tablet by mouth Daily. 90 tablet 3   • meclizine (ANTIVERT) 25 MG tablet Take 25 mg by mouth 3 (Three) Times a Day As Needed for dizziness.     • melatonin 5 MG tablet tablet Take 10 mg by mouth.     • metoprolol succinate XL (TOPROL-XL) 50 MG 24 hr tablet Take 1 tablet by mouth Daily. 30 tablet 11   • sulfamethoxazole-trimethoprim (BACTRIM DS,SEPTRA DS) 800-160 MG per tablet Take 1 tablet by mouth 2 (Two) Times a Day. 6 tablet 0     No facility-administered medications prior to visit.       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 Patient has an advance directive (not in EMR), copy requested.    Review of Systems   Constitutional: Negative for unexpected weight change.   HENT: Negative for congestion and trouble swallowing.    Respiratory: Negative for cough, chest tightness and shortness of breath.    Cardiovascular: Negative for chest pain and leg swelling.   Gastrointestinal: Negative for abdominal pain.   Genitourinary: Negative for difficulty urinating.   Musculoskeletal: Positive for arthralgias. Negative for back pain.   Neurological: Negative for dizziness and headaches.   Psychiatric/Behavioral: Negative for dysphoric mood and sleep disturbance.       Compared to one year ago, the patient feels her physical health is worse.  Compared to one year ago, the patient feels her mental health is the same.    Reviewed chart for potential of high risk medication in the elderly: yes  Reviewed chart for potential of harmful drug interactions in the elderly:yes    Objective         Vitals:    07/19/21 1339   BP: 128/64   Pulse: 70   Temp: 97.1 °F (36.2 °C)   Weight: 85.7 kg (189 lb)   Height: 165.1 cm (65\")       Body mass index is 31.45 kg/m².  Discussed the patient's BMI with her. The BMI is above average; BMI management plan is completed.    Physical Exam  Constitutional:       Appearance: Normal appearance.   Cardiovascular:      Rate and Rhythm: Normal rate and regular rhythm.      Pulses: Normal pulses.   Pulmonary:      Effort: Pulmonary effort is normal.      Breath sounds: Normal breath sounds.   Chest:      Breasts:         Right: Normal.         " Left: Normal.   Musculoskeletal:      Right lower leg: No edema.      Left lower leg: No edema.               Assessment/Plan   Medicare Risks and Personalized Health Plan  CMS Preventative Services Quick Reference  Immunizations Discussed/Encouraged (specific immunizations; Tdap, Pneumococcal 23 and Shingrix )    The above risks/problems have been discussed with the patient.  Pertinent information has been shared with the patient in the After Visit Summary.  Follow up plans and orders are seen below in the Assessment/Plan Section.    Diagnoses and all orders for this visit:    1. Paroxysmal atrial fibrillation (CMS/[facility]) (Primary)  Comments:  tolerates well, anticoagulated  Orders:  -     Comprehensive Metabolic Panel    2. Vertigo  Comments:  stable- check labs to be sure no anemia.     3. Anemia, unspecified type  Comments:  as above  Orders:  -     CBC & Differential  -     Vitamin B12  -     TSH  -     Ferritin    4. Vitamin D deficiency  -     Vitamin D 25 Hydroxy    5. NEW (obstructive sleep apnea)  Comments:  encouraged her to get the CPAP started, I think she'll feel much better.     6. Essential hypertension  Comments:  well controlled    7. Medicare annual wellness visit, subsequent  Comments:  thinks she got all vacc at [facility]- will check - encourage daily time for herself.  Watch diet, check labs today.       Follow Up:  Return in about 6 months (around 1/19/2022) for Recheck, Lab Today.     An After Visit Summary and PPPS were given to the patient.

## 2021-07-20 LAB
25(OH)D3+25(OH)D2 SERPL-MCNC: 27.5 NG/ML (ref 30–100)
ALBUMIN SERPL-MCNC: 4.5 G/DL (ref 3.5–5.2)
ALBUMIN/GLOB SERPL: 1.6 G/DL
ALP SERPL-CCNC: 60 U/L (ref 39–117)
ALT SERPL-CCNC: 11 U/L (ref 1–33)
AST SERPL-CCNC: 18 U/L (ref 1–32)
BASOPHILS # BLD AUTO: 0.02 10*3/MM3 (ref 0–0.2)
BASOPHILS NFR BLD AUTO: 0.3 % (ref 0–1.5)
BILIRUB SERPL-MCNC: 0.4 MG/DL (ref 0–1.2)
BUN SERPL-MCNC: 13 MG/DL (ref 8–23)
BUN/CREAT SERPL: 25.5 (ref 7–25)
CALCIUM SERPL-MCNC: 10.4 MG/DL (ref 8.6–10.5)
CHLORIDE SERPL-SCNC: 103 MMOL/L (ref 98–107)
CO2 SERPL-SCNC: 27.4 MMOL/L (ref 22–29)
CREAT SERPL-MCNC: 0.51 MG/DL (ref 0.57–1)
EOSINOPHIL # BLD AUTO: 0.05 10*3/MM3 (ref 0–0.4)
EOSINOPHIL NFR BLD AUTO: 0.7 % (ref 0.3–6.2)
ERYTHROCYTE [DISTWIDTH] IN BLOOD BY AUTOMATED COUNT: 12.2 % (ref 12.3–15.4)
FERRITIN SERPL-MCNC: 234 NG/ML (ref 13–150)
GLOBULIN SER CALC-MCNC: 2.9 GM/DL
GLUCOSE SERPL-MCNC: 95 MG/DL (ref 65–99)
HCT VFR BLD AUTO: 40.4 % (ref 34–46.6)
HGB BLD-MCNC: 13.2 G/DL (ref 12–15.9)
IMM GRANULOCYTES # BLD AUTO: 0.03 10*3/MM3 (ref 0–0.05)
IMM GRANULOCYTES NFR BLD AUTO: 0.4 % (ref 0–0.5)
LYMPHOCYTES # BLD AUTO: 1.64 10*3/MM3 (ref 0.7–3.1)
LYMPHOCYTES NFR BLD AUTO: 24.3 % (ref 19.6–45.3)
MCH RBC QN AUTO: 28.9 PG (ref 26.6–33)
MCHC RBC AUTO-ENTMCNC: 32.7 G/DL (ref 31.5–35.7)
MCV RBC AUTO: 88.4 FL (ref 79–97)
MONOCYTES # BLD AUTO: 0.65 10*3/MM3 (ref 0.1–0.9)
MONOCYTES NFR BLD AUTO: 9.6 % (ref 5–12)
NEUTROPHILS # BLD AUTO: 4.35 10*3/MM3 (ref 1.7–7)
NEUTROPHILS NFR BLD AUTO: 64.7 % (ref 42.7–76)
NRBC BLD AUTO-RTO: 0 /100 WBC (ref 0–0.2)
PLATELET # BLD AUTO: 188 10*3/MM3 (ref 140–450)
POTASSIUM SERPL-SCNC: 4.7 MMOL/L (ref 3.5–5.2)
PROT SERPL-MCNC: 7.4 G/DL (ref 6–8.5)
RBC # BLD AUTO: 4.57 10*6/MM3 (ref 3.77–5.28)
SODIUM SERPL-SCNC: 140 MMOL/L (ref 136–145)
TSH SERPL DL<=0.005 MIU/L-ACNC: 1.86 UIU/ML (ref 0.27–4.2)
VIT B12 SERPL-MCNC: 415 PG/ML (ref 211–946)
WBC # BLD AUTO: 6.74 10*3/MM3 (ref 3.4–10.8)

## 2021-07-26 DIAGNOSIS — R42 VERTIGO: Primary | ICD-10-CM

## 2021-07-26 RX ORDER — MECLIZINE HYDROCHLORIDE 25 MG/1
25 TABLET ORAL 3 TIMES DAILY PRN
Qty: 90 TABLET | Refills: 0 | Status: SHIPPED | OUTPATIENT
Start: 2021-07-26 | End: 2022-01-31

## 2021-07-29 ENCOUNTER — TELEPHONE (OUTPATIENT)
Dept: INTERNAL MEDICINE | Facility: CLINIC | Age: 76
End: 2021-07-29

## 2021-07-29 NOTE — TELEPHONE ENCOUNTER
Caller: iKley Last    Relationship: Self    Best call back number: [phone]    Caller requesting test results: SELF     What test was performed: BLOOD WORK     When was the test performed: LAST Monday     Where was the test performed: IN OFFICE     Additional notes: PATIENT WOULD ALSO LIKE RESULTS TO BE MAILED OUT TO HER

## 2021-08-12 RX ORDER — LOSARTAN POTASSIUM 100 MG/1
TABLET ORAL
Qty: 90 TABLET | Refills: 3 | Status: SHIPPED | OUTPATIENT
Start: 2021-08-12 | End: 2022-08-11

## 2021-08-23 RX ORDER — APIXABAN 5 MG/1
TABLET, FILM COATED ORAL
Qty: 180 TABLET | Refills: 2 | Status: SHIPPED | OUTPATIENT
Start: 2021-08-23 | End: 2022-06-22 | Stop reason: SDUPTHER

## 2021-08-24 ENCOUNTER — TELEPHONE (OUTPATIENT)
Dept: SLEEP MEDICINE | Facility: HOSPITAL | Age: 76
End: 2021-08-24

## 2021-08-25 ENCOUNTER — APPOINTMENT (OUTPATIENT)
Dept: SLEEP MEDICINE | Facility: HOSPITAL | Age: 76
End: 2021-08-25

## 2021-08-30 ENCOUNTER — TELEPHONE (OUTPATIENT)
Dept: SLEEP MEDICINE | Facility: HOSPITAL | Age: 76
End: 2021-08-30

## 2021-08-30 NOTE — TELEPHONE ENCOUNTER
The patient has used her auto CPAP device for 9 days starting 8/10/2021.  As previously stated her  is in the hospital.  Present pressure settings of 7-16 reveals a normal AHI without significant leak.    Spoke with patient, informed her nose pillows were ok . Her  is in the hospital right now and she is unable to use . She will start using when they get home and more than 4 hour a night

## 2021-09-16 ENCOUNTER — OFFICE VISIT (OUTPATIENT)
Dept: INTERNAL MEDICINE | Facility: CLINIC | Age: 76
End: 2021-09-16

## 2021-09-16 VITALS
HEIGHT: 65 IN | WEIGHT: 187 LBS | RESPIRATION RATE: 16 BRPM | HEART RATE: 72 BPM | SYSTOLIC BLOOD PRESSURE: 108 MMHG | TEMPERATURE: 97.3 F | BODY MASS INDEX: 31.16 KG/M2 | DIASTOLIC BLOOD PRESSURE: 72 MMHG

## 2021-09-16 DIAGNOSIS — I48.0 PAROXYSMAL ATRIAL FIBRILLATION (HCC): Chronic | ICD-10-CM

## 2021-09-16 DIAGNOSIS — J32.9 SINUSITIS, UNSPECIFIED CHRONICITY, UNSPECIFIED LOCATION: Primary | ICD-10-CM

## 2021-09-16 DIAGNOSIS — R00.2 PALPITATIONS: ICD-10-CM

## 2021-09-16 PROCEDURE — 99214 OFFICE O/P EST MOD 30 MIN: CPT | Performed by: NURSE PRACTITIONER

## 2021-09-16 RX ORDER — FLUTICASONE PROPIONATE 50 MCG
2 SPRAY, SUSPENSION (ML) NASAL DAILY
Qty: 18.2 ML | Refills: 1 | Status: SHIPPED | OUTPATIENT
Start: 2021-09-16 | End: 2022-08-03 | Stop reason: SDUPTHER

## 2021-09-16 NOTE — PROGRESS NOTES
Subjective   Chief Complaint   Patient presents with   • Earache     Rt ear pain    • Palpitations       History of Present Illness   75 y.o. female presents with cc right ear pain ongoing for one week. Saw someone at Dr. Hartman office who clean her her right ear and checked the ear. Pain comes and goes. She wonders if it is her sinuses. No fever or chills. Having sinus congestion.     Her heart has been fluttering at night while in bedfor the last month. Sh has a history of A-fib followed by Dr. Kearns. Denies CP or dyspnea. Under a lot of stress lately with her 's recent illness. She had labs last month and would like the results. Wonders if stress and anxiety may be playing a role. She has not called Dr. Kearns.      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          Current Outpatient Medications on File Prior to Visit   Medication Sig Dispense Refill   • Acetaminophen (TYLENOL ARTHRITIS PAIN PO) Take 650 mg by mouth 3 (Three) Times a Day As Needed.     • Eliquis 5 MG tablet tablet TAKE ONE TABLET BY MOUTH EVERY 12 HOURS 180 tablet 2   • flecainide (TAMBOCOR) 50 MG tablet Take 1 tablet by mouth Every 12 (Twelve) Hours. 60 tablet 11   • losartan (COZAAR) 100 MG tablet TAKE ONE TABLET BY MOUTH DAILY 90  tablet 3   • meclizine (ANTIVERT) 25 MG tablet Take 1 tablet by mouth 3 (Three) Times a Day As Needed for Dizziness. 90 tablet 0   • melatonin 5 MG tablet tablet Take 10 mg by mouth.     • metoprolol succinate XL (TOPROL-XL) 50 MG 24 hr tablet Take 1 tablet by mouth Daily. 30 tablet 11   • B Complex Vitamins (vitamin b complex) capsule capsule Take  by mouth Daily.       No current facility-administered medications on file prior to visit.       Past Medical History:   Diagnosis Date   • Arthritis    • Asthma     NO INHALERS   • Atrial fibrillation (CMS/HCC)    • Clostridium difficile infection 09/20/2019   • Colon polyps 08/01/2019    Transverse colon: tubular adenoma, descending colon: fragments of tubular adenoma, sigmoid colon: ischemic eroded hyperplastic polyp   • COPD (chronic obstructive pulmonary disease) (CMS/HCC)    • Diverticulitis    • Diverticulosis    • ESBL (extended spectrum beta-lactamase) producing bacteria infection    • History of abscess of skin and subcutaneous tissue     ABD WOUND 5/2017   • History of atrial fibrillation      X1 POST OP FROM COLOSTOMY 5/2017 - NO PROBLEMS SINCE   • Hypertension    • MDRO (multiple drug resistant organisms) resistance    • NEW (obstructive sleep apnea) 05/20/2021    Overnight polysomnogram.  Weight 190 pounds.  Mild NEW with AHI 7 events per hour for total sleep time.  However, during REM moderate NEW with AHI 16.7 events per hour.  No sleep-related hypoxia.  The patient snored 5% of total sleep time.   • PONV (postoperative nausea and vomiting)    • Psoriasis    • UTI (urinary tract infection)    • Vertigo        Family History   Problem Relation Age of Onset   • Cancer Mother    • Colon cancer Mother    • Diabetes Father    • Diabetes Son    • Ulcerative colitis Son    • No Known Problems Maternal Grandmother    • No Known Problems Maternal Grandfather    • No Known Problems Paternal Grandmother    • No Known Problems Paternal Grandfather    • [place]  Hyperthermia Neg Hx    • Breast cancer Neg Hx        Social History     Socioeconomic History   • Marital status:      Spouse name: Not on file   • Number of children: Not on file   • Years of education: Not on file   • Highest education level: Not on file   Tobacco Use   • Smoking status: Former Smoker     Types: Cigarettes     Quit date: 1967     Years since quittin.7   • Smokeless tobacco: Never Used   • Tobacco comment: SOCIAL SMOKING ONLY   Substance and Sexual Activity   • Alcohol use: Yes     Alcohol/week: 2.0 standard drinks     Types: 1 Glasses of wine, 1 Shots of liquor per week     Comment: occasionally/caffeine use    • Drug use: No   • Sexual activity: Defer       Past Surgical History:   Procedure Laterality Date   • BELPHAROPTOSIS REPAIR Bilateral 2017    Bilateral brow ptosis repair via the anterior hairline approach under monitored local anesthesia-Andrez Craven   • BLEPHAROPLASTY Bilateral 2017   • BREAST BIOPSY     • CATARACT EXTRACTION     • COLON RESECTION N/A 5/3/2017    Procedure: OPEN SIMOID COLECTOMY WITH COLOSTOMY;  Surgeon: Renato Delgado MD;  Location: [facility] MAIN OR;  Service:    • COLONOSCOPY N/A 2017    Procedure: COLONOSCOPY VIA COLOSTOMY TO CECUM;  Surgeon: Renato Delgado MD;  Location: [facility] ENDOSCOPY;  Service:    • COLONOSCOPY N/A 2019    Procedure: COLONOSCOPY to cecum and TI with biopsy / hot snare polypectomies;  Surgeon: Dalton Vela Jr., MD;  [facility] END[facility];  Service: General   • COLONOSCOPY N/A 2010    Andrez Trinidad   • COLOSTOMY CLOSURE N/A 2017    Procedure: COLOSTOMY TAKEDOWN AND CLOSURE, WITH RESECTION;  Surgeon: Renato Delgado MD;  Location: [facility] MAIN OR;  Service:    • ENDOSCOPY AND COLONOSCOPY N/A 10/31/2014    Normal EGD and colonoscopy, repeat c-scope in 5 years-Andrez Trinidad   • EXPLORATORY LAPAROTOMY N/A 2019    Procedure: Exploratory laparotomy with  "lysis of adhesions;  Surgeon: Trini Wade MD;  Location: [facility] OR;  Service: General   • FINGER SURGERY Left     4TH FINGER LEFT HAND   • HYSTERECTOMY Bilateral    • LAPAROSCOPIC CHOLECYSTECTOMY W/ CHOLANGIOGRAPHY N/A 07/15/2003    Dr. Amrit Martinez, [facility]   • SALPINGO OOPHORECTOMY Bilateral 02/02/2006    Abdominal sacral colpopexy, bilateral salpingo-oophorectomy, tension free vaginal tape, cystoscopy-Dr. Devika Bradley, [facility]   • THORACENTESIS Right 05/21/2017    US-guided right thoracentesis-Dr. Juan Yuen, [facility]   • TONSILLECTOMY Bilateral    • UPPER GASTROINTESTINAL ENDOSCOPY N/A 10/08/2007    Esophageal mucosal changes suspicious for short-segment Olivera's esphagus, gastric mucosal abnormality characterized by erythema: biopsied, NERD disease present, high likelihood of gastritis-Dr. Mayank Knapp, [facility]       The following portions of the patient's history were reviewed and updated as appropriate: problem list, allergies, current medications, past medical history and past social history.    Review of Systems    Immunization History   Administered Date(s) Administered   • COVID-19 (PFIZER) 03/03/2021, 03/24/2021   • Flu Vaccine Split Quad 10/27/2020   • Fluad Quad 65+ 10/27/2020       Objective   Vitals:    09/16/21 1516   BP: 108/72   Pulse: 72   Resp: 16   Temp: 97.3 °F (36.3 °C)   Weight: 84.8 kg (187 lb)   Height: 165.1 cm (65\")     Body mass index is 31.12 kg/m².  Physical Exam  Vitals reviewed.   Constitutional:       Appearance: Normal appearance. She is well-developed. She is obese.   HENT:      Head: Normocephalic and atraumatic.      Right Ear: Tympanic membrane, ear canal and external ear normal.      Left Ear: Tympanic membrane, ear canal and external ear normal.      Nose: Congestion present.      Mouth/Throat:      Mouth: Mucous membranes are moist.      Pharynx: Oropharynx is clear. No oropharyngeal exudate or posterior oropharyngeal erythema.      Comments: +PND.   Cardiovascular:     "  Rate and Rhythm: Normal rate and regular rhythm.      Heart sounds: Normal heart sounds, S1 normal and S2 normal.   Pulmonary:      Effort: Pulmonary effort is normal.      Breath sounds: Normal breath sounds.   Skin:     General: Skin is warm and dry.   Neurological:      Mental Status: She is alert.   Psychiatric:         Mood and Affect: Mood normal.         Behavior: Behavior normal.         Procedures    Assessment/Plan   Diagnoses and all orders for this visit:    1. Sinusitis, unspecified chronicity, unspecified location (Primary)  -     fluticasone (Flonase) 50 MCG/ACT nasal spray; 2 sprays into the nostril(s) as directed by provider Daily.  Dispense: 18.2 mL; Refill: 1    2. Palpitations  Comments:  Ongoing for greater than a month. CBC, TSH and CMP unremarkable last month. Check Mg and Holter as below. Discussed anxiety. Schedule with Dr. Kearns.   Orders:  -     Magnesium  -     Holter Monitor - 24 Hour    3. Paroxysmal atrial fibrillation (CMS/HCC)  Comments:  Rate and rhythm controlled. Anticoagulated with Eliquis.     Records reviewed include previous OV with myself as well as labs.     Return in about 6 weeks (around 10/28/2021) for Lab Today.

## 2021-10-04 ENCOUNTER — OFFICE VISIT (OUTPATIENT)
Dept: INTERNAL MEDICINE | Facility: CLINIC | Age: 76
End: 2021-10-04

## 2021-10-04 VITALS
DIASTOLIC BLOOD PRESSURE: 74 MMHG | HEART RATE: 80 BPM | HEIGHT: 65 IN | SYSTOLIC BLOOD PRESSURE: 124 MMHG | BODY MASS INDEX: 31.16 KG/M2 | TEMPERATURE: 97.3 F | WEIGHT: 187 LBS

## 2021-10-04 DIAGNOSIS — I10 ESSENTIAL HYPERTENSION: ICD-10-CM

## 2021-10-04 DIAGNOSIS — G47.33 OSA (OBSTRUCTIVE SLEEP APNEA): ICD-10-CM

## 2021-10-04 DIAGNOSIS — M25.511 PAIN IN JOINT OF RIGHT SHOULDER: Primary | ICD-10-CM

## 2021-10-04 DIAGNOSIS — F33.41 MAJOR DEPRESSIVE DISORDER, RECURRENT EPISODE, IN PARTIAL REMISSION (HCC): ICD-10-CM

## 2021-10-04 PROCEDURE — 99214 OFFICE O/P EST MOD 30 MIN: CPT | Performed by: INTERNAL MEDICINE

## 2021-10-04 RX ORDER — TRAZODONE HYDROCHLORIDE 50 MG/1
50 TABLET ORAL NIGHTLY
Qty: 90 TABLET | Refills: 0 | Status: SHIPPED | OUTPATIENT
Start: 2021-10-04 | End: 2022-07-19

## 2021-10-04 RX ORDER — DULOXETIN HYDROCHLORIDE 30 MG/1
30 CAPSULE, DELAYED RELEASE ORAL DAILY
Qty: 30 CAPSULE | Refills: 0 | Status: SHIPPED | OUTPATIENT
Start: 2021-10-04 | End: 2021-11-01

## 2021-10-04 RX ORDER — DULOXETIN HYDROCHLORIDE 60 MG/1
60 CAPSULE, DELAYED RELEASE ORAL DAILY
Qty: 90 CAPSULE | Refills: 1 | Status: SHIPPED | OUTPATIENT
Start: 2021-10-04 | End: 2021-12-27

## 2021-10-13 ENCOUNTER — TELEPHONE (OUTPATIENT)
Dept: INTERNAL MEDICINE | Facility: CLINIC | Age: 76
End: 2021-10-13

## 2021-10-13 NOTE — TELEPHONE ENCOUNTER
She can just d/c it- she hasn't been on it very long.  Let me know if she wants to try something else soon.

## 2021-10-13 NOTE — TELEPHONE ENCOUNTER
DULoxetine (CYMBALTA) 60 MG capsule    PATIENT HAS BEEN TAKING THIS FOR ABOUT A WEEK NOW AND IS VERY TIRED AND HAVING TO TAKE A LOT OF NAPS TO HELP HER     SHE HAS TO TAKE CARE OF HER  AND NEEDS TO BE AWAKE AND ALERT BUT SHE IS FEELING OFF BALANCE WITH IT.      SHE'D LIKE TO DISCONTINUE IT PROPERLY IF YOU HAVE ANY SUGGESTIONS     Kiley Last (Self) [phone] (H)     PLEASE CALL AND DISCUSS'

## 2021-10-28 ENCOUNTER — HOSPITAL ENCOUNTER (OUTPATIENT)
Dept: BONE DENSITY | Facility: HOSPITAL | Age: 76
Discharge: HOME | End: 2021-10-28
Admitting: INTERNAL MEDICINE

## 2021-10-28 PROCEDURE — 77080 DXA BONE DENSITY AXIAL: CPT

## 2021-11-01 RX ORDER — DULOXETIN HYDROCHLORIDE 30 MG/1
CAPSULE, DELAYED RELEASE ORAL
Qty: 30 CAPSULE | Refills: 4 | Status: SHIPPED | OUTPATIENT
Start: 2021-11-01 | End: 2021-12-27

## 2021-11-05 ENCOUNTER — HOSPITAL ENCOUNTER (OUTPATIENT)
Dept: CARDIOLOGY | Facility: HOSPITAL | Age: 76
Discharge: HOME | End: 2021-11-05
Admitting: NURSE PRACTITIONER

## 2021-11-05 PROCEDURE — 93226 XTRNL ECG REC<48 HR SCAN A/R: CPT

## 2021-11-05 PROCEDURE — 93225 XTRNL ECG REC<48 HRS REC: CPT

## 2021-11-09 ENCOUNTER — OFFICE VISIT (OUTPATIENT)
Dept: CARDIOLOGY | Facility: CLINIC | Age: 76
End: 2021-11-09

## 2021-11-09 VITALS
SYSTOLIC BLOOD PRESSURE: 148 MMHG | BODY MASS INDEX: 31.16 KG/M2 | HEART RATE: 59 BPM | DIASTOLIC BLOOD PRESSURE: 78 MMHG | WEIGHT: 187 LBS | HEIGHT: 65 IN

## 2021-11-09 DIAGNOSIS — I10 ESSENTIAL HYPERTENSION: ICD-10-CM

## 2021-11-09 DIAGNOSIS — I48.0 PAROXYSMAL ATRIAL FIBRILLATION (HCC): Primary | ICD-10-CM

## 2021-11-09 PROCEDURE — 99214 OFFICE O/P EST MOD 30 MIN: CPT | Performed by: INTERNAL MEDICINE

## 2021-11-09 PROCEDURE — 93000 ELECTROCARDIOGRAM COMPLETE: CPT | Performed by: INTERNAL MEDICINE

## 2021-11-09 NOTE — PROGRESS NOTES
Date of Office Visit: 21  Encounter Provider: Rich Kearns MD  Place of Service: [facility] CARDIOLOGY  Patient Name: Kiley Last  :1945  [id number]    Chief Complaint   Patient presents with   • Atrial Fibrillation   :     HPI: Kiley Last is a 76 y.o. female she has a history of paroxysmal A. fib she is had an recent stress and echo that are both normal she went into A. fib in the hospital when she had bad vertigo and we put her on Rythmol to help control it as well as increase her metoprolol.  She is also been on Eliquis.  She continued to not do well and we placed her on flecainide and that has stopped the A. Fib.  She feels like she is doing relatively well she just had a Holter put on and I reviewed that did not really show much no A. fib which is great.  She got a bleeding difficulty blood pressures have been good she of course takes care of her  she did not fully vaccinated and boosted        Past Medical History:   Diagnosis Date   • Arthritis    • Asthma     NO INHALERS   • Atrial fibrillation (HCC)    • Clostridium difficile infection 2019   • Colon polyps 2019    Transverse colon: tubular adenoma, descending colon: fragments of tubular adenoma, sigmoid colon: ischemic eroded hyperplastic polyp   • COPD (chronic obstructive pulmonary disease) (HCC)    • Diverticulitis    • Diverticulosis    • ESBL (extended spectrum beta-lactamase) producing bacteria infection    • History of abscess of skin and subcutaneous tissue     ABD WOUND 2017   • History of atrial fibrillation      X1 POST OP FROM COLOSTOMY 2017 - NO PROBLEMS SINCE   • Hypertension    • MDRO (multiple drug resistant organisms) resistance    • NEW (obstructive sleep apnea) 2021    Overnight polysomnogram.  Weight 190 pounds.  Mild NEW with AHI 7 events per hour for total sleep time.  However, during REM moderate NEW with AHI 16.7 events per hour.  No sleep-related  hypoxia.  The patient snored 5% of total sleep time.   • PONV (postoperative nausea and vomiting)    • Psoriasis    • UTI (urinary tract infection)    • Vertigo        Past Surgical History:   Procedure Laterality Date   • BELPHAROPTOSIS REPAIR Bilateral 04/27/2017    Bilateral brow ptosis repair via the anterior hairline approach under monitored local anesthesia-Andrez Craven   • BLEPHAROPLASTY Bilateral 04/27/2017   • BREAST BIOPSY     • CATARACT EXTRACTION     • COLON RESECTION N/A 5/3/2017    Procedure: OPEN SIMOID COLECTOMY WITH COLOSTOMY;  Surgeon: Renato Delgado MD;  Location: [facility] MAIN OR;  Service:    • COLONOSCOPY N/A 9/13/2017    Procedure: COLONOSCOPY VIA COLOSTOMY TO CECUM;  Surgeon: Renato Delgado MD;  Location: [facility] ENDOSCOPY;  Service:    • COLONOSCOPY N/A 8/1/2019    Procedure: COLONOSCOPY to cecum and TI with biopsy / hot snare polypectomies;  Surgeon: Dalton Vela Jr., MD;  Location: [facility] ENDOSCOPY;  Service: General   • COLONOSCOPY N/A 01/04/2010    Andrez Trinidad   • COLOSTOMY CLOSURE N/A 9/14/2017    Procedure: COLOSTOMY TAKEDOWN AND CLOSURE, WITH RESECTION;  Surgeon: Renato Delgado MD;  Location: [facility] MAIN OR;  Service:    • ENDOSCOPY AND COLONOSCOPY N/A 10/31/2014    Normal EGD and colonoscopy, repeat c-scope in 5 years-Andrez Trinidad   • EXPLORATORY LAPAROTOMY N/A 9/8/2019    Procedure: Exploratory laparotomy with lysis of adhesions;  Surgeon: Trini Wade MD;  Location: [facility] MAIN OR;  Service: General   • FINGER SURGERY Left     4TH FINGER LEFT HAND   • HYSTERECTOMY Bilateral    • LAPAROSCOPIC CHOLECYSTECTOMY W/ CHOLANGIOGRAPHY N/A 07/15/2003    Dr. Amrit Martinez, [facility]   • SALPINGO OOPHORECTOMY Bilateral 02/02/2006    Abdominal sacral colpopexy, bilateral salpingo-oophorectomy, tension free vaginal tape, cystoscopy-Dr. Devika Bradley, [facility]   • THORACENTESIS Right 05/21/2017    US-guided right thoracentesis-Dr. Juan Yuen,  [facility]   • TONSILLECTOMY Bilateral    • UPPER GASTROINTESTINAL ENDOSCOPY N/A 10/08/2007    Esophageal mucosal changes suspicious for short-segment Olivera's esphagus, gastric mucosal abnormality characterized by erythema: biopsied, NERD disease present, high likelihood of gastritis-Dr. Mayank Knapp, [facility]       Social History     Socioeconomic History   • Marital status:    Tobacco Use   • Smoking status: Former Smoker     Types: Cigarettes     Quit date: 1967     Years since quittin.8   • Smokeless tobacco: Never Used   • Tobacco comment: SOCIAL SMOKING ONLY   Substance and Sexual Activity   • Alcohol use: Yes     Alcohol/week: 2.0 standard drinks     Types: 1 Glasses of wine, 1 Shots of liquor per week     Comment: occasionally/caffeine use    • Drug use: No   • Sexual activity: Defer       Family History   Problem Relation Age of Onset   • Cancer Mother    • Colon cancer Mother    • Diabetes Father    • Diabetes Son    • Ulcerative colitis Son    • No Known Problems Maternal Grandmother    • No Known Problems Maternal Grandfather    • No Known Problems Paternal Grandmother    • No Known Problems Paternal Grandfather    • Malig Hyperthermia Neg Hx    • Breast cancer Neg Hx        Review of Systems   Constitutional: Negative for decreased appetite, fever, malaise/fatigue and weight loss.   HENT: Negative for nosebleeds.    Eyes: Negative for double vision.   Cardiovascular: Negative for chest pain, claudication, cyanosis, dyspnea on exertion, irregular heartbeat, leg swelling, near-syncope, orthopnea, palpitations, paroxysmal nocturnal dyspnea and syncope.   Respiratory: Negative for cough, hemoptysis and shortness of breath.    Hematologic/Lymphatic: Negative for bleeding problem.   Skin: Negative for rash.   Musculoskeletal: Negative for falls and myalgias.   Gastrointestinal: Negative for hematochezia, jaundice, melena, nausea and vomiting.   Genitourinary: Negative for hematuria.   Neurological: Negative  "for dizziness and seizures.   Psychiatric/Behavioral: Negative for altered mental status and memory loss.       Allergies   Allergen Reactions   • Epinephrine Palpitations   • Penicillins Other (See Comments)     BLISTERS IN MOUTH    Patient tolerated ceftriaxone during 5/2017 admission.            Current Outpatient Medications:   •  Acetaminophen (TYLENOL ARTHRITIS PAIN PO), Take 650 mg by mouth 3 (Three) Times a Day As Needed., Disp: , Rfl:   •  B Complex Vitamins (vitamin b complex) capsule capsule, Take  by mouth Daily., Disp: , Rfl:   •  Eliquis 5 MG tablet tablet, TAKE ONE TABLET BY MOUTH EVERY 12 HOURS, Disp: 180 tablet, Rfl: 2  •  flecainide (TAMBOCOR) 50 MG tablet, Take 1 tablet by mouth Every 12 (Twelve) Hours., Disp: 60 tablet, Rfl: 11  •  fluticasone (Flonase) 50 MCG/ACT nasal spray, 2 sprays into the nostril(s) as directed by provider Daily., Disp: 18.2 mL, Rfl: 1  •  losartan (COZAAR) 100 MG tablet, TAKE ONE TABLET BY MOUTH DAILY, Disp: 90 tablet, Rfl: 3  •  meclizine (ANTIVERT) 25 MG tablet, Take 1 tablet by mouth 3 (Three) Times a Day As Needed for Dizziness., Disp: 90 tablet, Rfl: 0  •  melatonin 5 MG tablet tablet, Take 10 mg by mouth., Disp: , Rfl:   •  metoprolol succinate XL (TOPROL-XL) 50 MG 24 hr tablet, Take 1 tablet by mouth Daily., Disp: 30 tablet, Rfl: 11  •  DULoxetine (CYMBALTA) 30 MG capsule, TAKE ONE CAPSULE BY MOUTH DAILY, Disp: 30 capsule, Rfl: 4  •  DULoxetine (CYMBALTA) 60 MG capsule, Take 1 capsule by mouth Daily. To take after 30 days of duloxetine 30 mg, Disp: 90 capsule, Rfl: 1  •  traZODone (DESYREL) 50 MG tablet, Take 1 tablet by mouth Every Night., Disp: 90 tablet, Rfl: 0      Objective:     Vitals:    11/09/21 1408   BP: 148/78   Pulse: 59   Weight: 84.8 kg (187 lb)   Height: 165.1 cm (65\")     Body mass index is 31.12 kg/m².    Physical Exam  Constitutional:       Appearance: She is well-developed.   HENT:      Head: Normocephalic.   Eyes:      General: No scleral " icterus.  Neck:      Thyroid: No thyromegaly.      Vascular: No JVD.   Cardiovascular:      Rate and Rhythm: Normal rate and regular rhythm.      Heart sounds: Normal heart sounds. No murmur heard.  No friction rub. No gallop.    Pulmonary:      Effort: Pulmonary effort is normal.      Breath sounds: Normal breath sounds. No wheezing or rales.   Abdominal:      Palpations: Abdomen is soft.      Tenderness: There is no abdominal tenderness.   Musculoskeletal:         General: Normal range of motion.   Lymphadenopathy:      Cervical: No cervical adenopathy.   Skin:     General: Skin is warm and dry.      Findings: No rash.   Neurological:      Mental Status: She is alert and oriented to person, place, and time.           ECG 12 Lead    Date/Time: 11/9/2021 2:28 PM  Performed by: Rich Kearns MD  Authorized by: Rich Kearns MD   Comparison: compared with previous ECG   Similar to previous ECG  Rhythm: sinus rhythm    Clinical impression: normal ECG             Assessment:       Diagnosis Plan   1. Paroxysmal atrial fibrillation (HCC)     2. Essential hypertension            Plan:       Well from a cardiac standpoint I think she is doing quite well the flecainide is really controlled her rhythm I am not can make any changes to her regimen when I have her stand the same and and come back and see us in a year it is reassuring that her Holter does not show any A. fib and really does not show any ectopy so not sure what she is feeling night could be anxiety    As always, it has been a pleasure to participate in your patient's care.      Sincerely,       Rich Kearns MD

## 2021-11-10 LAB
MAXIMAL PREDICTED HEART RATE: 143 BPM
STRESS TARGET HR: 122 BPM

## 2021-11-10 PROCEDURE — 93227 XTRNL ECG REC<48 HR R&I: CPT | Performed by: INTERNAL MEDICINE

## 2021-11-22 ENCOUNTER — TELEPHONE (OUTPATIENT)
Dept: INTERNAL MEDICINE | Facility: CLINIC | Age: 76
End: 2021-11-22

## 2021-11-24 ENCOUNTER — APPOINTMENT (OUTPATIENT)
Dept: GENERAL RADIOLOGY | Facility: HOSPITAL | Age: 76
End: 2021-11-24

## 2021-11-24 ENCOUNTER — TELEMEDICINE (OUTPATIENT)
Dept: INTERNAL MEDICINE | Facility: CLINIC | Age: 76
End: 2021-11-24

## 2021-11-24 ENCOUNTER — HOSPITAL ENCOUNTER (EMERGENCY)
Facility: HOSPITAL | Age: 76
Discharge: HOME | End: 2021-11-24
Attending: EMERGENCY MEDICINE | Admitting: EMERGENCY MEDICINE

## 2021-11-24 VITALS
SYSTOLIC BLOOD PRESSURE: 135 MMHG | TEMPERATURE: 99.5 F | HEIGHT: 65 IN | HEART RATE: 78 BPM | BODY MASS INDEX: 30.82 KG/M2 | DIASTOLIC BLOOD PRESSURE: 85 MMHG | WEIGHT: 185 LBS | RESPIRATION RATE: 16 BRPM | OXYGEN SATURATION: 97 %

## 2021-11-24 DIAGNOSIS — J12.1 PNEUMONIA DUE TO RESPIRATORY SYNCYTIAL VIRUS (RSV): Primary | ICD-10-CM

## 2021-11-24 DIAGNOSIS — R06.03 RESPIRATORY DISTRESS DETERMINED BY EXAMINATION: Primary | ICD-10-CM

## 2021-11-24 DIAGNOSIS — I48.21 PERMANENT ATRIAL FIBRILLATION (HCC): ICD-10-CM

## 2021-11-24 DIAGNOSIS — R05.9 COUGH: ICD-10-CM

## 2021-11-24 DIAGNOSIS — Z79.01 CHRONIC ANTICOAGULATION: ICD-10-CM

## 2021-11-24 LAB
ALBUMIN SERPL-MCNC: 4.5 G/DL (ref 3.5–5.2)
ALBUMIN/GLOB SERPL: 1.5 G/DL
ALP SERPL-CCNC: 65 U/L (ref 39–117)
ALT SERPL W P-5'-P-CCNC: 16 U/L (ref 1–33)
ANION GAP SERPL CALCULATED.3IONS-SCNC: 9.6 MMOL/L (ref 5–15)
AST SERPL-CCNC: 21 U/L (ref 1–32)
B PARAPERT DNA SPEC QL NAA+PROBE: NOT DETECTED
B PERT DNA SPEC QL NAA+PROBE: NOT DETECTED
BASOPHILS # BLD AUTO: 0.03 10*3/MM3 (ref 0–0.2)
BASOPHILS NFR BLD AUTO: 0.4 % (ref 0–1.5)
BILIRUB SERPL-MCNC: 0.5 MG/DL (ref 0–1.2)
BUN SERPL-MCNC: 13 MG/DL (ref 8–23)
BUN/CREAT SERPL: 20.3 (ref 7–25)
C PNEUM DNA NPH QL NAA+NON-PROBE: NOT DETECTED
CALCIUM SPEC-SCNC: 9.5 MG/DL (ref 8.6–10.5)
CHLORIDE SERPL-SCNC: 99 MMOL/L (ref 98–107)
CO2 SERPL-SCNC: 28.4 MMOL/L (ref 22–29)
CREAT SERPL-MCNC: 0.64 MG/DL (ref 0.57–1)
DEPRECATED RDW RBC AUTO: 39.4 FL (ref 37–54)
EOSINOPHIL # BLD AUTO: 0.18 10*3/MM3 (ref 0–0.4)
EOSINOPHIL NFR BLD AUTO: 2.4 % (ref 0.3–6.2)
ERYTHROCYTE [DISTWIDTH] IN BLOOD BY AUTOMATED COUNT: 12.6 % (ref 12.3–15.4)
FLUAV SUBTYP SPEC NAA+PROBE: NOT DETECTED
FLUBV RNA ISLT QL NAA+PROBE: NOT DETECTED
GFR SERPL CREATININE-BSD FRML MDRD: 90 ML/MIN/1.73
GLOBULIN UR ELPH-MCNC: 3.1 GM/DL
GLUCOSE SERPL-MCNC: 108 MG/DL (ref 65–99)
HADV DNA SPEC NAA+PROBE: NOT DETECTED
HCOV 229E RNA SPEC QL NAA+PROBE: NOT DETECTED
HCOV HKU1 RNA SPEC QL NAA+PROBE: NOT DETECTED
HCOV NL63 RNA SPEC QL NAA+PROBE: NOT DETECTED
HCOV OC43 RNA SPEC QL NAA+PROBE: NOT DETECTED
HCT VFR BLD AUTO: 39 % (ref 34–46.6)
HGB BLD-MCNC: 13.3 G/DL (ref 12–15.9)
HMPV RNA NPH QL NAA+NON-PROBE: NOT DETECTED
HPIV1 RNA ISLT QL NAA+PROBE: NOT DETECTED
HPIV2 RNA SPEC QL NAA+PROBE: NOT DETECTED
HPIV3 RNA NPH QL NAA+PROBE: NOT DETECTED
HPIV4 P GENE NPH QL NAA+PROBE: NOT DETECTED
IMM GRANULOCYTES # BLD AUTO: 0.02 10*3/MM3 (ref 0–0.05)
IMM GRANULOCYTES NFR BLD AUTO: 0.3 % (ref 0–0.5)
LYMPHOCYTES # BLD AUTO: 1.08 10*3/MM3 (ref 0.7–3.1)
LYMPHOCYTES NFR BLD AUTO: 14.4 % (ref 19.6–45.3)
M PNEUMO IGG SER IA-ACNC: NOT DETECTED
MCH RBC QN AUTO: 29.6 PG (ref 26.6–33)
MCHC RBC AUTO-ENTMCNC: 34.1 G/DL (ref 31.5–35.7)
MCV RBC AUTO: 86.7 FL (ref 79–97)
MONOCYTES # BLD AUTO: 0.9 10*3/MM3 (ref 0.1–0.9)
MONOCYTES NFR BLD AUTO: 12 % (ref 5–12)
NEUTROPHILS NFR BLD AUTO: 5.28 10*3/MM3 (ref 1.7–7)
NEUTROPHILS NFR BLD AUTO: 70.5 % (ref 42.7–76)
NRBC BLD AUTO-RTO: 0 /100 WBC (ref 0–0.2)
NT-PROBNP SERPL-MCNC: 266.8 PG/ML (ref 0–1800)
PLATELET # BLD AUTO: 180 10*3/MM3 (ref 140–450)
PMV BLD AUTO: 11.9 FL (ref 6–12)
POTASSIUM SERPL-SCNC: 3.8 MMOL/L (ref 3.5–5.2)
PROCALCITONIN SERPL-MCNC: 0.07 NG/ML (ref 0–0.25)
PROT SERPL-MCNC: 7.6 G/DL (ref 6–8.5)
QT INTERVAL: 367 MS
RBC # BLD AUTO: 4.5 10*6/MM3 (ref 3.77–5.28)
RHINOVIRUS RNA SPEC NAA+PROBE: NOT DETECTED
RSV RNA NPH QL NAA+NON-PROBE: DETECTED
SARS-COV-2 RNA NPH QL NAA+NON-PROBE: NOT DETECTED
SODIUM SERPL-SCNC: 137 MMOL/L (ref 136–145)
WBC NRBC COR # BLD: 7.49 10*3/MM3 (ref 3.4–10.8)

## 2021-11-24 PROCEDURE — 80053 COMPREHEN METABOLIC PANEL: CPT | Performed by: EMERGENCY MEDICINE

## 2021-11-24 PROCEDURE — 99213 OFFICE O/P EST LOW 20 MIN: CPT | Performed by: INTERNAL MEDICINE

## 2021-11-24 PROCEDURE — 0202U NFCT DS 22 TRGT SARS-COV-2: CPT | Performed by: EMERGENCY MEDICINE

## 2021-11-24 PROCEDURE — 83880 ASSAY OF NATRIURETIC PEPTIDE: CPT | Performed by: EMERGENCY MEDICINE

## 2021-11-24 PROCEDURE — 99283 EMERGENCY DEPT VISIT LOW MDM: CPT

## 2021-11-24 PROCEDURE — 71045 X-RAY EXAM CHEST 1 VIEW: CPT

## 2021-11-24 PROCEDURE — 84145 PROCALCITONIN (PCT): CPT | Performed by: EMERGENCY MEDICINE

## 2021-11-24 PROCEDURE — 85025 COMPLETE CBC W/AUTO DIFF WBC: CPT | Performed by: EMERGENCY MEDICINE

## 2021-11-24 PROCEDURE — 93005 ELECTROCARDIOGRAM TRACING: CPT

## 2021-11-24 PROCEDURE — 93010 ELECTROCARDIOGRAM REPORT: CPT | Performed by: INTERNAL MEDICINE

## 2021-11-24 PROCEDURE — 93005 ELECTROCARDIOGRAM TRACING: CPT | Performed by: EMERGENCY MEDICINE

## 2021-11-24 RX ORDER — AZITHROMYCIN 250 MG/1
250 TABLET, FILM COATED ORAL DAILY
Qty: 6 TABLET | Refills: 0 | Status: SHIPPED | OUTPATIENT
Start: 2021-11-24 | End: 2021-12-02

## 2021-11-24 RX ORDER — SODIUM CHLORIDE 0.9 % (FLUSH) 0.9 %
10 SYRINGE (ML) INJECTION AS NEEDED
Status: DISCONTINUED | OUTPATIENT
Start: 2021-11-24 | End: 2021-11-24 | Stop reason: HOSPADM

## 2021-11-24 NOTE — DISCHARGE INSTRUCTIONS
You have RSV and you also have pneumonia on chest x-ray.  This may be a viral pneumonia however we cannot exclude a secondary bacterial pneumonia therefore you will be covered with antibiotics.  Return to the ER for worsening shortness of breath.

## 2021-11-24 NOTE — PROGRESS NOTES
Chief Complaint  No chief complaint on file.    Subjective          Kilye Last presents to [facility] PRIMARY [facility]ARE  History of Present Illness  Thought she had a sinus infection but she keeps getting worse- low grade fevers @ 100 started yesterday  She feels weak/tired, coughing, sneezing.  Chest burns when she coughs, sputum clear to yellow.  She has a poor appetite but is drinking fluids.   recently admitted with RSV - she does not think she has the wheezing that he had.    Family very concerned because she seems to be getting worse every day.    Objective   Vital Signs:   There were no vitals taken for this visit.    Physical Exam  Constitutional:       Appearance: She is ill-appearing.   Pulmonary:      Comments: Doing a lot of deep coughing during video visit.       Result Review :                 Assessment and Plan    Diagnoses and all orders for this visit:    1. Respiratory distress determined by examination (Primary)  Comments:  suspect RSV infection as her - she will go to ER for evaluation/treatment.         Follow Up   No follow-ups on file.  Patient was given instructions and counseling regarding her condition or for health maintenance advice. Please see specific information pulled into the AVS if appropriate.

## 2021-11-25 NOTE — ED PROVIDER NOTES
EMERGENCY DEPARTMENT ENCOUNTER    Room Number:  29/29  Date seen:  11/24/2021  PCP: Miranda Morgan MD  Historian: Patient      HPI:  Chief Complaint: Cough, short of breath  A complete HPI/ROS/PMH/PSH/SH/FH are unobtainable due to: Nothing  Context: Kiley Last is a 76 y.o. female who presents to the ED c/o cough and shortness of breath.  Patient reports that she is also developed some low-grade fever.  She denies chest pain.  Her cough is occasionally productive of some yellow to green sputum.  She has been fully vaccinated for COVID-19.  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  He is now home and she has been caring for him.  She does have a history of atrial fibrillation and COPD but she does not wear home oxygen.    PAST MEDICAL HISTORY  Active Ambulatory Problems     Diagnosis Date Noted   • Essential hypertension 05/01/2017   • Diverticulitis 05/26/2017   • Pleural effusion 05/26/2017   • COPD (chronic obstructive pulmonary disease) (Formerly [facility]) 05/26/2017   • Family hx of colon cancer 08/28/2017   • Dermatochalasis of both eyelids 04/20/2017   • Paroxysmal atrial fibrillation (Former[facility]) 08/09/2018   • Clostridium difficile colitis 09/20/2019   • History of prediabetes 12/11/2019   • Vertigo 01/23/2020   • Hx of small bowel obstruction 01/23/2020   • Atrial tachycardia (Former[facility]) 07/10/2020   • 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 04/14/2021   • NEW (obstructive sleep apnea) 05/20/2021     Resolved Ambulatory Problems     Diagnosis Date Noted   • Sigmoid diverticulitis 04/30/2017   • Colon obstruction (Former[facility]) 05/03/2017   • Ileus (Formerly [facility]) 05/26/2017   • Abdominal abscess 05/26/2017   • 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 (Former[facility]) 05/26/2017   • Colostomy present (Former[facility]) 08/28/2017   • Bowel obstruction (Former[facility]) 01/26/2018   • Small bowel obstruction (Former[facility]) 01/26/2018   • Eyebrow  ptosis 04/20/2017   • Encounter for screening colonoscopy 07/05/2019   • Nausea & vomiting 01/23/2020   • Hypersomnia with sleep apnea 03/28/2021     Past Medical History:   Diagnosis Date   • Arthritis    • Asthma    • Atrial fibrillation (HCC)    • Clostridium difficile infection 09/20/2019   • Colon polyps 08/01/2019   • Diverticulosis    • ESBL (extended spectrum beta-lactamase) producing bacteria infection    • History of abscess of skin and subcutaneous tissue    • History of atrial fibrillation    • Hypertension    • MDRO (multiple drug resistant organisms) resistance    • PONV (postoperative nausea and vomiting)    • Psoriasis    • UTI (urinary tract infection)          PAST SURGICAL HISTORY  Past Surgical History:   Procedure Laterality Date   • BELPHAROPTOSIS REPAIR Bilateral 04/27/2017    Bilateral brow ptosis repair via the anterior hairline approach under monitored local anesthesia-Andrez Craven   • BLEPHAROPLASTY Bilateral 04/27/2017   • BREAST BIOPSY     • CATARACT EXTRACTION     • COLON RESECTION N/A 5/3/2017    Procedure: OPEN SIMOID COLECTOMY WITH COLOSTOMY;  Surgeon: Renato Delgado MD;  Location: [facility] MAIN OR;  Service:    • COLONOSCOPY N/A 9/13/2017    Procedure: COLONOSCOPY VIA COLOSTOMY TO CECUM;  Surgeon: Renato Delgado MD;  Location: [facility] ENDOSCOPY;  Service:    • COLONOSCOPY N/A 8/1/2019    Procedure: COLONOSCOPY to cecum and TI with biopsy / hot snare polypectomies;  Surgeon: Dalton Vela Jr., MD;  [facility] END[facility];  Service: General   • COLONOSCOPY N/A 01/04/2010    Andrez Trinidad   • COLOSTOMY CLOSURE N/A 9/14/2017    Procedure: COLOSTOMY TAKEDOWN AND CLOSURE, WITH RESECTION;  Surgeon: Renato Delgado MD;  Location: [facility] MAIN OR;  Service:    • ENDOSCOPY AND COLONOSCOPY N/A 10/31/2014    Normal EGD and colonoscopy, repeat c-scope in 5 years-Andrez Trinidad   • EXPLORATORY LAPAROTOMY N/A 9/8/2019    Procedure: Exploratory  laparotomy with lysis of adhesions;  Surgeon: Trini Wade MD;  Location: [facility] MAIN OR;  Service: General   • FINGER SURGERY Left     4TH FINGER LEFT HAND   • HYSTERECTOMY Bilateral    • LAPAROSCOPIC CHOLECYSTECTOMY W/ CHOLANGIOGRAPHY N/A 07/15/2003    Dr. Amrit Martinez, [facility]   • SALPINGO OOPHORECTOMY Bilateral 2006    Abdominal sacral colpopexy, bilateral salpingo-oophorectomy, tension free vaginal tape, cystoscopy-Dr. Devika Bradley, [facility]   • THORACENTESIS Right 2017    US-guided right thoracentesis-Dr. Juan Yuen, [facility]   • TONSILLECTOMY Bilateral    • UPPER GASTROINTESTINAL ENDOSCOPY N/A 10/08/2007    Esophageal mucosal changes suspicious for short-segment Olivera's esphagus, gastric mucosal abnormality characterized by erythema: biopsied, NERD disease present, high likelihood of gastritis-Dr. Mayank Knapp, [facility]         FAMILY HISTORY  Family History   Problem Relation Age of Onset   • Cancer Mother    • Colon cancer Mother    • Diabetes Father    • Diabetes Son    • Ulcerative colitis Son    • No Known Problems Maternal Grandmother    • No Known Problems Maternal Grandfather    • No Known Problems Paternal Grandmother    • No Known Problems Paternal Grandfather    • Malig Hyperthermia Neg Hx    • Breast cancer Neg Hx          SOCIAL HISTORY  Social History     Socioeconomic History   • Marital status:    Tobacco Use   • Smoking status: Former Smoker     Types: Cigarettes     Quit date:      Years since quittin.9   • Smokeless tobacco: Never Used   • Tobacco comment: SOCIAL SMOKING ONLY   Substance and Sexual Activity   • Alcohol use: Yes     Alcohol/week: 2.0 standard drinks     Types: 1 Glasses of wine, 1 Shots of liquor per week     Comment: occasionally/caffeine use    • Drug use: No   • Sexual activity: Defer         ALLERGIES  Epinephrine and Penicillins        REVIEW OF SYSTEMS  Review of Systems   Review of all 14 systems is negative other than stated in the HPI  above.      PHYSICAL EXAM  ED Triage Vitals   Temp Heart Rate Resp BP SpO2   11/24/21 1229 11/24/21 1229 11/24/21 1229 11/24/21 1301 11/24/21 1229   99.5 °F (37.5 °C) 89 16 125/59 93 %      Temp src Heart Rate Source Patient Position BP Location FiO2 (%)   11/24/21 1229 11/24/21 1229 -- -- --   Infrared Monitor            GENERAL: Awake and alert, no acute distress  HENT: nares patent  EYES: no scleral icterus  CV: regular rhythm, normal rate  RESPIRATORY: normal effort, no wheezing, crackles, rales  ABDOMEN: soft, nondistended, nontender throughout  MUSCULOSKELETAL: no deformity, no peripheral edema, no calf tenderness present bilaterally  NEURO: alert, moves all extremities, follows commands  PSYCH:  calm, cooperative  SKIN: warm, dry    Vital signs and nursing notes reviewed.          LAB RESULTS  Recent Results (from the past 24 hour(s))   ECG 12 Lead    Collection Time: 11/24/21 12:48 PM   Result Value Ref Range    QT Interval 367 ms   Respiratory Panel PCR w/COVID-19(SARS-CoV-2) JEREMIAH/MARGARITO/TILA/PAD/COR/MAD/PEDRO In-House, NP Swab in UTM/VTM, 3-4 HR TAT - Swab, Nasopharynx    Collection Time: 11/24/21  1:17 PM    Specimen: Nasopharynx; Swab   Result Value Ref Range    ADENOVIRUS, PCR Not Detected Not Detected    Coronavirus 229E Not Detected Not Detected    Coronavirus HKU1 Not Detected Not Detected    Coronavirus NL63 Not Detected Not Detected    Coronavirus OC43 Not Detected Not Detected    COVID19 Not Detected Not Detected - Ref. Range    Human Metapneumovirus Not Detected Not Detected    Human Rhinovirus/Enterovirus Not Detected Not Detected    Influenza A PCR Not Detected Not Detected    Influenza B PCR Not Detected Not Detected    Parainfluenza Virus 1 Not Detected Not Detected    Parainfluenza Virus 2 Not Detected Not Detected    Parainfluenza Virus 3 Not Detected Not Detected    Parainfluenza Virus 4 Not Detected Not Detected    RSV, PCR Detected (A) Not Detected    Bordetella pertussis pcr Not Detected Not  Detected    Bordetella parapertussis PCR Not Detected Not Detected    Chlamydophila pneumoniae PCR Not Detected Not Detected    Mycoplasma pneumo by PCR Not Detected Not Detected   Comprehensive Metabolic Panel    Collection Time: 11/24/21  1:26 PM    Specimen: Blood   Result Value Ref Range    Glucose 108 (H) 65 - 99 mg/dL    BUN 13 8 - 23 mg/dL    Creatinine 0.64 0.57 - 1.00 mg/dL    Sodium 137 136 - 145 mmol/L    Potassium 3.8 3.5 - 5.2 mmol/L    Chloride 99 98 - 107 mmol/L    CO2 28.4 22.0 - 29.0 mmol/L    Calcium 9.5 8.6 - 10.5 mg/dL    Total Protein 7.6 6.0 - 8.5 g/dL    Albumin 4.50 3.50 - 5.20 g/dL    ALT (SGPT) 16 1 - 33 U/L    AST (SGOT) 21 1 - 32 U/L    Alkaline Phosphatase 65 39 - 117 U/L    Total Bilirubin 0.5 0.0 - 1.2 mg/dL    eGFR Non African Amer 90 >60 mL/min/1.73    Globulin 3.1 gm/dL    A/G Ratio 1.5 g/dL    BUN/Creatinine Ratio 20.3 7.0 - 25.0    Anion Gap 9.6 5.0 - 15.0 mmol/L   BNP    Collection Time: 11/24/21  1:26 PM    Specimen: Blood   Result Value Ref Range    proBNP 266.8 0.0-1,800.0 pg/mL   Procalcitonin    Collection Time: 11/24/21  1:26 PM    Specimen: Blood   Result Value Ref Range    Procalcitonin 0.07 0.00 - 0.25 ng/mL   CBC Auto Differential    Collection Time: 11/24/21  1:26 PM    Specimen: Blood   Result Value Ref Range    WBC 7.49 3.40 - 10.80 10*3/mm3    RBC 4.50 3.77 - 5.28 10*6/mm3    Hemoglobin 13.3 12.0 - 15.9 g/dL    Hematocrit 39.0 34.0 - 46.6 %    MCV 86.7 79.0 - 97.0 fL    MCH 29.6 26.6 - 33.0 pg    MCHC 34.1 31.5 - 35.7 g/dL    RDW 12.6 12.3 - 15.4 %    RDW-SD 39.4 37.0 - 54.0 fl    MPV 11.9 6.0 - 12.0 fL    Platelets 180 140 - 450 10*3/mm3    Neutrophil % 70.5 42.7 - 76.0 %    Lymphocyte % 14.4 (L) 19.6 - 45.3 %    Monocyte % 12.0 5.0 - 12.0 %    Eosinophil % 2.4 0.3 - 6.2 %    Basophil % 0.4 0.0 - 1.5 %    Immature Grans % 0.3 0.0 - 0.5 %    Neutrophils, Absolute 5.28 1.70 - 7.00 10*3/mm3    Lymphocytes, Absolute 1.08 0.70 - 3.10 10*3/mm3    Monocytes, Absolute 0.90  0.10 - 0.90 10*3/mm3    Eosinophils, Absolute 0.18 0.00 - 0.40 10*3/mm3    Basophils, Absolute 0.03 0.00 - 0.20 10*3/mm3    Immature Grans, Absolute 0.02 0.00 - 0.05 10*3/mm3    nRBC 0.0 0.0 - 0.2 /100 WBC       Ordered the above labs and reviewed the results.        RADIOLOGY  XR Chest 1 View    Result Date: 11/24/2021  PORTABLE CHEST  HISTORY: Shortness of air.  COMPARISON: Chest x-ray 01/23/2020.  FINDINGS: The heart is within normal limits in size. There is increased interstitial prominence involving the left lung laterally and at the left lung base. An interstitial infiltrate or pneumonia cannot be excluded. There is no evidence of consolidation or effusion.        Ordered the above noted radiological studies. Reviewed by me in PACS.            PROCEDURES  Procedures            MEDICATIONS GIVEN IN ER  Medications - No data to display                MEDICAL DECISION MAKING, PROGRESS, and CONSULTS    All labs have been independently reviewed by me.  All radiology studies have been reviewed by me and discussed with radiologist dictating the report.   EKG's independently viewed and interpreted by me.  Discussion below represents my analysis of pertinent findings related to patient's condition, differential diagnosis, treatment plan and final disposition.      Differential diagnosis includes but is not limited to:  Bronchitis  Pneumonia  Congestive heart failure  Pulmonary embolus      ED Course as of 11/24/21 1958 Wed Nov 24, 2021 1955 EKG          EKG time: 1248  Rhythm/Rate: Sinus rhythm, 76  P waves and ND: Normal  QRS, axis: Normal axis  ST and T waves: No acute ischemic changes, borderline T wave abnormality in V2    Interpreted Contemporaneously by me, independently viewed  Similar compared to prior 1/26/2020       [JR]      ED Course User Index  [JR] Julio Cesar French MD     Patient with cough, low-grade fever at home, mild shortness of air although normal oxygenation here.  Chest x-ray does reveal  some mild infiltrate along the left lateral lung field.  She has no leukocytosis and procalcitonin is normal, respiratory viral panel positive for RSV which she had a recent exposure to, negative for Covid.  I discussed plan to treat with empiric azithromycin in case of developing secondary bacterial process.  She is not require admission at this time.  She is not actively wheezing to warrant steroid treatment or bronchodilators.  Return precautions were discussed.         I wore an N95 mask, face shield, and gloves during this patient encounter.  Patient also wearing a surgical mask.  Hand hygeine performed before and after seeing the patient.    DIAGNOSIS  Final diagnoses:   Pneumonia due to respiratory syncytial virus (RSV)   Cough   Permanent atrial fibrillation (HCC)   Chronic anticoagulation         DISPOSITION  DISCHARGE    Patient discharged in stable condition.    Reviewed implications of results, diagnosis, meds, responsibility to follow up, warning signs and symptoms of possible worsening, potential complications and reasons to return to ER.    Patient/Family voiced understanding of above instructions.    Discussed plan for discharge, as there is no emergent indication for admission. Patient referred to primary care provider for BP management due to today's BP. Pt/family is agreeable and understands need for follow up and repeat testing.  Pt is aware that discharge does not mean that nothing is wrong but it indicates no emergency is present that requires admission and they must continue care with follow-up as given below or physician of their choice.     FOLLOW-UP  Miranda Morgan MD  [address] [id number] [phone]      As needed         Medication List      New Prescriptions    azithromycin 250 MG tablet  Commonly known as: ZITHROMAX  Take 1 tablet by mouth Daily. Take 2 tablets the first day, then 1 tablet daily for 4 days.           Where to Get Your Medications      These  medications were sent to [address] - [address] [facility] AT [facility] - [phone]  - [phone] [address] [id number]    Phone: [phone]   · azithromycin 250 MG tablet                   Latest Documented Vital Signs:  As of 19:56 EST  BP- 135/85 HR- 78 Temp- 99.5 °F (37.5 °C) (Infrared) O2 sat- 97%        --    Please note that portions of this were completed with a voice recognition program.            Julio Cesar French MD  11/24/21 1958

## 2021-12-02 ENCOUNTER — TELEPHONE (OUTPATIENT)
Dept: INTERNAL MEDICINE | Facility: CLINIC | Age: 76
End: 2021-12-02

## 2021-12-02 ENCOUNTER — OFFICE VISIT (OUTPATIENT)
Dept: INTERNAL MEDICINE | Facility: CLINIC | Age: 76
End: 2021-12-02

## 2021-12-02 VITALS
HEIGHT: 65 IN | DIASTOLIC BLOOD PRESSURE: 72 MMHG | OXYGEN SATURATION: 99 % | TEMPERATURE: 97.3 F | BODY MASS INDEX: 30.99 KG/M2 | SYSTOLIC BLOOD PRESSURE: 134 MMHG | HEART RATE: 80 BPM | WEIGHT: 186 LBS

## 2021-12-02 DIAGNOSIS — B33.8 RSV INFECTION: Primary | ICD-10-CM

## 2021-12-02 PROCEDURE — 99214 OFFICE O/P EST MOD 30 MIN: CPT | Performed by: INTERNAL MEDICINE

## 2021-12-02 RX ORDER — PREDNISONE 10 MG/1
TABLET ORAL
Qty: 12 TABLET | Refills: 0 | Status: SHIPPED | OUTPATIENT
Start: 2021-12-02 | End: 2021-12-08

## 2021-12-02 NOTE — PROGRESS NOTES
"Chief Complaint  URI and Sinusitis    Subjective          Kiley Last presents to [facility]  History of Present Illness  Dx RSV and pneumonia last week in the ER- she is was treated with abx and is getting better but not great.  Coughing mainly at night, no SOB.  Sinuses feel congested.her concern is that her  requires 24 hour care- he was in ICU with RSV the week before she got sick.   Her biggest complaint today is of an excessive amt of sinus drainage- mainly clear, just always there.     Objective   Vital Signs:   /72   Pulse 80   Temp 97.3 °F (36.3 °C)   Ht 165.1 cm (65\")   Wt 84.4 kg (186 lb)   SpO2 99%   BMI 30.95 kg/m²     Physical Exam  Constitutional:       General: She is not in acute distress.  HENT:      Nose: Congestion and rhinorrhea present.   Cardiovascular:      Rate and Rhythm: Normal rate and regular rhythm.   Pulmonary:      Effort: Pulmonary effort is normal.      Breath sounds: Normal breath sounds. No wheezing.   Musculoskeletal:      Right lower leg: No edema.      Left lower leg: No edema.        Result Review :                 Assessment and Plan {CC Problem List  Visit Diagnosis   ROS  Review (Popup)  Health Maintenance  Quality  BestPractice  Medications  SmartSets  SnapShot Encounters  Media :23}   Diagnoses and all orders for this visit:    1. RSV infection (Primary)  Comments:  she is getting better but will try short course of prednisone and follow closely.    Other orders  -     predniSONE (DELTASONE) 10 MG tablet; Take 3 tablets by mouth Daily for 2 days, THEN 2 tablets Daily for 2 days, THEN 1 tablet Daily for 2 days.  Dispense: 12 tablet; Refill: 0        Follow Up   No follow-ups on file.  Patient was given instructions and counseling regarding her condition or for health maintenance advice. Please see specific information pulled into the AVS if appropriate.       "

## 2021-12-02 NOTE — TELEPHONE ENCOUNTER
Caller: Kiley Last    Relationship: Self    Best call back number: [phone]    What medication are you requesting: PREDNISONE    What are your current symptoms: HEAD AND EYE PAIN, SNEEZING, COUGHING, NOSE SWOLLEN ON BOTH SIDES - PATIENT WAS DIAGNOSED WITH PNEUMONIA AND RSV.     How long have you been experiencing symptoms: SOMETIME BEFORE THANKSGIVING    Have you had these symptoms before:    [x] Yes  [] No    Have you been treated for these symptoms before:   [x] Yes  [] No    If a prescription is needed, what is your preferred pharmacy and phone number: [facility] [address] [address] [place] [address] AT [facility] - [phone] [facility] [phone]      Additional notes: THE PATIENT JUST RECENTLY FINISHED A PRESCRIPTION OF Z-PACK AND SHE STATES THAT THIS DID NOT HELP LIKE IT SHOULD.

## 2021-12-20 ENCOUNTER — FLU SHOT (OUTPATIENT)
Dept: INTERNAL MEDICINE | Facility: CLINIC | Age: 76
End: 2021-12-20

## 2021-12-20 DIAGNOSIS — Z23 NEEDS FLU SHOT: Primary | ICD-10-CM

## 2021-12-20 PROCEDURE — 90662 IIV NO PRSV INCREASED AG IM: CPT | Performed by: INTERNAL MEDICINE

## 2021-12-20 PROCEDURE — G0008 ADMIN INFLUENZA VIRUS VAC: HCPCS | Performed by: INTERNAL MEDICINE

## 2021-12-21 LAB — MAGNESIUM SERPL-MCNC: 2 MG/DL (ref 1.6–2.3)

## 2021-12-25 ENCOUNTER — HOSPITAL ENCOUNTER (EMERGENCY)
Facility: HOSPITAL | Age: 76
Discharge: HOME | End: 2021-12-25
Attending: EMERGENCY MEDICINE | Admitting: EMERGENCY MEDICINE

## 2021-12-25 ENCOUNTER — APPOINTMENT (OUTPATIENT)
Dept: CT IMAGING | Facility: HOSPITAL | Age: 76
End: 2021-12-25

## 2021-12-25 VITALS
RESPIRATION RATE: 16 BRPM | BODY MASS INDEX: 31.37 KG/M2 | HEART RATE: 70 BPM | WEIGHT: 188.3 LBS | DIASTOLIC BLOOD PRESSURE: 83 MMHG | OXYGEN SATURATION: 99 % | TEMPERATURE: 98.7 F | HEIGHT: 65 IN | SYSTOLIC BLOOD PRESSURE: 157 MMHG

## 2021-12-25 DIAGNOSIS — Z86.79 HISTORY OF HYPERTENSION: ICD-10-CM

## 2021-12-25 DIAGNOSIS — K43.9 VENTRAL HERNIA WITHOUT OBSTRUCTION OR GANGRENE: Primary | ICD-10-CM

## 2021-12-25 DIAGNOSIS — I10 ASYMPTOMATIC HYPERTENSION: ICD-10-CM

## 2021-12-25 PROCEDURE — 74176 CT ABD & PELVIS W/O CONTRAST: CPT

## 2021-12-25 PROCEDURE — 99283 EMERGENCY DEPT VISIT LOW MDM: CPT

## 2021-12-25 NOTE — DISCHARGE INSTRUCTIONS
Continue current medications, follow-up with your primary care provider as needed, ED return for worsening symptoms as needed.

## 2021-12-25 NOTE — ED TRIAGE NOTES
[facility] with c/o lower abd pain after lifting heavy mixer from the floor.  Pt denies n/v/d.  Pt has hx of bowel resection after obstruction.    Pt wearing mask, staff wearing appropriate PPE.

## 2021-12-25 NOTE — ED PROVIDER NOTES
EMERGENCY DEPARTMENT ENCOUNTER    Room Number:  13/13  Date of encounter:  12/25/2021  PCP: Miranda Morgan MD  Historian: Patient      HPI:  Chief Complaint: Abdominal pain  A complete HPI/ROS/PMH/PSH/SH/FH are unobtainable due to: None    Context: Kiley Last is a 76 y.o. female who presents to the ED via [facility] from home with acute onset of left-sided abdominal pain earlier tonight after she picked up a heavy mixer from the floor.  Patient has a history of an incisional hernia from a prior surgical site where she had had bowel resection from a small bowel obstruction and postsurgical stoma.  Had noted a bulging in that area with pain earlier tonight, this has since resolved.  No nausea or vomiting, normal bowel movements today.      MEDICAL RECORD REVIEW    Chart review shows that the patient is on Eliquis    PAST MEDICAL HISTORY  Active Ambulatory Problems     Diagnosis Date Noted   • Essential hypertension 05/01/2017   • Diverticulitis 05/26/2017   • Pleural effusion 05/26/2017   • COPD (chronic obstructive pulmonary disease) ([facility]) 05/26/2017   • Family hx of colon cancer 08/28/2017   • Dermatochalasis of both eyelids 04/20/2017   • Paroxysmal atrial fibrillation ([facility]) 08/09/2018   • Clostridium difficile colitis 09/20/2019   • History of prediabetes 12/11/2019   • Vertigo 01/23/2020   • Hx of small bowel obstruction 01/23/2020   • Atrial tachycardia ([facility]) 07/10/2020   • 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 04/14/2021   • NEW (obstructive sleep apnea) 05/20/2021     Resolved Ambulatory Problems     Diagnosis Date Noted   • Sigmoid diverticulitis 04/30/2017   • Colon obstruction ([facility]) 05/03/2017   • Ileus ([facility]) 05/26/2017   • Abdominal abscess 05/26/2017   • 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 ([facility]) 05/26/2017   • Colostomy present ([facility]) 08/28/2017   • Bowel obstruction ([facility]) 01/26/2018   • Small bowel obstruction ([facility])  01/26/2018   • Eyebrow ptosis 04/20/2017   • Encounter for screening colonoscopy 07/05/2019   • Nausea & vomiting 01/23/2020   • Hypersomnia with sleep apnea 03/28/2021     Past Medical History:   Diagnosis Date   • Arthritis    • Asthma    • Atrial fibrillation (HCC)    • Clostridium difficile infection 09/20/2019   • Colon polyps 08/01/2019   • Diverticulosis    • ESBL (extended spectrum beta-lactamase) producing bacteria infection    • History of abscess of skin and subcutaneous tissue    • History of atrial fibrillation    • Hypertension    • MDRO (multiple drug resistant organisms) resistance    • PONV (postoperative nausea and vomiting)    • Psoriasis    • UTI (urinary tract infection)          PAST SURGICAL HISTORY  Past Surgical History:   Procedure Laterality Date   • BELPHAROPTOSIS REPAIR Bilateral 04/27/2017    Bilateral brow ptosis repair via the anterior hairline approach under monitored local anesthesia-Andrez Craven   • BLEPHAROPLASTY Bilateral 04/27/2017   • BREAST BIOPSY     • CATARACT EXTRACTION     • COLON RESECTION N/A 5/3/2017    Procedure: OPEN SIMOID COLECTOMY WITH COLOSTOMY;  Surgeon: Renato Delgado MD;  Location: [facility] OR;  Service:    • COLONOSCOPY N/A 9/13/2017    Procedure: COLONOSCOPY VIA COLOSTOMY TO CECUM;  Surgeon: Renato Delgado MD;  Location: [facility] ENDOSCOPY;  Service:    • COLONOSCOPY N/A 8/1/2019    Procedure: COLONOSCOPY to cecum and TI with biopsy / hot snare polypectomies;  Surgeon: Dalton Vela Jr., MD;  Location: [facility] ENDOSCOPY;  Service: General   • COLONOSCOPY N/A 01/04/2010    Andrez Trinidad   • COLOSTOMY CLOSURE N/A 9/14/2017    Procedure: COLOSTOMY TAKEDOWN AND CLOSURE, WITH RESECTION;  Surgeon: Renato Delgado MD;  Location: [facility] MAIN OR;  Service:    • ENDOSCOPY AND COLONOSCOPY N/A 10/31/2014    Normal EGD and colonoscopy, repeat c-scope in 5 years-Andrez Trinidad   • EXPLORATORY LAPAROTOMY N/A 9/8/2019     Procedure: Exploratory laparotomy with lysis of adhesions;  Surgeon: Trini Wade MD;  Location: [facility] OR;  Service: General   • FINGER SURGERY Left     4TH FINGER LEFT HAND   • HYSTERECTOMY Bilateral    • LAPAROSCOPIC CHOLECYSTECTOMY W/ CHOLANGIOGRAPHY N/A 07/15/2003    Dr. Amrit Martinez, [facility]   • SALPINGO OOPHORECTOMY Bilateral 2006    Abdominal sacral colpopexy, bilateral salpingo-oophorectomy, tension free vaginal tape, cystoscopy-Dr. Devika Bradley, [facility]   • THORACENTESIS Right 2017    US-guided right thoracentesis-Dr. Juan Yuen, [facility]   • TONSILLECTOMY Bilateral    • UPPER GASTROINTESTINAL ENDOSCOPY N/A 10/08/2007    Esophageal mucosal changes suspicious for short-segment Olivera's esphagus, gastric mucosal abnormality characterized by erythema: biopsied, NERD disease present, high likelihood of gastritis-Dr. Mayank Knapp, [facility]         FAMILY HISTORY  Family History   Problem Relation Age of Onset   • Cancer Mother    • Colon cancer Mother    • Diabetes Father    • Diabetes Son    • Ulcerative colitis Son    • No Known Problems Maternal Grandmother    • No Known Problems Maternal Grandfather    • No Known Problems Paternal Grandmother    • No Known Problems Paternal Grandfather    • Malig Hyperthermia Neg Hx    • Breast cancer Neg Hx          SOCIAL HISTORY  Social History     Socioeconomic History   • Marital status:    Tobacco Use   • Smoking status: Former Smoker     Types: Cigarettes     Quit date: 1967     Years since quittin.0   • Smokeless tobacco: Never Used   • Tobacco comment: SOCIAL SMOKING ONLY   Substance and Sexual Activity   • Alcohol use: Yes     Alcohol/week: 2.0 standard drinks     Types: 1 Glasses of wine, 1 Shots of liquor per week     Comment: occasionally/caffeine use    • Drug use: No   • Sexual activity: Defer         ALLERGIES  Epinephrine and Penicillins        REVIEW OF SYSTEMS  Review of Systems     All systems reviewed and negative except for  those discussed in HPI.       PHYSICAL EXAM    I have reviewed the triage vital signs and nursing notes.    ED Triage Vitals [12/25/21 0040]   Temp Heart Rate Resp BP SpO2   98.7 °F (37.1 °C) 73 16 134/74 96 %      Temp src Heart Rate Source Patient Position BP Location FiO2 (%)   Oral Monitor -- -- --       Physical Exam  General: No acute distress, nontoxic  HEENT: Mucous membranes moist, atraumatic, EOMI  Neck: Full ROM  Pulm: Symmetric chest rise, nonlabored, lungs CTAB  Cardiovascular: Regular rate and rhythm, intact distal pulses  GI: Soft, nontender, nondistended, no rebound, no guarding, bowel sounds present, no residual abdominal hernia noted, surgical scar site is well-appearing with no bulging, erythema, or skin discoloration  MSK: Full ROM, no deformity  Skin: Warm, dry  Neuro: Awake, alert, oriented x 4, GCS 15, moving all extremities, no focal deficits  Psych: Calm, cooperative      N95, protective eye goggles, and gloves used during this encounter. Patient in surgical mask.      LAB RESULTS  No results found for this or any previous visit (from the past 24 hour(s)).          RADIOLOGY  CT Abdomen Pelvis Without Contrast    Result Date: 12/25/2021  CT OF THE ABDOMEN AND PELVIS WITHOUT CONTRAST  HISTORY: Left anterior abdominal wall pain  COMPARISON: 09/06/2019  TECHNIQUE: Axial CT imaging was obtained through the abdomen and pelvis. No IV contrast was administered.  FINDINGS: Images through the lung bases demonstrate fibrotic changes. Similar findings were present on the prior study. The stomach, duodenum, adrenal glands, spleen, and pancreas appear normal. There is a tiny calcified splenic artery aneurysm at the hilum. This measures up to 8 mm. It was present on prior study. No focal hepatic lesions are seen. Gallbladder is nonvisualized. No hydronephrosis is seen on either side. No distal ureteral or bladder stones are seen. Uterus is surgically absent. There are changes of prior sigmoid resection,  without evidence of obstruction. The appendix is normal. There is a fat-containing left ventral hernia. Similar findings were present on prior study from 2019. Fascial defect measures approximately 1.2 cm. Again, this is stable when compared to prior study. The defect measures 1.3 cm in craniocaudal dimensions. There is colonic diverticulosis      Stable left ventral hernia, which contains fat. No evidence of obstruction.  Radiation dose reduction techniques were utilized, including automated exposure control and exposure modulation based on body size.  This report was finalized on 12/25/2021 2:12 AM by Dr. Debbie Thornton M.D.        I ordered the above noted radiological studies. Reviewed by me.  See dictation for official radiology interpretation.      PROCEDURES    Procedures      MEDICATIONS GIVEN IN ER    Medications - No data to display      PROGRESS, DATA ANALYSIS, CONSULTS, AND MEDICAL DECISION MAKING    All labs have been independently reviewed by me.  All radiology studies have been reviewed by me and discussed with radiologist dictating the report.   EKG's independently viewed and interpreted by me.  Discussion below represents my analysis of pertinent findings related to patient's condition, differential diagnosis, treatment plan and final disposition.    Spontaneously reduced hernia prior to arrival, will check a quick CT scan to make sure there is no evidence of a developing obstruction or ileus, but at this time no residual palpable hernia.    ED Course as of 12/25/21 0225   Sat Dec 25, 2021   0219 CT Abdomen Pelvis Without Contrast  Reviewed, no obstruction, no acute emergent findings. [DC]      ED Course User Index  [DC] Tomi Urbano MD     Patient updated on the reassuring CT findings, she remains well-appearing.  I did discuss with her her blood pressure, she is asymptomatic with this at this point.  Advised continue with her current medications and PCP follow-up for recheck next week.  ED  return for worsening symptoms as needed.  Surgical follow-up for the hernia as needed.    AS OF 02:25 EST VITALS:    BP - (!) 191/89  HR - 72  TEMP - 98.7 °F (37.1 °C) (Oral)  02 SATS - 95%        DIAGNOSIS  Final diagnoses:   Ventral hernia without obstruction or gangrene   Asymptomatic hypertension   History of hypertension         DISPOSITION  DISCHARGE    Patient discharged in stable condition.    Reviewed implications of results, diagnosis, meds, responsibility to follow up, warning signs and symptoms of possible worsening, potential complications and reasons to return to ER.    Patient/Family voiced understanding of above instructions.    Discussed plan for discharge, as there is no emergent indication for admission. Patient referred to primary care provider for BP management due to today's BP. Pt/family is agreeable and understands need for follow up and repeat testing.  Pt is aware that discharge does not mean that nothing is wrong but it indicates no emergency is present that requires admission and they must continue care with follow-up as given below or physician of their choice.     FOLLOW-UP  [facility] Emergency Department  [address] [address] [phone]    As needed, If symptoms worsen    Miranda Morgan MD  [address] [id number] [phone]    Schedule an appointment as soon as possible for a visit   As needed         Medication List      No changes were made to your prescriptions during this visit.                    Tomi Urbano MD  12/25/21 4992

## 2021-12-25 NOTE — ED NOTES
Pt was in mask through out encounter. This ERT was in proper PPE through out encounter.      Jeniffer Shelton, PCT  12/25/21 0119

## 2021-12-25 NOTE — ED NOTES
This RN wore gloves, mask, eye protection and all other necessary PPE while performing pt care.     Lisa Kerr, RN  12/25/21 0116

## 2021-12-27 ENCOUNTER — OFFICE VISIT (OUTPATIENT)
Dept: INTERNAL MEDICINE | Facility: CLINIC | Age: 76
End: 2021-12-27

## 2021-12-27 VITALS
SYSTOLIC BLOOD PRESSURE: 134 MMHG | HEIGHT: 65 IN | BODY MASS INDEX: 31.32 KG/M2 | HEART RATE: 80 BPM | DIASTOLIC BLOOD PRESSURE: 78 MMHG | WEIGHT: 188 LBS | TEMPERATURE: 97.3 F

## 2021-12-27 DIAGNOSIS — K43.2 INCISIONAL HERNIA, WITHOUT OBSTRUCTION OR GANGRENE: Primary | ICD-10-CM

## 2021-12-27 DIAGNOSIS — F43.21 SITUATIONAL DEPRESSION: ICD-10-CM

## 2021-12-27 DIAGNOSIS — R79.0 LOW MAGNESIUM LEVEL: ICD-10-CM

## 2021-12-27 DIAGNOSIS — I10 ESSENTIAL HYPERTENSION: ICD-10-CM

## 2021-12-27 PROCEDURE — 99214 OFFICE O/P EST MOD 30 MIN: CPT | Performed by: INTERNAL MEDICINE

## 2021-12-27 NOTE — PROGRESS NOTES
"Chief Complaint  Pneumonia    Subjective          Kiley Last presents to [facility] PRIMARY [facility]ARE  History of Present Illness Went to ER 12/24 with acute onset of LLQ pain- dx incisional hernia.  It is not sore now, thinks it went back in.   She takes care of her  all of the time.  Does not do much for herself.   Tried the Cymbalta- made her too tired/flat so she decided to not take it.  Her sleep has been better so she's not taking the trazodone.       Objective   Vital Signs:   /78   Pulse 80   Temp 97.3 °F (36.3 °C)   Ht 165.1 cm (65\")   Wt 85.3 kg (188 lb)   BMI 31.28 kg/m²     Physical Exam  Constitutional:       Appearance: Normal appearance.   Cardiovascular:      Rate and Rhythm: Normal rate.   Pulmonary:      Effort: Pulmonary effort is normal.   Abdominal:      General: Bowel sounds are normal.      Tenderness: There is abdominal tenderness (LLQ).   Musculoskeletal:      Right lower leg: No edema.      Left lower leg: No edema.   Skin:     General: Skin is warm and dry.        Result Review :                 Assessment and Plan    Diagnoses and all orders for this visit:    1. Incisional hernia, without obstruction or gangrene (Primary)  Comments:  Needs surgical opinion- no heavy lifting, which is difficult taking care of her .   Orders:  -     Ambulatory Referral to General Surgery    2. Low magnesium level  Comments:  has some replacement at home she can resume.  Orders:  -     Magnesium; Future    3. Essential hypertension  Comments:  Controlled  Orders:  -     Comprehensive Metabolic Panel; Future  -     Lipid Panel With / Chol / HDL Ratio; Future    4. Situational depression  Comments:  Feels she is doing ok off of meds for now.  Has excellent family support.        Follow Up   Return in about 6 months (around 6/27/2022) for Medicare Wellness, Lab Before FUP.  Patient was given instructions and counseling regarding her condition or for health " maintenance advice. Please see specific information pulled into the AVS if appropriate.

## 2022-01-13 ENCOUNTER — OFFICE VISIT (OUTPATIENT)
Dept: SURGERY | Facility: CLINIC | Age: 77
End: 2022-01-13

## 2022-01-13 VITALS — BODY MASS INDEX: 31.09 KG/M2 | HEIGHT: 65 IN | WEIGHT: 186.6 LBS

## 2022-01-13 DIAGNOSIS — K43.2 INCISIONAL HERNIA, WITHOUT OBSTRUCTION OR GANGRENE: Primary | ICD-10-CM

## 2022-01-13 PROCEDURE — 99214 OFFICE O/P EST MOD 30 MIN: CPT | Performed by: SURGERY

## 2022-01-13 RX ORDER — CLINDAMYCIN PHOSPHATE 900 MG/50ML
900 INJECTION INTRAVENOUS ONCE
Status: CANCELLED | OUTPATIENT
Start: 2022-02-02 | End: 2022-01-13

## 2022-01-18 NOTE — PROGRESS NOTES
"Chief Complaint  Pain (pain all over )    Subjective          Kiley Last presents to [facility] PRIMARY CARE  History of Present Illness  Here with continued complaints of pain across upper back- related mainly to the caretaking of her . She gets very little help from her children, although they come when they can.  She feels totally depleted, sacrifices herself to take care of her .  She started PT a few months ago for this pain but had to stop due to her responsibilities at home.  She gets an achy pain down the R arm when she's lying in bed.  Wakes her in the middle of the night.    Asking about an antidepressants- heard Zoloft is a good one from a friend. She is having trouble sleeping at night.    Hasn't been using her CPAP- just excuses.  Has a new mask coming on Wednesday.     Objective   Vital Signs:   /74   Pulse 80   Temp 97.3 °F (36.3 °C)   Ht 165.1 cm (65\")   Wt 84.8 kg (187 lb)   BMI 31.12 kg/m²     Physical Exam  Constitutional:       Appearance: Normal appearance.   Cardiovascular:      Rate and Rhythm: Normal rate.   Musculoskeletal:         General: Tenderness: R bicep       Cervical back: No rigidity or tenderness.      Right lower leg: No edema.      Left lower leg: No edema.   Psychiatric:         Mood and Affect: Mood normal.      Comments: teary        Result Review :                 Assessment and Plan    Diagnoses and all orders for this visit:    1. Pain in joint of right shoulder (Primary)  Comments:  from all of the lifting etc - may consider trying to go back to PT again.  Discuss with husbands OT about how to manipulate him safely.    2. NEW (obstructive sleep apnea)  Comments:  use nightly, definitely making her sleeping/fatigue worse.  Will try some trazodone at hs     3. Essential hypertension    4. Major depressive disorder, recurrent episode, in partial remission (HCC)  Comments:  will try duloxetine given general body aches. Start 30 " The patient is Stable - Low risk of patient condition declining or worsening    Shift Goals  Clinical Goals: Wean O2, Improve mobility  Patient Goals: Go Home and Decrease Cough  Family Goals: Support    Progress made toward(s) clinical / shift goals:  Patient's oxygen saturation has been maintained throughout shift. Cough being managed by nursing interventions and medication regiment.     Patient is not progressing towards the following goals:      Problem: Respiratory  Goal: Patient will achieve/maintain optimum respiratory ventilation and gas exchange  Outcome: Progressing     Problem: Knowledge Deficit - Standard  Goal: Patient and family/care givers will demonstrate understanding of plan of care, disease process/condition, diagnostic tests and medications  Outcome: Progressing      mg daily and increase to 60 after 2-4 weeks.     Other orders  -     DULoxetine (CYMBALTA) 30 MG capsule; Take 1 capsule by mouth Daily.  Dispense: 30 capsule; Refill: 0  -     DULoxetine (CYMBALTA) 60 MG capsule; Take 1 capsule by mouth Daily. To take after 30 days of duloxetine 30 mg  Dispense: 90 capsule; Refill: 1  -     traZODone (DESYREL) 50 MG tablet; Take 1 tablet by mouth Every Night.  Dispense: 90 tablet; Refill: 0        Follow Up   Return for Keep previously scheduled appointment.  Patient was given instructions and counseling regarding her condition or for health maintenance advice. Please see specific information pulled into the AVS if appropriate.

## 2022-01-26 ENCOUNTER — TELEPHONE (OUTPATIENT)
Dept: SURGERY | Facility: CLINIC | Age: 77
End: 2022-01-26

## 2022-01-26 NOTE — TELEPHONE ENCOUNTER
I called the patient back and answered her questions.  Her  is struggling with active C. difficile infection and she does not feel comfortable proceeding with open ventral hernia repair until her  has cleared his infection.  She would like to postpone her surgery indefinitely and will call back to schedule.  Could someone please make sure that her surgery and scheduled postoperative appointment with me are canceled?    Thanks,  RONEN

## 2022-01-26 NOTE — TELEPHONE ENCOUNTER
The patient called to see if you could call her regarding her surgery that is scheduled for 2/2/22. She is the primary caregiver for her  and wanted to get your thoughts on possibly holding off a little on the surgery and wanted to discuss this with you. She can be reached at  [phone].

## 2022-01-26 NOTE — PROGRESS NOTES
General Surgery  Established Patient Office Visit    CC: Abdominal wall hernia    HPI: The patient is a pleasant 76 y.o. year-old lady who presents today for recheck of a left lower quadrant abdominal wall hernia located at the site of a previous colostomy.  The colostomy was reversed in September 2017 by Dr. Delgado.  I then met her 2 years later in September 2019 when she came to the hospital with a small bowel obstruction requiring exploratory laparotomy with lysis of adhesions.  She says that the other day she was lifting a heavy mixer around [facility] and felt severe pain at the left lower quadrant colostomy scar.  She felt a large bulge there and came to the emergency room after the pain became rapidly worse in severity and felt unrelenting.  While in the ER, she had a CT abdomen/pelvis that demonstrated a fat-containing left-sided ventral hernia with a 1.2 cm fascial defect.  She has had no troubles with her midline incision and reports regular bowel movements with no nausea or vomiting.  She was discharged from the emergency room and says that the hernia is still present in a constant duration but smaller and is not nearly as painful as before.    Past Medical History:   History of diverticulitis  Asthma  History of multidrug-resistant bacterial infections of the skin and soft tissues  Psoriasis  Paroxysmal atrial fibrillation (occurred after acute episode of diverticulitis requiring sigmoid colectomy and colostomy)  History of small bowel obstructions requiring exploratory laparotomy with lysis of adhesions  History of C. difficile colitis treated medically     Past Surgical History:   Exploratory laparotomy with lysis of adhesions (Sept 2019 by me)  Open sigmoid colectomy with colostomy (May 2017, Dr. Delgado)  Open colostomy takedown (September 2017, Dr. Delgado)  Hysterectomy  Tonsillectomy  Colonoscopy (August 2019, Dr. Steven Vela)  Cholecystectomy  Blepharoplasty  Cataract extraction    Medications:    Eliquis 5 mg every 12 hours  Tylenol as needed for arthritis pain  Atorvastatin 20 mg daily  Vitamin D3 1000 units daily  Ativan 0.5 mg every 8 hours as needed for anxiety  Meclizine 25 mg 3 times daily as needed for dizziness  Metoprolol 50 mg daily  Rythmol 150 mg twice daily    Allergies: Penicillins (oral blisters), epinephrine (received too much and had heart palpitations)    Family History: Mother with colon cancer, son with ulcerative colitis    Social History:  and cares for her disabled , occasional alcohol use, former smoker but quit in 1967    ROS:   Constitutional: Negative for fevers or chills  HENT: Negative for hearing loss or runny nose  Eyes: Negative for vision changes or scleral icterus  Respiratory: Negative for cough or shortness of breath  Cardiovascular: Negative for chest pain or heart palpitations  Gastrointestinal: Positive for abdominal distention; negative for abdominal pain, nausea, vomiting, constipation, melena, or hematochezia  Genitourinary: Negative for hematuria or dysuria  Musculoskeletal: Negative for joint swelling or gait instability  Neurologic: Negative for tremors or seizures  Psychiatric: Negative for suicidal ideations or depression  All other systems reviewed and negative    Physical Exam:  Height: 165 cm  Weight: 84 kg  BMI: 31.1  General: No acute distress, well-nourished & well-developed  HEAD: normocephalic, atraumatic  EYES: normal conjunctiva, sclera anicteric  EARS: grossly normal hearing  NECK: supple, no thyromegaly  CARDIOVASCULAR: regular rate and rhythm  RESPIRATORY: clear to auscultation bilaterally  GASTROINTESTINAL: soft, nontender, non-distended, palpable incarcerated hernia at LLQ colostomy site scar which contains only omentum, no overlying skin erythema or fluctuance  MUSCULOSKELETAL: normal gait and station. No gross extremity abnormalities  PSYCHIATRIC: oriented x3, normal mood and affect    IMAGING:  CT ABD/PELVIS:  FINDINGS:  Images  through the lung bases demonstrate fibrotic changes. Similar findings were present on the prior study. The stomach, duodenum, adrenal glands, spleen, and pancreas appear normal. There is a tiny calcified splenic artery aneurysm at the hilum. This measures up to 8 mm. It was present on prior study. No focal hepatic lesions are seen. Gallbladder is nonvisualized. No hydronephrosis is seen on either side. No distal ureteral or bladder stones are seen. Uterus is surgically absent. There are changes of prior sigmoid resection, without evidence of obstruction. The appendix is normal. There is a fat-containing left ventral hernia. Similar findings were present on prior study from 2019. Fascial defect measures approximately 1.2 cm. Again, this is stable when compared to prior study. The defect measures 1.3 cm in craniocaudal dimensions. There is colonic diverticulosis     IMPRESSION:  Stable left ventral hernia, which contains fat. No evidence of obstruction.    ASSESSMENT & PLAN  Mrs. Last is a 76-year-old lady with an incisional hernia containing omentum through her former colostomy site.  I reviewed the CT abdomen/pelvis that was done on [facility] and showed her the imaging.  Given the increasing pain associated with her hernia, I would recommend she consider having her hernia repaired primarily.  There is a very small fascial defect measuring only about 1.2 cm, and would be amenable to a primary repair.  She is the primary caregiver for her  and would like to schedule surgery but will need to make sure that she has appropriate resources set up to assist her with caring for her  at home.  She frequently has to lift him and I advised her that she would need to avoid any heavy lifting for 6 weeks minimum postop.  She will need to hold her Eliquis for 5 days before the operation.    Trini Wade MD  General and Endoscopic Surgery  [facility]    [address], [address]  [place], [place], [address]  P: [phone]  F: [phone]

## 2022-01-31 ENCOUNTER — APPOINTMENT (OUTPATIENT)
Dept: PREADMISSION TESTING | Facility: HOSPITAL | Age: 77
End: 2022-01-31

## 2022-01-31 DIAGNOSIS — R42 VERTIGO: ICD-10-CM

## 2022-01-31 RX ORDER — MECLIZINE HYDROCHLORIDE 25 MG/1
TABLET ORAL
Qty: 90 TABLET | Refills: 0 | Status: SHIPPED | OUTPATIENT
Start: 2022-01-31 | End: 2022-07-26

## 2022-02-01 ENCOUNTER — OFFICE VISIT (OUTPATIENT)
Dept: INTERNAL MEDICINE | Facility: CLINIC | Age: 77
End: 2022-02-01

## 2022-02-01 VITALS
WEIGHT: 186 LBS | HEIGHT: 65 IN | SYSTOLIC BLOOD PRESSURE: 134 MMHG | HEART RATE: 80 BPM | DIASTOLIC BLOOD PRESSURE: 74 MMHG | TEMPERATURE: 97.3 F | BODY MASS INDEX: 30.99 KG/M2

## 2022-02-01 DIAGNOSIS — I10 ESSENTIAL HYPERTENSION: ICD-10-CM

## 2022-02-01 DIAGNOSIS — I48.0 PAROXYSMAL ATRIAL FIBRILLATION: Primary | ICD-10-CM

## 2022-02-01 DIAGNOSIS — F43.9 STRESS AT HOME: ICD-10-CM

## 2022-02-01 DIAGNOSIS — K43.2 INCISIONAL HERNIA, WITHOUT OBSTRUCTION OR GANGRENE: ICD-10-CM

## 2022-02-01 PROBLEM — J90 PLEURAL EFFUSION: Status: RESOLVED | Noted: 2017-05-26 | Resolved: 2022-02-01

## 2022-02-01 PROCEDURE — 99214 OFFICE O/P EST MOD 30 MIN: CPT | Performed by: INTERNAL MEDICINE

## 2022-02-01 NOTE — PROGRESS NOTES
"Chief Complaint  Hypertension    Subjective          Kiley Last presents to [facility] PRIMARY [facility]  History of Present Illness Continues to take care of her  with multiple medical issues.  She has cancelled her surgery for the abd hernia- has been in touch with Dr. Wade.  More and more problems with L arm/neck pain- seeing Dr. Vela later this week for evaluation. She is able to take care of him but not much else.    Feels better since being on Flecanide.      Objective   Vital Signs:   /74   Pulse 80   Temp 97.3 °F (36.3 °C)   Ht 165.1 cm (65\")   Wt 84.4 kg (186 lb)   BMI 30.95 kg/m²     Physical Exam  Constitutional:       Appearance: Normal appearance.   Cardiovascular:      Rate and Rhythm: Normal rate and regular rhythm.   Musculoskeletal:      Right lower leg: No edema.      Left lower leg: No edema.        Result Review :                 Assessment and Plan    Diagnoses and all orders for this visit:    1. Paroxysmal atrial fibrillation (HCC) (Primary)  Comments:  tolerates well- on anticoagulants.     2. Incisional hernia, without obstruction or gangrene  Comments:  discussed what to watch for to make sure she recognizes an emergency.     3. Essential hypertension  Comments:  controlled    4. Stress at home  Comments:  biggest issue- she really has no options.  Will follow her closely, offered support.  Does not want/need meds.         Follow Up   Return in about 6 months (around 8/1/2022) for Medicare Wellness, Lab Before FUP.  Patient was given instructions and counseling regarding her condition or for health maintenance advice. Please see specific information pulled into the AVS if appropriate.       "

## 2022-02-07 ENCOUNTER — OFFICE VISIT (OUTPATIENT)
Dept: ORTHOPEDIC SURGERY | Facility: CLINIC | Age: 77
End: 2022-02-07

## 2022-02-07 VITALS — TEMPERATURE: 97.5 F | BODY MASS INDEX: 30.99 KG/M2 | HEIGHT: 65 IN | WEIGHT: 186 LBS

## 2022-02-07 DIAGNOSIS — M75.100 TEAR OF ROTATOR CUFF, UNSPECIFIED LATERALITY, UNSPECIFIED TEAR EXTENT, UNSPECIFIED WHETHER TRAUMATIC: ICD-10-CM

## 2022-02-07 DIAGNOSIS — R52 PAIN: Primary | ICD-10-CM

## 2022-02-07 PROCEDURE — 99214 OFFICE O/P EST MOD 30 MIN: CPT | Performed by: ORTHOPAEDIC SURGERY

## 2022-02-07 PROCEDURE — 73030 X-RAY EXAM OF SHOULDER: CPT | Performed by: ORTHOPAEDIC SURGERY

## 2022-02-07 PROCEDURE — 20610 DRAIN/INJ JOINT/BURSA W/O US: CPT | Performed by: ORTHOPAEDIC SURGERY

## 2022-02-07 RX ADMIN — TRIAMCINOLONE ACETONIDE 40 MG: 40 INJECTION, SUSPENSION INTRA-ARTICULAR; INTRAMUSCULAR at 15:27

## 2022-02-07 NOTE — PROGRESS NOTES
Patient: Kiley Last  YOB: 1945  Date of Service: 2/7/2022    Chief Complaints: Bilateral shoulder pain    Subjective:    History of Present Illness: Pt is seen in the office today with complaints of bilateral shoulder pain it has been since 2020 that have seen her for both shoulders.  She has had a lot going on her personal life with sick her  sick she is here with her daughter both shoulders are really bothering her but her right 1 is much worse than left she has significant night pain.  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  Symptoms are severe intermittent worse at times worse at night no new history injury that she can recall her past medical history smart for asthma COPD diverticulitis A. fib she is on anticoagulant..          Allergies:   Allergies   Allergen Reactions   • Epinephrine Palpitations   • Penicillins Other (See Comments)     BLISTERS IN MOUTH    Patient tolerated ceftriaxone during 5/2017 admission.          Medications:   Home Medications:  Current Outpatient Medications on File Prior to Visit   Medication Sig   • Acetaminophen (TYLENOL ARTHRITIS PAIN PO) Take 650 mg by mouth 3 (Three) Times a Day As Needed.   • Ascorbic Acid (VITAMIN C PO) Take 1 tablet by mouth Daily.   • B Complex Vitamins (vitamin b complex) capsule capsule Take  by mouth Daily.   • Cholecalciferol (VITAMIN D3 PO) Take 1 tablet by mouth Daily.   • Eliquis 5 MG tablet tablet TAKE ONE TABLET BY MOUTH EVERY 12 HOURS   • flecainide (TAMBOCOR) 50 MG tablet Take 1 tablet by mouth Every 12 (Twelve) Hours.   • fluticasone (Flonase) 50 MCG/ACT nasal spray 2 sprays into the nostril(s) as directed by provider Daily.   • losartan (COZAAR) 100 MG tablet TAKE ONE TABLET BY MOUTH DAILY   • MAGNESIUM PO Take 1 tablet by mouth Daily.   • meclizine (ANTIVERT) 25 MG tablet TAKE ONE TABLET BY MOUTH THREE TIMES A DAY AS NEEDED FOR DIZZINESS   • melatonin 5 MG tablet  tablet Take 10 mg by mouth.   • metoprolol succinate XL (TOPROL-XL) 50 MG 24 hr tablet Take 1 tablet by mouth Daily.   • traZODone (DESYREL) 50 MG tablet Take 1 tablet by mouth Every Night.     No current facility-administered medications on file prior to visit.     Current Medications:  Scheduled Meds:  Continuous Infusions:No current facility-administered medications for this visit.    PRN Meds:.    I have reviewed the patient's medical history in detail and updated the computerized patient record.  Review and summarization of old records include:    Past Medical History:   Diagnosis Date   • Arthritis    • Asthma     NO INHALERS   • Atrial fibrillation (HCC)    • Clostridium difficile infection 09/20/2019   • Colon polyps 08/01/2019    Transverse colon: tubular adenoma, descending colon: fragments of tubular adenoma, sigmoid colon: ischemic eroded hyperplastic polyp   • COPD (chronic obstructive pulmonary disease) (HCC)    • Diverticulitis    • Diverticulosis    • ESBL (extended spectrum beta-lactamase) producing bacteria infection    • History of abscess of skin and subcutaneous tissue     ABD WOUND 5/2017   • History of atrial fibrillation      X1 POST OP FROM COLOSTOMY 5/2017 - NO PROBLEMS SINCE   • Hypertension    • MDRO (multiple drug resistant organisms) resistance    • NEW (obstructive sleep apnea) 05/20/2021    Overnight polysomnogram.  Weight 190 pounds.  Mild NEW with AHI 7 events per hour for total sleep time.  However, during REM moderate NEW with AHI 16.7 events per hour.  No sleep-related hypoxia.  The patient snored 5% of total sleep time.   • PONV (postoperative nausea and vomiting)    • Psoriasis    • UTI (urinary tract infection)    • Vertigo         Past Surgical History:   Procedure Laterality Date   • BELPHAROPTOSIS REPAIR Bilateral 04/27/2017    Bilateral brow ptosis repair via the anterior hairline approach under monitored local anesthesia-Andrez Craven   • BLEPHAROPLASTY  Bilateral 04/27/2017   • BREAST BIOPSY     • CATARACT EXTRACTION     • COLON RESECTION N/A 5/3/2017    Procedure: OPEN SIMOID COLECTOMY WITH COLOSTOMY;  Surgeon: Renato Delgado MD;  Location:  [facility];  Service:    • COLONOSCOPY N/A 9/13/2017    Procedure: COLONOSCOPY VIA COLOSTOMY TO CECUM;  Surgeon: Renato Delgado MD;  Location:  [facility];  Service:    • COLONOSCOPY N/A 8/1/2019    Procedure: COLONOSCOPY to cecum and TI with biopsy / hot snare polypectomies;  Surgeon: Dalton Vela Jr., MD;  Location:  [facility];  Service: General   • COLONOSCOPY N/A 01/04/2010    Andrez Trinidad   • COLOSTOMY CLOSURE N/A 9/14/2017    Procedure: COLOSTOMY TAKEDOWN AND CLOSURE, WITH RESECTION;  Surgeon: Renato Delgado MD;  Location: [facility] MAIN OR;  Service:    • ENDOSCOPY AND COLONOSCOPY N/A 10/31/2014    Normal EGD and colonoscopy, repeat c-scope in 5 years-Andrez Trinidad   • EXPLORATORY LAPAROTOMY N/A 9/8/2019    Procedure: Exploratory laparotomy with lysis of adhesions;  Surgeon: Trini Wade MD;  Location: [facility] MAIN OR;  Service: General   • FINGER SURGERY Left     4TH FINGER LEFT HAND   • HYSTERECTOMY Bilateral    • LAPAROSCOPIC CHOLECYSTECTOMY W/ CHOLANGIOGRAPHY N/A 07/15/2003    Dr. Amrit Martinez, [facility]   • SALPINGO OOPHORECTOMY Bilateral 02/02/2006    Abdominal sacral colpopexy, bilateral salpingo-oophorectomy, tension free vaginal tape, cystoscopy-Dr. Devika Bradley, [facility]   • THORACENTESIS Right 05/21/2017    US-guided right thoracentesis-Dr. Juan Yuen, [facility]   • TONSILLECTOMY Bilateral    • UPPER GASTROINTESTINAL ENDOSCOPY N/A 10/08/2007    Esophageal mucosal changes suspicious for short-segment Olivera's esphagus, gastric mucosal abnormality characterized by erythema: biopsied, NERD disease present, high likelihood of gastritis-Dr. Mayank Knapp, [facility]        Social History     Occupational History   • Not on file   Tobacco Use   • Smoking status: Former Smoker      Types: Cigarettes     Quit date:      Years since quittin.1   • Smokeless tobacco: Never Used   • Tobacco comment: SOCIAL SMOKING ONLY   Vaping Use   • Vaping Use: Never used   Substance and Sexual Activity   • Alcohol use: Yes     Alcohol/week: 2.0 standard drinks     Types: 1 Glasses of wine, 1 Shots of liquor per week     Comment: occasionally/caffeine use    • Drug use: No   • Sexual activity: Defer      Social History     Social History Narrative   • Not on file        Family History   Problem Relation Age of Onset   • Cancer Mother    • Colon cancer Mother    • Diabetes Father    • Diabetes Son    • Ulcerative colitis Son    • No Known Problems Maternal Grandmother    • No Known Problems Maternal Grandfather    • No Known Problems Paternal Grandmother    • No Known Problems Paternal Grandfather    • Malig Hyperthermia Neg Hx    • Breast cancer Neg Hx        ROS: 14 point review of systems was performed and was negative except for documented findings in HPI and today's encounter.     Allergies:   Allergies   Allergen Reactions   • Epinephrine Palpitations   • Penicillins Other (See Comments)     BLISTERS IN MOUTH    Patient tolerated ceftriaxone during 2017 admission.        Constitutional:  Denies fever, shaking or chills   Eyes:  Denies change in visual acuity   HENT:  Denies nasal congestion or sore throat   Respiratory:  Denies cough or shortness of breath   Cardiovascular:  Denies chest pain or severe LE edema   GI:  Denies abdominal pain, nausea, vomiting, bloody stools or diarrhea   Musculoskeletal:  Numbness, tingling, or loss of motor function only as noted above in history of present illness.  : Denies painful urination or hematuria  Integument:  Denies rash, lesion or ulceration   Neurologic:  Denies headache or focal weakness  Endocrine:  Denies lymphadenopathy  Psych:  Denies confusion or change in mental status   Hem:  Denies active bleeding      Physical Exam: 76 y.o. female  Wt  Readings from Last 3 Encounters:   02/01/22 84.4 kg (186 lb)   01/13/22 84.6 kg (186 lb 9.6 oz)   12/27/21 85.3 kg (188 lb)       There is no height or weight on file to calculate BMI.  No height and weight on file for this encounter.  There were no vitals filed for this visit.  Vital signs reviewed.   General Appearance:    Alert, cooperative, in no acute distress                    Ortho exam    Physical exam of the left shoulder reveals no overlying skin changes no lymphedema no lymphadenopathy.  Patient has active flexion 180 with mild symptoms abduction is similar external rotation is to 50 and internal rotation to the upper lumbar spine with mild symptoms.  Patient has good rotator cuff strength 4+ over 5 with isometric strength testing with pain.  Patient has a positive impingement and a positive Frederick sign.  Patient has good cervical range of motion which is full and asymptomatic no radicular symptoms.  Patient has a normal elbow exam.  Good distal pulses are presentPatient has pain with overhead activity and a positive Neer sign and a positive empty can sign  They have a positive drop arm any definitive painful arc      Exam of the right shoulder she can only active flex to about 130 passively get her to 180 abduction similar rotator cuff strength is 4/5 with isometric testing no overlying skin changes     X-rays AP scapular Y and actually lateral both shoulders were taken to evaluate her symptoms and compared to x-rays done in 2020 she does have some acromioclavicular arthritis no other acute pathology no real change    Assessment: Bilateral shoulder pain I think this is rotator cuff in origin the right probably has a tear and we talked about options I think an injection is quite reasonable to give her some relief she responds well she can progress her activities if she fails to which I think might happen in the right I would pursue an MRI to see if she has a rotator cuff tear.  She does know what  strengthening the do we reviewed that somewhat she knows and will do that on her own.  She cannot take anti-inflammatories due to her anticoagulant she understands her risk of injections is higher with her anticoagulants and her past medical history    Plan: Plan is as above  Follow up as indicated.  Ice, elevate, and rest as needed.  Discussed conservative measures of pain control including ice, bracing.  Also talked about the importance of strengthening and maintaining ideal body weight    Lety Vela M.D.    Large Joint Arthrocentesis: R subacromial bursa  Date/Time: 2/7/2022 3:27 PM  Consent given by: patient  Site marked: site marked  Timeout: Immediately prior to procedure a time out was called to verify the correct patient, procedure, equipment, support staff and site/side marked as required   Supporting Documentation  Indications: pain   Procedure Details  Location: shoulder - R subacromial bursa  Preparation: Patient was prepped and draped in the usual sterile fashion  Needle gauge: 21G.  Approach: posterior  Medications administered: 4 mL lidocaine (cardiac); 40 mg triamcinolone acetonide 40 MG/ML  Patient tolerance: patient tolerated the procedure well with no immediate complications    Large Joint Arthrocentesis: L subacromial bursa  Date/Time: 2/7/2022 3:27 PM  Consent given by: patient  Site marked: site marked  Timeout: Immediately prior to procedure a time out was called to verify the correct patient, procedure, equipment, support staff and site/side marked as required   Supporting Documentation  Indications: pain   Procedure Details  Location: shoulder - L subacromial bursa  Preparation: Patient was prepped and draped in the usual sterile fashion  Needle gauge: 21G.  Approach: posterior  Medications administered: 4 mL lidocaine (cardiac); 40 mg triamcinolone acetonide 40 MG/ML  Patient tolerance: patient tolerated the procedure well with no immediate complications

## 2022-02-08 RX ORDER — TRIAMCINOLONE ACETONIDE 40 MG/ML
40 INJECTION, SUSPENSION INTRA-ARTICULAR; INTRAMUSCULAR
Status: COMPLETED | OUTPATIENT
Start: 2022-02-07 | End: 2022-02-07

## 2022-04-04 RX ORDER — METOPROLOL SUCCINATE 50 MG/1
TABLET, EXTENDED RELEASE ORAL
Qty: 30 TABLET | Refills: 11 | Status: SHIPPED | OUTPATIENT
Start: 2022-04-04 | End: 2023-04-05

## 2022-04-18 ENCOUNTER — OFFICE VISIT (OUTPATIENT)
Dept: INTERNAL MEDICINE | Facility: CLINIC | Age: 77
End: 2022-04-18

## 2022-04-18 VITALS
TEMPERATURE: 97.3 F | WEIGHT: 187 LBS | BODY MASS INDEX: 31.16 KG/M2 | SYSTOLIC BLOOD PRESSURE: 130 MMHG | DIASTOLIC BLOOD PRESSURE: 78 MMHG | HEIGHT: 65 IN | HEART RATE: 84 BPM

## 2022-04-18 DIAGNOSIS — K43.2 INCISIONAL HERNIA, WITHOUT OBSTRUCTION OR GANGRENE: ICD-10-CM

## 2022-04-18 DIAGNOSIS — Z56.6 STRESS AT WORK: ICD-10-CM

## 2022-04-18 DIAGNOSIS — M89.8X1 PAIN OF LEFT SCAPULA: Primary | ICD-10-CM

## 2022-04-18 DIAGNOSIS — I10 ESSENTIAL HYPERTENSION: ICD-10-CM

## 2022-04-18 PROCEDURE — 99214 OFFICE O/P EST MOD 30 MIN: CPT | Performed by: INTERNAL MEDICINE

## 2022-04-18 SDOH — HEALTH STABILITY - MENTAL HEALTH: OTHER PHYSICAL AND MENTAL STRAIN RELATED TO WORK: Z56.6

## 2022-04-18 NOTE — PROGRESS NOTES
"Chief Complaint  Shoulder Pain (Left )    Subjective          Kiley Last presents to [facility] PRIMARY [facility]ARE  History of Present Illness  Here with \"so many aches and pains\"- this appt was because she had intense L infrascapular pain- can be harsh for a few minutes but then calms down.  She knows it's because she has to do so much for her .    She continues to have some pain in her abd with the incisional hernia- she did not have it fixed as planned in February.  Her family got nervous because of her history of post op a fib in 2017. She saw Dr. Vela about her shoulders- got a lot of relief from the cortisone inj.    She has put off everything to care of her .   She doesn't take anything for her nerves, thinks it would make her too tired (took one pill,once)    Objective   Vital Signs:   /78   Pulse 84   Temp 97.3 °F (36.3 °C)   Ht 165.1 cm (65\")   Wt 84.8 kg (187 lb)   BMI 31.12 kg/m²            Physical Exam  Constitutional:       Appearance: Normal appearance.   Cardiovascular:      Rate and Rhythm: Normal rate and regular rhythm.   Pulmonary:      Effort: Pulmonary effort is normal.   Musculoskeletal:        Arms:         Result Review :                 Assessment and Plan    Diagnoses and all orders for this visit:    1. Pain of left scapula (Primary)  Comments:  will try Salon Pas on her shoulder daily- stretch.     2. Incisional hernia, without obstruction or gangrene  Comments:  clinically hasn't changed- trying to put off surgery- knows to call if worsening, etc.     3. Essential hypertension  Comments:  controlled.     4. Stress at work  Comments:  related to having to help her - does not want meds, encouraged her to find help and go away with sisters.         Follow Up   Return for Keep previously scheduled appointment.  Patient was given instructions and counseling regarding her condition or for health maintenance advice. Please see specific " information pulled into the AVS if appropriate.

## 2022-04-25 RX ORDER — FLECAINIDE ACETATE 50 MG/1
TABLET ORAL
Qty: 90 TABLET | Refills: 3 | Status: SHIPPED | OUTPATIENT
Start: 2022-04-25 | End: 2022-12-08

## 2022-04-25 NOTE — TELEPHONE ENCOUNTER
Last OV-11/09/21-WD  Next OV-12/2/22-WD    Plan:       Well from a cardiac standpoint I think she is doing quite well the flecainide is really controlled her rhythm I am not can make any changes to her regimen when I have her stand the same and and come back and see us in a year it is reassuring that her Holter does not show any A. fib and really does not show any ectopy so not sure what she is feeling night could be anxiety

## 2022-05-03 ENCOUNTER — TELEPHONE (OUTPATIENT)
Dept: INTERNAL MEDICINE | Facility: CLINIC | Age: 77
End: 2022-05-03

## 2022-05-03 RX ORDER — BUSPIRONE HYDROCHLORIDE 5 MG/1
5 TABLET ORAL 3 TIMES DAILY PRN
Qty: 90 TABLET | Refills: 0 | Status: SHIPPED | OUTPATIENT
Start: 2022-05-03 | End: 2022-05-31

## 2022-05-03 NOTE — TELEPHONE ENCOUNTER
Caller: Kiley Last    Relationship: Self    Best call back number: [phone]    What medication are you requesting: SOMETHING FOR ANXIETY    What are your current symptoms: PATIENT HAS MANY STRESSES THAT HAVE CAME UPON ALL AT ONCE    How long have you been experiencing symptoms:     Have you had these symptoms before:    [] Yes  [] No    Have you been treated for these symptoms before:   [] Yes  [] No    If a prescription is needed, what is your preferred pharmacy and phone number: [facility] [address] [place] [address] [phone] [facility] [phone]      Additional notes:PATIENTS SON HAD TO HAVE PART OF HIS LEG AMPUTATED.  PATIENT STATED THAT THIS HAS ADDED MORE STRESS ON HER AND IS TRYING TO TAKE CARE OF EVERYTHING AND EVERY ONE.    PATIENT REQUESTING TO TALK WITH DR NEGRETE TO SEE IF THERE IS SOMETHING SHE CAN TRY TO HELP WITH THE ANXIETY.

## 2022-05-09 ENCOUNTER — OFFICE VISIT (OUTPATIENT)
Dept: ORTHOPEDIC SURGERY | Facility: CLINIC | Age: 77
End: 2022-05-09

## 2022-05-09 VITALS — BODY MASS INDEX: 31.61 KG/M2 | TEMPERATURE: 97.5 F | HEIGHT: 65 IN | WEIGHT: 189.7 LBS

## 2022-05-09 DIAGNOSIS — M75.102 TEAR OF LEFT ROTATOR CUFF, UNSPECIFIED TEAR EXTENT, UNSPECIFIED WHETHER TRAUMATIC: Primary | ICD-10-CM

## 2022-05-09 PROCEDURE — 20610 DRAIN/INJ JOINT/BURSA W/O US: CPT | Performed by: ORTHOPAEDIC SURGERY

## 2022-05-09 PROCEDURE — 99212 OFFICE O/P EST SF 10 MIN: CPT | Performed by: ORTHOPAEDIC SURGERY

## 2022-05-09 RX ADMIN — LIDOCAINE HYDROCHLORIDE 4 ML: 20 INJECTION, SOLUTION EPIDURAL; INFILTRATION; INTRACAUDAL; PERINEURAL at 17:03

## 2022-05-09 RX ADMIN — METHYLPREDNISOLONE ACETATE 80 MG: 80 INJECTION, SUSPENSION INTRA-ARTICULAR; INTRALESIONAL; INTRAMUSCULAR; SOFT TISSUE at 17:03

## 2022-05-09 NOTE — PROGRESS NOTES
Patient: Kiley Last  YOB: 1945  Date of Service: 5/9/2022    Chief Complaints: Bilateral shoulder pain left greater than right    Subjective:    History of Present Illness: Pt is seen in the office today with complaints of bilateral shoulder pain we injected both shoulders previously she states the right was doing great the left knee is killing her she states she cannot lift her arm to do her hair she is the caregiver for her  and her son is getting ready to come live with them following an amputation due to diabetes.  He has a lot on her plate but she is quite miserable.          Allergies:   Allergies   Allergen Reactions   • Epinephrine Palpitations   • Penicillins Other (See Comments)     BLISTERS IN MOUTH    Patient tolerated ceftriaxone during 5/2017 admission.          Medications:   Home Medications:  Current Outpatient Medications on File Prior to Visit   Medication Sig   • Acetaminophen (TYLENOL ARTHRITIS PAIN PO) Take 650 mg by mouth 3 (Three) Times a Day As Needed.   • Ascorbic Acid (VITAMIN C PO) Take 1 tablet by mouth Daily.   • B Complex Vitamins (vitamin b complex) capsule capsule Take  by mouth Daily.   • busPIRone (BUSPAR) 5 MG tablet Take 1 tablet by mouth 3 (Three) Times a Day As Needed (anxiety).   • Cholecalciferol (VITAMIN D3 PO) Take 1 tablet by mouth Daily.   • Eliquis 5 MG tablet tablet TAKE ONE TABLET BY MOUTH EVERY 12 HOURS   • flecainide (TAMBOCOR) 50 MG tablet TAKE ONE TABLET BY MOUTH EVERY 12 HOURS   • fluticasone (Flonase) 50 MCG/ACT nasal spray 2 sprays into the nostril(s) as directed by provider Daily.   • losartan (COZAAR) 100 MG tablet TAKE ONE TABLET BY MOUTH DAILY   • MAGNESIUM PO Take 1 tablet by mouth Daily.   • meclizine (ANTIVERT) 25 MG tablet TAKE ONE TABLET BY MOUTH THREE TIMES A DAY AS NEEDED FOR DIZZINESS   • melatonin 5 MG tablet tablet Take 10 mg by mouth.   • metoprolol succinate XL (TOPROL-XL) 50 MG 24 hr tablet TAKE ONE TABLET BY MOUTH  DAILY   • traZODone (DESYREL) 50 MG tablet Take 1 tablet by mouth Every Night.     No current facility-administered medications on file prior to visit.     Current Medications:  Scheduled Meds:  Continuous Infusions:No current facility-administered medications for this visit.    PRN Meds:.    I have reviewed the patient's medical history in detail and updated the computerized patient record.  Review and summarization of old records include:    Past Medical History:   Diagnosis Date   • Arthritis    • Asthma     NO INHALERS   • Atrial fibrillation (HCC)    • Clostridium difficile infection 09/20/2019   • Colon polyps 08/01/2019    Transverse colon: tubular adenoma, descending colon: fragments of tubular adenoma, sigmoid colon: ischemic eroded hyperplastic polyp   • COPD (chronic obstructive pulmonary disease) (HCC)    • Diverticulitis    • Diverticulosis    • ESBL (extended spectrum beta-lactamase) producing bacteria infection    • History of abscess of skin and subcutaneous tissue     ABD WOUND 5/2017   • History of atrial fibrillation      X1 POST OP FROM COLOSTOMY 5/2017 - NO PROBLEMS SINCE   • Hypertension    • MDRO (multiple drug resistant organisms) resistance    • NEW (obstructive sleep apnea) 05/20/2021    Overnight polysomnogram.  Weight 190 pounds.  Mild NEW with AHI 7 events per hour for total sleep time.  However, during REM moderate NEW with AHI 16.7 events per hour.  No sleep-related hypoxia.  The patient snored 5% of total sleep time.   • PONV (postoperative nausea and vomiting)    • Psoriasis    • UTI (urinary tract infection)    • Vertigo         Past Surgical History:   Procedure Laterality Date   • BELPHAROPTOSIS REPAIR Bilateral 04/27/2017    Bilateral brow ptosis repair via the anterior hairline approach under monitored local anesthesia-Andrez Craven   • BLEPHAROPLASTY Bilateral 04/27/2017   • BREAST BIOPSY     • CATARACT EXTRACTION     • COLON RESECTION N/A 5/3/2017    Procedure:  OPEN SIMOID COLECTOMY WITH COLOSTOMY;  Surgeon: Renato Delgado MD;  Location: [facility] MAIN OR;  Service:    • COLONOSCOPY N/A 2017    Procedure: COLONOSCOPY VIA COLOSTOMY TO CECUM;  Surgeon: Renato Delgado MD;  Location: [facility] ENDOSCOPY;  Service:    • COLONOSCOPY N/A 2019    Procedure: COLONOSCOPY to cecum and TI with biopsy / hot snare polypectomies;  Surgeon: Dalton Vela Jr., MD;  Location: [facility] ENDOSCOPY;  Service: General   • COLONOSCOPY N/A 2010    Andrez Trinidad   • COLOSTOMY CLOSURE N/A 2017    Procedure: COLOSTOMY TAKEDOWN AND CLOSURE, WITH RESECTION;  Surgeon: Renato Delgado MD;  Location: [facility] MAIN OR;  Service:    • ENDOSCOPY AND COLONOSCOPY N/A 10/31/2014    Normal EGD and colonoscopy, repeat c-scope in 5 years-Anrdez Trinidad   • EXPLORATORY LAPAROTOMY N/A 2019    Procedure: Exploratory laparotomy with lysis of adhesions;  Surgeon: Trini Wade MD;  Location: [facility] MAIN OR;  Service: General   • FINGER SURGERY Left     4TH FINGER LEFT HAND   • HYSTERECTOMY Bilateral    • LAPAROSCOPIC CHOLECYSTECTOMY W/ CHOLANGIOGRAPHY N/A 07/15/2003    Dr. Amrit Martinez, [facility]   • SALPINGO OOPHORECTOMY Bilateral 2006    Abdominal sacral colpopexy, bilateral salpingo-oophorectomy, tension free vaginal tape, cystoscopy-Dr. Devika Bradley, [facility]   • THORACENTESIS Right 2017    US-guided right thoracentesis-Dr. Juan Yuen, [facility]   • TONSILLECTOMY Bilateral    • UPPER GASTROINTESTINAL ENDOSCOPY N/A 10/08/2007    Esophageal mucosal changes suspicious for short-segment Olivera's esphagus, gastric mucosal abnormality characterized by erythema: biopsied, NERD disease present, high likelihood of gastritis-Dr. Mayank Knapp, [facility]        Social History     Occupational History   • Not on file   Tobacco Use   • Smoking status: Former Smoker     Types: Cigarettes     Quit date: 1967     Years since quittin.3   • Smokeless tobacco: Never Used   •  Tobacco comment: SOCIAL SMOKING ONLY   Vaping Use   • Vaping Use: Never used   Substance and Sexual Activity   • Alcohol use: Yes     Alcohol/week: 2.0 standard drinks     Types: 1 Glasses of wine, 1 Shots of liquor per week     Comment: occasionally/caffeine use    • Drug use: No   • Sexual activity: Defer      Social History     Social History Narrative   • Not on file        Family History   Problem Relation Age of Onset   • Cancer Mother    • Colon cancer Mother    • Diabetes Father    • Diabetes Son    • Ulcerative colitis Son    • No Known Problems Maternal Grandmother    • No Known Problems Maternal Grandfather    • No Known Problems Paternal Grandmother    • No Known Problems Paternal Grandfather    • Malig Hyperthermia Neg Hx    • Breast cancer Neg Hx        ROS: 14 point review of systems was performed and was negative except for documented findings in HPI and today's encounter.     Allergies:   Allergies   Allergen Reactions   • Epinephrine Palpitations   • Penicillins Other (See Comments)     BLISTERS IN MOUTH    Patient tolerated ceftriaxone during 5/2017 admission.        Constitutional:  Denies fever, shaking or chills   Eyes:  Denies change in visual acuity   HENT:  Denies nasal congestion or sore throat   Respiratory:  Denies cough or shortness of breath   Cardiovascular:  Denies chest pain or severe LE edema   GI:  Denies abdominal pain, nausea, vomiting, bloody stools or diarrhea   Musculoskeletal:  Numbness, tingling, or loss of motor function only as noted above in history of present illness.  : Denies painful urination or hematuria  Integument:  Denies rash, lesion or ulceration   Neurologic:  Denies headache or focal weakness  Endocrine:  Denies lymphadenopathy  Psych:  Denies confusion or change in mental status   Hem:  Denies active bleeding      Physical Exam: 76 y.o. female  Wt Readings from Last 3 Encounters:   04/18/22 84.8 kg (187 lb)   02/07/22 84.4 kg (186 lb)   02/01/22 84.4 kg  (186 lb)       There is no height or weight on file to calculate BMI.  No height and weight on file for this encounter.  There were no vitals filed for this visit.  Vital signs reviewed.   General Appearance:    Alert, cooperative, in no acute distress                    Ortho exam      Physical exam of the right shoulder reveals no overlying skin changes no lymphedema no lymphadenopathy.  Patient has active flexion 180 with mild symptoms abduction is similar external rotation is to 50 and internal rotation to the upper lumbar spine with mild symptoms.  Patient has good rotator cuff strength 4+ over 5 with isometric strength testing with pain.  Patient has a positive impingement and a positive Frederick sign.  Patient has good cervical range of motion which is full and asymptomatic no radicular symptoms.  Patient has a normal elbow exam.  Good distal pulses are present  Patient has pain with overhead activity and a positive Neer sign and a positive empty can sign , a positive drop arm and a definitive painful arc    Exam of the left shoulder she can active flex to 90 with marked substitution passively she has full range of motion rotator cuff strength 4/5  .time    Assessment: Bilateral shoulder pain right one is doing well left foot is really bothering her.  She is miserable enough I am going to inject it but I am also going to get an MRI to see exactly the status of her rotator cuff    Plan:   Follow up as indicated.  Ice, elevate, and rest as needed.  Discussed conservative measures of pain control including ice, bracing.      Lety Vela M.D.    Large Joint Arthrocentesis: L subacromial bursa  Date/Time: 5/9/2022 5:03 PM  Consent given by: patient  Site marked: site marked  Timeout: Immediately prior to procedure a time out was called to verify the correct patient, procedure, equipment, support staff and site/side marked as required   Supporting Documentation  Indications: pain   Procedure Details  Location:  shoulder - L subacromial bursa  Preparation: Patient was prepped and draped in the usual sterile fashion  Needle gauge: 21G.  Approach: posterior  Medications administered: 80 mg methylPREDNISolone acetate 80 MG/ML; 4 mL lidocaine PF 2% 2 %  Patient tolerance: patient tolerated the procedure well with no immediate complications

## 2022-05-10 RX ORDER — LIDOCAINE HYDROCHLORIDE 20 MG/ML
4 INJECTION, SOLUTION EPIDURAL; INFILTRATION; INTRACAUDAL; PERINEURAL
Status: COMPLETED | OUTPATIENT
Start: 2022-05-09 | End: 2022-05-09

## 2022-05-10 RX ORDER — METHYLPREDNISOLONE ACETATE 80 MG/ML
80 INJECTION, SUSPENSION INTRA-ARTICULAR; INTRALESIONAL; INTRAMUSCULAR; SOFT TISSUE
Status: COMPLETED | OUTPATIENT
Start: 2022-05-09 | End: 2022-05-09

## 2022-05-19 ENCOUNTER — HOSPITAL ENCOUNTER (OUTPATIENT)
Dept: MRI IMAGING | Facility: HOSPITAL | Age: 77
Discharge: HOME | End: 2022-05-19
Admitting: ORTHOPAEDIC SURGERY

## 2022-05-19 DIAGNOSIS — M75.102 TEAR OF LEFT ROTATOR CUFF, UNSPECIFIED TEAR EXTENT, UNSPECIFIED WHETHER TRAUMATIC: ICD-10-CM

## 2022-05-19 PROCEDURE — 73221 MRI JOINT UPR EXTREM W/O DYE: CPT

## 2022-05-20 ENCOUNTER — TELEPHONE (OUTPATIENT)
Dept: ORTHOPEDIC SURGERY | Facility: CLINIC | Age: 77
End: 2022-05-20

## 2022-05-20 NOTE — TELEPHONE ENCOUNTER
----- Message from Faith Velasquez MA sent at 5/20/2022  2:22 PM EDT -----    ----- Message -----  From: Lety Vela MD  Sent: 5/20/2022  12:26 PM EDT  To: Faith Velasquez MA    Please tell her she does have a full-thickness tear of the rotator cuff it if if it is really bothering her we could discuss fixing it I would like to discuss further in follow-up

## 2022-05-24 ENCOUNTER — OFFICE VISIT (OUTPATIENT)
Dept: ORTHOPEDIC SURGERY | Facility: CLINIC | Age: 77
End: 2022-05-24

## 2022-05-24 VITALS — BODY MASS INDEX: 30.82 KG/M2 | TEMPERATURE: 96.9 F | HEIGHT: 65 IN | WEIGHT: 185 LBS

## 2022-05-24 DIAGNOSIS — M75.122 COMPLETE TEAR OF LEFT ROTATOR CUFF, UNSPECIFIED WHETHER TRAUMATIC: Primary | ICD-10-CM

## 2022-05-24 PROCEDURE — 99213 OFFICE O/P EST LOW 20 MIN: CPT | Performed by: ORTHOPAEDIC SURGERY

## 2022-05-24 NOTE — PROGRESS NOTES
Shoulder MRI Follow Up      Patient: Kiley Last        YOB: 1945            Chief Complaints: Shoulder pain left      History of Present Illness: The patient is here follow-up of an MRI of the shoulder left shoulder MRI demonstrates a full-thickness tear of the rotator cuff she has mild glenohumeral arthritis.  She states her shoulder is really bothering her is too soon for another injection.  We have a long discussion regarding rotator cuff tears how to fix them.  She is the primary caregiver for her  and now also for her son who is recently had an amputation.  She states she really just cannot take time at this point where she cannot lift and care for them      Physical Exam: 76 y.o. female  General Appearance:    Alert, cooperative, in no acute distress                 There were no vitals filed for this visit.     Patient is alert and read ×3 no acute distress appears her above-listed at height weight and age.  Affect is normal respiratory rate is normal unlabored. Heart rate regular rate rhythm, sclera, dentition and hearing are normal for the purpose of this exam.      Ortho Exam  Physical exam of the left shoulder reveals no overlying skin changes no lymphedema no lymphadenopathy.  Patient has active flexion 170 with symptoms and marked substitution abduction is similar external rotation is to 50 and internal rotation to the upper lumbar spine with mild symptoms.  Patient has good rotator cuff strength 4+ over 5 with isometric strength testing with pain.  Patient has a positive impingement and a positive Frederick sign.  Patient has good cervical range of motion which is full and asymptomatic no radicular symptoms.  Patient has a normal elbow exam.  Good distal pulses are presentPatient has pain with overhead activity and a positive Neer sign and a positive empty can sign  They have a positive drop arm any definitive painful arc      MRI Results: MRIs as above have reviewed the  events myself and agree with the findings  Procedures      Assessment/Plan:      Left shoulder rotator cuff tear had a long discussion with her again in regard the natural history of rotator cuff tears how we fix him in the time down she states she just cannot take time off now there is no one else to care for her .  Talked talked about other things to do getting help using more of her right arm intermittent injections which is too soon to do now.  She does understand these

## 2022-05-31 RX ORDER — BUSPIRONE HYDROCHLORIDE 5 MG/1
TABLET ORAL
Qty: 90 TABLET | Refills: 0 | Status: SHIPPED | OUTPATIENT
Start: 2022-05-31 | End: 2022-07-05

## 2022-06-02 ENCOUNTER — OFFICE VISIT (OUTPATIENT)
Dept: SURGERY | Facility: CLINIC | Age: 77
End: 2022-06-02

## 2022-06-02 ENCOUNTER — HOSPITAL ENCOUNTER (OUTPATIENT)
Dept: CT IMAGING | Facility: HOSPITAL | Age: 77
Discharge: HOME | End: 2022-06-02
Admitting: SURGERY

## 2022-06-02 VITALS — WEIGHT: 183.2 LBS | HEIGHT: 65 IN | BODY MASS INDEX: 30.52 KG/M2

## 2022-06-02 DIAGNOSIS — Z87.19 HISTORY OF SMALL BOWEL OBSTRUCTION: ICD-10-CM

## 2022-06-02 DIAGNOSIS — R10.33 PERIUMBILICAL ABDOMINAL PAIN: Primary | ICD-10-CM

## 2022-06-02 LAB — CREAT BLDA-MCNC: 0.5 MG/DL (ref 0.6–1.3)

## 2022-06-02 PROCEDURE — 99214 OFFICE O/P EST MOD 30 MIN: CPT | Performed by: SURGERY

## 2022-06-02 PROCEDURE — 25010000002 IOPAMIDOL 61 % SOLUTION: Performed by: SURGERY

## 2022-06-02 PROCEDURE — 82565 ASSAY OF CREATININE: CPT

## 2022-06-02 PROCEDURE — 74177 CT ABD & PELVIS W/CONTRAST: CPT

## 2022-06-02 RX ADMIN — IOPAMIDOL 85 ML: 612 INJECTION, SOLUTION INTRAVENOUS at 16:53

## 2022-06-02 NOTE — NURSING NOTE
Protective goggles and mask in place with all patient interactions today. Stat hold & call CT A&P with contrast results called to Dr Wade. Patient may discharge home. IV d/c'd intact. Patient ambulated to main entrance for discharge.

## 2022-06-15 NOTE — PROGRESS NOTES
General Surgery  Established Patient Office Visit    CC: Abdominal pain    HPI: The patient is a pleasant 76 y.o. year-old lady who presents today for evaluation of upper abdominal pain that started acutely last Friday, is described as an intense severe cramping sensation that was unrelenting with associated nausea and vomiting.  She called EMS but chose not to go to the emergency room as the EMT notified her that all of the ERs had very long weights.  She stayed home Friday and Saturday with persistent symptoms until she finally began to feel some relief on Monday.  She started trying to eat some soup on Monday and has been able to hold that down.  She is still not eating a whole lot of solid food and has some persistent mild nausea with belching but her bowels have been moving well since Sunday afternoon.  She is still feeling fatigued, which she attributes to her lack of oral intake.  Her abdominal pain has largely resolved.  She has an extensive surgical history including sigmoid colectomy with colostomy followed by colostomy reversal by Dr. Delgado in 2017.  I then met her 2 years later in September 2019 when she came to the hospital with a small bowel obstruction requiring exploratory laparotomy with lysis of adhesions.  She has a known fat-containing ventral hernia through her old colostomy site, and denies noticing any obvious bulge at that location during these symptoms over the weekend.  She is still the primary caregiver for her  who has advanced dementia, and now she is also helping to care for her son who recently had to have an amputation.    Past Medical History:   Hypertension  Atrial fibrillation  Vertigo  COPD  Asthma  Obstructive sleep apnea  History of multidrug-resistant bacterial infections of the skin and soft tissues  History of diverticulitis  Psoriasis  History of small bowel obstructions requiring exploratory laparotomy with lysis of adhesions  History of C. difficile colitis treated  medically     Past Surgical History:   Exploratory laparotomy with lysis of adhesions (Sept 2019 by me)  Open sigmoid colectomy with colostomy (May 2017, Dr. Delgado)  Open colostomy takedown (September 2017, Dr. Delgado)  Hysterectomy  Tonsillectomy  Colonoscopy (August 2019, Dr. Steven Vela)  Cholecystectomy  Blepharoplasty  Cataract extraction    Medications:   Eliquis 5 mg every 12 hours  Tylenol as needed for arthritis pain  Vitamin C 1 tablet daily  Vitamin B complex once daily  Buspirone 5 mg 3 times daily as needed for anxiety  Vitamin D3 1000 units daily  Flecainide 50 mg twice daily  Fluticasone nasal spray as needed  Losartan 100 mg daily  Magnesium once daily  Meclizine 25 mg 3 times daily as needed for dizziness  Melatonin 5 mg nightly as needed for insomnia  Metoprolol 50 mg daily    Allergies: Penicillins (oral blisters), epinephrine (received too much and had heart palpitations)    Family History: Mother with colon cancer, son with ulcerative colitis    Social History:  and cares for her disabled , occasional alcohol use, former smoker but quit in 1967    ROS:   Constitutional: Negative for fevers or chills  HENT: Negative for hearing loss or runny nose  Eyes: Negative for vision changes or scleral icterus  Respiratory: Negative for cough or shortness of breath  Cardiovascular: Negative for chest pain or heart palpitations  Gastrointestinal: Positive for abdominal pain, nausea, and vomiting; negative for abdominal distention, constipation, melena, or hematochezia  Genitourinary: Negative for hematuria or dysuria  Musculoskeletal: Negative for joint swelling or gait instability  Neurologic: Negative for tremors or seizures  Psychiatric: Negative for suicidal ideations or depression  All other systems reviewed and negative    Physical Exam:  Height: 165 cm  Weight: 83 kg  BMI: 30  General: No acute distress, well-nourished & well-developed  HEAD: normocephalic, atraumatic  EYES: normal  conjunctiva, sclera anicteric  EARS: grossly normal hearing  NECK: supple, no thyromegaly  CARDIOVASCULAR: regular rate and rhythm  RESPIRATORY: clear to auscultation bilaterally  GASTROINTESTINAL: soft, nontender, non-distended, no obvious abdominal wall hernias at this time when she stands upright  MUSCULOSKELETAL: normal gait and station. No gross extremity abnormalities  PSYCHIATRIC: oriented x3, normal mood and affect    IMAGING:  Nothing since our last office visit    ASSESSMENT & PLAN  Mrs. Last is a 76-year-old lady with abdominal pain, nausea, and vomiting concerning for recurrent small bowel obstruction.  I have ordered a stat CT abdomen/pelvis and will send her directly over to the hospital to have this done.  I will call her with the results once available to me.    ADDENDUM: I called the patient after her CT was completed and showed no gross abnormality per the radiologist interpretation.  On review of the imaging myself, a few of her small bowel loops do look to be at the upper limit of normal with respect to their dilation and I suspect she had a bowel obstruction over the weekend that is resolving.  I encouraged her to stick with a bland diet for the next few days before gradually reintroducing solid meats, cheeses, and breads.  She expressed understanding and will see me back as needed.    Trini Wade MD  General and Endoscopic Surgery  [facility]    [address], [address]  [place], [place], [address]  P: [phone]  F: [phone]

## 2022-07-05 RX ORDER — BUSPIRONE HYDROCHLORIDE 5 MG/1
TABLET ORAL
Qty: 90 TABLET | Refills: 0 | Status: SHIPPED | OUTPATIENT
Start: 2022-07-05 | End: 2022-08-03

## 2022-07-19 ENCOUNTER — TELEMEDICINE (OUTPATIENT)
Dept: INTERNAL MEDICINE | Facility: CLINIC | Age: 77
End: 2022-07-19

## 2022-07-19 ENCOUNTER — TELEPHONE (OUTPATIENT)
Dept: INTERNAL MEDICINE | Facility: CLINIC | Age: 77
End: 2022-07-19

## 2022-07-19 DIAGNOSIS — U07.1 COVID-19 VIRUS INFECTION: Primary | ICD-10-CM

## 2022-07-19 PROCEDURE — 99213 OFFICE O/P EST LOW 20 MIN: CPT | Performed by: INTERNAL MEDICINE

## 2022-07-19 NOTE — TELEPHONE ENCOUNTER
Caller: Kiley Last    Relationship to patient: Self    Best call back number: [phone]    Date of positive COVID19 test: 07.16.22 HOMETEST    COVID19 symptoms: FEVER, COUGHING,SNEEZING SINUS PRESSURE,FATUIGE    Date of initial quarantine: 07.16.22    Additional information or concerns: PATIENT IS INTERESTED IN PAXLOVID BUT NOT SURE IF NESSESSARY    What is the patients preferred pharmacy: [facility] [address] [place] [phone] [place] [phone] FX

## 2022-07-19 NOTE — PROGRESS NOTES
"Chief Complaint  Covid-19 Home Monitoring Video Visit    Subjective        Kiley Last presents to [facility]IMARY [facility]  History of Present Illness   You have chosen to receive care through a telehealth visit.  Do you consent to use a video/audio connection for your medical care today? Yes  Exposed to covid last week, first symptoms on 7/15, positive test on 7/16.  She had a low grade fever and some sinus congestion.  She is feeling much better today, up and around more.    She has some fatigue but that is more chronic in nature.     Objective   Vital Signs:  There were no vitals taken for this visit.  Estimated body mass index is 30.49 kg/m² as calculated from the following:    Height as of 6/2/22: 165.1 cm (65\").    Weight as of 6/2/22: 83.1 kg (183 lb 3.2 oz).          Physical Exam  Constitutional:       General: She is not in acute distress.       Result Review :                Assessment and Plan   Diagnoses and all orders for this visit:    1. COVID-19 virus infection (Primary)  Comments:  discussed pros and cons of treatment- she is much improved and would like to avoid. Will call if anything changes. Cont symptomatic treatment.              Follow Up   Return for Keep previously scheduled appointment.  Patient was given instructions and counseling regarding her condition or for health maintenance advice. Please see specific information pulled into the AVS if appropriate.       "

## 2022-07-26 DIAGNOSIS — R42 VERTIGO: ICD-10-CM

## 2022-07-26 RX ORDER — MECLIZINE HYDROCHLORIDE 25 MG/1
TABLET ORAL
Qty: 90 TABLET | Refills: 0 | Status: SHIPPED | OUTPATIENT
Start: 2022-07-26 | End: 2023-03-09

## 2022-07-27 ENCOUNTER — TELEPHONE (OUTPATIENT)
Dept: INTERNAL MEDICINE | Facility: CLINIC | Age: 77
End: 2022-07-27

## 2022-07-27 NOTE — TELEPHONE ENCOUNTER
Caller: NISHI CAMPOVERDE     Relationship: SON    Best call back number: [phone] (TOVA ARIAS)    What medication are you requesting: PAXLOVID    What are your current symptoms: CONGESTION    How long have you been experiencing symptoms: OVER A WEEK    If a prescription is needed, what is your preferred pharmacy and phone number: [facility] [address] [phone] [facility] [phone]   [phone]     Additional notes: PATIENT'S SON STATES THE PATIENT DOES NEED THE PRESCRIPTION FOR PAXLOVID

## 2022-07-27 NOTE — TELEPHONE ENCOUNTER
Spoke with Pt she is way to far out to get paxlovid she understood and will let us know if she needs to be seen sooner than nex week

## 2022-08-03 ENCOUNTER — OFFICE VISIT (OUTPATIENT)
Dept: INTERNAL MEDICINE | Facility: CLINIC | Age: 77
End: 2022-08-03

## 2022-08-03 VITALS
HEART RATE: 76 BPM | WEIGHT: 179 LBS | DIASTOLIC BLOOD PRESSURE: 70 MMHG | SYSTOLIC BLOOD PRESSURE: 116 MMHG | TEMPERATURE: 97.5 F | HEIGHT: 65 IN | BODY MASS INDEX: 29.82 KG/M2

## 2022-08-03 DIAGNOSIS — J32.9 SINUSITIS, UNSPECIFIED CHRONICITY, UNSPECIFIED LOCATION: ICD-10-CM

## 2022-08-03 DIAGNOSIS — I10 ESSENTIAL HYPERTENSION: ICD-10-CM

## 2022-08-03 DIAGNOSIS — M25.512 CHRONIC PAIN OF BOTH SHOULDERS: ICD-10-CM

## 2022-08-03 DIAGNOSIS — G89.29 CHRONIC PAIN OF BOTH SHOULDERS: ICD-10-CM

## 2022-08-03 DIAGNOSIS — M25.511 CHRONIC PAIN OF BOTH SHOULDERS: ICD-10-CM

## 2022-08-03 DIAGNOSIS — G47.33 OSA (OBSTRUCTIVE SLEEP APNEA): ICD-10-CM

## 2022-08-03 DIAGNOSIS — R53.83 OTHER FATIGUE: Primary | ICD-10-CM

## 2022-08-03 DIAGNOSIS — E55.9 VITAMIN D DEFICIENCY: ICD-10-CM

## 2022-08-03 DIAGNOSIS — Z00.00 MEDICARE ANNUAL WELLNESS VISIT, SUBSEQUENT: ICD-10-CM

## 2022-08-03 DIAGNOSIS — R73.9 HYPERGLYCEMIA: ICD-10-CM

## 2022-08-03 DIAGNOSIS — I48.0 PAROXYSMAL ATRIAL FIBRILLATION: ICD-10-CM

## 2022-08-03 PROCEDURE — 1160F RVW MEDS BY RX/DR IN RCRD: CPT | Performed by: INTERNAL MEDICINE

## 2022-08-03 PROCEDURE — G0439 PPPS, SUBSEQ VISIT: HCPCS | Performed by: INTERNAL MEDICINE

## 2022-08-03 PROCEDURE — 99214 OFFICE O/P EST MOD 30 MIN: CPT | Performed by: INTERNAL MEDICINE

## 2022-08-03 PROCEDURE — 1170F FXNL STATUS ASSESSED: CPT | Performed by: INTERNAL MEDICINE

## 2022-08-03 RX ORDER — FLUTICASONE PROPIONATE 50 MCG
2 SPRAY, SUSPENSION (ML) NASAL DAILY
Qty: 18.2 ML | Refills: 1 | Status: SHIPPED | OUTPATIENT
Start: 2022-08-03

## 2022-08-03 RX ORDER — MELATONIN
1000 DAILY
COMMUNITY

## 2022-08-03 RX ORDER — MULTIVIT WITH MINERALS/LUTEIN
250 TABLET ORAL DAILY
COMMUNITY

## 2022-08-03 RX ORDER — BUSPIRONE HYDROCHLORIDE 5 MG/1
TABLET ORAL
Qty: 90 TABLET | Refills: 0 | Status: SHIPPED | OUTPATIENT
Start: 2022-08-03

## 2022-08-03 NOTE — PROGRESS NOTES
The ABCs of the Annual Wellness Visit  Subsequent Medicare Wellness Visit    Chief Complaint   Patient presents with   • Medicare Wellness-subsequent      Subjective    History of Present Illness:  Kiley Last is a 76 y.o. female who presents for a Subsequent Medicare Wellness Visit.  Hasn't felt great since having Covid- very tired.  Denies SOB.  She is eating less.  Sinus congestion persists-   Due for C-scope with Dr. Wade.  Pt to call.   Lots of issues with both shoulders- seeing Dr. Vela about it- decreased ability to do ADLs but reluctant to operate because she takes care of her .   Feels her tremor has gotten a lot worse- hard to hold coffee, etc.   Seeing Dr. Kearns in Dec.   Takes the buspirone prn- says she doesn't think about it very often.   Son moved in with them- even though he is recovering from an amputation, he is very helpful.     The following portions of the patient's history were reviewed and   updated as appropriate: allergies, current medications, past family history, past medical history, past social history, past surgical history and problem list.    Compared to one year ago, the patient feels her physical   health is worse.    Compared to one year ago, the patient feels her mental   health is the same.    Recent Hospitalizations:  She was admitted within the past 365 days at [facility].       Current Medical Providers:  Patient Care Team:  Miranda Morgan MD as PCP - General (Internal Medicine)  Miguelina Hurst APRN as Nurse Practitioner (Nurse Practitioner)  Rich Kearns MD as Consulting Physician (Cardiology)  Patrick Weeks MD as Consulting Physician (Otolaryngology)  Rossana Evans MD as Consulting Physician (Infectious Diseases)  Patrick Dong MD as Consulting Physician (Pulmonary Disease)    Outpatient Medications Prior to Visit   Medication Sig Dispense Refill   • Acetaminophen (TYLENOL ARTHRITIS PAIN PO) Take 650 mg by mouth 3 (Three) Times a Day  As Needed.     • apixaban (Eliquis) 5 MG tablet tablet Take 1 tablet by mouth Every 12 (Twelve) Hours. 180 tablet 2   • B Complex Vitamins (VITAMIN B COMPLEX 100 IJ) Inject  as directed.     • busPIRone (BUSPAR) 5 MG tablet TAKE ONE TABLET BY MOUTH THREE TIMES A DAY AS NEEDED FOR ANXIETY 90 tablet 0   • cholecalciferol (VITAMIN D3) 25 MCG (1000 UT) tablet Take 1,000 Units by mouth Daily.     • flecainide (TAMBOCOR) 50 MG tablet TAKE ONE TABLET BY MOUTH EVERY 12 HOURS 90 tablet 3   • losartan (COZAAR) 100 MG tablet TAKE ONE TABLET BY MOUTH DAILY 90 tablet 3   • meclizine (ANTIVERT) 25 MG tablet TAKE ONE TABLET BY MOUTH THREE TIMES A DAY AS NEEDED FOR DIZZINESS 90 tablet 0   • melatonin 5 MG tablet tablet Take 10 mg by mouth.     • metoprolol succinate XL (TOPROL-XL) 50 MG 24 hr tablet TAKE ONE TABLET BY MOUTH DAILY 30 tablet 11   • vitamin C (ASCORBIC ACID) 250 MG tablet Take 250 mg by mouth Daily.     • Zinc Sulfate (ZINC 15 PO) Take  by mouth.     • fluticasone (Flonase) 50 MCG/ACT nasal spray 2 sprays into the nostril(s) as directed by provider Daily. 18.2 mL 1     No facility-administered medications prior to visit.       No opioid medication identified on active medication list. I have reviewed chart for other potential  high risk medication/s and harmful drug interactions in the elderly.          Aspirin is not on active medication list.  Aspirin use is not indicated based on review of current medical condition/s. Risk of harm outweighs potential benefits.  .    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  ACP discussion was held with the patient during this visit. Patient has an advance directive (not in EMR), copy requested.    Review of Systems   HENT: Positive for congestion. Negative for hearing loss and trouble swallowing.    Respiratory: Negative for cough and shortness of breath.    Gastrointestinal: Negative for abdominal pain.   Genitourinary: Negative for difficulty urinating.   Musculoskeletal: Positive for arthralgias and back pain.   Psychiatric/Behavioral: Negative for dysphoric mood.        Objective    Vitals:    08/03/22 1053   BP: 116/70   Pulse: 76   Temp: 97.5 °F (36.4 °C)   Weight: 81.2 kg (179 lb)   Height: 165.1 cm (65\")     Estimated body mass index is 29.79 kg/m² as calculated from the following:    Height as of this encounter: 165.1 cm (65\").    Weight as of this encounter: 81.2 kg (179 lb).    BMI is >= 25 and <30. (Overweight) The following options were offered after discussion;: exercise counseling/recommendations and nutrition counseling/recommendations      Does the patient have evidence of cognitive impairment? No    Physical Exam  Constitutional:       General: She is not in acute distress.     Appearance: Normal appearance.   Cardiovascular:      Rate and Rhythm: Normal rate and regular rhythm.   Pulmonary:      Effort: Pulmonary effort is normal.   Musculoskeletal:      Right shoulder: Tenderness present. Decreased range of motion.      Left shoulder: Tenderness present. Decreased range of motion.   Skin:     General: Skin is warm and dry.       Lab Results   Component Value Date    CHLPL 265 (H) 07/27/2022    TRIG 475 (H) 07/27/2022    HDL 40 07/27/2022     (H) 07/27/2022    VLDL 88 (H) 07/27/2022            HEALTH RISK ASSESSMENT    Smoking Status:  Social History     Tobacco Use   Smoking Status Former Smoker "   • Types: Cigarettes   • Quit date:    • Years since quittin.6   Smokeless Tobacco Never Used   Tobacco Comment    SOCIAL SMOKING ONLY     Alcohol Consumption:  Social History     Substance and Sexual Activity   Alcohol Use Yes   • Alcohol/week: 2.0 standard drinks   • Types: 1 Glasses of wine, 1 Shots of liquor per week    Comment: occasionally/caffeine use      Fall Risk Screen:    JOSELINE Fall Risk Assessment was completed, and patient is at LOW risk for falls.Assessment completed on:8/3/2022    Depression Screening:  PHQ-2/PHQ-9 Depression Screening 8/3/2022   Retired PHQ-9 Total Score -   Retired Total Score -   Little Interest or Pleasure in Doing Things 0-->not at all   Feeling Down, Depressed or Hopeless 0-->not at all   PHQ-9: Brief Depression Severity Measure Score 0       Health Habits and Functional and Cognitive Screening:  Functional & Cognitive Status 8/3/2022   Do you have difficulty preparing food and eating? No   Do you have difficulty bathing yourself, getting dressed or grooming yourself? No   Do you have difficulty using the toilet? No   Do you have difficulty moving around from place to place? No   Do you have trouble with steps or getting out of a bed or a chair? No   Current Diet Well Balanced Diet   Dental Exam Not up to date   Eye Exam Not up to date   Exercise (times per week) 0 times per week   Current Exercises Include No Regular Exercise   Do you need help using the phone?  No   Are you deaf or do you have serious difficulty hearing?  No   Do you need help with transportation? No   Do you need help shopping? No   Do you need help preparing meals?  No   Do you need help with housework?  No   Do you need help with laundry? No   Do you need help taking your medications? No   Do you need help managing money? No   Do you ever drive or ride in a car without wearing a seat belt? No   Have you felt unusual stress, anger or loneliness in the last month? No   Who do you live with? Spouse    If you need help, do you have trouble finding someone available to you? No   Have you been bothered in the last four weeks by sexual problems? No   Do you have difficulty concentrating, remembering or making decisions? No       Age-appropriate Screening Schedule:  Refer to the list below for future screening recommendations based on patient's age, sex and/or medical conditions. Orders for these recommended tests are listed in the plan section. The patient has been provided with a written plan.    Health Maintenance   Topic Date Due   • TDAP/TD VACCINES (1 - Tdap) Never done   • ZOSTER VACCINE (1 of 2) Never done   • INFLUENZA VACCINE  10/01/2022   • MAMMOGRAM  06/10/2023   • DXA SCAN  10/28/2023              Assessment & Plan   CMS Preventative Services Quick Reference  Risk Factors Identified During Encounter  Immunizations Discussed/Encouraged (specific Immunizations; Tdap, Shingrix and COVID19  colonoscopy  The above risks/problems have been discussed with the patient.  Follow up actions/plans if indicated are seen below in the Assessment/Plan Section.  Pertinent information has been shared with the patient in the After Visit Summary.    Diagnoses and all orders for this visit:    1. Other fatigue (Primary)  Comments:  check labs today  Orders:  -     CBC & Differential  -     TSH  -     Vitamin B12    2. Sinusitis, unspecified chronicity, unspecified location  Comments:  resume Flonase, Netti pot or Mucinex as needed.   Orders:  -     fluticasone (Flonase) 50 MCG/ACT nasal spray; 2 sprays into the nostril(s) as directed by provider Daily.  Dispense: 18.2 mL; Refill: 1    3. Vitamin D deficiency  Comments:  check levels  Orders:  -     Vitamin D 25 Hydroxy    4. Hyperglycemia  -     Hemoglobin A1c    5. Essential hypertension  Comments:  controlled    6. Paroxysmal atrial fibrillation (HCC)  Comments:  stable, on anticoagulation    7. NEW (obstructive sleep apnea)  Comments:  if all other testing for fatigue  are negative, would encourage her to consider retreatment.     8. Chronic pain of both shoulders  Comments:  biggest issue- keep appt with Dr. Vela, encouraged her to be aggressive about treatment options.     9. Medicare annual wellness visit, subsequent  Comments:  to call about c-scope, Laura recommended, ok to wait until fall for Covid booster.  Encouraged her to take care of herself- to take care of .        Follow Up:   Return in about 3 months (around 11/3/2022) for Recheck, Lab Today.     An After Visit Summary and PPPS were made available to the patient.

## 2022-08-04 LAB
25(OH)D3+25(OH)D2 SERPL-MCNC: 31.4 NG/ML (ref 30–100)
BASOPHILS # BLD AUTO: 0 X10E3/UL (ref 0–0.2)
BASOPHILS NFR BLD AUTO: 0 %
EOSINOPHIL # BLD AUTO: 0.1 X10E3/UL (ref 0–0.4)
EOSINOPHIL NFR BLD AUTO: 1 %
ERYTHROCYTE [DISTWIDTH] IN BLOOD BY AUTOMATED COUNT: 12.4 % (ref 11.7–15.4)
HBA1C MFR BLD: 5.5 % (ref 4.8–5.6)
HCT VFR BLD AUTO: 39.3 % (ref 34–46.6)
HGB BLD-MCNC: 12.6 G/DL (ref 11.1–15.9)
IMM GRANULOCYTES # BLD AUTO: 0 X10E3/UL (ref 0–0.1)
IMM GRANULOCYTES NFR BLD AUTO: 1 %
LYMPHOCYTES # BLD AUTO: 2.1 X10E3/UL (ref 0.7–3.1)
LYMPHOCYTES NFR BLD AUTO: 28 %
MCH RBC QN AUTO: 29 PG (ref 26.6–33)
MCHC RBC AUTO-ENTMCNC: 32.1 G/DL (ref 31.5–35.7)
MCV RBC AUTO: 90 FL (ref 79–97)
MONOCYTES # BLD AUTO: 0.8 X10E3/UL (ref 0.1–0.9)
MONOCYTES NFR BLD AUTO: 11 %
NEUTROPHILS # BLD AUTO: 4.5 X10E3/UL (ref 1.4–7)
NEUTROPHILS NFR BLD AUTO: 59 %
PLATELET # BLD AUTO: 204 X10E3/UL (ref 150–450)
RBC # BLD AUTO: 4.35 X10E6/UL (ref 3.77–5.28)
TSH SERPL DL<=0.005 MIU/L-ACNC: 1.94 UIU/ML (ref 0.45–4.5)
VIT B12 SERPL-MCNC: 408 PG/ML (ref 232–1245)
WBC # BLD AUTO: 7.5 X10E3/UL (ref 3.4–10.8)

## 2022-08-08 ENCOUNTER — OFFICE VISIT (OUTPATIENT)
Dept: ORTHOPEDIC SURGERY | Facility: CLINIC | Age: 77
End: 2022-08-08

## 2022-08-08 VITALS — WEIGHT: 186.6 LBS | TEMPERATURE: 96.5 F | HEIGHT: 65 IN | BODY MASS INDEX: 31.09 KG/M2

## 2022-08-08 DIAGNOSIS — M75.120 COMPLETE TEAR OF ROTATOR CUFF, UNSPECIFIED LATERALITY, UNSPECIFIED WHETHER TRAUMATIC: Primary | ICD-10-CM

## 2022-08-08 PROCEDURE — 99213 OFFICE O/P EST LOW 20 MIN: CPT | Performed by: ORTHOPAEDIC SURGERY

## 2022-08-08 PROCEDURE — 20610 DRAIN/INJ JOINT/BURSA W/O US: CPT | Performed by: ORTHOPAEDIC SURGERY

## 2022-08-08 RX ORDER — LIDOCAINE HYDROCHLORIDE 10 MG/ML
2 INJECTION, SOLUTION EPIDURAL; INFILTRATION; INTRACAUDAL; PERINEURAL
Status: COMPLETED | OUTPATIENT
Start: 2022-08-08 | End: 2022-08-08

## 2022-08-08 RX ORDER — METHYLPREDNISOLONE ACETATE 80 MG/ML
80 INJECTION, SUSPENSION INTRA-ARTICULAR; INTRALESIONAL; INTRAMUSCULAR; SOFT TISSUE
Status: COMPLETED | OUTPATIENT
Start: 2022-08-08 | End: 2022-08-08

## 2022-08-08 RX ADMIN — METHYLPREDNISOLONE ACETATE 80 MG: 80 INJECTION, SUSPENSION INTRA-ARTICULAR; INTRALESIONAL; INTRAMUSCULAR; SOFT TISSUE at 13:49

## 2022-08-08 RX ADMIN — METHYLPREDNISOLONE ACETATE 80 MG: 80 INJECTION, SUSPENSION INTRA-ARTICULAR; INTRALESIONAL; INTRAMUSCULAR; SOFT TISSUE at 14:00

## 2022-08-08 RX ADMIN — LIDOCAINE HYDROCHLORIDE 2 ML: 10 INJECTION, SOLUTION EPIDURAL; INFILTRATION; INTRACAUDAL; PERINEURAL at 13:49

## 2022-08-08 RX ADMIN — LIDOCAINE HYDROCHLORIDE 2 ML: 10 INJECTION, SOLUTION EPIDURAL; INFILTRATION; INTRACAUDAL; PERINEURAL at 14:00

## 2022-08-08 NOTE — PROGRESS NOTES
Patient: Kiley Last  YOB: 1945  Date of Service: 8/8/2022    Chief Complaints:   Chief Complaint   Patient presents with   • Left Shoulder - Follow-up, Pain   Bilateral shoulder pain    Subjective:    History of Present Illness: Pt is seen in the office today with complaints of bilateral shoulder pain she has a known full-thickness rotator cuff tear on the left she has a lot going on at home taking care of her son and her  speculating consider repair we injected it last in May has been right at about 3 months she states it did help quite as long she would like 1 more injection her right 1 is now bothering her has been bothering her for several months no no history injury  Chief Complaint   Patient presents with   • Left Shoulder - Follow-up, Pain   .          Allergies:   Allergies   Allergen Reactions   • Epinephrine Palpitations   • Penicillins Other (See Comments)     BLISTERS IN MOUTH    Patient tolerated ceftriaxone during 5/2017 admission.          Medications:   Home Medications:  Current Outpatient Medications on File Prior to Visit   Medication Sig   • apixaban (Eliquis) 5 MG tablet tablet Take 1 tablet by mouth Every 12 (Twelve) Hours.   • B Complex Vitamins (VITAMIN B COMPLEX 100 IJ) Inject  as directed.   • busPIRone (BUSPAR) 5 MG tablet TAKE ONE TABLET BY MOUTH THREE TIMES A DAY AS NEEDED FOR ANXIETY   • cholecalciferol (VITAMIN D3) 25 MCG (1000 UT) tablet Take 1,000 Units by mouth Daily.   • flecainide (TAMBOCOR) 50 MG tablet TAKE ONE TABLET BY MOUTH EVERY 12 HOURS   • fluticasone (Flonase) 50 MCG/ACT nasal spray 2 sprays into the nostril(s) as directed by provider Daily.   • losartan (COZAAR) 100 MG tablet TAKE ONE TABLET BY MOUTH DAILY   • Magnesium 100 MG capsule Take  by mouth.   • meclizine (ANTIVERT) 25 MG tablet TAKE ONE TABLET BY MOUTH THREE TIMES A DAY AS NEEDED FOR DIZZINESS   • melatonin 5 MG tablet tablet Take 10 mg by mouth.   • metoprolol succinate XL  (TOPROL-XL) 50 MG 24 hr tablet TAKE ONE TABLET BY MOUTH DAILY   • vitamin C (ASCORBIC ACID) 250 MG tablet Take 250 mg by mouth Daily.   • Zinc Sulfate (ZINC 15 PO) Take  by mouth.   • Acetaminophen (TYLENOL ARTHRITIS PAIN PO) Take 650 mg by mouth 3 (Three) Times a Day As Needed.     No current facility-administered medications on file prior to visit.     Current Medications:  Scheduled Meds:  Continuous Infusions:No current facility-administered medications for this visit.    PRN Meds:.    I have reviewed the patient's medical history in detail and updated the computerized patient record.  Review and summarization of old records include:    Past Medical History:   Diagnosis Date   • Arthritis    • Asthma     NO INHALERS   • Atrial fibrillation (HCC)    • Clostridium difficile infection 09/20/2019   • Colon polyps 08/01/2019    Transverse colon: tubular adenoma, descending colon: fragments of tubular adenoma, sigmoid colon: ischemic eroded hyperplastic polyp   • COPD (chronic obstructive pulmonary disease) (HCC)    • Diverticulitis    • Diverticulosis    • ESBL (extended spectrum beta-lactamase) producing bacteria infection    • History of abscess of skin and subcutaneous tissue     ABD WOUND 5/2017   • History of atrial fibrillation      X1 POST OP FROM COLOSTOMY 5/2017 - NO PROBLEMS SINCE   • Hypertension    • MDRO (multiple drug resistant organisms) resistance    • NEW (obstructive sleep apnea) 05/20/2021    Overnight polysomnogram.  Weight 190 pounds.  Mild NEW with AHI 7 events per hour for total sleep time.  However, during REM moderate NEW with AHI 16.7 events per hour.  No sleep-related hypoxia.  The patient snored 5% of total sleep time.   • PONV (postoperative nausea and vomiting)    • Psoriasis    • UTI (urinary tract infection)    • Vertigo         Past Surgical History:   Procedure Laterality Date   • BELPHAROPTOSIS REPAIR Bilateral 04/27/2017    Bilateral brow ptosis repair via the anterior hairline  approach under monitored local anesthesia-Andrez Craven   • BLEPHAROPLASTY Bilateral 04/27/2017   • BREAST BIOPSY     • CATARACT EXTRACTION     • COLON RESECTION N/A 5/3/2017    Procedure: OPEN SIMOID COLECTOMY WITH COLOSTOMY;  Surgeon: Renato Delgado MD;  Location:  [facility];  Service:    • COLONOSCOPY N/A 9/13/2017    Procedure: COLONOSCOPY VIA COLOSTOMY TO CECUM;  Surgeon: Renato Delgado MD;  Location:  [facility];  Service:    • COLONOSCOPY N/A 8/1/2019    Procedure: COLONOSCOPY to cecum and TI with biopsy / hot snare polypectomies;  Surgeon: Dalton Vela Jr., MD;  Location:  [facility];  Service: General   • COLONOSCOPY N/A 01/04/2010    Andrez rTinidad   • COLOSTOMY CLOSURE N/A 9/14/2017    Procedure: COLOSTOMY TAKEDOWN AND CLOSURE, WITH RESECTION;  Surgeon: Renato Delgado MD;  Location: [facility];  Service:    • ENDOSCOPY AND COLONOSCOPY N/A 10/31/2014    Normal EGD and colonoscopy, repeat c-scope in 5 years-Andrez Trinidad   • EXPLORATORY LAPAROTOMY N/A 9/8/2019    Procedure: Exploratory laparotomy with lysis of adhesions;  Surgeon: Trini Wade MD;  Location: [facility];  Service: General   • FINGER SURGERY Left     4TH FINGER LEFT HAND   • HYSTERECTOMY Bilateral    • LAPAROSCOPIC CHOLECYSTECTOMY W/ CHOLANGIOGRAPHY N/A 07/15/2003    Dr. Amrit Martinez, [facility]   • SALPINGO OOPHORECTOMY Bilateral 02/02/2006    Abdominal sacral colpopexy, bilateral salpingo-oophorectomy, tension free vaginal tape, cystoscopy-Dr. Devika Bradley, [facility]   • THORACENTESIS Right 05/21/2017    US-guided right thoracentesis-Dr. Juan Yuen, [facility]   • TONSILLECTOMY Bilateral    • UPPER GASTROINTESTINAL ENDOSCOPY N/A 10/08/2007    Esophageal mucosal changes suspicious for short-segment Olivera's esphagus, gastric mucosal abnormality characterized by erythema: biopsied, NERD disease present, high likelihood of gastritis-Dr. Mayank Knapp, [facility]        Social History      Occupational History   • Not on file   Tobacco Use   • Smoking status: Former Smoker     Types: Cigarettes     Quit date:      Years since quittin.6   • Smokeless tobacco: Never Used   • Tobacco comment: SOCIAL SMOKING ONLY   Vaping Use   • Vaping Use: Never used   Substance and Sexual Activity   • Alcohol use: Yes     Alcohol/week: 2.0 standard drinks     Types: 1 Glasses of wine, 1 Shots of liquor per week     Comment: occasionally/caffeine use    • Drug use: No   • Sexual activity: Defer      Social History     Social History Narrative   • Not on file        Family History   Problem Relation Age of Onset   • Cancer Mother    • Colon cancer Mother    • Diabetes Father    • Diabetes Son    • Ulcerative colitis Son    • No Known Problems Maternal Grandmother    • No Known Problems Maternal Grandfather    • No Known Problems Paternal Grandmother    • No Known Problems Paternal Grandfather    • Malig Hyperthermia Neg Hx    • Breast cancer Neg Hx        ROS: 14 point review of systems was performed and was negative except for documented findings in HPI and today's encounter.     Allergies:   Allergies   Allergen Reactions   • Epinephrine Palpitations   • Penicillins Other (See Comments)     BLISTERS IN MOUTH    Patient tolerated ceftriaxone during 2017 admission.        Constitutional:  Denies fever, shaking or chills   Eyes:  Denies change in visual acuity   HENT:  Denies nasal congestion or sore throat   Respiratory:  Denies cough or shortness of breath   Cardiovascular:  Denies chest pain or severe LE edema   GI:  Denies abdominal pain, nausea, vomiting, bloody stools or diarrhea   Musculoskeletal:  Numbness, tingling, or loss of motor function only as noted above in history of present illness.  : Denies painful urination or hematuria  Integument:  Denies rash, lesion or ulceration   Neurologic:  Denies headache or focal weakness  Endocrine:  Denies lymphadenopathy  Psych:  Denies confusion or  change in mental status   Hem:  Denies active bleeding      Physical Exam: 76 y.o. female  Wt Readings from Last 3 Encounters:   08/08/22 84.6 kg (186 lb 9.6 oz)   08/03/22 81.2 kg (179 lb)   06/02/22 83.1 kg (183 lb 3.2 oz)       Body mass index is 31.05 kg/m².  Facility age limit for growth percentiles is 20 years.  Vitals:    08/08/22 1344   Temp: 96.5 °F (35.8 °C)     Vital signs reviewed.   General Appearance:    Alert, cooperative, in no acute distress                    Ortho exam  Physical exam of the left shoulder reveals no overlying skin changes no lymphedema no lymphadenopathy.  Patient has active flexion 180 with mild symptoms abduction is similar external rotation is to 50 and internal rotation to the upper lumbar spine with mild symptoms.  Patient has good rotator cuff strength 4+ over 5 with isometric strength testing with pain.  Patient has a positive impingement and a positive Frederick sign.  Patient has good cervical range of motion which is full and asymptomatic no radicular symptoms.  Patient has a normal elbow exam.  Good distal pulses are presentPatient has pain with overhead activity and a positive Neer sign and a positive empty can sign  They have a positive drop arm any definitive painful arc  Physical exam of the right shoulder reveals no overlying skin changes no lymphedema no lymphadenopathy.  Patient has active flexion 180 with mild symptoms abduction is similar external rotation is to 50 and internal rotation to the upper lumbar spine with mild symptoms.  Patient has good rotator cuff strength 4+ over 5 with isometric strength testing with pain.  Patient has a positive impingement and a positive Frederick sign.  Patient has good cervical range of motion which is full and asymptomatic no radicular symptoms.  Patient has a normal elbow exam.  Good distal pulses are present  Patient has pain with overhead activity and a positive Neer sign and a positive empty can sign , a positive drop arm  and a definitive painful arc           .time    Assessment: Bilateral shoulder pain she has a known rotator cuff tear on the left again we talked about options injections if the injections quit working I do think it is a repairable tear on the right if she fails the injection would consider an MRI on the right.  Her son is seeing occupational therapy they feel like they can put her on a good exercise program so I will give her a prescription for that    Plan:   Follow up as indicated.  Ice, elevate, and rest as needed.  Discussed conservative measures of pain control including ice, bracing.  Also talked about the importance of strengthening and maintaining ideal body weight    Lety Vela M.D.    Large Joint Arthrocentesis: L subacromial bursa  Date/Time: 8/8/2022 1:49 PM  Consent given by: patient  Site marked: site marked  Timeout: Immediately prior to procedure a time out was called to verify the correct patient, procedure, equipment, support staff and site/side marked as required   Supporting Documentation  Indications: pain   Procedure Details  Location: shoulder - L subacromial bursa  Preparation: Patient was prepped and draped in the usual sterile fashion  Needle gauge: 21G.  Approach: posterior  Medications administered: 80 mg methylPREDNISolone acetate 80 MG/ML; 2 mL lidocaine PF 1% 1 %  Patient tolerance: patient tolerated the procedure well with no immediate complications    Large Joint Arthrocentesis: R subacromial bursa  Date/Time: 8/8/2022 2:00 PM  Consent given by: patient  Site marked: site marked  Timeout: Immediately prior to procedure a time out was called to verify the correct patient, procedure, equipment, support staff and site/side marked as required   Supporting Documentation  Indications: pain   Procedure Details  Location: shoulder - R subacromial bursa  Preparation: Patient was prepped and draped in the usual sterile fashion  Needle gauge: 21G.  Approach: posterior  Medications  administered: 80 mg methylPREDNISolone acetate 80 MG/ML; 2 mL lidocaine PF 1% 1 %  Patient tolerance: patient tolerated the procedure well with no immediate complications

## 2022-08-11 RX ORDER — LOSARTAN POTASSIUM 100 MG/1
TABLET ORAL
Qty: 90 TABLET | Refills: 0 | Status: SHIPPED | OUTPATIENT
Start: 2022-08-11 | End: 2022-11-18

## 2022-09-07 ENCOUNTER — PREP FOR SURGERY (OUTPATIENT)
Dept: OTHER | Facility: HOSPITAL | Age: 77
End: 2022-09-07

## 2022-09-07 DIAGNOSIS — Z12.11 SCREENING FOR COLON CANCER: Primary | ICD-10-CM

## 2022-09-07 DIAGNOSIS — Z12.11 ENCOUNTER FOR COLONOSCOPY DUE TO HISTORY OF ADENOMATOUS COLONIC POLYPS: ICD-10-CM

## 2022-09-07 DIAGNOSIS — Z80.0 FAMILY HISTORY OF COLON CANCER IN MOTHER: ICD-10-CM

## 2022-09-07 DIAGNOSIS — Z86.010 ENCOUNTER FOR COLONOSCOPY DUE TO HISTORY OF ADENOMATOUS COLONIC POLYPS: ICD-10-CM

## 2022-09-09 ENCOUNTER — TELEPHONE (OUTPATIENT)
Dept: SURGERY | Facility: CLINIC | Age: 77
End: 2022-09-09

## 2022-09-09 RX ORDER — METRONIDAZOLE 500 MG/1
500 TABLET ORAL 3 TIMES DAILY
Qty: 21 TABLET | Refills: 0 | Status: SHIPPED | OUTPATIENT
Start: 2022-09-09 | End: 2022-09-16

## 2022-09-09 RX ORDER — LEVOFLOXACIN 500 MG/1
500 TABLET, FILM COATED ORAL DAILY
Qty: 7 TABLET | Refills: 0 | Status: SHIPPED | OUTPATIENT
Start: 2022-09-09 | End: 2022-09-16

## 2022-09-09 NOTE — TELEPHONE ENCOUNTER
I have sent a prescription for Levaquin & Flagyl to her [facility] pharmacy. Usually I prescribe Augmentin, but she is allergic to Penicillin. Let her know she should  both antibiotics later today. If she gets worse through the weekend, she will need to come to the ER for evaluation.    Thanks,  RONEN

## 2022-09-09 NOTE — TELEPHONE ENCOUNTER
PT thinks she is having a diverticulitis flare up can you send in antibiotic vinod on [address] and [address]

## 2022-09-22 ENCOUNTER — OFFICE VISIT (OUTPATIENT)
Dept: SURGERY | Facility: CLINIC | Age: 77
End: 2022-09-22

## 2022-09-22 ENCOUNTER — TELEPHONE (OUTPATIENT)
Dept: SURGERY | Facility: CLINIC | Age: 77
End: 2022-09-22

## 2022-09-22 VITALS — BODY MASS INDEX: 30.92 KG/M2 | WEIGHT: 185.6 LBS | HEIGHT: 65 IN

## 2022-09-22 DIAGNOSIS — R10.32 LLQ ABDOMINAL PAIN: Primary | ICD-10-CM

## 2022-09-22 DIAGNOSIS — K57.92 DIVERTICULITIS: ICD-10-CM

## 2022-09-22 PROBLEM — Z12.11 ENCOUNTER FOR COLONOSCOPY DUE TO HISTORY OF ADENOMATOUS COLONIC POLYPS: Status: ACTIVE | Noted: 2022-09-22

## 2022-09-22 PROBLEM — Z86.0101 ENCOUNTER FOR COLONOSCOPY DUE TO HISTORY OF ADENOMATOUS COLONIC POLYPS: Status: ACTIVE | Noted: 2022-09-22

## 2022-09-22 PROBLEM — Z86.010 ENCOUNTER FOR COLONOSCOPY DUE TO HISTORY OF ADENOMATOUS COLONIC POLYPS: Status: ACTIVE | Noted: 2022-09-22

## 2022-09-22 PROBLEM — Z12.11 SCREENING FOR COLON CANCER: Status: ACTIVE | Noted: 2022-09-22

## 2022-09-22 PROCEDURE — 99213 OFFICE O/P EST LOW 20 MIN: CPT | Performed by: SURGERY

## 2022-09-22 RX ORDER — B-COMPLEX WITH VITAMIN C
1 TABLET ORAL DAILY
COMMUNITY

## 2022-09-22 NOTE — TELEPHONE ENCOUNTER
I left a voicemail for the patient to call back to reschedule her colonoscopy. Dr. Wade wants her to have it in about 6 weeks from her office appointment. Needs to hold Eliquis 3 days prior.

## 2022-09-26 NOTE — PROGRESS NOTES
General Surgery  Established Patient Office Visit    CC: Abdominal pain    HPI: The patient is a pleasant 76 y.o. year-old lady who presents today for evaluation of recent left lower quadrant abdominal pain that started nearly 2 weeks ago with associated diarrhea.  She called our office on 9/9/2022 and was prescribed Levaquin and Flagyl for presumed recurrent diverticulitis.  She did not go to the emergency room or have CT scan at the time.  She returns today to see me and has completed the antibiotics with resolution of her pain and improvement in her diarrhea since she has started taking a probiotic.  She points to her left lower quadrant transverse scar from previous colostomy reversal as the site of her pain when it began 2 weeks ago.  She has not noticed any blood in the stool and has had no fevers or chills.  Her last CT abdomen/pelvis was done in June of this year and showed fat-containing hernia at the site of her prior colostomy as well as diverticuli above the level of her colorectal anastomosis.  She underwent an open sigmoid colectomy with colostomy in 2017 by Dr. Delgado for perforated diverticulitis and later had her colostomy taken down in September 2017 also by Dr. Delgado.  She last underwent a colonoscopy in 2019 by Dr. Steven Vela where multiple adenomatous polyps were removed from the transverse and descending colon.  A few hyperplastic polyps were removed from the sigmoid colon.  The colorectal anastomosis was widely patent.    Past Medical History:   Hypertension  Atrial fibrillation  Vertigo  COPD  Asthma  Obstructive sleep apnea  History of multidrug-resistant bacterial infections of the skin and soft tissues  History of diverticulitis  Psoriasis  History of small bowel obstructions requiring exploratory laparotomy with lysis of adhesions  History of C. difficile colitis treated medically     Past Surgical History:   Exploratory laparotomy with lysis of adhesions (Sept 2019 by me)  Open sigmoid  colectomy with colostomy (May 2017, Dr. Delgado)  Open colostomy takedown (September 2017, Dr. Delgado)  Hysterectomy  Tonsillectomy  Colonoscopy (August 2019, Dr. Steven Vela -multiple adenomatous colon polyps removed)  Cholecystectomy  Blepharoplasty  Cataract extraction    Medications:   Eliquis 5 mg every 12 hours  Tylenol as needed for arthritis pain  Vitamin C 1 tablet daily  Vitamin B complex once daily  Buspirone 5 mg 3 times daily as needed for anxiety  Vitamin D3 1000 units daily  Flecainide 50 mg twice daily  Fluticasone nasal spray as needed  Losartan 100 mg daily  Magnesium once daily  Meclizine 25 mg 3 times daily as needed for dizziness  Melatonin 5 mg nightly as needed for insomnia  Metoprolol 50 mg daily    Allergies: Penicillins (oral blisters), epinephrine (received too much and had heart palpitations)    Family History: Mother with colon cancer, son with ulcerative colitis    Social History:  and cares for her disabled , occasional alcohol use, former smoker but quit in 1967    ROS:   Constitutional: Negative for fevers or chills  HENT: Negative for hearing loss or runny nose  Eyes: Negative for vision changes or scleral icterus  Respiratory: Negative for cough or shortness of breath  Cardiovascular: Negative for chest pain or heart palpitations  Gastrointestinal: Positive for abdominal pain, abdominal bloating, and diarrhea; negative for nausea, vomiting, constipation, melena, or hematochezia  Genitourinary: Negative for hematuria or dysuria  Musculoskeletal: Negative for joint swelling or gait instability  Neurologic: Positive for balance problems, dizziness, and lightheadedness due to vertigo; negative for tremors or seizures  Psychiatric: Negative for suicidal ideations or depression  All other systems reviewed and negative    Physical Exam:  Height: 165 cm  Weight: 84 kg  BMI: 30  General: No acute distress, well-nourished & well-developed  HEAD: normocephalic, atraumatic  EYES:  normal conjunctiva, sclera anicteric  EARS: grossly normal hearing  NECK: supple, no thyromegaly  CARDIOVASCULAR: regular rate and rhythm  RESPIRATORY: clear to auscultation bilaterally  GASTROINTESTINAL: soft, nontender, non-distended, chronically incarcerated left lower quadrant hernia at the site of her previous colostomy which contains only fat  MUSCULOSKELETAL: normal gait and station. No gross extremity abnormalities  PSYCHIATRIC: oriented x3, normal mood and affect    IMAGING:  Nothing since our last office visit    ASSESSMENT & PLAN  Mrs. Last is a 76-year-old lady with recent abdominal pain and diarrhea suggesting recurrent diverticulitis.  She has made a full recovery with outpatient antibiotics but I would recommend scheduling her for repeat colonoscopy in about 6 to 8 weeks.  I reviewed her last colonoscopy report from 2019 as well as the pathology report showing multiple adenomatous colon polyps.  I also reviewed her most recent CT abdomen/pelvis that was done in June which shows a fat-containing hernia at her left lower quadrant prior colostomy site.  Just deep to the incarcerated fat is a portion of her colon, and I am concerned she may be sporadically incarcerating a portion of the colon within her hernia.  This could also account for some of her recent symptoms of abdominal pain and bloating.  We should get a better assessment of the colon with her upcoming colonoscopy to determine if there is any involvement within her incisional hernia.  She should hold her Eliquis for 3 days prior to her colonoscopy.    Trini Wade MD  General and Endoscopic Surgery  [facility], operated by [facility]    [address], [address]  [place], [place], [address]  P: [phone]  F: [phone]

## 2022-10-17 ENCOUNTER — OFFICE VISIT (OUTPATIENT)
Dept: ORTHOPEDIC SURGERY | Facility: CLINIC | Age: 77
End: 2022-10-17

## 2022-10-17 VITALS — WEIGHT: 184 LBS | RESPIRATION RATE: 16 BRPM | HEIGHT: 65 IN | TEMPERATURE: 96.8 F | BODY MASS INDEX: 30.66 KG/M2

## 2022-10-17 DIAGNOSIS — M17.10 PRIMARY LOCALIZED OSTEOARTHROSIS OF LOWER LEG, UNSPECIFIED LATERALITY: ICD-10-CM

## 2022-10-17 DIAGNOSIS — M75.40 IMPINGEMENT SYNDROME OF SHOULDER REGION, UNSPECIFIED LATERALITY: ICD-10-CM

## 2022-10-17 DIAGNOSIS — R52 PAIN: Primary | ICD-10-CM

## 2022-10-17 PROCEDURE — 73562 X-RAY EXAM OF KNEE 3: CPT | Performed by: ORTHOPAEDIC SURGERY

## 2022-10-17 PROCEDURE — 20610 DRAIN/INJ JOINT/BURSA W/O US: CPT | Performed by: ORTHOPAEDIC SURGERY

## 2022-10-17 PROCEDURE — 99214 OFFICE O/P EST MOD 30 MIN: CPT | Performed by: ORTHOPAEDIC SURGERY

## 2022-10-17 RX ORDER — METHYLPREDNISOLONE 4 MG/1
TABLET ORAL
Qty: 21 TABLET | Refills: 0 | Status: SHIPPED | OUTPATIENT
Start: 2022-10-17 | End: 2023-01-12

## 2022-10-17 RX ADMIN — METHYLPREDNISOLONE ACETATE 80 MG: 80 INJECTION, SUSPENSION INTRA-ARTICULAR; INTRALESIONAL; INTRAMUSCULAR; SOFT TISSUE at 15:09

## 2022-10-17 NOTE — PROGRESS NOTES
Shoulder Follow Up      Patient: Kiley Last        YOB: 1945            Chief Complaints: Shoulder pain bilateral and right knee pain      History of Present Illness: This patient presents complaint of severe bilateral shoulder pain I last injected her in August was too soon to do this again.  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  Her shoulders are miserable she has night pain and difficulty sleeping.  She is also complaining now of right knee pain she is a lot of walking due to get to her daughter's room knee is bothering her no no history of injury the only change in activity is as above she has occasional swelling pain is primarily medial      Physical Exam: 76 y.o. female  General Appearance:    Alert, cooperative, in no acute distress                 There were no vitals filed for this visit.     Patient is alert and read ×3 no acute distress appears her above-listed at height weight and age.  Affect is normal respiratory rate is normal unlabored. Heart rate regular rate rhythm, sclera, dentition and hearing are normal for the purpose of this exam.      Ortho Exam  Physical exam of the right knee reveals no effusion, no erythema.  It mild loss of extension and full flexion  Patient has mild varus alignment.  They have mild tenderness to palpation about the medial compartment, no tenderness laterally..  The patient has a negative bounce home, negative Felton and a stable ligamentous exam.  Quad tone is reasonable and symmetric.  There are no overlying skin changes no lymphedema no lymphadenopathy.  There is good hip range of motion which is full symmetric and asymptomatic and a normal ankle exam.  Physical exam of the right and left shoulder reveals no overlying skin changes no lymphedema no lymphadenopathy.  Patient has active flexion 180 with  mild symptoms abduction is similar external rotation is to 50 and internal rotation to the upper lumbar spine with mild symptoms.  Patient has good rotator cuff strength 4+ over 5 with isometric strength testing with pain.  Patient has a positive impingement and a positive Frederick sign.  Patient has good cervical range of motion which is full and asymptomatic no radicular symptoms.  Patient has a normal elbow exam.  Good distal pulses are present  Patient has pain with overhead activity and a positive Neer sign and a positive empty can sign , a positive drop arm and a definitive painful arc    X-rays AP lateral merchant view of the right knee were taken to evaluate her symptoms have no comparative films readily available she does have tricompartmental OA much worse medially Gavin consider moderate to severe  Large Joint Arthrocentesis: R knee  Date/Time: 10/17/2022 3:09 PM  Consent given by: patient  Site marked: site marked  Timeout: Immediately prior to procedure a time out was called to verify the correct patient, procedure, equipment, support staff and site/side marked as required   Supporting Documentation  Indications: pain, joint swelling and diagnostic evaluation   Procedure Details  Location: knee - R knee  Preparation: Patient was prepped and draped in the usual sterile fashion  Needle gauge: 21G.  Approach: anterolateral  Medications administered: 80 mg methylPREDNISolone acetate 80 MG/ML; 2 mL lidocaine (cardiac)  Patient tolerance: patient tolerated the procedure well with no immediate complications                Assessment/Plan: Right knee pain I think this is degenerative in origin I think should benefit from an injection.  On her shoulders we know his rotator cuff on the left she does have a full-thickness tear but really cannot get it fixed at this time is too early to inject him again.  I think a steroid to be quite reasonable but she is on an anticoagulant.  I did check with  her  cardiologist and he is okay with her doing a Dosepak.  She understands strict precautions we will see her in a month to repeat her shoulder injection

## 2022-10-18 RX ORDER — METHYLPREDNISOLONE ACETATE 80 MG/ML
80 INJECTION, SUSPENSION INTRA-ARTICULAR; INTRALESIONAL; INTRAMUSCULAR; SOFT TISSUE
Status: COMPLETED | OUTPATIENT
Start: 2022-10-17 | End: 2022-10-17

## 2022-11-03 ENCOUNTER — HOSPITAL ENCOUNTER (OUTPATIENT)
Facility: HOSPITAL | Age: 77
Setting detail: HOSPITAL OUTPATIENT SURGERY
Discharge: HOME | End: 2022-11-03
Attending: SURGERY | Admitting: SURGERY

## 2022-11-03 ENCOUNTER — ANESTHESIA EVENT (OUTPATIENT)
Dept: GASTROENTEROLOGY | Facility: HOSPITAL | Age: 77
End: 2022-11-03

## 2022-11-03 ENCOUNTER — ANESTHESIA (OUTPATIENT)
Dept: GASTROENTEROLOGY | Facility: HOSPITAL | Age: 77
End: 2022-11-03

## 2022-11-03 VITALS
SYSTOLIC BLOOD PRESSURE: 164 MMHG | DIASTOLIC BLOOD PRESSURE: 82 MMHG | HEART RATE: 72 BPM | OXYGEN SATURATION: 99 % | BODY MASS INDEX: 29.66 KG/M2 | HEIGHT: 65 IN | RESPIRATION RATE: 14 BRPM | TEMPERATURE: 97 F | WEIGHT: 178 LBS

## 2022-11-03 DIAGNOSIS — Z12.11 SCREENING FOR COLON CANCER: ICD-10-CM

## 2022-11-03 DIAGNOSIS — Z80.0 FAMILY HISTORY OF COLON CANCER IN MOTHER: ICD-10-CM

## 2022-11-03 DIAGNOSIS — Z86.010 ENCOUNTER FOR COLONOSCOPY DUE TO HISTORY OF ADENOMATOUS COLONIC POLYPS: ICD-10-CM

## 2022-11-03 DIAGNOSIS — Z12.11 ENCOUNTER FOR COLONOSCOPY DUE TO HISTORY OF ADENOMATOUS COLONIC POLYPS: ICD-10-CM

## 2022-11-03 PROBLEM — K57.90 DIVERTICULOSIS: Status: ACTIVE | Noted: 2022-11-03

## 2022-11-03 PROCEDURE — 88305 TISSUE EXAM BY PATHOLOGIST: CPT | Performed by: SURGERY

## 2022-11-03 PROCEDURE — 45380 COLONOSCOPY AND BIOPSY: CPT | Performed by: SURGERY

## 2022-11-03 PROCEDURE — S0260 H&P FOR SURGERY: HCPCS | Performed by: SURGERY

## 2022-11-03 RX ORDER — SODIUM CHLORIDE, SODIUM LACTATE, POTASSIUM CHLORIDE, CALCIUM CHLORIDE 600; 310; 30; 20 MG/100ML; MG/100ML; MG/100ML; MG/100ML
30 INJECTION, SOLUTION INTRAVENOUS CONTINUOUS PRN
Status: DISCONTINUED | OUTPATIENT
Start: 2022-11-03 | End: 2022-11-03 | Stop reason: HOSPADM

## 2022-11-03 RX ORDER — SODIUM CHLORIDE 0.9 % (FLUSH) 0.9 %
10 SYRINGE (ML) INJECTION EVERY 12 HOURS SCHEDULED
Status: DISCONTINUED | OUTPATIENT
Start: 2022-11-03 | End: 2022-11-03 | Stop reason: HOSPADM

## 2022-11-03 RX ORDER — SODIUM CHLORIDE 0.9 % (FLUSH) 0.9 %
10 SYRINGE (ML) INJECTION AS NEEDED
Status: DISCONTINUED | OUTPATIENT
Start: 2022-11-03 | End: 2022-11-03 | Stop reason: HOSPADM

## 2022-11-03 RX ADMIN — SODIUM CHLORIDE, POTASSIUM CHLORIDE, SODIUM LACTATE AND CALCIUM CHLORIDE 30 ML/HR: 600; 310; 30; 20 INJECTION, SOLUTION INTRAVENOUS at 09:38

## 2022-11-03 NOTE — ANESTHESIA PREPROCEDURE EVALUATION
Anesthesia Evaluation     Patient summary reviewed and Nursing notes reviewed   NPO Solid Status: > 8 hours             Airway   Mallampati: II  TM distance: >3 FB  Neck ROM: full  No difficulty expected  Dental - normal exam     Pulmonary - normal exam   (+) asthma,  Cardiovascular - normal exam    (+) hypertension, dysrhythmias Paroxysmal Atrial Fib,       Neuro/Psych- negative ROS  GI/Hepatic/Renal/Endo - negative ROS     Musculoskeletal     Abdominal  - normal exam    Bowel sounds: normal.   Substance History - negative use     OB/GYN negative ob/gyn ROS         Other   arthritis,                      Anesthesia Plan    ASA 2     MAC       Anesthetic plan, risks, benefits, and alternatives have been provided, discussed and informed consent has been obtained with: patient.        CODE STATUS:

## 2022-11-03 NOTE — ANESTHESIA POSTPROCEDURE EVALUATION
Patient: Kiley Last    Procedure Summary     Date: 11/03/22 Room / Location: [facility] ENDOSCOPY 6 /  [facility]    Anesthesia Start: 0943 Anesthesia Stop: 1014    Procedure: COLONOSCOPY to cecum and TI with biopsy / hot snare polypectomy Diagnosis:       Screening for colon cancer      Encounter for colonoscopy due to history of adenomatous colonic polyps      Family history of colon cancer in mother      (Screening for colon cancer [Z12.11])      (Encounter for colonoscopy due to history of adenomatous colonic polyps [Z12.11, Z86.010])      (Family history of colon cancer in mother [Z80.0])    Surgeons: Trini Wade MD Provider: Suhas Jaquez MD    Anesthesia Type: MAC ASA Status: 2          Anesthesia Type: MAC    Vitals  No vitals data found for the desired time range.          Post Anesthesia Care and Evaluation    Patient location during evaluation: PHASE II  Patient participation: complete - patient participated  Level of consciousness: awake  Pain score: 1  Pain management: satisfactory to patient  Anesthetic complications: No anesthetic complications  PONV Status: none  Cardiovascular status: acceptable  Respiratory status: acceptable  Hydration status: acceptable       23

## 2022-11-03 NOTE — H&P
General Surgery  History and Physical    CC: Abdominal pain     HPI: The patient is a pleasant 76 y.o. year-old lady who presents today for evaluation of recent left lower quadrant abdominal pain that started nearly 2 weeks ago with associated diarrhea.  She called our office on 9/9/2022 and was prescribed Levaquin and Flagyl for presumed recurrent diverticulitis.  She did not go to the emergency room or have CT scan at the time.  She returns today to see me and has completed the antibiotics with resolution of her pain and improvement in her diarrhea since she has started taking a probiotic.  She points to her left lower quadrant transverse scar from previous colostomy reversal as the site of her pain when it began 2 weeks ago.  She has not noticed any blood in the stool and has had no fevers or chills.  Her last CT abdomen/pelvis was done in June of this year and showed fat-containing hernia at the site of her prior colostomy as well as diverticuli above the level of her colorectal anastomosis.  She underwent an open sigmoid colectomy with colostomy in 2017 by Dr. Delgado for perforated diverticulitis and later had her colostomy taken down in September 2017 also by Dr. Delgado.  She last underwent a colonoscopy in 2019 by Dr. Steven Vela where multiple adenomatous polyps were removed from the transverse and descending colon.  A few hyperplastic polyps were removed from the sigmoid colon.  The colorectal anastomosis was widely patent.     Past Medical History:   Hypertension  Atrial fibrillation  Vertigo  COPD  Asthma  Obstructive sleep apnea  History of multidrug-resistant bacterial infections of the skin and soft tissues  History of diverticulitis  Psoriasis  History of small bowel obstructions requiring exploratory laparotomy with lysis of adhesions  History of C. difficile colitis treated medically     Past Surgical History:   Exploratory laparotomy with lysis of adhesions (Sept 2019 by me)  Open sigmoid colectomy  with colostomy (May 2017, Dr. Delgado)  Open colostomy takedown (September 2017, Dr. Delgado)  Hysterectomy  Tonsillectomy  Colonoscopy (August 2019, Dr. Steven Vela -multiple adenomatous colon polyps removed)  Cholecystectomy  Blepharoplasty  Cataract extraction     Medications:   Eliquis 5 mg every 12 hours  Tylenol as needed for arthritis pain  Vitamin C 1 tablet daily  Vitamin B complex once daily  Buspirone 5 mg 3 times daily as needed for anxiety  Vitamin D3 1000 units daily  Flecainide 50 mg twice daily  Fluticasone nasal spray as needed  Losartan 100 mg daily  Magnesium once daily  Meclizine 25 mg 3 times daily as needed for dizziness  Melatonin 5 mg nightly as needed for insomnia  Metoprolol 50 mg daily     Allergies: Penicillins (oral blisters), epinephrine (received too much and had heart palpitations)     Family History: Mother with colon cancer, son with ulcerative colitis     Social History:  and cares for her disabled , occasional alcohol use, former smoker but quit in 1967     ROS:   Constitutional: Negative for fevers or chills  HENT: Negative for hearing loss or runny nose  Eyes: Negative for vision changes or scleral icterus  Respiratory: Negative for cough or shortness of breath  Cardiovascular: Negative for chest pain or heart palpitations  Gastrointestinal: Positive for abdominal pain, abdominal bloating, and diarrhea; negative for nausea, vomiting, constipation, melena, or hematochezia  Genitourinary: Negative for hematuria or dysuria  Musculoskeletal: Negative for joint swelling or gait instability  Neurologic: Positive for balance problems, dizziness, and lightheadedness due to vertigo; negative for tremors or seizures  Psychiatric: Negative for suicidal ideations or depression  All other systems reviewed and negative    Physical Exam:  Vitals:    11/03/22 0927   BP: 170/84   Pulse: 97   Resp: 16   Temp: 97 °F (36.1 °C)   SpO2: 97%     Height: 165 cm  Weight: 80 kg  BMI:  29  General: No acute distress, well-nourished & well-developed  HEAD: normocephalic, atraumatic  EYES: normal conjunctiva, sclera anicteric  EARS: grossly normal hearing  NECK: supple, no thyromegaly  CARDIOVASCULAR: regular rate and rhythm  RESPIRATORY: clear to auscultation bilaterally  GASTROINTESTINAL: soft, nontender, non-distended  PSYCHIATRIC: oriented x3, normal mood and affect    ASSESSMENT & PLAN  Mrs. Last is a 77 year-old lady with history of adenomatous colon polyps and diverticulitis who presents today for a repeat colonoscopy.  She has been counseled on the risks of the procedure to include bleeding, colon perforation, and missed pathology.  Despite these risks, she has consented to proceed.    Trini Wade MD  General, Robotic, and Endoscopic Surgery  [facility]    [address], [address]  [place] [address]  P: [phone]  F: [phone]

## 2022-11-03 NOTE — OP NOTE
Operative Note :  Trini Wade MD      Kiley Last  1945    Procedure Date: 11/03/22    Pre-op Diagnosis:  Screening for colon cancer [Z12.11]  Encounter for colonoscopy due to history of adenomatous colonic polyps [Z12.11, Z86.010]  Family history of colon cancer in mother [Z80.0]   Diverticulitis    Post-Operative Diagnosis:  Diverticulosis  Normal anatomy following previous colostomy reversal  Colon polyp  Nonbleeding grade 1 internal hemorrhoids    Procedure:   Flexible colonoscopy to the cecum and terminal ileum with cold forcep polypectomy    Surgeon: Trini Wade MD    Assistant: None    Anesthesia:  MAC (monitored anesthetic care)    Estimated Blood Loss: Minimal    Specimens: Ascending colon polyp    Complications: None    Indications:  Mrs. Last is a 77 year-old lady with history of adenomatous colon polyps and diverticulitis who presents today for a repeat colonoscopy.  She has been counseled on the risks of the procedure to include bleeding, colon perforation, and missed pathology.  Despite these risks, she has consented to proceed.    Findings: 3 mm ascending colon polyp removed, normal end of colon to side of rectum stapled anastomosis with short blind ended rectal stump, diverticulosis above and below the anastomosis with no sign of bleeding or inflammation    Description of procedure:  The patient was brought to the endoscopy suite and drew in the left lateral decubitus position.  Continuous propofol anesthesia was administered.  A surgical timeout was completed.  A digital rectal exam was performed, revealing no abnormalities.  An adult colonoscope was then inserted through the anus and passed under direct visualization to the level of the cecum.  The cecum was identified via the ileocecal valve as well as the appendiceal orifice.  The terminal ileum was cannulated and scope passed up into the distal ileum for a length of about 10 cm with the mucosa appearing grossly  unremarkable.  The scope was then slowly withdrawn, examining all circumferential walls of the ascending, transverse, descending, and sigmoid colon.  There was a patent end of colon to side of rectum colorectal anastomosis from her previous colostomy reversal with no sign of anastomotic stricture or bleeding.  There was no inflammation of the anastomosis.  There was a blind ended rectal stump measuring about 2 cm off to the side of the colorectal anastomosis with diverticulosis noted inside of the rectal stump.  There was also diverticulosis of the colon above and below the anastomosis with no sign of bleeding.  There was a 3 mm polyp of the ascending colon removed using the cold biopsy forceps.  Within the rectum the scope was retroflexed and showed nonbleeding grade 1 internal hemorrhoids.  The scope was then withdrawn and the colon desufflated.  The patient had a very good bowel prep and was transferred to the recovery area in stable condition.     Recommendations:  I will call the patient in 1 week or less with the pathology results of the one polyp removed as this will determine when the next screening colonoscopy will be due.    Trini Wade MD  General, Robotic, and Endoscopic Surgery  [facility]    [address] [place] [address], [address]  [place] [address]  P: [phone]  F: [phone]

## 2022-11-04 LAB
LAB AP CASE REPORT: NORMAL
LAB AP CLINICAL INFORMATION: NORMAL
PATH REPORT.FINAL DX SPEC: NORMAL
PATH REPORT.GROSS SPEC: NORMAL

## 2022-11-09 ENCOUNTER — TELEPHONE (OUTPATIENT)
Dept: SURGERY | Facility: CLINIC | Age: 77
End: 2022-11-09

## 2022-11-09 NOTE — TELEPHONE ENCOUNTER
I called Anna and discussed the benign pathology findings from her recent colonoscopy where a single tubular adenoma was removed.  This would normally warrant a repeat screening colonoscopy in 5 years, however she would be 82 years old by that time.  I expressed once patients are in their 80s we do not recommend returning for repeat screening colonoscopies as the risks outweigh the benefits.  She expressed understanding.

## 2022-11-18 RX ORDER — LOSARTAN POTASSIUM 100 MG/1
TABLET ORAL
Qty: 90 TABLET | Refills: 2 | Status: SHIPPED | OUTPATIENT
Start: 2022-11-18

## 2022-11-18 NOTE — PROGRESS NOTES
Patient: Kiley Last  YOB: 1945  Date of Service: 11/18/2022    Chief Complaints: Bilateral shoulder pain    Subjective:    History of Present Illness: Pt is seen in the office today with complaints of bilateral shoulder pain she gets intermittent injections she has a known rotator cuff tear.  On the left she has had injections in the past he got good relief she is the primary caregiver for her  is unable to do think surgical at this time she is here for repeat injections        Allergies:   Allergies   Allergen Reactions   • Epinephrine Palpitations   • Penicillins Other (See Comments)     BLISTERS IN MOUTH    Patient tolerated ceftriaxone during 5/2017 admission.          Medications:   Home Medications:  Current Outpatient Medications on File Prior to Visit   Medication Sig   • Acetaminophen (TYLENOL ARTHRITIS PAIN PO) Take 650 mg by mouth 3 (Three) Times a Day As Needed.   • apixaban (Eliquis) 5 MG tablet tablet Take 1 tablet by mouth Every 12 (Twelve) Hours.   • B Complex Vitamins (Vitamin B Complex) tablet Take 1 tablet by mouth Daily.   • busPIRone (BUSPAR) 5 MG tablet TAKE ONE TABLET BY MOUTH THREE TIMES A DAY AS NEEDED FOR ANXIETY   • cholecalciferol (VITAMIN D3) 25 MCG (1000 UT) tablet Take 1,000 Units by mouth Daily.   • flecainide (TAMBOCOR) 50 MG tablet TAKE ONE TABLET BY MOUTH EVERY 12 HOURS   • fluticasone (Flonase) 50 MCG/ACT nasal spray 2 sprays into the nostril(s) as directed by provider Daily.   • losartan (COZAAR) 100 MG tablet TAKE ONE TABLET BY MOUTH DAILY   • Magnesium 100 MG capsule Take 1 capsule by mouth Daily.   • meclizine (ANTIVERT) 25 MG tablet TAKE ONE TABLET BY MOUTH THREE TIMES A DAY AS NEEDED FOR DIZZINESS   • melatonin 5 MG tablet tablet Take 10 mg by mouth.   • methylPREDNISolone (MEDROL) 4 MG dose pack Use as directed by package instructions   • metoprolol succinate XL (TOPROL-XL) 50 MG 24 hr tablet TAKE ONE TABLET BY MOUTH DAILY   • Probiotic  Product (PROBIOTIC PO) Take 1 capsule by mouth Daily.   • vitamin C (ASCORBIC ACID) 250 MG tablet Take 250 mg by mouth Daily.   • Zinc Sulfate (ZINC 15 PO) Take 1 tablet by mouth Daily.     No current facility-administered medications on file prior to visit.     Current Medications:  Scheduled Meds:  Continuous Infusions:No current facility-administered medications for this visit.    PRN Meds:.    I have reviewed the patient's medical history in detail and updated the computerized patient record.  Review and summarization of old records include:    Past Medical History:   Diagnosis Date   • Arthritis    • Asthma     NO INHALERS   • Atrial fibrillation (HCC)    • Clostridium difficile infection 09/20/2019   • Colon polyps 08/01/2019    Transverse colon: tubular adenoma, descending colon: fragments of tubular adenoma, sigmoid colon: ischemic eroded hyperplastic polyp   • COPD (chronic obstructive pulmonary disease) (HCC)    • Diverticulitis    • Diverticulosis    • ESBL (extended spectrum beta-lactamase) producing bacteria infection    • History of abscess of skin and subcutaneous tissue     ABD WOUND 5/2017   • History of atrial fibrillation      X1 POST OP FROM COLOSTOMY 5/2017 - NO PROBLEMS SINCE   • Hypertension    • MDRO (multiple drug resistant organisms) resistance    • NEW (obstructive sleep apnea) 05/20/2021    Overnight polysomnogram.  Weight 190 pounds.  Mild NEW with AHI 7 events per hour for total sleep time.  However, during REM moderate NEW with AHI 16.7 events per hour.  No sleep-related hypoxia.  The patient snored 5% of total sleep time.   • PONV (postoperative nausea and vomiting)    • Psoriasis    • UTI (urinary tract infection)    • Vertigo         Past Surgical History:   Procedure Laterality Date   • BELPHAROPTOSIS REPAIR Bilateral 04/27/2017    Bilateral brow ptosis repair via the anterior hairline approach under monitored local anesthesia-Andrez Craven   • BLEPHAROPLASTY Bilateral  04/27/2017   • BREAST BIOPSY     • CATARACT EXTRACTION     • COLON RESECTION N/A 5/3/2017    Procedure: OPEN SIMOID COLECTOMY WITH COLOSTOMY;  Surgeon: Renato Delgado MD;  Location:  [facility];  Service:    • COLONOSCOPY N/A 9/13/2017    Procedure: COLONOSCOPY VIA COLOSTOMY TO CECUM;  Surgeon: Renato Delgado MD;  Location:  [facility];  Service:    • COLONOSCOPY N/A 8/1/2019    Procedure: COLONOSCOPY to cecum and TI with biopsy / hot snare polypectomies;  Surgeon: Dalton Vela Jr., MD;  [facility];  Service: General   • COLONOSCOPY N/A 01/04/2010    Andrez Trinidad   • COLONOSCOPY N/A 11/3/2022    Procedure: COLONOSCOPY to cecum and TI with biopsy / hot snare polypectomy;  Surgeon: Trini Wade MD;  [facility];  Service: General;  Laterality: N/A;  pre-hx diverticulitis, screen, fam hx colon ca, personal hx polyps  post-polyp, diverticulosis, small hemorrhoids   • COLOSTOMY CLOSURE N/A 9/14/2017    Procedure: COLOSTOMY TAKEDOWN AND CLOSURE, WITH RESECTION;  Surgeon: Renato Delgado MD;  Location: [facility] MAIN OR;  Service:    • ENDOSCOPY AND COLONOSCOPY N/A 10/31/2014    Normal EGD and colonoscopy, repeat c-scope in 5 years-Andrez Trinidad   • EXPLORATORY LAPAROTOMY N/A 9/8/2019    Procedure: Exploratory laparotomy with lysis of adhesions;  Surgeon: Trini Wade MD;  Location: [facility] MAIN OR;  Service: General   • FINGER SURGERY Left     4TH FINGER LEFT HAND   • HYSTERECTOMY Bilateral    • LAPAROSCOPIC CHOLECYSTECTOMY W/ CHOLANGIOGRAPHY N/A 07/15/2003    Dr. Amrit Martinez, [facility]   • SALPINGO OOPHORECTOMY Bilateral 02/02/2006    Abdominal sacral colpopexy, bilateral salpingo-oophorectomy, tension free vaginal tape, cystoscopy-Dr. Devika Bradley, [facility]   • THORACENTESIS Right 05/21/2017    US-guided right thoracentesis-Dr. Juan Yuen, [facility]   • TONSILLECTOMY Bilateral    • UPPER GASTROINTESTINAL ENDOSCOPY N/A 10/08/2007    Esophageal  mucosal changes suspicious for short-segment Olivera's esphagus, gastric mucosal abnormality characterized by erythema: biopsied, NERD disease present, high likelihood of gastritis-Dr. Mayank Knapp, [facility]        Social History     Occupational History   • Not on file   Tobacco Use   • Smoking status: Former     Types: Cigarettes     Quit date: 1967     Years since quittin.9   • Smokeless tobacco: Never   • Tobacco comments:     SOCIAL SMOKING ONLY   Vaping Use   • Vaping Use: Never used   Substance and Sexual Activity   • Alcohol use: Not Currently     Alcohol/week: 2.0 standard drinks     Types: 1 Glasses of wine, 1 Shots of liquor per week     Comment: occasionally/caffeine use    • Drug use: No   • Sexual activity: Defer      Social History     Social History Narrative   • Not on file        Family History   Problem Relation Age of Onset   • Cancer Mother    • Colon cancer Mother    • Diabetes Father    • Diabetes Son    • Ulcerative colitis Son    • No Known Problems Maternal Grandmother    • No Known Problems Maternal Grandfather    • No Known Problems Paternal Grandmother    • No Known Problems Paternal Grandfather    • Malig Hyperthermia Neg Hx    • Breast cancer Neg Hx        ROS: 14 point review of systems was performed and was negative except for documented findings in HPI and today's encounter.     Allergies:   Allergies   Allergen Reactions   • Epinephrine Palpitations   • Penicillins Other (See Comments)     BLISTERS IN MOUTH    Patient tolerated ceftriaxone during 2017 admission.        Constitutional:  Denies fever, shaking or chills   Eyes:  Denies change in visual acuity   HENT:  Denies nasal congestion or sore throat   Respiratory:  Denies cough or shortness of breath   Cardiovascular:  Denies chest pain or severe LE edema   GI:  Denies abdominal pain, nausea, vomiting, bloody stools or diarrhea   Musculoskeletal:  Numbness, tingling, or loss of motor function only as noted above in history of  present illness.  : Denies painful urination or hematuria  Integument:  Denies rash, lesion or ulceration   Neurologic:  Denies headache or focal weakness  Endocrine:  Denies lymphadenopathy  Psych:  Denies confusion or change in mental status   Hem:  Denies active bleeding      Physical Exam: 77 y.o. female  Wt Readings from Last 3 Encounters:   11/03/22 80.7 kg (178 lb)   10/17/22 83.5 kg (184 lb)   09/22/22 84.2 kg (185 lb 9.6 oz)       There is no height or weight on file to calculate BMI.  No height and weight on file for this encounter.  There were no vitals filed for this visit.  Vital signs reviewed.   General Appearance:    Alert, cooperative, in no acute distress                    Ortho exam    Exams are unchanged       .time  Bilateral shoulder pain the rotator cuff in origin plan is to proceed with injections she understands her options she is not interested in think surgical  Assessment:     Plan: Plan is as above  Follow up as indicated.  Ice, elevate, and rest as needed.  Discussed conservative measures of pain control including ice, bracing.    Lety Vela M.D.      Large Joint Arthrocentesis: R subacromial bursa  Date/Time: 11/21/2022 1:32 PM  Consent given by: patient  Site marked: site marked  Timeout: Immediately prior to procedure a time out was called to verify the correct patient, procedure, equipment, support staff and site/side marked as required   Supporting Documentation  Indications: pain   Procedure Details  Location: shoulder - R subacromial bursa  Preparation: Patient was prepped and draped in the usual sterile fashion  Needle gauge: 21G.  Approach: posterior  Medications administered: 80 mg methylPREDNISolone acetate 80 MG/ML; 2 mL lidocaine (cardiac)  Patient tolerance: patient tolerated the procedure well with no immediate complications    Large Joint Arthrocentesis: L subacromial bursa  Date/Time: 11/21/2022 1:32 PM  Consent given by: patient  Site marked: site  marked  Timeout: Immediately prior to procedure a time out was called to verify the correct patient, procedure, equipment, support staff and site/side marked as required   Supporting Documentation  Indications: pain   Procedure Details  Location: shoulder - L subacromial bursa  Preparation: Patient was prepped and draped in the usual sterile fashion  Needle gauge: 21G.  Approach: posterior  Medications administered: 80 mg methylPREDNISolone acetate 80 MG/ML; 2 mL lidocaine (cardiac)  Patient tolerance: patient tolerated the procedure well with no immediate complications

## 2022-11-21 ENCOUNTER — CLINICAL SUPPORT (OUTPATIENT)
Dept: ORTHOPEDIC SURGERY | Facility: CLINIC | Age: 77
End: 2022-11-21

## 2022-11-21 VITALS — WEIGHT: 184 LBS | TEMPERATURE: 97.6 F | HEIGHT: 65 IN | BODY MASS INDEX: 30.66 KG/M2

## 2022-11-21 DIAGNOSIS — M75.100 TEAR OF ROTATOR CUFF, UNSPECIFIED LATERALITY, UNSPECIFIED TEAR EXTENT, UNSPECIFIED WHETHER TRAUMATIC: Primary | ICD-10-CM

## 2022-11-21 PROCEDURE — 20610 DRAIN/INJ JOINT/BURSA W/O US: CPT | Performed by: ORTHOPAEDIC SURGERY

## 2022-11-21 RX ORDER — METHYLPREDNISOLONE ACETATE 80 MG/ML
80 INJECTION, SUSPENSION INTRA-ARTICULAR; INTRALESIONAL; INTRAMUSCULAR; SOFT TISSUE
Status: COMPLETED | OUTPATIENT
Start: 2022-11-21 | End: 2022-11-21

## 2022-11-21 RX ADMIN — METHYLPREDNISOLONE ACETATE 80 MG: 80 INJECTION, SUSPENSION INTRA-ARTICULAR; INTRALESIONAL; INTRAMUSCULAR; SOFT TISSUE at 13:32

## 2022-12-01 NOTE — TELEPHONE ENCOUNTER
Pt calling to inform us of pt estevan program for eliquis, states they will be contacting us via fax and she is requesting samples for her and her  until the program gets started. Samples left up front. Pt informed.    DA  
99

## 2022-12-08 RX ORDER — FLECAINIDE ACETATE 50 MG/1
TABLET ORAL
Qty: 90 TABLET | Refills: 3 | Status: SHIPPED | OUTPATIENT
Start: 2022-12-08

## 2023-01-06 ENCOUNTER — OFFICE VISIT (OUTPATIENT)
Dept: CARDIOLOGY | Facility: CLINIC | Age: 78
End: 2023-01-06
Payer: MEDICARE

## 2023-01-06 VITALS
DIASTOLIC BLOOD PRESSURE: 78 MMHG | HEART RATE: 81 BPM | HEIGHT: 65 IN | BODY MASS INDEX: 29.82 KG/M2 | WEIGHT: 179 LBS | SYSTOLIC BLOOD PRESSURE: 130 MMHG

## 2023-01-06 DIAGNOSIS — I48.0 PAROXYSMAL ATRIAL FIBRILLATION: Primary | ICD-10-CM

## 2023-01-06 DIAGNOSIS — G47.33 OBSTRUCTIVE SLEEP APNEA SYNDROME: ICD-10-CM

## 2023-01-06 PROCEDURE — 99214 OFFICE O/P EST MOD 30 MIN: CPT | Performed by: INTERNAL MEDICINE

## 2023-01-06 PROCEDURE — 93000 ELECTROCARDIOGRAM COMPLETE: CPT | Performed by: INTERNAL MEDICINE

## 2023-01-06 NOTE — PROGRESS NOTES
Date of Office Visit: 23  Encounter Provider: Rich Kearns MD  Place of Service: [facility] CARDIOLOGY  Patient Name: Kiley Last  :1945  [id number]    Chief Complaint   Patient presents with   • Atrial Fibrillation   :     HPI: Kiley Last is a 77 y.o. female she has a history of paroxysmal A. fib she is had an recent stress and echo that are both normal she went into A. fib in the hospital when she had bad vertigo and we put her on Rythmol to help control it as well as increase her metoprolol.  She is also been on Eliquis.  She continued to not do well and we placed her on flecainide and that has stopped the A. Fib.  She is here for follow-up she has a lot of complaints she is really tired she had sleep apnea she is not wearing the device she matter fact she sent it back.  She is worried about her  she is just tired all the time.  But it sounds like her sleep is awful.  She is got some vertigo she is got some no shortness of breath she is got a lot of anxiety and worries she is worried about blood vessel disease    Past Medical History:   Diagnosis Date   • Arthritis    • Asthma     NO INHALERS   • Atrial fibrillation (HCC)    • Clostridium difficile infection 2019   • Colon polyps 2019    Transverse colon: tubular adenoma, descending colon: fragments of tubular adenoma, sigmoid colon: ischemic eroded hyperplastic polyp   • COPD (chronic obstructive pulmonary disease) (HCC)    • Diverticulitis    • Diverticulosis    • ESBL (extended spectrum beta-lactamase) producing bacteria infection    • History of abscess of skin and subcutaneous tissue     ABD WOUND 2017   • History of atrial fibrillation      X1 POST OP FROM COLOSTOMY 2017 - NO PROBLEMS SINCE   • Hypertension    • MDRO (multiple drug resistant organisms) resistance    • NEW (obstructive sleep apnea) 2021    Overnight polysomnogram.  Weight 190 pounds.  Mild NEW with AHI 7  events per hour for total sleep time.  However, during REM moderate NEW with AHI 16.7 events per hour.  No sleep-related hypoxia.  The patient snored 5% of total sleep time.   • PONV (postoperative nausea and vomiting)    • Psoriasis    • UTI (urinary tract infection)    • Vertigo        Past Surgical History:   Procedure Laterality Date   • BELPHAROPTOSIS REPAIR Bilateral 04/27/2017    Bilateral brow ptosis repair via the anterior hairline approach under monitored local anesthesia-Andrez Craven   • BLEPHAROPLASTY Bilateral 04/27/2017   • BREAST BIOPSY     • CATARACT EXTRACTION     • COLON RESECTION N/A 5/3/2017    Procedure: OPEN SIMOID COLECTOMY WITH COLOSTOMY;  Surgeon: Renato Delgado MD;  Location: [facility] MAIN OR;  Service:    • COLONOSCOPY N/A 9/13/2017    Procedure: COLONOSCOPY VIA COLOSTOMY TO CECUM;  Surgeon: Renato Delgado MD;  Location: [facility] ENDOSCOPY;  Service:    • COLONOSCOPY N/A 8/1/2019    Procedure: COLONOSCOPY to cecum and TI with biopsy / hot snare polypectomies;  Surgeon: Dalton Vela Jr., MD;  [facility];  Service: General   • COLONOSCOPY N/A 01/04/2010    Andrez Trinidad   • COLONOSCOPY N/A 11/3/2022    Procedure: COLONOSCOPY to cecum and TI with biopsy / hot snare polypectomy;  Surgeon: Trini Wade MD;  Location: [facility] ENDOSCOPY;  Service: General;  Laterality: N/A;  pre-hx diverticulitis, screen, fam hx colon ca, personal hx polyps  post-polyp, diverticulosis, small hemorrhoids   • COLOSTOMY CLOSURE N/A 9/14/2017    Procedure: COLOSTOMY TAKEDOWN AND CLOSURE, WITH RESECTION;  Surgeon: Renato Delgado MD;  Location: [facility] MAIN OR;  Service:    • ENDOSCOPY AND COLONOSCOPY N/A 10/31/2014    Normal EGD and colonoscopy, repeat c-scope in 5 years-Andrez Trinidad   • EXPLORATORY LAPAROTOMY N/A 9/8/2019    Procedure: Exploratory laparotomy with lysis of adhesions;  Surgeon: Trini Wade MD;  Location: [facility] MAIN OR;   Service: General   • FINGER SURGERY Left     4TH FINGER LEFT HAND   • HYSTERECTOMY Bilateral    • LAPAROSCOPIC CHOLECYSTECTOMY W/ CHOLANGIOGRAPHY N/A 07/15/2003    Dr. Amrit Martinez, [facility]   • SALPINGO OOPHORECTOMY Bilateral 2006    Abdominal sacral colpopexy, bilateral salpingo-oophorectomy, tension free vaginal tape, cystoscopy-Dr. Devika Bradley, [facility]   • THORACENTESIS Right 2017    US-guided right thoracentesis-Dr. Juan Yuen, [facility]   • TONSILLECTOMY Bilateral    • UPPER GASTROINTESTINAL ENDOSCOPY N/A 10/08/2007    Esophageal mucosal changes suspicious for short-segment Olivera's esphagus, gastric mucosal abnormality characterized by erythema: biopsied, NERD disease present, high likelihood of gastritis-Dr. Mayank Knapp, [facility]       Social History     Socioeconomic History   • Marital status:    Tobacco Use   • Smoking status: Former     Types: Cigarettes     Quit date: 1967     Years since quittin.0   • Smokeless tobacco: Never   • Tobacco comments:     SOCIAL SMOKING ONLY   Vaping Use   • Vaping Use: Never used   Substance and Sexual Activity   • Alcohol use: Not Currently     Alcohol/week: 2.0 standard drinks     Types: 1 Glasses of wine, 1 Shots of liquor per week     Comment: occasionally/caffeine use    • Drug use: No   • Sexual activity: Defer       Family History   Problem Relation Age of Onset   • Cancer Mother    • Colon cancer Mother    • Diabetes Father    • Diabetes Son    • Ulcerative colitis Son    • No Known Problems Maternal Grandmother    • No Known Problems Maternal Grandfather    • No Known Problems Paternal Grandmother    • No Known Problems Paternal Grandfather    • Malig Hyperthermia Neg Hx    • Breast cancer Neg Hx        Review of Systems   Constitutional: Negative for decreased appetite, fever, malaise/fatigue and weight loss.   HENT: Negative for nosebleeds.    Eyes: Negative for double vision.   Cardiovascular: Negative for chest pain, claudication, cyanosis,  dyspnea on exertion, irregular heartbeat, leg swelling, near-syncope, orthopnea, palpitations, paroxysmal nocturnal dyspnea and syncope.   Respiratory: Negative for cough, hemoptysis and shortness of breath.    Hematologic/Lymphatic: Negative for bleeding problem.   Skin: Negative for rash.   Musculoskeletal: Negative for falls and myalgias.   Gastrointestinal: Negative for hematochezia, jaundice, melena, nausea and vomiting.   Genitourinary: Negative for hematuria.   Neurological: Negative for dizziness and seizures.   Psychiatric/Behavioral: Negative for altered mental status and memory loss.       Allergies   Allergen Reactions   • Epinephrine Palpitations   • Penicillins Other (See Comments)     BLISTERS IN MOUTH    Patient tolerated ceftriaxone during 5/2017 admission.            Current Outpatient Medications:   •  Acetaminophen (TYLENOL ARTHRITIS PAIN PO), Take 650 mg by mouth 3 (Three) Times a Day As Needed., Disp: , Rfl:   •  apixaban (Eliquis) 5 MG tablet tablet, Take 1 tablet by mouth Every 12 (Twelve) Hours., Disp: 180 tablet, Rfl: 2  •  B Complex Vitamins (Vitamin B Complex) tablet, Take 1 tablet by mouth Daily., Disp: , Rfl:   •  busPIRone (BUSPAR) 5 MG tablet, TAKE ONE TABLET BY MOUTH THREE TIMES A DAY AS NEEDED FOR ANXIETY, Disp: 90 tablet, Rfl: 0  •  cholecalciferol (VITAMIN D3) 25 MCG (1000 UT) tablet, Take 1,000 Units by mouth Daily., Disp: , Rfl:   •  flecainide (TAMBOCOR) 50 MG tablet, TAKE ONE TABLET BY MOUTH EVERY 12 HOURS, Disp: 90 tablet, Rfl: 3  •  losartan (COZAAR) 100 MG tablet, TAKE ONE TABLET BY MOUTH DAILY, Disp: 90 tablet, Rfl: 2  •  Magnesium 100 MG capsule, Take 1 capsule by mouth Daily., Disp: , Rfl:   •  meclizine (ANTIVERT) 25 MG tablet, TAKE ONE TABLET BY MOUTH THREE TIMES A DAY AS NEEDED FOR DIZZINESS, Disp: 90 tablet, Rfl: 0  •  melatonin 5 MG tablet tablet, Take 10 mg by mouth., Disp: , Rfl:   •  metoprolol succinate XL (TOPROL-XL) 50 MG 24 hr tablet, TAKE ONE TABLET BY MOUTH  DAILY, Disp: 30 tablet, Rfl: 11  •  Zinc Sulfate (ZINC 15 PO), Take 1 tablet by mouth Daily., Disp: , Rfl:   •  fluticasone (Flonase) 50 MCG/ACT nasal spray, 2 sprays into the nostril(s) as directed by provider Daily., Disp: 18.2 mL, Rfl: 1  •  methylPREDNISolone (MEDROL) 4 MG dose pack, Use as directed by package instructions, Disp: 21 tablet, Rfl: 0  •  Probiotic Product (PROBIOTIC PO), Take 1 capsule by mouth Daily., Disp: , Rfl:   •  vitamin C (ASCORBIC ACID) 250 MG tablet, Take 250 mg by mouth Daily., Disp: , Rfl:       Objective:     Vitals:    01/06/23 1325   BP: 130/78   Pulse: 81   Weight: 81.2 kg (179 lb)   Height: 165.1 cm (65\")     Body mass index is 29.79 kg/m².    Physical Exam  Constitutional:       Appearance: She is well-developed.   HENT:      Head: Normocephalic.   Eyes:      General: No scleral icterus.  Neck:      Thyroid: No thyromegaly.      Vascular: No JVD.   Cardiovascular:      Rate and Rhythm: Normal rate and regular rhythm.      Heart sounds: Normal heart sounds. No murmur heard.    No friction rub. No gallop.   Pulmonary:      Effort: Pulmonary effort is normal.      Breath sounds: Normal breath sounds. No wheezing or rales.   Abdominal:      Palpations: Abdomen is soft.      Tenderness: There is no abdominal tenderness.   Musculoskeletal:         General: Normal range of motion.   Lymphadenopathy:      Cervical: No cervical adenopathy.   Skin:     General: Skin is warm and dry.      Findings: No rash.   Neurological:      Mental Status: She is alert and oriented to person, place, and time.           ECG 12 Lead    Date/Time: 1/6/2023 2:02 PM  Performed by: Rich Kearns MD  Authorized by: Rich Kearns MD   Comparison: compared with previous ECG   Rhythm: sinus rhythm  Other findings: non-specific ST-T wave changes    Clinical impression: abnormal EKG             Assessment:       Diagnosis Plan   1. Paroxysmal atrial fibrillation (HCC)               Plan:       Left chest  situation here I I think she probably has sleep apnea that is contributing I doubt the metoprolol is doing it if we were to give her a sleeping pill I think that that is going to make the sleep apnea worse so she is going to see her regular doctor organ to start an getting a home sleep study see what that shows if it does not show sleep apnea then we will cut her metoprolol in half and see how she does but we really need to stay on some I will have her come back and see us in 6 months.  I reviewed her studies and in 2019 she had a normal stress test and she had vascular screening that showed a little plaque on the left carotid nothing on the right carotid normal ABIs does not have any history of vascular disease that we can find    As always, it has been a pleasure to participate in your patient's care.      Sincerely,       Rich Kearns MD

## 2023-01-12 ENCOUNTER — OFFICE VISIT (OUTPATIENT)
Dept: INTERNAL MEDICINE | Facility: CLINIC | Age: 78
End: 2023-01-12
Payer: MEDICARE

## 2023-01-12 VITALS
DIASTOLIC BLOOD PRESSURE: 78 MMHG | SYSTOLIC BLOOD PRESSURE: 132 MMHG | WEIGHT: 180 LBS | BODY MASS INDEX: 29.99 KG/M2 | HEART RATE: 78 BPM | HEIGHT: 65 IN

## 2023-01-12 DIAGNOSIS — G47.33 OSA (OBSTRUCTIVE SLEEP APNEA): ICD-10-CM

## 2023-01-12 DIAGNOSIS — I10 ESSENTIAL HYPERTENSION: ICD-10-CM

## 2023-01-12 DIAGNOSIS — J01.00 SUBACUTE MAXILLARY SINUSITIS: Primary | ICD-10-CM

## 2023-01-12 PROBLEM — K43.2 INCISIONAL HERNIA, WITHOUT OBSTRUCTION OR GANGRENE: Status: RESOLVED | Noted: 2022-01-13 | Resolved: 2023-01-12

## 2023-01-12 PROBLEM — K57.92 DIVERTICULITIS: Status: RESOLVED | Noted: 2017-05-26 | Resolved: 2023-01-12

## 2023-01-12 PROCEDURE — 99214 OFFICE O/P EST MOD 30 MIN: CPT | Performed by: INTERNAL MEDICINE

## 2023-01-12 RX ORDER — METHYLPREDNISOLONE 4 MG/1
TABLET ORAL
Qty: 1 EACH | Refills: 0 | Status: SHIPPED | OUTPATIENT
Start: 2023-01-12

## 2023-01-12 NOTE — PROGRESS NOTES
"Chief Complaint  Fatigue, Ear Fullness, Shortness of Breath, and Cough    Subjective        Kiley Last presents to [facility]  History of Present Illness  Here with sister- c/o fatigue- really for months to years but maybe there is a worsening.  She takes care of her .  Sister says always overwhelmed.  She is coughing and clearing her throat a lot lately- several weeks  Wakes up feeling tired, hard to go to sleep, multiple awakenings during the night. She was dx NEW in 2021 but didn't wear CPAP so it was take back.  She is going to  a new sleep study later this month.  Dr. Vela is keeping her shoulders going with steroid inj- knowning she cant have replacement  She does have some acute sinus pain/pressure- discharge yellow/occ bloody.  Cough when she lies down. No fevers.     Objective   Vital Signs:  /78   Pulse 78   Ht 165.1 cm (65\")   Wt 81.6 kg (180 lb)   BMI 29.95 kg/m²   Estimated body mass index is 29.95 kg/m² as calculated from the following:    Height as of this encounter: 165.1 cm (65\").    Weight as of this encounter: 81.6 kg (180 lb).             Physical Exam  Constitutional:       General: She is not in acute distress.     Appearance: Normal appearance.   HENT:      Nose:      Right Turbinates: Enlarged.      Left Turbinates: Enlarged.      Right Sinus: Maxillary sinus tenderness present.      Left Sinus: Maxillary sinus tenderness present.   Cardiovascular:      Rate and Rhythm: Normal rate and regular rhythm.   Pulmonary:      Effort: Pulmonary effort is normal.      Breath sounds: Normal breath sounds.   Musculoskeletal:      Right lower leg: No edema.      Left lower leg: No edema.   Lymphadenopathy:      Cervical: No cervical adenopathy.        Result Review :                   Assessment and Plan   Diagnoses and all orders for this visit:    1. Subacute maxillary sinusitis (Primary)  Comments:  would like to avoid abx- tx with sinus " rinses over the weekend, if fails to improve- call.    2. NEW (obstructive sleep apnea)  Comments:  I think this is the main issue- along with caregivers fatigue- will follow thru with repeat evaluation.     3. Essential hypertension  Comments:  controlled.     Other orders  -     methylPREDNISolone (MEDROL) 4 MG dose pack; Take as directed on package instructions.  Dispense: 1 each; Refill: 0             Follow Up   Return in about 3 months (around 4/12/2023) for Recheck.  Patient was given instructions and counseling regarding her condition or for health maintenance advice. Please see specific information pulled into the AVS if appropriate.

## 2023-01-30 ENCOUNTER — TELEPHONE (OUTPATIENT)
Dept: SLEEP MEDICINE | Facility: HOSPITAL | Age: 78
End: 2023-01-30
Payer: MEDICARE

## 2023-03-07 ENCOUNTER — TELEPHONE (OUTPATIENT)
Dept: SLEEP MEDICINE | Facility: HOSPITAL | Age: 78
End: 2023-03-07
Payer: MEDICARE

## 2023-03-07 ENCOUNTER — CLINICAL SUPPORT (OUTPATIENT)
Dept: ORTHOPEDIC SURGERY | Facility: CLINIC | Age: 78
End: 2023-03-07
Payer: MEDICARE

## 2023-03-07 VITALS — HEIGHT: 65 IN | TEMPERATURE: 96.5 F | WEIGHT: 181.1 LBS | BODY MASS INDEX: 30.17 KG/M2

## 2023-03-07 DIAGNOSIS — M75.42 IMPINGEMENT SYNDROME OF LEFT SHOULDER: ICD-10-CM

## 2023-03-07 DIAGNOSIS — M75.41 IMPINGEMENT SYNDROME OF RIGHT SHOULDER: ICD-10-CM

## 2023-03-07 DIAGNOSIS — R52 PAIN: Primary | ICD-10-CM

## 2023-03-07 PROCEDURE — 20610 DRAIN/INJ JOINT/BURSA W/O US: CPT | Performed by: ORTHOPAEDIC SURGERY

## 2023-03-07 PROCEDURE — 73030 X-RAY EXAM OF SHOULDER: CPT | Performed by: ORTHOPAEDIC SURGERY

## 2023-03-07 RX ORDER — OMEPRAZOLE 40 MG/1
CAPSULE, DELAYED RELEASE ORAL
COMMUNITY
Start: 2023-02-24

## 2023-03-07 RX ADMIN — LIDOCAINE HYDROCHLORIDE 2 ML: 10 INJECTION, SOLUTION EPIDURAL; INFILTRATION; INTRACAUDAL; PERINEURAL at 13:07

## 2023-03-07 RX ADMIN — METHYLPREDNISOLONE ACETATE 80 MG: 80 INJECTION, SUSPENSION INTRA-ARTICULAR; INTRALESIONAL; INTRAMUSCULAR; SOFT TISSUE at 13:07

## 2023-03-07 NOTE — PROGRESS NOTES
Patient: Kiley Last  YOB: 1945  Date of Service: 3/7/2023    Chief Complaints: Bilateral shoulder pain    Subjective:    History of Present Illness: Pt is seen in the office today with complaints of bilateral shoulder pain she has rotator cuff and involvement she does get better with injections she is done some physical therapy.  She is not interested in think surgical would like repeat injections        Allergies:   Allergies   Allergen Reactions   • Epinephrine Palpitations   • Penicillins Other (See Comments)     BLISTERS IN MOUTH    Patient tolerated ceftriaxone during 5/2017 admission.          Medications:   Home Medications:  Current Outpatient Medications on File Prior to Visit   Medication Sig   • Acetaminophen (TYLENOL ARTHRITIS PAIN PO) Take 650 mg by mouth 3 (Three) Times a Day As Needed.   • apixaban (Eliquis) 5 MG tablet tablet Take 1 tablet by mouth Every 12 (Twelve) Hours.   • B Complex Vitamins (Vitamin B Complex) tablet Take 1 tablet by mouth Daily.   • busPIRone (BUSPAR) 5 MG tablet TAKE ONE TABLET BY MOUTH THREE TIMES A DAY AS NEEDED FOR ANXIETY   • cholecalciferol (VITAMIN D3) 25 MCG (1000 UT) tablet Take 1,000 Units by mouth Daily.   • flecainide (TAMBOCOR) 50 MG tablet TAKE ONE TABLET BY MOUTH EVERY 12 HOURS   • fluticasone (Flonase) 50 MCG/ACT nasal spray 2 sprays into the nostril(s) as directed by provider Daily.   • losartan (COZAAR) 100 MG tablet TAKE ONE TABLET BY MOUTH DAILY   • Magnesium 100 MG capsule Take 1 capsule by mouth Daily.   • meclizine (ANTIVERT) 25 MG tablet TAKE ONE TABLET BY MOUTH THREE TIMES A DAY AS NEEDED FOR DIZZINESS   • melatonin 5 MG tablet tablet Take 10 mg by mouth.   • methylPREDNISolone (MEDROL) 4 MG dose pack Take as directed on package instructions.   • metoprolol succinate XL (TOPROL-XL) 50 MG 24 hr tablet TAKE ONE TABLET BY MOUTH DAILY   • Probiotic Product (PROBIOTIC PO) Take 1 capsule by mouth Daily.   • vitamin C (ASCORBIC ACID)  250 MG tablet Take 250 mg by mouth Daily.   • Zinc Sulfate (ZINC 15 PO) Take 1 tablet by mouth Daily.     No current facility-administered medications on file prior to visit.     Current Medications:  Scheduled Meds:  Continuous Infusions:No current facility-administered medications for this visit.    PRN Meds:.    I have reviewed the patient's medical history in detail and updated the computerized patient record.  Review and summarization of old records include:    Past Medical History:   Diagnosis Date   • Arthritis    • Asthma     NO INHALERS   • Atrial fibrillation (HCC)    • Clostridium difficile infection 09/20/2019   • Colon polyps 08/01/2019    Transverse colon: tubular adenoma, descending colon: fragments of tubular adenoma, sigmoid colon: ischemic eroded hyperplastic polyp   • COPD (chronic obstructive pulmonary disease) (HCC)    • Diverticulitis    • Diverticulosis    • ESBL (extended spectrum beta-lactamase) producing bacteria infection    • History of abscess of skin and subcutaneous tissue     ABD WOUND 5/2017   • History of atrial fibrillation      X1 POST OP FROM COLOSTOMY 5/2017 - NO PROBLEMS SINCE   • Hypertension    • MDRO (multiple drug resistant organisms) resistance    • NEW (obstructive sleep apnea) 05/20/2021    Overnight polysomnogram.  Weight 190 pounds.  Mild NEW with AHI 7 events per hour for total sleep time.  However, during REM moderate NEW with AHI 16.7 events per hour.  No sleep-related hypoxia.  The patient snored 5% of total sleep time.   • PONV (postoperative nausea and vomiting)    • Psoriasis    • UTI (urinary tract infection)    • Vertigo         Past Surgical History:   Procedure Laterality Date   • BELPHAROPTOSIS REPAIR Bilateral 04/27/2017    Bilateral brow ptosis repair via the anterior hairline approach under monitored local anesthesia-Andrez Craven   • BLEPHAROPLASTY Bilateral 04/27/2017   • BREAST BIOPSY     • CATARACT EXTRACTION     • COLON RESECTION N/A  5/3/2017    Procedure: OPEN SIMOID COLECTOMY WITH COLOSTOMY;  Surgeon: Renato Delgado MD;  Location:  [facility];  Service:    • COLONOSCOPY N/A 9/13/2017    Procedure: COLONOSCOPY VIA COLOSTOMY TO CECUM;  Surgeon: Renato Delgado MD;  Location: [facility] END[facility]COPY;  Service:    • COLONOSCOPY N/A 8/1/2019    Procedure: COLONOSCOPY to cecum and TI with biopsy / hot snare polypectomies;  Surgeon: Dalton Vela Jr., MD;  Location: [facility] ENDOSCOPY;  Service: General   • COLONOSCOPY N/A 01/04/2010    Andrez Trinidad   • COLONOSCOPY N/A 11/3/2022    Procedure: COLONOSCOPY to cecum and TI with biopsy / hot snare polypectomy;  Surgeon: Trini Wade MD;  Location: [facility] ENDOSCOPY;  Service: General;  Laterality: N/A;  pre-hx diverticulitis, screen, fam hx colon ca, personal hx polyps  post-polyp, diverticulosis, small hemorrhoids   • COLOSTOMY CLOSURE N/A 9/14/2017    Procedure: COLOSTOMY TAKEDOWN AND CLOSURE, WITH RESECTION;  Surgeon: Renato Delgado MD;  Location: [facility] MAIN OR;  Service:    • ENDOSCOPY AND COLONOSCOPY N/A 10/31/2014    Normal EGD and colonoscopy, repeat c-scope in 5 years-Andrez Trinidad   • EXPLORATORY LAPAROTOMY N/A 9/8/2019    Procedure: Exploratory laparotomy with lysis of adhesions;  Surgeon: Trini Wade MD;  Location: [facility] MAIN OR;  Service: General   • FINGER SURGERY Left     4TH FINGER LEFT HAND   • HYSTERECTOMY Bilateral    • LAPAROSCOPIC CHOLECYSTECTOMY W/ CHOLANGIOGRAPHY N/A 07/15/2003    Dr. Amrit Martinez, [facility]   • SALPINGO OOPHORECTOMY Bilateral 02/02/2006    Abdominal sacral colpopexy, bilateral salpingo-oophorectomy, tension free vaginal tape, cystoscopy-Dr. Devika Bradley, [facility]   • THORACENTESIS Right 05/21/2017    US-guided right thoracentesis-Dr. Juan Yuen, [facility]   • TONSILLECTOMY Bilateral    • UPPER GASTROINTESTINAL ENDOSCOPY N/A 10/08/2007    Esophageal mucosal changes suspicious for short-segment Olivera's esphagus, gastric mucosal  abnormality characterized by erythema: biopsied, NERD disease present, high likelihood of gastritis-Dr. Mayank Knapp, [facility]        Social History     Occupational History   • Not on file   Tobacco Use   • Smoking status: Former     Types: Cigarettes     Quit date:      Years since quittin.2   • Smokeless tobacco: Never   • Tobacco comments:     SOCIAL SMOKING ONLY   Vaping Use   • Vaping Use: Never used   Substance and Sexual Activity   • Alcohol use: Not Currently     Alcohol/week: 2.0 standard drinks     Types: 1 Glasses of wine, 1 Shots of liquor per week     Comment: occasionally/caffeine use    • Drug use: No   • Sexual activity: Defer      Social History     Social History Narrative   • Not on file        Family History   Problem Relation Age of Onset   • Cancer Mother    • Colon cancer Mother    • Diabetes Father    • Diabetes Son    • Ulcerative colitis Son    • No Known Problems Maternal Grandmother    • No Known Problems Maternal Grandfather    • No Known Problems Paternal Grandmother    • No Known Problems Paternal Grandfather    • Malig Hyperthermia Neg Hx    • Breast cancer Neg Hx        ROS: 14 point review of systems was performed and was negative except for documented findings in HPI and today's encounter.     Allergies:   Allergies   Allergen Reactions   • Epinephrine Palpitations   • Penicillins Other (See Comments)     BLISTERS IN MOUTH    Patient tolerated ceftriaxone during 2017 admission.        Constitutional:  Denies fever, shaking or chills   Eyes:  Denies change in visual acuity   HENT:  Denies nasal congestion or sore throat   Respiratory:  Denies cough or shortness of breath   Cardiovascular:  Denies chest pain or severe LE edema   GI:  Denies abdominal pain, nausea, vomiting, bloody stools or diarrhea   Musculoskeletal:  Numbness, tingling, or loss of motor function only as noted above in history of present illness.  : Denies painful urination or hematuria  Integument:  Denies  rash, lesion or ulceration   Neurologic:  Denies headache or focal weakness  Endocrine:  Denies lymphadenopathy  Psych:  Denies confusion or change in mental status   Hem:  Denies active bleeding      Physical Exam: 77 y.o. female  Wt Readings from Last 3 Encounters:   01/12/23 81.6 kg (180 lb)   01/06/23 81.2 kg (179 lb)   11/21/22 83.5 kg (184 lb)       There is no height or weight on file to calculate BMI.    There were no vitals filed for this visit.  Vital signs reviewed.   General Appearance:    Alert, cooperative, in no acute distress                    Ortho exam    Exams are unchanged       X-rays AP scapular Y and x-ray lateral right shoulder and left shoulder were taken to evaluate her symptoms I did compare to x-rays done approximately 1 year agoI have compared to x-rays done in February 2022 she has some mild sclerosis at the insertion the cuff some very mild acromioclavicular arthritis no acute pathology no real change    Assessment: Bilateral shoulder pain I do think this is rotator cuff in origin she is really not interested in think surgical she would like to continue injections and working on her strength    Plan: Plan is as above  Follow up as indicated.  Ice, elevate, and rest as needed.  Discussed conservative measures of pain control including ice, bracing.  Also talked about the importance of strengthening   Lety Vela M.D.    Large Joint Arthrocentesis: R subacromial bursa  Date/Time: 3/7/2023 1:07 PM  Consent given by: patient  Site marked: site marked  Timeout: Immediately prior to procedure a time out was called to verify the correct patient, procedure, equipment, support staff and site/side marked as required   Supporting Documentation  Indications: pain   Procedure Details  Location: shoulder - R subacromial bursa  Preparation: Patient was prepped and draped in the usual sterile fashion  Needle gauge: 21G.  Approach: posterior  Medications administered: 80 mg methylPREDNISolone  acetate 80 MG/ML; 2 mL lidocaine PF 1% 1 %  Patient tolerance: patient tolerated the procedure well with no immediate complications    Large Joint Arthrocentesis: L subacromial bursa  Date/Time: 3/7/2023 1:07 PM  Consent given by: patient  Site marked: site marked  Timeout: Immediately prior to procedure a time out was called to verify the correct patient, procedure, equipment, support staff and site/side marked as required   Supporting Documentation  Indications: pain   Procedure Details  Location: shoulder - L subacromial bursa  Preparation: Patient was prepped and draped in the usual sterile fashion  Needle gauge: 21G.  Approach: posterior  Medications administered: 80 mg methylPREDNISolone acetate 80 MG/ML; 2 mL lidocaine PF 1% 1 %  Patient tolerance: patient tolerated the procedure well with no immediate complications

## 2023-03-08 RX ORDER — LIDOCAINE HYDROCHLORIDE 10 MG/ML
2 INJECTION, SOLUTION EPIDURAL; INFILTRATION; INTRACAUDAL; PERINEURAL
Status: COMPLETED | OUTPATIENT
Start: 2023-03-07 | End: 2023-03-07

## 2023-03-08 RX ORDER — METHYLPREDNISOLONE ACETATE 80 MG/ML
80 INJECTION, SUSPENSION INTRA-ARTICULAR; INTRALESIONAL; INTRAMUSCULAR; SOFT TISSUE
Status: COMPLETED | OUTPATIENT
Start: 2023-03-07 | End: 2023-03-07

## 2023-03-09 DIAGNOSIS — R42 VERTIGO: ICD-10-CM

## 2023-03-09 RX ORDER — MECLIZINE HYDROCHLORIDE 25 MG/1
TABLET ORAL
Qty: 90 TABLET | Refills: 0 | Status: SHIPPED | OUTPATIENT
Start: 2023-03-09

## 2023-03-14 ENCOUNTER — HOSPITAL ENCOUNTER (OUTPATIENT)
Dept: SLEEP MEDICINE | Facility: HOSPITAL | Age: 78
Discharge: HOME | End: 2023-03-14
Admitting: INTERNAL MEDICINE
Payer: MEDICARE

## 2023-03-14 PROCEDURE — 95806 SLEEP STUDY UNATT&RESP EFFT: CPT

## 2023-04-05 RX ORDER — METOPROLOL SUCCINATE 50 MG/1
TABLET, EXTENDED RELEASE ORAL
Qty: 30 TABLET | Refills: 11 | Status: SHIPPED | OUTPATIENT
Start: 2023-04-05

## 2023-04-06 DIAGNOSIS — R06.83 SNORING: ICD-10-CM

## 2023-04-06 DIAGNOSIS — G47.33 OSA (OBSTRUCTIVE SLEEP APNEA): Primary | ICD-10-CM

## 2023-04-07 ENCOUNTER — TELEPHONE (OUTPATIENT)
Dept: SLEEP MEDICINE | Facility: HOSPITAL | Age: 78
End: 2023-04-07
Payer: MEDICARE

## 2023-04-10 ENCOUNTER — TELEPHONE (OUTPATIENT)
Dept: SLEEP MEDICINE | Facility: HOSPITAL | Age: 78
End: 2023-04-10
Payer: MEDICARE

## 2023-04-10 DIAGNOSIS — G47.30 HYPERSOMNIA WITH SLEEP APNEA: ICD-10-CM

## 2023-04-10 DIAGNOSIS — G47.10 HYPERSOMNIA WITH SLEEP APNEA: ICD-10-CM

## 2023-04-10 DIAGNOSIS — I48.0 PAROXYSMAL ATRIAL FIBRILLATION: ICD-10-CM

## 2023-04-10 DIAGNOSIS — G47.33 OSA (OBSTRUCTIVE SLEEP APNEA): Primary | ICD-10-CM

## 2023-04-10 DIAGNOSIS — R06.83 SNORING: ICD-10-CM

## 2023-04-10 NOTE — TELEPHONE ENCOUNTER
Pts insurance will not cover new cpap with an HST due to non compliance in 2021. Pt will need an in lab study per Griselda @ [facility]. Message sent to .

## 2023-04-19 ENCOUNTER — OFFICE VISIT (OUTPATIENT)
Dept: INTERNAL MEDICINE | Facility: CLINIC | Age: 78
End: 2023-04-19
Payer: MEDICARE

## 2023-04-19 VITALS
BODY MASS INDEX: 29.99 KG/M2 | HEART RATE: 74 BPM | HEIGHT: 65 IN | WEIGHT: 180 LBS | SYSTOLIC BLOOD PRESSURE: 124 MMHG | DIASTOLIC BLOOD PRESSURE: 66 MMHG

## 2023-04-19 DIAGNOSIS — G47.33 OSA (OBSTRUCTIVE SLEEP APNEA): Chronic | ICD-10-CM

## 2023-04-19 DIAGNOSIS — E78.49 OTHER HYPERLIPIDEMIA: ICD-10-CM

## 2023-04-19 DIAGNOSIS — E53.8 B12 DEFICIENCY: Chronic | ICD-10-CM

## 2023-04-19 DIAGNOSIS — I10 ESSENTIAL HYPERTENSION: Primary | ICD-10-CM

## 2023-04-19 DIAGNOSIS — M79.10 MUSCLE PAIN: ICD-10-CM

## 2023-04-19 PROCEDURE — 3074F SYST BP LT 130 MM HG: CPT | Performed by: INTERNAL MEDICINE

## 2023-04-19 PROCEDURE — 99214 OFFICE O/P EST MOD 30 MIN: CPT | Performed by: INTERNAL MEDICINE

## 2023-04-19 PROCEDURE — 3078F DIAST BP <80 MM HG: CPT | Performed by: INTERNAL MEDICINE

## 2023-04-19 NOTE — PROGRESS NOTES
"Chief Complaint  Hypertension    Subjective        Kiley Last presents to [facility]  History of Present Illness here for reg f/u- she had another steroid inj to shoulders but it didn't help as much as before.  She is now having an intermittent nerve pain L suprascapular- seems to be positional.  Takes care of her  full time- feels exhausted.   Forgets to try the buspirone so not sure if it is helpful or not.     Objective   Vital Signs:  /66   Pulse 74   Ht 165.1 cm (65\")   Wt 81.6 kg (180 lb)   BMI 29.95 kg/m²   Estimated body mass index is 29.95 kg/m² as calculated from the following:    Height as of this encounter: 165.1 cm (65\").    Weight as of this encounter: 81.6 kg (180 lb).             Physical Exam  Constitutional:       Appearance: Normal appearance.   Cardiovascular:      Rate and Rhythm: Normal rate.   Musculoskeletal:        Arms:       Right lower leg: No edema.      Left lower leg: No edema.   Lymphadenopathy:      Cervical: No cervical adenopathy.        Result Review :                   Assessment and Plan   Diagnoses and all orders for this visit:    1. Essential hypertension (Primary)  Comments:  well controlled, no change    2. Other hyperlipidemia  Comments:  recheck at f/u-   Orders:  -     Comprehensive Metabolic Panel; Future  -     Lipid Panel With / Chol / HDL Ratio; Future    3. B12 deficiency  Comments:  on B complex supplement- will recheck and make sure repleted.   Orders:  -     CBC & Differential; Future  -     Vitamin B12; Future    4. NEW (obstructive sleep apnea)  Comments:  hasn't started yet- agree with giving it a try despite only mild disease on testing.     5. Muscle pain  Comments:  try Salon pas with lidocaine, if doesn't improve will see Dr. Vela for therapy.             Follow Up   Return in about 4 months (around 8/19/2023) for Medicare Wellness, Lab Before FUP.  Patient was given instructions and counseling " regarding her condition or for health maintenance advice. Please see specific information pulled into the AVS if appropriate.

## 2023-05-05 ENCOUNTER — TELEPHONE (OUTPATIENT)
Dept: ORTHOPEDIC SURGERY | Facility: CLINIC | Age: 78
End: 2023-05-05
Payer: MEDICARE

## 2023-05-05 DIAGNOSIS — M25.511 BILATERAL SHOULDER PAIN, UNSPECIFIED CHRONICITY: ICD-10-CM

## 2023-05-05 DIAGNOSIS — M75.100 TEAR OF ROTATOR CUFF, UNSPECIFIED LATERALITY, UNSPECIFIED TEAR EXTENT, UNSPECIFIED WHETHER TRAUMATIC: ICD-10-CM

## 2023-05-05 DIAGNOSIS — M75.42 IMPINGEMENT SYNDROME OF LEFT SHOULDER: Primary | ICD-10-CM

## 2023-05-05 DIAGNOSIS — M75.41 IMPINGEMENT SYNDROME OF RIGHT SHOULDER: ICD-10-CM

## 2023-05-05 DIAGNOSIS — M25.512 BILATERAL SHOULDER PAIN, UNSPECIFIED CHRONICITY: ICD-10-CM

## 2023-05-05 NOTE — TELEPHONE ENCOUNTER
Patient is experiencing worsening pain in her shoulders and is wanting to try physical therapy. She had cortisone injections on 3/7 and they did not work well. Patient is wanting to know what she can do for pain?

## 2023-05-05 NOTE — TELEPHONE ENCOUNTER
I think PT would be reasonable- looks like she has done it in the past. If her pain worsens she should let us know rather than just continue. Would recommend tylenol, ice, heat for pain. Activity modifications, etc.     Please check with her and see where she would like to go and we can place an order for PT.

## 2023-05-05 NOTE — TELEPHONE ENCOUNTER
Spoke with patient and let her know what you recommend to help with pain. She stated that she has been doing all of that. She would like to go to [facility] in [facility] and I let her know that we would fax the order over for her. She also stated that her pain is going across the back of her shoulder blades up into her neck and back of the head just fyi.

## 2023-05-05 NOTE — TELEPHONE ENCOUNTER
Its possible that her symptoms could be referred pain perhaps some cervical spine issues.  I am not sure if she seen Dr. Vela for this in the past if her symptoms fail to respond to the injections I would recommend an appointment with Dr. Vela.  I do think she could try the physical therapy but again if things are not improving I would have her make a follow-up appointment unless she has another provider who has seen her for her neck in the past.

## 2023-05-26 RX ORDER — APIXABAN 5 MG/1
TABLET, FILM COATED ORAL
Qty: 60 TABLET | Refills: 2 | Status: SHIPPED | OUTPATIENT
Start: 2023-05-26

## 2023-06-07 RX ORDER — FLECAINIDE ACETATE 50 MG/1
TABLET ORAL
Qty: 90 TABLET | Refills: 3 | Status: SHIPPED | OUTPATIENT
Start: 2023-06-07

## 2023-07-05 ENCOUNTER — TELEPHONE (OUTPATIENT)
Dept: ORTHOPEDIC SURGERY | Facility: CLINIC | Age: 78
End: 2023-07-05

## 2023-07-05 NOTE — TELEPHONE ENCOUNTER
"Caller: Kiley Last \"Anna\"    Relationship to patient: Self    Best call back number:    Chief complaint: KNEES AND SHOULDERS     Type of visit: FUP INJECTIONS    "

## 2023-07-20 ENCOUNTER — HOSPITAL ENCOUNTER (OUTPATIENT)
Dept: SLEEP MEDICINE | Facility: HOSPITAL | Age: 78
Discharge: HOME | End: 2023-07-20
Admitting: INTERNAL MEDICINE
Payer: MEDICARE

## 2023-07-20 DIAGNOSIS — G47.10 HYPERSOMNIA WITH SLEEP APNEA: ICD-10-CM

## 2023-07-20 DIAGNOSIS — I48.0 PAROXYSMAL ATRIAL FIBRILLATION: ICD-10-CM

## 2023-07-20 DIAGNOSIS — G47.30 HYPERSOMNIA WITH SLEEP APNEA: ICD-10-CM

## 2023-07-20 DIAGNOSIS — R06.83 SNORING: ICD-10-CM

## 2023-07-20 DIAGNOSIS — G47.33 OSA (OBSTRUCTIVE SLEEP APNEA): ICD-10-CM

## 2023-07-20 PROCEDURE — 95810 POLYSOM 6/> YRS 4/> PARAM: CPT

## 2023-07-20 PROCEDURE — 95810 POLYSOM 6/> YRS 4/> PARAM: CPT | Performed by: INTERNAL MEDICINE

## 2023-07-21 NOTE — PROGRESS NOTES
Patient: Kiley Last  YOB: 1945  Date of Service: 7/21/2023    Chief Complaints: Right knee right shoulder pain    Subjective:    History of Present Illness: Pt is seen in the office today with complaints of right knee right shoulder pain last I injected the shoulder was marked as the knee was October they are both bothering her she is not interested in think surgical        Allergies:   Allergies   Allergen Reactions   • Epinephrine Palpitations   • Penicillins Other (See Comments)     BLISTERS IN MOUTH    Patient tolerated ceftriaxone during 5/2017 admission.          Medications:   Home Medications:  Current Outpatient Medications on File Prior to Visit   Medication Sig   • Acetaminophen (TYLENOL ARTHRITIS PAIN PO) Take 650 mg by mouth 3 (Three) Times a Day As Needed.   • B Complex Vitamins (Vitamin B Complex) tablet Take 1 tablet by mouth Daily.   • cholecalciferol (VITAMIN D3) 25 MCG (1000 UT) tablet Take 1 tablet by mouth Daily.   • Eliquis 5 MG tablet tablet TAKE ONE TABLET BY MOUTH EVERY 12 HOURS   • flecainide (TAMBOCOR) 50 MG tablet TAKE ONE TABLET BY MOUTH EVERY 12 HOURS   • losartan (COZAAR) 100 MG tablet TAKE ONE TABLET BY MOUTH DAILY   • Magnesium 100 MG capsule Take 1 capsule by mouth Daily.   • meclizine (ANTIVERT) 25 MG tablet TAKE ONE TABLET BY MOUTH THREE TIMES A DAY AS NEEDED FOR DIZZINESS   • melatonin 5 MG tablet tablet Take 2 tablets by mouth.   • metoprolol succinate XL (TOPROL-XL) 50 MG 24 hr tablet TAKE ONE TABLET BY MOUTH DAILY   • omeprazole (priLOSEC) 40 MG capsule    • Zinc Sulfate (ZINC 15 PO) Take 1 tablet by mouth Daily.     No current facility-administered medications on file prior to visit.     Current Medications:  Scheduled Meds:  Continuous Infusions:No current facility-administered medications for this visit.    PRN Meds:.    I have reviewed the patient's medical history in detail and updated the computerized patient record.  Review and summarization of  old records include:    Past Medical History:   Diagnosis Date   • Arthritis    • Asthma     NO INHALERS   • Atrial fibrillation    • Clostridium difficile infection 09/20/2019   • Colon polyps 08/01/2019    Transverse colon: tubular adenoma, descending colon: fragments of tubular adenoma, sigmoid colon: ischemic eroded hyperplastic polyp   • COPD (chronic obstructive pulmonary disease)    • Diverticulitis    • Diverticulosis    • ESBL (extended spectrum beta-lactamase) producing bacteria infection    • History of abscess of skin and subcutaneous tissue     ABD WOUND 5/2017   • History of atrial fibrillation      X1 POST OP FROM COLOSTOMY 5/2017 - NO PROBLEMS SINCE   • Hypertension    • MDRO (multiple drug resistant organisms) resistance    • NEW (obstructive sleep apnea) 05/20/2021    Overnight polysomnogram.  Weight 190 pounds.  Mild NEW with AHI 7 events per hour for total sleep time.  However, during REM moderate NEW with AHI 16.7 events per hour.  No sleep-related hypoxia.  The patient snored 5% of total sleep time.   • PONV (postoperative nausea and vomiting)    • Psoriasis    • UTI (urinary tract infection)    • Vertigo         Past Surgical History:   Procedure Laterality Date   • BELPHAROPTOSIS REPAIR Bilateral 04/27/2017    Bilateral brow ptosis repair via the anterior hairline approach under monitored local anesthesia-Andrez Craven   • BLEPHAROPLASTY Bilateral 04/27/2017   • BREAST BIOPSY     • CATARACT EXTRACTION     • COLON RESECTION N/A 5/3/2017    Procedure: OPEN SIMOID COLECTOMY WITH COLOSTOMY;  Surgeon: Renato Delgado MD;  Location: [facility] MAIN OR;  Service:    • COLONOSCOPY N/A 9/13/2017    Procedure: COLONOSCOPY VIA COLOSTOMY TO CECUM;  Surgeon: Renato Delgado MD;  Location: [facility] ENDOSCOPY;  Service:    • COLONOSCOPY N/A 8/1/2019    Procedure: COLONOSCOPY to cecum and TI with biopsy / hot snare polypectomies;  Surgeon: Dalton Vela Jr., MD;  Location: [facility] ENDOSCOPY;   Service: General   • COLONOSCOPY N/A 2010    Andrez Trinidad   • COLONOSCOPY N/A 11/3/2022    Procedure: COLONOSCOPY to cecum and TI with biopsy / hot snare polypectomy;  Surgeon: Trini Wade MD;  Location: [facility] ENDOSCOPY;  Service: General;  Laterality: N/A;  pre-hx diverticulitis, screen, fam hx colon ca, personal hx polyps  post-polyp, diverticulosis, small hemorrhoids   • COLOSTOMY CLOSURE N/A 2017    Procedure: COLOSTOMY TAKEDOWN AND CLOSURE, WITH RESECTION;  Surgeon: Renato Delgado MD;  Location: [facility] MAIN OR;  Service:    • ENDOSCOPY AND COLONOSCOPY N/A 10/31/2014    Normal EGD and colonoscopy, repeat c-scope in 5 years-Andrez Trinidad   • EXPLORATORY LAPAROTOMY N/A 2019    Procedure: Exploratory laparotomy with lysis of adhesions;  Surgeon: Trini Wade MD;  Location: [facility] MAIN OR;  Service: General   • FINGER SURGERY Left     4TH FINGER LEFT HAND   • HYSTERECTOMY Bilateral    • LAPAROSCOPIC CHOLECYSTECTOMY W/ CHOLANGIOGRAPHY N/A 07/15/2003    Dr. Amrit Martinez, [facility]   • SALPINGO OOPHORECTOMY Bilateral 2006    Abdominal sacral colpopexy, bilateral salpingo-oophorectomy, tension free vaginal tape, cystoscopy-Dr. Devika Bradley, [facility]   • THORACENTESIS Right 2017    US-guided right thoracentesis-Dr. Juan Yuen, [facility]   • TONSILLECTOMY Bilateral    • UPPER GASTROINTESTINAL ENDOSCOPY N/A 10/08/2007    Esophageal mucosal changes suspicious for short-segment Olivera's esphagus, gastric mucosal abnormality characterized by erythema: biopsied, NERD disease present, high likelihood of gastritis-Dr. Mayank Knapp, [facility]        Social History     Occupational History   • Not on file   Tobacco Use   • Smoking status: Former     Types: Cigarettes     Quit date: 1967     Years since quittin.5   • Smokeless tobacco: Never   • Tobacco comments:     SOCIAL SMOKING ONLY   Vaping Use   • Vaping Use: Never used   Substance and Sexual Activity   •  Alcohol use: Not Currently     Alcohol/week: 2.0 standard drinks     Types: 1 Glasses of wine, 1 Shots of liquor per week     Comment: occasionally/caffeine use    • Drug use: No   • Sexual activity: Defer      Social History     Social History Narrative   • Not on file        Family History   Problem Relation Age of Onset   • Cancer Mother    • Colon cancer Mother    • Diabetes Father    • Diabetes Son    • Ulcerative colitis Son    • No Known Problems Maternal Grandmother    • No Known Problems Maternal Grandfather    • No Known Problems Paternal Grandmother    • No Known Problems Paternal Grandfather    • Malig Hyperthermia Neg Hx    • Breast cancer Neg Hx        ROS: 14 point review of systems was performed and was negative except for documented findings in HPI and today's encounter.     Allergies:   Allergies   Allergen Reactions   • Epinephrine Palpitations   • Penicillins Other (See Comments)     BLISTERS IN MOUTH    Patient tolerated ceftriaxone during 5/2017 admission.        Constitutional:  Denies fever, shaking or chills   Eyes:  Denies change in visual acuity   HENT:  Denies nasal congestion or sore throat   Respiratory:  Denies cough or shortness of breath   Cardiovascular:  Denies chest pain or severe LE edema   GI:  Denies abdominal pain, nausea, vomiting, bloody stools or diarrhea   Musculoskeletal:  Numbness, tingling, or loss of motor function only as noted above in history of present illness.  : Denies painful urination or hematuria  Integument:  Denies rash, lesion or ulceration   Neurologic:  Denies headache or focal weakness  Endocrine:  Denies lymphadenopathy  Psych:  Denies confusion or change in mental status   Hem:  Denies active bleeding      Physical Exam: 77 y.o. female  Wt Readings from Last 3 Encounters:   07/06/23 82.4 kg (181 lb 9.6 oz)   04/19/23 81.6 kg (180 lb)   03/07/23 82.1 kg (181 lb 1.6 oz)       There is no height or weight on file to calculate BMI.    There were no  vitals filed for this visit.  Vital signs reviewed.   General Appearance:    Alert, cooperative, in no acute distress                    Ortho exam    Exams are unchanged             Assessment: Right shoulder pain which is degenerative in origin right knee pain which is same    Plan: Plan is to proceed with injections of both  Follow up as indicated.  Ice, elevate, and rest as needed.  Discussed conservative measures of pain control including ice, bracing.  Also talked about the importance of strengthening and maintaining ideal body weight    Lety Vela M.D.  Large Joint Arthrocentesis: R knee  Date/Time: 7/25/2023 12:52 PM  Consent given by: patient  Site marked: site marked  Timeout: Immediately prior to procedure a time out was called to verify the correct patient, procedure, equipment, support staff and site/side marked as required   Supporting Documentation  Indications: pain   Procedure Details  Location: knee - R knee  Preparation: Patient was prepped and draped in the usual sterile fashion  Needle gauge: 21 G.  Approach: anterolateral  Medications administered: 1 mL methylPREDNISolone acetate 80 MG/ML; 2 mL lidocaine PF 1% 1 %  Patient tolerance: patient tolerated the procedure well with no immediate complications      Large Joint Arthrocentesis: R glenohumeral  Date/Time: 7/25/2023 12:54 PM  Consent given by: patient  Site marked: site marked  Timeout: Immediately prior to procedure a time out was called to verify the correct patient, procedure, equipment, support staff and site/side marked as required   Supporting Documentation  Indications: pain   Procedure Details  Location: shoulder - R glenohumeral  Preparation: Patient was prepped and draped in the usual sterile fashion  Needle gauge: 21 G.  Approach: posterior  Medications administered: 1 mL methylPREDNISolone acetate 80 MG/ML; 2 mL lidocaine PF 1% 1 %  Patient tolerance: patient tolerated the procedure well with no immediate  complications

## 2023-07-25 ENCOUNTER — CLINICAL SUPPORT (OUTPATIENT)
Dept: ORTHOPEDIC SURGERY | Facility: CLINIC | Age: 78
End: 2023-07-25
Payer: MEDICARE

## 2023-07-25 VITALS — TEMPERATURE: 97.7 F | BODY MASS INDEX: 30.01 KG/M2 | WEIGHT: 180.1 LBS | HEIGHT: 65 IN

## 2023-07-25 DIAGNOSIS — M17.11 PRIMARY OSTEOARTHRITIS OF RIGHT KNEE: ICD-10-CM

## 2023-07-25 DIAGNOSIS — M19.019 GLENOHUMERAL ARTHRITIS: Primary | ICD-10-CM

## 2023-07-25 RX ORDER — METHYLPREDNISOLONE ACETATE 80 MG/ML
1 INJECTION, SUSPENSION INTRA-ARTICULAR; INTRALESIONAL; INTRAMUSCULAR; SOFT TISSUE
Status: COMPLETED | OUTPATIENT
Start: 2023-07-25 | End: 2023-07-25

## 2023-07-25 RX ORDER — LIDOCAINE HYDROCHLORIDE 10 MG/ML
2 INJECTION, SOLUTION EPIDURAL; INFILTRATION; INTRACAUDAL; PERINEURAL
Status: COMPLETED | OUTPATIENT
Start: 2023-07-25 | End: 2023-07-25

## 2023-07-25 RX ADMIN — LIDOCAINE HYDROCHLORIDE 2 ML: 10 INJECTION, SOLUTION EPIDURAL; INFILTRATION; INTRACAUDAL; PERINEURAL at 12:52

## 2023-07-25 RX ADMIN — METHYLPREDNISOLONE ACETATE 1 ML: 80 INJECTION, SUSPENSION INTRA-ARTICULAR; INTRALESIONAL; INTRAMUSCULAR; SOFT TISSUE at 12:52

## 2023-07-25 RX ADMIN — METHYLPREDNISOLONE ACETATE 1 ML: 80 INJECTION, SUSPENSION INTRA-ARTICULAR; INTRALESIONAL; INTRAMUSCULAR; SOFT TISSUE at 12:54

## 2023-07-25 RX ADMIN — LIDOCAINE HYDROCHLORIDE 2 ML: 10 INJECTION, SOLUTION EPIDURAL; INFILTRATION; INTRACAUDAL; PERINEURAL at 12:54

## 2023-07-27 DIAGNOSIS — G47.33 OSA (OBSTRUCTIVE SLEEP APNEA): Primary | ICD-10-CM

## 2023-07-27 DIAGNOSIS — R06.83 SNORING: ICD-10-CM

## 2023-08-04 ENCOUNTER — TELEPHONE (OUTPATIENT)
Dept: SLEEP MEDICINE | Facility: HOSPITAL | Age: 78
End: 2023-08-04
Payer: MEDICARE

## 2023-08-18 ENCOUNTER — TELEPHONE (OUTPATIENT)
Dept: ORTHOPEDIC SURGERY | Facility: CLINIC | Age: 78
End: 2023-08-18

## 2023-08-18 NOTE — TELEPHONE ENCOUNTER
Provider: DR. BUTLER  Caller: LEA  Relationship to Patient: SELF  Phone Number: [phone]  Reason for Call: PATIENT IS CALLING BECAUSE SHE STATES HER PAIN LEVEL IS BAD AND FEELS SHE HAS A HIGH TOLERANCE FOR PAIN. SHE IS REQUESTING AN MRI TO TRY TO GET TO THE BOTTOM OF WHAT IS CAUSING HER PROBLEMS. SHE SAYS SHE HAS SPOKEN TO DR. BUTLER REGARDING THIS. HER PAIN IS IN HER NECK. PLEASE CALL TO ADVISE.  When was the patient last seen: 07/25/23

## 2023-08-18 NOTE — TELEPHONE ENCOUNTER
Can you just talk with her and see if this truly is her neck when not you have her come in on Tuesday so I can x-ray the neck I do not think I have x-rayed it and then we can get the MRI

## 2023-08-21 NOTE — PROGRESS NOTES
[facility]  New Problem      Patient Name: Kiley Last  : 1945  Primary Care Physician: Miranda Morgan MD        Chief Complaint:      HPI:   Kiley Last is a 77 y.o. year old who presents today for evaluation of ***.      Past Medical/Surgical, Social and Family History:  I have reviewed and/or updated pertinent history as noted in the medical record including:  Past Medical History:   Diagnosis Date    Arthritis     Asthma     NO INHALERS    Atrial fibrillation     Clostridium difficile infection 2019    Colon polyps 2019    Transverse colon: tubular adenoma, descending colon: fragments of tubular adenoma, sigmoid colon: ischemic eroded hyperplastic polyp    COPD (chronic obstructive pulmonary disease)     Diverticulitis     Diverticulosis     ESBL (extended spectrum beta-lactamase) producing bacteria infection     History of abscess of skin and subcutaneous tissue     ABD WOUND 2017    History of atrial fibrillation      X1 POST OP FROM COLOSTOMY 2017 - NO PROBLEMS SINCE    Hypertension     MDRO (multiple drug resistant organisms) resistance     NEW (obstructive sleep apnea) 2021    Overnight polysomnogram.  Weight 190 pounds.  Mild NEW with AHI 7 events per hour for total sleep time.  However, during REM moderate NEW with AHI 16.7 events per hour.  No sleep-related hypoxia.  The patient snored 5% of total sleep time.    PONV (postoperative nausea and vomiting)     Psoriasis     UTI (urinary tract infection)     Vertigo      Past Surgical History:   Procedure Laterality Date    BELPHAROPTOSIS REPAIR Bilateral 2017    Bilateral brow ptosis repair via the anterior hairline approach under monitored local anesthesia-Andrez Craven    BLEPHAROPLASTY Bilateral 2017    BREAST BIOPSY      CATARACT EXTRACTION      COLON RESECTION N/A 5/3/2017    Procedure: OPEN SIMOID COLECTOMY WITH COLOSTOMY;  Surgeon: Renato Delgado MD;  Location: [facility] OR;   Service:     COLONOSCOPY N/A 9/13/2017    Procedure: COLONOSCOPY VIA COLOSTOMY TO CECUM;  Surgeon: Renato Delgado MD;  Location: [facility] ENDOSCOPY;  Service:     COLONOSCOPY N/A 8/1/2019    Procedure: COLONOSCOPY to cecum and TI with biopsy / hot snare polypectomies;  Surgeon: Dalton Vela Jr., MD;  Location: [facility] ENDOSCOPY;  Service: General    COLONOSCOPY N/A 01/04/2010    Andrez Trinidad    COLONOSCOPY N/A 11/3/2022    Procedure: COLONOSCOPY to cecum and TI with biopsy / hot snare polypectomy;  Surgeon: Trini Wade MD;  Location: [facility] ENDOSCOPY;  Service: General;  Laterality: N/A;  pre-hx diverticulitis, screen, fam hx colon ca, personal hx polyps  post-polyp, diverticulosis, small hemorrhoids    COLOSTOMY CLOSURE N/A 9/14/2017    Procedure: COLOSTOMY TAKEDOWN AND CLOSURE, WITH RESECTION;  Surgeon: Renato Delgado MD;  Location: [facility] MAIN OR;  Service:     ENDOSCOPY AND COLONOSCOPY N/A 10/31/2014    Normal EGD and colonoscopy, repeat c-scope in 5 years-Andrez Trinidad    EXPLORATORY LAPAROTOMY N/A 9/8/2019    Procedure: Exploratory laparotomy with lysis of adhesions;  Surgeon: Trini Wade MD;  Location: [facility] MAIN OR;  Service: General    FINGER SURGERY Left     4TH FINGER LEFT HAND    HYSTERECTOMY Bilateral     LAPAROSCOPIC CHOLECYSTECTOMY W/ CHOLANGIOGRAPHY N/A 07/15/2003    Dr. Amrit Martinez, [facility]    SALPINGO OOPHORECTOMY Bilateral 02/02/2006    Abdominal sacral colpopexy, bilateral salpingo-oophorectomy, tension free vaginal tape, cystoscopy-Dr. Devika Bradley, [facility]    THORACENTESIS Right 05/21/2017    US-guided right thoracentesis-Dr. Juan Yuen, [facility]    TONSILLECTOMY Bilateral     UPPER GASTROINTESTINAL ENDOSCOPY N/A 10/08/2007    Esophageal mucosal changes suspicious for short-segment Olivera's esphagus, gastric mucosal abnormality characterized by erythema: biopsied, NERD disease present, high likelihood of gastritis-Dr. Mayank Knapp, [facility]     Social  History     Occupational History    Not on file   Tobacco Use    Smoking status: Former     Types: Cigarettes     Quit date:      Years since quittin.6    Smokeless tobacco: Never    Tobacco comments:     SOCIAL SMOKING ONLY   Vaping Use    Vaping Use: Never used   Substance and Sexual Activity    Alcohol use: Not Currently     Alcohol/week: 2.0 standard drinks     Types: 1 Glasses of wine, 1 Shots of liquor per week     Comment: occasionally/caffeine use     Drug use: No    Sexual activity: Defer          Allergies:   Allergies   Allergen Reactions    Epinephrine Palpitations    Penicillins Other (See Comments)     BLISTERS IN MOUTH    Patient tolerated ceftriaxone during 2017 admission.          Medications:   Home Medications:  Current Outpatient Medications on File Prior to Visit   Medication Sig    Acetaminophen (TYLENOL ARTHRITIS PAIN PO) Take 650 mg by mouth 3 (Three) Times a Day As Needed.    B Complex Vitamins (Vitamin B Complex) tablet Take 1 tablet by mouth Daily.    cholecalciferol (VITAMIN D3) 25 MCG (1000 UT) tablet Take 1 tablet by mouth Daily.    Eliquis 5 MG tablet tablet TAKE ONE TABLET BY MOUTH EVERY 12 HOURS    flecainide (TAMBOCOR) 50 MG tablet TAKE ONE TABLET BY MOUTH EVERY 12 HOURS    losartan (COZAAR) 100 MG tablet TAKE ONE TABLET BY MOUTH DAILY    Magnesium 100 MG capsule Take 1 capsule by mouth Daily.    meclizine (ANTIVERT) 25 MG tablet TAKE ONE TABLET BY MOUTH THREE TIMES A DAY AS NEEDED FOR DIZZINESS    melatonin 5 MG tablet tablet Take 2 tablets by mouth.    metoprolol succinate XL (TOPROL-XL) 50 MG 24 hr tablet TAKE ONE TABLET BY MOUTH DAILY    omeprazole (priLOSEC) 40 MG capsule     Zinc Sulfate (ZINC 15 PO) Take 1 tablet by mouth Daily.     No current facility-administered medications on file prior to visit.         ROS:  14 point review of systems was negative except as listed in the HPI ***.    Physical Exam:   77 y.o. female  There is no height or weight on file to  calculate BMI.,    There were no vitals filed for this visit.  General: Alert, cooperative, appears well and in no observable distress. Appears stated age and BMI as listed above.  HEENT: Normocephalic, atraumatic on external visual inspection.  CV: No significant peripheral edema.  Respiratory: Normal respiratory effort.  Skin: Warm & well perfused; appropriate skin turgor.  Psych: Appropriate mood & affect.  Neuro: Gross sensation and motor intact in affected extremity/extremities.  Vascular: Peripheral pulses palpable in affected extremity/extremities.     MSK Exam:  ***    Radiology:    {HEXRAY:07286}    Procedure:   {HVEINJRISK:93700}    Misc. Data/Labs: N/A    Assessment & Plan:  No diagnosis found.  No orders of the defined types were placed in this encounter.    No orders of the defined types were placed in this encounter.        ***    No follow-ups on file.    Patient encouraged to call with questions or concerns prior to follow up.  Recommend ICE and/or HEAT PRN as discussed.  Will discuss with attending physician as needed.  Consider additional referrals, work up and/or advanced imaging as indicated or if patient fails to respond to conservative care.    {NicholasPlan:[id number]}    Bandar Peterson, APRN

## 2023-08-22 ENCOUNTER — OFFICE VISIT (OUTPATIENT)
Dept: ORTHOPEDIC SURGERY | Facility: CLINIC | Age: 78
End: 2023-08-22
Payer: MEDICARE

## 2023-08-22 VITALS — TEMPERATURE: 97.8 F | WEIGHT: 177.2 LBS | HEIGHT: 65 IN | BODY MASS INDEX: 29.52 KG/M2

## 2023-08-22 DIAGNOSIS — M75.42 IMPINGEMENT SYNDROME OF LEFT SHOULDER: ICD-10-CM

## 2023-08-22 DIAGNOSIS — M54.2 NECK PAIN: Primary | ICD-10-CM

## 2023-08-22 PROCEDURE — 99214 OFFICE O/P EST MOD 30 MIN: CPT | Performed by: ORTHOPAEDIC SURGERY

## 2023-08-22 RX ORDER — METHYLPREDNISOLONE 4 MG/1
TABLET ORAL
Qty: 21 TABLET | Refills: 0 | Status: SHIPPED | OUTPATIENT
Start: 2023-08-22

## 2023-08-23 ENCOUNTER — OFFICE VISIT (OUTPATIENT)
Dept: INTERNAL MEDICINE | Facility: CLINIC | Age: 78
End: 2023-08-23
Payer: MEDICARE

## 2023-08-23 VITALS
DIASTOLIC BLOOD PRESSURE: 62 MMHG | WEIGHT: 184 LBS | HEIGHT: 65 IN | BODY MASS INDEX: 30.66 KG/M2 | SYSTOLIC BLOOD PRESSURE: 130 MMHG | HEART RATE: 74 BPM

## 2023-08-23 DIAGNOSIS — M54.2 NECK PAIN: ICD-10-CM

## 2023-08-23 DIAGNOSIS — I10 ESSENTIAL HYPERTENSION: ICD-10-CM

## 2023-08-23 DIAGNOSIS — F33.41 MAJOR DEPRESSIVE DISORDER, RECURRENT EPISODE, IN PARTIAL REMISSION: ICD-10-CM

## 2023-08-23 DIAGNOSIS — Z00.00 MEDICARE ANNUAL WELLNESS VISIT, SUBSEQUENT: ICD-10-CM

## 2023-08-23 DIAGNOSIS — E53.8 B12 DEFICIENCY: Primary | ICD-10-CM

## 2023-08-23 DIAGNOSIS — E53.8 B12 DEFICIENCY: Primary | Chronic | ICD-10-CM

## 2023-08-23 PROBLEM — Z86.010 ENCOUNTER FOR COLONOSCOPY DUE TO HISTORY OF ADENOMATOUS COLONIC POLYPS: Status: RESOLVED | Noted: 2022-09-22 | Resolved: 2023-08-23

## 2023-08-23 PROBLEM — Z12.11 ENCOUNTER FOR COLONOSCOPY DUE TO HISTORY OF ADENOMATOUS COLONIC POLYPS: Status: RESOLVED | Noted: 2022-09-22 | Resolved: 2023-08-23

## 2023-08-23 PROBLEM — Z12.11 SCREENING FOR COLON CANCER: Status: RESOLVED | Noted: 2022-09-22 | Resolved: 2023-08-23

## 2023-08-23 PROBLEM — Z86.0101 ENCOUNTER FOR COLONOSCOPY DUE TO HISTORY OF ADENOMATOUS COLONIC POLYPS: Status: RESOLVED | Noted: 2022-09-22 | Resolved: 2023-08-23

## 2023-08-23 PROCEDURE — 96372 THER/PROPH/DIAG INJ SC/IM: CPT | Performed by: INTERNAL MEDICINE

## 2023-08-23 PROCEDURE — 1160F RVW MEDS BY RX/DR IN RCRD: CPT | Performed by: INTERNAL MEDICINE

## 2023-08-23 PROCEDURE — G0439 PPPS, SUBSEQ VISIT: HCPCS | Performed by: INTERNAL MEDICINE

## 2023-08-23 PROCEDURE — 3078F DIAST BP <80 MM HG: CPT | Performed by: INTERNAL MEDICINE

## 2023-08-23 PROCEDURE — 1159F MED LIST DOCD IN RCRD: CPT | Performed by: INTERNAL MEDICINE

## 2023-08-23 PROCEDURE — 1170F FXNL STATUS ASSESSED: CPT | Performed by: INTERNAL MEDICINE

## 2023-08-23 PROCEDURE — 3075F SYST BP GE 130 - 139MM HG: CPT | Performed by: INTERNAL MEDICINE

## 2023-08-23 RX ORDER — CYANOCOBALAMIN 1000 UG/ML
1000 INJECTION, SOLUTION INTRAMUSCULAR; SUBCUTANEOUS
Status: SHIPPED | OUTPATIENT
Start: 2023-08-23

## 2023-08-23 RX ORDER — CYANOCOBALAMIN 1000 UG/ML
1000 INJECTION, SOLUTION INTRAMUSCULAR; SUBCUTANEOUS
Qty: 3 ML | Refills: 3 | Status: SHIPPED | OUTPATIENT
Start: 2023-08-23

## 2023-08-23 RX ORDER — APIXABAN 5 MG/1
TABLET, FILM COATED ORAL
Qty: 60 TABLET | Refills: 2 | Status: SHIPPED | OUTPATIENT
Start: 2023-08-23

## 2023-08-23 RX ADMIN — CYANOCOBALAMIN 1000 MCG: 1000 INJECTION, SOLUTION INTRAMUSCULAR; SUBCUTANEOUS at 14:31

## 2023-08-23 NOTE — PROGRESS NOTES
The ABCs of the Annual Wellness Visit  Subsequent Medicare Wellness Visit    Subjective    Kiley Last is a 77 y.o. female who presents for a Subsequent Medicare Wellness Visit.    The following portions of the patient's history were reviewed and   updated as appropriate: allergies, current medications, past family history, past medical history, past social history, past surgical history, and problem list.    Compared to one year ago, the patient feels her physical   health is worse.    Compared to one year ago, the patient feels her mental   health is the same.    Recent Hospitalizations:  She was not admitted to the hospital during the last year.       Current Medical Providers:  Patient Care Team:  Miranda Morgan MD as PCP - General (Internal Medicine)  Miguelina Hurst APRN as Nurse Practitioner (Nurse Practitioner)  Rich Kearns MD as Consulting Physician (Cardiology)  Patrick Weeks MD as Consulting Physician (Otolaryngology)  Rossana Evans MD as Consulting Physician (Infectious Diseases)  Patrick Dong MD as Consulting Physician (Pulmonary Disease)    Outpatient Medications Prior to Visit   Medication Sig Dispense Refill    Acetaminophen (TYLENOL ARTHRITIS PAIN PO) Take 650 mg by mouth 3 (Three) Times a Day As Needed.      B Complex Vitamins (Vitamin B Complex) tablet Take 1 tablet by mouth Daily.      cholecalciferol (VITAMIN D3) 25 MCG (1000 UT) tablet Take 1 tablet by mouth Daily.      Eliquis 5 MG tablet tablet TAKE ONE TABLET BY MOUTH EVERY 12 HOURS 60 tablet 2    flecainide (TAMBOCOR) 50 MG tablet TAKE ONE TABLET BY MOUTH EVERY 12 HOURS 90 tablet 3    losartan (COZAAR) 100 MG tablet TAKE ONE TABLET BY MOUTH DAILY 90 tablet 2    Magnesium 100 MG capsule Take 1 capsule by mouth Daily.      meclizine (ANTIVERT) 25 MG tablet TAKE ONE TABLET BY MOUTH THREE TIMES A DAY AS NEEDED FOR DIZZINESS 90 tablet 0    methylPREDNISolone (MEDROL) 4 MG dose pack Use as directed by package  "instructions 21 tablet 0    metoprolol succinate XL (TOPROL-XL) 50 MG 24 hr tablet TAKE ONE TABLET BY MOUTH DAILY 30 tablet 11    omeprazole (priLOSEC) 40 MG capsule       Zinc Sulfate (ZINC 15 PO) Take 1 tablet by mouth Daily.      melatonin 5 MG tablet tablet Take 2 tablets by mouth. (Patient not taking: Reported on 8/23/2023)       No facility-administered medications prior to visit.       No opioid medication identified on active medication list. I have reviewed chart for other potential  high risk medication/s and harmful drug interactions in the elderly.        Aspirin is not on active medication list.  Aspirin use is not indicated based on review of current medical condition/s. Risk of harm outweighs potential benefits.  .    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  ACP discussion was held with the patient during this visit. Patient has an advance directive (not in EMR), copy requested.     Objective    Vitals:    08/23/23 1342   BP: 130/62   Pulse: 74   Weight: 83.5 kg (184 lb)   Height: 165.1 cm (65\")     Estimated body mass index is 30.62 kg/mý as calculated from the following:    Height as of this encounter: 165.1 cm (65\").    Weight as of this encounter: 83.5 kg (184 lb).    BMI is >= 30 and <35. (Class 1 Obesity). The following options were offered after discussion;: exercise counseling/recommendations and nutrition counseling/recommendations      Does the patient have evidence of cognitive " impairment? No    Lab Results   Component Value Date    CHLPL 270 (H) 2023    TRIG 258 (H) 2023    HDL 53 2023     (H) 2023    VLDL 48 (H) 2023        HEALTH RISK ASSESSMENT    Smoking Status:  Social History     Tobacco Use   Smoking Status Former    Types: Cigarettes    Quit date: 1967    Years since quittin.6   Smokeless Tobacco Never   Tobacco Comments    SOCIAL SMOKING ONLY     Alcohol Consumption:  Social History     Substance and Sexual Activity   Alcohol Use Not Currently    Alcohol/week: 2.0 standard drinks    Types: 1 Glasses of wine, 1 Shots of liquor per week    Comment: occasionally/caffeine use      Fall Risk Screen:    JOSELINE Fall Risk Assessment was completed, and patient is at LOW risk for falls.Assessment completed on:2023    Depression Screenin/23/2023     1:44 PM   PHQ-2/PHQ-9 Depression Screening   Little Interest or Pleasure in Doing Things 0-->not at all   Feeling Down, Depressed or Hopeless 0-->not at all   PHQ-9: Brief Depression Severity Measure Score 0       Health Habits and Functional and Cognitive Screenin/23/2023     1:44 PM   Functional & Cognitive Status   Do you have difficulty preparing food and eating? No   Do you have difficulty bathing yourself, getting dressed or grooming yourself? No   Do you have difficulty using the toilet? No   Do you have difficulty moving around from place to place? No   Do you have trouble with steps or getting out of a bed or a chair? No   Current Diet Well Balanced Diet   Dental Exam Up to date   Eye Exam Up to date   Exercise (times per week) 0 times per week   Current Exercises Include No Regular Exercise   Do you need help using the phone?  No   Are you deaf or do you have serious difficulty hearing?  No   Do you need help to go to places out of walking distance? No   Do you need help shopping? No   Do you need help preparing meals?  No   Do you need help with housework?  No   Do you  need help with laundry? No   Do you need help taking your medications? No   Do you need help managing money? No   Do you ever drive or ride in a car without wearing a seat belt? No   Have you felt unusual stress, anger or loneliness in the last month? No   Who do you live with? Spouse   If you need help, do you have trouble finding someone available to you? No   Have you been bothered in the last four weeks by sexual problems? No   Do you have difficulty concentrating, remembering or making decisions? No       Age-appropriate Screening Schedule:  Refer to the list below for future screening recommendations based on patient's age, sex and/or medical conditions. Orders for these recommended tests are listed in the plan section. The patient has been provided with a written plan.    Health Maintenance   Topic Date Due    TDAP/TD VACCINES (1 - Tdap) Never done    ZOSTER VACCINE (1 of 2) Never done    HEPATITIS C SCREENING  Never done    COVID-19 Vaccine (5 - Pfizer series) 04/19/2023    ANNUAL WELLNESS VISIT  08/03/2023    INFLUENZA VACCINE  10/01/2023    DXA SCAN  10/28/2023    LIPID PANEL  08/16/2024    COLORECTAL CANCER SCREENING  11/03/2025    Pneumococcal Vaccine 65+  Completed                  CMS Preventative Services Quick Reference  Risk Factors Identified During Encounter  Immunizations Discussed/Encouraged: Tdap, Shingrix, and COVID19  The above risks/problems have been discussed with the patient.  Pertinent information has been shared with the patient in the After Visit Summary.  An After Visit Summary and PPPS were made available to the patient.    Follow Up:   Next Medicare Wellness visit to be scheduled in 1 year.       Additional E&M Note during same encounter follows:  Patient has multiple medical problems which are significant and separately identifiable that require additional work above and beyond the Medicare Wellness Visit.      Chief Complaint  Medicare Wellness-subsequent    Subjective   "      HPI  Kiley Last is also being seen today for neck and shoulder pain- saw Dr. Vela yesterday- thinks it could be a pinched nerve.  This is an ongoing problem for a long time- stress and physical care of her  have been the culprits.   Known L lower quadrant hernia at colostomy site- hasn't been causing any issues- specifically no pain or bloating in the abdomen.   Review of Systems   Constitutional:  Negative for appetite change and fatigue.   HENT:  Negative for hearing loss (known significant loss in L ear) and trouble swallowing.    Eyes:  Negative for visual disturbance.   Respiratory:  Negative for cough and shortness of breath.    Cardiovascular:  Negative for chest pain and palpitations.   Gastrointestinal:  Negative for abdominal pain.   Genitourinary:  Negative for difficulty urinating.   Musculoskeletal:  Positive for neck pain.   Psychiatric/Behavioral:  Positive for dysphoric mood.    Her diet is not bad- says she doesn't eat much during the day.     Objective   Vital Signs:  /62   Pulse 74   Ht 165.1 cm (65\")   Wt 83.5 kg (184 lb)   BMI 30.62 kg/mý     Physical Exam  Constitutional:       Appearance: Normal appearance.   Cardiovascular:      Rate and Rhythm: Normal rate and regular rhythm.      Pulses: Normal pulses.      Heart sounds: Normal heart sounds.   Pulmonary:      Effort: Pulmonary effort is normal.      Breath sounds: Normal breath sounds.   Chest:   Breasts:     Right: Normal.      Left: Normal.   Musculoskeletal:      Cervical back: Tenderness present. No rigidity.      Right lower leg: No edema.      Left lower leg: No edema.   Lymphadenopathy:      Cervical: No cervical adenopathy.        The following data was reviewed by: Miranda Morgan MD on 08/23/2023:  Common labs          8/16/2023    11:31   Common Labs   Glucose 105    BUN 18    Creatinine 0.68    Sodium 144    Potassium 4.8    Chloride 104    Calcium 10.2    Total Protein 6.6    Albumin 4.3    Total " Bilirubin 0.5    Alkaline Phosphatase 62    AST (SGOT) 15    ALT (SGPT) 17    WBC 7.85    Hemoglobin 12.8    Hematocrit 39.3    Platelets 174    Total Cholesterol 270    Triglycerides 258    HDL Cholesterol 53    LDL Cholesterol  169      Data reviewed : Consultant notes ortho           Assessment and Plan   Diagnoses and all orders for this visit:    1. B12 deficiency (Primary)  Comments:  oral replacement not helping - celine start monthly inj- pt to administer.  Orders:  -     cyanocobalamin injection 1,000 mcg    2. Essential hypertension  Comments:  controlled, no change.    3. Neck pain  Comments:  chronic, ongoing issue- agree with Dr. Vela's plan- understands association with her physical demands.    4. Major depressive disorder, recurrent episode, in partial remission  Comments:  currently no meds- doesn't feel she needs.  content with things as they are and has great family support.    5. Medicare annual wellness visit, subsequent  Comments:  check with Lizzy about Shingrix. Wants new covid vacc in fall with flu shot. Encouraged  to have family help with her - take time out.   walk daily.             Follow Up   Return in about 6 months (around 2/23/2024) for Recheck, Lab Before FUP.  Patient was given instructions and counseling regarding her condition or for health maintenance advice. Please see specific information pulled into the AVS if appropriate.

## 2023-08-24 ENCOUNTER — TELEPHONE (OUTPATIENT)
Dept: INTERNAL MEDICINE | Facility: CLINIC | Age: 78
End: 2023-08-24

## 2023-08-24 NOTE — TELEPHONE ENCOUNTER
"Caller: Kiley Last \"Anna\"    Relationship: Self    Best call back number: [phone]     What form or medical record are you requesting: COPY OF LAB WORK    Who is requesting this form or medical record from you: PATIENT    How would you like to receive the form or medical records (pick-up, mail, fax): MAIL  If fax, what is the fax number:  If mail, what is the address: [address] [place] [address]   If pick-up, provide patient with address and location details    Timeframe paperwork needed: ASAP    Additional notes: PLEASE ADVISE         "

## 2023-08-24 NOTE — PROGRESS NOTES
New Shoulder      Patient: Kiley Last        YOB: 1945    Medical Record Number: [id number]        Chief Complaints: Bilateral shoulder and neck pain      History of Present Illness: This is a 77-year-old female who presents complaining of mostly neck pain is mostly at the base of her neck and does radiate to the upper traps no history injury change in activity have seen in the past for the shoulder she feels like the shoulder is not the same thing is what she is dealing with.  This does bother her with turning of her head she has occasional symptoms down into her arms but not a way down into her hands she has no chest pain her past medical history as listed below      Allergies:   Allergies   Allergen Reactions    Epinephrine Palpitations    Penicillins Other (See Comments)     BLISTERS IN MOUTH    Patient tolerated ceftriaxone during 5/2017 admission.          Medications:   Home Medications:  Current Outpatient Medications on File Prior to Visit   Medication Sig    Acetaminophen (TYLENOL ARTHRITIS PAIN PO) Take 650 mg by mouth 3 (Three) Times a Day As Needed.    B Complex Vitamins (Vitamin B Complex) tablet Take 1 tablet by mouth Daily.    cholecalciferol (VITAMIN D3) 25 MCG (1000 UT) tablet Take 1 tablet by mouth Daily.    flecainide (TAMBOCOR) 50 MG tablet TAKE ONE TABLET BY MOUTH EVERY 12 HOURS    losartan (COZAAR) 100 MG tablet TAKE ONE TABLET BY MOUTH DAILY    Magnesium 100 MG capsule Take 1 capsule by mouth Daily.    meclizine (ANTIVERT) 25 MG tablet TAKE ONE TABLET BY MOUTH THREE TIMES A DAY AS NEEDED FOR DIZZINESS    metoprolol succinate XL (TOPROL-XL) 50 MG 24 hr tablet TAKE ONE TABLET BY MOUTH DAILY    omeprazole (priLOSEC) 40 MG capsule     Zinc Sulfate (ZINC 15 PO) Take 1 tablet by mouth Daily.     No current facility-administered medications on file prior to visit.     Current Medications:  Scheduled Meds:cyanocobalamin, 1,000 mcg, Intramuscular, Q28 Days      Continuous  Infusions:   PRN Meds:.    Past Medical History:   Diagnosis Date    Arthritis     Asthma     NO INHALERS    Atrial fibrillation     Clostridium difficile infection 09/20/2019    Colon polyps 08/01/2019    Transverse colon: tubular adenoma, descending colon: fragments of tubular adenoma, sigmoid colon: ischemic eroded hyperplastic polyp    COPD (chronic obstructive pulmonary disease)     Diverticulitis     Diverticulosis     ESBL (extended spectrum beta-lactamase) producing bacteria infection     History of abscess of skin and subcutaneous tissue     ABD WOUND 5/2017    History of atrial fibrillation      X1 POST OP FROM COLOSTOMY 5/2017 - NO PROBLEMS SINCE    Hypertension     MDRO (multiple drug resistant organisms) resistance     NEW (obstructive sleep apnea) 05/20/2021    Overnight polysomnogram.  Weight 190 pounds.  Mild NEW with AHI 7 events per hour for total sleep time.  However, during REM moderate NEW with AHI 16.7 events per hour.  No sleep-related hypoxia.  The patient snored 5% of total sleep time.    PONV (postoperative nausea and vomiting)     Psoriasis     UTI (urinary tract infection)     Vertigo         Past Surgical History:   Procedure Laterality Date    BELPHAROPTOSIS REPAIR Bilateral 04/27/2017    Bilateral brow ptosis repair via the anterior hairline approach under monitored local anesthesia-Dr. Deangelo Cisneros, [place]    BLEPHAROPLASTY Bilateral 04/27/2017    BREAST BIOPSY      CATARACT EXTRACTION      COLON RESECTION N/A 5/3/2017    Procedure: OPEN SIMOID COLECTOMY WITH COLOSTOMY;  Surgeon: Renato Delgado MD;  Location: [facility] MAIN OR;  Service:     COLONOSCOPY N/A 9/13/2017    Procedure: COLONOSCOPY VIA COLOSTOMY TO CECUM;  Surgeon: Renato Delgado MD;  Location: [facility] ENDOSCOPY;  Service:     COLONOSCOPY N/A 8/1/2019    Procedure: COLONOSCOPY to cecum and TI with biopsy / hot snare polypectomies;  Surgeon: Dalton Vela Jr., MD;  Location: [facility] ENDOSCOPY;  Service: General     COLONOSCOPY N/A 2010    Andrez Trinidad    COLONOSCOPY N/A 11/3/2022    Procedure: COLONOSCOPY to cecum and TI with biopsy / hot snare polypectomy;  Surgeon: Trini Wade MD;  Location: [facility] ENDOSCOPY;  Service: General;  Laterality: N/A;  pre-hx diverticulitis, screen, fam hx colon ca, personal hx polyps  post-polyp, diverticulosis, small hemorrhoids    COLOSTOMY CLOSURE N/A 2017    Procedure: COLOSTOMY TAKEDOWN AND CLOSURE, WITH RESECTION;  Surgeon: Renato Delgado MD;  Location: [facility] MAIN OR;  Service:     ENDOSCOPY AND COLONOSCOPY N/A 10/31/2014    Normal EGD and colonoscopy, repeat c-scope in 5 years-Andrez Trinidad    EXPLORATORY LAPAROTOMY N/A 2019    Procedure: Exploratory laparotomy with lysis of adhesions;  Surgeon: Trini Wade MD;  Location: [facility] MAIN OR;  Service: General    FINGER SURGERY Left     4TH FINGER LEFT HAND    HYSTERECTOMY Bilateral     LAPAROSCOPIC CHOLECYSTECTOMY W/ CHOLANGIOGRAPHY N/A 07/15/2003    Dr. Amrit Martinez, [facility]    SALPINGO OOPHORECTOMY Bilateral 2006    Abdominal sacral colpopexy, bilateral salpingo-oophorectomy, tension free vaginal tape, cystoscopy-Dr. Devika Bradley, [facility]    THORACENTESIS Right 2017    US-guided right thoracentesis-Dr. Juan Yuen, [facility]    TONSILLECTOMY Bilateral     UPPER GASTROINTESTINAL ENDOSCOPY N/A 10/08/2007    Esophageal mucosal changes suspicious for short-segment Olivera's esphagus, gastric mucosal abnormality characterized by erythema: biopsied, NERD disease present, high likelihood of gastritis-Dr. Mayank Knapp, [facility]        Social History     Occupational History    Not on file   Tobacco Use    Smoking status: Former     Types: Cigarettes     Quit date: 1967     Years since quittin.6    Smokeless tobacco: Never    Tobacco comments:     SOCIAL SMOKING ONLY   Vaping Use    Vaping Use: Never used   Substance and Sexual Activity    Alcohol use: Not Currently     Alcohol/week:  "2.0 standard drinks     Types: 1 Glasses of wine, 1 Shots of liquor per week     Comment: occasionally/caffeine use     Drug use: No    Sexual activity: Defer      Social History     Social History Narrative    Not on file        Family History   Problem Relation Age of Onset    Cancer Mother     Colon cancer Mother     Diabetes Father     Diabetes Son     Ulcerative colitis Son     No Known Problems Maternal Grandmother     No Known Problems Maternal Grandfather     No Known Problems Paternal Grandmother     No Known Problems Paternal Grandfather     Malig Hyperthermia Neg Hx     Breast cancer Neg Hx              Review of Systems:     Review of Systems      Physical Exam: 77 y.o. female  General Appearance:    Alert, cooperative, in no acute distress                   Vitals:    08/22/23 1240   Temp: 97.8 øF (36.6 øC)   Weight: 80.4 kg (177 lb 3.2 oz)   Height: 165.1 cm (65\")   PainSc: 10-Worst pain ever      Patient is alert and read x3 no acute distress appears her above-listed at height weight and age.  Affect is normal respiratory rate is normal unlabored. Heart rate regular rate rhythm, sclera, dentition and hearing are normal for the purpose of this exam.    Ortho Exam exam her shoulder motion is actually quite good it does not reproduce her current symptoms that she is complaining of she does have a decrease in cervical range of motion especially sidebending and rotation those are about 50% of what I would consider normal and they do give rise to pain in the area of the upper traps    Procedures          Radiology:   AP, lateral of the cervical spine were ordered/reviewed to evauater pain.  She does have decrease in disc space throughout her cervical spine no obvious listhesis      Assessment/Plan: Neck pain I do think it is coming from her neck not her shoulder we talked about options I think therapy is reasonable she is pretty miserable I will start her on a steroid Dosepak with strict precautions if she " does not get better I will have her see Eveline

## 2023-09-07 ENCOUNTER — OFFICE VISIT (OUTPATIENT)
Dept: SURGERY | Facility: CLINIC | Age: 78
End: 2023-09-07
Payer: MEDICARE

## 2023-09-07 VITALS
SYSTOLIC BLOOD PRESSURE: 130 MMHG | BODY MASS INDEX: 29.49 KG/M2 | DIASTOLIC BLOOD PRESSURE: 70 MMHG | WEIGHT: 177 LBS | HEIGHT: 65 IN

## 2023-09-07 DIAGNOSIS — K43.2 INCISIONAL HERNIA, WITHOUT OBSTRUCTION OR GANGRENE: Primary | ICD-10-CM

## 2023-09-07 PROCEDURE — 3075F SYST BP GE 130 - 139MM HG: CPT | Performed by: SURGERY

## 2023-09-07 PROCEDURE — 1160F RVW MEDS BY RX/DR IN RCRD: CPT | Performed by: SURGERY

## 2023-09-07 PROCEDURE — 3078F DIAST BP <80 MM HG: CPT | Performed by: SURGERY

## 2023-09-07 PROCEDURE — 99213 OFFICE O/P EST LOW 20 MIN: CPT | Performed by: SURGERY

## 2023-09-07 PROCEDURE — 1159F MED LIST DOCD IN RCRD: CPT | Performed by: SURGERY

## 2023-09-11 NOTE — PROGRESS NOTES
General Surgery  Established Patient Office Visit    CC: Follow-up incisional hernia    HPI: The patient is a pleasant 77 y.o. year-old lady who returns to see me today for evaluation of worsening chronic left upper and lower quadrant abdominal pain that has been ongoing for a few years at the site of a known incisional hernia.  The hernia is located at the site of a previous colostomy created in 2017 by Dr. Delgado for perforated diverticulitis.  She then later had a colostomy takedown in September 2017 also by Dr. Delgado and developed hernia shortly thereafter.  We have always elected to leave the hernia alone as it was relatively asymptomatic.  She has developed the cramping abdominal pain at the site of the previous hernia that has been off and on for the last couple of years, and she says today that it has been worsening in severity.  She has also experienced abdominal bloating and nausea whenever the pain becomes severe, likely due to transient incarceration of the adjacent transverse colon.  Her last CT scan was performed in June 2022 and demonstrated a fat-containing incisional hernia of the left side of her abdomen through her rectus muscle fascia.  The fascial defect measures 1.3 cm in diameter, and it appears as though the transverse colon is positioned directly beneath the fascial defect concerning for impending colonic incarceration.    Past Medical History:   Hypertension  Atrial fibrillation  Vertigo  COPD  Asthma  Obstructive sleep apnea  History of multidrug-resistant bacterial infections of the skin and soft tissues  History of diverticulitis  Psoriasis  History of small bowel obstructions requiring exploratory laparotomy with lysis of adhesions  History of C. difficile colitis treated medically     Past Surgical History:   Exploratory laparotomy with lysis of adhesions (Sept 2019 by me)  Open sigmoid colectomy with colostomy (May 2017, Dr. Delgado)  Open colostomy takedown (September 2017,   Danny)  Hysterectomy  Tonsillectomy  Colonoscopy (August 2019, Dr. Steven Vela -multiple adenomatous colon polyps removed)  Cholecystectomy  Blepharoplasty  Cataract extraction    Medications:   Eliquis 5 mg every 12 hours  Tylenol as needed for arthritis pain  Vitamin C 1 tablet daily  Vitamin B complex once daily  Buspirone 5 mg 3 times daily as needed for anxiety  Vitamin D3 1000 units daily  Flecainide 50 mg twice daily  Fluticasone nasal spray as needed  Losartan 100 mg daily  Magnesium once daily  Meclizine 25 mg 3 times daily as needed for dizziness  Melatonin 5 mg nightly as needed for insomnia  Metoprolol 50 mg daily    Allergies: Penicillins (oral blisters), epinephrine (received too much and had heart palpitations)    Family History: Mother with colon cancer, son with ulcerative colitis    Social History:  and cares for her disabled , occasional alcohol use, former smoker but quit in 1967    ROS:   Constitutional: Negative for fevers or chills  HENT: Negative for hearing loss or runny nose  Eyes: Negative for vision changes or scleral icterus  Respiratory: Negative for cough or shortness of breath  Cardiovascular: Negative for chest pain or heart palpitations  Gastrointestinal: Positive for abdominal pain, abdominal bloating, and diarrhea; negative for nausea, vomiting, constipation, melena, or hematochezia  Genitourinary: Negative for hematuria or dysuria  Musculoskeletal: Negative for joint swelling or gait instability  Neurologic: Positive for balance problems, dizziness, and lightheadedness due to vertigo; negative for tremors or seizures  Psychiatric: Negative for suicidal ideations or depression  All other systems reviewed and negative    Physical Exam:  Height: 165 cm  Weight: 80 kg  BMI: 29.5  General: No acute distress, well-nourished & well-developed  HEAD: normocephalic, atraumatic  EYES: normal conjunctiva, sclera anicteric  EARS: grossly normal hearing  NECK: supple, no  thyromegaly  CARDIOVASCULAR: regular rate and rhythm  RESPIRATORY: clear to auscultation bilaterally  GASTROINTESTINAL: soft, nontender, non-distended, chronically incarcerated left lower quadrant hernia at the site of her previous colostomy which contains only fat  MUSCULOSKELETAL: normal gait and station. No gross extremity abnormalities  PSYCHIATRIC: oriented x3, normal mood and affect    IMAGING:  CT ABD/PELVIS (June 2023):  Otherwise unremarkable appearance of the liver, spleen, adrenal glands, pancreas, kidneys, bladder.  No bowel obstruction or abnormal bowel thickening is identified. Colonic diverticula are seen that do not appear inflamed. The appendix does no appear inflamed.  No free intraperitoneal gas or free fluid. A hernia of fat is redemonstrated at the anterior aspect of the upper pelvis.  Scattered small mesenteric and para-aortic lymph nodes are seen that are not significant by size criteria.  Abdominal aorta is not aneurysmal. Aortic and other arterial calcifications are present. A partly calcified splenic artery aneurysm is again noted.  The lung bases show chronic fibrotic changes.  Degenerative changes are seen in the spine. No acute fracture is identified.      IMPRESSION:  Colonic diverticulosis. No bowel obstruction or focal acute inflammatory process of bowel is identified. Follow-up as indications persist.    ASSESSMENT & PLAN  Mrs. Last is a 77-year-old lady with a chronic incisional hernia containing incarcerated fat with worsening associated symptoms of pain and bloating.  I reviewed her CT scan from last year and showed her the images.  Given the narrow neck of her incisional hernia, I think she would benefit from an open incisional hernia repair with biologic retrorectus mesh to minimize risk of long-term hernia recurrence.  I cautioned her on the other risks of the procedure which include bleeding, wound infection, hernia recurrence, and damage to intra-abdominal structures  such as the colon or small bowel.  Despite these risks, she has consented to proceed and I am happy to get her surgery scheduled for next Tuesday.  She should hold her Eliquis between now and then.    Trini Wade MD  General and Endoscopic Surgery  [facility]    [address], [address]  [place], [place], [address]  P: [phone]  F: [phone]

## 2023-09-11 NOTE — H&P (VIEW-ONLY)
General Surgery  Established Patient Office Visit    CC: Follow-up incisional hernia    HPI: The patient is a pleasant 77 y.o. year-old lady who returns to see me today for evaluation of worsening chronic left upper and lower quadrant abdominal pain that has been ongoing for a few years at the site of a known incisional hernia.  The hernia is located at the site of a previous colostomy created in 2017 by Dr. Delgado for perforated diverticulitis.  She then later had a colostomy takedown in September 2017 also by Dr. Delgado and developed hernia shortly thereafter.  We have always elected to leave the hernia alone as it was relatively asymptomatic.  She has developed the cramping abdominal pain at the site of the previous hernia that has been off and on for the last couple of years, and she says today that it has been worsening in severity.  She has also experienced abdominal bloating and nausea whenever the pain becomes severe, likely due to transient incarceration of the adjacent transverse colon.  Her last CT scan was performed in June 2022 and demonstrated a fat-containing incisional hernia of the left side of her abdomen through her rectus muscle fascia.  The fascial defect measures 1.3 cm in diameter, and it appears as though the transverse colon is positioned directly beneath the fascial defect concerning for impending colonic incarceration.    Past Medical History:   Hypertension  Atrial fibrillation  Vertigo  COPD  Asthma  Obstructive sleep apnea  History of multidrug-resistant bacterial infections of the skin and soft tissues  History of diverticulitis  Psoriasis  History of small bowel obstructions requiring exploratory laparotomy with lysis of adhesions  History of C. difficile colitis treated medically     Past Surgical History:   Exploratory laparotomy with lysis of adhesions (Sept 2019 by me)  Open sigmoid colectomy with colostomy (May 2017, Dr. Delgado)  Open colostomy takedown (September 2017,   Danny)  Hysterectomy  Tonsillectomy  Colonoscopy (August 2019, Dr. Steven Vela -multiple adenomatous colon polyps removed)  Cholecystectomy  Blepharoplasty  Cataract extraction    Medications:   Eliquis 5 mg every 12 hours  Tylenol as needed for arthritis pain  Vitamin C 1 tablet daily  Vitamin B complex once daily  Buspirone 5 mg 3 times daily as needed for anxiety  Vitamin D3 1000 units daily  Flecainide 50 mg twice daily  Fluticasone nasal spray as needed  Losartan 100 mg daily  Magnesium once daily  Meclizine 25 mg 3 times daily as needed for dizziness  Melatonin 5 mg nightly as needed for insomnia  Metoprolol 50 mg daily    Allergies: Penicillins (oral blisters), epinephrine (received too much and had heart palpitations)    Family History: Mother with colon cancer, son with ulcerative colitis    Social History:  and cares for her disabled , occasional alcohol use, former smoker but quit in 1967    ROS:   Constitutional: Negative for fevers or chills  HENT: Negative for hearing loss or runny nose  Eyes: Negative for vision changes or scleral icterus  Respiratory: Negative for cough or shortness of breath  Cardiovascular: Negative for chest pain or heart palpitations  Gastrointestinal: Positive for abdominal pain, abdominal bloating, and diarrhea; negative for nausea, vomiting, constipation, melena, or hematochezia  Genitourinary: Negative for hematuria or dysuria  Musculoskeletal: Negative for joint swelling or gait instability  Neurologic: Positive for balance problems, dizziness, and lightheadedness due to vertigo; negative for tremors or seizures  Psychiatric: Negative for suicidal ideations or depression  All other systems reviewed and negative    Physical Exam:  Height: 165 cm  Weight: 80 kg  BMI: 29.5  General: No acute distress, well-nourished & well-developed  HEAD: normocephalic, atraumatic  EYES: normal conjunctiva, sclera anicteric  EARS: grossly normal hearing  NECK: supple, no  thyromegaly  CARDIOVASCULAR: regular rate and rhythm  RESPIRATORY: clear to auscultation bilaterally  GASTROINTESTINAL: soft, nontender, non-distended, chronically incarcerated left lower quadrant hernia at the site of her previous colostomy which contains only fat  MUSCULOSKELETAL: normal gait and station. No gross extremity abnormalities  PSYCHIATRIC: oriented x3, normal mood and affect    IMAGING:  CT ABD/PELVIS (June 2023):  Otherwise unremarkable appearance of the liver, spleen, adrenal glands, pancreas, kidneys, bladder.  No bowel obstruction or abnormal bowel thickening is identified. Colonic diverticula are seen that do not appear inflamed. The appendix does no appear inflamed.  No free intraperitoneal gas or free fluid. A hernia of fat is redemonstrated at the anterior aspect of the upper pelvis.  Scattered small mesenteric and para-aortic lymph nodes are seen that are not significant by size criteria.  Abdominal aorta is not aneurysmal. Aortic and other arterial calcifications are present. A partly calcified splenic artery aneurysm is again noted.  The lung bases show chronic fibrotic changes.  Degenerative changes are seen in the spine. No acute fracture is identified.      IMPRESSION:  Colonic diverticulosis. No bowel obstruction or focal acute inflammatory process of bowel is identified. Follow-up as indications persist.    ASSESSMENT & PLAN  Mrs. Last is a 77-year-old lady with a chronic incisional hernia containing incarcerated fat with worsening associated symptoms of pain and bloating.  I reviewed her CT scan from last year and showed her the images.  Given the narrow neck of her incisional hernia, I think she would benefit from an open incisional hernia repair with biologic retrorectus mesh to minimize risk of long-term hernia recurrence.  I cautioned her on the other risks of the procedure which include bleeding, wound infection, hernia recurrence, and damage to intra-abdominal structures  such as the colon or small bowel.  Despite these risks, she has consented to proceed and I am happy to get her surgery scheduled for next Tuesday.  She should hold her Eliquis between now and then.    Trini Wade MD  General and Endoscopic Surgery  [facility]    [address], [address]  [place], [place], [address]  P: [phone]  F: [phone]

## 2023-09-12 ENCOUNTER — ANESTHESIA EVENT (OUTPATIENT)
Dept: PERIOP | Facility: HOSPITAL | Age: 78
End: 2023-09-12

## 2023-09-12 ENCOUNTER — ANESTHESIA (OUTPATIENT)
Dept: PERIOP | Facility: HOSPITAL | Age: 78
End: 2023-09-12

## 2023-09-13 ENCOUNTER — TELEPHONE (OUTPATIENT)
Dept: SURGERY | Facility: CLINIC | Age: 78
End: 2023-09-13
Payer: MEDICARE

## 2023-09-13 ENCOUNTER — PREP FOR SURGERY (OUTPATIENT)
Dept: OTHER | Facility: HOSPITAL | Age: 78
End: 2023-09-13
Payer: MEDICARE

## 2023-09-13 DIAGNOSIS — K43.2 INCISIONAL HERNIA, WITHOUT OBSTRUCTION OR GANGRENE: Primary | ICD-10-CM

## 2023-09-13 NOTE — TELEPHONE ENCOUNTER
Patient informed. She says she is very sorry but had one more question. She stated her bowel movements have been a little off and wondered if she needed an US prior to surgery.

## 2023-09-13 NOTE — TELEPHONE ENCOUNTER
Called patient to discuss rescheduling her surgery. She needs to wait until her  is doing better before she reschedules. She will call back.

## 2023-09-19 ENCOUNTER — PRE-ADMISSION TESTING (OUTPATIENT)
Dept: PREADMISSION TESTING | Facility: HOSPITAL | Age: 78
End: 2023-09-19
Payer: MEDICARE

## 2023-09-19 VITALS
OXYGEN SATURATION: 95 % | TEMPERATURE: 97.1 F | HEIGHT: 64 IN | HEART RATE: 59 BPM | WEIGHT: 180.1 LBS | SYSTOLIC BLOOD PRESSURE: 141 MMHG | RESPIRATION RATE: 16 BRPM | BODY MASS INDEX: 30.75 KG/M2 | DIASTOLIC BLOOD PRESSURE: 77 MMHG

## 2023-09-19 DIAGNOSIS — K43.2 INCISIONAL HERNIA, WITHOUT OBSTRUCTION OR GANGRENE: ICD-10-CM

## 2023-09-19 LAB
ANION GAP SERPL CALCULATED.3IONS-SCNC: 9.3 MMOL/L (ref 5–15)
BUN SERPL-MCNC: 16 MG/DL (ref 8–23)
BUN/CREAT SERPL: 22.5 (ref 7–25)
CALCIUM SPEC-SCNC: 9.7 MG/DL (ref 8.6–10.5)
CHLORIDE SERPL-SCNC: 105 MMOL/L (ref 98–107)
CO2 SERPL-SCNC: 26.7 MMOL/L (ref 22–29)
CREAT SERPL-MCNC: 0.71 MG/DL (ref 0.57–1)
DEPRECATED RDW RBC AUTO: 43 FL (ref 37–54)
EGFRCR SERPLBLD CKD-EPI 2021: 87.2 ML/MIN/1.73
ERYTHROCYTE [DISTWIDTH] IN BLOOD BY AUTOMATED COUNT: 13.2 % (ref 12.3–15.4)
GLUCOSE SERPL-MCNC: 94 MG/DL (ref 65–99)
HCT VFR BLD AUTO: 36.6 % (ref 34–46.6)
HGB BLD-MCNC: 12 G/DL (ref 12–15.9)
MCH RBC QN AUTO: 29.2 PG (ref 26.6–33)
MCHC RBC AUTO-ENTMCNC: 32.8 G/DL (ref 31.5–35.7)
MCV RBC AUTO: 89.1 FL (ref 79–97)
PLATELET # BLD AUTO: 175 10*3/MM3 (ref 140–450)
PMV BLD AUTO: 12.2 FL (ref 6–12)
POTASSIUM SERPL-SCNC: 5.2 MMOL/L (ref 3.5–5.2)
RBC # BLD AUTO: 4.11 10*6/MM3 (ref 3.77–5.28)
SODIUM SERPL-SCNC: 141 MMOL/L (ref 136–145)
WBC NRBC COR # BLD: 7.71 10*3/MM3 (ref 3.4–10.8)

## 2023-09-19 PROCEDURE — 85027 COMPLETE CBC AUTOMATED: CPT

## 2023-09-19 PROCEDURE — 80048 BASIC METABOLIC PNL TOTAL CA: CPT

## 2023-09-19 PROCEDURE — 36415 COLL VENOUS BLD VENIPUNCTURE: CPT

## 2023-09-19 NOTE — DISCHARGE INSTRUCTIONS
CHLORHEXIDINE CLOTH INSTRUCTIONS  The morning of surgery follow these instructions using the Chlorhexidine cloths you've been given.  These steps reduce bacteria on the body.  Do not use the cloths near your eyes, ears mouth, genitalia or on open wounds.  Throw the cloths away after use but do not try to flush them down a toilet.      Open and remove one cloth at a time from the package.    Leave the cloth unfolded and begin the bathing.  Massage the skin with the cloths using gentle pressure to remove bacteria.  Do not scrub harshly.   Follow the steps below with one 2% CHG cloth per area (6 total cloths).  One cloth for neck, shoulders and chest.  One cloth for both arms, hands, fingers and underarms (do underarms last).  One cloth for the abdomen followed by groin.  One cloth for right leg and foot including between the toes.  One cloth for left leg and foot including between the toes.  The last cloth is to be used for the back of the neck, back and buttocks.    Allow the CHG to air dry 3 minutes on the skin which will give it time to work and decrease the chance of irritation.  The skin may feel sticky until it is dry.  Do not rinse with water or any other liquid or you will lose the beneficial effects of the CHG.  If mild skin irritation occurs, do rinse the skin to remove the CHG.  Report this to the nurse at time of admission.  Do not apply lotions, creams, ointments, deodorants or perfumes after using the clothes. Dress in clean clothes before coming to the hospital.    Take the following medications the morning of surgery: OMEPRAZOLE AND METOPROLOL    ARRIVAL TIME 08:00      If you are on prescription narcotic pain medication to control your pain you may also take that medication the morning of surgery.    General Instructions:  Do not eat solid food after midnight the night before surgery.  You may drink clear liquids day of surgery but must stop at least one hour before your hospital arrival time.  It is  beneficial for you to have a clear drink that contains carbohydrates the day of surgery.  We suggest a 12 to 20 ounce bottle of Gatorade or Powerade for non-diabetic patients or a 12 to 20 ounce bottle of G2 or Powerade Zero for diabetic patients. (Pediatric patients, are not advised to drink a 12 to 20 ounce carbohydrate drink)    Clear liquids are liquids you can see through.  Nothing red in color.     Plain water                               Sports drinks  Sodas                                   Gelatin (Jell-O)  Fruit juices without pulp such as white grape juice and apple juice  Popsicles that contain no fruit or yogurt  Tea or coffee (no cream or milk added)  Gatorade / Powerade  G2 / Powerade Zero  Patients who avoid smoking, chewing tobacco and alcohol for 4 weeks prior to surgery have a reduced risk of post-operative complications.  Quit smoking as many days before surgery as you can.  Do not smoke, use chewing tobacco or drink alcohol the day of surgery.   If applicable bring your C-PAP/ BI-PAP machine in with you to preop day of surgery.  Bring any papers given to you in the doctor’s office.  Wear clean comfortable clothes.  Do not wear contact lenses, false eyelashes or make-up.  Bring a case for your glasses.   Bring crutches or walker if applicable.  Remove all piercings.  Leave jewelry and any other valuables at home.  Hair extensions with metal clips must be removed prior to surgery.  The Pre-Admission Testing nurse will instruct you to bring medications if unable to obtain an accurate list in Pre-Admission Testing.      Preventing a Surgical Site Infection:  For 2 to 3 days before surgery, avoid shaving with a razor because the razor can irritate skin and make it easier to develop an infection.    Any areas of open skin can increase the risk of a post-operative wound infection by allowing bacteria to enter and travel throughout the body.  Notify your surgeon if you have any skin wounds / rashes  even if it is not near the expected surgical site.  The area will need assessed to determine if surgery should be delayed until it is healed.  The night prior to surgery shower using a fresh bar of anti-bacterial soap (such as Dial) and clean washcloth.  Sleep in a clean bed with clean clothing.  Do not allow pets to sleep with you.  Shower on the morning of surgery using a fresh bar of anti-bacterial soap (such as Dial) and clean washcloth.  Dry with a clean towel and dress in clean clothing.  Ask your surgeon if you will be receiving antibiotics prior to surgery.  Make sure you, your family, and all healthcare providers clean their hands with soap and water or an alcohol based hand  before caring for you or your wound.    Day of surgery:  Your arrival time is approximately two hours before your scheduled surgery time.  Upon arrival, a Pre-op nurse and Anesthesiologist will review your health history, obtain vital signs, and answer questions you may have.  The only belongings needed at this time will be a list of your home medications and if applicable your C-PAP/BI-PAP machine.  A Pre-op nurse will start an IV and you may receive medication in preparation for surgery, including something to help you relax.     Please be aware that surgery does come with discomfort.  We want to make every effort to control your discomfort so please discuss any uncontrolled symptoms with your nurse.   Your doctor will most likely have prescribed pain medications.      If you are going home after surgery you will receive individualized written care instructions before being discharged.  A responsible adult must drive you to and from the hospital on the day of your surgery and stay with you for 24 hours.  Discharge prescriptions can be filled by the hospital pharmacy during regular pharmacy hours.  If you are having surgery late in the day/evening your prescription may be e-prescribed to your pharmacy.  Please verify your  pharmacy hours or chose a 24 hour pharmacy to avoid not having access to your prescription because your pharmacy has closed for the day.    If you are staying overnight following surgery, you will be transported to your hospital room following the recovery period.  [facility] has all private rooms.    If you have any questions please call Pre-Admission Testing at [phone].  Deductibles and co-payments are collected on the day of service. Please be prepared to pay the required co-pay, deductible or deposit on the day of service as defined by your plan.    Call your surgeon immediately if you experience any of the following symptoms:  Sore Throat  Shortness of Breath or difficulty breathing  Cough  Chills  Body soreness or muscle pain  Headache  Fever  New loss of taste or smell  Do not arrive for your surgery ill.  Your procedure will need to be rescheduled to another time.  You will need to call your physician before the day of surgery to avoid any unnecessary exposure to hospital staff as well as other patients.

## 2023-09-22 ENCOUNTER — ANESTHESIA EVENT (OUTPATIENT)
Dept: PERIOP | Facility: HOSPITAL | Age: 78
End: 2023-09-22
Payer: MEDICARE

## 2023-09-22 ENCOUNTER — HOSPITAL ENCOUNTER (OUTPATIENT)
Facility: HOSPITAL | Age: 78
Setting detail: HOSPITAL OUTPATIENT SURGERY
Discharge: HOME | End: 2023-09-22
Attending: SURGERY | Admitting: SURGERY
Payer: MEDICARE

## 2023-09-22 ENCOUNTER — ANESTHESIA (OUTPATIENT)
Dept: PERIOP | Facility: HOSPITAL | Age: 78
End: 2023-09-22
Payer: MEDICARE

## 2023-09-22 VITALS
TEMPERATURE: 97.5 F | DIASTOLIC BLOOD PRESSURE: 96 MMHG | HEART RATE: 59 BPM | SYSTOLIC BLOOD PRESSURE: 143 MMHG | OXYGEN SATURATION: 95 % | RESPIRATION RATE: 16 BRPM

## 2023-09-22 DIAGNOSIS — K43.2 INCISIONAL HERNIA, WITHOUT OBSTRUCTION OR GANGRENE: Primary | ICD-10-CM

## 2023-09-22 PROCEDURE — 25010000002 FENTANYL CITRATE (PF) 50 MCG/ML SOLUTION: Performed by: NURSE ANESTHETIST, CERTIFIED REGISTERED

## 2023-09-22 PROCEDURE — 25010000002 HYDROMORPHONE 1 MG/ML SOLUTION: Performed by: NURSE ANESTHETIST, CERTIFIED REGISTERED

## 2023-09-22 PROCEDURE — 49592 RPR AA HRN 1ST < 3 NCR/STRN: CPT | Performed by: SURGERY

## 2023-09-22 PROCEDURE — 25010000002 ONDANSETRON PER 1 MG: Performed by: NURSE ANESTHETIST, CERTIFIED REGISTERED

## 2023-09-22 PROCEDURE — C1781 MESH (IMPLANTABLE): HCPCS | Performed by: SURGERY

## 2023-09-22 PROCEDURE — 25010000002 DROPERIDOL PER 5 MG: Performed by: NURSE ANESTHETIST, CERTIFIED REGISTERED

## 2023-09-22 PROCEDURE — 25010000002 HYDROMORPHONE PER 4 MG: Performed by: NURSE ANESTHETIST, CERTIFIED REGISTERED

## 2023-09-22 PROCEDURE — 25010000002 PROPOFOL 10 MG/ML EMULSION: Performed by: NURSE ANESTHETIST, CERTIFIED REGISTERED

## 2023-09-22 PROCEDURE — 25010000002 SUGAMMADEX 200 MG/2ML SOLUTION: Performed by: NURSE ANESTHETIST, CERTIFIED REGISTERED

## 2023-09-22 PROCEDURE — 25010000002 CEFAZOLIN IN DEXTROSE 2000 MG/ 100 ML SOLUTION: Performed by: SURGERY

## 2023-09-22 PROCEDURE — 25010000002 DEXAMETHASONE SODIUM PHOSPHATE 20 MG/5ML SOLUTION: Performed by: NURSE ANESTHETIST, CERTIFIED REGISTERED

## 2023-09-22 DEVICE — PHASIX MESH, 4" X 6" (10.2 CM X 15.2 CM), RECTANGLE
Type: IMPLANTABLE DEVICE | Site: ABDOMEN | Status: FUNCTIONAL
Brand: PHASIX

## 2023-09-22 DEVICE — ARISTA AH ABSORBABLE HEMOSTATIC PARTICLES
Type: IMPLANTABLE DEVICE | Site: ABDOMEN | Status: FUNCTIONAL
Brand: ARISTA™ AH

## 2023-09-22 RX ORDER — PROPOFOL 10 MG/ML
VIAL (ML) INTRAVENOUS AS NEEDED
Status: DISCONTINUED | OUTPATIENT
Start: 2023-09-22 | End: 2023-09-22 | Stop reason: SURG

## 2023-09-22 RX ORDER — EPHEDRINE SULFATE 50 MG/ML
5 INJECTION, SOLUTION INTRAVENOUS ONCE AS NEEDED
Status: DISCONTINUED | OUTPATIENT
Start: 2023-09-22 | End: 2023-09-22 | Stop reason: HOSPADM

## 2023-09-22 RX ORDER — DIPHENHYDRAMINE HYDROCHLORIDE 50 MG/ML
12.5 INJECTION INTRAMUSCULAR; INTRAVENOUS
Status: DISCONTINUED | OUTPATIENT
Start: 2023-09-22 | End: 2023-09-22 | Stop reason: HOSPADM

## 2023-09-22 RX ORDER — PROMETHAZINE HYDROCHLORIDE 25 MG/1
25 TABLET ORAL ONCE AS NEEDED
Status: DISCONTINUED | OUTPATIENT
Start: 2023-09-22 | End: 2023-09-22 | Stop reason: HOSPADM

## 2023-09-22 RX ORDER — LIDOCAINE HYDROCHLORIDE 20 MG/ML
INJECTION, SOLUTION INFILTRATION; PERINEURAL AS NEEDED
Status: DISCONTINUED | OUTPATIENT
Start: 2023-09-22 | End: 2023-09-22 | Stop reason: SURG

## 2023-09-22 RX ORDER — DROPERIDOL 2.5 MG/ML
0.62 INJECTION, SOLUTION INTRAMUSCULAR; INTRAVENOUS
Status: DISCONTINUED | OUTPATIENT
Start: 2023-09-22 | End: 2023-09-22 | Stop reason: HOSPADM

## 2023-09-22 RX ORDER — PROMETHAZINE HYDROCHLORIDE 25 MG/1
25 SUPPOSITORY RECTAL ONCE AS NEEDED
Status: DISCONTINUED | OUTPATIENT
Start: 2023-09-22 | End: 2023-09-22 | Stop reason: HOSPADM

## 2023-09-22 RX ORDER — MAGNESIUM HYDROXIDE 1200 MG/15ML
LIQUID ORAL AS NEEDED
Status: DISCONTINUED | OUTPATIENT
Start: 2023-09-22 | End: 2023-09-22 | Stop reason: HOSPADM

## 2023-09-22 RX ORDER — FENTANYL CITRATE 50 UG/ML
25 INJECTION, SOLUTION INTRAMUSCULAR; INTRAVENOUS
Status: DISCONTINUED | OUTPATIENT
Start: 2023-09-22 | End: 2023-09-22 | Stop reason: HOSPADM

## 2023-09-22 RX ORDER — CEFAZOLIN SODIUM 2 G/100ML
2000 INJECTION, SOLUTION INTRAVENOUS ONCE
Status: COMPLETED | OUTPATIENT
Start: 2023-09-22 | End: 2023-09-22

## 2023-09-22 RX ORDER — FLUMAZENIL 0.1 MG/ML
0.2 INJECTION INTRAVENOUS AS NEEDED
Status: DISCONTINUED | OUTPATIENT
Start: 2023-09-22 | End: 2023-09-22 | Stop reason: HOSPADM

## 2023-09-22 RX ORDER — IPRATROPIUM BROMIDE AND ALBUTEROL SULFATE 2.5; .5 MG/3ML; MG/3ML
3 SOLUTION RESPIRATORY (INHALATION) ONCE AS NEEDED
Status: DISCONTINUED | OUTPATIENT
Start: 2023-09-22 | End: 2023-09-22 | Stop reason: HOSPADM

## 2023-09-22 RX ORDER — HYDROCODONE BITARTRATE AND ACETAMINOPHEN 5; 325 MG/1; MG/1
1 TABLET ORAL ONCE AS NEEDED
Status: DISCONTINUED | OUTPATIENT
Start: 2023-09-22 | End: 2023-09-22 | Stop reason: HOSPADM

## 2023-09-22 RX ORDER — SODIUM CHLORIDE, SODIUM LACTATE, POTASSIUM CHLORIDE, CALCIUM CHLORIDE 600; 310; 30; 20 MG/100ML; MG/100ML; MG/100ML; MG/100ML
9 INJECTION, SOLUTION INTRAVENOUS CONTINUOUS
Status: DISCONTINUED | OUTPATIENT
Start: 2023-09-22 | End: 2023-09-22 | Stop reason: HOSPADM

## 2023-09-22 RX ORDER — SODIUM CHLORIDE 0.9 % (FLUSH) 0.9 %
3 SYRINGE (ML) INJECTION EVERY 12 HOURS SCHEDULED
Status: DISCONTINUED | OUTPATIENT
Start: 2023-09-22 | End: 2023-09-22 | Stop reason: HOSPADM

## 2023-09-22 RX ORDER — SODIUM CHLORIDE 0.9 % (FLUSH) 0.9 %
3-10 SYRINGE (ML) INJECTION AS NEEDED
Status: DISCONTINUED | OUTPATIENT
Start: 2023-09-22 | End: 2023-09-22 | Stop reason: HOSPADM

## 2023-09-22 RX ORDER — FAMOTIDINE 10 MG/ML
20 INJECTION, SOLUTION INTRAVENOUS ONCE
Status: COMPLETED | OUTPATIENT
Start: 2023-09-22 | End: 2023-09-22

## 2023-09-22 RX ORDER — OXYCODONE HYDROCHLORIDE AND ACETAMINOPHEN 5; 325 MG/1; MG/1
1 TABLET ORAL EVERY 4 HOURS PRN
Qty: 15 TABLET | Refills: 0 | Status: SHIPPED | OUTPATIENT
Start: 2023-09-22

## 2023-09-22 RX ORDER — LIDOCAINE HYDROCHLORIDE 10 MG/ML
0.5 INJECTION, SOLUTION INFILTRATION; PERINEURAL ONCE AS NEEDED
Status: DISCONTINUED | OUTPATIENT
Start: 2023-09-22 | End: 2023-09-22 | Stop reason: HOSPADM

## 2023-09-22 RX ORDER — ONDANSETRON 2 MG/ML
4 INJECTION INTRAMUSCULAR; INTRAVENOUS ONCE AS NEEDED
Status: COMPLETED | OUTPATIENT
Start: 2023-09-22 | End: 2023-09-22

## 2023-09-22 RX ORDER — DEXAMETHASONE SODIUM PHOSPHATE 4 MG/ML
INJECTION, SOLUTION INTRA-ARTICULAR; INTRALESIONAL; INTRAMUSCULAR; INTRAVENOUS; SOFT TISSUE AS NEEDED
Status: DISCONTINUED | OUTPATIENT
Start: 2023-09-22 | End: 2023-09-22 | Stop reason: SURG

## 2023-09-22 RX ORDER — HYDRALAZINE HYDROCHLORIDE 20 MG/ML
5 INJECTION INTRAMUSCULAR; INTRAVENOUS
Status: DISCONTINUED | OUTPATIENT
Start: 2023-09-22 | End: 2023-09-22 | Stop reason: HOSPADM

## 2023-09-22 RX ORDER — NALOXONE HCL 0.4 MG/ML
0.2 VIAL (ML) INJECTION AS NEEDED
Status: DISCONTINUED | OUTPATIENT
Start: 2023-09-22 | End: 2023-09-22 | Stop reason: HOSPADM

## 2023-09-22 RX ORDER — ONDANSETRON 2 MG/ML
INJECTION INTRAMUSCULAR; INTRAVENOUS AS NEEDED
Status: DISCONTINUED | OUTPATIENT
Start: 2023-09-22 | End: 2023-09-22 | Stop reason: SURG

## 2023-09-22 RX ORDER — HYDROCODONE BITARTRATE AND ACETAMINOPHEN 7.5; 325 MG/1; MG/1
1 TABLET ORAL EVERY 4 HOURS PRN
Status: DISCONTINUED | OUTPATIENT
Start: 2023-09-22 | End: 2023-09-22 | Stop reason: HOSPADM

## 2023-09-22 RX ORDER — LABETALOL HYDROCHLORIDE 5 MG/ML
5 INJECTION, SOLUTION INTRAVENOUS
Status: DISCONTINUED | OUTPATIENT
Start: 2023-09-22 | End: 2023-09-22 | Stop reason: HOSPADM

## 2023-09-22 RX ORDER — BUPIVACAINE HYDROCHLORIDE AND EPINEPHRINE 5; 5 MG/ML; UG/ML
INJECTION, SOLUTION EPIDURAL; INTRACAUDAL; PERINEURAL AS NEEDED
Status: DISCONTINUED | OUTPATIENT
Start: 2023-09-22 | End: 2023-09-22 | Stop reason: HOSPADM

## 2023-09-22 RX ORDER — HYDROMORPHONE HYDROCHLORIDE 1 MG/ML
0.25 INJECTION, SOLUTION INTRAMUSCULAR; INTRAVENOUS; SUBCUTANEOUS
Status: DISCONTINUED | OUTPATIENT
Start: 2023-09-22 | End: 2023-09-22 | Stop reason: HOSPADM

## 2023-09-22 RX ORDER — ROCURONIUM BROMIDE 10 MG/ML
INJECTION, SOLUTION INTRAVENOUS AS NEEDED
Status: DISCONTINUED | OUTPATIENT
Start: 2023-09-22 | End: 2023-09-22 | Stop reason: SURG

## 2023-09-22 RX ADMIN — HYDROCODONE BITARTRATE AND ACETAMINOPHEN 1 TABLET: 7.5; 325 TABLET ORAL at 12:48

## 2023-09-22 RX ADMIN — PROPOFOL 25 MCG/KG/MIN: 10 INJECTION, EMULSION INTRAVENOUS at 10:45

## 2023-09-22 RX ADMIN — FENTANYL CITRATE 25 MCG: 50 INJECTION, SOLUTION INTRAMUSCULAR; INTRAVENOUS at 12:18

## 2023-09-22 RX ADMIN — CEFAZOLIN SODIUM 2 G: 2 INJECTION, SOLUTION INTRAVENOUS at 10:42

## 2023-09-22 RX ADMIN — FENTANYL CITRATE 25 MCG: 50 INJECTION, SOLUTION INTRAMUSCULAR; INTRAVENOUS at 12:08

## 2023-09-22 RX ADMIN — LIDOCAINE HYDROCHLORIDE 80 MG: 20 INJECTION, SOLUTION INFILTRATION; PERINEURAL at 10:37

## 2023-09-22 RX ADMIN — FAMOTIDINE 20 MG: 10 INJECTION INTRAVENOUS at 09:12

## 2023-09-22 RX ADMIN — PROPOFOL 140 MG: 10 INJECTION, EMULSION INTRAVENOUS at 10:37

## 2023-09-22 RX ADMIN — SODIUM CHLORIDE, POTASSIUM CHLORIDE, SODIUM LACTATE AND CALCIUM CHLORIDE 9 ML/HR: 600; 310; 30; 20 INJECTION, SOLUTION INTRAVENOUS at 09:12

## 2023-09-22 RX ADMIN — HYDROMORPHONE HYDROCHLORIDE 0.25 MG: 1 INJECTION, SOLUTION INTRAMUSCULAR; INTRAVENOUS; SUBCUTANEOUS at 11:49

## 2023-09-22 RX ADMIN — HYDROMORPHONE HYDROCHLORIDE 0.25 MG: 1 INJECTION, SOLUTION INTRAMUSCULAR; INTRAVENOUS; SUBCUTANEOUS at 11:54

## 2023-09-22 RX ADMIN — DROPERIDOL 0.62 MG: 2.5 INJECTION, SOLUTION INTRAMUSCULAR; INTRAVENOUS at 11:49

## 2023-09-22 RX ADMIN — ONDANSETRON 4 MG: 2 INJECTION INTRAMUSCULAR; INTRAVENOUS at 10:55

## 2023-09-22 RX ADMIN — DEXAMETHASONE SODIUM PHOSPHATE 8 MG: 4 INJECTION, SOLUTION INTRAMUSCULAR; INTRAVENOUS at 10:45

## 2023-09-22 RX ADMIN — FENTANYL CITRATE 25 MCG: 50 INJECTION, SOLUTION INTRAMUSCULAR; INTRAVENOUS at 12:37

## 2023-09-22 RX ADMIN — FENTANYL CITRATE 25 MCG: 50 INJECTION, SOLUTION INTRAMUSCULAR; INTRAVENOUS at 12:22

## 2023-09-22 RX ADMIN — ONDANSETRON 4 MG: 2 INJECTION INTRAMUSCULAR; INTRAVENOUS at 11:44

## 2023-09-22 RX ADMIN — HYDROMORPHONE HYDROCHLORIDE 0.5 MG: 1 INJECTION, SOLUTION INTRAMUSCULAR; INTRAVENOUS; SUBCUTANEOUS at 11:38

## 2023-09-22 RX ADMIN — ROCURONIUM BROMIDE 40 MG: 10 INJECTION INTRAVENOUS at 10:37

## 2023-09-22 RX ADMIN — SUGAMMADEX 200 MG: 100 INJECTION, SOLUTION INTRAVENOUS at 11:29

## 2023-09-22 RX ADMIN — FENTANYL CITRATE 25 MCG: 50 INJECTION, SOLUTION INTRAMUSCULAR; INTRAVENOUS at 12:02

## 2023-09-22 NOTE — ANESTHESIA PROCEDURE NOTES
Airway  Urgency: elective    Date/Time: 9/22/2023 10:40 AM  Airway not difficult    General Information and Staff    Patient location during procedure: OR  Anesthesiologist: Cinthya Menendez MD  CRNA/CAA: Courtney Esquivel CRNA    Indications and Patient Condition  Indications for airway management: airway protection    Preoxygenated: yes  MILS not maintained throughout  Mask difficulty assessment: 1 - vent by mask    Final Airway Details  Final airway type: endotracheal airway      Successful airway: ETT  Cuffed: yes   Successful intubation technique: direct laryngoscopy  Facilitating devices/methods: intubating stylet  Endotracheal tube insertion site: oral  Blade: Bradley  Blade size: 2  ETT size (mm): 7.0  Cormack-Lehane Classification: grade I - full view of glottis  Placement verified by: chest auscultation and capnometry   Cuff volume (mL): 6  Measured from: teeth  ETT/EBT  to teeth (cm): 20  Number of attempts at approach: 1  Assessment: lips, teeth, and gum same as pre-op and atraumatic intubation    Additional Comments  Airway exam prior to DL, teeth/lips inspected. Preoxygenated with 100% O2; sniffing position, IV induction, eyes taped. Easy mask ventilation. Atraumatic intubation. ETT connected to vent. Confirmed EBBS, +EtCO2. Lips and teeth inspected, intact, no damage.

## 2023-09-22 NOTE — ANESTHESIA PREPROCEDURE EVALUATION
Anesthesia Evaluation     Patient summary reviewed and Nursing notes reviewed   history of anesthetic complications:  PONV  NPO Solid Status: > 2 hours  NPO Liquid Status: > 8 hours           Airway   Mallampati: II  TM distance: >3 FB  Neck ROM: full  Dental - normal exam     Pulmonary - normal exam   (+) COPD (no symptoms) mild,sleep apnea  (-) asthma  Cardiovascular   Exercise tolerance: good (4-7 METS)    Patient on routine beta blocker and Beta blocker given within 24 hours of surgery  Rhythm: regular    (+) hypertension, dysrhythmias (a fib following colon resection) Paroxysmal Atrial Fib  (-) past MI, angina, cardiac stents      Neuro/Psych  (+) dizziness/light headedness (Vertigo), tremors  (-) seizures, CVA  GI/Hepatic/Renal/Endo    (+) GERD  (-) liver disease, no renal disease    ROS Comment: Prior colon resection/colostomy for perf diverticulosis.    Musculoskeletal     Abdominal    Substance History - negative use     OB/GYN negative ob/gyn ROS         Other   arthritis,                   Anesthesia Plan    ASA 3     general     (I have reviewed the patient's history and chart with the patient, including all pertinent laboratory results and imaging. I have explained the risks of anesthesia including but not limited to dental or airway injury, nausea, cardio-pulmonary complications, such as aspiration, MI, stroke, or death.     VITALS:  /58   Pulse 56   Temp 36.6 °C (97.9 °F)   LMP  (LMP Unknown)   SpO2 97% )  intravenous induction     Anesthetic plan, risks, benefits, and alternatives have been provided, discussed and informed consent has been obtained with: patient.  Pre-procedure education provided    CODE STATUS:

## 2023-09-22 NOTE — ANESTHESIA POSTPROCEDURE EVALUATION
Patient: Kiley Last    Procedure Summary       Date: 09/22/23 Room / Location: [facility] OR  / [facility] MAIN OR    Anesthesia Start: 1026 Anesthesia Stop: 1142    Procedure: Open incisional hernia repair with mesh (Abdomen) Diagnosis:       Incisional hernia, without obstruction or gangrene      (Incisional hernia, without obstruction or gangrene [K43.2])    Surgeons: Trini Wade MD Provider: Cinthya Menendez MD    Anesthesia Type: general ASA Status: 3            Anesthesia Type: general    Vitals  Vitals Value Taken Time   /90 09/22/23 1415   Temp 36.4 °C (97.5 °F) 09/22/23 1145   Pulse 56 09/22/23 1424   Resp 10 09/22/23 1415   SpO2 91 % 09/22/23 1424   Vitals shown include unvalidated device data.        Post Anesthesia Care and Evaluation    No anesthesia care post op

## 2023-09-22 NOTE — OP NOTE
Operative Note :  Trini Wade MD      Kiley Last  1945    Procedure Date: 09/22/23    Pre-op Diagnosis:  Incisional hernia, without obstruction or gangrene [K43.2]    Post-Operative Diagnosis:  Incisional hernia, without obstruction or gangrene [K43.2]    Procedure:   Open incisional hernia repair with mesh (incarcerated, 2 cm, nonrecurrent)    Surgeon: Trini Wade MD    Assistant: Paul Stacy CSA (Paul was responsible for suctioning, retracting, suturing of all surgical incisions, and application of sterile dressings at the completion of the case)    Anesthesia:  General (general endotracheal tube)    Estimated Blood Loss: 5 mL    Specimens: None    Complications: None    Indications:  The patient is a 77-year-old lady who I underwent a sigmoid colectomy with colostomy for perforated diverticulitis many years ago.  She then had her colostomy reversed, all of this having been done by Dr. Renato Delgado.  She then developed a chronic hernia through her former colostomy site which has become increasingly more symptomatic in the last 2 years.  She presents today for open incisional hernia repair with mesh.  She has been counseled on the risks of the procedure to include bleeding, wound infection, hernia recurrence, and possible mesh complications such as erosion into the bowel or infection.  Despite these risks, she has consented to proceed.    Findings: 2 cm incarcerated abdominal wall hernia at previous colostomy site    Description of procedure:  The patient was brought to the operating room placed on the OR table in supine position.  An endotracheal tube was inserted and general anesthesia induced.  The abdominal wall was prepped and draped in a sterile fashion and a surgical timeout completed.  Local anesthetic was instilled throughout the skin of the left upper quadrant around her former colostomy site in the elliptical transverse scar was excised and discarded.  The underlying  subcutaneous fat was divided to the hernia sac which was dissected circumferentially to the level of the fascia.  The hernia sac was transected off the fascia and also discarded.  The incarcerated omentum was easily reduced into the abdominal cavity.  The preperitoneal space was developed and peritoneum closed primarily using a running 0 Vicryl suture.  The anterior and posterior rectus sheaths were  circumferentially using electrocautery.  The posterior rectus sheath was closed primarily using a running 0 PDS suture.  The fascial defect prior to closure measured about 2 cm.  The retrorectus space was developed for a length of about 7 cm craniocaudad by 5 cm side to side.  An elliptical piece of Phasix mesh was cut to fit the space and rested snugly within the retrorectus space.  The anterior rectus sheath was then closed primarily using interrupted 0 PDS suture, taking bites of the mesh with each stitch.  The subcutaneous space was inspected and irrigated with normal saline.  The wound appeared hemostatic.  The incision was closed primarily using interrupted 3-0 Vicryl deep dermal sutures followed by running 4-0 Vicryl subcuticular suture and topical Exofin glue.  She was then extubated and transferred to PACU in stable condition with all counts correct per nursing.    Trini Wade MD  General, Robotic, and Endoscopic Surgery  [facility]    [address], [address]  [place] [address]  P: [phone]  F: [phone]

## 2023-09-26 RX ORDER — LOSARTAN POTASSIUM 100 MG/1
TABLET ORAL
Qty: 90 TABLET | Refills: 2 | Status: SHIPPED | OUTPATIENT
Start: 2023-09-26

## 2023-10-06 ENCOUNTER — OFFICE VISIT (OUTPATIENT)
Dept: SURGERY | Facility: CLINIC | Age: 78
End: 2023-10-06
Payer: MEDICARE

## 2023-10-06 VITALS
SYSTOLIC BLOOD PRESSURE: 140 MMHG | DIASTOLIC BLOOD PRESSURE: 70 MMHG | WEIGHT: 176.6 LBS | HEIGHT: 64 IN | BODY MASS INDEX: 30.15 KG/M2

## 2023-10-06 DIAGNOSIS — K43.2 INCISIONAL HERNIA, WITHOUT OBSTRUCTION OR GANGRENE: ICD-10-CM

## 2023-10-06 DIAGNOSIS — Z09 SURGICAL FOLLOWUP: Primary | ICD-10-CM

## 2023-10-06 NOTE — PROGRESS NOTES
CHIEF COMPLAINT:   Chief Complaint   Patient presents with    Post-op     Open incisional hernia repair with mesh 09/22       HISTORY OF PRESENT ILLNESS:  This is a 77 y.o. female who presents for a post-operative visit after undergoing open incisional hernia repair with mesh on 9/22/2023.  She has been doing well other than having a low appetite currently and feeling as though her equilibrium is off.  She denies any nausea, vomiting, fever, or chills and has not noticed any cellulitis of the skin of her left upper quadrant.    PHYSICAL EXAM:  Lungs: Clear  Heart: RRR  ABD: Incision is healing well without any erythema or signs of infection.  Ext: no significant edema, calves nontender    A/P:  This is a 77 y.o. female patient who is S/P open incisional hernia repair with mesh on 9/22/2023    She is healing well.  I emphasized the importance of continuing to avoid lifting anything heavier than 15 pounds for 6 to 8 weeks postop.  I would like to see her back in about 4 weeks from now for recheck of her incision.  She is concerned because her  is being discharged from his subacute rehab facility on Monday and she will be resuming primary caregiver responsibilities again.  I emphasize she will need to ask for help frequently to make sure she is not having to do a lot of heavy lifting.  She expressed understanding.    Trini Wade MD  General, Robotic, and Endoscopic Surgery  [facility]    [address], [address]  [place] [address]  P: [phone]  F: [phone]

## 2023-10-27 ENCOUNTER — TELEPHONE (OUTPATIENT)
Dept: ORTHOPEDIC SURGERY | Facility: CLINIC | Age: 78
End: 2023-10-27

## 2023-10-27 DIAGNOSIS — M50.20 HNP (HERNIATED NUCLEUS PULPOSUS), CERVICAL: Primary | ICD-10-CM

## 2023-10-27 NOTE — TELEPHONE ENCOUNTER
"    Caller: Kiley Last \"Anna\"    Relationship: Self    Best call back number:     What orders are you requesting (i.e. lab or imaging): MRI OF THE NECK    In what timeframe would the patient need to come in: ASAP    Where will you receive your lab/imaging services: [facility]    "

## 2023-11-02 ENCOUNTER — OFFICE VISIT (OUTPATIENT)
Dept: SURGERY | Facility: CLINIC | Age: 78
End: 2023-11-02
Payer: MEDICARE

## 2023-11-02 VITALS
BODY MASS INDEX: 30.32 KG/M2 | SYSTOLIC BLOOD PRESSURE: 136 MMHG | HEIGHT: 64 IN | DIASTOLIC BLOOD PRESSURE: 72 MMHG | WEIGHT: 177.6 LBS

## 2023-11-02 DIAGNOSIS — Z09 SURGICAL FOLLOWUP: Primary | ICD-10-CM

## 2023-11-02 DIAGNOSIS — K43.2 INCISIONAL HERNIA, WITHOUT OBSTRUCTION OR GANGRENE: ICD-10-CM

## 2023-11-02 PROCEDURE — 1160F RVW MEDS BY RX/DR IN RCRD: CPT | Performed by: SURGERY

## 2023-11-02 PROCEDURE — 1159F MED LIST DOCD IN RCRD: CPT | Performed by: SURGERY

## 2023-11-02 PROCEDURE — 3078F DIAST BP <80 MM HG: CPT | Performed by: SURGERY

## 2023-11-02 PROCEDURE — 3075F SYST BP GE 130 - 139MM HG: CPT | Performed by: SURGERY

## 2023-11-02 PROCEDURE — 99024 POSTOP FOLLOW-UP VISIT: CPT | Performed by: SURGERY

## 2023-11-02 NOTE — PROGRESS NOTES
CHIEF COMPLAINT:   Chief Complaint   Patient presents with    Follow-up       HISTORY OF PRESENT ILLNESS:  This is a 78 y.o. female who presents for a post-operative visit after undergoing open incisional hernia repair with mesh on 9/22/2023.  She is doing fantastic with only mild incisional pain during certain activities.  Her  has been doing well since he was discharged home from his subacute rehab and she is helping to take care of his colostomy bag.    PHYSICAL EXAM:  Lungs: Clear  Heart: RRR  ABD: Incision is healing well without any erythema or signs of infection.  Ext: no significant edema, calves nontender    A/P:  This is a 78 y.o. female patient who is S/P open incisional hernia repair with mesh on 9/22/2023    She is healing beautifully.  I encouraged her to continue to avoid heavy lifting for another 2 weeks, after which she can return to all activities as tolerated.  She can see me back on an as-needed basis.    Trini Wade MD  General, Robotic, and Endoscopic Surgery  [facility]    [address], [address]  [place] [address]  P: [phone]  F: [phone]

## 2023-11-07 ENCOUNTER — HOSPITAL ENCOUNTER (OUTPATIENT)
Dept: MRI IMAGING | Facility: HOSPITAL | Age: 78
Discharge: HOME | End: 2023-11-07
Admitting: ORTHOPAEDIC SURGERY
Payer: MEDICARE

## 2023-11-07 DIAGNOSIS — M50.20 HNP (HERNIATED NUCLEUS PULPOSUS), CERVICAL: ICD-10-CM

## 2023-11-07 PROCEDURE — 72141 MRI NECK SPINE W/O DYE: CPT

## 2023-11-07 RX ORDER — FLECAINIDE ACETATE 50 MG/1
TABLET ORAL
Qty: 90 TABLET | Refills: 3 | Status: SHIPPED | OUTPATIENT
Start: 2023-11-07

## 2023-11-08 ENCOUNTER — TELEPHONE (OUTPATIENT)
Dept: ORTHOPEDIC SURGERY | Facility: CLINIC | Age: 78
End: 2023-11-08
Payer: MEDICARE

## 2023-11-08 NOTE — TELEPHONE ENCOUNTER
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  Can you make her an appointment please

## 2023-11-13 ENCOUNTER — TELEPHONE (OUTPATIENT)
Dept: ORTHOPEDIC SURGERY | Facility: CLINIC | Age: 78
End: 2023-11-13

## 2023-11-13 NOTE — TELEPHONE ENCOUNTER
"Caller: Kiley Last \"Anna\"    Relationship: Self    Best call back number:     What is the medical concern/diagnosis: NECK PAIN    What specialty or service is being requested: ORTHO    What is the provider, practice or medical service name: [facility]"

## 2023-11-17 ENCOUNTER — OFFICE VISIT (OUTPATIENT)
Dept: ORTHOPEDIC SURGERY | Facility: CLINIC | Age: 78
End: 2023-11-17
Payer: MEDICARE

## 2023-11-17 VITALS — HEIGHT: 64 IN | TEMPERATURE: 97.5 F | BODY MASS INDEX: 30.29 KG/M2 | WEIGHT: 177.4 LBS

## 2023-11-17 DIAGNOSIS — M50.30 DDD (DEGENERATIVE DISC DISEASE), CERVICAL: Primary | ICD-10-CM

## 2023-11-17 PROCEDURE — 1159F MED LIST DOCD IN RCRD: CPT | Performed by: NURSE PRACTITIONER

## 2023-11-17 PROCEDURE — 1160F RVW MEDS BY RX/DR IN RCRD: CPT | Performed by: NURSE PRACTITIONER

## 2023-11-17 PROCEDURE — 99213 OFFICE O/P EST LOW 20 MIN: CPT | Performed by: NURSE PRACTITIONER

## 2023-11-17 NOTE — PROGRESS NOTES
Patient Name: Kiley Last   YOB: 1945  Referring Primary Care Physician: Miranda Morgan MD      Chief Complaint:    Chief Complaint   Patient presents with    Cervical Spine - Initial Evaluation, Pain        Neck Pain   This is a chronic problem. The current episode started more than 1 year ago. The problem occurs intermittently. The problem has been gradually worsening. The pain is associated with nothing. The pain is present in the occipital region (Zachary cervical paraspinal referring into zachary shoulders down the BUE stooping at the elbows.). The quality of the pain is described as burning (Tightness). The pain is at a severity of 0/10. Exacerbated by: Lifting, pushing, pulling, looking down, turning head left/right. The pain is Worse during the day. Stiffness is present All day. Pertinent negatives include no headaches, numbness, tingling or weakness. She has tried acetaminophen, neck support and heat (Oral steroids) for the symptoms. The treatment provided mild relief.        HPI:  Kiley Last is a 78 y.o. female who presents to [facility] for evaluation of above complaints. This is an established patient of practice who has seen Dr. Dane trejo for shoulder pain.  Based on some of her symptoms she was referred for further evaluation of her cervical spine.  She did see Dr. Alonso for neck complaints in 2020.  At that point he referred her to physical therapy with traction.  She did buy a traction device, but was unable to complete physical therapy as she is the primary caregiver of her .  Pain is worse in the left PCS, she does get radicular pain down the right upper extremity at times.  Denies any weakness in upper extremities.  She does report history of imbalance which she relates to inner ear.  Prior pertinent records were reviewed.    PFSH:  See attached    ROS: As per HPI, otherwise negative    Objective:      78 y.o. female  Body mass  index is 30.45 kg/m²., 80.5 kg (177 lb 6.4 oz), @HT@  Vitals:    11/17/23 1112   Temp: 97.5 °F (36.4 °C)     Pain Score    11/17/23 1112   PainSc: 0-No pain   PainLoc: Neck            Spine Musculoskeletal Exam    Gait    Gait is normal.    Palpation    Cervical Spine    Tenderness: present      Trapezius: left    Range of Motion    Cervical Spine       Cervical flexion: chin to chest.     Cervical extension: normal.       Right      Lateral bending: reduced bend (0-25%).       Lateral rotation: reduced rotation (0-25%).       Left      Lateral bending: reduced bend (0-25%).       Lateral rotation: reduced rotation (0-25%).      Strength    Cervical Spine    Cervical spine motor exam is normal.    Sensory    Cervical Spine    Cervical spine sensation is normal.    Reflexes    Right        Biceps: 2/4      Brachioradialis: 2/4      Triceps: 2/4      Jordan: absent    Left        Biceps: 2/4        Brachioradialis: 2/4      Triceps: 2/4      Jordan: absent    General      Constitutional: well-developed and well-nourished    Scleral icterus: no    Labored breathing: no    Psychiatric: normal mood and affect and no acute distress    Neurological: alert and oriented x3    Skin: intact        IMAGING:   Cervical MRI 11/7/2023 at [facility] was reviewed and reveals multilevel degenerative disc and facet changes contributing to mild central stenosis in the mid cervical spine, varying degrees of foraminal narrowing, moderate on the right at C4-5, mild to left at C5-C6 and mild bilaterally C6-7      Assessment:           Diagnoses and all orders for this visit:    1. DDD (degenerative disc disease), cervical (Primary)             Plan:  For her pattern of neck pain referring into the trapezius and occasionally right upper extremity primarily in the proximal arm.  Will refer to physical therapy, she was advised to take her traction device with her as she has not yet learned how to use it.  Also requested they try dry needling  especially to the left trapezius.  Otherwise, she will follow-up in 2 months to see how she is progressing and if still symptomatic, may consider referral to pain management to try epidurals.  Call the meantime with questions or concerns.   Return in about 2 months (around 1/17/2024), or if symptoms worsen or fail to improve.    EMR Dragon/Transcription Disclaimer:   Much of this encounter note is an electronic transcription/translation of spoken language to printed text. The electronic translation of spoken language may permit erroneous, or at times, nonsensical words or phrases to be inadvertently transcribed; Although I have reviewed the note for such errors, some may still exist.  Red flags have been discussed at this or previous visits to include but not limited weakness in extremities, worsening pain that does not respond to conservative treatment and bowel or bladder dysfunction. These are reasons to present to ER and patient has been informed.    The diagnosis(es), natural history, pathophysiology and treatment for diagnosis(es) were discussed. Opportunity given and questions answered. Biomechanics of pertinent body areas discussed.    EXERCISES:  Advice on benefits of, and types of regular/moderate exercise pertaining to diagnosis.  Continue HEP. For back or neck pain, recommend pilates and or yoga as tolerated. Generally it is best to start any new exercise under the guidance of a  or therapist.   MEDICATIONS:  When prescribe, the risks, benefits, warnings,side effects and alternatives of medications discussed. Advised against long term use of narcotics.   PAIN CONTROL:  Cold, heat, OTC lidocaine patches and/or ointment as needed. Avoid direct skin contact with ice. Ice 15-20 minutes 3-4 times daily as needed. For SI joint pain, recommend ice bath in water about 50 degrees for 5 consecutive days, add ice slowly to help with adjustment and may cover with warm towel or robe to help with cold  tolerance. If using lidocaine, do not apply heat in conjunction as this can cause a burn.   MEDICAL RECORDS reviewed from other provider(s) for past and current medical history pertinent to this visit..

## 2023-12-08 DIAGNOSIS — R42 VERTIGO: ICD-10-CM

## 2023-12-08 RX ORDER — MECLIZINE HYDROCHLORIDE 25 MG/1
TABLET ORAL
Qty: 90 TABLET | Refills: 0 | Status: SHIPPED | OUTPATIENT
Start: 2023-12-08

## 2023-12-28 ENCOUNTER — TELEPHONE (OUTPATIENT)
Dept: CARDIOLOGY | Facility: CLINIC | Age: 78
End: 2023-12-28
Payer: MEDICARE

## 2023-12-28 NOTE — TELEPHONE ENCOUNTER
"    Caller: ShayyKiley meyers TINO \"Anna\"    Relationship to patient: Self    Best call back number: [phone]    Patient is needing: PATIENT STATED THAT [facility] ON [facility] HAS NOT RECEIVED THE ELIQUIS 5 MG PRESCRIPTION. PATIENT STATED THAT SHE HAS 3 DAYS OF MEDICATION LEFT. PLEASE CONTACT PATIENT TO ADVISE. THANK YOU.           "

## 2024-01-16 ENCOUNTER — OFFICE VISIT (OUTPATIENT)
Dept: ORTHOPEDIC SURGERY | Facility: CLINIC | Age: 79
End: 2024-01-16
Payer: MEDICARE

## 2024-01-16 VITALS — WEIGHT: 180.6 LBS | BODY MASS INDEX: 30.83 KG/M2 | HEIGHT: 64 IN | TEMPERATURE: 97.8 F

## 2024-01-16 DIAGNOSIS — R52 PAIN: Primary | ICD-10-CM

## 2024-01-16 DIAGNOSIS — M17.0 PRIMARY OSTEOARTHRITIS OF BOTH KNEES: ICD-10-CM

## 2024-01-16 RX ADMIN — METHYLPREDNISOLONE ACETATE 1 ML: 80 INJECTION, SUSPENSION INTRA-ARTICULAR; INTRALESIONAL; INTRAMUSCULAR; SOFT TISSUE at 14:39

## 2024-01-16 RX ADMIN — LIDOCAINE HYDROCHLORIDE 2 ML: 10 INJECTION, SOLUTION EPIDURAL; INFILTRATION; INTRACAUDAL; PERINEURAL at 14:40

## 2024-01-16 RX ADMIN — LIDOCAINE HYDROCHLORIDE 2 ML: 10 INJECTION, SOLUTION EPIDURAL; INFILTRATION; INTRACAUDAL; PERINEURAL at 14:39

## 2024-01-16 RX ADMIN — METHYLPREDNISOLONE ACETATE 1 ML: 80 INJECTION, SUSPENSION INTRA-ARTICULAR; INTRALESIONAL; INTRAMUSCULAR; SOFT TISSUE at 14:40

## 2024-01-16 NOTE — PROGRESS NOTES
"  YOB: 1945  Date of Service: 1/16/2024    Chief Complaints: Bilateral knee pain    Subjective:    History of Present Illness: Pt is seen in the office today with complaints of bilateral knee pain I last saw her for her right knee in July of last year have never seen her for her left knee she states they both hurt the same.  They bother her with stairs sit to stand anterior and medial her past medical history as listed below and reviewed by me      Allergies:   Allergies   Allergen Reactions    Epinephrine Palpitations    Penicillins Other (See Comments)     BLISTERS IN MOUTH    Patient tolerated ceftriaxone during 5/2017 admission.          Medications:   Home Medications:  Current Outpatient Medications on File Prior to Visit   Medication Sig    meclizine (ANTIVERT) 25 MG tablet TAKE ONE TABLET BY MOUTH THREE TIMES A DAY AS NEEDED FOR DIZZINESS    Acetaminophen (TYLENOL ARTHRITIS PAIN PO) Take 650 mg by mouth 3 (Three) Times a Day As Needed.    apixaban (Eliquis) 5 MG tablet tablet Take 1 tablet by mouth Every 12 (Twelve) Hours.    cholecalciferol (VITAMIN D3) 25 MCG (1000 UT) tablet Take 1 tablet by mouth Daily.    cyanocobalamin 1000 MCG/ML injection Inject 1 mL into the appropriate muscle as directed by prescriber Every 28 (Twenty-Eight) Days.    flecainide (TAMBOCOR) 50 MG tablet TAKE ONE TABLET BY MOUTH EVERY 12 HOURS    losartan (COZAAR) 100 MG tablet TAKE ONE TABLET BY MOUTH DAILY    Magnesium 100 MG capsule Take 1 capsule by mouth Every Night.    metoprolol succinate XL (TOPROL-XL) 50 MG 24 hr tablet TAKE ONE TABLET BY MOUTH DAILY (Patient taking differently: 1 tablet Daily.)    omeprazole (priLOSEC) 40 MG capsule 1 capsule Daily.    Syringe/Needle, Disp, 25G X 5/8\" 3 ML misc Use one monthly with B12    Zinc Sulfate (ZINC 15 PO) Take 1 tablet by mouth Every Night.     Current Facility-Administered Medications on File Prior to Visit   Medication    cyanocobalamin injection 1,000 mcg "     Current Medications:  Scheduled Meds:cyanocobalamin, 1,000 mcg, Intramuscular, Q28 Days      Continuous Infusions:   PRN Meds:.    I have reviewed the patient's medical history in detail and updated the computerized patient record.  Review and summarization of old records include:    Past Medical History:   Diagnosis Date    Arthritis     Asthma     NO INHALERS    Atrial fibrillation     Clostridium difficile infection 09/20/2019    Colon polyps 08/01/2019    Transverse colon: tubular adenoma, descending colon: fragments of tubular adenoma, sigmoid colon: ischemic eroded hyperplastic polyp    COPD (chronic obstructive pulmonary disease)     Diverticulitis     Diverticulosis     ESBL (extended spectrum beta-lactamase) producing bacteria infection     GERD (gastroesophageal reflux disease)     History of abscess of skin and subcutaneous tissue     ABD WOUND 5/2017    History of atrial fibrillation      X1 POST OP FROM COLOSTOMY 5/2017 - NO PROBLEMS SINCE    Hypertension     Incisional hernia     MDRO (multiple drug resistant organisms) resistance     Occasional tremors     NEW (obstructive sleep apnea) 05/20/2021    Overnight polysomnogram.  Weight 190 pounds.  Mild NEW with AHI 7 events per hour for total sleep time.  However, during REM moderate NEW with AHI 16.7 events per hour.  No sleep-related hypoxia.  The patient snored 5% of total sleep time.    PONV (postoperative nausea and vomiting)     Psoriasis     UTI (urinary tract infection)     Vertigo         Past Surgical History:   Procedure Laterality Date    BELPHAROPTOSIS REPAIR Bilateral 04/27/2017    Bilateral brow ptosis repair via the anterior hairline approach under monitored local anesthesia-Dr. Deangelo Cisneros, [facility]    BLEPHAROPLASTY Bilateral 04/27/2017    BREAST BIOPSY      CATARACT EXTRACTION      COLON RESECTION N/A 5/3/2017    Procedure: OPEN SIMOID COLECTOMY WITH COLOSTOMY;  Surgeon: Renato Delgado MD;  Location: [facility] OR;  Service:      COLONOSCOPY N/A 9/13/2017    Procedure: COLONOSCOPY VIA COLOSTOMY TO CECUM;  Surgeon: Renato Delgado MD;  [facility] END[facility];  Service:     COLONOSCOPY N/A 8/1/2019    Procedure: COLONOSCOPY to cecum and TI with biopsy / hot snare polypectomies;  Surgeon: Dalton Vela Jr., MD;  [facility] END[facility];  Service: General    COLONOSCOPY N/A 01/04/2010    [facility]    COLONOSCOPY N/A 11/3/2022    Procedure: COLONOSCOPY to cecum and TI with biopsy / hot snare polypectomy;  Surgeon: Trini Wade MD;  Location[facility] END[facility];  Service: General;  Laterality: N/A;  pre-hx diverticulitis, screen, fam hx colon ca, personal hx polyps  post-polyp, diverticulosis, small hemorrhoids    COLOSTOMY CLOSURE N/A 9/14/2017    Procedure: COLOSTOMY TAKEDOWN AND CLOSURE, WITH RESECTION;  Surgeon: Renato Delgado MD;  Location: [facility] MAIN OR;  Service:     ENDOSCOPY AND COLONOSCOPY N/A 10/31/2014    Normal EGD and colonoscopy, repeat c-scope in 5 years-[facility]    EXPLORATORY LAPAROTOMY N/A 9/8/2019    Procedure: Exploratory laparotomy with lysis of adhesions;  Surgeon: Trini Wade MD;  Location: [facility] MAIN OR;  Service: General    FINGER SURGERY Left     4TH FINGER LEFT HAND    HYSTERECTOMY Bilateral     LAPAROSCOPIC CHOLECYSTECTOMY W/ CHOLANGIOGRAPHY N/A 07/15/2003    Dr. Amrit Martinez, [facility]    SALPINGO OOPHORECTOMY Bilateral 02/02/2006    Abdominal sacral colpopexy, bilateral salpingo-oophorectomy, tension free vaginal tape, cystoscopy-Dr. Devika Bradley, [facility]    THORACENTESIS Right 05/21/2017    US-guided right thoracentesis-Dr. Juan Yuen, [facility]    TONSILLECTOMY Bilateral     UPPER GASTROINTESTINAL ENDOSCOPY N/A 10/08/2007    Esophageal mucosal changes suspicious for short-segment Olivera's esphagus, gastric mucosal abnormality characterized by erythema: biopsied, NERD disease present, high likelihood of gastritis-Dr. Mayank Knapp, [facility]    VENTRAL/INCISIONAL  HERNIA REPAIR N/A 2023    Procedure: Open incisional hernia repair with mesh;  Surgeon: Trini Wade MD;  Location: [facility] MAIN OR;  Service: General;  Laterality: N/A;        Social History     Occupational History    Not on file   Tobacco Use    Smoking status: Former     Types: Cigarettes     Quit date:      Years since quittin.0     Passive exposure: Past    Smokeless tobacco: Never    Tobacco comments:     SOCIAL SMOKING ONLY   Vaping Use    Vaping Use: Never used   Substance and Sexual Activity    Alcohol use: Yes     Comment: RARE    Drug use: No    Sexual activity: Defer      Social History     Social History Narrative    Not on file        Family History   Problem Relation Age of Onset    Cancer Mother     Colon cancer Mother     Diabetes Father     Diabetes Son     Ulcerative colitis Son     No Known Problems Maternal Grandmother     No Known Problems Maternal Grandfather     No Known Problems Paternal Grandmother     No Known Problems Paternal Grandfather     Malig Hyperthermia Neg Hx     Breast cancer Neg Hx        ROS: 14 point review of systems was performed and was negative except for documented findings in HPI and today's encounter.     Allergies:   Allergies   Allergen Reactions    Epinephrine Palpitations    Penicillins Other (See Comments)     BLISTERS IN MOUTH    Patient tolerated ceftriaxone during 2017 admission.        Constitutional:  Denies fever, shaking or chills   Eyes:  Denies change in visual acuity   HENT:  Denies nasal congestion or sore throat   Respiratory:  Denies cough or shortness of breath   Cardiovascular:  Denies chest pain or severe LE edema   GI:  Denies abdominal pain, nausea, vomiting, bloody stools or diarrhea   Musculoskeletal:  Numbness, tingling, or loss of motor function only as noted above in history of present illness.  : Denies painful urination or hematuria  Integument:  Denies rash, lesion or ulceration   Neurologic:  Denies headache or  focal weakness  Endocrine:  Denies lymphadenopathy  Psych:  Denies confusion or change in mental status   Hem:  Denies active bleeding      Physical Exam: 78 y.o. female  Wt Readings from Last 3 Encounters:   11/17/23 80.5 kg (177 lb 6.4 oz)   11/02/23 80.6 kg (177 lb 9.6 oz)   10/06/23 80.1 kg (176 lb 9.6 oz)       There is no height or weight on file to calculate BMI.    There were no vitals filed for this visit.  Vital signs reviewed.   General Appearance:    Alert, cooperative, in no acute distress                    Ortho exam    Physical exam of the right and left knee reveals no effusion, no erythema.  It mild loss of extension and full flexion  Patient has mild varus alignment.  They have mild tenderness to palpation about the medial compartment, no tenderness laterally..  The patient has a negative bounce home, negative [place] and a stable ligamentous exam.  Quad tone is reasonable and symmetric.  There are no overlying skin changes no lymphedema no lymphadenopathy.  There is good hip range of motion which is full symmetric and asymptomatic and a normal ankle exam.       X-rays AP lateral merchant view of both knees were taken to evaluate her symptoms and compared x-rays done in 2022 she has significant medial compartment narrowing varus alignment less than 20% of her joint space remaining she has lipping of her lateral femoral condyle bilaterally and moderate severe patellofemoral OA      Assessment: Bilateral knee pain I do think this is degenerative in origin we talked about options I think injections are quite reasonable I think therapy working on quad and core strengthening is important ultimately at some point arthroplasty we discussed gel and PRP as well she is on Eliquis understands her risk of injection are bit higher but I think safe to proceed she also knows to ice a little bit more diligently due to the blood thinner    Plan: Is as above  Follow up as indicated.  Ice, elevate, and rest as  needed.  Discussed conservative measures of pain control including ice, bracing.  Also talked about the importance of strengthening and maintaining ideal body weight    Lety Vela M.D.    Large Joint Arthrocentesis: R knee  Date/Time: 1/16/2024 2:39 PM  Consent given by: patient  Site marked: site marked  Timeout: Immediately prior to procedure a time out was called to verify the correct patient, procedure, equipment, support staff and site/side marked as required   Supporting Documentation  Indications: pain, joint swelling and diagnostic evaluation   Procedure Details  Location: knee - R knee  Preparation: Patient was prepped and draped in the usual sterile fashion  Needle gauge: 21G.  Medications administered: 1 mL methylPREDNISolone acetate 80 MG/ML; 2 mL lidocaine PF 1% 1 %  Patient tolerance: patient tolerated the procedure well with no immediate complications      Large Joint Arthrocentesis: L knee  Date/Time: 1/16/2024 2:40 PM  Consent given by: patient  Site marked: site marked  Timeout: Immediately prior to procedure a time out was called to verify the correct patient, procedure, equipment, support staff and site/side marked as required   Supporting Documentation  Indications: pain   Procedure Details  Location: knee - L knee  Preparation: Patient was prepped and draped in the usual sterile fashion  Needle gauge: 21G.  Medications administered: 1 mL methylPREDNISolone acetate 80 MG/ML; 2 mL lidocaine PF 1% 1 %  Patient tolerance: patient tolerated the procedure well with no immediate complications

## 2024-01-17 RX ORDER — METHYLPREDNISOLONE ACETATE 80 MG/ML
1 INJECTION, SUSPENSION INTRA-ARTICULAR; INTRALESIONAL; INTRAMUSCULAR; SOFT TISSUE
Status: COMPLETED | OUTPATIENT
Start: 2024-01-16 | End: 2024-01-16

## 2024-01-17 RX ORDER — LIDOCAINE HYDROCHLORIDE 10 MG/ML
2 INJECTION, SOLUTION EPIDURAL; INFILTRATION; INTRACAUDAL; PERINEURAL
Status: COMPLETED | OUTPATIENT
Start: 2024-01-16 | End: 2024-01-16

## 2024-02-19 DIAGNOSIS — I10 ESSENTIAL HYPERTENSION: Primary | ICD-10-CM

## 2024-02-19 DIAGNOSIS — E78.49 OTHER HYPERLIPIDEMIA: ICD-10-CM

## 2024-02-22 LAB
ALBUMIN SERPL-MCNC: 4.4 G/DL (ref 3.5–5.2)
ALBUMIN/GLOB SERPL: 1.9 G/DL
ALP SERPL-CCNC: 79 U/L (ref 39–117)
ALT SERPL-CCNC: 13 U/L (ref 1–33)
AST SERPL-CCNC: 22 U/L (ref 1–32)
BILIRUB SERPL-MCNC: 0.5 MG/DL (ref 0–1.2)
BUN SERPL-MCNC: 19 MG/DL (ref 8–23)
BUN/CREAT SERPL: 29.7 (ref 7–25)
CALCIUM SERPL-MCNC: 9.9 MG/DL (ref 8.6–10.5)
CHLORIDE SERPL-SCNC: 100 MMOL/L (ref 98–107)
CHOLEST SERPL-MCNC: 315 MG/DL (ref 0–200)
CO2 SERPL-SCNC: 25.9 MMOL/L (ref 22–29)
CREAT SERPL-MCNC: 0.64 MG/DL (ref 0.57–1)
EGFRCR SERPLBLD CKD-EPI 2021: 90 ML/MIN/1.73
GLOBULIN SER CALC-MCNC: 2.3 GM/DL
GLUCOSE SERPL-MCNC: 106 MG/DL (ref 65–99)
HDLC SERPL-MCNC: 41 MG/DL (ref 40–60)
LDLC SERPL CALC-MCNC: 190 MG/DL (ref 0–100)
POTASSIUM SERPL-SCNC: 5.2 MMOL/L (ref 3.5–5.2)
PROT SERPL-MCNC: 6.7 G/DL (ref 6–8.5)
SODIUM SERPL-SCNC: 138 MMOL/L (ref 136–145)
TRIGL SERPL-MCNC: 416 MG/DL (ref 0–150)
VLDLC SERPL CALC-MCNC: 84 MG/DL (ref 5–40)

## 2024-02-29 ENCOUNTER — OFFICE VISIT (OUTPATIENT)
Dept: INTERNAL MEDICINE | Facility: CLINIC | Age: 79
End: 2024-02-29
Payer: MEDICARE

## 2024-02-29 ENCOUNTER — HOSPITAL ENCOUNTER (OUTPATIENT)
Facility: HOSPITAL | Age: 79
Discharge: HOME | End: 2024-02-29
Admitting: INTERNAL MEDICINE
Payer: MEDICARE

## 2024-02-29 VITALS
SYSTOLIC BLOOD PRESSURE: 130 MMHG | BODY MASS INDEX: 30.39 KG/M2 | HEIGHT: 64 IN | DIASTOLIC BLOOD PRESSURE: 72 MMHG | HEART RATE: 76 BPM | WEIGHT: 178 LBS

## 2024-02-29 DIAGNOSIS — R06.02 SHORTNESS OF BREATH: Chronic | ICD-10-CM

## 2024-02-29 DIAGNOSIS — E78.49 OTHER HYPERLIPIDEMIA: Chronic | ICD-10-CM

## 2024-02-29 DIAGNOSIS — R05.1 ACUTE COUGH: Primary | ICD-10-CM

## 2024-02-29 DIAGNOSIS — I10 ESSENTIAL HYPERTENSION: Chronic | ICD-10-CM

## 2024-02-29 PROCEDURE — 3078F DIAST BP <80 MM HG: CPT | Performed by: INTERNAL MEDICINE

## 2024-02-29 PROCEDURE — 1160F RVW MEDS BY RX/DR IN RCRD: CPT | Performed by: INTERNAL MEDICINE

## 2024-02-29 PROCEDURE — 71046 X-RAY EXAM CHEST 2 VIEWS: CPT

## 2024-02-29 PROCEDURE — 99214 OFFICE O/P EST MOD 30 MIN: CPT | Performed by: INTERNAL MEDICINE

## 2024-02-29 PROCEDURE — 3075F SYST BP GE 130 - 139MM HG: CPT | Performed by: INTERNAL MEDICINE

## 2024-02-29 PROCEDURE — 1159F MED LIST DOCD IN RCRD: CPT | Performed by: INTERNAL MEDICINE

## 2024-02-29 NOTE — PROGRESS NOTES
"Chief Complaint  Hypertension    Subjective        Kiley Last presents to [facility]  History of Present Illness continues to take care of her  with multiple medical issues- she works hard all day- does deal with a bit of anxiety related to it all. Makes her feel like she has to take a deep breath throughout the day.   She denies orthopnea      Objective   Vital Signs:  /72   Pulse 76   Ht 162.6 cm (64\")   Wt 80.7 kg (178 lb)   BMI 30.55 kg/m²   Estimated body mass index is 30.55 kg/m² as calculated from the following:    Height as of this encounter: 162.6 cm (64\").    Weight as of this encounter: 80.7 kg (178 lb).               Physical Exam  Constitutional:       General: She is not in acute distress.     Appearance: Normal appearance.   Cardiovascular:      Rate and Rhythm: Normal rate. Rhythm irregular.   Pulmonary:      Effort: Pulmonary effort is normal.      Comments: Crackles B lower lung fields to midlung  Musculoskeletal:      Right lower leg: No edema.      Left lower leg: No edema.   Lymphadenopathy:      Cervical: No cervical adenopathy.        Result Review :    The following data was reviewed by: Miranda Morgan MD on 02/29/2024:  CMP          8/16/2023    11:31 9/19/2023    15:20 2/22/2024    10:48   CMP   Glucose 105  94  106    BUN 18  16  19    Creatinine 0.68  0.71  0.64    EGFR  87.2     Sodium 144  141  138    Potassium 4.8  5.2  5.2    Chloride 104  105  100    Calcium 10.2  9.7  9.9    Total Protein 6.6   6.7    Albumin 4.3   4.4    Globulin 2.3   2.3    Total Bilirubin 0.5   0.5    Alkaline Phosphatase 62   79    AST (SGOT) 15   22    ALT (SGPT) 17   13    BUN/Creatinine Ratio 26.5  22.5  29.7    Anion Gap  9.3       Lipid Panel          8/16/2023    11:31 2/22/2024    10:48   Lipid Panel   Total Cholesterol 270  315    Triglycerides 258  416    HDL Cholesterol 53  41    VLDL Cholesterol 48  84    LDL Cholesterol  169  190                 "   Assessment and Plan     Diagnoses and all orders for this visit:    1. Acute cough (Primary)  Comments:  check CXR- ? reason for the crackles at lung bases  Orders:  -     XR Chest PA & Lateral    2. Shortness of breath  Comments:  as above- check BNP as well.  Orders:  -     TSH; Future  -     BNP; Future    3. Other hyperlipidemia  Comments:  Much worse than baseine- check TSH today, if normal would recommend treatment.  Orders:  -     TSH; Future    4. Essential hypertension  Comments:  Well controlled, no change.             Follow Up     Return in about 6 months (around 8/29/2024) for Medicare Wellness, Lab Today, Lab Before FUP.  Patient was given instructions and counseling regarding her condition or for health maintenance advice. Please see specific information pulled into the AVS if appropriate.

## 2024-03-04 ENCOUNTER — TELEPHONE (OUTPATIENT)
Dept: INTERNAL MEDICINE | Facility: CLINIC | Age: 79
End: 2024-03-04
Payer: MEDICARE

## 2024-03-04 NOTE — TELEPHONE ENCOUNTER
Pt called wanted to know if she should be taking anything for her cholesterol/trig.  She is concerned about labs from February.

## 2024-03-05 ENCOUNTER — OFFICE VISIT (OUTPATIENT)
Dept: CARDIOLOGY | Facility: CLINIC | Age: 79
End: 2024-03-05
Payer: MEDICARE

## 2024-03-05 ENCOUNTER — LAB (OUTPATIENT)
Facility: HOSPITAL | Age: 79
End: 2024-03-05
Payer: MEDICARE

## 2024-03-05 ENCOUNTER — TELEPHONE (OUTPATIENT)
Dept: CARDIOLOGY | Facility: CLINIC | Age: 79
End: 2024-03-05

## 2024-03-05 VITALS
WEIGHT: 170 LBS | HEIGHT: 64 IN | SYSTOLIC BLOOD PRESSURE: 132 MMHG | HEART RATE: 61 BPM | BODY MASS INDEX: 29.02 KG/M2 | DIASTOLIC BLOOD PRESSURE: 68 MMHG

## 2024-03-05 DIAGNOSIS — R06.09 DYSPNEA ON EXERTION: ICD-10-CM

## 2024-03-05 DIAGNOSIS — E78.2 MIXED HYPERLIPIDEMIA: ICD-10-CM

## 2024-03-05 DIAGNOSIS — I10 ESSENTIAL HYPERTENSION: ICD-10-CM

## 2024-03-05 DIAGNOSIS — I48.0 PAROXYSMAL ATRIAL FIBRILLATION: Primary | ICD-10-CM

## 2024-03-05 LAB
DEPRECATED RDW RBC AUTO: 40.6 FL (ref 37–54)
ERYTHROCYTE [DISTWIDTH] IN BLOOD BY AUTOMATED COUNT: 12.9 % (ref 12.3–15.4)
HCT VFR BLD AUTO: 36.2 % (ref 34–46.6)
HGB BLD-MCNC: 11.7 G/DL (ref 12–15.9)
MCH RBC QN AUTO: 28.1 PG (ref 26.6–33)
MCHC RBC AUTO-ENTMCNC: 32.3 G/DL (ref 31.5–35.7)
MCV RBC AUTO: 87 FL (ref 79–97)
PLATELET # BLD AUTO: 210 10*3/MM3 (ref 140–450)
PMV BLD AUTO: 11.7 FL (ref 6–12)
RBC # BLD AUTO: 4.16 10*6/MM3 (ref 3.77–5.28)
WBC NRBC COR # BLD AUTO: 7.27 10*3/MM3 (ref 3.4–10.8)

## 2024-03-05 PROCEDURE — 3078F DIAST BP <80 MM HG: CPT | Performed by: NURSE PRACTITIONER

## 2024-03-05 PROCEDURE — 84443 ASSAY THYROID STIM HORMONE: CPT | Performed by: INTERNAL MEDICINE

## 2024-03-05 PROCEDURE — 85027 COMPLETE CBC AUTOMATED: CPT

## 2024-03-05 PROCEDURE — 3075F SYST BP GE 130 - 139MM HG: CPT | Performed by: NURSE PRACTITIONER

## 2024-03-05 PROCEDURE — 1160F RVW MEDS BY RX/DR IN RCRD: CPT | Performed by: NURSE PRACTITIONER

## 2024-03-05 PROCEDURE — 99214 OFFICE O/P EST MOD 30 MIN: CPT | Performed by: NURSE PRACTITIONER

## 2024-03-05 PROCEDURE — 93000 ELECTROCARDIOGRAM COMPLETE: CPT | Performed by: NURSE PRACTITIONER

## 2024-03-05 PROCEDURE — 1159F MED LIST DOCD IN RCRD: CPT | Performed by: NURSE PRACTITIONER

## 2024-03-05 PROCEDURE — 83880 ASSAY OF NATRIURETIC PEPTIDE: CPT | Performed by: INTERNAL MEDICINE

## 2024-03-05 RX ORDER — ROSUVASTATIN CALCIUM 20 MG/1
20 TABLET, COATED ORAL DAILY
Qty: 90 TABLET | Refills: 0 | Status: SHIPPED | OUTPATIENT
Start: 2024-03-05

## 2024-03-05 NOTE — TELEPHONE ENCOUNTER
Caller: LEA    Relationship: SELF    Best call back number: [phone]        What was the call regarding: OVER THE LAST FEW MONTHS PT STATES SHE HAS BEEN EXPERIENCING INCREASED SHORTNESS OF BREATH (SITTING AND ON EXERTION) AND PALPITATIONS AT NIGHT.  SHE STATED IT COULD JUST BE ANXIETY (SHE HAS BEEN TAKING CARE OF HER ) BUT SHE WANTED TO LET DR. MCCONNELL KNOW AND CHECK ON WHAT HE WOULD LIKE HER TO DO.

## 2024-03-05 NOTE — PROGRESS NOTES
Date of Office Visit: 2024  Encounter Provider: SARITHA Kat  Place of Service: [facility] CARDIOLOGY  Patient Name: Kiley Last  :1945    Chief Complaint   Patient presents with    Atrial Fibrillation   :     HPI: Kiley Last is a 78 y.o. female patient of Dr. Kearns's with paroxysmal atrial fibrillation which we have managed with flecainide.    I last saw her in the office in 2023 which time she was reporting nightly palpitations and periodic shortness of breath.  Of note, the symptoms had been ongoing for at least 6 months.  Dr. Kearns had previously suspected she had undiagnosed sleep apnea and ordered a home sleep study.  Reportedly an official sleep study had been recommended which was supposed to happen soon.  From a cardiac standpoint I felt she was stable.  She was advised to follow-up in 1 year.    She called office this morning to report increasing shortness of breath and palpitations.  She was scheduled to see me today.    Over the last few months, she has been experiencing progressive shortness of breath at rest and with exertion along with increased daily palpitations.  She notices the palpitations mostly at nighttime.  However, she denies any PND, orthopnea, or edema.  She also reports occasional tightness in the chest sting for seconds at a time.  There are no aggravating or alleviating factors.  She has occasionally tingling in her fingers.  She struggles with chronic vertigo but denies any falls or syncope.  She denies any bleeding difficulties or melena.  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  In fact, he is in the hospital right now.    Past Medical History:   Diagnosis Date    Arthritis     Asthma     NO INHALERS    Atrial fibrillation     Clostridium difficile infection 2019    Colon polyps 2019    Transverse colon: tubular adenoma, descending colon: fragments of  tubular adenoma, sigmoid colon: ischemic eroded hyperplastic polyp    COPD (chronic obstructive pulmonary disease)     Diverticulitis     Diverticulosis     ESBL (extended spectrum beta-lactamase) producing bacteria infection     GERD (gastroesophageal reflux disease)     History of abscess of skin and subcutaneous tissue     ABD WOUND 5/2017    History of atrial fibrillation      X1 POST OP FROM COLOSTOMY 5/2017 - NO PROBLEMS SINCE    Hypertension     Incisional hernia     MDRO (multiple drug resistant organisms) resistance     Occasional tremors     NEW (obstructive sleep apnea) 05/20/2021    Overnight polysomnogram.  Weight 190 pounds.  Mild NEW with AHI 7 events per hour for total sleep time.  However, during REM moderate NEW with AHI 16.7 events per hour.  No sleep-related hypoxia.  The patient snored 5% of total sleep time.    PONV (postoperative nausea and vomiting)     Psoriasis     UTI (urinary tract infection)     Vertigo        Past Surgical History:   Procedure Laterality Date    BELPHAROPTOSIS REPAIR Bilateral 04/27/2017    Bilateral brow ptosis repair via the anterior hairline approach under monitored local anesthesia-[facility] Craven    BLEPHAROPLASTY Bilateral 04/27/2017    BREAST BIOPSY      CATARACT EXTRACTION      COLON RESECTION N/A 5/3/2017    Procedure: OPEN SIMOID COLECTOMY WITH COLOSTOMY;  Surgeon: Renato Delgado MD;  Location: [facility] MAIN OR;  Service:     COLONOSCOPY N/A 9/13/2017    Procedure: COLONOSCOPY VIA COLOSTOMY TO CECUM;  Surgeon: Renato Delgado MD;  Location: [facility] ENDOSCOPY;  Service:     COLONOSCOPY N/A 8/1/2019    Procedure: COLONOSCOPY to cecum and TI with biopsy / hot snare polypectomies;  Surgeon: Dalton Vela Jr., MD;  Location: [facility] ENDOSCOPY;  Service: General    COLONOSCOPY N/A 01/04/2010    [facility]ade [facility]    COLONOSCOPY N/A 11/3/2022    Procedure: COLONOSCOPY to cecum and TI with biopsy / hot snare polypectomy;  Surgeon: Mauro  MD Trini;  Location: [facility] ENDOSCOPY;  Service: General;  Laterality: N/A;  pre-hx diverticulitis, screen, fam hx colon ca, personal hx polyps  post-polyp, diverticulosis, small hemorrhoids    COLOSTOMY CLOSURE N/A 2017    Procedure: COLOSTOMY TAKEDOWN AND CLOSURE, WITH RESECTION;  Surgeon: Renato Delgado MD;  Location: [facility] MAIN OR;  Service:     ENDOSCOPY AND COLONOSCOPY N/A 10/31/2014    Normal EGD and colonoscopy, [facility]    EXPLORATORY LAPAROTOMY N/A 2019    Procedure: Exploratory laparotomy with lysis of adhesions;  Surgeon: Trini Wade MD;  Location: [facility] OR;  Service: General    FINGER SURGERY Left     4TH FINGER LEFT HAND    HYSTERECTOMY Bilateral     LAPAROSCOPIC CHOLECYSTECTOMY W/ CHOLANGIOGRAPHY N/A 07/15/2003    Dr. Amrit Martinez, [facility]    SALPINGO OOPHORECTOMY Bilateral 2006    Abdominal sacral colpopexy, bilateral salpingo-oophorectomy, tension free vaginal tape, cystoscopy-Dr. Devika Bradley, [facility]    THORACENTESIS Right 2017    US-guided right thoracentesis-Dr. Juan Yuen, [facility]    TONSILLECTOMY Bilateral     UPPER GASTROINTESTINAL ENDOSCOPY N/A 10/08/2007    Esophageal mucosal changes suspicious for short-segment Olivera's esphagus, gastric mucosal abnormality characterized by erythema: biopsied, NERD disease present, high likelihood of gastritis-Dr. Mayank Knapp, [facility]    VENTRAL/INCISIONAL HERNIA REPAIR N/A 2023    Procedure: Open incisional hernia repair with mesh;  Surgeon: Trini Wade MD;  Location: [facility] MAIN OR;  Service: General;  Laterality: N/A;       Social History     Socioeconomic History    Marital status:    Tobacco Use    Smoking status: Former     Current packs/day: 0.00     Types: Cigarettes     Quit date: 1967     Years since quittin.2     Passive exposure: Past    Smokeless tobacco: Never    Tobacco comments:     SOCIAL SMOKING ONLY   Vaping Use    Vaping status:  Never Used   Substance and Sexual Activity    Alcohol use: Yes     Comment: RARE    Drug use: No    Sexual activity: Defer       Family History   Problem Relation Age of Onset    Cancer Mother     Colon cancer Mother     Diabetes Father     Diabetes Son     Ulcerative colitis Son     No Known Problems Maternal Grandmother     No Known Problems Maternal Grandfather     No Known Problems Paternal Grandmother     No Known Problems Paternal Grandfather     Malig Hyperthermia Neg Hx     Breast cancer Neg Hx        Review of Systems   Constitutional: Negative.   Cardiovascular:  Positive for chest pain, dyspnea on exertion and palpitations. Negative for leg swelling, orthopnea, paroxysmal nocturnal dyspnea and syncope.   Respiratory: Negative.     Hematologic/Lymphatic: Negative for bleeding problem.   Musculoskeletal:  Negative for falls.   Gastrointestinal:  Negative for melena.   Neurological:  Positive for paresthesias and vertigo. Negative for dizziness and light-headedness.   Psychiatric/Behavioral:  The patient is nervous/anxious.        Allergies   Allergen Reactions    Epinephrine Palpitations    Penicillins Other (See Comments)     BLISTERS IN MOUTH    Patient tolerated ceftriaxone during 5/2017 admission.            Current Outpatient Medications:     Acetaminophen (TYLENOL ARTHRITIS PAIN PO), Take 650 mg by mouth 3 (Three) Times a Day As Needed., Disp: , Rfl:     apixaban (Eliquis) 5 MG tablet tablet, Take 1 tablet by mouth Every 12 (Twelve) Hours., Disp: 60 tablet, Rfl: 2    cholecalciferol (VITAMIN D3) 25 MCG (1000 UT) tablet, Take 1 tablet by mouth Daily., Disp: , Rfl:     cyanocobalamin 1000 MCG/ML injection, Inject 1 mL into the appropriate muscle as directed by prescriber Every 28 (Twenty-Eight) Days., Disp: 3 mL, Rfl: 3    flecainide (TAMBOCOR) 50 MG tablet, TAKE ONE TABLET BY MOUTH EVERY 12 HOURS, Disp: 90 tablet, Rfl: 3    losartan (COZAAR) 100 MG tablet, TAKE ONE TABLET BY MOUTH DAILY, Disp: 90  "tablet, Rfl: 2    Magnesium 100 MG capsule, Take 1 capsule by mouth Every Night., Disp: , Rfl:     meclizine (ANTIVERT) 25 MG tablet, TAKE ONE TABLET BY MOUTH THREE TIMES A DAY AS NEEDED FOR DIZZINESS, Disp: 90 tablet, Rfl: 0    metoprolol succinate XL (TOPROL-XL) 50 MG 24 hr tablet, TAKE ONE TABLET BY MOUTH DAILY (Patient taking differently: 1 tablet Daily.), Disp: 30 tablet, Rfl: 11    rosuvastatin (CRESTOR) 20 MG tablet, Take 1 tablet by mouth Daily., Disp: 90 tablet, Rfl: 0    Syringe/Needle, Disp, 25G X 5/8\" 3 ML misc, Use one monthly with B12, Disp: 3 each, Rfl: 3    Zinc Sulfate (ZINC 15 PO), Take 1 tablet by mouth Every Night., Disp: , Rfl:     Current Facility-Administered Medications:     cyanocobalamin injection 1,000 mcg, 1,000 mcg, Intramuscular, Q28 Days, Miranda Morgan MD, 1,000 mcg at 08/23/23 1431      Objective:     Vitals:    03/05/24 1046   BP: 132/68   Pulse: 61   Weight: 77.1 kg (170 lb)   Height: 162.6 cm (64\")     Body mass index is 29.18 kg/m².    PHYSICAL EXAM:    Neck:      Vascular: No JVD.   Pulmonary:      Effort: Pulmonary effort is normal.      Breath sounds: Normal breath sounds. Examination of the right-lower field reveals rales. Examination of the left-lower field reveals rales.   Cardiovascular:      Normal rate. Regular rhythm.      Murmurs: There is no murmur.      No gallop.  No click. No rub.   Pulses:     Intact distal pulses.           ECG 12 Lead    Date/Time: 3/5/2024 10:51 AM  Performed by: Yaneli Sierra APRN    Authorized by: Yaneli Sierra APRN  Comparison: compared with previous ECG from 7/6/2023  Similar to previous ECG  Rhythm: sinus rhythm  Rate: normal  BPM: 61            Assessment:       Diagnosis Plan   1. Paroxysmal atrial fibrillation  ECG 12 Lead    Holter Monitor - 72 Hour Up To 15 Days      2. Dyspnea on exertion  CBC (No Diff)    Adult Transthoracic Echo Complete W/ Cont if Necessary Per Protocol      3. Essential hypertension        4. Mixed " hyperlipidemia          Orders Placed This Encounter   Procedures    CBC (No Diff)     Standing Status:   Future     Standing Expiration Date:   3/5/2025     Order Specific Question:   Release to patient     Answer:   Routine Release [1400000002]    Holter Monitor - 72 Hour Up To 15 Days     Standing Status:   Future     Standing Expiration Date:   3/5/2025     Order Specific Question:   Reason for exam?     Answer:   Palpitations     Order Specific Question:   Release to patient     Answer:   Routine Release [1400000002]    ECG 12 Lead     This order was created via procedure documentation     Order Specific Question:   Release to patient     Answer:   Routine Release [1400000002]    Adult Transthoracic Echo Complete W/ Cont if Necessary Per Protocol     Standing Status:   Future     Standing Expiration Date:   3/5/2025     Order Specific Question:   Reason for exam?     Answer:   Palpitations     Order Specific Question:   Reason for exam?     Answer:   Dyspnea     Order Specific Question:   Release to patient     Answer:   Routine Release [1400000002]          Plan:       1.  Paroxysmal atrial fibrillation.  She has been maintaining sinus rhythm with flecainide.  Due to increased palpitations, I recommended a ZIO.  Continue metoprolol and Eliquis.      2.  Dyspnea on exertion.  She has crackles in her lower bases.  Her primary care doctor already ordered a chest x-ray for this finding which she had.  Demonstrated changes of chronic bronchitis but no evidence of CHF.  She is having further labs today including a proBNP.  I also recommended an echocardiogram.      3.  Hypertension.  Her blood pressure is stable.  Continue losartan and Toprol.      4.  Hyperlipidemia.    Her LDL is 190 and her triglycerides are 416.  I recommended a statin and she is agreeable.  We will start with rosuvastatin 20 mg.  She will need repeat labs in 3 months.      Overall I think she is stable.  Certainly part of her symptoms could be  related to the significant amount of stress she is under caring for her .  Further recommendations will be made pending the results of the above.      As always, it has been a pleasure to participate in your patient's care.      Sincerely,         SARITHA Falk

## 2024-03-19 ENCOUNTER — HOSPITAL ENCOUNTER (OUTPATIENT)
Dept: CARDIOLOGY | Facility: HOSPITAL | Age: 79
Discharge: HOME | End: 2024-03-19
Admitting: NURSE PRACTITIONER
Payer: MEDICARE

## 2024-03-19 VITALS
WEIGHT: 170 LBS | SYSTOLIC BLOOD PRESSURE: 158 MMHG | BODY MASS INDEX: 29.02 KG/M2 | HEIGHT: 64 IN | DIASTOLIC BLOOD PRESSURE: 70 MMHG | HEART RATE: 67 BPM

## 2024-03-19 DIAGNOSIS — R06.09 DYSPNEA ON EXERTION: ICD-10-CM

## 2024-03-19 LAB
AORTIC ARCH: 1.9 CM
ASCENDING AORTA: 3.5 CM
BH CV ECHO MEAS - ACS: 2.46 CM
BH CV ECHO MEAS - AO MAX PG: 6.2 MMHG
BH CV ECHO MEAS - AO MEAN PG: 3 MMHG
BH CV ECHO MEAS - AO ROOT DIAM: 3.2 CM
BH CV ECHO MEAS - AO V2 MAX: 124 CM/SEC
BH CV ECHO MEAS - AO V2 VTI: 28.1 CM
BH CV ECHO MEAS - AVA(I,D): 2.6 CM2
BH CV ECHO MEAS - EDV(CUBED): 117.6 ML
BH CV ECHO MEAS - EDV(MOD-SP2): 51 ML
BH CV ECHO MEAS - EDV(MOD-SP4): 67 ML
BH CV ECHO MEAS - EF(MOD-BP): 63.1 %
BH CV ECHO MEAS - EF(MOD-SP2): 62.7 %
BH CV ECHO MEAS - EF(MOD-SP4): 62.7 %
BH CV ECHO MEAS - ESV(CUBED): 48.7 ML
BH CV ECHO MEAS - ESV(MOD-SP2): 19 ML
BH CV ECHO MEAS - ESV(MOD-SP4): 25 ML
BH CV ECHO MEAS - FS: 25.5 %
BH CV ECHO MEAS - IVS/LVPW: 0.91 CM
BH CV ECHO MEAS - IVSD: 1 CM
BH CV ECHO MEAS - LAT PEAK E' VEL: 9.4 CM/SEC
BH CV ECHO MEAS - LV DIASTOLIC VOL/BSA (35-75): 36.3 CM2
BH CV ECHO MEAS - LV MASS(C)D: 188.1 GRAMS
BH CV ECHO MEAS - LV MAX PG: 4.2 MMHG
BH CV ECHO MEAS - LV MEAN PG: 2 MMHG
BH CV ECHO MEAS - LV SYSTOLIC VOL/BSA (12-30): 13.5 CM2
BH CV ECHO MEAS - LV V1 MAX: 102 CM/SEC
BH CV ECHO MEAS - LV V1 VTI: 23.8 CM
BH CV ECHO MEAS - LVIDD: 4.9 CM
BH CV ECHO MEAS - LVIDS: 3.7 CM
BH CV ECHO MEAS - LVOT AREA: 3 CM2
BH CV ECHO MEAS - LVOT DIAM: 1.97 CM
BH CV ECHO MEAS - LVPWD: 1.1 CM
BH CV ECHO MEAS - MED PEAK E' VEL: 6.3 CM/SEC
BH CV ECHO MEAS - MR MAX PG: 60.1 MMHG
BH CV ECHO MEAS - MR MAX VEL: 387.6 CM/SEC
BH CV ECHO MEAS - MV A DUR: 0.12 SEC
BH CV ECHO MEAS - MV A MAX VEL: 53.6 CM/SEC
BH CV ECHO MEAS - MV DEC SLOPE: 315.6 CM/SEC2
BH CV ECHO MEAS - MV DEC TIME: 0.21 SEC
BH CV ECHO MEAS - MV E MAX VEL: 68.1 CM/SEC
BH CV ECHO MEAS - MV E/A: 1.27
BH CV ECHO MEAS - MV MAX PG: 1.85 MMHG
BH CV ECHO MEAS - MV MEAN PG: 0.82 MMHG
BH CV ECHO MEAS - MV P1/2T: 60.5 MSEC
BH CV ECHO MEAS - MV V2 VTI: 23.9 CM
BH CV ECHO MEAS - MVA(P1/2T): 3.6 CM2
BH CV ECHO MEAS - MVA(VTI): 3 CM2
BH CV ECHO MEAS - PA ACC TIME: 0.12 SEC
BH CV ECHO MEAS - PA V2 MAX: 79.9 CM/SEC
BH CV ECHO MEAS - PI END-D VEL: 79.9 CM/SEC
BH CV ECHO MEAS - PULM A REVS DUR: 0.1 SEC
BH CV ECHO MEAS - PULM A REVS VEL: 28.3 CM/SEC
BH CV ECHO MEAS - PULM DIAS VEL: 51.8 CM/SEC
BH CV ECHO MEAS - PULM S/D: 0.78
BH CV ECHO MEAS - PULM SYS VEL: 40.3 CM/SEC
BH CV ECHO MEAS - QP/QS: 0.87
BH CV ECHO MEAS - RAP SYSTOLE: 3 MMHG
BH CV ECHO MEAS - RV MAX PG: 1.46 MMHG
BH CV ECHO MEAS - RV V1 MAX: 60.4 CM/SEC
BH CV ECHO MEAS - RV V1 VTI: 13.8 CM
BH CV ECHO MEAS - RVOT DIAM: 2.4 CM
BH CV ECHO MEAS - RVSP: 16 MMHG
BH CV ECHO MEAS - SI(MOD-SP2): 17.3 ML/M2
BH CV ECHO MEAS - SI(MOD-SP4): 22.8 ML/M2
BH CV ECHO MEAS - SUP REN AO DIAM: 2.1 CM
BH CV ECHO MEAS - SV(LVOT): 72.2 ML
BH CV ECHO MEAS - SV(MOD-SP2): 32 ML
BH CV ECHO MEAS - SV(MOD-SP4): 42 ML
BH CV ECHO MEAS - SV(RVOT): 62.5 ML
BH CV ECHO MEAS - TR MAX PG: 12.9 MMHG
BH CV ECHO MEAS - TR MAX VEL: 179.8 CM/SEC
BH CV ECHO MEASUREMENTS AVERAGE E/E' RATIO: 8.68
BH CV XLRA - RV BASE: 2.8 CM
BH CV XLRA - RV LENGTH: 6.3 CM
BH CV XLRA - RV MID: 2.37 CM
BH CV XLRA - TDI S': 17 CM/SEC
LEFT ATRIUM VOLUME INDEX: 37.4 ML/M2
SINUS: 3.1 CM
STJ: 2.48 CM

## 2024-03-19 PROCEDURE — 93306 TTE W/DOPPLER COMPLETE: CPT

## 2024-03-20 DIAGNOSIS — R06.09 DYSPNEA ON EXERTION: Primary | ICD-10-CM

## 2024-03-20 NOTE — PROGRESS NOTES
Discussed the echocardiogram results with her.  No findings that would explain her shortness of breath.  I discussed with Dr. Kearns.  We are recommending she see pulmonary.  I will call her with the results of the ZIO when available.

## 2024-03-21 ENCOUNTER — TELEPHONE (OUTPATIENT)
Dept: CARDIOLOGY | Facility: CLINIC | Age: 79
End: 2024-03-21

## 2024-03-21 NOTE — TELEPHONE ENCOUNTER
"Caller Name: Kiley Last \"Anna\"      Relationship: Self      Best Contact Number: [phone]      Patient is requesting samples of ELIQUIS       How many days of medication do you have left? 2 DAYS LEFT      Additional Information: PT WAS REQUESTING TO PICK THOSE UP TODAY.      "

## 2024-03-22 NOTE — TELEPHONE ENCOUNTER
"     Caller: Kiley Last \"Anna\"    Relationship to patient: Self    Best call back number:  [phone]    Patient is needing: PATIENT PICKED UP ELIQUIS SAMPLES. SHE SAID THEY WERE 5 MG BUT SHE TAKES 7.5 MG.         "

## 2024-03-28 RX ORDER — METOPROLOL SUCCINATE 50 MG/1
TABLET, EXTENDED RELEASE ORAL
Qty: 30 TABLET | Refills: 11 | Status: SHIPPED | OUTPATIENT
Start: 2024-03-28

## 2024-04-09 ENCOUNTER — TELEPHONE (OUTPATIENT)
Dept: CARDIOLOGY | Facility: CLINIC | Age: 79
End: 2024-04-09
Payer: MEDICARE

## 2024-04-09 NOTE — TELEPHONE ENCOUNTER
Results and recommendations called to pt.  Instructed to call with any further questions or concerns.  Verbalized understanding.    Shruthi Gramajo RN  Triage Nurse, [facility]  04/09/24 10:09 EDT

## 2024-04-09 NOTE — TELEPHONE ENCOUNTER
----- Message from SARITHA Perez sent at 4/9/2024  9:57 AM EDT -----  Please let her know the heart monitor demonstrated normal sinus rhythm with occasional extra beats from the top of the heart.  No evidence of atrial fibrillation.  I would not recommend any changes.

## 2024-04-16 ENCOUNTER — OFFICE VISIT (OUTPATIENT)
Dept: ORTHOPEDIC SURGERY | Facility: CLINIC | Age: 79
End: 2024-04-16
Payer: MEDICARE

## 2024-04-16 VITALS — TEMPERATURE: 97.3 F | HEIGHT: 65 IN | WEIGHT: 174.3 LBS | BODY MASS INDEX: 29.04 KG/M2

## 2024-04-16 DIAGNOSIS — M17.0 PRIMARY OSTEOARTHRITIS OF BOTH KNEES: Primary | ICD-10-CM

## 2024-04-16 RX ADMIN — METHYLPREDNISOLONE ACETATE 1 ML: 80 INJECTION, SUSPENSION INTRA-ARTICULAR; INTRALESIONAL; INTRAMUSCULAR; SOFT TISSUE at 13:26

## 2024-04-16 RX ADMIN — LIDOCAINE HYDROCHLORIDE 2 ML: 10 INJECTION, SOLUTION EPIDURAL; INFILTRATION; INTRACAUDAL; PERINEURAL at 13:26

## 2024-04-16 RX ADMIN — LIDOCAINE HYDROCHLORIDE 2 ML: 10 INJECTION, SOLUTION EPIDURAL; INFILTRATION; INTRACAUDAL; PERINEURAL at 13:27

## 2024-04-16 RX ADMIN — METHYLPREDNISOLONE ACETATE 1 ML: 80 INJECTION, SUSPENSION INTRA-ARTICULAR; INTRALESIONAL; INTRAMUSCULAR; SOFT TISSUE at 13:27

## 2024-04-16 NOTE — PROGRESS NOTES
"  Patient: Kiley Last  YOB: 1945  Date of Service: 4/16/2024    Chief Complaints:   Bilateral knee pain    Subjective:    History of Present Illness: Pt is seen in the office today with complaints of bilateral knee pain she has known arthritis I last saw her in January we did injections which gave her some relief she understands her options she is also complaining of shoulder issues she understands we can only do 2 at a time        Allergies:   Allergies   Allergen Reactions    Epinephrine Palpitations    Penicillins Other (See Comments)     BLISTERS IN MOUTH    Patient tolerated ceftriaxone during 5/2017 admission.          Medications:   Home Medications:  Current Outpatient Medications on File Prior to Visit   Medication Sig    Acetaminophen (TYLENOL ARTHRITIS PAIN PO) Take 650 mg by mouth 3 (Three) Times a Day As Needed.    apixaban (Eliquis) 5 MG tablet tablet Take 1 tablet by mouth Every 12 (Twelve) Hours.    cholecalciferol (VITAMIN D3) 25 MCG (1000 UT) tablet Take 1 tablet by mouth Daily.    cyanocobalamin 1000 MCG/ML injection Inject 1 mL into the appropriate muscle as directed by prescriber Every 28 (Twenty-Eight) Days.    flecainide (TAMBOCOR) 50 MG tablet TAKE ONE TABLET BY MOUTH EVERY 12 HOURS    losartan (COZAAR) 100 MG tablet TAKE ONE TABLET BY MOUTH DAILY    Magnesium 100 MG capsule Take 1 capsule by mouth Every Night.    meclizine (ANTIVERT) 25 MG tablet TAKE ONE TABLET BY MOUTH THREE TIMES A DAY AS NEEDED FOR DIZZINESS    metoprolol succinate XL (TOPROL-XL) 50 MG 24 hr tablet TAKE ONE TABLET BY MOUTH DAILY    rosuvastatin (CRESTOR) 20 MG tablet Take 1 tablet by mouth Daily.    Syringe/Needle, Disp, 25G X 5/8\" 3 ML misc Use one monthly with B12    Zinc Sulfate (ZINC 15 PO) Take 1 tablet by mouth Every Night.     Current Facility-Administered Medications on File Prior to Visit   Medication    cyanocobalamin injection 1,000 mcg     Current Medications:  Scheduled " Meds:cyanocobalamin, 1,000 mcg, Intramuscular, Q28 Days      Continuous Infusions:   PRN Meds:.    I have reviewed the patient's medical history in detail and updated the computerized patient record.  Review and summarization of old records include:    Past Medical History:   Diagnosis Date    Arthritis     Asthma     NO INHALERS    Atrial fibrillation     Clostridium difficile infection 09/20/2019    Colon polyps 08/01/2019    Transverse colon: tubular adenoma, descending colon: fragments of tubular adenoma, sigmoid colon: ischemic eroded hyperplastic polyp    COPD (chronic obstructive pulmonary disease)     Diverticulitis     Diverticulosis     ESBL (extended spectrum beta-lactamase) producing bacteria infection     GERD (gastroesophageal reflux disease)     History of abscess of skin and subcutaneous tissue     ABD WOUND 5/2017    History of atrial fibrillation      X1 POST OP FROM COLOSTOMY 5/2017 - NO PROBLEMS SINCE    Hypertension     Incisional hernia     MDRO (multiple drug resistant organisms) resistance     Occasional tremors     NEW (obstructive sleep apnea) 05/20/2021    Overnight polysomnogram.  Weight 190 pounds.  Mild NEW with AHI 7 events per hour for total sleep time.  However, during REM moderate NEW with AHI 16.7 events per hour.  No sleep-related hypoxia.  The patient snored 5% of total sleep time.    PONV (postoperative nausea and vomiting)     Psoriasis     UTI (urinary tract infection)     Vertigo         Past Surgical History:   Procedure Laterality Date    BELPHAROPTOSIS REPAIR Bilateral 04/27/2017    Bilateral brow ptosis repair via the anterior hairline approach under monitored local anesthesia-Dr. Deangelo Cisneros, [facility]    BLEPHAROPLASTY Bilateral 04/27/2017    BREAST BIOPSY      CATARACT EXTRACTION      COLON RESECTION N/A 5/3/2017    Procedure: OPEN SIMOID COLECTOMY WITH COLOSTOMY;  Surgeon: Renato Delgado MD;  Location: [facility];  Service:     COLONOSCOPY N/A 9/13/2017     Procedure: COLONOSCOPY VIA COLOSTOMY TO CECUM;  Surgeon: Renato Delgado MD;  Location: [facility] ENDOSCOPY;  Service:     COLONOSCOPY N/A 8/1/2019    Procedure: COLONOSCOPY to cecum and TI with biopsy / hot snare polypectomies;  Surgeon: Dalton Vela Jr., MD;  [facility];  Service: General    COLONOSCOPY N/A 01/04/2010    [facility] Trinidad    COLONOSCOPY N/A 11/3/2022    Procedure: COLONOSCOPY to cecum and TI with biopsy / hot snare polypectomy;  Surgeon: Trini Wade MD;  [facility];  Service: General;  Laterality: N/A;  pre-hx diverticulitis, screen, fam hx colon ca, personal hx polyps  post-polyp, diverticulosis, small hemorrhoids    COLOSTOMY CLOSURE N/A 9/14/2017    Procedure: COLOSTOMY TAKEDOWN AND CLOSURE, WITH RESECTION;  Surgeon: Renato Delgado MD;  Location: [facility] MAIN OR;  Service:     ENDOSCOPY AND COLONOSCOPY N/A 10/31/2014    Normal EGD and colonoscopy, repeat c-scope in 5 years-Andrez Trinidad    EXPLORATORY LAPAROTOMY N/A 9/8/2019    Procedure: Exploratory laparotomy with lysis of adhesions;  Surgeon: Trini Wade MD;  Location: [facility] MAIN OR;  Service: General    FINGER SURGERY Left     4TH FINGER LEFT HAND    HYSTERECTOMY Bilateral     LAPAROSCOPIC CHOLECYSTECTOMY W/ CHOLANGIOGRAPHY N/A 07/15/2003    Dr. Amrit Martinez, [facility]    SALPINGO OOPHORECTOMY Bilateral 02/02/2006    Abdominal sacral colpopexy, bilateral salpingo-oophorectomy, tension free vaginal tape, cystoscopy-Dr. Devika Bradley, [facility]    THORACENTESIS Right 05/21/2017    US-guided right thoracentesis-Dr. Juan Yuen, [facility]    TONSILLECTOMY Bilateral     UPPER GASTROINTESTINAL ENDOSCOPY N/A 10/08/2007    Esophageal mucosal changes suspicious for short-segment Olivera's esphagus, gastric mucosal abnormality characterized by erythema: biopsied, NERD disease present, high likelihood of gastritis-Dr. Mayank Knapp, [facility]    VENTRAL/INCISIONAL HERNIA REPAIR N/A 9/22/2023     Procedure: Open incisional hernia repair with mesh;  Surgeon: Trini Wade MD;  Location: [facility] MAIN OR;  Service: General;  Laterality: N/A;        Social History     Occupational History    Not on file   Tobacco Use    Smoking status: Former     Current packs/day: 0.00     Types: Cigarettes     Quit date:      Years since quittin.3     Passive exposure: Past    Smokeless tobacco: Never    Tobacco comments:     SOCIAL SMOKING ONLY   Vaping Use    Vaping status: Never Used   Substance and Sexual Activity    Alcohol use: Yes     Comment: RARE    Drug use: No    Sexual activity: Defer      Social History     Social History Narrative    Not on file        Family History   Problem Relation Age of Onset    Cancer Mother     Colon cancer Mother     Diabetes Father     Diabetes Son     Ulcerative colitis Son     No Known Problems Maternal Grandmother     No Known Problems Maternal Grandfather     No Known Problems Paternal Grandmother     No Known Problems Paternal Grandfather     Malig Hyperthermia Neg Hx     Breast cancer Neg Hx        ROS: 14 point review of systems was performed and was negative except for documented findings in HPI and today's encounter.     Allergies:   Allergies   Allergen Reactions    Epinephrine Palpitations    Penicillins Other (See Comments)     BLISTERS IN MOUTH    Patient tolerated ceftriaxone during 2017 admission.        Constitutional:  Denies fever, shaking or chills   Eyes:  Denies change in visual acuity   HENT:  Denies nasal congestion or sore throat   Respiratory:  Denies cough or shortness of breath   Cardiovascular:  Denies chest pain or severe LE edema   GI:  Denies abdominal pain, nausea, vomiting, bloody stools or diarrhea   Musculoskeletal:  Numbness, tingling, or loss of motor function only as noted above in history of present illness.  : Denies painful urination or hematuria  Integument:  Denies rash, lesion or ulceration   Neurologic:  Denies headache or  focal weakness  Endocrine:  Denies lymphadenopathy  Psych:  Denies confusion or change in mental status   Hem:  Denies active bleeding      Physical Exam: 78 y.o. female  Wt Readings from Last 3 Encounters:   03/19/24 77.1 kg (170 lb)   03/05/24 77.1 kg (170 lb)   02/29/24 80.7 kg (178 lb)       There is no height or weight on file to calculate BMI.    There were no vitals filed for this visit.  Vital signs reviewed.   General Appearance:    Alert, cooperative, in no acute distress                    Ortho exam      Exam is ever knees are unchanged           Assessment: Bilateral knee DJD    Plan: Injections I will see her back in a couple weeks for shoulders  Follow up as indicated.  Ice, elevate, and rest as needed.  Discussed conservative measures of pain control including ice, bracing.  Also talked about the importance of strengthening and maintaining ideal body weight ultimately if it is just injections that she wants I will get her into the hands of [facility] and have her do those injections going forward    Lety Vela M.D.    Large Joint Arthrocentesis: R knee  Date/Time: 4/16/2024 1:26 PM  Consent given by: patient  Site marked: site marked  Timeout: Immediately prior to procedure a time out was called to verify the correct patient, procedure, equipment, support staff and site/side marked as required   Supporting Documentation  Indications: pain, joint swelling and diagnostic evaluation   Procedure Details  Location: knee - R knee  Preparation: Patient was prepped and draped in the usual sterile fashion  Needle gauge: 21G.  Medications administered: 1 mL methylPREDNISolone acetate 80 MG/ML; 2 mL lidocaine PF 1% 1 %  Patient tolerance: patient tolerated the procedure well with no immediate complications      Large Joint Arthrocentesis: L knee  Date/Time: 4/16/2024 1:27 PM  Consent given by: patient  Site marked: site marked  Timeout: Immediately prior to procedure a time out was called to verify the correct  patient, procedure, equipment, support staff and site/side marked as required   Supporting Documentation  Indications: pain   Procedure Details  Location: knee - L knee  Preparation: Patient was prepped and draped in the usual sterile fashion  Needle gauge: 21G.  Medications administered: 1 mL methylPREDNISolone acetate 80 MG/ML; 2 mL lidocaine PF 1% 1 %  Patient tolerance: patient tolerated the procedure well with no immediate complications

## 2024-04-17 RX ORDER — METHYLPREDNISOLONE ACETATE 80 MG/ML
1 INJECTION, SUSPENSION INTRA-ARTICULAR; INTRALESIONAL; INTRAMUSCULAR; SOFT TISSUE
Status: COMPLETED | OUTPATIENT
Start: 2024-04-16 | End: 2024-04-16

## 2024-04-17 RX ORDER — LIDOCAINE HYDROCHLORIDE 10 MG/ML
2 INJECTION, SOLUTION EPIDURAL; INFILTRATION; INTRACAUDAL; PERINEURAL
Status: COMPLETED | OUTPATIENT
Start: 2024-04-16 | End: 2024-04-16

## 2024-04-24 ENCOUNTER — TELEPHONE (OUTPATIENT)
Dept: CARDIOLOGY | Facility: CLINIC | Age: 79
End: 2024-04-24

## 2024-04-24 NOTE — TELEPHONE ENCOUNTER
"Caller Name: Kiley Campoverde \"Anna\"      Relationship: Self      Best Contact Number: [phone]      Patient is requesting samples of ELIQUIS FOR HER AND HER  ANASTASIA CAMPOVERDE.       How many days of medication do you have left? THEY RUN OUT TODAY        Additional Information: PT STATES WE GIVE HER AND HER  SAMPLES OF ELIQUIS. SHE IS WONDERING IF HER SON CAN COME PICK THEIR SAMPLES UP SOMETIME TODAY IF POSSIBLE. PLEASE CALL BACK TO RELAY ONCE READY.       "

## 2024-04-29 NOTE — PROGRESS NOTES
"  Patient: Kiley Last  YOB: 1945  Date of Service: 4/29/2024    Chief Complaints:   Right shoulder pain  Subjective:    History of Present Illness: Pt is seen in the office today with complaints of right shoulder pain I last saw her almost a year ago we thought she had a little bit of capsulitis did a glenohumeral injection she got great relief she states recently her shoulder started bothering her she has good range of motion but she has pain with activity        Past medical history as listed below she does take Eliquis        Allergies:   Allergies   Allergen Reactions    Epinephrine Palpitations    Penicillins Other (See Comments)     BLISTERS IN MOUTH    Patient tolerated ceftriaxone during 5/2017 admission.          Medications:   Home Medications:  Current Outpatient Medications on File Prior to Visit   Medication Sig    Acetaminophen (TYLENOL ARTHRITIS PAIN PO) Take 650 mg by mouth 3 (Three) Times a Day As Needed.    apixaban (Eliquis) 5 MG tablet tablet Take 1 tablet by mouth Every 12 (Twelve) Hours.    cholecalciferol (VITAMIN D3) 25 MCG (1000 UT) tablet Take 1 tablet by mouth Daily.    cyanocobalamin 1000 MCG/ML injection Inject 1 mL into the appropriate muscle as directed by prescriber Every 28 (Twenty-Eight) Days.    flecainide (TAMBOCOR) 50 MG tablet TAKE ONE TABLET BY MOUTH EVERY 12 HOURS    losartan (COZAAR) 100 MG tablet TAKE ONE TABLET BY MOUTH DAILY    Magnesium 100 MG capsule Take 1 capsule by mouth Every Night.    meclizine (ANTIVERT) 25 MG tablet TAKE ONE TABLET BY MOUTH THREE TIMES A DAY AS NEEDED FOR DIZZINESS    metoprolol succinate XL (TOPROL-XL) 50 MG 24 hr tablet TAKE ONE TABLET BY MOUTH DAILY    rosuvastatin (CRESTOR) 20 MG tablet Take 1 tablet by mouth Daily.    Syringe/Needle, Disp, 25G X 5/8\" 3 ML misc Use one monthly with B12    Zinc Sulfate (ZINC 15 PO) Take 1 tablet by mouth Every Night.     Current Facility-Administered Medications on File Prior to Visit "   Medication    cyanocobalamin injection 1,000 mcg     Current Medications:  Scheduled Meds:cyanocobalamin, 1,000 mcg, Intramuscular, Q28 Days      Continuous Infusions:   PRN Meds:.    I have reviewed the patient's medical history in detail and updated the computerized patient record.  Review and summarization of old records include:    Past Medical History:   Diagnosis Date    Arthritis     Asthma     NO INHALERS    Atrial fibrillation     Clostridium difficile infection 09/20/2019    Colon polyps 08/01/2019    Transverse colon: tubular adenoma, descending colon: fragments of tubular adenoma, sigmoid colon: ischemic eroded hyperplastic polyp    COPD (chronic obstructive pulmonary disease)     Diverticulitis     Diverticulosis     ESBL (extended spectrum beta-lactamase) producing bacteria infection     GERD (gastroesophageal reflux disease)     History of abscess of skin and subcutaneous tissue     ABD WOUND 5/2017    History of atrial fibrillation      X1 POST OP FROM COLOSTOMY 5/2017 - NO PROBLEMS SINCE    Hypertension     Incisional hernia     MDRO (multiple drug resistant organisms) resistance     Occasional tremors     NEW (obstructive sleep apnea) 05/20/2021    Overnight polysomnogram.  Weight 190 pounds.  Mild NEW with AHI 7 events per hour for total sleep time.  However, during REM moderate NEW with AHI 16.7 events per hour.  No sleep-related hypoxia.  The patient snored 5% of total sleep time.    PONV (postoperative nausea and vomiting)     Psoriasis     UTI (urinary tract infection)     Vertigo         Past Surgical History:   Procedure Laterality Date    BELPHAROPTOSIS REPAIR Bilateral 04/27/2017    Bilateral brow ptosis repair via the anterior hairline approach under monitored local anesthesia-Dr. Deangelo Cisneros, [facility]    BLEPHAROPLASTY Bilateral 04/27/2017    BREAST BIOPSY      CATARACT EXTRACTION      COLON RESECTION N/A 5/3/2017    Procedure: OPEN SIMOID COLECTOMY WITH COLOSTOMY;  Surgeon: Renato POSADAS  MD Danny;  Location: [facility] MAIN OR;  Service:     COLONOSCOPY N/A 9/13/2017    Procedure: COLONOSCOPY VIA COLOSTOMY TO CECUM;  Surgeon: Renato Delgado MD;  Location[facility] ENDOSCOPY;  Service:     COLONOSCOPY N/A 8/1/2019    Procedure: COLONOSCOPY to cecum and TI with biopsy / hot snare polypectomies;  Surgeon: Dalton Vela Jr., MD;  [facility] END[facility];  Service: General    COLONOSCOPY N/A 01/04/2010    [facility]    COLONOSCOPY N/A 11/3/2022    Procedure: COLONOSCOPY to cecum and TI with biopsy / hot snare polypectomy;  Surgeon: Trini Wade MD;  [facility];  Service: General;  Laterality: N/A;  pre-hx diverticulitis, screen, fam hx colon ca, personal hx polyps  post-polyp, diverticulosis, small hemorrhoids    COLOSTOMY CLOSURE N/A 9/14/2017    Procedure: COLOSTOMY TAKEDOWN AND CLOSURE, WITH RESECTION;  Surgeon: Renato Delgado MD;  Location: [facility] MAIN OR;  Service:     ENDOSCOPY AND COLONOSCOPY N/A 10/31/2014    Normal EGD and colonoscopy, repeat c-scope in 5 years-[facility]    EXPLORATORY LAPAROTOMY N/A 9/8/2019    Procedure: Exploratory laparotomy with lysis of adhesions;  Surgeon: Trini Wade MD;  Location: [facility] MAIN OR;  Service: General    FINGER SURGERY Left     4TH FINGER LEFT HAND    HYSTERECTOMY Bilateral     LAPAROSCOPIC CHOLECYSTECTOMY W/ CHOLANGIOGRAPHY N/A 07/15/2003    Dr. Amrit Martinez, [facility]    SALPINGO OOPHORECTOMY Bilateral 02/02/2006    Abdominal sacral colpopexy, bilateral salpingo-oophorectomy, tension free vaginal tape, cystoscopy-Dr. Devika Bradley, [facility]    THORACENTESIS Right 05/21/2017    US-guided right thoracentesis-Dr. Juan Yuen, [facility]    TONSILLECTOMY Bilateral     UPPER GASTROINTESTINAL ENDOSCOPY N/A 10/08/2007    Esophageal mucosal changes suspicious for short-segment Olivera's esphagus, gastric mucosal abnormality characterized by erythema: biopsied, NERD disease present, high likelihood of  gastritis-Dr. Mayank Knapp, [facility]    VENTRAL/INCISIONAL HERNIA REPAIR N/A 2023    Procedure: Open incisional hernia repair with mesh;  Surgeon: Trini Wade MD;  Location: [facility] MAIN OR;  Service: General;  Laterality: N/A;        Social History     Occupational History    Not on file   Tobacco Use    Smoking status: Former     Current packs/day: 0.00     Types: Cigarettes     Quit date:      Years since quittin.3     Passive exposure: Past    Smokeless tobacco: Never    Tobacco comments:     SOCIAL SMOKING ONLY   Vaping Use    Vaping status: Never Used   Substance and Sexual Activity    Alcohol use: Yes     Comment: RARE    Drug use: No    Sexual activity: Defer      Social History     Social History Narrative    Not on file        Family History   Problem Relation Age of Onset    Cancer Mother     Colon cancer Mother     Diabetes Father     Diabetes Son     Ulcerative colitis Son     No Known Problems Maternal Grandmother     No Known Problems Maternal Grandfather     No Known Problems Paternal Grandmother     No Known Problems Paternal Grandfather     Malig Hyperthermia Neg Hx     Breast cancer Neg Hx        ROS: 14 point review of systems was performed and was negative except for documented findings in HPI and today's encounter.     Allergies:   Allergies   Allergen Reactions    Epinephrine Palpitations    Penicillins Other (See Comments)     BLISTERS IN MOUTH    Patient tolerated ceftriaxone during 2017 admission.        Constitutional:  Denies fever, shaking or chills   Eyes:  Denies change in visual acuity   HENT:  Denies nasal congestion or sore throat   Respiratory:  Denies cough or shortness of breath   Cardiovascular:  Denies chest pain or severe LE edema   GI:  Denies abdominal pain, nausea, vomiting, bloody stools or diarrhea   Musculoskeletal:  Numbness, tingling, or loss of motor function only as noted above in history of present illness.  : Denies painful urination or  hematuria  Integument:  Denies rash, lesion or ulceration   Neurologic:  Denies headache or focal weakness  Endocrine:  Denies lymphadenopathy  Psych:  Denies confusion or change in mental status   Hem:  Denies active bleeding      Physical Exam: 78 y.o. female  Wt Readings from Last 3 Encounters:   04/16/24 79.1 kg (174 lb 4.8 oz)   03/19/24 77.1 kg (170 lb)   03/05/24 77.1 kg (170 lb)       There is no height or weight on file to calculate BMI.    There were no vitals filed for this visit.  Vital signs reviewed.   General Appearance:    Alert, cooperative, in no acute distress                    Ortho exam      Physical exam of the left shoulder reveals no overlying skin changes no lymphedema no lymphadenopathy.  Patient has active flexion 180 with mild symptoms abduction is similar external rotation is to 50 and internal rotation to the upper lumbar spine with mild symptoms.  Patient has good rotator cuff strength 4+ over 5 with isometric strength testing with pain.  Patient has a positive impingement and a positive Frederick sign.  Patient has good cervical range of motion which is full and asymptomatic no radicular symptoms.  Patient has a normal elbow exam.  Good distal pulses are presentPatient has pain with overhead activity and a positive Neer sign and a positive empty can sign  They have a positive drop arm any definitive painful arc questioning    X-rays right shoulder AP scapular Y and x-ray lateral taken to evaluate her symptoms have compared x-rays done 1 year ago she has good maintenance of her joint space mild acromioclavicular arthritis otherwise no acute pathology      Assessment: Right shoulder pain I do not think this capsulitis I really think this is more rotator cuff plan is to proceed with an injection she fails to improve with this we will pursue other means of testing    Plan: Is as above  Follow up as indicated.  Ice, elevate, and rest as needed.  Discussed conservative measures of pain  control including ice, bracing.  Also talked about the importance of strengthening   Lety Vela M.D.    Large Joint Arthrocentesis: R subacromial bursa  Date/Time: 4/30/2024 1:41 PM  Consent given by: patient  Site marked: site marked  Timeout: Immediately prior to procedure a time out was called to verify the correct patient, procedure, equipment, support staff and site/side marked as required   Supporting Documentation  Indications: pain   Procedure Details  Location: shoulder - R subacromial bursa  Preparation: Patient was prepped and draped in the usual sterile fashion  Needle gauge: 21G.  Approach: posterior  Medications administered: 1 mL methylPREDNISolone acetate 80 MG/ML; 2 mL lidocaine PF 1% 1 %  Patient tolerance: patient tolerated the procedure well with no immediate complications

## 2024-04-30 ENCOUNTER — OFFICE VISIT (OUTPATIENT)
Dept: ORTHOPEDIC SURGERY | Facility: CLINIC | Age: 79
End: 2024-04-30
Payer: MEDICARE

## 2024-04-30 VITALS — TEMPERATURE: 97.5 F | WEIGHT: 174.4 LBS | BODY MASS INDEX: 29.06 KG/M2 | HEIGHT: 65 IN

## 2024-04-30 DIAGNOSIS — R52 PAIN: Primary | ICD-10-CM

## 2024-04-30 DIAGNOSIS — M75.41 IMPINGEMENT SYNDROME OF RIGHT SHOULDER: ICD-10-CM

## 2024-04-30 RX ORDER — LIDOCAINE HYDROCHLORIDE 10 MG/ML
2 INJECTION, SOLUTION EPIDURAL; INFILTRATION; INTRACAUDAL; PERINEURAL
Status: COMPLETED | OUTPATIENT
Start: 2024-04-30 | End: 2024-04-30

## 2024-04-30 RX ORDER — METHYLPREDNISOLONE ACETATE 80 MG/ML
1 INJECTION, SUSPENSION INTRA-ARTICULAR; INTRALESIONAL; INTRAMUSCULAR; SOFT TISSUE
Status: COMPLETED | OUTPATIENT
Start: 2024-04-30 | End: 2024-04-30

## 2024-04-30 RX ADMIN — METHYLPREDNISOLONE ACETATE 1 ML: 80 INJECTION, SUSPENSION INTRA-ARTICULAR; INTRALESIONAL; INTRAMUSCULAR; SOFT TISSUE at 13:41

## 2024-04-30 RX ADMIN — LIDOCAINE HYDROCHLORIDE 2 ML: 10 INJECTION, SOLUTION EPIDURAL; INFILTRATION; INTRACAUDAL; PERINEURAL at 13:41

## 2024-06-03 DIAGNOSIS — E53.8 B12 DEFICIENCY: ICD-10-CM

## 2024-06-03 RX ORDER — ROSUVASTATIN CALCIUM 20 MG/1
20 TABLET, COATED ORAL DAILY
Qty: 90 TABLET | Refills: 0 | Status: SHIPPED | OUTPATIENT
Start: 2024-06-03

## 2024-06-04 RX ORDER — NEEDLES, SAFETY 22GX1 1/2"
NEEDLE, DISPOSABLE MISCELLANEOUS
Qty: 3 EACH | Refills: 1 | Status: SHIPPED | OUTPATIENT
Start: 2024-06-04

## 2024-06-11 RX ORDER — CITALOPRAM HYDROBROMIDE 10 MG/1
10 TABLET ORAL DAILY
Qty: 90 TABLET | Refills: 1 | Status: SHIPPED | OUTPATIENT
Start: 2024-06-11

## 2024-06-18 ENCOUNTER — TELEPHONE (OUTPATIENT)
Dept: CARDIOLOGY | Facility: CLINIC | Age: 79
End: 2024-06-18
Payer: MEDICARE

## 2024-06-18 NOTE — TELEPHONE ENCOUNTER
Pt called the office requesting samples of medication.    I've placed 2 week's worth of apixaban 5 mg BID at the  for pickup.  Pt is aware that these are ready for them to pickup at their convenience.    LOT: [id number]   EXP: August 2025    Thank you,    Kinga MCDOWELL , RN  Triage [facility]  06/18/24 09:58 EDT

## 2024-06-20 RX ORDER — LOSARTAN POTASSIUM 100 MG/1
100 TABLET ORAL DAILY
Qty: 90 TABLET | Refills: 2 | Status: SHIPPED | OUTPATIENT
Start: 2024-06-20

## 2024-06-20 RX ORDER — FLECAINIDE ACETATE 50 MG/1
TABLET ORAL
Qty: 90 TABLET | Refills: 3 | Status: SHIPPED | OUTPATIENT
Start: 2024-06-20

## 2024-08-13 ENCOUNTER — TELEPHONE (OUTPATIENT)
Dept: CARDIOLOGY | Facility: CLINIC | Age: 79
End: 2024-08-13

## 2024-08-13 NOTE — TELEPHONE ENCOUNTER
Caller: TOVA GILLES    Relationship:PT    Callback number: [phone]   Is it ok to leave a message: [x] Yes [] No    Requested medication for samples: ELIQUIS    How much medication does the patient currently have left: NONE    Who will be picking up the samples: PT    Do you need information about patient financial assistance for this medication: [x] Yes [] No

## 2024-08-20 ENCOUNTER — TELEPHONE (OUTPATIENT)
Dept: INTERNAL MEDICINE | Facility: CLINIC | Age: 79
End: 2024-08-20
Payer: MEDICARE

## 2024-08-20 RX ORDER — GRANULES FOR ORAL 3 G/1
3 POWDER ORAL ONCE
Qty: 3 G | Refills: 0 | Status: SHIPPED | OUTPATIENT
Start: 2024-08-20 | End: 2024-08-20

## 2024-08-20 NOTE — TELEPHONE ENCOUNTER
"  Caller: Kiley Last \"Anna\"    Relationship: Self    Best call back number: [phone]     Requested Prescriptions: ANTIBIOTIC  Requested Prescriptions      No prescriptions requested or ordered in this encounter        Pharmacy where request should be sent:     Reorder (0)  Discontinue (0)     [facility] [id number] [facility] [address] AT [facility] [address] [facility] [phone] [facility] [phone]  [phone]        Last office visit with prescribing clinician: 2/29/2024   Last telemedicine visit with prescribing clinician: Visit date not found   Next office visit with prescribing clinician: 9/3/2024     Additional details provided by patient: PATIENT IS CALLING TO STATE SHE IS GETTING A UTI.  SHE STATES SHE HAS URINARY URGENCY AND DISCOMFORT WHEN SHE URINATES.  SHE IS WANTING TO KNOW IF DR NEGRETE WOULD SEND IN A PRESCRIPTION .    Does the patient have less than a 3 day supply:  [x] Yes  [] No    Would you like a call back once the refill request has been completed: [] Yes [] No    If the office needs to give you a call back, can they leave a voicemail: [] Yes [] No    Sol Joel Rep   08/20/24 09:55 EDT     PLEASE ADVISE.        "

## 2024-08-25 DIAGNOSIS — E53.8 B12 DEFICIENCY: ICD-10-CM

## 2024-08-26 RX ORDER — CYANOCOBALAMIN 1000 UG/ML
INJECTION, SOLUTION INTRAMUSCULAR; SUBCUTANEOUS
Qty: 3 ML | Refills: 3 | Status: SHIPPED | OUTPATIENT
Start: 2024-08-26

## 2024-09-03 ENCOUNTER — OFFICE VISIT (OUTPATIENT)
Dept: INTERNAL MEDICINE | Facility: CLINIC | Age: 79
End: 2024-09-03
Payer: MEDICARE

## 2024-09-03 VITALS
WEIGHT: 174.4 LBS | TEMPERATURE: 97.1 F | HEART RATE: 78 BPM | BODY MASS INDEX: 29.06 KG/M2 | HEIGHT: 65 IN | SYSTOLIC BLOOD PRESSURE: 132 MMHG | DIASTOLIC BLOOD PRESSURE: 64 MMHG

## 2024-09-03 DIAGNOSIS — D64.9 ANEMIA, UNSPECIFIED TYPE: Chronic | ICD-10-CM

## 2024-09-03 DIAGNOSIS — F33.41 MAJOR DEPRESSIVE DISORDER, RECURRENT EPISODE, IN PARTIAL REMISSION: Chronic | ICD-10-CM

## 2024-09-03 DIAGNOSIS — E78.49 OTHER HYPERLIPIDEMIA: Chronic | ICD-10-CM

## 2024-09-03 DIAGNOSIS — Z12.31 VISIT FOR SCREENING MAMMOGRAM: Primary | ICD-10-CM

## 2024-09-03 PROBLEM — R73.03 PREDIABETES: Status: ACTIVE | Noted: 2019-12-11

## 2024-09-03 PROCEDURE — 1125F AMNT PAIN NOTED PAIN PRSNT: CPT | Performed by: INTERNAL MEDICINE

## 2024-09-03 PROCEDURE — 1159F MED LIST DOCD IN RCRD: CPT | Performed by: INTERNAL MEDICINE

## 2024-09-03 PROCEDURE — 1170F FXNL STATUS ASSESSED: CPT | Performed by: INTERNAL MEDICINE

## 2024-09-03 PROCEDURE — 3078F DIAST BP <80 MM HG: CPT | Performed by: INTERNAL MEDICINE

## 2024-09-03 PROCEDURE — 1160F RVW MEDS BY RX/DR IN RCRD: CPT | Performed by: INTERNAL MEDICINE

## 2024-09-03 PROCEDURE — 3075F SYST BP GE 130 - 139MM HG: CPT | Performed by: INTERNAL MEDICINE

## 2024-09-03 PROCEDURE — G0439 PPPS, SUBSEQ VISIT: HCPCS | Performed by: INTERNAL MEDICINE

## 2024-09-03 RX ORDER — ROSUVASTATIN CALCIUM 20 MG/1
20 TABLET, COATED ORAL DAILY
Qty: 90 TABLET | Refills: 0 | Status: SHIPPED | OUTPATIENT
Start: 2024-09-03

## 2024-09-03 RX ORDER — FLUTICASONE PROPIONATE 50 MCG
2 SPRAY, SUSPENSION (ML) NASAL DAILY
Qty: 18.2 ML | Refills: 1 | Status: SHIPPED | OUTPATIENT
Start: 2024-09-03

## 2024-09-03 NOTE — PROGRESS NOTES
Subjective   The ABCs of the Annual Wellness Visit  Medicare Wellness Visit      Kiley Last is a 78 y.o. patient who presents for a Medicare Wellness Visit.    The following portions of the patient's history were reviewed and   updated as appropriate: allergies, current medications, past family history, past medical history, past social history, past surgical history, and problem list.    Compared to one year ago, the patient's physical   health is worse.  Compared to one year ago, the patient's mental   health is worse.    Recent Hospitalizations:  She was not admitted to the hospital during the last year.     Current Medical Providers:  Patient Care Team:  Miranda Morgan MD as PCP - General (Internal Medicine)  Miguelina Hurst APRN as Nurse Practitioner (Nurse Practitioner)  Rich Kearns MD as Consulting Physician (Cardiology)  Patrick Weeks MD as Consulting Physician (Otolaryngology)  Rossana Evans MD as Consulting Physician (Infectious Diseases)  Patrick Dong MD as Consulting Physician (Pulmonary Disease)    Outpatient Medications Prior to Visit   Medication Sig Dispense Refill    Acetaminophen (TYLENOL ARTHRITIS PAIN PO) Take 650 mg by mouth 3 (Three) Times a Day As Needed.      apixaban (Eliquis) 5 MG tablet tablet Take 1 tablet by mouth Every 12 (Twelve) Hours. 28 tablet 0    citalopram (CeleXA) 10 MG tablet Take 1 tablet by mouth Daily. 90 tablet 1    cyanocobalamin 1000 MCG/ML injection INJECT 1ML INTO THE APPROPRIATE MUSCLE AS DIRECTED BY PRESCRIBER EVERY 28 DAYS 3 mL 3    flecainide (TAMBOCOR) 50 MG tablet TAKE ONE TABLET BY MOUTH EVERY 12 HOURS 90 tablet 3    losartan (COZAAR) 100 MG tablet TAKE 1 TABLET BY MOUTH DAILY 90 tablet 2    Magnesium 100 MG capsule Take 1 capsule by mouth Every Night.      meclizine (ANTIVERT) 25 MG tablet TAKE ONE TABLET BY MOUTH THREE TIMES A DAY AS NEEDED FOR DIZZINESS 90 tablet 0    metoprolol succinate XL (TOPROL-XL) 50 MG 24 hr tablet TAKE ONE  "TABLET BY MOUTH DAILY 30 tablet 11    rosuvastatin (CRESTOR) 20 MG tablet TAKE 1 TABLET BY MOUTH DAILY 90 tablet 0    Syringe/Needle, Disp, (B-D SYRINGE/NEEDLE 1CC/25GX5/8) 25G X 5/8\" 1 ML misc USE MONTHLY WITH B12 3 each 1    Zinc Sulfate (ZINC 15 PO) Take 1 tablet by mouth Every Night.      cholecalciferol (VITAMIN D3) 25 MCG (1000 UT) tablet Take 1 tablet by mouth Daily. (Patient not taking: Reported on 9/3/2024)       Facility-Administered Medications Prior to Visit   Medication Dose Route Frequency Provider Last Rate Last Admin    cyanocobalamin injection 1,000 mcg  1,000 mcg Intramuscular Q28 Days Miranda Morgan MD   1,000 mcg at 08/23/23 1431     No opioid medication identified on active medication list. I have reviewed chart for other potential  high risk medication/s and harmful drug interactions in the elderly.      Aspirin is not on active medication list.  Aspirin use is not indicated based on review of current medical condition/s. Risk of harm outweighs potential benefits.  .    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  ACP discussion was held with the patient during this visit. Patient has an advance directive (not in EMR), copy requested.            Objective   Vitals:    09/03/24 1255   BP: 132/64   Pulse: 78   Temp: 97.1 °F (36.2 °C)   TempSrc: Temporal   Weight: 79.1 kg (174 lb 6.4 oz)   Height: 165.1 cm (65\")       Estimated body mass index is 29.02 kg/m² as calculated from the following:    " "Height as of this encounter: 165.1 cm (65\").    Weight as of this encounter: 79.1 kg (174 lb 6.4 oz).            Does the patient have evidence of cognitive impairment? No                                                                                                Health  Risk Assessment    Smoking Status:  Social History     Tobacco Use   Smoking Status Former    Current packs/day: 0.00    Types: Cigarettes    Quit date:     Years since quittin.7    Passive exposure: Past   Smokeless Tobacco Never   Tobacco Comments    SOCIAL SMOKING ONLY     Alcohol Consumption:  Social History     Substance and Sexual Activity   Alcohol Use Yes    Comment: RARE       Fall Risk Screen  STEADI Fall Risk Assessment was completed, and patient is at LOW risk for falls.Assessment completed on:9/3/2024    Depression Screenin/3/2024     1:00 PM   PHQ-2/PHQ-9 Depression Screening   Little Interest or Pleasure in Doing Things 1-->several days   Feeling Down, Depressed or Hopeless 0-->not at all   PHQ-9: Brief Depression Severity Measure Score 1     Health Habits and Functional and Cognitive Screenin/3/2024    12:57 PM   Functional & Cognitive Status   Do you have difficulty preparing food and eating? No   Do you have difficulty bathing yourself, getting dressed or grooming yourself? No   Do you have difficulty using the toilet? No   Do you have difficulty moving around from place to place? No   Do you have trouble with steps or getting out of a bed or a chair? 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  No   Are you deaf or do you have serious difficulty hearing?  Yes   Do you need help to go to places out of walking distance? No   Do you need help shopping? No   Do you need help preparing meals?  No   Do you " need help with housework?  No   Do you need help with laundry? No   Do you need help taking your medications? No   Do you need help managing money? No   Do you ever drive or ride in a car without wearing a seat belt? No   Have you felt unusual stress, anger or loneliness in the last month? Yes   Who do you live with? Spouse   If you need help, do you have trouble finding someone available to you? No   Have you been bothered in the last four weeks by sexual problems? No   Do you have difficulty concentrating, remembering or making decisions? No           Age-appropriate Screening Schedule:  Refer to the list below for future screening recommendations based on patient's age, sex and/or medical conditions. Orders for these recommended tests are listed in the plan section. The patient has been provided with a written plan.    Health Maintenance List  Health Maintenance   Topic Date Due    TDAP/TD VACCINES (1 - Tdap) Never done    ZOSTER VACCINE (1 of 2) Never done    HEPATITIS C SCREENING  Never done    MAMMOGRAM  06/10/2023    ANNUAL WELLNESS VISIT  08/23/2024    COVID-19 Vaccine (6 - 2023-24 season) 09/01/2024    INFLUENZA VACCINE  08/01/2024    DXA SCAN  09/01/2027 (Originally 10/28/2023)    BMI FOLLOWUP  11/17/2024    LIPID PANEL  02/22/2025    COLORECTAL CANCER SCREENING  11/03/2025    RSV Vaccine - Adults  Completed    Pneumococcal Vaccine 65+  Completed                                                                                                                                                CMS Preventative Services Quick Reference  Risk Factors Identified During Encounter  Multiple reviewed but she refuses or knows she needs to do things but refuses due to     The above risks/problems have been discussed with the patient.  Pertinent information has been shared with the patient in the After Visit Summary.  An After Visit Summary and PPPS were made available to the patient.    Follow Up:   Next Medicare  "Wellness visit to be scheduled in 1 year.         Additional E&M Note during same encounter follows:  Patient has additional, significant, and separately identifiable condition(s)/problem(s) that require work above and beyond the Medicare Wellness Visit     Chief Complaint  Medicare Wellness-subsequent    Subjective   HPI  Anna is also being seen today for additional medical problem/s.     in subacute rehab- she does feel physically better with him there but is still with him much of the days. Admits that she never takes care of herself, even taking something for her sinuses-   Her voice feels weak sometimes- could be drainage.   Wonders if she should get the flu and Covid vaccines.             Objective   Vital Signs:  /64   Pulse 78   Temp 97.1 °F (36.2 °C) (Temporal)   Ht 165.1 cm (65\")   Wt 79.1 kg (174 lb 6.4 oz)   BMI 29.02 kg/m²   Physical Exam  Constitutional:       Appearance: Normal appearance.   HENT:      Nose: Congestion present.   Cardiovascular:      Rate and Rhythm: Normal rate and regular rhythm.   Pulmonary:      Effort: Pulmonary effort is normal.      Breath sounds: Normal breath sounds.   Chest:   Breasts:     Right: Normal.      Left: Normal.   Abdominal:      General: Bowel sounds are normal.   Musculoskeletal:      Right lower leg: No edema.      Left lower leg: No edema.   Lymphadenopathy:      Cervical: No cervical adenopathy.      Upper Body:      Right upper body: No supraclavicular, axillary or pectoral adenopathy.      Left upper body: No supraclavicular, axillary or pectoral adenopathy.               Assessment and Plan               Visit for screening mammogram    Other hyperlipidemia     Anemia, unspecified type    Major depressive disorder, recurrent episode, in partial remission    Diagnoses and all orders for this visit:    1. Visit for screening mammogram (Primary)  -     Mammo Screening Digital Tomosynthesis Bilateral With CAD; Future    2. Other " hyperlipidemia  Comments:  check labs today- she has been on rosuvastatin  Orders:  -     Comprehensive Metabolic Panel  -     Lipid Panel With / Chol / HDL Ratio    3. Anemia, unspecified type  Comments:  recheck today  Orders:  -     CBC & Differential  -     Iron Profile  -     Ferritin  -     Vitamin B12    4. Major depressive disorder, recurrent episode, in partial remission  Comments:  she feels citalopram low dose is helpful- she is encouraged to take care of herself so she can be useful to !    Other orders  -     fluticasone (FLONASE) 50 MCG/ACT nasal spray; 2 sprays into the nostril(s) as directed by provider Daily.  Dispense: 18.2 mL; Refill: 1       New Medications Ordered This Visit   Medications    fluticasone (FLONASE) 50 MCG/ACT nasal spray     Si sprays into the nostril(s) as directed by provider Daily.     Dispense:  18.2 mL     Refill:  1          Follow Up   Return in about 6 months (around 3/3/2025) for Recheck, Lab Today.  Patient was given instructions and counseling regarding her condition or for health maintenance advice. Please see specific information pulled into the AVS if appropriate.

## 2024-09-04 LAB
ALBUMIN SERPL-MCNC: 4.3 G/DL (ref 3.5–5.2)
ALBUMIN/GLOB SERPL: 1.7 G/DL
ALP SERPL-CCNC: 63 U/L (ref 39–117)
ALT SERPL-CCNC: 17 U/L (ref 1–33)
AST SERPL-CCNC: 23 U/L (ref 1–32)
BASOPHILS # BLD AUTO: 0.04 10*3/MM3 (ref 0–0.2)
BASOPHILS NFR BLD AUTO: 0.5 % (ref 0–1.5)
BILIRUB SERPL-MCNC: 0.4 MG/DL (ref 0–1.2)
BUN SERPL-MCNC: 16 MG/DL (ref 8–23)
BUN/CREAT SERPL: 26.2 (ref 7–25)
CALCIUM SERPL-MCNC: 9.8 MG/DL (ref 8.6–10.5)
CHLORIDE SERPL-SCNC: 103 MMOL/L (ref 98–107)
CHOLEST SERPL-MCNC: 166 MG/DL (ref 0–200)
CHOLEST/HDLC SERPL: 3.46 {RATIO}
CO2 SERPL-SCNC: 27.7 MMOL/L (ref 22–29)
CREAT SERPL-MCNC: 0.61 MG/DL (ref 0.57–1)
EGFRCR SERPLBLD CKD-EPI 2021: 91.1 ML/MIN/1.73
EOSINOPHIL # BLD AUTO: 0.15 10*3/MM3 (ref 0–0.4)
EOSINOPHIL NFR BLD AUTO: 1.9 % (ref 0.3–6.2)
ERYTHROCYTE [DISTWIDTH] IN BLOOD BY AUTOMATED COUNT: 11.7 % (ref 12.3–15.4)
FERRITIN SERPL-MCNC: 321 NG/ML (ref 13–150)
GLOBULIN SER CALC-MCNC: 2.5 GM/DL
GLUCOSE SERPL-MCNC: 100 MG/DL (ref 65–99)
HCT VFR BLD AUTO: 36.2 % (ref 34–46.6)
HDLC SERPL-MCNC: 48 MG/DL (ref 40–60)
HGB BLD-MCNC: 11.6 G/DL (ref 12–15.9)
IMM GRANULOCYTES # BLD AUTO: 0.02 10*3/MM3 (ref 0–0.05)
IMM GRANULOCYTES NFR BLD AUTO: 0.3 % (ref 0–0.5)
IRON SATN MFR SERPL: 19 % (ref 20–50)
IRON SERPL-MCNC: 69 MCG/DL (ref 37–145)
LDLC SERPL CALC-MCNC: 78 MG/DL (ref 0–100)
LYMPHOCYTES # BLD AUTO: 2.27 10*3/MM3 (ref 0.7–3.1)
LYMPHOCYTES NFR BLD AUTO: 29.3 % (ref 19.6–45.3)
MCH RBC QN AUTO: 29.8 PG (ref 26.6–33)
MCHC RBC AUTO-ENTMCNC: 32 G/DL (ref 31.5–35.7)
MCV RBC AUTO: 93.1 FL (ref 79–97)
MONOCYTES # BLD AUTO: 0.74 10*3/MM3 (ref 0.1–0.9)
MONOCYTES NFR BLD AUTO: 9.5 % (ref 5–12)
NEUTROPHILS # BLD AUTO: 4.54 10*3/MM3 (ref 1.7–7)
NEUTROPHILS NFR BLD AUTO: 58.5 % (ref 42.7–76)
NRBC BLD AUTO-RTO: 0 /100 WBC (ref 0–0.2)
PLATELET # BLD AUTO: 177 10*3/MM3 (ref 140–450)
POTASSIUM SERPL-SCNC: 4.8 MMOL/L (ref 3.5–5.2)
PROT SERPL-MCNC: 6.8 G/DL (ref 6–8.5)
RBC # BLD AUTO: 3.89 10*6/MM3 (ref 3.77–5.28)
SODIUM SERPL-SCNC: 140 MMOL/L (ref 136–145)
TIBC SERPL-MCNC: 371 MCG/DL
TRIGL SERPL-MCNC: 241 MG/DL (ref 0–150)
UIBC SERPL-MCNC: 302 MCG/DL (ref 112–346)
VIT B12 SERPL-MCNC: 1006 PG/ML (ref 211–946)
VLDLC SERPL CALC-MCNC: 40 MG/DL (ref 5–40)
WBC # BLD AUTO: 7.76 10*3/MM3 (ref 3.4–10.8)

## 2024-09-19 ENCOUNTER — TELEPHONE (OUTPATIENT)
Dept: CARDIOLOGY | Facility: CLINIC | Age: 79
End: 2024-09-19

## 2024-09-19 NOTE — TELEPHONE ENCOUNTER
"Caller Name: Kiley Last \"Anna\"      Relationship: Self      Best Contact Number: [phone]      Patient is requesting samples of: ELIQUIS 5      How many days of medication do you have left?         Additional Information: IN THE AREA TODAY         " Special Stains Stage 1 - Results: Base On Clearance Noted Above

## 2024-10-08 ENCOUNTER — APPOINTMENT (OUTPATIENT)
Dept: GENERAL RADIOLOGY | Facility: HOSPITAL | Age: 79
End: 2024-10-08
Payer: MEDICARE

## 2024-10-08 ENCOUNTER — HOSPITAL ENCOUNTER (OUTPATIENT)
Facility: HOSPITAL | Age: 79
Setting detail: OBSERVATION
Discharge: HOME | End: 2024-10-09
Attending: STUDENT IN AN ORGANIZED HEALTH CARE EDUCATION/TRAINING PROGRAM | Admitting: EMERGENCY MEDICINE
Payer: MEDICARE

## 2024-10-08 DIAGNOSIS — R07.9 NONSPECIFIC CHEST PAIN: Primary | ICD-10-CM

## 2024-10-08 LAB
ALBUMIN SERPL-MCNC: 4.2 G/DL (ref 3.5–5.2)
ALBUMIN/GLOB SERPL: 1.5 G/DL
ALP SERPL-CCNC: 76 U/L (ref 39–117)
ALT SERPL W P-5'-P-CCNC: 21 U/L (ref 1–33)
ANION GAP SERPL CALCULATED.3IONS-SCNC: 9 MMOL/L (ref 5–15)
AST SERPL-CCNC: 33 U/L (ref 1–32)
BASOPHILS # BLD AUTO: 0.02 10*3/MM3 (ref 0–0.2)
BASOPHILS NFR BLD AUTO: 0.3 % (ref 0–1.5)
BILIRUB SERPL-MCNC: 0.4 MG/DL (ref 0–1.2)
BUN SERPL-MCNC: 22 MG/DL (ref 8–23)
BUN/CREAT SERPL: 31.9 (ref 7–25)
CALCIUM SPEC-SCNC: 9.8 MG/DL (ref 8.6–10.5)
CHLORIDE SERPL-SCNC: 104 MMOL/L (ref 98–107)
CHOLEST SERPL-MCNC: 147 MG/DL (ref 0–200)
CO2 SERPL-SCNC: 25 MMOL/L (ref 22–29)
CREAT SERPL-MCNC: 0.69 MG/DL (ref 0.57–1)
DEPRECATED RDW RBC AUTO: 41.1 FL (ref 37–54)
EGFRCR SERPLBLD CKD-EPI 2021: 88.4 ML/MIN/1.73
EOSINOPHIL # BLD AUTO: 0.13 10*3/MM3 (ref 0–0.4)
EOSINOPHIL NFR BLD AUTO: 1.7 % (ref 0.3–6.2)
ERYTHROCYTE [DISTWIDTH] IN BLOOD BY AUTOMATED COUNT: 12 % (ref 12.3–15.4)
GEN 5 2HR TROPONIN T REFLEX: <6 NG/L
GLOBULIN UR ELPH-MCNC: 2.8 GM/DL
GLUCOSE SERPL-MCNC: 102 MG/DL (ref 65–99)
HCT VFR BLD AUTO: 39.7 % (ref 34–46.6)
HDLC SERPL-MCNC: 45 MG/DL (ref 40–60)
HGB BLD-MCNC: 12.7 G/DL (ref 12–15.9)
HOLD SPECIMEN: NORMAL
HOLD SPECIMEN: NORMAL
IMM GRANULOCYTES # BLD AUTO: 0.03 10*3/MM3 (ref 0–0.05)
IMM GRANULOCYTES NFR BLD AUTO: 0.4 % (ref 0–0.5)
INR PPP: 1.3 (ref 0.9–1.1)
LDLC SERPL CALC-MCNC: 75 MG/DL (ref 0–100)
LDLC/HDLC SERPL: 1.56 {RATIO}
LYMPHOCYTES # BLD AUTO: 1.5 10*3/MM3 (ref 0.7–3.1)
LYMPHOCYTES NFR BLD AUTO: 20 % (ref 19.6–45.3)
MCH RBC QN AUTO: 29.6 PG (ref 26.6–33)
MCHC RBC AUTO-ENTMCNC: 32 G/DL (ref 31.5–35.7)
MCV RBC AUTO: 92.5 FL (ref 79–97)
MONOCYTES # BLD AUTO: 0.72 10*3/MM3 (ref 0.1–0.9)
MONOCYTES NFR BLD AUTO: 9.6 % (ref 5–12)
NEUTROPHILS NFR BLD AUTO: 5.11 10*3/MM3 (ref 1.7–7)
NEUTROPHILS NFR BLD AUTO: 68 % (ref 42.7–76)
NRBC BLD AUTO-RTO: 0 /100 WBC (ref 0–0.2)
NT-PROBNP SERPL-MCNC: 546 PG/ML (ref 0–1800)
PLATELET # BLD AUTO: 164 10*3/MM3 (ref 140–450)
PMV BLD AUTO: 12.2 FL (ref 6–12)
POTASSIUM SERPL-SCNC: 4.4 MMOL/L (ref 3.5–5.2)
PROT SERPL-MCNC: 7 G/DL (ref 6–8.5)
PROTHROMBIN TIME: 16.4 SECONDS (ref 11.7–14.2)
RBC # BLD AUTO: 4.29 10*6/MM3 (ref 3.77–5.28)
SODIUM SERPL-SCNC: 138 MMOL/L (ref 136–145)
TRIGL SERPL-MCNC: 159 MG/DL (ref 0–150)
TROPONIN T DELTA: NORMAL
TROPONIN T SERPL HS-MCNC: 9 NG/L
VLDLC SERPL-MCNC: 27 MG/DL (ref 5–40)
WBC NRBC COR # BLD AUTO: 7.51 10*3/MM3 (ref 3.4–10.8)
WHOLE BLOOD HOLD COAG: NORMAL
WHOLE BLOOD HOLD SPECIMEN: NORMAL

## 2024-10-08 PROCEDURE — 85610 PROTHROMBIN TIME: CPT | Performed by: STUDENT IN AN ORGANIZED HEALTH CARE EDUCATION/TRAINING PROGRAM

## 2024-10-08 PROCEDURE — 93010 ELECTROCARDIOGRAM REPORT: CPT | Performed by: INTERNAL MEDICINE

## 2024-10-08 PROCEDURE — 80061 LIPID PANEL: CPT | Performed by: NURSE PRACTITIONER

## 2024-10-08 PROCEDURE — G0378 HOSPITAL OBSERVATION PER HR: HCPCS

## 2024-10-08 PROCEDURE — 93005 ELECTROCARDIOGRAM TRACING: CPT | Performed by: STUDENT IN AN ORGANIZED HEALTH CARE EDUCATION/TRAINING PROGRAM

## 2024-10-08 PROCEDURE — 80053 COMPREHEN METABOLIC PANEL: CPT | Performed by: STUDENT IN AN ORGANIZED HEALTH CARE EDUCATION/TRAINING PROGRAM

## 2024-10-08 PROCEDURE — 99285 EMERGENCY DEPT VISIT HI MDM: CPT

## 2024-10-08 PROCEDURE — 85025 COMPLETE CBC W/AUTO DIFF WBC: CPT | Performed by: STUDENT IN AN ORGANIZED HEALTH CARE EDUCATION/TRAINING PROGRAM

## 2024-10-08 PROCEDURE — 84484 ASSAY OF TROPONIN QUANT: CPT | Performed by: STUDENT IN AN ORGANIZED HEALTH CARE EDUCATION/TRAINING PROGRAM

## 2024-10-08 PROCEDURE — 71045 X-RAY EXAM CHEST 1 VIEW: CPT

## 2024-10-08 PROCEDURE — 83880 ASSAY OF NATRIURETIC PEPTIDE: CPT | Performed by: STUDENT IN AN ORGANIZED HEALTH CARE EDUCATION/TRAINING PROGRAM

## 2024-10-08 PROCEDURE — 36415 COLL VENOUS BLD VENIPUNCTURE: CPT

## 2024-10-08 RX ORDER — SODIUM CHLORIDE 0.9 % (FLUSH) 0.9 %
10 SYRINGE (ML) INJECTION AS NEEDED
Status: DISCONTINUED | OUTPATIENT
Start: 2024-10-08 | End: 2024-10-09 | Stop reason: HOSPADM

## 2024-10-08 RX ORDER — ROSUVASTATIN CALCIUM 20 MG/1
20 TABLET, COATED ORAL DAILY
Status: DISCONTINUED | OUTPATIENT
Start: 2024-10-08 | End: 2024-10-09 | Stop reason: HOSPADM

## 2024-10-08 RX ORDER — CITALOPRAM HYDROBROMIDE 10 MG/1
10 TABLET ORAL DAILY
Status: DISCONTINUED | OUTPATIENT
Start: 2024-10-08 | End: 2024-10-09 | Stop reason: HOSPADM

## 2024-10-08 RX ORDER — NITROGLYCERIN 0.4 MG/1
0.4 TABLET SUBLINGUAL
Status: DISCONTINUED | OUTPATIENT
Start: 2024-10-08 | End: 2024-10-09 | Stop reason: HOSPADM

## 2024-10-08 RX ORDER — BISACODYL 10 MG
10 SUPPOSITORY, RECTAL RECTAL DAILY PRN
Status: DISCONTINUED | OUTPATIENT
Start: 2024-10-08 | End: 2024-10-09 | Stop reason: HOSPADM

## 2024-10-08 RX ORDER — BISACODYL 5 MG/1
5 TABLET, DELAYED RELEASE ORAL DAILY PRN
Status: DISCONTINUED | OUTPATIENT
Start: 2024-10-08 | End: 2024-10-09 | Stop reason: HOSPADM

## 2024-10-08 RX ORDER — AMOXICILLIN 250 MG
2 CAPSULE ORAL 2 TIMES DAILY
Status: DISCONTINUED | OUTPATIENT
Start: 2024-10-08 | End: 2024-10-09 | Stop reason: HOSPADM

## 2024-10-08 RX ORDER — METOPROLOL SUCCINATE 50 MG/1
50 TABLET, EXTENDED RELEASE ORAL DAILY
Status: DISCONTINUED | OUTPATIENT
Start: 2024-10-08 | End: 2024-10-09 | Stop reason: HOSPADM

## 2024-10-08 RX ORDER — ASPIRIN 81 MG/1
81 TABLET ORAL DAILY
Status: DISCONTINUED | OUTPATIENT
Start: 2024-10-09 | End: 2024-10-08

## 2024-10-08 RX ORDER — POLYETHYLENE GLYCOL 3350 17 G/17G
17 POWDER, FOR SOLUTION ORAL DAILY PRN
Status: DISCONTINUED | OUTPATIENT
Start: 2024-10-08 | End: 2024-10-09 | Stop reason: HOSPADM

## 2024-10-08 RX ORDER — ASPIRIN 81 MG/1
324 TABLET, CHEWABLE ORAL ONCE
Status: COMPLETED | OUTPATIENT
Start: 2024-10-08 | End: 2024-10-08

## 2024-10-08 RX ORDER — LOSARTAN POTASSIUM 100 MG/1
100 TABLET ORAL DAILY
Status: DISCONTINUED | OUTPATIENT
Start: 2024-10-08 | End: 2024-10-09 | Stop reason: HOSPADM

## 2024-10-08 RX ORDER — SODIUM CHLORIDE 0.9 % (FLUSH) 0.9 %
10 SYRINGE (ML) INJECTION EVERY 12 HOURS SCHEDULED
Status: DISCONTINUED | OUTPATIENT
Start: 2024-10-08 | End: 2024-10-09 | Stop reason: HOSPADM

## 2024-10-08 RX ORDER — FLECAINIDE ACETATE 50 MG/1
50 TABLET ORAL EVERY 12 HOURS
Status: DISCONTINUED | OUTPATIENT
Start: 2024-10-08 | End: 2024-10-09 | Stop reason: HOSPADM

## 2024-10-08 RX ORDER — ASPIRIN 81 MG/1
324 TABLET, CHEWABLE ORAL ONCE
Status: DISCONTINUED | OUTPATIENT
Start: 2024-10-08 | End: 2024-10-08

## 2024-10-08 RX ADMIN — Medication 10 ML: at 19:48

## 2024-10-08 RX ADMIN — Medication 5 MG: at 23:31

## 2024-10-08 RX ADMIN — ASPIRIN 324 MG: 81 TABLET, CHEWABLE ORAL at 12:14

## 2024-10-08 RX ADMIN — FLECAINIDE ACETATE TABLET 50 MG: 50 TABLET ORAL at 23:09

## 2024-10-08 NOTE — ED TRIAGE NOTES
Pt to triage via ems from home  Pt called for chest pain and nausea that started around 0900. The symptoms have resolved. Pt has hx afib takes eliquis.

## 2024-10-08 NOTE — PROGRESS NOTES
Clinical Pharmacy Services: Medication History    Kiley Last is a 78 y.o. female presenting to [facility] for   Chief Complaint   Patient presents with    Chest Pain       She  has a past medical history of Arthritis, Asthma, Atrial fibrillation, Clostridium difficile infection (09/20/2019), Colon polyps (08/01/2019), COPD (chronic obstructive pulmonary disease), Diverticulitis, Diverticulosis, ESBL (extended spectrum beta-lactamase) producing bacteria infection, GERD (gastroesophageal reflux disease), History of abscess of skin and subcutaneous tissue, History of atrial fibrillation, Hypertension, Incisional hernia, MDRO (multiple drug resistant organisms) resistance, Occasional tremors, NEW (obstructive sleep apnea) (05/20/2021), PONV (postoperative nausea and vomiting), Psoriasis, UTI (urinary tract infection), and Vertigo.    Allergies as of 10/08/2024 - Reviewed 10/08/2024   Allergen Reaction Noted    Epinephrine Palpitations 08/09/2018    Penicillins Other (See Comments) 10/29/2016       Medication information was obtained from: Patient   Pharmacy and Phone Number:     Prior to Admission Medications       Prescriptions Last Dose Informant Patient Reported? Taking?    Acetaminophen (TYLENOL ARTHRITIS PAIN PO)  Self Yes Yes    Take 650 mg by mouth 3 (Three) Times a Day As Needed.    apixaban (Eliquis) 5 MG tablet tablet  Self No Yes    Take 1 tablet by mouth Every 12 (Twelve) Hours.    citalopram (CeleXA) 10 MG tablet  Self No Yes    Take 1 tablet by mouth Daily.    cyanocobalamin 1000 MCG/ML injection  Self No Yes    INJECT 1ML INTO THE APPROPRIATE MUSCLE AS DIRECTED BY PRESCRIBER EVERY 28 DAYS    Patient taking differently:  Inject 1 mL into the appropriate muscle as directed by prescriber Every 28 (Twenty-Eight) Days.    flecainide (TAMBOCOR) 50 MG tablet  Self No Yes    TAKE ONE TABLET BY MOUTH EVERY 12 HOURS    Patient taking differently:  Take 1 tablet by mouth 2 (Two) Times a Day.     "fluticasone (FLONASE) 50 MCG/ACT nasal spray  Self No Yes    2 sprays into the nostril(s) as directed by provider Daily.    losartan (COZAAR) 100 MG tablet  Self No Yes    TAKE 1 TABLET BY MOUTH DAILY    Magnesium 100 MG capsule  Self Yes Yes    Take 1 capsule by mouth Every Night.    meclizine (ANTIVERT) 25 MG tablet  Self No Yes    TAKE ONE TABLET BY MOUTH THREE TIMES A DAY AS NEEDED FOR DIZZINESS    Patient taking differently:  Take 1 tablet by mouth 3 (Three) Times a Day As Needed for Dizziness.    metoprolol succinate XL (TOPROL-XL) 50 MG 24 hr tablet  Self No Yes    TAKE ONE TABLET BY MOUTH DAILY    Patient taking differently:  Take 1 tablet by mouth Daily.    rosuvastatin (CRESTOR) 20 MG tablet  Self No Yes    TAKE 1 TABLET BY MOUTH DAILY    Zinc Sulfate (ZINC 15 PO)  Self Yes Yes    Take 1 tablet by mouth Every Night.    cyanocobalamin injection 1,000 mcg   No No    Syringe/Needle, Disp, (B-D SYRINGE/NEEDLE 1CC/25GX5/8) 25G X 5/8\" 1 ML misc   No No    USE MONTHLY WITH B12              Medication notes:     This medication list is complete to the best of my knowledge as of 10/8/2024    Please call if questions.    Camila Perez  Medication History Technician   [phone]    10/8/2024 18:00 EDT      "

## 2024-10-08 NOTE — ED PROVIDER NOTES
EMERGENCY DEPARTMENT ENCOUNTER  Room Number:  34/34  PCP: Miranda Morgan MD  Independent Historians: Patient      HPI:  Chief Complaint: had concerns including Chest Pain.       Context: Kiley Last is a 78 y.o. female with a medical history of hypertension, paroxysmal A-fib, sleep apnea, who presents to the ED c/o acute right sided chest pain.  It is been associated with nausea, diaphoresis, difficulty breathing.  Is known for several days.  No pain currently.  Patient reports pain last experienced over 2 hours prior to arrival.  No fever or chills, no abdominal pain, no swelling of extremities.  No other complaints at this time.      Review of prior external notes (non-ED) -and- Review of prior external test results outside of this encounter: Echo 3/19/2024, Dr. Sierra, EF 63%.  LV normal size and thickness.  LA moderately dilated.  RA mildly dilated.        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 04/14/2021    NEW (obstructive sleep apnea) 05/20/2021    Diverticulosis 11/03/2022    Incisional hernia, without obstruction or gangrene 09/07/2023    Mixed hyperlipidemia 03/05/2024     Resolved Ambulatory Problems     Diagnosis Date Noted    Sigmoid diverticulitis 04/30/2017    Colon obstruction 05/03/2017    Diverticulitis 05/26/2017    Pleural effusion 05/26/2017    Ileus 05/26/2017    Abdominal abscess 05/26/2017    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 05/26/2017    Colostomy present 08/28/2017    Bowel obstruction 01/26/2018     Small bowel obstruction 01/26/2018    Eyebrow ptosis 04/20/2017    Encounter for screening colonoscopy 07/05/2019    Nausea & vomiting 01/23/2020    Hypersomnia with sleep apnea 03/28/2021    Incisional hernia, without obstruction or gangrene 01/13/2022    Screening for colon cancer 09/22/2022    Encounter for colonoscopy due to history of adenomatous colonic polyps 09/22/2022     Past Medical History:   Diagnosis Date    Arthritis     Asthma     Atrial fibrillation     Clostridium difficile infection 09/20/2019    Colon polyps 08/01/2019    COPD (chronic obstructive pulmonary disease)     ESBL (extended spectrum beta-lactamase) producing bacteria infection     GERD (gastroesophageal reflux disease)     History of abscess of skin and subcutaneous tissue     History of atrial fibrillation     Hypertension     Incisional hernia     MDRO (multiple drug resistant organisms) resistance     Occasional tremors     PONV (postoperative nausea and vomiting)     Psoriasis     UTI (urinary tract infection)          PAST SURGICAL HISTORY  Past Surgical History:   Procedure Laterality Date    BELPHAROPTOSIS REPAIR Bilateral 04/27/2017    Bilateral brow ptosis repair via the anterior hairline approach under monitored local anesthesia-Andrez Craven    BLEPHAROPLASTY Bilateral 04/27/2017    BREAST BIOPSY      CATARACT EXTRACTION      COLON RESECTION N/A 5/3/2017    Procedure: OPEN SIMOID COLECTOMY WITH COLOSTOMY;  Surgeon: Renato Delgado MD;  Location: [facility] MAIN OR;  Service:     COLONOSCOPY N/A 9/13/2017    Procedure: COLONOSCOPY VIA COLOSTOMY TO CECUM;  Surgeon: Renato Delgado MD;  Location: [facility] ENDOSCOPY;  Service:     COLONOSCOPY N/A 8/1/2019    Procedure: COLONOSCOPY to cecum and TI with biopsy / hot snare polypectomies;  Surgeon: Dalton Vela Jr., MD;  Location: [facility] ENDOSCOPY;  Service: General    COLONOSCOPY N/A 01/04/2010    Andrez Trinidad    COLONOSCOPY N/A 11/3/2022    Procedure:  COLONOSCOPY to cecum and TI with biopsy / hot snare polypectomy;  Surgeon: Trini Wade MD;  Location: [facility] ENDOSCOPY;  Service: General;  Laterality: N/A;  pre-hx diverticulitis, screen, fam hx colon ca, personal hx polyps  post-polyp, diverticulosis, small hemorrhoids    COLOSTOMY CLOSURE N/A 9/14/2017    Procedure: COLOSTOMY TAKEDOWN AND CLOSURE, WITH RESECTION;  Surgeon: Renato Delgado MD;  Location: [facility] MAIN OR;  Service:     ENDOSCOPY AND COLONOSCOPY N/A 10/31/2014    Normal EGD and colonoscopy, repeat c-scope in 5 years-Dr. Sivakumar Tolentino [place]    EXPLORATORY LAPAROTOMY N/A 9/8/2019    Procedure: Exploratory laparotomy with lysis of adhesions;  Surgeon: Trini Wade MD;  Location: [facility] MAIN OR;  Service: General    FINGER SURGERY Left     4TH FINGER LEFT HAND    HYSTERECTOMY Bilateral     LAPAROSCOPIC CHOLECYSTECTOMY W/ CHOLANGIOGRAPHY N/A 07/15/2003    Dr. Amrit Martinez, [facility]    SALPINGO OOPHORECTOMY Bilateral 02/02/2006    Abdominal sacral colpopexy, bilateral salpingo-oophorectomy, tension free vaginal tape, cystoscopy-Dr. Devika Bradley, [facility]    THORACENTESIS Right 05/21/2017    US-guided right thoracentesis-Dr. Juan Yuen, [facility]    TONSILLECTOMY Bilateral     UPPER GASTROINTESTINAL ENDOSCOPY N/A 10/08/2007    Esophageal mucosal changes suspicious for short-segment Olivera's esphagus, gastric mucosal abnormality characterized by erythema: biopsied, NERD disease present, high likelihood of gastritis-Dr. Mayank Knapp, [facility]    VENTRAL/INCISIONAL HERNIA REPAIR N/A 9/22/2023    Procedure: Open incisional hernia repair with mesh;  Surgeon: Trini Wade MD;  Location: [facility] MAIN OR;  Service: General;  Laterality: N/A;         FAMILY HISTORY  Family History   Problem Relation Age of Onset    Cancer Mother     Colon cancer Mother     Diabetes Father     Diabetes Son     Ulcerative colitis Son     No Known Problems Maternal Grandmother     No Known Problems Maternal Grandfather      No Known Problems Paternal Grandmother     No Known Problems Paternal Grandfather     Malig Hyperthermia Neg Hx     Breast cancer Neg Hx          SOCIAL HISTORY  Social History     Socioeconomic History    Marital status:    Tobacco Use    Smoking status: Former     Current packs/day: 0.00     Types: Cigarettes     Quit date:      Years since quittin.8     Passive exposure: Past    Smokeless tobacco: Never    Tobacco comments:     SOCIAL SMOKING ONLY   Vaping Use    Vaping status: Never Used   Substance and Sexual Activity    Alcohol use: Yes     Comment: RARE    Drug use: No    Sexual activity: Defer         ALLERGIES  Epinephrine and Penicillins        PHYSICAL EXAM    I have reviewed the triage vital signs and nursing notes.    ED Triage Vitals [10/08/24 1140]   Temp Heart Rate Resp BP SpO2   98.5 °F (36.9 °C) 56 20 (!) 181/64 100 %      Temp src Heart Rate Source Patient Position BP Location FiO2 (%)   -- -- -- -- --         GENERAL: awake and alert, no distress  HENT: Normocephalic, atraumatic  EYES: no scleral icterus  CV: regular rhythm, regular rate, anterior chest wall nontender to palpation  RESPIRATORY: normal effort  ABDOMEN: soft  MUSCULOSKELETAL: no deformity  NEURO: alert, moves all extremities, follows commands  PSYCH: calm, cooperative  SKIN: Warm, dry          LAB RESULTS  Recent Results (from the past 24 hour(s))   ECG 12 Lead Chest Pain    Collection Time: 10/08/24 11:54 AM   Result Value Ref Range    QT Interval 456 ms    QTC Interval 424 ms   Comprehensive Metabolic Panel    Collection Time: 10/08/24 12:16 PM    Specimen: Blood   Result Value Ref Range    Glucose 102 (H) 65 - 99 mg/dL    BUN 22 8 - 23 mg/dL    Creatinine 0.69 0.57 - 1.00 mg/dL    Sodium 138 136 - 145 mmol/L    Potassium 4.4 3.5 - 5.2 mmol/L    Chloride 104 98 - 107 mmol/L    CO2 25.0 22.0 - 29.0 mmol/L    Calcium 9.8 8.6 - 10.5 mg/dL    Total Protein 7.0 6.0 - 8.5 g/dL    Albumin 4.2 3.5 - 5.2 g/dL    ALT (SGPT)  21 1 - 33 U/L    AST (SGOT) 33 (H) 1 - 32 U/L    Alkaline Phosphatase 76 39 - 117 U/L    Total Bilirubin 0.4 0.0 - 1.2 mg/dL    Globulin 2.8 gm/dL    A/G Ratio 1.5 g/dL    BUN/Creatinine Ratio 31.9 (H) 7.0 - 25.0    Anion Gap 9.0 5.0 - 15.0 mmol/L    eGFR 88.4 >60.0 mL/min/1.73   High Sensitivity Troponin T    Collection Time: 10/08/24 12:16 PM    Specimen: Blood   Result Value Ref Range    HS Troponin T 9 <14 ng/L   Protime-INR    Collection Time: 10/08/24 12:16 PM    Specimen: Blood   Result Value Ref Range    Protime 16.4 (H) 11.7 - 14.2 Seconds    INR 1.30 (H) 0.90 - 1.10   BNP    Collection Time: 10/08/24 12:16 PM    Specimen: Blood   Result Value Ref Range    proBNP 546.0 0.0 - 1,800.0 pg/mL   Green Top (Gel)    Collection Time: 10/08/24 12:16 PM   Result Value Ref Range    Extra Tube Hold for add-ons.    Lavender Top    Collection Time: 10/08/24 12:16 PM   Result Value Ref Range    Extra Tube hold for add-on    Gold Top - SST    Collection Time: 10/08/24 12:16 PM   Result Value Ref Range    Extra Tube Hold for add-ons.    Light Blue Top    Collection Time: 10/08/24 12:16 PM   Result Value Ref Range    Extra Tube Hold for add-ons.    CBC Auto Differential    Collection Time: 10/08/24 12:16 PM    Specimen: Blood   Result Value Ref Range    WBC 7.51 3.40 - 10.80 10*3/mm3    RBC 4.29 3.77 - 5.28 10*6/mm3    Hemoglobin 12.7 12.0 - 15.9 g/dL    Hematocrit 39.7 34.0 - 46.6 %    MCV 92.5 79.0 - 97.0 fL    MCH 29.6 26.6 - 33.0 pg    MCHC 32.0 31.5 - 35.7 g/dL    RDW 12.0 (L) 12.3 - 15.4 %    RDW-SD 41.1 37.0 - 54.0 fl    MPV 12.2 (H) 6.0 - 12.0 fL    Platelets 164 140 - 450 10*3/mm3    Neutrophil % 68.0 42.7 - 76.0 %    Lymphocyte % 20.0 19.6 - 45.3 %    Monocyte % 9.6 5.0 - 12.0 %    Eosinophil % 1.7 0.3 - 6.2 %    Basophil % 0.3 0.0 - 1.5 %    Immature Grans % 0.4 0.0 - 0.5 %    Neutrophils, Absolute 5.11 1.70 - 7.00 10*3/mm3    Lymphocytes, Absolute 1.50 0.70 - 3.10 10*3/mm3    Monocytes, Absolute 0.72 0.10 - 0.90  10*3/mm3    Eosinophils, Absolute 0.13 0.00 - 0.40 10*3/mm3    Basophils, Absolute 0.02 0.00 - 0.20 10*3/mm3    Immature Grans, Absolute 0.03 0.00 - 0.05 10*3/mm3    nRBC 0.0 0.0 - 0.2 /100 WBC   High Sensitivity Troponin T 2Hr    Collection Time: 10/08/24  4:24 PM    Specimen: Blood   Result Value Ref Range    HS Troponin T <6 <14 ng/L    Troponin T Delta         The above labs were ordered by me and independently reviewed by me.     RADIOLOGY  XR Chest 1 View    Result Date: 10/8/2024  PORTABLE RADIOGRAPHIC VIEW OF THE CHEST  CLINICAL HISTORY: Chest pain.  FINDINGS:  Chronic interstitial markings are appreciated within the left lung base and the right and midlung zone. Similar findings were noted on the prior chest x-ray dated 02/29/2024. However, there is no convincing evidence for a superimposed acute process. The cardiomediastinal silhouette is within normal limits. The osseous structures are unremarkable.       There are prominent interstitial markings within both lungs that are probably representative of chronic interstitial abnormalities. There is no convincing evidence to suggest a superimposed acute process.  This report was finalized on 10/8/2024 12:57 PM by Dr. Ayush Penny M.D on Workstation: [id number]       The above radiology studies were ordered by me.  See ED course for independent interpretations.     MEDICATIONS GIVEN IN ER  Medications   sodium chloride 0.9 % flush 10 mL (has no administration in time range)   aspirin chewable tablet 324 mg (324 mg Oral Given 10/8/24 1214)         ORDERS PLACED DURING THIS VISIT:  Orders Placed This Encounter   Procedures    XR Chest 1 View    [place] Draw    Comprehensive Metabolic Panel    High Sensitivity Troponin T    Protime-INR    BNP    CBC Auto Differential    High Sensitivity Troponin T 2Hr    Continuous Pulse Oximetry    Vital Signs    LCG (on-call MD unless specified)    Oxygen Therapy- Nasal Cannula; Titrate 1-6 LPM Per SpO2; 90 - 95%    ECG 12  "Lead Chest Pain    ECG 12 Lead Chest Pain    Insert Peripheral IV    Initiate ED Observation Status    CBC & Differential    Green Top (Gel)    Lavender Top    Gold Top - SST    Light Blue Top         OUTPATIENT MEDICATION MANAGEMENT:  Current Facility-Administered Medications Ordered in Epic   Medication Dose Route Frequency Provider Last Rate Last Admin    cyanocobalamin injection 1,000 mcg  1,000 mcg Intramuscular Q28 Days Miranda Morgan MD   1,000 mcg at 08/23/23 1431    sodium chloride 0.9 % flush 10 mL  10 mL Intravenous PRN Yogi Cui MD         Current Outpatient Medications Ordered in Epic   Medication Sig Dispense Refill    Acetaminophen (TYLENOL ARTHRITIS PAIN PO) Take 650 mg by mouth 3 (Three) Times a Day As Needed.      apixaban (Eliquis) 5 MG tablet tablet Take 1 tablet by mouth Every 12 (Twelve) Hours. 28 tablet 0    citalopram (CeleXA) 10 MG tablet Take 1 tablet by mouth Daily. 90 tablet 1    cyanocobalamin 1000 MCG/ML injection INJECT 1ML INTO THE APPROPRIATE MUSCLE AS DIRECTED BY PRESCRIBER EVERY 28 DAYS 3 mL 3    flecainide (TAMBOCOR) 50 MG tablet TAKE ONE TABLET BY MOUTH EVERY 12 HOURS 90 tablet 3    fluticasone (FLONASE) 50 MCG/ACT nasal spray 2 sprays into the nostril(s) as directed by provider Daily. 18.2 mL 1    losartan (COZAAR) 100 MG tablet TAKE 1 TABLET BY MOUTH DAILY 90 tablet 2    Magnesium 100 MG capsule Take 1 capsule by mouth Every Night.      meclizine (ANTIVERT) 25 MG tablet TAKE ONE TABLET BY MOUTH THREE TIMES A DAY AS NEEDED FOR DIZZINESS 90 tablet 0    metoprolol succinate XL (TOPROL-XL) 50 MG 24 hr tablet TAKE ONE TABLET BY MOUTH DAILY 30 tablet 11    rosuvastatin (CRESTOR) 20 MG tablet TAKE 1 TABLET BY MOUTH DAILY 90 tablet 0    Syringe/Needle, Disp, (B-D SYRINGE/NEEDLE 1CC/25GX5/8) 25G X 5/8\" 1 ML misc USE MONTHLY WITH B12 3 each 1    Zinc Sulfate (ZINC 15 PO) Take 1 tablet by mouth Every Night.           PROCEDURES  Procedures            PROGRESS, DATA ANALYSIS, " CONSULTS, AND MEDICAL DECISION MAKING  All labs have been independently interpreted by me.  All radiology studies have been reviewed by me. All EKG's have been independently viewed and interpreted by me.  Discussion below represents my analysis of pertinent findings related to patient's condition, differential diagnosis, treatment plan and final disposition.    Differential diagnosis includes but is not limited to:  ACS, STEMI, NSTEMI, costochondritis, pleurisy, PE, pneumonia, pneumothorax, pleural effusion, GERD    Clinical Scores: HEART Score: 6                  ED Course as of 10/08/24 1720   Tue Oct 08, 2024   1258 EKG reviewed, normal sinus rhythm, rate 52, normal axis, normal intervals, no significant T or ST changes, no STEMI.  EKG compared to prior on 11/20/2021.  Borderline ST changes in anterior leads at that time no longer present.    EKG interpreted by self/EP [DN]   1715 I discussed patient with on-call cardiologist, agreeable with plan to admit to observation unit for chest pain.  Concerning story. [DN]   1720 I discussed patient with Kerrie, on-call practitioner for observation unit, agreeable with plan to admit [DN]      ED Course User Index  [DN] oYgi Cui MD             AS OF 17:20 EDT VITALS:    BP - 153/69  HR - 54  TEMP - 98.5 °F (36.9 °C)  O2 SATS - 95%    COMPLEXITY OF CARE  Admission was considered but after careful review of the patient's presentation, physical examination, diagnostic results, and response to treatment the patient may be safely discharged with outpatient follow-up.      Chronic or social conditions impacting patient care (Social Determinants of Health):     DIAGNOSIS  Final diagnoses:   Acute nonspecific chest pain           DISPOSITION  Discharge      Prescription drug monitoring program review:           Please note that portions of this document were completed with a voice recognition program.    Note Disclaimer: At [facility], we believe that sharing  information builds trust and better relationships. You are receiving this note because you recently visited [facility]. It is possible you will see health information before a provider has talked with you about it. This kind of information can be easy to misunderstand. To help you fully understand what it means for your health, we urge you to discuss this note with your provider.       Yogi Cui MD  10/08/24 1328       Yogi Cui MD  10/08/24 3826

## 2024-10-08 NOTE — ED NOTES
Nursing report ED to floor  Kiley Last  78 y.o.  female     Kiley Last is a 78 y.o. female with a medical history of hypertension, paroxysmal A-fib, sleep apnea, who presents to the ED c/o acute right sided chest pain.  It is been associated with nausea, diaphoresis, difficulty breathing.  Is known for several days.  No pain currently.  Patient reports pain last experienced over 2 hours prior to arrival.  No fever or chills, no abdominal pain, no swelling of extremities.  No other complaints at this time.   HPI :  HPI (Adult)  Stated Reason for Visit: cp/nausea  History Obtained From: patient, EMS    Chief Complaint  Chief Complaint   Patient presents with    Chest Pain       Admitting doctor:   Tomi Urbnao MD    Admitting diagnosis:   The encounter diagnosis was Acute nonspecific chest pain.    Code status:   Current Code Status       Date Active Code Status Order ID Comments User Context       Prior            Allergies:   Epinephrine and Penicillins    Isolation:   No active isolations    Intake and Output  No intake or output data in the 24 hours ending 10/08/24 1724    Weight:       10/08/24  1140   Weight: 78.9 kg (174 lb)       Most recent vitals:   Vitals:    10/08/24 1431 10/08/24 1501 10/08/24 1601 10/08/24 1633   BP: 147/66 133/61 137/66 153/69   Pulse: (!) 46 (!) 48 (!) 47 54   Resp:       Temp:       SpO2: 95% 95% 94% 95%   Weight:       Height:           Active LDAs/IV Access:   Lines, Drains & Airways       Active LDAs       Name Placement date Placement time Site Days    Peripheral IV 10/08/24 1257 Left Antecubital 10/08/24  1257  Antecubital  less than 1                    Labs (abnormal labs have a star):   Labs Reviewed   COMPREHENSIVE METABOLIC PANEL - Abnormal; Notable for the following components:       Result Value    Glucose 102 (*)     AST (SGOT) 33 (*)     BUN/Creatinine Ratio 31.9 (*)     All other components within normal limits    Narrative:     GFR Normal >60  Chronic  Kidney Disease <60  Kidney Failure <15    The GFR formula is only valid for adults with stable renal function between ages 18 and 70.   PROTIME-INR - Abnormal; Notable for the following components:    Protime 16.4 (*)     INR 1.30 (*)     All other components within normal limits   CBC WITH AUTO DIFFERENTIAL - Abnormal; Notable for the following components:    RDW 12.0 (*)     MPV 12.2 (*)     All other components within normal limits   TROPONIN - Normal    Narrative:     High Sensitive Troponin T Reference Range:  <14.0 ng/L- Negative Female for AMI  <22.0 ng/L- Negative Male for AMI  >=14 - Abnormal Female indicating possible myocardial injury.  >=22 - Abnormal Male indicating possible myocardial injury.   Clinicians would have to utilize clinical acumen, EKG, Troponin, and serial changes to determine if it is an Acute Myocardial Infarction or myocardial injury due to an underlying chronic condition.        BNP (IN-HOUSE) - Normal    Narrative:     This assay is used as an aid in the diagnosis of individuals suspected of having heart failure. It can be used as an aid in the diagnosis of acute decompensated heart failure (ADHF) in patients presenting with signs and symptoms of ADHF to the emergency department (ED). In addition, NT-proBNP of <300 pg/mL indicates ADHF is not likely.    Age Range Result Interpretation  NT-proBNP Concentration (pg/mL:      <50             Positive            >450                   Gray                 300-450                    Negative             <300    50-75           Positive            >900                  Gray                300-900                  Negative            <300      >75             Positive            >1800                  Gray                300-1800                  Negative            <300   RAINBOW DRAW    Narrative:     The following orders were created for panel order [place] Draw.  Procedure                               Abnormality         Status                      ---------                               -----------         ------                     Green Top (Gel)[[id number]]                                  Final result               Lavender Top[[id number]]                                     Final result               Gold Top - SST[[id number]]                                   Final result               Light Blue Top[[id number]]                                   Final result                 Please view results for these tests on the individual orders.   HIGH SENSITIVITIY TROPONIN T 2HR    Narrative:     High Sensitive Troponin T Reference Range:  <14.0 ng/L- Negative Female for AMI  <22.0 ng/L- Negative Male for AMI  >=14 - Abnormal Female indicating possible myocardial injury.  >=22 - Abnormal Male indicating possible myocardial injury.   Clinicians would have to utilize clinical acumen, EKG, Troponin, and serial changes to determine if it is an Acute Myocardial Infarction or myocardial injury due to an underlying chronic condition.        CBC AND DIFFERENTIAL    Narrative:     The following orders were created for panel order CBC & Differential.  Procedure                               Abnormality         Status                     ---------                               -----------         ------                     CBC Auto Differential[[id number]]        Abnormal            Final result                 Please view results for these tests on the individual orders.   GREEN TOP   LAVENDER TOP   GOLD TOP - SST   LIGHT BLUE TOP       EKG:   ECG 12 Lead Chest Pain   Preliminary Result   HEART RATE=52  bpm   RR Lnqrzrhk=5918  ms   MI Ndyqplpm=880  ms   P Horizontal Axis=-36  deg   P Front Axis=-30  deg   QRSD Interval=93  ms   QT Odmkucgw=316  ms   GJdG=503  ms   QRS Axis=56  deg   T Wave Axis=42  deg   - NORMAL ECG -   Sinus rhythm   Date and Time of Study:2024-10-08 11:54:45          Meds given in ED:   Medications   sodium chloride 0.9 % flush 10 mL (has no  administration in time range)   aspirin chewable tablet 324 mg (324 mg Oral Given 10/8/24 1214)       Imaging results:  XR Chest 1 View    Result Date: 10/8/2024   There are prominent interstitial markings within both lungs that are probably representative of chronic interstitial abnormalities. There is no convincing evidence to suggest a superimposed acute process.  This report was finalized on 10/8/2024 12:57 PM by Dr. Ayush Penny M.D on Workstation: [id number]       Ambulatory status:   - Up ad yessica     Social issues:   Social History     Socioeconomic History    Marital status:    Tobacco Use    Smoking status: Former     Current packs/day: 0.00     Types: Cigarettes     Quit date:      Years since quittin.8     Passive exposure: Past    Smokeless tobacco: Never    Tobacco comments:     SOCIAL SMOKING ONLY   Vaping Use    Vaping status: Never Used   Substance and Sexual Activity    Alcohol use: Yes     Comment: RARE    Drug use: No    Sexual activity: Defer       Peripheral Neurovascular  Peripheral Neurovascular (Adult)  Peripheral Neurovascular WDL: WDL    Neuro Cognitive  Neuro Cognitive (Adult)  Cognitive/Neuro/Behavioral WDL: WDL  Orientation: oriented x 4    Learning  Learning Assessment (Adult)  Learning Readiness and Ability: no barriers identified    Respiratory  Respiratory WDL  Respiratory WDL: WDL    Abdominal Pain       Pain Assessments  Pain (Adult)  (0-10) Pain Rating: Rest: 0  (0-10) Pain Rating: Activity: 0  Response to Pain Interventions: interventions effective per patient    NIH Stroke Scale       Jacklyn Coyne RN  10/08/24 17:24 EDT

## 2024-10-09 ENCOUNTER — APPOINTMENT (OUTPATIENT)
Dept: NUCLEAR MEDICINE | Facility: HOSPITAL | Age: 79
End: 2024-10-09
Payer: MEDICARE

## 2024-10-09 ENCOUNTER — READMISSION MANAGEMENT (OUTPATIENT)
Dept: CALL CENTER | Facility: HOSPITAL | Age: 79
End: 2024-10-09
Payer: MEDICARE

## 2024-10-09 VITALS
BODY MASS INDEX: 28.99 KG/M2 | HEIGHT: 65 IN | WEIGHT: 174 LBS | TEMPERATURE: 98.2 F | HEART RATE: 56 BPM | RESPIRATION RATE: 18 BRPM | OXYGEN SATURATION: 96 % | SYSTOLIC BLOOD PRESSURE: 141 MMHG | DIASTOLIC BLOOD PRESSURE: 64 MMHG

## 2024-10-09 LAB
ANION GAP SERPL CALCULATED.3IONS-SCNC: 9.3 MMOL/L (ref 5–15)
BH CV REST NUCLEAR ISOTOPE DOSE: 11 MCI
BH CV STRESS COMMENTS STAGE 1: NORMAL
BH CV STRESS DOSE REGADENOSON STAGE 1: 0.4
BH CV STRESS DURATION MIN STAGE 1: 0
BH CV STRESS DURATION SEC STAGE 1: 10
BH CV STRESS NUCLEAR ISOTOPE DOSE: 36 MCI
BH CV STRESS PROTOCOL 1: NORMAL
BH CV STRESS RECOVERY BP: NORMAL MMHG
BH CV STRESS RECOVERY HR: 62 BPM
BH CV STRESS STAGE 1: 1
BUN SERPL-MCNC: 19 MG/DL (ref 8–23)
BUN/CREAT SERPL: 30.6 (ref 7–25)
CALCIUM SPEC-SCNC: 8.9 MG/DL (ref 8.6–10.5)
CHLORIDE SERPL-SCNC: 103 MMOL/L (ref 98–107)
CO2 SERPL-SCNC: 24.7 MMOL/L (ref 22–29)
CREAT SERPL-MCNC: 0.62 MG/DL (ref 0.57–1)
DEPRECATED RDW RBC AUTO: 39.1 FL (ref 37–54)
EGFRCR SERPLBLD CKD-EPI 2021: 90.7 ML/MIN/1.73
ERYTHROCYTE [DISTWIDTH] IN BLOOD BY AUTOMATED COUNT: 11.8 % (ref 12.3–15.4)
GLUCOSE SERPL-MCNC: 101 MG/DL (ref 65–99)
HCT VFR BLD AUTO: 35.9 % (ref 34–46.6)
HGB BLD-MCNC: 11.6 G/DL (ref 12–15.9)
LV EF NUC BP: 74 %
MAGNESIUM SERPL-MCNC: 2.1 MG/DL (ref 1.6–2.4)
MAXIMAL PREDICTED HEART RATE: 141 BPM
MCH RBC QN AUTO: 29.2 PG (ref 26.6–33)
MCHC RBC AUTO-ENTMCNC: 32.3 G/DL (ref 31.5–35.7)
MCV RBC AUTO: 90.4 FL (ref 79–97)
PERCENT MAX PREDICTED HR: 51.77 %
PLATELET # BLD AUTO: 153 10*3/MM3 (ref 140–450)
PMV BLD AUTO: 11.9 FL (ref 6–12)
POTASSIUM SERPL-SCNC: 4.2 MMOL/L (ref 3.5–5.2)
QT INTERVAL: 456 MS
QT INTERVAL: 478 MS
QTC INTERVAL: 424 MS
QTC INTERVAL: 441 MS
RBC # BLD AUTO: 3.97 10*6/MM3 (ref 3.77–5.28)
SODIUM SERPL-SCNC: 137 MMOL/L (ref 136–145)
STRESS BASELINE BP: NORMAL MMHG
STRESS BASELINE HR: 51 BPM
STRESS PERCENT HR: 61 %
STRESS POST PEAK BP: NORMAL MMHG
STRESS POST PEAK HR: 73 BPM
STRESS TARGET HR: 120 BPM
TROPONIN T SERPL HS-MCNC: 7 NG/L
WBC NRBC COR # BLD AUTO: 7.21 10*3/MM3 (ref 3.4–10.8)

## 2024-10-09 PROCEDURE — G0378 HOSPITAL OBSERVATION PER HR: HCPCS

## 2024-10-09 PROCEDURE — A9502 TC99M TETROFOSMIN: HCPCS | Performed by: EMERGENCY MEDICINE

## 2024-10-09 PROCEDURE — 0 TECHNETIUM TETROFOSMIN KIT: Performed by: EMERGENCY MEDICINE

## 2024-10-09 PROCEDURE — 93010 ELECTROCARDIOGRAM REPORT: CPT | Performed by: INTERNAL MEDICINE

## 2024-10-09 PROCEDURE — 93018 CV STRESS TEST I&R ONLY: CPT | Performed by: INTERNAL MEDICINE

## 2024-10-09 PROCEDURE — 93016 CV STRESS TEST SUPVJ ONLY: CPT | Performed by: INTERNAL MEDICINE

## 2024-10-09 PROCEDURE — 93017 CV STRESS TEST TRACING ONLY: CPT

## 2024-10-09 PROCEDURE — 78452 HT MUSCLE IMAGE SPECT MULT: CPT | Performed by: INTERNAL MEDICINE

## 2024-10-09 PROCEDURE — 84484 ASSAY OF TROPONIN QUANT: CPT | Performed by: NURSE PRACTITIONER

## 2024-10-09 PROCEDURE — 93005 ELECTROCARDIOGRAM TRACING: CPT | Performed by: NURSE PRACTITIONER

## 2024-10-09 PROCEDURE — 85027 COMPLETE CBC AUTOMATED: CPT | Performed by: NURSE PRACTITIONER

## 2024-10-09 PROCEDURE — 99214 OFFICE O/P EST MOD 30 MIN: CPT | Performed by: INTERNAL MEDICINE

## 2024-10-09 PROCEDURE — 80048 BASIC METABOLIC PNL TOTAL CA: CPT | Performed by: NURSE PRACTITIONER

## 2024-10-09 PROCEDURE — 83735 ASSAY OF MAGNESIUM: CPT | Performed by: NURSE PRACTITIONER

## 2024-10-09 PROCEDURE — 78452 HT MUSCLE IMAGE SPECT MULT: CPT

## 2024-10-09 RX ORDER — ACETAMINOPHEN 325 MG/1
650 TABLET ORAL EVERY 6 HOURS PRN
Status: DISCONTINUED | OUTPATIENT
Start: 2024-10-09 | End: 2024-10-09 | Stop reason: HOSPADM

## 2024-10-09 RX ORDER — PANTOPRAZOLE SODIUM 40 MG/1
40 TABLET, DELAYED RELEASE ORAL DAILY
Qty: 30 TABLET | Refills: 0 | Status: SHIPPED | OUTPATIENT
Start: 2024-10-09 | End: 2024-11-08

## 2024-10-09 RX ADMIN — LOSARTAN POTASSIUM 100 MG: 100 TABLET, FILM COATED ORAL at 12:32

## 2024-10-09 RX ADMIN — TETROFOSMIN 1 DOSE: 1.38 INJECTION, POWDER, LYOPHILIZED, FOR SOLUTION INTRAVENOUS at 08:43

## 2024-10-09 RX ADMIN — TETROFOSMIN 1 DOSE: 1.38 INJECTION, POWDER, LYOPHILIZED, FOR SOLUTION INTRAVENOUS at 10:14

## 2024-10-09 RX ADMIN — ACETAMINOPHEN 325MG 650 MG: 325 TABLET ORAL at 08:36

## 2024-10-09 RX ADMIN — CITALOPRAM 10 MG: 10 TABLET, FILM COATED ORAL at 12:32

## 2024-10-09 RX ADMIN — Medication 10 ML: at 12:33

## 2024-10-09 NOTE — CONSULTS
[facility] Consult Note       Encounter Date:10/09/24  Patient:Kiley Last  :1945  MRN:[id number]    Date of Admission: 10/8/2024  Date of Encounter Visit: 10/09/24  Encounter Provider: Jose Denson MD  Referring Provider: Tomi Urbano MD  Place of Service: [facility]  Patient Care Team:  Miranda Morgan MD as PCP - General (Internal Medicine)  Miguelina Hurst APRN as Nurse Practitioner (Nurse Practitioner)  Rich Kearns MD as Consulting Physician (Cardiology)  Patrick Weeks MD as Consulting Physician (Otolaryngology)  Rossana Evans MD as Consulting Physician (Infectious Diseases)  Patrick Dong MD as Consulting Physician (Pulmonary Disease)      Consulted for: Chest pain     Chief Complaint: Chest pain       History of Presenting Illness:      Ms. Last is a 78 y.o. woman who follows with Dr. Kearns with a past medical history notable for paroxysmal atrial fibrillation and hypertension presents to the emergency room yesterday with chest pain.  She describes acute onset of chest discomfort was a tightness located in the center of the chest.  She did have some associated diaphoresis symptoms lasted for maybe 10 to 15 minutes they waxed and waned during that time however given the severity of her symptoms and the concerns she came to the emergency room.  By the time she got to the emergency room she was pain-free.  There is no inciting or alleviating factors.  She has been pain-free since being here in the hospital.  She does report some burning type pain over the last couple of weeks which would last for seconds and again were not exertional in nature.  Overall she is doing well this morning.      Review of Systems:  Review of Systems   Constitutional: Positive for diaphoresis.   HENT: Negative.     Eyes: Negative.    Cardiovascular:  Positive for chest pain.   Respiratory: Negative.     Endocrine: Negative.    Hematologic/Lymphatic:  Negative.    Skin: Negative.    Musculoskeletal: Negative.    Gastrointestinal:  Positive for heartburn.   Genitourinary: Negative.    Neurological: Negative.    Psychiatric/Behavioral: Negative.     Allergic/Immunologic: Negative.        Medications:    Current Facility-Administered Medications:     [Held by provider] apixaban (ELIQUIS) tablet 5 mg, 5 mg, Oral, Q12H, Blevens, Kerrie C, APRN    sennosides-docusate (PERICOLACE) 8.6-50 MG per tablet 2 tablet, 2 tablet, Oral, BID **AND** polyethylene glycol (MIRALAX) packet 17 g, 17 g, Oral, Daily PRN **AND** bisacodyl (DULCOLAX) EC tablet 5 mg, 5 mg, Oral, Daily PRN **AND** bisacodyl (DULCOLAX) suppository 10 mg, 10 mg, Rectal, Daily PRN, Blevens, Kerrie C, APRN    citalopram (CeleXA) tablet 10 mg, 10 mg, Oral, Daily, Blevens, Kerrie C, APRN    flecainide (TAMBOCOR) tablet 50 mg, 50 mg, Oral, Q12H, Blevens, Kerrie C, APRN, 50 mg at 10/08/24 2309    losartan (COZAAR) tablet 100 mg, 100 mg, Oral, Daily, Blevens, Kerrie C, APRN    melatonin tablet 5 mg, 5 mg, Oral, Nightly PRN, Anna Schmitz, APRN, 5 mg at 10/08/24 2331    [Held by provider] metoprolol succinate XL (TOPROL-XL) 24 hr tablet 50 mg, 50 mg, Oral, Daily, Blevens, Kerrie C, APRN    nitroglycerin (NITROSTAT) SL tablet 0.4 mg, 0.4 mg, Sublingual, Q5 Min PRN, Blevens, Kerrie C, APRN    rosuvastatin (CRESTOR) tablet 20 mg, 20 mg, Oral, Daily, Blevens, Kerrie C, APRN    sodium chloride 0.9 % flush 10 mL, 10 mL, Intravenous, PRN, Yogi Cui MD    sodium chloride 0.9 % flush 10 mL, 10 mL, Intravenous, Q12H, Blevens, Kerrie C, APRN, 10 mL at 10/08/24 1948    sodium chloride 0.9 % flush 10 mL, 10 mL, Intravenous, PRN, Kerrie Romero APRN    Allergies   Allergen Reactions    Epinephrine Palpitations    Penicillins Other (See Comments)     BLISTERS IN MOUTH    Patient tolerated ceftriaxone during 5/2017 admission.          Past Medical History:   Diagnosis Date    Arthritis     Asthma     NO INHALERS    Atrial fibrillation      Clostridium difficile infection 09/20/2019    Colon polyps 08/01/2019    Transverse colon: tubular adenoma, descending colon: fragments of tubular adenoma, sigmoid colon: ischemic eroded hyperplastic polyp    COPD (chronic obstructive pulmonary disease)     Diverticulitis     Diverticulosis     ESBL (extended spectrum beta-lactamase) producing bacteria infection     GERD (gastroesophageal reflux disease)     History of abscess of skin and subcutaneous tissue     ABD WOUND 5/2017    History of atrial fibrillation      X1 POST OP FROM COLOSTOMY 5/2017 - NO PROBLEMS SINCE    Hypertension     Incisional hernia     MDRO (multiple drug resistant organisms) resistance     Occasional tremors     NEW (obstructive sleep apnea) 05/20/2021    Overnight polysomnogram.  Weight 190 pounds.  Mild NEW with AHI 7 events per hour for total sleep time.  However, during REM moderate NEW with AHI 16.7 events per hour.  No sleep-related hypoxia.  The patient snored 5% of total sleep time.    PONV (postoperative nausea and vomiting)     Psoriasis     UTI (urinary tract infection)     Vertigo        Past Surgical History:   Procedure Laterality Date    BELPHAROPTOSIS REPAIR Bilateral 04/27/2017    Bilateral brow ptosis repair via the anterior hairline approach under monitored local anesthesia-Andrez Craven    BLEPHAROPLASTY Bilateral 04/27/2017    BREAST BIOPSY      CATARACT EXTRACTION      COLON RESECTION N/A 5/3/2017    Procedure: OPEN SIMOID COLECTOMY WITH COLOSTOMY;  Surgeon: Renato Delgado MD;  Location: [facility] MAIN OR;  Service:     COLONOSCOPY N/A 9/13/2017    Procedure: COLONOSCOPY VIA COLOSTOMY TO CECUM;  Surgeon: Renato Delagdo MD;  Location: [facility] ENDOSCOPY;  Service:     COLONOSCOPY N/A 8/1/2019    Procedure: COLONOSCOPY to cecum and TI with biopsy / hot snare polypectomies;  Surgeon: Dalton Vela Jr., MD;  Location: [facility] ENDOSCOPY;  Service: General    COLONOSCOPY N/A 01/04/2010    Dr. Sivakumar Tolentino,  [facility]    COLONOSCOPY N/A 11/3/2022    Procedure: COLONOSCOPY to cecum and TI with biopsy / hot snare polypectomy;  Surgeon: Trini Wade MD;  Location: [facility] ENDOSCOPY;  Service: General;  Laterality: N/A;  pre-hx diverticulitis, screen, fam hx colon ca, personal hx polyps  post-polyp, diverticulosis, small hemorrhoids    COLOSTOMY CLOSURE N/A 2017    Procedure: COLOSTOMY TAKEDOWN AND CLOSURE, WITH RESECTION;  Surgeon: Renato Delgado MD;  Location: [facility] MAIN OR;  Service:     ENDOSCOPY AND COLONOSCOPY N/A 10/31/2014    Normal EGD and colonoscopy, repeat c-scope in 5 years-Dr. Sivakumar Tolentino [place]    EXPLORATORY LAPAROTOMY N/A 2019    Procedure: Exploratory laparotomy with lysis of adhesions;  Surgeon: Trini Wade MD;  Location: [facility] MAIN OR;  Service: General    FINGER SURGERY Left     4TH FINGER LEFT HAND    HYSTERECTOMY Bilateral     LAPAROSCOPIC CHOLECYSTECTOMY W/ CHOLANGIOGRAPHY N/A 07/15/2003    Dr. Amrit Martinez, [facility]    SALPINGO OOPHORECTOMY Bilateral 2006    Abdominal sacral colpopexy, bilateral salpingo-oophorectomy, tension free vaginal tape, cystoscopy-Dr. Devika Bradley, [facility]    THORACENTESIS Right 2017    US-guided right thoracentesis-Dr. Juan Yuen, [facility]    TONSILLECTOMY Bilateral     UPPER GASTROINTESTINAL ENDOSCOPY N/A 10/08/2007    Esophageal mucosal changes suspicious for short-segment Olivera's esphagus, gastric mucosal abnormality characterized by erythema: biopsied, NERD disease present, high likelihood of gastritis-Dr. Mayank Knapp, [facility]    VENTRAL/INCISIONAL HERNIA REPAIR N/A 2023    Procedure: Open incisional hernia repair with mesh;  Surgeon: Trini Wade MD;  Location: [facility] MAIN OR;  Service: General;  Laterality: N/A;       Social History     Socioeconomic History    Marital status:    Tobacco Use    Smoking status: Former     Current packs/day: 0.00     Types: Cigarettes     Quit date:      Years since quittin.8      Passive exposure: Past    Smokeless tobacco: Never    Tobacco comments:     SOCIAL SMOKING ONLY   Vaping Use    Vaping status: Never Used   Substance and Sexual Activity    Alcohol use: Yes     Comment: RARE    Drug use: No    Sexual activity: Defer       Family History   Problem Relation Age of Onset    Cancer Mother     Colon cancer Mother     Diabetes Father     Diabetes Son     Ulcerative colitis Son     No Known Problems Maternal Grandmother     No Known Problems Maternal Grandfather     No Known Problems Paternal Grandmother     No Known Problems Paternal Grandfather     Malig Hyperthermia Neg Hx     Breast cancer Neg Hx        The following portions of the patient's history were reviewed and updated as appropriate: allergies, current medications, past family history, past medical history, past social history, past surgical history and problem list.         Objective:      Temp:  [98.1 °F (36.7 °C)-98.5 °F (36.9 °C)] 98.2 °F (36.8 °C)  Heart Rate:  [45-61] 56  Resp:  [15-20] 18  BP: (124-191)/(55-78) 141/64   No intake or output data in the 24 hours ending 10/09/24 0803  Body mass index is 28.96 kg/m².      10/08/24  1140   Weight: 78.9 kg (174 lb)           Physical Exam:  Constitutional: Well appearing, well developed, no acute distress   HENT: Oropharynx clear and membrane moist  Eyes: Normal conjunctiva, no sclera icterus.  Neck: Supple, no carotid bruit bilaterally.  Cardiovascular: Regular rate and rhythm, No Murmur, No bilateral lower extremity edema.  Pulmonary: Normal respiratory effort, normal lung sounds, no wheezing.  Abdominal: Soft, nontender, no hepatosplenomegaly, liver is non-pulsatile.  Neurological: Alert and orient x 3.   Skin: Warm, dry, no ecchymosis, no rash.  Psych: Appropriate mood and affect. Normal judgment and insight.         Lab Review:   Results from last 7 days   Lab Units 10/09/24  0412 10/08/24  1216   SODIUM mmol/L 137 138   POTASSIUM mmol/L 4.2 4.4   CHLORIDE mmol/L 103  104   CO2 mmol/L 24.7 25.0   BUN mg/dL 19 22   CREATININE mg/dL 0.62 0.69   GLUCOSE mg/dL 101* 102*   CALCIUM mg/dL 8.9 9.8   AST (SGOT) U/L  --  33*   ALT (SGPT) U/L  --  21     Results from last 7 days   Lab Units 10/09/24  0412 10/08/24  1624 10/08/24  1216   HSTROP T ng/L 7 <6 9     Results from last 7 days   Lab Units 10/09/24  0412 10/08/24  1216   WBC 10*3/mm3 7.21 7.51   HEMOGLOBIN g/dL 11.6* 12.7   HEMATOCRIT % 35.9 39.7   PLATELETS 10*3/mm3 153 164     Results from last 7 days   Lab Units 10/08/24  1216   INR  1.30*     Results from last 7 days   Lab Units 10/09/24  0412   MAGNESIUM mg/dL 2.1     Results from last 7 days   Lab Units 10/08/24  1624   CHOLESTEROL mg/dL 147   TRIGLYCERIDES mg/dL 159*   HDL CHOL mg/dL 45     The 10-year ASCVD risk score (Sherley SHAH, et al., 2019) is: 36%    Values used to calculate the score:      Age: 78 years      Sex: Female      Is Non- : No      Diabetic: No      Tobacco smoker: No      Systolic Blood Pressure: 141 mmHg      Is BP treated: Yes      HDL Cholesterol: 45 mg/dL      Total Cholesterol: 147 mg/dL     Results from last 7 days   Lab Units 10/08/24  1216   PROBNP pg/mL 546.0           EKG 10/9/2024:  Sinus rhythm      Prior cardiac test:      Seriouslyo Monitor 7/9/2024:  A normal monitor study.  Underlying heart rhythm was sinus rhythm with an average heart rate of 59 bpm and a rate range of 42 bpm up to 98 bpm  26 episodes of paroxysmal SVT with the longest episode lasting 5 beats.  Patient reported symptoms during study    Echocardiogram 3/19/2024 with images reviewed by myself:  Left ventricular systolic function is normal. Calculated left ventricular EF = 63.1% Normal left ventricular cavity size and wall thickness noted. All left ventricular wall segments contract normally. Left ventricular diastolic function was normal.  The left atrial cavity is moderately dilated. Right AtriumThe right atrial cavity is mildly dilated.  No aortic valve  regurgitation or stenosis is present. The aortic valve is abnormal in structure. There is mild calcification of the aortic valve.  Mild mitral annular calcification is present. Trace mitral valve regurgitation is present.  Trace tricuspid valve regurgitation is present. Estimated right ventricular systolic pressure from tricuspid regurgitation is normal (<35 mmHg). Calculated right ventricular systolic pressure from tricuspid regurgitation is 16 mmHg.    Stress MPI 12/12/2019:  Regadenoson SPECT done. No EKG evidence for myocardial ischemia.  Myocardial perfusion imaging indicates a normal myocardial perfusion study with no evidence of ischemia. Left ventricular ejection fraction is hyperdynamic (Calculated EF > 70%).  Impressions are consistent with a low risk study.  There is no prior study available for comparison.       Assessment:           Chest pain         Plan:       Ms. Last is a 78 y.o. woman who follows with Dr. Kearns with a past medical history notable for paroxysmal atrial fibrillation and hypertension presents to the emergency room yesterday with chest pain.  Her symptoms are hard to pin down they are somewhat atypical and could represent more esophageal or gastric reflux disease however fairly severe in severity she has not had a stress test since 2019 did have some preceding symptoms leading up to this and would be reasonable to risk stratify given her age risk factors and her need for continuation of flecainide use.  All these combined I would recommend we reevaluate with a Lexiscan stress test given her paroxysmal atrial fibrillation.  If this is normal I would consider empiric PPI treatment and potential follow-up with her primary care doctor and/or GI.  If abnormal can follow-up with definitive assessment with cardiac catheterization.  Holding anticoagulation.  Will follow-up on stress test results      Chest pain:  EKG and troponins normal  HAO risk or 2  Has both atypical and typical  symptoms  Given age comorbidities and concomitant flecainide use will further stratify with stress test  If stress test normal can go home with empiric treatment of reflux disease with follow-up with primary care doctor or GI  If stress test abnormal plan for heart catheterization later today    Paroxysmal atrial fibrillation:  Anticoagulation currently on hold  Currently in sinus rhythm continue beta-blocker and flecainide  If abnormal stress test would proceed forward with heart catheterization get definitive assessment of coronary anatomy given concomitant flecainide use    Essential hypertension:  Blood pressure elevated on arrival could be stress related to discomfort further monitoring as an outpatient        Thank you for allowing me to participate in the care of Kiley Last. Feel free to contact me directly with any further questions or concerns.    Jose Denson MD  [facility]  10/09/24  08:03 EDT

## 2024-10-09 NOTE — OUTREACH NOTE
Prep Survey      Flowsheet Row Responses   [facility] patient discharged from? [facility]   Is LACE score < 7 ? Yes   Eligibility [facility]   Date of Admission 10/08/24   Date of Discharge 10/09/24   Discharge Disposition Home or Self Care   Discharge diagnosis Chest pain, stress test normal   Does the patient have one of the following disease processes/diagnoses(primary or secondary)? Other   Does the patient have Home health ordered? No   Is there a DME ordered? No   Prep survey completed? Yes            Dipti ZAIDI - Registered Nurse

## 2024-10-09 NOTE — DISCHARGE INSTRUCTIONS
Your cardiac stress test showed no ischemic process in the heart and showed a normal myocardial perfusion.  It is felt that some your chest pain could perhaps be GI related.  Cardiology recommends a trial on a proton pump inhibitor to help with upper GI symptoms.  I will write for 30 days of Protonix.  You will need to take 1 tab daily for the next 30 days.      Dr. Kearns with [facility] will schedule a 2-week follow-up appointment to reevaluate you and ensure you are doing better.  Follow-up closely with the primary care physician in the next few weeks for repeat evaluation.  If symptoms persist you may want to have a discussion with your primary care physician about a gastroenterology referral.  Should you develop any exertional chest pain, worsening chest pain, or have any further concerns, please do not hesitate return to the ER for repeat evaluation.

## 2024-10-09 NOTE — PLAN OF CARE
Goal Outcome Evaluation: Patient has been cleared for discharge.  Following up outpatient with PCP

## 2024-10-09 NOTE — PLAN OF CARE
Goal Outcome Evaluation:  Plan of Care Reviewed With: patient        Progress: improving  Outcome Evaluation: VSS: Pt alert and oriented; No complaints noted throughout the night; NPO till seen by cardio; Ambulating stand by to the bathroom; SR/SB; wctm

## 2024-10-09 NOTE — H&P
[facility]   HISTORY AND PHYSICAL    Patient Name: Kiley Last  : 1945  MRN: [id number]  Primary Care Physician:  Miranda Morgan MD  Date of admission: 10/8/2024    Subjective   Subjective     Chief Complaint:   Chief Complaint   Patient presents with    Chest Pain         HPI:    Kiley Last is a 78 y.o. female, with a past medical history including, but not limited to, asthma, hypertension, paroxysmal atrial fibrillation, psoriasis, NEW, and vertigo, presented to the emergency department after having an episode of chest pain at home.  She had gotten up this morning when she had an episode of mid sternal chest pressure that radiated to the right side of her chest and up both sides of her neck..  She states it was accompanied with nausea and diaphoresis.  She denies any shortness of breath. The pain lasted several minutes prior to subsiding.  Approximately 10 minutes later she had a second episode of pain so she called EMS.  The pain subsided prior to EMS arriving.  She denies any further episodes of pain.  Cardiology has been consulted to see the patient in the AM.  She will remain n.p.o. after midnight for possible cardiac testing in the a.m.    Review of Systems   All systems were reviewed and negative except for: What was mentioned above in the HPI.    Personal History     Past Medical History:   Diagnosis Date    Arthritis     Asthma     NO INHALERS    Atrial fibrillation     Clostridium difficile infection 2019    Colon polyps 2019    Transverse colon: tubular adenoma, descending colon: fragments of tubular adenoma, sigmoid colon: ischemic eroded hyperplastic polyp    COPD (chronic obstructive pulmonary disease)     Diverticulitis     Diverticulosis     ESBL (extended spectrum beta-lactamase) producing bacteria infection     GERD (gastroesophageal reflux disease)     History of abscess of skin and subcutaneous tissue     ABD WOUND 2017    History of atrial fibrillation       X1 POST OP FROM COLOSTOMY 5/2017 - NO PROBLEMS SINCE    Hypertension     Incisional hernia     MDRO (multiple drug resistant organisms) resistance     Occasional tremors     NEW (obstructive sleep apnea) 05/20/2021    Overnight polysomnogram.  Weight 190 pounds.  Mild NEW with AHI 7 events per hour for total sleep time.  However, during REM moderate NEW with AHI 16.7 events per hour.  No sleep-related hypoxia.  The patient snored 5% of total sleep time.    PONV (postoperative nausea and vomiting)     Psoriasis     UTI (urinary tract infection)     Vertigo        Past Surgical History:   Procedure Laterality Date    BELPHAROPTOSIS REPAIR Bilateral 04/27/2017    Bilateral brow ptosis repair via the anterior hairline approach under monitored local anesthesia-Andrez Craven    BLEPHAROPLASTY Bilateral 04/27/2017    BREAST BIOPSY      CATARACT EXTRACTION      COLON RESECTION N/A 5/3/2017    Procedure: OPEN SIMOID COLECTOMY WITH COLOSTOMY;  Surgeon: Renato Delgado MD;  Location: [facility] OR;  Service:     COLONOSCOPY N/A 9/13/2017    Procedure: COLONOSCOPY VIA COLOSTOMY TO CECUM;  Surgeon: Renato Delgado MD;  Location: [facility] ENDOSCOPY;  Service:     COLONOSCOPY N/A 8/1/2019    Procedure: COLONOSCOPY to cecum and TI with biopsy / hot snare polypectomies;  Surgeon: Dalton Vela Jr., MD;  Location: [facility] ENDOSCOPY;  Service: General    COLONOSCOPY N/A 01/04/2010    Kade Triniddaton    COLONOSCOPY N/A 11/3/2022    Procedure: COLONOSCOPY to cecum and TI with biopsy / hot snare polypectomy;  Surgeon: Trini Wade MD;  Location: [facility] ENDOSCOPY;  Service: General;  Laterality: N/A;  pre-hx diverticulitis, screen, fam hx colon ca, personal hx polyps  post-polyp, diverticulosis, small hemorrhoids    COLOSTOMY CLOSURE N/A 9/14/2017    Procedure: COLOSTOMY TAKEDOWN AND CLOSURE, WITH RESECTION;  Surgeon: Renato Delgado MD;  Location: [facility] OR;  Service:     ENDOSCOPY AND  COLONOSCOPY N/A 10/31/2014    Normal EGD and colonoscopy, repeat c-scope in 5 years-Andrez Trinidad    EXPLORATORY LAPAROTOMY N/A 9/8/2019    Procedure: Exploratory laparotomy with lysis of adhesions;  Surgeon: Trini Wade MD;  Location: [facility];  Service: General    FINGER SURGERY Left     4TH FINGER LEFT HAND    HYSTERECTOMY Bilateral     LAPAROSCOPIC CHOLECYSTECTOMY W/ CHOLANGIOGRAPHY N/A 07/15/2003    Dr. Amrit Martinez, [facility]    SALPINGO OOPHORECTOMY Bilateral 02/02/2006    Abdominal sacral colpopexy, bilateral salpingo-oophorectomy, tension free vaginal tape, cystoscopy-Dr. Devika Bradley, [facility]    THORACENTESIS Right 05/21/2017    US-guided right thoracentesis-Dr. Juan Yuen, [facility]    TONSILLECTOMY Bilateral     UPPER GASTROINTESTINAL ENDOSCOPY N/A 10/08/2007    Esophageal mucosal changes suspicious for short-segment Olivera's esphagus, gastric mucosal abnormality characterized by erythema: biopsied, NERD disease present, high likelihood of gastritis-Dr. Mayank Knapp, [facility]    VENTRAL/INCISIONAL HERNIA REPAIR N/A 9/22/2023    Procedure: Open incisional hernia repair with mesh;  Surgeon: Trini Wade MD;  Location: [facility];  Service: General;  Laterality: N/A;       Family History: family history includes Cancer in her mother; Colon cancer in her mother; Diabetes in her father and son; No Known Problems in her maternal grandfather, maternal grandmother, paternal grandfather, and paternal grandmother; Ulcerative colitis in her son. Otherwise pertinent FHx was reviewed and not pertinent to current issue.    Social History:  reports that she quit smoking about 57 years ago. Her smoking use included cigarettes. She has been exposed to tobacco smoke. She has never used smokeless tobacco. She reports current alcohol use. She reports that she does not use drugs.    Home Medications:  Acetaminophen, Magnesium, Zinc Sulfate, apixaban, citalopram, cyanocobalamin, flecainide,  fluticasone, losartan, meclizine, metoprolol succinate XL, and rosuvastatin    Allergies:  Allergies   Allergen Reactions    Epinephrine Palpitations    Penicillins Other (See Comments)     BLISTERS IN MOUTH    Patient tolerated ceftriaxone during 5/2017 admission.          Objective   Objective     Vitals:   Temp:  [98.2 °F (36.8 °C)-98.5 °F (36.9 °C)] 98.5 °F (36.9 °C)  Heart Rate:  [45-61] 61  Resp:  [17-20] 17  BP: (130-191)/(55-78) 133/55  Physical Exam     Constitutional: Awake, alert   Eyes: PERRLA   HENT: NCAT, mucous membranes moist   Neck: Supple   Respiratory: Clear to auscultation bilaterally, nonlabored respirations    Cardiovascular: regular rate, palpable pedal pulses bilaterally   Gastrointestinal: Positive bowel sounds, soft, nontender, nondistended   Musculoskeletal: No bilateral ankle edema   Psychiatric: Appropriate affect, cooperative   Neurologic: Oriented x 3, speech clear   Skin: No rashes         Result Review    Result Review:  I have personally reviewed the results from the time of this admission to 10/8/2024 21:54 EDT and agree with these findings:  [x]  Laboratory list / accordion  []  Microbiology  [x]  Radiology  [x]  EKG/Telemetry   []  Cardiology/Vascular   []  Pathology  [x]  Old records  []  Other:      Initial lab workup in the emergency department shows high-sensitivity troponins of 9, less than 6 proBNP 546.0, however lab work is at baseline for the patient.  Chest x-ray shows prominent interstitial markings within both lungs that are probably representative of chronic interstitial abnormalities.  EKG shows sinus bradycardia, rate 52, nonischemic.    Assessment & Plan   Assessment / Plan     Brief Patient Summary:  Kiley Last is a 78 y.o. female who was admitted to the observation unit for further evaluation and treatment of her chest pain.    Active Hospital Problems:  Active Hospital Problems    Diagnosis     **Chest pain      Plan:     Chest pain   -Cardiac  monitoring  -Vital signs every 4 hours   -Cardiology consult   -High-sensitivity troponin 9, less than 6, trend  -EKG-sinus bradycardia, rate 52  -N.p.o. after midnight   -Hold metoprolol  -Last echocardiogram was done on 3/19/2024    Paroxysmal atrial fibrillation  -Continue flecainide  -Hold Eliquis until seen by cardiology in a.m.    Hypertension  -Monitor blood pressure  -Continue home dose blood pressure agents          VTE Prophylaxis:  Pharmacologic VTE prophylaxis orders are present.        CODE STATUS:       Admission Status:  I believe this patient meets observation status.    76 minutes have been spent by [facility] providers in the care of this patient while under observation status.      Appropriate PPE worn during patient encounter.  Hand hygeine performed before and after seeing the patient.      Electronically signed by SARITHA Mendoza, 10/08/24, 9:54 PM EDT.

## 2024-10-09 NOTE — DISCHARGE SUMMARY
ED OBSERVATION PROGRESS/DISCHARGE SUMMARY    Date of Admission: 10/8/2024   LOS: 0 days   PCP: Miranda Morgan MD    Final Diagnosis chest pain      Subjective     Hospital Outcome: Discharge    ROS:  General: no fevers, chills  Respiratory: no cough, dyspnea  Cardiovascular: no chest pain, palpitations  Abdomen: No abdominal pain, nausea, vomiting, or diarrhea  Neurologic: No focal weakness    Kiley Last is a 78 y.o. female, with a past medical history including, but not limited to, asthma, hypertension, paroxysmal atrial fibrillation, psoriasis, NEW, and vertigo, presented to the emergency department after having an episode of chest pain at home.  She had gotten up this morning when she had an episode of mid sternal chest pressure that radiated to the right side of her chest and up both sides of her neck..  She states it was accompanied with nausea and diaphoresis.  She denies any shortness of breath. The pain lasted several minutes prior to subsiding.  Approximately 10 minutes later she had a second episode of pain so she called EMS.  The pain subsided prior to EMS arriving.  She denies any further episodes of pain.  Cardiology has been consulted to see the patient in the AM.  She will remain n.p.o. after midnight for possible cardiac testing in the a.m.     Review of Systems              All systems were reviewed and negative except for: What was mentioned above in the HPI.    10/9/2024.      9 AM.  Patient has been evaluated by Dr. Denson.  Patient's chest discomfort hard to pin down.  No stress test since 2019.  There is a plan for a Lexiscan stress test.  If this is normal to consider PPI treatment obtained follow-up with primary care and/or GI.  If abnormal can follow-up with definitive assessment with cardiac catheterization.  Hold on anticoagulant for now.    1:49 PM.Patient has been evaluated with cardiology.  Troponins remain flat at 9, 6, 7.  ECG showed no ischemic changes.  Lexiscan stress test  indicates normal myocardial perfusion.  Cardiology recommends home with PPI and follow-up with primary care to discuss GI referral.  Will write for 40 mg Protonix daily and dispense 30 capsules to ensure the patient has plenty of medication to cover until follow-up with primary care.    Objective   Physical Exam:  I have reviewed the vital signs.  Temp:  [98.1 °F (36.7 °C)-98.5 °F (36.9 °C)] 98.2 °F (36.8 °C)  Heart Rate:  [45-61] 56  Resp:  [15-18] 18  BP: (124-191)/(55-78) 141/64  General Appearance:    Alert, cooperative, no distress  Head:    Normocephalic, atraumatic  Eyes:    Sclerae anicteric  Neck:   Supple, no mass  Lungs: Clear to auscultation bilaterally, respirations unlabored  Heart: Regular rate and rhythm, S1 and S2 normal, no murmur, rub or gallop  Abdomen:  Soft, nontender, bowel sounds active, nondistended  Extremities: No clubbing, cyanosis, or edema to lower extremities  Pulses:  2+ and symmetric in distal lower extremities  Skin: No rashes   Neurologic: Oriented x3, Normal strength to extremities    Results Review:    I have reviewed the labs, radiology results and diagnostic studies.    Results from last 7 days   Lab Units 10/09/24  0412   WBC 10*3/mm3 7.21   HEMOGLOBIN g/dL 11.6*   HEMATOCRIT % 35.9   PLATELETS 10*3/mm3 153     Results from last 7 days   Lab Units 10/09/24  0412 10/08/24  1216   SODIUM mmol/L 137 138   POTASSIUM mmol/L 4.2 4.4   CHLORIDE mmol/L 103 104   CO2 mmol/L 24.7 25.0   BUN mg/dL 19 22   CREATININE mg/dL 0.62 0.69   CALCIUM mg/dL 8.9 9.8   BILIRUBIN mg/dL  --  0.4   ALK PHOS U/L  --  76   ALT (SGPT) U/L  --  21   AST (SGOT) U/L  --  33*   GLUCOSE mg/dL 101* 102*     Imaging Results (Last 24 Hours)       ** No results found for the last 24 hours. **            I have reviewed the medications.  ---------------------------------------------------------------------------------------------  Assessment & Plan   Assessment/Problem List    Chest pain      Plan:    Chest pain    -Cardiac monitoring  -Vital signs every 4 hours   -Cardiology consult   -High-sensitivity troponin 9, less than 6, trend  -EKG-sinus bradycardia, rate 52  -N.p.o. after midnight   -Hold metoprolol  -Last echocardiogram was done on 3/19/2024  -Lexiscan stress test was normal myocardial perfusion.  Plan as above.     Paroxysmal atrial fibrillation  -Continue flecainide  -Hold Eliquis until seen by cardiology in a.m.     Hypertension  -Monitor blood pressure  -Continue home dose blood pressure agents       Disposition: Discharge    Follow-up after Discharge: Primary care    This note will serve as a discharge summary and progress note    Marck Fallon III, PA 10/09/24 13:50 EDT    I have worn appropriate PPE during this patient encounter, sanitized my hands both with entering and exiting patient's room.      35 minutes has been spent by [facility] providers in the care of this patient while under observation status

## 2024-10-09 NOTE — PROGRESS NOTES
Pt admitted to the observation unit from the emerged part with period of chest pain earlier today with diaphoresis and nausea.  No prior episodes in the past.  Currently asymptomatic.     On exam,   General: No acute distress, nontoxic  HEENT: Mucous membranes moist, atraumatic, EOMI  Neck: Full ROM  Pulm: Symmetric chest rise, nonlabored, lungs CTAB  Cardiovascular: Regular rate and rhythm, intact distal pulses  GI: Soft, nontender, nondistended, no rebound, no guarding, bowel sounds present  MSK: Full ROM, no deformity  Skin: Warm, dry  Neuro: Awake, alert, oriented x 4, GCS 15, moving all extremities, no focal deficits  Psych: Calm, cooperative          Plan: Reassuring troponins, asymptomatic, nonischemic EKG, plan for cardiology evaluation in the morning.  Consideration for possible cardiac etiology, esophageal spasm, esophagitis, gastritis, reflux, arrhythmia, among others.  Patient well-appearing in no acute distress, all questions or concerns addressed.         MD Attestation Note    SHARED VISIT: This visit was performed by BOTH a physician and an APC. The substantive portion of the medical decision making was performed by this attesting physician who made or approved the management plan and takes responsibility for patient management. All studies in the APC note (if performed) were independently interpreted by me.

## 2024-10-09 NOTE — PROGRESS NOTES
The MADHAV and I have discussed this patient's history, physical exam and treatment plan.  I provided a substantive portion of the care of this patient.  I have reviewed the documentation and personally had a face to face interaction with the patient and personally performed the physical exam, in its entirety.  I affirm the documentation and agree with the treatment and plan.  The following describes my personal findings.  SHARED VISIT: This visit was performed by BOTH a physician and an APC. The substantive portion of the medical decision making was performed by this attesting physician who made or approved the management plan and takes responsibility for patient management. All studies in the APC note (if performed) were independently interpreted by me.       The patient is admitted to the observation unit for further evaluation of chest pain.    Comprehensive Physical exam:  Patient is nontoxic appearing oriented, conversant awake, alert  HEENT: normocephalic, atraumatic  Neck: no goiter, no pain with ROM  Pulmonary: Nontachypneic  cardiovascular: Nontachycardic  Abdomen: Soft, nontender  musculoskeletal: Good range of motion x 4, no significant pedal edema, posterior tibial pulses palpable bilaterally  Neuro/psychiatric:calm, appropriate, cooperative  Skin:warm, dry      Plan:     Chest pain   -Cardiac monitoring  -Vital signs every 4 hours   -Cardiology consult, seen by Dr. Denson with suspicion for esophageal/gastric reflux disease with recommendation for Lexiscan stress, if normal PPI and follow-up with PCP/GI  -High-sensitivity troponin 9, less than 6, 7  -EKG-sinus bradycardia, rate 52  -N.p.o. after midnight   -Hold metoprolol  -Last echocardiogram was done on 3/19/2024     Paroxysmal atrial fibrillation  -Continue flecainide  -Hold Eliquis     Hypertension  -Monitor blood pressure  -Continue home dose blood pressure agents

## 2024-10-10 ENCOUNTER — TRANSITIONAL CARE MANAGEMENT TELEPHONE ENCOUNTER (OUTPATIENT)
Dept: CALL CENTER | Facility: HOSPITAL | Age: 79
End: 2024-10-10
Payer: MEDICARE

## 2024-10-10 NOTE — OUTREACH NOTE
Call Center TCM Note      Flowsheet Row Responses   [facility] patient discharged from? [place]   Does the patient have one of the following disease processes/diagnoses(primary or secondary)? Other   TCM attempt successful? Yes   Call start time 1121   Call end time 1126   Discharge diagnosis Chest pain, stress test normal   Person spoke with today (if not patient) and relationship pt   Meds reviewed with patient/caregiver? Yes   Is the patient having any side effects they believe may be caused by any medication additions or changes? No   Does the patient have all medications ordered at discharge? Yes   Is the patient taking all medications as directed (includes completed medication regime)? Yes   Comments Cardiology 10/24/24   Does the patient have an appointment with their PCP within 7-14 days of discharge? No appointments available   Nursing Interventions Routed TCM call to PCP office   Psychosocial issues? No   Did the patient receive a copy of their discharge instructions? Yes   Nursing interventions Reviewed instructions with patient   What is the patient's perception of their health status since discharge? Improving   Is the patient/caregiver able to teach back signs and symptoms related to disease process for when to call PCP? Yes   Is the patient/caregiver able to teach back signs and symptoms related to disease process for when to call 911? Yes   Is the patient/caregiver able to teach back the hierarchy of who to call/visit for symptoms/problems? PCP, Specialist, Home health nurse, Urgent Care, ED, 911 Yes   If the patient is a current smoker, are they able to teach back resources for cessation? Not a smoker   TCM call completed? Yes   Wrap up additional comments Pt states she is doing better, and does not have any chest pain. Reviewed AVS/meds with pt. Will route message to PCP as there are no appts that satisfy TCM. Pt has cardiology fu appt on 10/24/24.   Call end time 1126   Would this patient  benefit from a Referral to [facility] Social Work? No   Is the patient interested in additional calls from an ambulatory ? No            Rosette Joy RN    10/10/2024, 11:27 EDT

## 2024-10-14 ENCOUNTER — TELEPHONE (OUTPATIENT)
Dept: INTERNAL MEDICINE | Facility: CLINIC | Age: 79
End: 2024-10-14
Payer: MEDICARE

## 2024-10-14 NOTE — TELEPHONE ENCOUNTER
"  Hub staff attempted to follow warm transfer process and was unsuccessful     Caller: Kiley Last \"Anna\"    Relationship to patient: Self    Best call back number: [phone]     Patient is needing: NEED HOSPITAL FOLLOW UP APPOINTMENT WITH DR. NEGRETE- WENT TO  AND DISCHARGED ON 10/10/24           "

## 2024-10-14 NOTE — TELEPHONE ENCOUNTER
I left ms for patient with apt info and asked that she call to confirm since I wont be here tomorrow you may want to follow up

## 2024-10-16 ENCOUNTER — OFFICE VISIT (OUTPATIENT)
Dept: INTERNAL MEDICINE | Facility: CLINIC | Age: 79
End: 2024-10-16
Payer: MEDICARE

## 2024-10-16 VITALS
HEIGHT: 65 IN | DIASTOLIC BLOOD PRESSURE: 64 MMHG | HEART RATE: 78 BPM | BODY MASS INDEX: 28.32 KG/M2 | WEIGHT: 170 LBS | SYSTOLIC BLOOD PRESSURE: 128 MMHG

## 2024-10-16 DIAGNOSIS — Z23 NEED FOR INFLUENZA VACCINATION: ICD-10-CM

## 2024-10-16 DIAGNOSIS — R07.9 CHEST PAIN, UNSPECIFIED TYPE: Primary | ICD-10-CM

## 2024-10-16 DIAGNOSIS — I10 ESSENTIAL HYPERTENSION: Chronic | ICD-10-CM

## 2024-10-16 PROCEDURE — 90662 IIV NO PRSV INCREASED AG IM: CPT | Performed by: INTERNAL MEDICINE

## 2024-10-16 PROCEDURE — 1160F RVW MEDS BY RX/DR IN RCRD: CPT | Performed by: INTERNAL MEDICINE

## 2024-10-16 PROCEDURE — 3074F SYST BP LT 130 MM HG: CPT | Performed by: INTERNAL MEDICINE

## 2024-10-16 PROCEDURE — G0008 ADMIN INFLUENZA VIRUS VAC: HCPCS | Performed by: INTERNAL MEDICINE

## 2024-10-16 PROCEDURE — 1125F AMNT PAIN NOTED PAIN PRSNT: CPT | Performed by: INTERNAL MEDICINE

## 2024-10-16 PROCEDURE — 3078F DIAST BP <80 MM HG: CPT | Performed by: INTERNAL MEDICINE

## 2024-10-16 PROCEDURE — 99495 TRANSJ CARE MGMT MOD F2F 14D: CPT | Performed by: INTERNAL MEDICINE

## 2024-10-16 PROCEDURE — 1159F MED LIST DOCD IN RCRD: CPT | Performed by: INTERNAL MEDICINE

## 2024-10-16 PROCEDURE — 1111F DSCHRG MED/CURRENT MED MERGE: CPT | Performed by: INTERNAL MEDICINE

## 2024-10-22 ENCOUNTER — OFFICE VISIT (OUTPATIENT)
Dept: ORTHOPEDIC SURGERY | Facility: CLINIC | Age: 79
End: 2024-10-22
Payer: MEDICARE

## 2024-10-22 VITALS — BODY MASS INDEX: 30.09 KG/M2 | HEIGHT: 65 IN | TEMPERATURE: 98.4 F | WEIGHT: 180.6 LBS

## 2024-10-22 DIAGNOSIS — M17.0 PRIMARY OSTEOARTHRITIS OF BOTH KNEES: Primary | ICD-10-CM

## 2024-10-22 DIAGNOSIS — R52 PAIN: ICD-10-CM

## 2024-10-22 NOTE — PROGRESS NOTES
[facility]              Follow Up      Patient Name: Kiley Last  : 1945  Primary Care Physician: Miranda Morgan MD        Chief Complaint:  Biltareal knee pain    HPI:   Kiley Last is a 79 y.o. year old who presents today for evaluation.  History of Present Illness  The patient presents for evaluation of knee pain related to arthritis. Patient has been seeing Dr. Vela for a number of years for her knees and also for the R shoulder.    She is experiencing widespread arthritis, which is particularly concerning as she is the primary caregiver for her . Her previous injections provided temporary relief for her knees, but she has not received any injections for her shoulders recently, which are causing her significant discomfort. She has not experienced any falls.    Currently, she is managing her pain with Tylenol 8-hour Arthritis. She is not interested in pursuing pain management at this time. She asked about a pain pill.    ALLERGIES  She is allergic to EPINEPHRINE and PENICILLIN.      Past Medical/Surgical, Social and Family History:  I have reviewed and/or updated pertinent history as noted in the medical record including:  Past Medical History:   Diagnosis Date    Arthritis     Asthma     NO INHALERS    Atrial fibrillation     Clostridium difficile infection 2019    Colon polyps 2019    Transverse colon: tubular adenoma, descending colon: fragments of tubular adenoma, sigmoid colon: ischemic eroded hyperplastic polyp    COPD (chronic obstructive pulmonary disease)     Diverticulitis     Diverticulosis     ESBL (extended spectrum beta-lactamase) producing bacteria infection     GERD (gastroesophageal reflux disease)     History of abscess of skin and subcutaneous tissue     ABD WOUND 2017    History of atrial fibrillation      X1 POST OP FROM COLOSTOMY 2017 - NO PROBLEMS SINCE    Hypertension     Incisional hernia     MDRO (multiple drug resistant organisms)  resistance     Occasional tremors     NEW (obstructive sleep apnea) 05/20/2021    Overnight polysomnogram.  Weight 190 pounds.  Mild NEW with AHI 7 events per hour for total sleep time.  However, during REM moderate NEW with AHI 16.7 events per hour.  No sleep-related hypoxia.  The patient snored 5% of total sleep time.    PONV (postoperative nausea and vomiting)     Psoriasis     UTI (urinary tract infection)     Vertigo      Past Surgical History:   Procedure Laterality Date    BELPHAROPTOSIS REPAIR Bilateral 04/27/2017    Bilateral brow ptosis repair via the anterior hairline approach under monitored local anesthesia-Andrez Craven    BLEPHAROPLASTY Bilateral 04/27/2017    BREAST BIOPSY      CATARACT EXTRACTION      COLON RESECTION N/A 5/3/2017    Procedure: OPEN SIMOID COLECTOMY WITH COLOSTOMY;  Surgeon: Renato Delgado MD;  Location: [facility] MAIN OR;  Service:     COLONOSCOPY N/A 9/13/2017    Procedure: COLONOSCOPY VIA COLOSTOMY TO CECUM;  Surgeon: Renato Delgado MD;  [facility];  Service:     COLONOSCOPY N/A 8/1/2019    Procedure: COLONOSCOPY to cecum and TI with biopsy / hot snare polypectomies;  Surgeon: Dalton Vela Jr., MD;  [facility];  Service: General    COLONOSCOPY N/A 01/04/2010    Andrez Trinidad    COLONOSCOPY N/A 11/3/2022    Procedure: COLONOSCOPY to cecum and TI with biopsy / hot snare polypectomy;  Surgeon: Trini Wade MD;  [facility];  Service: General;  Laterality: N/A;  pre-hx diverticulitis, screen, fam hx colon ca, personal hx polyps  post-polyp, diverticulosis, small hemorrhoids    COLOSTOMY CLOSURE N/A 9/14/2017    Procedure: COLOSTOMY TAKEDOWN AND CLOSURE, WITH RESECTION;  Surgeon: Renato Delgado MD;  Location: [facility] MAIN OR;  Service:     ENDOSCOPY AND COLONOSCOPY N/A 10/31/2014    Normal EGD and colonoscopy, repeat c-scope in 5 years-Andrez Trinidad    EXPLORATORY LAPAROTOMY N/A 9/8/2019     Procedure: Exploratory laparotomy with lysis of adhesions;  Surgeon: Trini Wade MD;  Location: [facility];  Service: General    FINGER SURGERY Left     4TH FINGER LEFT HAND    HYSTERECTOMY Bilateral     LAPAROSCOPIC CHOLECYSTECTOMY W/ CHOLANGIOGRAPHY N/A 07/15/2003    Dr. Amrit Martinez, [facility]    SALPINGO OOPHORECTOMY Bilateral 2006    Abdominal sacral colpopexy, bilateral salpingo-oophorectomy, tension free vaginal tape, cystoscopy-Dr. Devika Bradley, [facility]    THORACENTESIS Right 2017    US-guided right thoracentesis-Dr. Juan Yuen, [facility]    TONSILLECTOMY Bilateral     UPPER GASTROINTESTINAL ENDOSCOPY N/A 10/08/2007    Esophageal mucosal changes suspicious for short-segment Olivera's esphagus, gastric mucosal abnormality characterized by erythema: biopsied, NERD disease present, high likelihood of gastritis-Dr. Mayank Knapp, [facility]    VENTRAL/INCISIONAL HERNIA REPAIR N/A 2023    Procedure: Open incisional hernia repair with mesh;  Surgeon: Trini Wade MD;  Location: [facility];  Service: General;  Laterality: N/A;     Social History     Occupational History    Not on file   Tobacco Use    Smoking status: Former     Current packs/day: 0.00     Types: Cigarettes     Quit date: 1967     Years since quittin.8     Passive exposure: Past    Smokeless tobacco: Never    Tobacco comments:     SOCIAL SMOKING ONLY   Vaping Use    Vaping status: Never Used   Substance and Sexual Activity    Alcohol use: Yes     Comment: RARE    Drug use: No    Sexual activity: Defer          Allergies:   Allergies   Allergen Reactions    Epinephrine Palpitations    Penicillins Other (See Comments)     BLISTERS IN MOUTH    Patient tolerated ceftriaxone during 2017 admission.          Medications:   Home Medications:  Current Outpatient Medications on File Prior to Visit   Medication Sig    Acetaminophen (TYLENOL ARTHRITIS PAIN PO) Take 650 mg by mouth 3 (Three) Times a Day As Needed.    apixaban  (Eliquis) 5 MG tablet tablet Take 1 tablet by mouth Every 12 (Twelve) Hours.    citalopram (CeleXA) 10 MG tablet Take 1 tablet by mouth Daily.    cyanocobalamin 1000 MCG/ML injection INJECT 1ML INTO THE APPROPRIATE MUSCLE AS DIRECTED BY PRESCRIBER EVERY 28 DAYS (Patient taking differently: Inject 1 mL into the appropriate muscle as directed by prescriber Every 28 (Twenty-Eight) Days.)    flecainide (TAMBOCOR) 50 MG tablet TAKE ONE TABLET BY MOUTH EVERY 12 HOURS (Patient taking differently: Take 1 tablet by mouth 2 (Two) Times a Day.)    fluticasone (FLONASE) 50 MCG/ACT nasal spray 2 sprays into the nostril(s) as directed by provider Daily.    losartan (COZAAR) 100 MG tablet TAKE 1 TABLET BY MOUTH DAILY    Magnesium 100 MG capsule Take 1 capsule by mouth Every Night.    meclizine (ANTIVERT) 25 MG tablet TAKE ONE TABLET BY MOUTH THREE TIMES A DAY AS NEEDED FOR DIZZINESS (Patient taking differently: Take 1 tablet by mouth 3 (Three) Times a Day As Needed for Dizziness.)    metoprolol succinate XL (TOPROL-XL) 50 MG 24 hr tablet TAKE ONE TABLET BY MOUTH DAILY (Patient taking differently: Take 1 tablet by mouth Daily.)    pantoprazole (Protonix) 40 MG EC tablet Take 1 tablet by mouth Daily for 30 days.    rosuvastatin (CRESTOR) 20 MG tablet TAKE 1 TABLET BY MOUTH DAILY    Zinc Sulfate (ZINC 15 PO) Take 1 tablet by mouth Every Night.     Current Facility-Administered Medications on File Prior to Visit   Medication    cyanocobalamin injection 1,000 mcg         ROS:  ROS negative except as listed in the HPI.    Physical Exam:   79 y.o. female  Body mass index is 30.05 kg/m²., 81.9 kg (180 lb 9.6 oz)  Vitals:    10/22/24 1547   Temp: 98.4 °F (36.9 °C)     General: Alert, cooperative, appears well and in no observable distress. Appears stated age and BMI as listed above.  HEENT: Normocephalic, atraumatic on external visual inspection.  CV: No significant peripheral edema.  Respiratory: Normal respiratory effort.  Skin: Warm &  well perfused; appropriate skin turgor.  Psych: Appropriate mood & affect.  Neuro: Gross sensation and motor intact in affected extremity/extremities.  Vascular: Peripheral pulses palpable in affected extremity/extremities.   Physical Exam      Radiology:    No new images were needed at the visit today.   Results      Procedure:   See Procedure Note: The potential risks and benefits of performing a diagnostic and therapeutic injection were discussed with the patient prior to procedure. Risks include, but are not limited to infection, swelling, transient increase in pain, bleeding, bruising. Patient was advised that injections are a diagnostic and therapeutic tool meaning they may not alleviate symptoms at all, or may only provide partial or temporary relief. Injection precautions and aftercare discussed. and Patient is currently on anticoagulation and/or a blood thinning agent which poses an increased risk of more significant bleeding or bruising following an injection. While joint injections are generally well tolerated even on AC, the patient was counseled on this increased risk and advised to monitor for signs of bleeding with proper follow up instructions. Injection precautions and aftercare discussed.      [facility] [place]Labs: N/A    Assessment & Plan:  Assessment & Plan  1. Arthritis.  She reports widespread arthritis, particularly affecting her knees, and shoulders. She has not had shoulder injections for a while, and they are worsening. A cortisone injection was administered today for both knees. She was advised to schedule an appointment in 2 weeks for shoulder injections. She currently takes Tylenol 8-hour arthritis for pain relief. Given her current regimen of blood thinners, there is an increased risk of bleeding. She was advised to consult Dr. Morgan regarding pain medication options.    Follow-up  Return in 2 weeks for shoulder injections.      ICD-10-CM ICD-9-CM   1. Primary osteoarthritis of both knees   M17.0 715.16   2. Pain  R52 780.96     No orders of the defined types were placed in this encounter.    No orders of the defined types were placed in this encounter.      Continue with activity modifications as needed/discussed.  Continue ICE and/or HEAT PRN.  Recommend to continue activity as tolerated, focus on quad and hip/core strengthening as well as gentle stretching.  Recommend lowering or maintaining BMI within a healthy range to reduce symptoms.   Patient encouraged to call with questions or concerns prior to follow up.  Will discuss with attending as needed.   Consider additional referrals, work up and/or advanced imaging as indicated or if patient fails to respond to conservative care.    Return if symptoms worsen or fail to improve.    Patient or patient representative verbalized consent for the use of Ambient Listening during the visit with  SARITHA Grant for chart documentation. 10/22/2024  16:11 EDT        SARITHA Leal    Dictation software was used to complete a portion or all of this note.

## 2024-10-29 ENCOUNTER — OFFICE VISIT (OUTPATIENT)
Dept: CARDIOLOGY | Facility: CLINIC | Age: 79
End: 2024-10-29
Payer: MEDICARE

## 2024-10-29 VITALS
BODY MASS INDEX: 28.99 KG/M2 | DIASTOLIC BLOOD PRESSURE: 75 MMHG | WEIGHT: 174 LBS | SYSTOLIC BLOOD PRESSURE: 120 MMHG | HEIGHT: 65 IN | HEART RATE: 56 BPM

## 2024-10-29 DIAGNOSIS — I10 ESSENTIAL HYPERTENSION: ICD-10-CM

## 2024-10-29 DIAGNOSIS — R07.9 CHEST PAIN, UNSPECIFIED TYPE: ICD-10-CM

## 2024-10-29 DIAGNOSIS — I48.0 PAROXYSMAL ATRIAL FIBRILLATION: Primary | ICD-10-CM

## 2024-10-29 PROCEDURE — 3078F DIAST BP <80 MM HG: CPT | Performed by: NURSE PRACTITIONER

## 2024-10-29 PROCEDURE — 1160F RVW MEDS BY RX/DR IN RCRD: CPT | Performed by: NURSE PRACTITIONER

## 2024-10-29 PROCEDURE — 3074F SYST BP LT 130 MM HG: CPT | Performed by: NURSE PRACTITIONER

## 2024-10-29 PROCEDURE — 1159F MED LIST DOCD IN RCRD: CPT | Performed by: NURSE PRACTITIONER

## 2024-10-29 PROCEDURE — 93000 ELECTROCARDIOGRAM COMPLETE: CPT | Performed by: NURSE PRACTITIONER

## 2024-10-29 PROCEDURE — 99214 OFFICE O/P EST MOD 30 MIN: CPT | Performed by: NURSE PRACTITIONER

## 2024-10-29 NOTE — PROGRESS NOTES
Date of Office Visit: 10/29/2024  Encounter Provider: SARITHA Kat  Place of Service: [facility] CARDIOLOGY  Patient Name: Kiley Last  :1945    Chief Complaint   Patient presents with    Atrial Fibrillation   :     HPI: Kiley Last is a 79 y.o. female patient of Dr. Kearns's with paroxysmal atrial fibrillation which we have managed with flecainide.    She has a longstanding history of fatigue which we have previously attributed to untreated sleep apnea.    I saw her in the office in March for progressive shortness of breath both at rest and with exertion as well as increased palpitations.  I ordered a ZIO and an echocardiogram.  The echocardiogram demonstrated normal LV function and no significant valvular abnormalities.  The ZIO demonstrated normal sinus rhythm with 26 episodes of SVT, the longest lasting 5 beats.    On 10/8, she presented to the ED with chest pain and associated diaphoresis.  She was seen in consultation by Dr. Denson.  He felt like her symptoms were fairly atypical.  However, in light of her ongoing symptoms, age, and risk factors, a stress test was recommended.  This demonstrated no evidence of ischemia.  She was discharged with a PPI and recommendations to follow-up with primary care doctor to discuss a possible GI referral.    She has been doing well.  She denies any further chest pain.  She denies any shortness of breath, edema, dizziness, syncope, bleeding difficulties or melena.  She has occasional palpitations which occur mostly at nighttime and lasts for only minutes at a time.    Past Medical History:   Diagnosis Date    Arthritis     Asthma     NO INHALERS    Atrial fibrillation     Clostridium difficile infection 2019    Colon polyps 2019    Transverse colon: tubular adenoma, descending colon: fragments of tubular adenoma, sigmoid colon: ischemic eroded hyperplastic polyp    COPD (chronic obstructive pulmonary  disease)     Diverticulitis     Diverticulosis     ESBL (extended spectrum beta-lactamase) producing bacteria infection     GERD (gastroesophageal reflux disease)     History of abscess of skin and subcutaneous tissue     ABD WOUND 5/2017    History of atrial fibrillation      X1 POST OP FROM COLOSTOMY 5/2017 - NO PROBLEMS SINCE    Hypertension     Incisional hernia     MDRO (multiple drug resistant organisms) resistance     Occasional tremors     NEW (obstructive sleep apnea) 05/20/2021    Overnight polysomnogram.  Weight 190 pounds.  Mild NEW with AHI 7 events per hour for total sleep time.  However, during REM moderate NEW with AHI 16.7 events per hour.  No sleep-related hypoxia.  The patient snored 5% of total sleep time.    PONV (postoperative nausea and vomiting)     Psoriasis     UTI (urinary tract infection)     Vertigo        Past Surgical History:   Procedure Laterality Date    BELPHAROPTOSIS REPAIR Bilateral 04/27/2017    Bilateral brow ptosis repair via the anterior hairline approach under monitored local anesthesia-Andrez Craven    BLEPHAROPLASTY Bilateral 04/27/2017    BREAST BIOPSY      CATARACT EXTRACTION      COLON RESECTION N/A 5/3/2017    Procedure: OPEN SIMOID COLECTOMY WITH COLOSTOMY;  Surgeon: Renato Delgado MD;  Location: [facility] MAIN OR;  Service:     COLONOSCOPY N/A 9/13/2017    Procedure: COLONOSCOPY VIA COLOSTOMY TO CECUM;  Surgeon: Renato Delgado MD;  Location: [facility] ENDOSCOPY;  Service:     COLONOSCOPY N/A 8/1/2019    Procedure: COLONOSCOPY to cecum and TI with biopsy / hot snare polypectomies;  Surgeon: Dalton Vela Jr., MD;  Location: [facility] ENDOSCOPY;  Service: General    COLONOSCOPY N/A 01/04/2010    Andrez Trinidad    COLONOSCOPY N/A 11/3/2022    Procedure: COLONOSCOPY to cecum and TI with biopsy / hot snare polypectomy;  Surgeon: Trini Wade MD;  Location: [facility] ENDOSCOPY;  Service: General;  Laterality: N/A;  pre-hx diverticulitis, screen,  fam hx colon ca, personal hx polyps  post-polyp, diverticulosis, small hemorrhoids    COLOSTOMY CLOSURE N/A 2017    Procedure: COLOSTOMY TAKEDOWN AND CLOSURE, WITH RESECTION;  Surgeon: Renato Delgado MD;  Location: [facility];  Service:     ENDOSCOPY AND COLONOSCOPY N/A 10/31/2014    Normal EGD and colonoscopy, repeat c-scope in 5 years-Dr. Sivakumar Tolentino [place]    EXPLORATORY LAPAROTOMY N/A 2019    Procedure: Exploratory laparotomy with lysis of adhesions;  Surgeon: Trini Wade MD;  Location: [facility];  Service: General    FINGER SURGERY Left     4TH FINGER LEFT HAND    HYSTERECTOMY Bilateral     LAPAROSCOPIC CHOLECYSTECTOMY W/ CHOLANGIOGRAPHY N/A 07/15/2003    Dr. Amrit Martinez, [facility]    SALPINGO OOPHORECTOMY Bilateral 2006    Abdominal sacral colpopexy, bilateral salpingo-oophorectomy, tension free vaginal tape, cystoscopy-Dr. Devika Bradley, [facility]    THORACENTESIS Right 2017    US-guided right thoracentesis-Dr. Juan Yuen, [facility]    TONSILLECTOMY Bilateral     UPPER GASTROINTESTINAL ENDOSCOPY N/A 10/08/2007    Esophageal mucosal changes suspicious for short-segment Olivera's esphagus, gastric mucosal abnormality characterized by erythema: biopsied, NERD disease present, high likelihood of gastritis-Dr. Mayank Knapp, [facility]    VENTRAL/INCISIONAL HERNIA REPAIR N/A 2023    Procedure: Open incisional hernia repair with mesh;  Surgeon: Trini Wade MD;  Location: [facility];  Service: General;  Laterality: N/A;       Social History     Socioeconomic History    Marital status:    Tobacco Use    Smoking status: Former     Current packs/day: 0.00     Types: Cigarettes     Quit date:      Years since quittin.8     Passive exposure: Past    Smokeless tobacco: Never    Tobacco comments:     SOCIAL SMOKING ONLY   Vaping Use    Vaping status: Never Used   Substance and Sexual Activity    Alcohol use: Yes     Comment: RARE    Drug use: No    Sexual activity:  Defer       Family History   Problem Relation Age of Onset    Cancer Mother     Colon cancer Mother     Diabetes Father     Diabetes Son     Ulcerative colitis Son     No Known Problems Maternal Grandmother     No Known Problems Maternal Grandfather     No Known Problems Paternal Grandmother     No Known Problems Paternal Grandfather     Malig Hyperthermia Neg Hx     Breast cancer Neg Hx        Review of Systems   Constitutional: Negative.   Cardiovascular:  Positive for palpitations. Negative for chest pain, dyspnea on exertion, leg swelling, orthopnea, paroxysmal nocturnal dyspnea and syncope.   Respiratory: Negative.     Hematologic/Lymphatic: Negative for bleeding problem.   Musculoskeletal:  Negative for falls.   Gastrointestinal:  Negative for melena.   Neurological:  Negative for dizziness and light-headedness.       Allergies   Allergen Reactions    Epinephrine Palpitations    Penicillins Other (See Comments)     BLISTERS IN MOUTH    Patient tolerated ceftriaxone during 5/2017 admission.            Current Outpatient Medications:     Acetaminophen (TYLENOL ARTHRITIS PAIN PO), Take 650 mg by mouth 3 (Three) Times a Day As Needed., Disp: , Rfl:     apixaban (Eliquis) 5 MG tablet tablet, Take 1 tablet by mouth Every 12 (Twelve) Hours., Disp: 28 tablet, Rfl: 0    citalopram (CeleXA) 10 MG tablet, Take 1 tablet by mouth Daily., Disp: 90 tablet, Rfl: 1    cyanocobalamin 1000 MCG/ML injection, INJECT 1ML INTO THE APPROPRIATE MUSCLE AS DIRECTED BY PRESCRIBER EVERY 28 DAYS (Patient taking differently: Inject 1 mL into the appropriate muscle as directed by prescriber Every 28 (Twenty-Eight) Days.), Disp: 3 mL, Rfl: 3    flecainide (TAMBOCOR) 50 MG tablet, TAKE ONE TABLET BY MOUTH EVERY 12 HOURS (Patient taking differently: Take 1 tablet by mouth 2 (Two) Times a Day.), Disp: 90 tablet, Rfl: 3    fluticasone (FLONASE) 50 MCG/ACT nasal spray, 2 sprays into the nostril(s) as directed by provider Daily., Disp: 18.2 mL,  "Rfl: 1    losartan (COZAAR) 100 MG tablet, TAKE 1 TABLET BY MOUTH DAILY, Disp: 90 tablet, Rfl: 2    Magnesium 100 MG capsule, Take 1 capsule by mouth Every Night., Disp: , Rfl:     meclizine (ANTIVERT) 25 MG tablet, TAKE ONE TABLET BY MOUTH THREE TIMES A DAY AS NEEDED FOR DIZZINESS (Patient taking differently: Take 1 tablet by mouth 3 (Three) Times a Day As Needed for Dizziness.), Disp: 90 tablet, Rfl: 0    metoprolol succinate XL (TOPROL-XL) 50 MG 24 hr tablet, TAKE ONE TABLET BY MOUTH DAILY (Patient taking differently: Take 1 tablet by mouth Daily.), Disp: 30 tablet, Rfl: 11    pantoprazole (Protonix) 40 MG EC tablet, Take 1 tablet by mouth Daily for 30 days., Disp: 30 tablet, Rfl: 0    rosuvastatin (CRESTOR) 20 MG tablet, TAKE 1 TABLET BY MOUTH DAILY, Disp: 90 tablet, Rfl: 0    Zinc Sulfate (ZINC 15 PO), Take 1 tablet by mouth Every Night., Disp: , Rfl:     Current Facility-Administered Medications:     cyanocobalamin injection 1,000 mcg, 1,000 mcg, Intramuscular, Q28 Days, Miranda Morgan MD, 1,000 mcg at 08/23/23 1431      Objective:     Vitals:    10/29/24 1425   BP: 120/75   Pulse: 56   Weight: 78.9 kg (174 lb)   Height: 165.1 cm (65\")     Body mass index is 28.96 kg/m².    PHYSICAL EXAM:    Neck:      Vascular: No JVD.   Pulmonary:      Effort: Pulmonary effort is normal.      Breath sounds: Normal breath sounds.   Cardiovascular:      Normal rate. Regular rhythm.      Murmurs: There is no murmur.      No gallop.  No click. No rub.   Pulses:     Intact distal pulses.           ECG 12 Lead    Date/Time: 10/29/2024 2:38 PM  Performed by: Yaneli Sierra APRN    Authorized by: Yaneli Sierra APRN  Comparison: compared with previous ECG from 10/8/2024  Similar to previous ECG  Rhythm: sinus rhythm  Rate: normal  BPM: 56            Assessment:       Diagnosis Plan   1. Paroxysmal atrial fibrillation  ECG 12 Lead      2. Chest pain, unspecified type        3. Essential hypertension          Orders " Placed This Encounter   Procedures    ECG 12 Lead     This order was created via procedure documentation     Order Specific Question:   Release to patient     Answer:   Routine Release [[id number]]          Plan:       1.  Paroxysmal atrial fibrillation.  She is maintaining sinus rhythm with flecainide.  She is rate controlled with metoprolol and anticoagulated with Eliquis.      2.  Chest pain.  She denies any further episodes since starting pantoprazole.  The stress test was normal.  I recommended she follow-up with either her PCP or GI.      3.  Hypertension.  Her blood pressure is stable.  Continue current regimen including losartan and Toprol.      I think she is doing well.  I am not making any changes, and she will follow-up with Dr. Kearns in 6 months.      As always, it has been a pleasure to participate in your patient's care.      Sincerely,         SARITHA Falk

## 2024-11-05 ENCOUNTER — OFFICE VISIT (OUTPATIENT)
Dept: ORTHOPEDIC SURGERY | Facility: CLINIC | Age: 79
End: 2024-11-05
Payer: MEDICARE

## 2024-11-05 VITALS — TEMPERATURE: 96.9 F | BODY MASS INDEX: 29.12 KG/M2 | WEIGHT: 174.8 LBS | HEIGHT: 65 IN

## 2024-11-05 DIAGNOSIS — M75.41 IMPINGEMENT SYNDROME OF RIGHT SHOULDER: ICD-10-CM

## 2024-11-05 DIAGNOSIS — M75.42 IMPINGEMENT SYNDROME OF LEFT SHOULDER: ICD-10-CM

## 2024-11-05 DIAGNOSIS — M25.511 CHRONIC PAIN OF BOTH SHOULDERS: Primary | ICD-10-CM

## 2024-11-05 DIAGNOSIS — M25.512 CHRONIC PAIN OF BOTH SHOULDERS: Primary | ICD-10-CM

## 2024-11-05 DIAGNOSIS — G89.29 CHRONIC PAIN OF BOTH SHOULDERS: Primary | ICD-10-CM

## 2024-11-05 RX ADMIN — LIDOCAINE HYDROCHLORIDE 2 ML: 10 INJECTION, SOLUTION EPIDURAL; INFILTRATION; INTRACAUDAL; PERINEURAL at 11:30

## 2024-11-05 RX ADMIN — METHYLPREDNISOLONE ACETATE 80 MG: 80 INJECTION, SUSPENSION INTRA-ARTICULAR; INTRALESIONAL; INTRAMUSCULAR; SOFT TISSUE at 11:30

## 2024-11-05 NOTE — PROGRESS NOTES
[facility]              Follow Up      Patient Name: Kiley Last  : 1945  Primary Care Physician: Miranda Morgan MD        Chief Complaint:  Bilateral shoulder pain    HPI:   Kiley Last is a 79 y.o. year old who presents today for evaluation.  She has seen Dr. Vela in the past I also saw her about a week ago for her knees.  She has suspected rotator cuff pathology and has intermittently got some injections of cortisone with good relief in her symptoms.  She is not interested in anything surgical and is requesting injections again today.  History of Present Illness         Past Medical/Surgical, Social and Family History:  I have reviewed and/or updated pertinent history as noted in the medical record including:  Past Medical History:   Diagnosis Date    Arthritis     Asthma     NO INHALERS    Atrial fibrillation     Clostridium difficile infection 2019    Colon polyps 2019    Transverse colon: tubular adenoma, descending colon: fragments of tubular adenoma, sigmoid colon: ischemic eroded hyperplastic polyp    COPD (chronic obstructive pulmonary disease)     Diverticulitis     Diverticulosis     ESBL (extended spectrum beta-lactamase) producing bacteria infection     GERD (gastroesophageal reflux disease)     History of abscess of skin and subcutaneous tissue     ABD WOUND 2017    History of atrial fibrillation      X1 POST OP FROM COLOSTOMY 2017 - NO PROBLEMS SINCE    Hypertension     Incisional hernia     MDRO (multiple drug resistant organisms) resistance     Occasional tremors     NEW (obstructive sleep apnea) 2021    Overnight polysomnogram.  Weight 190 pounds.  Mild NEW with AHI 7 events per hour for total sleep time.  However, during REM moderate NEW with AHI 16.7 events per hour.  No sleep-related hypoxia.  The patient snored 5% of total sleep time.    PONV (postoperative nausea and vomiting)     Psoriasis     UTI (urinary tract infection)     Vertigo       Past Surgical History:   Procedure Laterality Date    BELPHAROPTOSIS REPAIR Bilateral 04/27/2017    Bilateral brow ptosis repair via the anterior hairline approach under monitored local anesthesia-Andrez Craven    BLEPHAROPLASTY Bilateral 04/27/2017    BREAST BIOPSY      CATARACT EXTRACTION      COLON RESECTION N/A 5/3/2017    Procedure: OPEN SIMOID COLECTOMY WITH COLOSTOMY;  Surgeon: Renato Delgado MD;  [facility] MA[facility];  Service:     COLONOSCOPY N/A 9/13/2017    Procedure: COLONOSCOPY VIA COLOSTOMY TO CECUM;  Surgeon: Renato Delgado MD;  Location[facility] ENDOSCOPY;  Service:     COLONOSCOPY N/A 8/1/2019    Procedure: COLONOSCOPY to cecum and TI with biopsy / hot snare polypectomies;  Surgeon: Dalton Vela Jr., MD;  Location[facility] ENDOSCOPY;  Service: General    COLONOSCOPY N/A 01/04/2010    Andrez Trinidad    COLONOSCOPY N/A 11/3/2022    Procedure: COLONOSCOPY to cecum and TI with biopsy / hot snare polypectomy;  Surgeon: Trini Wade MD;  [facility] END[facility];  Service: General;  Laterality: N/A;  pre-hx diverticulitis, screen, fam hx colon ca, personal hx polyps  post-polyp, diverticulosis, small hemorrhoids    COLOSTOMY CLOSURE N/A 9/14/2017    Procedure: COLOSTOMY TAKEDOWN AND CLOSURE, WITH RESECTION;  Surgeon: Renato Delgado MD;  Location: [facility] MAIN OR;  Service:     ENDOSCOPY AND COLONOSCOPY N/A 10/31/2014    Normal EGD and colonoscopy, repeat c-scope in 5 years-Andrez Trinidad    EXPLORATORY LAPAROTOMY N/A 9/8/2019    Procedure: Exploratory laparotomy with lysis of adhesions;  Surgeon: Trini Wade MD;  Location: [facility] MAIN OR;  Service: General    FINGER SURGERY Left     4TH FINGER LEFT HAND    HYSTERECTOMY Bilateral     LAPAROSCOPIC CHOLECYSTECTOMY W/ CHOLANGIOGRAPHY N/A 07/15/2003    Dr. Amrit Martinez, [facility]    SALPINGO OOPHORECTOMY Bilateral 02/02/2006    Abdominal sacral colpopexy, bilateral salpingo-oophorectomy,  tension free vaginal tape, cystoscopy-Dr. Devika Bradley, [facility]    THORACENTESIS Right 2017    US-guided right thoracentesis-Dr. Juan Yuen, [facility]    TONSILLECTOMY Bilateral     UPPER GASTROINTESTINAL ENDOSCOPY N/A 10/08/2007    Esophageal mucosal changes suspicious for short-segment Olivera's esphagus, gastric mucosal abnormality characterized by erythema: biopsied, NERD disease present, high likelihood of gastritis-Dr. Mayank Knapp, [facility]    VENTRAL/INCISIONAL HERNIA REPAIR N/A 2023    Procedure: Open incisional hernia repair with mesh;  Surgeon: Trini Wade MD;  Location: [facility] MAIN OR;  Service: General;  Laterality: N/A;     Social History     Occupational History    Not on file   Tobacco Use    Smoking status: Former     Current packs/day: 0.00     Types: Cigarettes     Quit date:      Years since quittin.8     Passive exposure: Past    Smokeless tobacco: Never    Tobacco comments:     SOCIAL SMOKING ONLY   Vaping Use    Vaping status: Never Used   Substance and Sexual Activity    Alcohol use: Yes     Comment: RARE    Drug use: No    Sexual activity: Defer          Allergies:   Allergies   Allergen Reactions    Epinephrine Palpitations    Penicillins Other (See Comments)     BLISTERS IN MOUTH    Patient tolerated ceftriaxone during 2017 admission.          Medications:   Home Medications:  Current Outpatient Medications on File Prior to Visit   Medication Sig    Acetaminophen (TYLENOL ARTHRITIS PAIN PO) Take 650 mg by mouth 3 (Three) Times a Day As Needed.    apixaban (Eliquis) 5 MG tablet tablet Take 1 tablet by mouth Every 12 (Twelve) Hours.    citalopram (CeleXA) 10 MG tablet Take 1 tablet by mouth Daily.    cyanocobalamin 1000 MCG/ML injection INJECT 1ML INTO THE APPROPRIATE MUSCLE AS DIRECTED BY PRESCRIBER EVERY 28 DAYS (Patient taking differently: Inject 1 mL into the appropriate muscle as directed by prescriber Every 28 (Twenty-Eight) Days.)    flecainide (TAMBOCOR) 50 MG  tablet TAKE ONE TABLET BY MOUTH EVERY 12 HOURS (Patient taking differently: Take 1 tablet by mouth 2 (Two) Times a Day.)    fluticasone (FLONASE) 50 MCG/ACT nasal spray 2 sprays into the nostril(s) as directed by provider Daily.    losartan (COZAAR) 100 MG tablet TAKE 1 TABLET BY MOUTH DAILY    Magnesium 100 MG capsule Take 1 capsule by mouth Every Night.    meclizine (ANTIVERT) 25 MG tablet TAKE ONE TABLET BY MOUTH THREE TIMES A DAY AS NEEDED FOR DIZZINESS (Patient taking differently: Take 1 tablet by mouth 3 (Three) Times a Day As Needed for Dizziness.)    metoprolol succinate XL (TOPROL-XL) 50 MG 24 hr tablet TAKE ONE TABLET BY MOUTH DAILY (Patient taking differently: Take 1 tablet by mouth Daily.)    rosuvastatin (CRESTOR) 20 MG tablet TAKE 1 TABLET BY MOUTH DAILY    Zinc Sulfate (ZINC 15 PO) Take 1 tablet by mouth Every Night.    pantoprazole (Protonix) 40 MG EC tablet Take 1 tablet by mouth Daily for 30 days. (Patient not taking: Reported on 11/5/2024)     Current Facility-Administered Medications on File Prior to Visit   Medication    cyanocobalamin injection 1,000 mcg         ROS:  ROS negative except as listed in the HPI.    Physical Exam:   79 y.o. female  Body mass index is 29.09 kg/m²., 79.3 kg (174 lb 12.8 oz)  Vitals:    11/05/24 1130   Temp: 96.9 °F (36.1 °C)     General: Alert, cooperative, appears well and in no observable distress. Appears stated age and BMI as listed above.  HEENT: Normocephalic, atraumatic on external visual inspection.  CV: No significant peripheral edema.  Respiratory: Normal respiratory effort.  Skin: Warm & well perfused; appropriate skin turgor.  Psych: Appropriate mood & affect.  Neuro: Gross sensation and motor intact in affected extremity/extremities.  Vascular: Peripheral pulses palpable in affected extremity/extremities.   Physical Exam          No new images were needed at the visit today.   Results      Procedure:   See Procedure Note: The potential risks and benefits  of performing a diagnostic and therapeutic injection were discussed with the patient prior to procedure. Risks include, but are not limited to infection, swelling, transient increase in pain, bleeding, bruising. Patient was advised that injections are a diagnostic and therapeutic tool meaning they may not alleviate symptoms at all, or may only provide partial or temporary relief. Injection precautions and aftercare discussed.      [facility] Data/Labs: N/A    Assessment & Plan:        ICD-10-CM ICD-9-CM   1. Chronic pain of both shoulders  M25.511 719.41    G89.29 338.29    M25.512    2. Impingement syndrome of left shoulder  M75.42 726.2   3. Impingement syndrome of right shoulder  M75.41 726.2     No orders of the defined types were placed in this encounter.    Orders Placed This Encounter   Procedures    Large Joint Arthrocentesis: R subacromial bursa    Large Joint Arthrocentesis: L subacromial bursa       Assessment & Plan      Continue with activity modifications as needed/discussed.  Continue ICE and/or HEAT PRN.  Recommend to continue activity as tolerated, focus on stretching and strengthening of the joint.    Focus on posture and body mechanics to improve/prevent symptoms.   Patient encouraged to call with questions or concerns prior to follow up.  Will discuss with attending as needed.  Consider additional referrals, work up and/or advanced imaging as indicated or if patient fails to respond to conservative care.    Return if symptoms worsen or fail to improve.      SARITHA Leal    Dictation software was used to complete a portion or all of this note.    Large Joint Arthrocentesis: R subacromial bursa  Date/Time: 11/5/2024 11:30 AM  Consent given by: patient  Site marked: site marked  Timeout: Immediately prior to procedure a time out was called to verify the correct patient, procedure, equipment, support staff and site/side marked as required   Supporting Documentation  Indications: pain    Procedure Details  Location: shoulder - R subacromial bursa  Preparation: Patient was prepped and draped in the usual sterile fashion  Needle gauge: 21G.  Approach: posterior  Medications administered: 80 mg methylPREDNISolone acetate 80 MG/ML; 2 mL lidocaine PF 1% 1 %  Patient tolerance: patient tolerated the procedure well with no immediate complications      Large Joint Arthrocentesis: L subacromial bursa  Date/Time: 11/5/2024 11:30 AM  Consent given by: patient  Site marked: site marked  Timeout: Immediately prior to procedure a time out was called to verify the correct patient, procedure, equipment, support staff and site/side marked as required   Supporting Documentation  Indications: pain   Procedure Details  Location: shoulder - L subacromial bursa  Preparation: Patient was prepped and draped in the usual sterile fashion  Needle gauge: 21G.  Approach: posterior  Medications administered: 80 mg methylPREDNISolone acetate 80 MG/ML; 2 mL lidocaine PF 1% 1 %  Patient tolerance: patient tolerated the procedure well with no immediate complications

## 2024-11-07 RX ORDER — LIDOCAINE HYDROCHLORIDE 10 MG/ML
2 INJECTION, SOLUTION EPIDURAL; INFILTRATION; INTRACAUDAL; PERINEURAL
Status: COMPLETED | OUTPATIENT
Start: 2024-11-05 | End: 2024-11-05

## 2024-11-07 RX ORDER — METHYLPREDNISOLONE ACETATE 80 MG/ML
80 INJECTION, SUSPENSION INTRA-ARTICULAR; INTRALESIONAL; INTRAMUSCULAR; SOFT TISSUE
Status: COMPLETED | OUTPATIENT
Start: 2024-11-05 | End: 2024-11-05

## 2024-11-16 DIAGNOSIS — E53.8 B12 DEFICIENCY: ICD-10-CM

## 2024-11-18 RX ORDER — NEEDLES, SAFETY 22GX1 1/2"
NEEDLE, DISPOSABLE MISCELLANEOUS
Qty: 3 EACH | Refills: 1 | Status: SHIPPED | OUTPATIENT
Start: 2024-11-18

## 2024-11-27 RX ORDER — ROSUVASTATIN CALCIUM 20 MG/1
20 TABLET, COATED ORAL DAILY
Qty: 90 TABLET | Refills: 0 | Status: SHIPPED | OUTPATIENT
Start: 2024-11-27

## 2024-12-04 ENCOUNTER — OFFICE VISIT (OUTPATIENT)
Dept: INTERNAL MEDICINE | Facility: CLINIC | Age: 79
End: 2024-12-04
Payer: MEDICARE

## 2024-12-04 VITALS
SYSTOLIC BLOOD PRESSURE: 122 MMHG | HEIGHT: 65 IN | TEMPERATURE: 98.1 F | WEIGHT: 175 LBS | BODY MASS INDEX: 29.16 KG/M2 | DIASTOLIC BLOOD PRESSURE: 76 MMHG

## 2024-12-04 DIAGNOSIS — T14.8XXA MUSCLE STRAIN: Primary | ICD-10-CM

## 2024-12-04 DIAGNOSIS — N30.01 ACUTE CYSTITIS WITH HEMATURIA: ICD-10-CM

## 2024-12-04 LAB
BILIRUB BLD-MCNC: NEGATIVE MG/DL
CLARITY, POC: ABNORMAL
COLOR UR: ABNORMAL
EXPIRATION DATE: ABNORMAL
GLUCOSE UR STRIP-MCNC: NEGATIVE MG/DL
KETONES UR QL: NEGATIVE
LEUKOCYTE EST, POC: ABNORMAL
Lab: ABNORMAL
NITRITE UR-MCNC: POSITIVE MG/ML
PH UR: 6 [PH] (ref 5–8)
PROT UR STRIP-MCNC: NEGATIVE MG/DL
RBC # UR STRIP: ABNORMAL /UL
SP GR UR: 1.02 (ref 1–1.03)
UROBILINOGEN UR QL: NORMAL

## 2024-12-04 RX ORDER — CYCLOBENZAPRINE HCL 5 MG
5 TABLET ORAL 3 TIMES DAILY PRN
Qty: 30 TABLET | Refills: 0 | Status: SHIPPED | OUTPATIENT
Start: 2024-12-04 | End: 2024-12-14

## 2024-12-04 RX ORDER — NITROFURANTOIN 25; 75 MG/1; MG/1
100 CAPSULE ORAL 2 TIMES DAILY
Qty: 14 CAPSULE | Refills: 0 | Status: SHIPPED | OUTPATIENT
Start: 2024-12-04 | End: 2024-12-11

## 2024-12-04 NOTE — PROGRESS NOTES
Subjective   Chief Complaint   Patient presents with    Urinary Urgency     Feels like she has to go then can't go    Back Pain     Mid-Right side for a few years       History of Present Illness     She is primary caregiver for her , has been having chronic right sided back pain that is not improving. Started worsening in the last few weeks. She does lift and pull on her  some, has cut back. She thinks she may have a UTI. Will have urgency and then very little urine production. No nausea or vomiting recently. Urinary symptoms started about a week ago. Not having any discomfort with urinating.      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          Current Outpatient Medications on File Prior to Visit   Medication Sig Dispense Refill    Acetaminophen (TYLENOL ARTHRITIS PAIN PO) Take 650 mg by mouth 3 (Three) Times a Day As Needed.      citalopram (CeleXA) 10 MG tablet Take 1 tablet by mouth Daily. 90 tablet 1    cyanocobalamin 1000 MCG/ML injection INJECT 1ML INTO THE APPROPRIATE MUSCLE AS DIRECTED BY PRESCRIBER EVERY 28 DAYS (Patient taking differently: Inject 1 mL into the appropriate muscle as directed by prescriber Every 28 (Twenty-Eight) Days.) 3 mL 3    flecainide (TAMBOCOR) 50 MG tablet TAKE ONE TABLET BY MOUTH EVERY 12 HOURS (Patient taking differently: Take 1  "tablet by mouth 2 (Two) Times a Day.) 90 tablet 3    fluticasone (FLONASE) 50 MCG/ACT nasal spray 2 sprays into the nostril(s) as directed by provider Daily. 18.2 mL 1    losartan (COZAAR) 100 MG tablet TAKE 1 TABLET BY MOUTH DAILY 90 tablet 2    Magnesium 100 MG capsule Take 1 capsule by mouth Every Night.      meclizine (ANTIVERT) 25 MG tablet TAKE ONE TABLET BY MOUTH THREE TIMES A DAY AS NEEDED FOR DIZZINESS (Patient taking differently: Take 1 tablet by mouth 3 (Three) Times a Day As Needed for Dizziness.) 90 tablet 0    metoprolol succinate XL (TOPROL-XL) 50 MG 24 hr tablet TAKE ONE TABLET BY MOUTH DAILY (Patient taking differently: Take 1 tablet by mouth Daily.) 30 tablet 11    rosuvastatin (CRESTOR) 20 MG tablet TAKE 1 TABLET BY MOUTH DAILY 90 tablet 0    Syringe/Needle, Disp, (B-D SYRINGE/NEEDLE 1CC/25GX5/8) 25G X 5/8\" 1 ML misc USE INTRAMUSCULARLY ONCE MONTHLY WITH VITAMIN B12 3 each 1    Zinc Sulfate (ZINC 15 PO) Take 1 tablet by mouth Every Night.      [DISCONTINUED] apixaban (Eliquis) 5 MG tablet tablet Take 1 tablet by mouth Every 12 (Twelve) Hours. 28 tablet 0     Current Facility-Administered Medications on File Prior to Visit   Medication Dose Route Frequency Provider Last Rate Last Admin    cyanocobalamin injection 1,000 mcg  1,000 mcg Intramuscular Q28 Days Miranda Morgan MD   1,000 mcg at 08/23/23 1431       Past Medical History:   Diagnosis Date    Arthritis     Asthma     NO INHALERS    Atrial fibrillation     Clostridium difficile infection 09/20/2019    Colon polyps 08/01/2019    Transverse colon: tubular adenoma, descending colon: fragments of tubular adenoma, sigmoid colon: ischemic eroded hyperplastic polyp    COPD (chronic obstructive pulmonary disease)     Diverticulitis     Diverticulosis     ESBL (extended spectrum beta-lactamase) producing bacteria infection     GERD (gastroesophageal reflux disease)     History of abscess of skin and subcutaneous tissue     ABD WOUND 5/2017    History " of atrial fibrillation      X1 POST OP FROM COLOSTOMY 2017 - NO PROBLEMS SINCE    Hypertension     Incisional hernia     MDRO (multiple drug resistant organisms) resistance     Occasional tremors     NEW (obstructive sleep apnea) 2021    Overnight polysomnogram.  Weight 190 pounds.  Mild NEW with AHI 7 events per hour for total sleep time.  However, during REM moderate NEW with AHI 16.7 events per hour.  No sleep-related hypoxia.  The patient snored 5% of total sleep time.    PONV (postoperative nausea and vomiting)     Psoriasis     UTI (urinary tract infection)     Vertigo        Family History   Problem Relation Age of Onset    Cancer Mother     Colon cancer Mother     Diabetes Father     Diabetes Son     Ulcerative colitis Son     No Known Problems Maternal Grandmother     No Known Problems Maternal Grandfather     No Known Problems Paternal Grandmother     No Known Problems Paternal Grandfather     Malig Hyperthermia Neg Hx     Breast cancer Neg Hx        Social History     Socioeconomic History    Marital status:    Tobacco Use    Smoking status: Former     Current packs/day: 0.00     Types: Cigarettes     Quit date:      Years since quittin.9     Passive exposure: Past    Smokeless tobacco: Never    Tobacco comments:     SOCIAL SMOKING ONLY   Vaping Use    Vaping status: Never Used   Substance and Sexual Activity    Alcohol use: Yes     Comment: RARE    Drug use: No    Sexual activity: Defer       Past Surgical History:   Procedure Laterality Date    BELPHAROPTOSIS REPAIR Bilateral 2017    Bilateral brow ptosis repair via the anterior hairline approach under monitored local anesthesia-Andrez Craven    BLEPHAROPLASTY Bilateral 2017    BREAST BIOPSY      CATARACT EXTRACTION      COLON RESECTION N/A 5/3/2017    Procedure: OPEN SIMOID COLECTOMY WITH COLOSTOMY;  Surgeon: Renato Delgado MD;  Location: [facility];  Service:     COLONOSCOPY N/A 2017     Procedure: COLONOSCOPY VIA COLOSTOMY TO CECUM;  Surgeon: Renato Delgado MD;  Location: [facility] ENDOSCOPY;  Service:     COLONOSCOPY N/A 8/1/2019    Procedure: COLONOSCOPY to cecum and TI with biopsy / hot snare polypectomies;  Surgeon: Dalton Vela Jr., MD;  [facility];  Service: General    COLONOSCOPY N/A 01/04/2010    Andrez Trinidad    COLONOSCOPY N/A 11/3/2022    Procedure: COLONOSCOPY to cecum and TI with biopsy / hot snare polypectomy;  Surgeon: Trini Wade MD;  [facility];  Service: General;  Laterality: N/A;  pre-hx diverticulitis, screen, fam hx colon ca, personal hx polyps  post-polyp, diverticulosis, small hemorrhoids    COLOSTOMY CLOSURE N/A 9/14/2017    Procedure: COLOSTOMY TAKEDOWN AND CLOSURE, WITH RESECTION;  Surgeon: Renato Delgado MD;  Location: [facility] MAIN OR;  Service:     ENDOSCOPY AND COLONOSCOPY N/A 10/31/2014    Normal EGD and colonoscopy, repeat c-scope in 5 years-Andrez Trinidad    EXPLORATORY LAPAROTOMY N/A 9/8/2019    Procedure: Exploratory laparotomy with lysis of adhesions;  Surgeon: Trini Wade MD;  Location: [facility] MAIN OR;  Service: General    FINGER SURGERY Left     4TH FINGER LEFT HAND    HYSTERECTOMY Bilateral     LAPAROSCOPIC CHOLECYSTECTOMY W/ CHOLANGIOGRAPHY N/A 07/15/2003    Dr. Amrit Martinez, [facility]    SALPINGO OOPHORECTOMY Bilateral 02/02/2006    Abdominal sacral colpopexy, bilateral salpingo-oophorectomy, tension free vaginal tape, cystoscopy-Dr. Devika Bradley, [facility]    THORACENTESIS Right 05/21/2017    US-guided right thoracentesis-Dr. Juan Yuen, [facility]    TONSILLECTOMY Bilateral     UPPER GASTROINTESTINAL ENDOSCOPY N/A 10/08/2007    Esophageal mucosal changes suspicious for short-segment Olivera's esphagus, gastric mucosal abnormality characterized by erythema: biopsied, NERD disease present, high likelihood of gastritis-Dr. Mayank Knapp, [facility]    VENTRAL/INCISIONAL HERNIA REPAIR N/A 9/22/2023     "Procedure: Open incisional hernia repair with mesh;  Surgeon: Trini Wade MD;  Location: [facility] OR;  Service: General;  Laterality: N/A;     The following portions of the patient's history were reviewed and updated as appropriate: problem list, allergies, current medications, past medical history, past family history, past social history, and past surgical history.    ROS    See HPI    Immunization History   Administered Date(s) Administered    ABRYSVO (RSV, 60+ or pregnant women 32-36 wks) 02/02/2024    COVID-19 (PFIZER) 12YRS+ (COMIRNATY) 02/02/2024    COVID-19 (PFIZER) BIVALENT 12+YRS 12/19/2022    COVID-19 (PFIZER) Purple Cap Monovalent 03/03/2021, 03/24/2021, 10/02/2021    Flu Vaccine Split Quad 10/27/2020    Fluad Quad 65+ 10/27/2020    Fluzone High-Dose 65+YRS 10/16/2024    Fluzone High-Dose 65+yrs 12/20/2021, 12/19/2022, 11/09/2023    Pneumococcal Conjugate 13-Valent (PCV13) 07/01/2015    Pneumococcal Polysaccharide (PPSV23) 07/01/2017       Objective   Vitals:    12/04/24 1404   BP: 122/76   Temp: 98.1 °F (36.7 °C)   Weight: 79.4 kg (175 lb)   Height: 165.1 cm (65\")     Body mass index is 29.12 kg/m².  Physical Exam  Vitals reviewed.   Constitutional:       Appearance: Normal appearance.   HENT:      Head: Normocephalic and atraumatic.   Cardiovascular:      Rate and Rhythm: Normal rate and regular rhythm.   Abdominal:      Tenderness: There is no right CVA tenderness or left CVA tenderness.   Musculoskeletal:      Comments: Active lower thoracic right sided and lumbar muscle spasm with point tenderness   Neurological:      Mental Status: She is alert.           Assessment & Plan   Diagnoses and all orders for this visit:    1. Muscle strain (Primary)  -     cyclobenzaprine (FLEXERIL) 5 MG tablet; Take 1 tablet by mouth 3 (Three) Times a Day As Needed for Muscle Spasms for up to 10 days.  Dispense: 30 tablet; Refill: 0  -     Ambulatory Referral to Physical Therapy for Evaluation & " Treatment    2. Acute cystitis with hematuria  -     nitrofurantoin, macrocrystal-monohydrate, (Macrobid) 100 MG capsule; Take 1 capsule by mouth 2 (Two) Times a Day for 7 days.  Dispense: 14 capsule; Refill: 0  -     POCT urinalysis dipstick, automated  -     Urinalysis With Culture If Indicated - Urine, Clean Catch     Urine is positive for blood, leuks and nitrates. Will start Macrobid and send for cx. Will also order PT for muscle spasm, she is going to use salon pas patch. Will give her a very low dose of flexeril to try, discussed concerning se of dizziness/falling etc. She is only going to try in the evening.     No follow-ups on file.

## 2024-12-05 RX ORDER — CITALOPRAM HYDROBROMIDE 10 MG/1
10 TABLET ORAL DAILY
Qty: 90 TABLET | Refills: 1 | Status: SHIPPED | OUTPATIENT
Start: 2024-12-05

## 2024-12-07 LAB
APPEARANCE UR: ABNORMAL
BACTERIA #/AREA URNS HPF: ABNORMAL /[HPF]
BACTERIA UR CULT: ABNORMAL
BACTERIA UR CULT: ABNORMAL
BILIRUB UR QL STRIP: NEGATIVE
CASTS URNS QL MICRO: ABNORMAL /LPF
COLOR UR: YELLOW
EPI CELLS #/AREA URNS HPF: ABNORMAL /HPF (ref 0–10)
GLUCOSE UR QL STRIP: NEGATIVE
HGB UR QL STRIP: ABNORMAL
KETONES UR QL STRIP: ABNORMAL
LEUKOCYTE ESTERASE UR QL STRIP: ABNORMAL
MICRO URNS: ABNORMAL
NITRITE UR QL STRIP: POSITIVE
OTHER ANTIBIOTIC SUSC ISLT: ABNORMAL
PH UR STRIP: 5.5 [PH] (ref 5–7.5)
PROT UR QL STRIP: ABNORMAL
RBC #/AREA URNS HPF: ABNORMAL /HPF (ref 0–2)
SP GR UR STRIP: 1.02 (ref 1–1.03)
URINALYSIS REFLEX: ABNORMAL
UROBILINOGEN UR STRIP-MCNC: 0.2 MG/DL (ref 0.2–1)
WBC #/AREA URNS HPF: >30 /HPF (ref 0–5)

## 2024-12-16 RX ORDER — FLECAINIDE ACETATE 50 MG/1
TABLET ORAL
Qty: 90 TABLET | Refills: 1 | Status: SHIPPED | OUTPATIENT
Start: 2024-12-16

## 2024-12-20 ENCOUNTER — TELEPHONE (OUTPATIENT)
Dept: CARDIOLOGY | Facility: CLINIC | Age: 79
End: 2024-12-20
Payer: MEDICARE

## 2024-12-20 NOTE — TELEPHONE ENCOUNTER
"  Caller: Kiley Last \"Anna\"    Relationship: Self    Best call back number: [phone]    What is the best time to reach you: ANYTIME    What was the call regarding: PT IS CALLING TO SEE IF SHE CAN  SAMPLES OF ELIQUIS TODAY. SHE SAID SHE SPOKE TO FAUSTINO"

## 2024-12-22 DIAGNOSIS — R42 VERTIGO: ICD-10-CM

## 2024-12-23 RX ORDER — MECLIZINE HYDROCHLORIDE 25 MG/1
TABLET ORAL
Qty: 90 TABLET | Refills: 0 | Status: SHIPPED | OUTPATIENT
Start: 2024-12-23

## 2024-12-31 ENCOUNTER — TELEPHONE (OUTPATIENT)
Dept: INTERNAL MEDICINE | Facility: CLINIC | Age: 79
End: 2024-12-31
Payer: MEDICARE

## 2024-12-31 NOTE — TELEPHONE ENCOUNTER
Pt was informed to be seen due to symptoms but pt declined due to spouse in ER; pt states she will monitor  today and probably call back Thursday

## 2024-12-31 NOTE — TELEPHONE ENCOUNTER
C/o: UTI (cannot urinate at all), she is currently at [facility] ER with /Drew (she has stated that she cannot and will not leave him (he is not doing well) and she does not want to be seen there, she would like to have a Rx called into [facility], please advise [phone]

## 2025-01-03 ENCOUNTER — TELEPHONE (OUTPATIENT)
Dept: INTERNAL MEDICINE | Facility: CLINIC | Age: 80
End: 2025-01-03
Payer: MEDICARE

## 2025-01-03 RX ORDER — GRANULES FOR ORAL 3 G/1
3 POWDER ORAL ONCE
Qty: 3 G | Refills: 0 | Status: SHIPPED | OUTPATIENT
Start: 2025-01-03 | End: 2025-01-03

## 2025-01-03 NOTE — TELEPHONE ENCOUNTER
Pt called with same problems as before ( UTI)  doesn't want to leave her  - he is in hospital still  could something be called in for her ? Please advise

## 2025-01-09 ENCOUNTER — TELEPHONE (OUTPATIENT)
Dept: CARDIOLOGY | Facility: CLINIC | Age: 80
End: 2025-01-09
Payer: MEDICARE

## 2025-01-09 ENCOUNTER — OFFICE VISIT (OUTPATIENT)
Dept: INTERNAL MEDICINE | Facility: CLINIC | Age: 80
End: 2025-01-09
Payer: MEDICARE

## 2025-01-09 VITALS
HEART RATE: 80 BPM | HEIGHT: 65 IN | DIASTOLIC BLOOD PRESSURE: 86 MMHG | WEIGHT: 175 LBS | BODY MASS INDEX: 29.16 KG/M2 | SYSTOLIC BLOOD PRESSURE: 138 MMHG

## 2025-01-09 DIAGNOSIS — R53.83 OTHER FATIGUE: Primary | ICD-10-CM

## 2025-01-09 DIAGNOSIS — R10.13 EPIGASTRIC PAIN: ICD-10-CM

## 2025-01-09 PROCEDURE — 1160F RVW MEDS BY RX/DR IN RCRD: CPT | Performed by: INTERNAL MEDICINE

## 2025-01-09 PROCEDURE — 1125F AMNT PAIN NOTED PAIN PRSNT: CPT | Performed by: INTERNAL MEDICINE

## 2025-01-09 PROCEDURE — 3079F DIAST BP 80-89 MM HG: CPT | Performed by: INTERNAL MEDICINE

## 2025-01-09 PROCEDURE — 3075F SYST BP GE 130 - 139MM HG: CPT | Performed by: INTERNAL MEDICINE

## 2025-01-09 PROCEDURE — 99214 OFFICE O/P EST MOD 30 MIN: CPT | Performed by: INTERNAL MEDICINE

## 2025-01-09 PROCEDURE — G2211 COMPLEX E/M VISIT ADD ON: HCPCS | Performed by: INTERNAL MEDICINE

## 2025-01-09 PROCEDURE — 1159F MED LIST DOCD IN RCRD: CPT | Performed by: INTERNAL MEDICINE

## 2025-01-09 RX ORDER — PANTOPRAZOLE SODIUM 40 MG/1
40 TABLET, DELAYED RELEASE ORAL DAILY
Qty: 90 TABLET | Refills: 1 | Status: SHIPPED | OUTPATIENT
Start: 2025-01-09

## 2025-01-09 NOTE — PROGRESS NOTES
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  She has had significant bowel changes for a month- after eating she gets urgent bowel movements- loose, splattering. Main GI issues is some epigastric burning and belching which is unusual for her. She is eating but less at a time. No dysphagia.   Treated for UTI in the last 2 weeks with fosfomycin.   had rhinovirus in hospital.     Objective   Vital Signs:  /86   Pulse 80   Ht 165.1 cm (65\")   Wt 79.4 kg (175 lb)   BMI 29.12 kg/m²   Estimated body mass index is 29.12 kg/m² as calculated from the following:    Height as of this encounter: 165.1 cm (65\").    Weight as of this encounter: 79.4 kg (175 lb).            Physical Exam  Constitutional:       Appearance: Normal appearance.      Comments: Overall pale and tired appearing   Cardiovascular:      Rate and Rhythm: Normal rate.   Pulmonary:      Effort: Pulmonary effort is normal.   Abdominal:      General: Bowel sounds are normal.      Tenderness: Tenderness: mild epigastric tenderness.   Musculoskeletal:      Right lower leg: No edema.      Left lower leg: No edema.        Result Review :                Assessment and Plan   Diagnoses and all orders for this visit:    1. Other fatigue (Primary)  Comments:  check labs today but do suspect related to being at hospital with , etc.  Orders:  -     CBC & Differential  -     Comprehensive Metabolic Panel  -     TSH    2. Epigastric pain  Comments:  will try PPI for several weeks-    Other orders  -     pantoprazole (Protonix) 40 MG EC tablet; Take 1 tablet by mouth Daily.  Dispense: 90 tablet; Refill: 1             Follow Up   Return for Lab Today, Keep previously scheduled appointment.  Patient was given " instructions and counseling regarding her condition or for health maintenance advice. Please see specific information pulled into the AVS if appropriate.

## 2025-01-09 NOTE — TELEPHONE ENCOUNTER
Patient called for samples of Eliquis 5mg tablets  I placed up front for patient to  today.  Left message for patient

## 2025-01-10 LAB
ALBUMIN SERPL-MCNC: 4.3 G/DL (ref 3.5–5.2)
ALBUMIN/GLOB SERPL: 1.4 G/DL
ALP SERPL-CCNC: 71 U/L (ref 39–117)
ALT SERPL-CCNC: 24 U/L (ref 1–33)
AST SERPL-CCNC: 27 U/L (ref 1–32)
BASOPHILS # BLD AUTO: 0.03 10*3/MM3 (ref 0–0.2)
BASOPHILS NFR BLD AUTO: 0.3 % (ref 0–1.5)
BILIRUB SERPL-MCNC: 0.6 MG/DL (ref 0–1.2)
BUN SERPL-MCNC: 15 MG/DL (ref 8–23)
BUN/CREAT SERPL: 25 (ref 7–25)
CALCIUM SERPL-MCNC: 10.3 MG/DL (ref 8.6–10.5)
CHLORIDE SERPL-SCNC: 102 MMOL/L (ref 98–107)
CO2 SERPL-SCNC: 30.3 MMOL/L (ref 22–29)
CREAT SERPL-MCNC: 0.6 MG/DL (ref 0.57–1)
EGFRCR SERPLBLD CKD-EPI 2021: 90.9 ML/MIN/1.73
EOSINOPHIL # BLD AUTO: 0.09 10*3/MM3 (ref 0–0.4)
EOSINOPHIL NFR BLD AUTO: 0.9 % (ref 0.3–6.2)
ERYTHROCYTE [DISTWIDTH] IN BLOOD BY AUTOMATED COUNT: 13.9 % (ref 12.3–15.4)
GLOBULIN SER CALC-MCNC: 3 GM/DL
GLUCOSE SERPL-MCNC: 121 MG/DL (ref 65–99)
HCT VFR BLD AUTO: 39.4 % (ref 34–46.6)
HGB BLD-MCNC: 13 G/DL (ref 12–15.9)
IMM GRANULOCYTES # BLD AUTO: 0.06 10*3/MM3 (ref 0–0.05)
IMM GRANULOCYTES NFR BLD AUTO: 0.6 % (ref 0–0.5)
LYMPHOCYTES # BLD AUTO: 2.29 10*3/MM3 (ref 0.7–3.1)
LYMPHOCYTES NFR BLD AUTO: 22.4 % (ref 19.6–45.3)
MCH RBC QN AUTO: 30.2 PG (ref 26.6–33)
MCHC RBC AUTO-ENTMCNC: 33 G/DL (ref 31.5–35.7)
MCV RBC AUTO: 91.6 FL (ref 79–97)
MONOCYTES # BLD AUTO: 0.85 10*3/MM3 (ref 0.1–0.9)
MONOCYTES NFR BLD AUTO: 8.3 % (ref 5–12)
NEUTROPHILS # BLD AUTO: 6.91 10*3/MM3 (ref 1.7–7)
NEUTROPHILS NFR BLD AUTO: 67.5 % (ref 42.7–76)
NRBC BLD AUTO-RTO: 0 /100 WBC (ref 0–0.2)
PLATELET # BLD AUTO: 203 10*3/MM3 (ref 140–450)
POTASSIUM SERPL-SCNC: 4.3 MMOL/L (ref 3.5–5.2)
PROT SERPL-MCNC: 7.3 G/DL (ref 6–8.5)
RBC # BLD AUTO: 4.3 10*6/MM3 (ref 3.77–5.28)
SODIUM SERPL-SCNC: 141 MMOL/L (ref 136–145)
TSH SERPL DL<=0.005 MIU/L-ACNC: 1.46 UIU/ML (ref 0.27–4.2)
WBC # BLD AUTO: 10.23 10*3/MM3 (ref 3.4–10.8)

## 2025-01-13 ENCOUNTER — TELEPHONE (OUTPATIENT)
Dept: INTERNAL MEDICINE | Facility: CLINIC | Age: 80
End: 2025-01-13
Payer: MEDICARE

## 2025-01-13 RX ORDER — AZITHROMYCIN 250 MG/1
TABLET, FILM COATED ORAL
Qty: 6 TABLET | Refills: 0 | Status: SHIPPED | OUTPATIENT
Start: 2025-01-13

## 2025-01-13 NOTE — TELEPHONE ENCOUNTER
"  Caller: Kiley Last \"Anna\"    Relationship: Self    Best call back number: [phone]     What medication are you requesting: ANTIBIOTICS     What are your current symptoms: GREEN MUCUS, SNEEZING, COUGH     How long have you been experiencing symptoms: OVER A WEEK     Have you had these symptoms before:    [x] Yes  [] No    Have you been treated for these symptoms before:   [x] Yes  [] No    If a prescription is needed, what is your preferred pharmacy and phone number: [facility] [id number] [facility] [address] [place] [facility] [place] [address] [facility] [phone] [facility] [phone]      Additional notes:    PLEASE ADVISE   "

## 2025-01-29 ENCOUNTER — APPOINTMENT (OUTPATIENT)
Dept: GENERAL RADIOLOGY | Facility: HOSPITAL | Age: 80
End: 2025-01-29
Payer: MEDICARE

## 2025-01-29 ENCOUNTER — APPOINTMENT (OUTPATIENT)
Dept: CT IMAGING | Facility: HOSPITAL | Age: 80
End: 2025-01-29
Payer: MEDICARE

## 2025-01-29 ENCOUNTER — HOSPITAL ENCOUNTER (INPATIENT)
Facility: HOSPITAL | Age: 80
LOS: 2 days | Discharge: HOME | End: 2025-01-31
Attending: EMERGENCY MEDICINE | Admitting: HOSPITALIST
Payer: MEDICARE

## 2025-01-29 DIAGNOSIS — K56.609 SBO (SMALL BOWEL OBSTRUCTION): Primary | ICD-10-CM

## 2025-01-29 LAB
ALBUMIN SERPL-MCNC: 3.9 G/DL (ref 3.5–5.2)
ALBUMIN SERPL-MCNC: 4.2 G/DL (ref 3.5–5.2)
ALBUMIN/GLOB SERPL: 1.2 G/DL
ALBUMIN/GLOB SERPL: 1.3 G/DL
ALP SERPL-CCNC: 65 U/L (ref 39–117)
ALP SERPL-CCNC: 71 U/L (ref 39–117)
ALT SERPL W P-5'-P-CCNC: 16 U/L (ref 1–33)
ALT SERPL W P-5'-P-CCNC: 17 U/L (ref 1–33)
ANION GAP SERPL CALCULATED.3IONS-SCNC: 13 MMOL/L (ref 5–15)
ANION GAP SERPL CALCULATED.3IONS-SCNC: 13.5 MMOL/L (ref 5–15)
AST SERPL-CCNC: 21 U/L (ref 1–32)
AST SERPL-CCNC: 24 U/L (ref 1–32)
B PARAPERT DNA SPEC QL NAA+PROBE: NOT DETECTED
B PERT DNA SPEC QL NAA+PROBE: NOT DETECTED
BASOPHILS # BLD AUTO: 0.03 10*3/MM3 (ref 0–0.2)
BASOPHILS NFR BLD AUTO: 0.2 % (ref 0–1.5)
BILIRUB SERPL-MCNC: 0.5 MG/DL (ref 0–1.2)
BILIRUB SERPL-MCNC: 0.6 MG/DL (ref 0–1.2)
BILIRUB UR QL STRIP: NEGATIVE
BUN SERPL-MCNC: 25 MG/DL (ref 8–23)
BUN SERPL-MCNC: 25 MG/DL (ref 8–23)
BUN/CREAT SERPL: 32.5 (ref 7–25)
BUN/CREAT SERPL: 35.7 (ref 7–25)
C PNEUM DNA NPH QL NAA+NON-PROBE: NOT DETECTED
CALCIUM SPEC-SCNC: 10 MG/DL (ref 8.6–10.5)
CALCIUM SPEC-SCNC: 9.8 MG/DL (ref 8.6–10.5)
CHLORIDE SERPL-SCNC: 97 MMOL/L (ref 98–107)
CHLORIDE SERPL-SCNC: 98 MMOL/L (ref 98–107)
CLARITY UR: CLEAR
CO2 SERPL-SCNC: 24.5 MMOL/L (ref 22–29)
CO2 SERPL-SCNC: 28 MMOL/L (ref 22–29)
COLOR UR: YELLOW
CREAT SERPL-MCNC: 0.7 MG/DL (ref 0.57–1)
CREAT SERPL-MCNC: 0.77 MG/DL (ref 0.57–1)
D-LACTATE SERPL-SCNC: 2 MMOL/L (ref 0.5–2)
DEPRECATED RDW RBC AUTO: 44.7 FL (ref 37–54)
DEPRECATED RDW RBC AUTO: 46.5 FL (ref 37–54)
EGFRCR SERPLBLD CKD-EPI 2021: 78.1 ML/MIN/1.73
EGFRCR SERPLBLD CKD-EPI 2021: 87.6 ML/MIN/1.73
EOSINOPHIL # BLD AUTO: 0.07 10*3/MM3 (ref 0–0.4)
EOSINOPHIL NFR BLD AUTO: 0.4 % (ref 0.3–6.2)
ERYTHROCYTE [DISTWIDTH] IN BLOOD BY AUTOMATED COUNT: 13.4 % (ref 12.3–15.4)
ERYTHROCYTE [DISTWIDTH] IN BLOOD BY AUTOMATED COUNT: 13.8 % (ref 12.3–15.4)
FLUAV SUBTYP SPEC NAA+PROBE: NOT DETECTED
FLUBV RNA ISLT QL NAA+PROBE: NOT DETECTED
GEN 5 1HR TROPONIN T REFLEX: 9 NG/L
GLOBULIN UR ELPH-MCNC: 3.2 GM/DL
GLOBULIN UR ELPH-MCNC: 3.3 GM/DL
GLUCOSE BLDC GLUCOMTR-MCNC: 80 MG/DL (ref 70–130)
GLUCOSE SERPL-MCNC: 154 MG/DL (ref 65–99)
GLUCOSE SERPL-MCNC: 174 MG/DL (ref 65–99)
GLUCOSE UR STRIP-MCNC: NEGATIVE MG/DL
HADV DNA SPEC NAA+PROBE: NOT DETECTED
HCOV 229E RNA SPEC QL NAA+PROBE: NOT DETECTED
HCOV HKU1 RNA SPEC QL NAA+PROBE: NOT DETECTED
HCOV NL63 RNA SPEC QL NAA+PROBE: NOT DETECTED
HCOV OC43 RNA SPEC QL NAA+PROBE: NOT DETECTED
HCT VFR BLD AUTO: 36.7 % (ref 34–46.6)
HCT VFR BLD AUTO: 40 % (ref 34–46.6)
HGB BLD-MCNC: 12.2 G/DL (ref 12–15.9)
HGB BLD-MCNC: 13.1 G/DL (ref 12–15.9)
HGB UR QL STRIP.AUTO: NEGATIVE
HMPV RNA NPH QL NAA+NON-PROBE: NOT DETECTED
HPIV1 RNA ISLT QL NAA+PROBE: NOT DETECTED
HPIV2 RNA SPEC QL NAA+PROBE: NOT DETECTED
HPIV3 RNA NPH QL NAA+PROBE: NOT DETECTED
HPIV4 P GENE NPH QL NAA+PROBE: NOT DETECTED
IMM GRANULOCYTES # BLD AUTO: 0.09 10*3/MM3 (ref 0–0.05)
IMM GRANULOCYTES NFR BLD AUTO: 0.5 % (ref 0–0.5)
KETONES UR QL STRIP: ABNORMAL
LEUKOCYTE ESTERASE UR QL STRIP.AUTO: NEGATIVE
LIPASE SERPL-CCNC: 45 U/L (ref 13–60)
LYMPHOCYTES # BLD AUTO: 2.88 10*3/MM3 (ref 0.7–3.1)
LYMPHOCYTES NFR BLD AUTO: 17.1 % (ref 19.6–45.3)
M PNEUMO IGG SER IA-ACNC: NOT DETECTED
MAGNESIUM SERPL-MCNC: 1.9 MG/DL (ref 1.6–2.4)
MCH RBC QN AUTO: 29.9 PG (ref 26.6–33)
MCH RBC QN AUTO: 30.6 PG (ref 26.6–33)
MCHC RBC AUTO-ENTMCNC: 32.8 G/DL (ref 31.5–35.7)
MCHC RBC AUTO-ENTMCNC: 33.2 G/DL (ref 31.5–35.7)
MCV RBC AUTO: 91.3 FL (ref 79–97)
MCV RBC AUTO: 92 FL (ref 79–97)
MONOCYTES # BLD AUTO: 1.22 10*3/MM3 (ref 0.1–0.9)
MONOCYTES NFR BLD AUTO: 7.3 % (ref 5–12)
NEUTROPHILS NFR BLD AUTO: 12.52 10*3/MM3 (ref 1.7–7)
NEUTROPHILS NFR BLD AUTO: 74.5 % (ref 42.7–76)
NITRITE UR QL STRIP: NEGATIVE
NRBC BLD AUTO-RTO: 0 /100 WBC (ref 0–0.2)
PH UR STRIP.AUTO: 5.5 [PH] (ref 5–8)
PLATELET # BLD AUTO: 195 10*3/MM3 (ref 140–450)
PLATELET # BLD AUTO: 214 10*3/MM3 (ref 140–450)
PMV BLD AUTO: 12.1 FL (ref 6–12)
PMV BLD AUTO: 12.4 FL (ref 6–12)
POTASSIUM SERPL-SCNC: 4.2 MMOL/L (ref 3.5–5.2)
POTASSIUM SERPL-SCNC: 4.9 MMOL/L (ref 3.5–5.2)
PROT SERPL-MCNC: 7.1 G/DL (ref 6–8.5)
PROT SERPL-MCNC: 7.5 G/DL (ref 6–8.5)
PROT UR QL STRIP: NEGATIVE
QT INTERVAL: 435 MS
QTC INTERVAL: 431 MS
RBC # BLD AUTO: 3.99 10*6/MM3 (ref 3.77–5.28)
RBC # BLD AUTO: 4.38 10*6/MM3 (ref 3.77–5.28)
RHINOVIRUS RNA SPEC NAA+PROBE: NOT DETECTED
RSV RNA NPH QL NAA+NON-PROBE: NOT DETECTED
SARS-COV-2 RNA NPH QL NAA+NON-PROBE: NOT DETECTED
SODIUM SERPL-SCNC: 135 MMOL/L (ref 136–145)
SODIUM SERPL-SCNC: 139 MMOL/L (ref 136–145)
SP GR UR STRIP: >1.03 (ref 1–1.03)
TROPONIN T NUMERIC DELTA: NORMAL
TROPONIN T SERPL HS-MCNC: <6 NG/L
UROBILINOGEN UR QL STRIP: ABNORMAL
WBC NRBC COR # BLD AUTO: 15.68 10*3/MM3 (ref 3.4–10.8)
WBC NRBC COR # BLD AUTO: 16.81 10*3/MM3 (ref 3.4–10.8)

## 2025-01-29 PROCEDURE — 93005 ELECTROCARDIOGRAM TRACING: CPT | Performed by: EMERGENCY MEDICINE

## 2025-01-29 PROCEDURE — 84484 ASSAY OF TROPONIN QUANT: CPT | Performed by: EMERGENCY MEDICINE

## 2025-01-29 PROCEDURE — 25010000002 ONDANSETRON PER 1 MG: Performed by: NURSE PRACTITIONER

## 2025-01-29 PROCEDURE — 36415 COLL VENOUS BLD VENIPUNCTURE: CPT

## 2025-01-29 PROCEDURE — 25010000002 PROCHLORPERAZINE 10 MG/2ML SOLUTION: Performed by: HOSPITALIST

## 2025-01-29 PROCEDURE — 81003 URINALYSIS AUTO W/O SCOPE: CPT | Performed by: EMERGENCY MEDICINE

## 2025-01-29 PROCEDURE — 25510000002 DIATRIZOATE MEGLUMINE & SODIUM PER 1 ML: Performed by: HOSPITALIST

## 2025-01-29 PROCEDURE — 99285 EMERGENCY DEPT VISIT HI MDM: CPT

## 2025-01-29 PROCEDURE — 74177 CT ABD & PELVIS W/CONTRAST: CPT

## 2025-01-29 PROCEDURE — 83735 ASSAY OF MAGNESIUM: CPT | Performed by: EMERGENCY MEDICINE

## 2025-01-29 PROCEDURE — 74018 RADEX ABDOMEN 1 VIEW: CPT

## 2025-01-29 PROCEDURE — 93010 ELECTROCARDIOGRAM REPORT: CPT | Performed by: INTERNAL MEDICINE

## 2025-01-29 PROCEDURE — 82948 REAGENT STRIP/BLOOD GLUCOSE: CPT

## 2025-01-29 PROCEDURE — 99222 1ST HOSP IP/OBS MODERATE 55: CPT | Performed by: NURSE PRACTITIONER

## 2025-01-29 PROCEDURE — 74250 X-RAY XM SM INT 1CNTRST STD: CPT

## 2025-01-29 PROCEDURE — 25010000002 PROCHLORPERAZINE 10 MG/2ML SOLUTION: Performed by: EMERGENCY MEDICINE

## 2025-01-29 PROCEDURE — 85027 COMPLETE CBC AUTOMATED: CPT | Performed by: NURSE PRACTITIONER

## 2025-01-29 PROCEDURE — 83605 ASSAY OF LACTIC ACID: CPT | Performed by: EMERGENCY MEDICINE

## 2025-01-29 PROCEDURE — 25010000002 MORPHINE PER 10 MG: Performed by: HOSPITALIST

## 2025-01-29 PROCEDURE — 25510000001 IOPAMIDOL 61 % SOLUTION: Performed by: EMERGENCY MEDICINE

## 2025-01-29 PROCEDURE — 0202U NFCT DS 22 TRGT SARS-COV-2: CPT | Performed by: EMERGENCY MEDICINE

## 2025-01-29 PROCEDURE — 85025 COMPLETE CBC W/AUTO DIFF WBC: CPT | Performed by: EMERGENCY MEDICINE

## 2025-01-29 PROCEDURE — 25810000003 SODIUM CHLORIDE 0.9 % SOLUTION: Performed by: NURSE PRACTITIONER

## 2025-01-29 PROCEDURE — 80053 COMPREHEN METABOLIC PANEL: CPT | Performed by: NURSE PRACTITIONER

## 2025-01-29 PROCEDURE — 25010000002 MORPHINE PER 10 MG: Performed by: EMERGENCY MEDICINE

## 2025-01-29 PROCEDURE — 80053 COMPREHEN METABOLIC PANEL: CPT | Performed by: EMERGENCY MEDICINE

## 2025-01-29 PROCEDURE — 25010000002 ONDANSETRON PER 1 MG: Performed by: EMERGENCY MEDICINE

## 2025-01-29 PROCEDURE — 99222 1ST HOSP IP/OBS MODERATE 55: CPT

## 2025-01-29 PROCEDURE — 83690 ASSAY OF LIPASE: CPT | Performed by: EMERGENCY MEDICINE

## 2025-01-29 RX ORDER — IOPAMIDOL 612 MG/ML
100 INJECTION, SOLUTION INTRAVASCULAR
Status: COMPLETED | OUTPATIENT
Start: 2025-01-29 | End: 2025-01-29

## 2025-01-29 RX ORDER — CITALOPRAM HYDROBROMIDE 10 MG/1
10 TABLET ORAL DAILY
Status: DISCONTINUED | OUTPATIENT
Start: 2025-01-29 | End: 2025-01-31 | Stop reason: HOSPADM

## 2025-01-29 RX ORDER — ROSUVASTATIN CALCIUM 20 MG/1
20 TABLET, COATED ORAL DAILY
Status: DISCONTINUED | OUTPATIENT
Start: 2025-01-29 | End: 2025-01-31 | Stop reason: HOSPADM

## 2025-01-29 RX ORDER — NITROGLYCERIN 0.4 MG/1
0.4 TABLET SUBLINGUAL
Status: DISCONTINUED | OUTPATIENT
Start: 2025-01-29 | End: 2025-01-31 | Stop reason: HOSPADM

## 2025-01-29 RX ORDER — SODIUM CHLORIDE 0.9 % (FLUSH) 0.9 %
10 SYRINGE (ML) INJECTION AS NEEDED
Status: DISCONTINUED | OUTPATIENT
Start: 2025-01-29 | End: 2025-01-31 | Stop reason: HOSPADM

## 2025-01-29 RX ORDER — ONDANSETRON 2 MG/ML
4 INJECTION INTRAMUSCULAR; INTRAVENOUS EVERY 6 HOURS PRN
Status: DISCONTINUED | OUTPATIENT
Start: 2025-01-29 | End: 2025-01-31 | Stop reason: HOSPADM

## 2025-01-29 RX ORDER — PROCHLORPERAZINE 25 MG
25 SUPPOSITORY, RECTAL RECTAL EVERY 12 HOURS PRN
Status: DISCONTINUED | OUTPATIENT
Start: 2025-01-29 | End: 2025-01-31 | Stop reason: HOSPADM

## 2025-01-29 RX ORDER — PROCHLORPERAZINE MALEATE 5 MG/1
5 TABLET ORAL EVERY 6 HOURS PRN
Status: DISCONTINUED | OUTPATIENT
Start: 2025-01-29 | End: 2025-01-31 | Stop reason: HOSPADM

## 2025-01-29 RX ORDER — PROCHLORPERAZINE EDISYLATE 5 MG/ML
10 INJECTION INTRAMUSCULAR; INTRAVENOUS ONCE
Status: COMPLETED | OUTPATIENT
Start: 2025-01-29 | End: 2025-01-29

## 2025-01-29 RX ORDER — SODIUM CHLORIDE 9 MG/ML
100 INJECTION, SOLUTION INTRAVENOUS CONTINUOUS
Status: ACTIVE | OUTPATIENT
Start: 2025-01-29 | End: 2025-01-29

## 2025-01-29 RX ORDER — ACETAMINOPHEN 650 MG/1
650 SUPPOSITORY RECTAL EVERY 4 HOURS PRN
Status: DISCONTINUED | OUTPATIENT
Start: 2025-01-29 | End: 2025-01-31 | Stop reason: HOSPADM

## 2025-01-29 RX ORDER — DIATRIZOATE MEGLUMINE AND DIATRIZOATE SODIUM 660; 100 MG/ML; MG/ML
240 SOLUTION ORAL; RECTAL
Status: COMPLETED | OUTPATIENT
Start: 2025-01-29 | End: 2025-01-29

## 2025-01-29 RX ORDER — PROCHLORPERAZINE EDISYLATE 5 MG/ML
5 INJECTION INTRAMUSCULAR; INTRAVENOUS EVERY 6 HOURS PRN
Status: DISCONTINUED | OUTPATIENT
Start: 2025-01-29 | End: 2025-01-31 | Stop reason: HOSPADM

## 2025-01-29 RX ORDER — SODIUM CHLORIDE 0.9 % (FLUSH) 0.9 %
10 SYRINGE (ML) INJECTION EVERY 12 HOURS SCHEDULED
Status: DISCONTINUED | OUTPATIENT
Start: 2025-01-29 | End: 2025-01-31 | Stop reason: HOSPADM

## 2025-01-29 RX ORDER — ACETAMINOPHEN 160 MG/5ML
650 SOLUTION ORAL EVERY 4 HOURS PRN
Status: DISCONTINUED | OUTPATIENT
Start: 2025-01-29 | End: 2025-01-31 | Stop reason: HOSPADM

## 2025-01-29 RX ORDER — FAMOTIDINE 20 MG/1
20 TABLET, FILM COATED ORAL 2 TIMES DAILY PRN
Status: DISCONTINUED | OUTPATIENT
Start: 2025-01-29 | End: 2025-01-29

## 2025-01-29 RX ORDER — MORPHINE SULFATE 2 MG/ML
2 INJECTION, SOLUTION INTRAMUSCULAR; INTRAVENOUS ONCE
Status: COMPLETED | OUTPATIENT
Start: 2025-01-29 | End: 2025-01-29

## 2025-01-29 RX ORDER — MORPHINE SULFATE 2 MG/ML
2 INJECTION, SOLUTION INTRAMUSCULAR; INTRAVENOUS EVERY 4 HOURS PRN
Status: DISCONTINUED | OUTPATIENT
Start: 2025-01-29 | End: 2025-01-31 | Stop reason: HOSPADM

## 2025-01-29 RX ORDER — ACETAMINOPHEN 325 MG/1
650 TABLET ORAL EVERY 4 HOURS PRN
Status: DISCONTINUED | OUTPATIENT
Start: 2025-01-29 | End: 2025-01-31 | Stop reason: HOSPADM

## 2025-01-29 RX ORDER — PANTOPRAZOLE SODIUM 40 MG/10ML
40 INJECTION, POWDER, LYOPHILIZED, FOR SOLUTION INTRAVENOUS EVERY 12 HOURS SCHEDULED
Status: DISCONTINUED | OUTPATIENT
Start: 2025-01-29 | End: 2025-01-31 | Stop reason: HOSPADM

## 2025-01-29 RX ORDER — FLECAINIDE ACETATE 50 MG/1
50 TABLET ORAL 2 TIMES DAILY
Status: DISCONTINUED | OUTPATIENT
Start: 2025-01-29 | End: 2025-01-31 | Stop reason: HOSPADM

## 2025-01-29 RX ORDER — METOPROLOL SUCCINATE 50 MG/1
50 TABLET, EXTENDED RELEASE ORAL DAILY
Status: DISCONTINUED | OUTPATIENT
Start: 2025-01-29 | End: 2025-01-31 | Stop reason: HOSPADM

## 2025-01-29 RX ORDER — SODIUM CHLORIDE 9 MG/ML
40 INJECTION, SOLUTION INTRAVENOUS AS NEEDED
Status: DISCONTINUED | OUTPATIENT
Start: 2025-01-29 | End: 2025-01-31 | Stop reason: HOSPADM

## 2025-01-29 RX ORDER — ONDANSETRON 2 MG/ML
4 INJECTION INTRAMUSCULAR; INTRAVENOUS ONCE
Status: COMPLETED | OUTPATIENT
Start: 2025-01-29 | End: 2025-01-29

## 2025-01-29 RX ADMIN — IOPAMIDOL 85 ML: 612 INJECTION, SOLUTION INTRAVENOUS at 02:42

## 2025-01-29 RX ADMIN — DIATRIZOATE MEGLUMINE AND DIATRIZOATE SODIUM 240 ML: 660; 100 LIQUID ORAL; RECTAL at 14:31

## 2025-01-29 RX ADMIN — MORPHINE SULFATE 2 MG: 2 INJECTION, SOLUTION INTRAMUSCULAR; INTRAVENOUS at 02:33

## 2025-01-29 RX ADMIN — METOPROLOL TARTRATE 2.5 MG: 5 INJECTION INTRAVENOUS at 15:02

## 2025-01-29 RX ADMIN — MORPHINE SULFATE 2 MG: 2 INJECTION, SOLUTION INTRAMUSCULAR; INTRAVENOUS at 07:56

## 2025-01-29 RX ADMIN — PROCHLORPERAZINE EDISYLATE 5 MG: 5 INJECTION INTRAMUSCULAR; INTRAVENOUS at 15:10

## 2025-01-29 RX ADMIN — ONDANSETRON 4 MG: 2 INJECTION INTRAMUSCULAR; INTRAVENOUS at 12:58

## 2025-01-29 RX ADMIN — Medication 10 ML: at 08:03

## 2025-01-29 RX ADMIN — SODIUM CHLORIDE 100 ML/HR: 9 INJECTION, SOLUTION INTRAVENOUS at 12:24

## 2025-01-29 RX ADMIN — ONDANSETRON 4 MG: 2 INJECTION INTRAMUSCULAR; INTRAVENOUS at 01:42

## 2025-01-29 RX ADMIN — PANTOPRAZOLE SODIUM 40 MG: 40 INJECTION, POWDER, FOR SOLUTION INTRAVENOUS at 22:12

## 2025-01-29 RX ADMIN — PROCHLORPERAZINE EDISYLATE 10 MG: 5 INJECTION INTRAMUSCULAR; INTRAVENOUS at 02:33

## 2025-01-29 RX ADMIN — MORPHINE SULFATE 2 MG: 2 INJECTION, SOLUTION INTRAMUSCULAR; INTRAVENOUS at 01:43

## 2025-01-29 RX ADMIN — SODIUM CHLORIDE 100 ML/HR: 9 INJECTION, SOLUTION INTRAVENOUS at 06:08

## 2025-01-29 RX ADMIN — ONDANSETRON 4 MG: 2 INJECTION INTRAMUSCULAR; INTRAVENOUS at 07:34

## 2025-01-29 NOTE — ED NOTES
Nursing report ED to floor  Kiley Last  79 y.o.  female    HPI :  HPI  Stated Reason for Visit: pt arrives via EMS from home with complaints of upper abd pain and N/V since 1900 yesterday. Pt has hx of colectomy.  History Obtained From: patient    Chief Complaint  Chief Complaint   Patient presents with    Abdominal Pain       Admitting doctor:   Cyrus Olivas MD    Admitting diagnosis:   The encounter diagnosis was SBO (small bowel obstruction).    Code status:   Current Code Status       Date Active Code Status Order ID Comments User Context       1/29/2025 0519 CPR (Attempt to Resuscitate) [id number]  Oumou Mayen, [facility]        Question Answer    Code Status (Patient has no pulse and is not breathing) CPR (Attempt to Resuscitate)    Medical Interventions (Patient has pulse or is breathing) Full Support                    Allergies:   Epinephrine and Penicillins    Isolation:   No active isolations    Intake and Output    Intake/Output Summary (Last 24 hours) at 1/29/2025 0841  Last data filed at 1/29/2025 0753  Gross per 24 hour   Intake --   Output 1325 ml   Net -1325 ml       Weight:       01/29/25  0124   Weight: 79.4 kg (175 lb)       Most recent vitals:   Vitals:    01/29/25 0301 01/29/25 0611 01/29/25 0701 01/29/25 0801   BP: 120/48 131/53 119/53 114/57   Pulse: 57      Resp:       Temp:       TempSrc:       SpO2: 90%      Weight:       Height:           Active LDAs/IV Access:   Lines, Drains & Airways       Active LDAs       Name Placement date Placement time Site Days    Peripheral IV 01/29/25 0142 Right Antecubital 01/29/25  0142  Antecubital  less than 1    NG/OG Tube Nasogastric 18 Fr Right nostril 01/29/25  0355  Right nostril  less than 1                    Labs (abnormal labs have a star):   Labs Reviewed   COMPREHENSIVE METABOLIC PANEL - Abnormal; Notable for the following components:       Result Value    Glucose 174 (*)     BUN 25 (*)     Sodium 135 (*)     Chloride 97 (*)      BUN/Creatinine Ratio 32.5 (*)     All other components within normal limits    Narrative:     GFR Categories in Chronic Kidney Disease (CKD)      GFR Category          GFR (mL/min/1.73)    Interpretation  G1                     90 or greater         Normal or high (1)  G2                      60-89                Mild decrease (1)  G3a                   45-59                Mild to moderate decrease  G3b                   30-44                Moderate to severe decrease  G4                    15-29                Severe decrease  G5                    14 or less           Kidney failure          (1)In the absence of evidence of kidney disease, neither GFR category G1 or G2 fulfill the criteria for CKD.    eGFR calculation 2021 CKD-EPI creatinine equation, which does not include race as a factor   URINALYSIS W/ MICROSCOPIC IF INDICATED (NO CULTURE) - Abnormal; Notable for the following components:    Specific Gravity, UA >1.030 (*)     Ketones, UA 15 mg/dL (1+) (*)     All other components within normal limits    Narrative:     Urine microscopic not indicated.   CBC WITH AUTO DIFFERENTIAL - Abnormal; Notable for the following components:    WBC 16.81 (*)     MPV 12.4 (*)     Lymphocyte % 17.1 (*)     Neutrophils, Absolute 12.52 (*)     Monocytes, Absolute 1.22 (*)     Immature Grans, Absolute 0.09 (*)     All other components within normal limits   CBC (NO DIFF) - Abnormal; Notable for the following components:    WBC 15.68 (*)     MPV 12.1 (*)     All other components within normal limits   COMPREHENSIVE METABOLIC PANEL - Abnormal; Notable for the following components:    Glucose 154 (*)     BUN 25 (*)     BUN/Creatinine Ratio 35.7 (*)     All other components within normal limits    Narrative:     GFR Categories in Chronic Kidney Disease (CKD)      GFR Category          GFR (mL/min/1.73)    Interpretation  G1                     90 or greater         Normal or high (1)  G2                      60-89                 Mild decrease (1)  G3a                   45-59                Mild to moderate decrease  G3b                   30-44                Moderate to severe decrease  G4                    15-29                Severe decrease  G5                    14 or less           Kidney failure          (1)In the absence of evidence of kidney disease, neither GFR category G1 or G2 fulfill the criteria for CKD.    eGFR calculation 2021 CKD-EPI creatinine equation, which does not include race as a factor   RESPIRATORY PANEL PCR W/ COVID-19 (SARS-COV-2), NP SWAB IN UTM/VTP, 2 HR TAT - Normal    Narrative:     In the setting of a positive respiratory panel with a viral infection PLUS a negative procalcitonin without other underlying concern for bacterial infection, consider observing off antibiotics or discontinuation of antibiotics and continue supportive care. If the respiratory panel is positive for atypical bacterial infection (Bordetella pertussis, Chlamydophila pneumoniae, or Mycoplasma pneumoniae), consider antibiotic de-escalation to target atypical bacterial infection.   LIPASE - Normal   LACTIC ACID, PLASMA - Normal   MAGNESIUM - Normal   TROPONIN - Normal    Narrative:     High Sensitive Troponin T Reference Range:  <14.0 ng/L- Negative Female for AMI  <22.0 ng/L- Negative Male for AMI  >=14 - Abnormal Female indicating possible myocardial injury.  >=22 - Abnormal Male indicating possible myocardial injury.   Clinicians would have to utilize clinical acumen, EKG, Troponin, and serial changes to determine if it is an Acute Myocardial Infarction or myocardial injury due to an underlying chronic condition.        HIGH SENSITIVITIY TROPONIN T 1HR    Narrative:     High Sensitive Troponin T Reference Range:  <14.0 ng/L- Negative Female for AMI  <22.0 ng/L- Negative Male for AMI  >=14 - Abnormal Female indicating possible myocardial injury.  >=22 - Abnormal Male indicating possible myocardial injury.   Clinicians would have to  utilize clinical acumen, EKG, Troponin, and serial changes to determine if it is an Acute Myocardial Infarction or myocardial injury due to an underlying chronic condition.        CBC AND DIFFERENTIAL    Narrative:     The following orders were created for panel order CBC & Differential.  Procedure                               Abnormality         Status                     ---------                               -----------         ------                     CBC Auto Differential[[id number]]        Abnormal            Final result                 Please view results for these tests on the individual orders.       EKG:   ECG 12 Lead Chest Pain   Final Result   HEART RATE=59  bpm   RR Ensikuhu=2199  ms   MS Prxzxizv=732  ms   P Horizontal Axis=  deg   P Front Axis=-25  deg   QRSD Interval=93  ms   QT Ctybptix=181  ms   YFxF=019  ms   QRS Axis=30  deg   T Wave Axis=41  deg   - NORMAL ECG -   Sinus rhythm   When compared with ECG of 09-Oct-2024 05:26:56,   No significant change   Electronically Signed By: Jose Denson ([facility]) 2025-01-29 07:51:30   Date and Time of Study:2025-01-29 02:01:03          Meds given in ED:   Medications   sodium chloride 0.9 % flush 10 mL (has no administration in time range)   nitroglycerin (NITROSTAT) SL tablet 0.4 mg (has no administration in time range)   sodium chloride 0.9 % flush 10 mL (10 mL Intravenous Given 1/29/25 0803)   sodium chloride 0.9 % flush 10 mL (has no administration in time range)   sodium chloride 0.9 % infusion 40 mL (has no administration in time range)   acetaminophen (TYLENOL) tablet 650 mg (has no administration in time range)     Or   acetaminophen (TYLENOL) 160 MG/5ML oral solution 650 mg (has no administration in time range)     Or   acetaminophen (TYLENOL) suppository 650 mg (has no administration in time range)   famotidine (PEPCID) tablet 20 mg (has no administration in time range)   ondansetron (ZOFRAN) injection 4 mg (4 mg Intravenous Given 1/29/25 0734)    sodium chloride 0.9 % infusion (100 mL/hr Intravenous New Bag 25 0608)   morphine injection 2 mg (2 mg Intravenous Given 25 0756)   sodium chloride 0.9 % infusion (has no administration in time range)   morphine injection 2 mg (2 mg Intravenous Given 25 0143)   ondansetron (ZOFRAN) injection 4 mg (4 mg Intravenous Given 25 0142)   morphine injection 2 mg (2 mg Intravenous Given 25 0233)   prochlorperazine (COMPAZINE) injection 10 mg (10 mg Intravenous Given 25 0233)   iopamidol (ISOVUE-300) 61 % injection 100 mL (85 mL Intravenous Given 25 0242)       Imaging results:  XR Abdomen KUB    Result Date: 2025  Electronically signed by Juan Kaplan MD on 25 at 0504    CT Abdomen Pelvis With Contrast    Result Date: 2025  Electronically signed by Juan Kaplan MD on 25 at 0321     Ambulatory status:   - ad yessica    Social issues:   Social History     Socioeconomic History    Marital status:    Tobacco Use    Smoking status: Former     Current packs/day: 0.00     Types: Cigarettes     Quit date: 1967     Years since quittin.1     Passive exposure: Past    Smokeless tobacco: Never    Tobacco comments:     SOCIAL SMOKING ONLY   Vaping Use    Vaping status: Never Used   Substance and Sexual Activity    Alcohol use: Yes     Comment: RARE    Drug use: No    Sexual activity: Defer       Peripheral Neurovascular  Peripheral Neurovascular (Adult)  Peripheral Neurovascular WDL: WDL    Neuro Cognitive  Neuro Cognitive (Adult)  Cognitive/Neuro/Behavioral WDL: WDL  Sedation Group  POSS (Pasero Opioid-Induced Sed Scale): 1 - Awake and alert    Learning  Learning Assessment  Learning Readiness and Ability: no barriers identified    Respiratory  Respiratory  Airway WDL: WDL  Respiratory WDL  Respiratory WDL: WDL    Abdominal Pain       Pain Assessments  Pain (Adult)  (0-10) Pain Rating: Rest: 8  Pain Location: abdomen  Pain Side/Orientation: upper  Response to Pain  Interventions: cognitive function improved, mobility function improved, self-care function improved, interventions effective per patient    NIH Stroke Scale       Leon Roberts RN  01/29/25 08:41 EST

## 2025-01-29 NOTE — SIGNIFICANT NOTE
01/29/25 1448   OTHER   Discipline physical therapist   Rehab Time/Intention   Session Not Performed other (see comments)  (Spoke with nsg. Patient with ampac of 24, up ad yessica in room other than when needing assistance with line management. No skilled acute PT needs identified. PT will sign off. Recommend patient ambulate in hallway at least 3x/day.)   Therapy Assessment/Plan (PT)   Criteria for Skilled Interventions Met (PT) no;no problems identified which require skilled intervention

## 2025-01-29 NOTE — PROGRESS NOTES
Clinical Pharmacy Services: Medication History    Kiley Last is a 79 y.o. female presenting to [facility] for   Chief Complaint   Patient presents with    Abdominal Pain       She  has a past medical history of Arthritis, Asthma, Atrial fibrillation, Clostridium difficile infection (09/20/2019), Colon polyps (08/01/2019), COPD (chronic obstructive pulmonary disease), Diverticulitis, Diverticulosis, ESBL (extended spectrum beta-lactamase) producing bacteria infection, GERD (gastroesophageal reflux disease), History of abscess of skin and subcutaneous tissue, History of atrial fibrillation, Hypertension, Incisional hernia, MDRO (multiple drug resistant organisms) resistance, Occasional tremors, NEW (obstructive sleep apnea) (05/20/2021), PONV (postoperative nausea and vomiting), Psoriasis, UTI (urinary tract infection), and Vertigo.    Allergies as of 01/29/2025 - Reviewed 01/29/2025   Allergen Reaction Noted    Epinephrine Palpitations 08/09/2018    Penicillins Other (See Comments) 10/29/2016       Medication information was obtained from: Patient   Pharmacy and Phone Number:     Prior to Admission Medications       Prescriptions Last Dose Informant Patient Reported? Taking?    Acetaminophen (TYLENOL ARTHRITIS PAIN PO) Past Week Self Yes Yes    Take 650 mg by mouth 3 (Three) Times a Day As Needed.    apixaban (Eliquis) 5 MG tablet tablet 1/28/2025 Self No Yes    Take 1 tablet by mouth Every 12 (Twelve) Hours.    citalopram (CeleXA) 10 MG tablet 1/28/2025 Self No Yes    TAKE 1 TABLET BY MOUTH DAILY    cyanocobalamin 1000 MCG/ML injection Past Month Self No Yes    INJECT 1ML INTO THE APPROPRIATE MUSCLE AS DIRECTED BY PRESCRIBER EVERY 28 DAYS    Patient taking differently:  Inject 1 mL into the appropriate muscle as directed by prescriber Every 28 (Twenty-Eight) Days.    flecainide (TAMBOCOR) 50 MG tablet 1/28/2025 Pharmacy, Self No Yes    TAKE ONE TABLET BY MOUTH EVERY 12 HOURS    Patient taking  differently:  Take 1 tablet by mouth 2 (Two) Times a Day.    fluticasone (FLONASE) 50 MCG/ACT nasal spray Past Week Self, Pharmacy No Yes    2 sprays into the nostril(s) as directed by provider Daily.    losartan (COZAAR) 100 MG tablet 1/28/2025 Self, Pharmacy No Yes    TAKE 1 TABLET BY MOUTH DAILY    Magnesium 100 MG capsule Past Week Self Yes Yes    Take 1 capsule by mouth Every Night.    meclizine (ANTIVERT) 25 MG tablet Past Week Self No Yes    TAKE ONE TABLET BY MOUTH THREE TIMES A DAY AS NEEDED FOR DIZZINESS    Patient taking differently:  Take 1 tablet by mouth 3 (Three) Times a Day As Needed.    metoprolol succinate XL (TOPROL-XL) 50 MG 24 hr tablet 1/28/2025 Self, Pharmacy No Yes    TAKE ONE TABLET BY MOUTH DAILY    Patient taking differently:  Take 1 tablet by mouth Daily.    pantoprazole (Protonix) 40 MG EC tablet 1/28/2025 Pharmacy No Yes    Take 1 tablet by mouth Daily.    rosuvastatin (CRESTOR) 20 MG tablet 1/28/2025 Pharmacy, Self No Yes    TAKE 1 TABLET BY MOUTH DAILY    Zinc Sulfate (ZINC 15 PO) Past Week Self Yes Yes    Take 1 tablet by mouth Every Night.    cyanocobalamin injection 1,000 mcg   No No              Medication notes:     This medication list is complete to the best of my knowledge as of 1/29/2025    Please call if questions.    Lazaro Boucher  Medication History Technician  [phone]    1/29/2025 08:55 EST

## 2025-01-29 NOTE — CONSULTS
Patient Name: Kiley Last  :1945  79 y.o.    Date of Admission: 2025  Date of Consultation:  25  Encounter Provider: SARITHA Kruger  Place of Service: [facility] CARDIOLOGY  Referring Provider: Dong MADRID MD  Patient Care Team:  Miranda Morgan MD as PCP - General (Internal Medicine)  Miguelina Hurst APRN as Nurse Practitioner (Nurse Practitioner)  Rich Kearns MD as Consulting Physician (Cardiology)  Patrick Weeks MD as Consulting Physician (Otolaryngology)  Rossana Evans MD as Consulting Physician (Infectious Diseases)  Patrick Dong MD as Consulting Physician (Pulmonary Disease)      Chief complaint: abdominal pain    Reason for consultation: small bowel obstruction     History of Present Illness:  Ms. Last is an 79 year old woman followed by Dr. Kearns for PAF on flecainide and apixaban. She has hypertension, untreated sleep apnea, chronic fatigue, COPD and GERD.     Her  is also a patient of Dr. Kearns and unfortunately passed away last week.     She came to the emergency room this morning with abdominal pain. She has a small bowel obstruction. NG tube placed. Hopeful for conservative therapy.     She remains in SR but is strict NPO for which we have been asked to see.     Stress 10/9/2024    Breast attenuation artifact is present.    Myocardial perfusion imaging indicates a normal myocardial perfusion study with no evidence of ischemia. Impressions are consistent with a low risk study.    Left ventricular ejection fraction is hyperdynamic (Calculated EF > 70%).    Compared to the prior study from 2019 the current study reveals no changes.    Holter 24       A normal monitor study.    Underlying heart rhythm was sinus rhythm with an average heart rate of 59 bpm and a rate range of 42 bpm up to 98 bpm    26 episodes of paroxysmal SVT with the longest episode lasting 5 beats.    Patient reported  symptoms during study    Echo 3/19/24    Left ventricular systolic function is normal. Calculated left ventricular EF = 63.1% Normal left ventricular cavity size and wall thickness noted. All left ventricular wall segments contract normally. Left ventricular diastolic function was normal.    The left atrial cavity is moderately dilated. Right AtriumThe right atrial cavity is mildly dilated.    No aortic valve regurgitation or stenosis is present. The aortic valve is abnormal in structure. There is mild calcification of the aortic valve.    Mild mitral annular calcification is present. Trace mitral valve regurgitation is present.    Trace tricuspid valve regurgitation is present. Estimated right ventricular systolic pressure from tricuspid regurgitation is normal (<35 mmHg). Calculated right ventricular systolic pressure from tricuspid regurgitation is 16 mmHg.       Past Medical History:   Diagnosis Date    Arthritis     Asthma     NO INHALERS    Atrial fibrillation     Clostridium difficile infection 09/20/2019    Colon polyps 08/01/2019    Transverse colon: tubular adenoma, descending colon: fragments of tubular adenoma, sigmoid colon: ischemic eroded hyperplastic polyp    COPD (chronic obstructive pulmonary disease)     Diverticulitis     Diverticulosis     ESBL (extended spectrum beta-lactamase) producing bacteria infection     GERD (gastroesophageal reflux disease)     History of abscess of skin and subcutaneous tissue     ABD WOUND 5/2017    History of atrial fibrillation      X1 POST OP FROM COLOSTOMY 5/2017 - NO PROBLEMS SINCE    Hypertension     Incisional hernia     MDRO (multiple drug resistant organisms) resistance     Occasional tremors     NEW (obstructive sleep apnea) 05/20/2021    Overnight polysomnogram.  Weight 190 pounds.  Mild NEW with AHI 7 events per hour for total sleep time.  However, during REM moderate NEW with AHI 16.7 events per hour.  No sleep-related hypoxia.  The patient snored 5% of  total sleep time.    PONV (postoperative nausea and vomiting)     Psoriasis     UTI (urinary tract infection)     Vertigo        Past Surgical History:   Procedure Laterality Date    BELPHAROPTOSIS REPAIR Bilateral 04/27/2017    Bilateral brow ptosis repair via the anterior hairline approach under monitored local anesthesia-Andrez Craven    BLEPHAROPLASTY Bilateral 04/27/2017    BREAST BIOPSY      CATARACT EXTRACTION      COLON RESECTION N/A 5/3/2017    Procedure: OPEN SIMOID COLECTOMY WITH COLOSTOMY;  Surgeon: Renato Delgado MD;  [facility];  Service:     COLONOSCOPY N/A 9/13/2017    Procedure: COLONOSCOPY VIA COLOSTOMY TO CECUM;  Surgeon: Renato Delgado MD;  [facility] END[facility];  Service:     COLONOSCOPY N/A 8/1/2019    Procedure: COLONOSCOPY to cecum and TI with biopsy / hot snare polypectomies;  Surgeon: Dalton Vela Jr., MD;  Location: [facility] ENDOSCOPY;  Service: General    COLONOSCOPY N/A 01/04/2010    Andrez Trinidad    COLONOSCOPY N/A 11/3/2022    Procedure: COLONOSCOPY to cecum and TI with biopsy / hot snare polypectomy;  Surgeon: Trini Wade MD;  Location: [facility] ENDOSCOPY;  Service: General;  Laterality: N/A;  pre-hx diverticulitis, screen, fam hx colon ca, personal hx polyps  post-polyp, diverticulosis, small hemorrhoids    COLOSTOMY CLOSURE N/A 9/14/2017    Procedure: COLOSTOMY TAKEDOWN AND CLOSURE, WITH RESECTION;  Surgeon: Renato Delgado MD;  Location: [facility] MAIN OR;  Service:     ENDOSCOPY AND COLONOSCOPY N/A 10/31/2014    Normal EGD and colonoscopy, repeat c-scope in 5 years-Andrez Trinidad    EXPLORATORY LAPAROTOMY N/A 9/8/2019    Procedure: Exploratory laparotomy with lysis of adhesions;  Surgeon: Trini Wade MD;  Location: [facility] MAIN OR;  Service: General    FINGER SURGERY Left     4TH FINGER LEFT HAND    HYSTERECTOMY Bilateral     LAPAROSCOPIC CHOLECYSTECTOMY W/ CHOLANGIOGRAPHY N/A 07/15/2003    Dr. Marcus  Juan, [facility]    SALPINGO OOPHORECTOMY Bilateral 02/02/2006    Abdominal sacral colpopexy, bilateral salpingo-oophorectomy, tension free vaginal tape, cystoscopy-Dr. Devika Bradley, [facility]    THORACENTESIS Right 05/21/2017    US-guided right thoracentesis-Dr. Juan Yuen, [facility]    TONSILLECTOMY Bilateral     UPPER GASTROINTESTINAL ENDOSCOPY N/A 10/08/2007    Esophageal mucosal changes suspicious for short-segment Olivera's esphagus, gastric mucosal abnormality characterized by erythema: biopsied, NERD disease present, high likelihood of gastritis-Dr. Mayank Knapp, [facility]    VENTRAL/INCISIONAL HERNIA REPAIR N/A 9/22/2023    Procedure: Open incisional hernia repair with mesh;  Surgeon: Trini Wade MD;  Location: [facility] OR;  Service: General;  Laterality: N/A;         Prior to Admission medications    Medication Sig Start Date End Date Taking? Authorizing Provider   Acetaminophen (TYLENOL ARTHRITIS PAIN PO) Take 650 mg by mouth 3 (Three) Times a Day As Needed.   Yes Provider, MD Tracie   apixaban (Eliquis) 5 MG tablet tablet Take 1 tablet by mouth Every 12 (Twelve) Hours. 12/4/24  Yes Rich Kearns MD   citalopram (CeleXA) 10 MG tablet TAKE 1 TABLET BY MOUTH DAILY 12/5/24  Yes Miranda Morgan MD   cyanocobalamin 1000 MCG/ML injection INJECT 1ML INTO THE APPROPRIATE MUSCLE AS DIRECTED BY PRESCRIBER EVERY 28 DAYS  Patient taking differently: Inject 1 mL into the appropriate muscle as directed by prescriber Every 28 (Twenty-Eight) Days. 8/26/24  Yes Miranda Morgan MD   flecainide (TAMBOCOR) 50 MG tablet TAKE ONE TABLET BY MOUTH EVERY 12 HOURS  Patient taking differently: Take 1 tablet by mouth 2 (Two) Times a Day. 12/16/24  Yes Miguelina Hurst APRN   fluticasone (FLONASE) 50 MCG/ACT nasal spray 2 sprays into the nostril(s) as directed by provider Daily. 9/3/24  Yes Miranda Morgan MD   losartan (COZAAR) 100 MG tablet TAKE 1 TABLET BY MOUTH DAILY 6/20/24  Yes Rich Kearns MD   Magnesium 100 MG capsule  "Take 1 capsule by mouth Every Night.   Yes Provider, MD Tracie   meclizine (ANTIVERT) 25 MG tablet TAKE ONE TABLET BY MOUTH THREE TIMES A DAY AS NEEDED FOR DIZZINESS  Patient taking differently: Take 1 tablet by mouth 3 (Three) Times a Day As Needed. 24  Yes Miranda Morgan MD   metoprolol succinate XL (TOPROL-XL) 50 MG 24 hr tablet TAKE ONE TABLET BY MOUTH DAILY  Patient taking differently: Take 1 tablet by mouth Daily. 3/28/24  Yes Miguelina Hurst APRN   pantoprazole (Protonix) 40 MG EC tablet Take 1 tablet by mouth Daily. 25  Yes Miranda Morgan MD   rosuvastatin (CRESTOR) 20 MG tablet TAKE 1 TABLET BY MOUTH DAILY 24  Yes Yaneli Sierra APRN   Zinc Sulfate (ZINC 15 PO) Take 1 tablet by mouth Every Night.   Yes Provider, MD Tracie   azithromycin (Zithromax Z-Carlito) 250 MG tablet Take 2 tablets by mouth on day 1, then 1 tablet daily on days 2-5 25  Miranda Morgan MD   Syringe/Needle, Disp, (B-D SYRINGE/NEEDLE 1CC/25GX5/8) 25G X 5/8\" 1 ML misc USE INTRAMUSCULARLY ONCE MONTHLY WITH VITAMIN B12 24  Miranda Morgan MD       Allergies   Allergen Reactions    Epinephrine Palpitations    Penicillins Other (See Comments)     BLISTERS IN MOUTH    Patient tolerated ceftriaxone during 2017 admission.          Social History     Socioeconomic History    Marital status:    Tobacco Use    Smoking status: Former     Current packs/day: 0.00     Types: Cigarettes     Quit date:      Years since quittin.1     Passive exposure: Past    Smokeless tobacco: Never    Tobacco comments:     SOCIAL SMOKING ONLY   Vaping Use    Vaping status: Never Used   Substance and Sexual Activity    Alcohol use: Yes     Comment: RARE    Drug use: No    Sexual activity: Defer       Family History   Problem Relation Age of Onset    Cancer Mother     Colon cancer Mother     Diabetes Father     Diabetes Son     Ulcerative colitis Son     No Known Problems Maternal Grandmother     " No Known Problems Maternal Grandfather     No Known Problems Paternal Grandmother     No Known Problems Paternal Grandfather     Malig Hyperthermia Neg Hx     Breast cancer Neg Hx        REVIEW OF SYSTEMS:   All systems reviewed.  Pertinent positives identified in HPI.  All other systems are negative.      Objective:     Vitals:    01/29/25 0801 01/29/25 0901 01/29/25 1002 01/29/25 1537   BP: 114/57 109/41 134/56 138/55   BP Location:   Left arm Left arm   Patient Position:   Lying Lying   Pulse:   63    Resp:   18 18   Temp:   98.2 °F (36.8 °C) 97.5 °F (36.4 °C)   TempSrc:   Oral Oral   SpO2:   96%    Weight:   81 kg (178 lb 9.2 oz)    Height:         Body mass index is 29.72 kg/m².    Physical Exam:  Constitutional: She is oriented to person, place, and time. She appears well-developed. She does not appear ill.   HENT:   Head: Normocephalic and atraumatic. Head is without contusion.   Right Ear: Hearing normal. No drainage.   Left Ear: Hearing normal. No drainage.   Nose: No nasal deformity. No epistaxis.   Eyes: Lids are normal. Right eye exhibits no exudate. Left eye exhibits no exudate.  Neck: No JVD present. Carotid bruit is not present. No tracheal deviation present. No thyroid mass and no thyromegaly present.   Cardiovascular: Normal rate, regular rhythm and normal heart sounds.    Pulses:       Posterior tibial pulses are 2+ on the right side, and 2+ on the left side.   Pulmonary/Chest: Effort normal and breath sounds normal.   Abdominal: Soft. Normal appearance and bowel sounds are normal. There is no tenderness.   Musculoskeletal: Normal range of motion.        Right shoulder: She exhibits no deformity.        Left shoulder: She exhibits no deformity.   Neurological: She is alert and oriented to person, place, and time. She has normal strength.   Skin: Skin is warm, dry and intact. No rash noted.   Psychiatric: She has a normal mood and affect. Her behavior is normal. Thought content normal.   Vitals  reviewed      Lab Review:     Results from last 7 days   Lab Units 01/29/25  0609   SODIUM mmol/L 139   POTASSIUM mmol/L 4.9   CHLORIDE mmol/L 98   CO2 mmol/L 28.0   BUN mg/dL 25*   CREATININE mg/dL 0.70   CALCIUM mg/dL 9.8   BILIRUBIN mg/dL 0.5   ALK PHOS U/L 65   ALT (SGPT) U/L 16   AST (SGOT) U/L 21   GLUCOSE mg/dL 154*     Results from last 7 days   Lab Units 01/29/25  0326 01/29/25  0145   HSTROP T ng/L 9 <6     Results from last 7 days   Lab Units 01/29/25  0609   WBC 10*3/mm3 15.68*   HEMOGLOBIN g/dL 12.2   HEMATOCRIT % 36.7   PLATELETS 10*3/mm3 195         Results from last 7 days   Lab Units 01/29/25  0145   MAGNESIUM mg/dL 1.9                   Current Facility-Administered Medications:     acetaminophen (TYLENOL) tablet 650 mg, 650 mg, Oral, Q4H PRN **OR** acetaminophen (TYLENOL) 160 MG/5ML oral solution 650 mg, 650 mg, Oral, Q4H PRN **OR** acetaminophen (TYLENOL) suppository 650 mg, 650 mg, Rectal, Q4H PRN, Cyrus Olivas MD    [Held by provider] apixaban (ELIQUIS) tablet 5 mg, 5 mg, Oral, Q12H, Cyrus Olivas MD    [Held by provider] citalopram (CeleXA) tablet 10 mg, 10 mg, Oral, Daily, Cyrus Olivas MD    [Held by provider] flecainide (TAMBOCOR) tablet 50 mg, 50 mg, Oral, BID, Cyrus Olivas MD    [Held by provider] metoprolol succinate XL (TOPROL-XL) 24 hr tablet 50 mg, 50 mg, Oral, Daily, Cyrus Olivas MD    metoprolol tartrate (LOPRESSOR) injection 2.5 mg, 2.5 mg, Intravenous, Q6H, Cyrus Olivas MD, 2.5 mg at 01/29/25 1502    morphine injection 2 mg, 2 mg, Intravenous, Q4H PRN, Cyrus Olivas MD, 2 mg at 01/29/25 0756    nitroglycerin (NITROSTAT) SL tablet 0.4 mg, 0.4 mg, Sublingual, Q5 Min PRN, Cyrus Olivas MD    ondansetron (ZOFRAN) injection 4 mg, 4 mg, Intravenous, Q6H PRN, Cryus Olivas MD, 4 mg at 01/29/25 1258    pantoprazole (PROTONIX) injection 40 mg, 40 mg, Intravenous, Q12H, Cyrus Olivas MD    prochlorperazine (COMPAZINE)  injection 5 mg, 5 mg, Intravenous, Q6H PRN, 5 mg at 01/29/25 1510 **OR** prochlorperazine (COMPAZINE) tablet 5 mg, 5 mg, Oral, Q6H PRN **OR** prochlorperazine (COMPAZINE) suppository 25 mg, 25 mg, Rectal, Q12H PRN, Cyrus Olivas MD    [Held by provider] rosuvastatin (CRESTOR) tablet 20 mg, 20 mg, Oral, Daily, Cyrus Olivas MD    [COMPLETED] Insert Peripheral IV, , , Once **AND** sodium chloride 0.9 % flush 10 mL, 10 mL, Intravenous, PRN, Cyrus Olivas MD    sodium chloride 0.9 % flush 10 mL, 10 mL, Intravenous, Q12H, Cyrus Olivas MD, 10 mL at 01/29/25 0803    sodium chloride 0.9 % flush 10 mL, 10 mL, Intravenous, PRN, Cyrus Olivas MD    sodium chloride 0.9 % infusion 40 mL, 40 mL, Intravenous, PRN, Cyrus Olivas MD    sodium chloride 0.9 % infusion, 100 mL/hr, Intravenous, Continuous, Cyrus Olivas MD, Last Rate: 100 mL/hr at 01/29/25 1224, 100 mL/hr at 01/29/25 1224    Assessment and Plan:       Active Hospital Problems    Diagnosis  POA    Small bowel obstruction [K56.609]  Yes    Mixed hyperlipidemia [E78.2]  Yes    Paroxysmal atrial fibrillation [I48.0]  Yes    Essential hypertension [I10]  Yes      Resolved Hospital Problems   No resolved problems to display.     Small bowel obstruction - strict NPO  PAF  Obstructive sleep apnea , untreated  Hypertension    Agree with IV lopressor.   Apixaban and flecainide currently held.   Will follow.       Indira Bennett, SARITHA  01/29/25  16:20 EST

## 2025-01-29 NOTE — H&P
Name: Kiley Last ADMIT: 2025   : 1945  PCP: Miranda Morgan MD    MRN: [id number] LOS: 0 days   AGE/SEX: 79 y.o. female  ROOM: 10/10     Chief Complaint   Patient presents with    Abdominal Pain       Subjective   Patient is a 79 y.o. female who presents to [facility] with the above chief complaint.  Around 7 PM yesterday she noticed pain around her navel and across her abdomen.  She feels like it was worse than labor pain.  It was similar to when she had a previous bowel obstruction it was a sharp squeezing pain that wraps across her abdomen.  The pain persisted for 3 or 4 hours but then developed severe nausea and vomiting.  After a couple bouts of emesis and dry heaving she presented to the emergency room for further evaluation with concern she had a bowel obstruction.  She denies any blood or mucus in her emesis is mainly bilious and food.  She has noticed that since about the beginning of January she has had issues with her bowel movements and feels like things have not been normal.  She actually saw her primary care doctor on .  She has had fecal urgency and a lot of loose stools especially after eating.  Her oral intake had been less but denied any trouble swallowing.  She has been under a lot of emotional stress since the beginning of the year.  Her  was ill in the hospital and went home with hospice and passed this month.  His  is coming up next week.  In the emergency room a CT scan of her abdomen and pelvis revealed significant dilation within the bowel and NG tube was placed and admitted to our service for small bowel obstruction.    History of Present Illness    Past Medical History:   Diagnosis Date    Arthritis     Asthma     NO INHALERS    Atrial fibrillation     Clostridium difficile infection 2019    Colon polyps 2019    Transverse colon: tubular adenoma, descending colon: fragments of tubular adenoma, sigmoid colon: ischemic  eroded hyperplastic polyp    COPD (chronic obstructive pulmonary disease)     Diverticulitis     Diverticulosis     ESBL (extended spectrum beta-lactamase) producing bacteria infection     GERD (gastroesophageal reflux disease)     History of abscess of skin and subcutaneous tissue     ABD WOUND 5/2017    History of atrial fibrillation      X1 POST OP FROM COLOSTOMY 5/2017 - NO PROBLEMS SINCE    Hypertension     Incisional hernia     MDRO (multiple drug resistant organisms) resistance     Occasional tremors     NEW (obstructive sleep apnea) 05/20/2021    Overnight polysomnogram.  Weight 190 pounds.  Mild NEW with AHI 7 events per hour for total sleep time.  However, during REM moderate NEW with AHI 16.7 events per hour.  No sleep-related hypoxia.  The patient snored 5% of total sleep time.    PONV (postoperative nausea and vomiting)     Psoriasis     UTI (urinary tract infection)     Vertigo      Past Surgical History:   Procedure Laterality Date    BELPHAROPTOSIS REPAIR Bilateral 04/27/2017    Bilateral brow ptosis repair via the anterior hairline approach under monitored local anesthesia-Andrez Craven    BLEPHAROPLASTY Bilateral 04/27/2017    BREAST BIOPSY      CATARACT EXTRACTION      COLON RESECTION N/A 5/3/2017    Procedure: OPEN SIMOID COLECTOMY WITH COLOSTOMY;  Surgeon: Renato Delgado MD;  Location: [facility] MAIN OR;  Service:     COLONOSCOPY N/A 9/13/2017    Procedure: COLONOSCOPY VIA COLOSTOMY TO CECUM;  Surgeon: Renato Delgado MD;  Location: [facility] ENDOSCOPY;  Service:     COLONOSCOPY N/A 8/1/2019    Procedure: COLONOSCOPY to cecum and TI with biopsy / hot snare polypectomies;  Surgeon: Dalton Vela Jr., MD;  Location: [facility] ENDOSCOPY;  Service: General    COLONOSCOPY N/A 01/04/2010    Dr. Sivakumar Tolentino [place]    COLONOSCOPY N/A 11/3/2022    Procedure: COLONOSCOPY to cecum and TI with biopsy / hot snare polypectomy;  Surgeon: Trini Wade MD;  Location: [facility] ENDOSCOPY;   Service: General;  Laterality: N/A;  pre-hx diverticulitis, screen, fam hx colon ca, personal hx polyps  post-polyp, diverticulosis, small hemorrhoids    COLOSTOMY CLOSURE N/A 9/14/2017    Procedure: COLOSTOMY TAKEDOWN AND CLOSURE, WITH RESECTION;  Surgeon: Renato Delgado MD;  Location: [facility];  Service:     ENDOSCOPY AND COLONOSCOPY N/A 10/31/2014    Normal EGD and colonoscopy, repeat c-scope in 5 years-Dr. Sivakumar Tolentino [place]    EXPLORATORY LAPAROTOMY N/A 9/8/2019    Procedure: Exploratory laparotomy with lysis of adhesions;  Surgeon: Trini Wade MD;  Location: [facility] OR;  Service: General    FINGER SURGERY Left     4TH FINGER LEFT HAND    HYSTERECTOMY Bilateral     LAPAROSCOPIC CHOLECYSTECTOMY W/ CHOLANGIOGRAPHY N/A 07/15/2003    Dr. Amrit Martinez, [facility]    SALPINGO OOPHORECTOMY Bilateral 02/02/2006    Abdominal sacral colpopexy, bilateral salpingo-oophorectomy, tension free vaginal tape, cystoscopy-Dr. Devika Bradley, [facility]    THORACENTESIS Right 05/21/2017    US-guided right thoracentesis-Dr. Juan Yuen, [facility]    TONSILLECTOMY Bilateral     UPPER GASTROINTESTINAL ENDOSCOPY N/A 10/08/2007    Esophageal mucosal changes suspicious for short-segment Olivera's esphagus, gastric mucosal abnormality characterized by erythema: biopsied, NERD disease present, high likelihood of gastritis-Dr. Mayank Knapp, [facility]    VENTRAL/INCISIONAL HERNIA REPAIR N/A 9/22/2023    Procedure: Open incisional hernia repair with mesh;  Surgeon: Trini Wade MD;  Location: [facility];  Service: General;  Laterality: N/A;     Family History   Problem Relation Age of Onset    Cancer Mother     Colon cancer Mother     Diabetes Father     Diabetes Son     Ulcerative colitis Son     No Known Problems Maternal Grandmother     No Known Problems Maternal Grandfather     No Known Problems Paternal Grandmother     No Known Problems Paternal Grandfather     Malig Hyperthermia Neg Hx     Breast cancer Neg Hx      Social  History     Tobacco Use    Smoking status: Former     Current packs/day: 0.00     Types: Cigarettes     Quit date: 1967     Years since quittin.1     Passive exposure: Past    Smokeless tobacco: Never    Tobacco comments:     SOCIAL SMOKING ONLY   Vaping Use    Vaping status: Never Used   Substance Use Topics    Alcohol use: Yes     Comment: RARE    Drug use: No     (Not in a hospital admission)    Allergies:  Epinephrine and Penicillins    Review of Systems   As per HPI otherwise negative  Objective    Vital Signs  Temp:  [98 °F (36.7 °C)] 98 °F (36.7 °C)  Heart Rate:  [57-73] 57  Resp:  [20] 20  BP: (112-137)/(48-93) 114/57  SpO2:  [90 %-100 %] 90 %  on   ;   Device (Oxygen Therapy): room air  Body mass index is 29.12 kg/m².    Physical Exam  She is resting in the bed in no apparent distress has an NG tube to low wall suction and is alert and oriented x 4  She is clear to auscultation bilaterally with regular rate and rhythm  Her abdomen is soft is tender to palpation generally and is also noted to have bowel sounds although they are quite hypoactive.  Trace edema in the lower extremities.  Neurovascularly intact with no focal neurologic deficits speech is fluent.    Results Review:   I reviewed the patient's new clinical results.  Results from last 7 days   Lab Units 25  0609 25  0145   WBC 10*3/mm3 15.68* 16.81*   HEMOGLOBIN g/dL 12.2 13.1   PLATELETS 10*3/mm3 195 214     Results from last 7 days   Lab Units 25  0609 25  0145   SODIUM mmol/L 139 135*   POTASSIUM mmol/L 4.9 4.2   CHLORIDE mmol/L 98 97*   CO2 mmol/L 28.0 24.5   BUN mg/dL 25* 25*   CREATININE mg/dL 0.70 0.77   GLUCOSE mg/dL 154* 174*   ALBUMIN g/dL 3.9 4.2   BILIRUBIN mg/dL 0.5 0.6   ALK PHOS U/L 65 71   AST (SGOT) U/L 21 24   ALT (SGPT) U/L 16 17   Estimated Creatinine Clearance: 66.8 mL/min (by C-G formula based on SCr of 0.7 mg/dL).  Results from last 7 days   Lab Units 25  0326 25  0145   HSTROP T ng/L 9  "<6         Invalid input(s): \"LDLCALC\"  Results from last 7 days   Lab Units 01/29/25  0321   NITRITE UA  Negative     XR Abdomen KUB   Final Result         Electronically signed by Juan Kaplan MD on 01-29-25 at 0504      CT Abdomen Pelvis With Contrast   Final Result         Electronically signed by Juan Kaplan MD on 01-29-25 at 0321      FL Small Bowel Follow Through Single-Contrast    (Results Pending)     Assessment & Plan       Essential hypertension    Paroxysmal atrial fibrillation    Mixed hyperlipidemia    Small bowel obstruction      Assessment & Plan  This is a an 79-year-old lady with a history of paroxysmal A-fib hypertension hyperlipidemia COPD diverticulosis, previous exploratory laparotomy lysis of adhesions and ventral hernia repair, and multiple drug-resistant infections in the past who presents to the hospital with abdominal pain and discomfort with nausea and vomiting and is found to have a small bowel obstruction.  -She has an NG tube to low wall suction we will continue that for the time being.  Has pain medication and antiemetics ordered  -General Surgery consulted for assistance with management.  The hope is that this is a partial obstruction given she is have bowel sounds.  However she is not passing gas and has not passed any stool since the pain started.  -Will allow ice chips for oral discomfort but I did ask that she limit the usage to when needed.  She currently has strict n.p.o. orders but is actively drinking water at the bedside.  -Will hold her anticoagulation given the need for possible surgery.  -Patient is on flecainide and metoprolol for atrial fibrillation.  Will consult cardiology for assistance with management of these medications while NPO.  -Will place on IV Protonix  -Mechanical DVT prophylaxis  -Full code    I discussed the patients findings and my recommendations with patient, family, and nursing staff.    Cyrus Olivas MD  [facility]  01/29/25  08:55 EST

## 2025-01-29 NOTE — CONSULTS
General Surgery     Summary:     Kiley Last is a 79 y.o. year old with a small bowel obstruction likely related to adhesions. Abdominal exam with upper abdominal distention and tenderness without rebound or guarding. Leukocytosis, AVSS. An NG was placed to LWS overnight with about 700cc of bilious/brown output. Patient continues to have some nausea and abdominal pain, denies flatus.   Patient's  recently  this past week, she has been in the middle of  arrangements, currently the plan is to have the  this Friday, patient concerned about this.     Plan:   Small bowel follow through today, NPO, IVF, NG to LWS, hold Eliquis    Chief Complaint:    Abdominal pain, nausea, vomiting    History of Present Illness:     Kiley Last is a 79 y.o. year old with an extensive abdominal surgical history who presented to the ED with nausea, vomiting, and upper abdominal pain that began after 7pm last night. Reports the pain radiates throughout her upper abdomen.  Reports she had a small formed BM this morning. Continues to have pain and nausea after NG has been placed to LWS. Reports her last SBO was 7 years ago.  Denies hematochezia or melena, has had some on and off diarrhea for the past 2 months.    Past Medical History:   Asthma  Diverticulosis, diverticulitis  A-fib  GERD  COPD  History of C. difficile, history of ESBL, history of MDRO  Arthritis  NEW  PONV  Vertigo  Hypertension      Past Surgical History:    Open incisional hernia repair with mesh  with Dr. Wade  Exploratory laparotomy with lysis of adhesion  with Dr. Wade  Open sigmoid colectomy with colostomy  with Dr. Kohli   Open colostomy takedown  with Dr. Kohli  Hysterectomy  Tonsillectomy  Cholecystectomy    Endoscopy:  Colonoscopy  with Dr. Wade -diverticulosis, internal hemorrhoids, colon polyp (tubular adenoma)      Family History:    Mother with colon cancer, son with ulcerative colitis    Social  History:    Former smoker  Occasional alcohol use      Allergies:   Allergies   Allergen Reactions    Epinephrine Palpitations    Penicillins Other (See Comments)     BLISTERS IN MOUTH    Patient tolerated ceftriaxone during 5/2017 admission.          Medications:     Eliquis  Celexa  B12 injections  Flecainide  Flonase  Losartan  Metoprolol  Protonix  Crestor  Zinc    Radiology/Endoscopy:    CT abdomen pelvis with contrast on 1/29/2025  FINDINGS:    Limitations:  Limited due to motion.      ABDOMEN:    Liver:  Unremarkable.    Gallbladder and bile ducts:  Cholecystectomy.    Pancreas:  Unremarkable.    Spleen:  Unremarkable.    Adrenals:  Unremarkable.    Kidneys and ureters:  Unremarkable.    Stomach and bowel:  Small bowel obstruction.  Colonic diverticula.   Otherwise unremarkable bowel.  No bowel pneumatosis.      PELVIS:    Appendix:  No findings to suggest acute appendicitis.    Bladder:  Unremarkable.    Reproductive:  Hysterectomy.      ABDOMEN and PELVIS:    Intraperitoneal space:  No pneumoperitoneum.    Bones joints:  Degenerative spine findings.    Soft tissues:  Unremarkable.    Vasculature:  Atherosclerotic arterial calcifications: moderate. No   aortic aneurysm or dissection. Arterial structures otherwise unremarkable.       Lymph nodes:  Unremarkable.     IMPRESSION:       1.  Motion degraded study.  2.  Small bowel obstruction.  3.  No bowel perforation or portal venous gas.  4.  See additional findings above.     IMPRESSION:        Electronically signed by Juan Kaplan MD on 01-29-25 at 0321    Labs:    Results from last 7 days   Lab Units 01/29/25  0609 01/29/25  0145   WBC 10*3/mm3 15.68* 16.81*   HEMOGLOBIN g/dL 12.2 13.1   HEMATOCRIT % 36.7 40.0   PLATELETS 10*3/mm3 195 214     Results from last 7 days   Lab Units 01/29/25  0609 01/29/25  0145   SODIUM mmol/L 139 135*   POTASSIUM mmol/L 4.9 4.2   CHLORIDE mmol/L 98 97*   CO2 mmol/L 28.0 24.5   BUN mg/dL 25* 25*   CREATININE mg/dL 0.70 0.77    CALCIUM mg/dL 9.8 10.0   BILIRUBIN mg/dL 0.5 0.6   ALK PHOS U/L 65 71   ALT (SGPT) U/L 16 17   AST (SGOT) U/L 21 24   GLUCOSE mg/dL 154* 174*   Lactate 2  Lipase 45    BMI 29.12  Weight 79.4 kg    Vitals  Temp 98.2  HR 63  RR 18  /56  SpO2 96    Review of Systems   Constitutional:  Negative for chills and fever.   HENT:  Negative for sore throat and trouble swallowing.    Respiratory:  Negative for chest tightness and shortness of breath.    Cardiovascular:  Negative for chest pain and palpitations.   Gastrointestinal:  Positive for abdominal pain, nausea and vomiting. Negative for blood in stool.   Skin:  Negative for rash and wound.   Neurological:  Negative for dizziness, weakness and confusion.   Psychiatric/Behavioral:  The patient is not nervous/anxious.         Physical Exam  Constitutional:       General: She is not in acute distress.     Appearance: She is not ill-appearing.   HENT:      Head: Normocephalic.   Eyes:      Extraocular Movements: Extraocular movements intact.      Pupils: Pupils are equal, round, and reactive to light.   Cardiovascular:      Pulses: Normal pulses.   Pulmonary:      Effort: Pulmonary effort is normal.   Abdominal:      General: There is distension.      Palpations: Abdomen is soft.      Tenderness: There is abdominal tenderness. There is no guarding or rebound.   Skin:     General: Skin is warm and dry.      Coloration: Skin is not jaundiced.   Neurological:      General: No focal deficit present.      Mental Status: She is alert and oriented to person, place, and time.   Psychiatric:         Mood and Affect: Mood normal.         Behavior: Behavior normal.                   SARITHA Escudero   General and Endoscopic Surgery  [facility], [place], operated by [facility]    [address] [place] [address], [address]  [place], [place], [address]  P: [phone]  F: [phone]

## 2025-01-29 NOTE — ED PROVIDER NOTES
EMERGENCY DEPARTMENT ENCOUNTER    Room Number:  10/10  PCP: Miranda Morgan MD  Patient Care Team:  Miranda Morgan MD as PCP - General (Internal Medicine)  Miguelina Hurst APRN as Nurse Practitioner (Nurse Practitioner)  Rich Kearns MD as Consulting Physician (Cardiology)  Patrick Weeks MD as Consulting Physician (Otolaryngology)  Rossana Evans MD as Consulting Physician (Infectious Diseases)  Patrick Dong MD as Consulting Physician (Pulmonary Disease)   Independent Historians: Patient    HPI:  Chief Complaint: Abdominal pain, nausea vomiting    A complete HPI/ROS/PMH/PSH/SH/FH are unobtainable due to: None    Chronic or social conditions impacting patient care (Social Determinants of Health): None  (Financial Resource Strain / Food Insecurity / Transportation Needs / Physical Activity / Stress / Social Connections / Intimate Partner Violence / Housing Stability)    Context: Kiley Last is a 79 y.o. female who presents to the ED c/o acute abdominal pain and nausea vomiting started yesterday.  Patient reports no diarrhea no passage of stool or flatus last 24 hours.  Patient reports bilateral upper abdominal pain.  No pain in her chest no shortness of breath.  Patient has had significant vomiting.  Patient has prior history of colostomy that was the sequelae of perforated diverticulitis.  Patient had colostomy takedown and then developed an incisional hernia this has since been repaired.    Review of prior external notes (non-ED) -and- Review of prior external test results outside of this encounter: Please note from 9/7/2023    Prescription drug monitoring program review:         PAST MEDICAL HISTORY  Active Ambulatory Problems     Diagnosis Date Noted    Essential hypertension 05/01/2017    Family hx of colon cancer 08/28/2017    Dermatochalasis of both eyelids 04/20/2017    Paroxysmal atrial fibrillation 08/09/2018    Clostridium difficile colitis 09/20/2019    Prediabetes 12/11/2019     Vertigo 01/23/2020    Hx of small bowel obstruction 01/23/2020    Atrial tachycardia 07/10/2020    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 04/14/2021    NEW (obstructive sleep apnea) 05/20/2021    Diverticulosis 11/03/2022    Incisional hernia, without obstruction or gangrene 09/07/2023    Mixed hyperlipidemia 03/05/2024    Chest pain 10/08/2024     Resolved Ambulatory Problems     Diagnosis Date Noted    Sigmoid diverticulitis 04/30/2017    Colon obstruction 05/03/2017    Diverticulitis 05/26/2017    Pleural effusion 05/26/2017    Ileus 05/26/2017    Abdominal abscess 05/26/2017    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 05/26/2017    Colostomy present 08/28/2017    Bowel obstruction 01/26/2018    Small bowel obstruction 01/26/2018    Eyebrow ptosis 04/20/2017    Encounter for screening colonoscopy 07/05/2019    Nausea & vomiting 01/23/2020    Hypersomnia with sleep apnea 03/28/2021    Incisional hernia, without obstruction or gangrene 01/13/2022    Screening for colon cancer 09/22/2022    Encounter for colonoscopy due to history of adenomatous colonic polyps 09/22/2022     Past Medical History:   Diagnosis Date    Arthritis     Asthma     Atrial fibrillation     Clostridium difficile infection 09/20/2019    Colon polyps 08/01/2019    COPD (chronic obstructive pulmonary disease)     ESBL (extended spectrum beta-lactamase) producing bacteria infection     GERD (gastroesophageal reflux disease)     History of abscess of skin and subcutaneous tissue     History of atrial fibrillation     Hypertension     Incisional hernia     MDRO (multiple drug resistant organisms) resistance     Occasional tremors     PONV (postoperative nausea and vomiting)     Psoriasis     UTI (urinary tract infection)          PAST SURGICAL HISTORY  Past Surgical History:   Procedure Laterality Date    BELPHAROPTOSIS REPAIR Bilateral 04/27/2017    Bilateral brow ptosis repair  via the anterior hairline approach under monitored local anesthesia-Andrez Craven    BLEPHAROPLASTY Bilateral 04/27/2017    BREAST BIOPSY      CATARACT EXTRACTION      COLON RESECTION N/A 5/3/2017    Procedure: OPEN SIMOID COLECTOMY WITH COLOSTOMY;  Surgeon: Renato Delgado MD;  Location: [facility] MAIN OR;  Service:     COLONOSCOPY N/A 9/13/2017    Procedure: COLONOSCOPY VIA COLOSTOMY TO CECUM;  Surgeon: Renato Delgado MD;  Location: [facility] ENDOSCOPY;  Service:     COLONOSCOPY N/A 8/1/2019    Procedure: COLONOSCOPY to cecum and TI with biopsy / hot snare polypectomies;  Surgeon: Dalton Vela Jr., MD;  Location: [facility] ENDOSCOPY;  Service: General    COLONOSCOPY N/A 01/04/2010    Andrez Trinidad    COLONOSCOPY N/A 11/3/2022    Procedure: COLONOSCOPY to cecum and TI with biopsy / hot snare polypectomy;  Surgeon: Trini Wade MD;  Location: [facility] ENDOSCOPY;  Service: General;  Laterality: N/A;  pre-hx diverticulitis, screen, fam hx colon ca, personal hx polyps  post-polyp, diverticulosis, small hemorrhoids    COLOSTOMY CLOSURE N/A 9/14/2017    Procedure: COLOSTOMY TAKEDOWN AND CLOSURE, WITH RESECTION;  Surgeon: Renato Delgado MD;  Location: [facility] MAIN OR;  Service:     ENDOSCOPY AND COLONOSCOPY N/A 10/31/2014    Normal EGD and colonoscopy, repeat c-scope in 5 years-Andrez Trinidad    EXPLORATORY LAPAROTOMY N/A 9/8/2019    Procedure: Exploratory laparotomy with lysis of adhesions;  Surgeon: Trini Wade MD;  Location: [facility] MAIN OR;  Service: General    FINGER SURGERY Left     4TH FINGER LEFT HAND    HYSTERECTOMY Bilateral     LAPAROSCOPIC CHOLECYSTECTOMY W/ CHOLANGIOGRAPHY N/A 07/15/2003    Dr. Amrit Martinez, [facility]    SALPINGO OOPHORECTOMY Bilateral 02/02/2006    Abdominal sacral colpopexy, bilateral salpingo-oophorectomy, tension free vaginal tape, cystoscopy-Dr. Devika Bradley, [facility]    THORACENTESIS Right 05/21/2017    US-guided right thoracentesis-Dr. Martinez  Alpa, [facility]    TONSILLECTOMY Bilateral     UPPER GASTROINTESTINAL ENDOSCOPY N/A 10/08/2007    Esophageal mucosal changes suspicious for short-segment Olivera's esphagus, gastric mucosal abnormality characterized by erythema: biopsied, NERD disease present, high likelihood of gastritis-Dr. Mayank Knapp, [facility]    VENTRAL/INCISIONAL HERNIA REPAIR N/A 2023    Procedure: Open incisional hernia repair with mesh;  Surgeon: Trini Wade MD;  Location: [facility] OR;  Service: General;  Laterality: N/A;         FAMILY HISTORY  Family History   Problem Relation Age of Onset    Cancer Mother     Colon cancer Mother     Diabetes Father     Diabetes Son     Ulcerative colitis Son     No Known Problems Maternal Grandmother     No Known Problems Maternal Grandfather     No Known Problems Paternal Grandmother     No Known Problems Paternal Grandfather     Malig Hyperthermia Neg Hx     Breast cancer Neg Hx          SOCIAL HISTORY  Social History     Socioeconomic History    Marital status:    Tobacco Use    Smoking status: Former     Current packs/day: 0.00     Types: Cigarettes     Quit date:      Years since quittin.1     Passive exposure: Past    Smokeless tobacco: Never    Tobacco comments:     SOCIAL SMOKING ONLY   Vaping Use    Vaping status: Never Used   Substance and Sexual Activity    Alcohol use: Yes     Comment: RARE    Drug use: No    Sexual activity: Defer         ALLERGIES  Epinephrine and Penicillins        REVIEW OF SYSTEMS  Review of Systems  Included in HPI  All systems reviewed and negative except for those discussed in HPI.      PHYSICAL EXAM    I have reviewed the triage vital signs and nursing notes.    ED Triage Vitals [25 0122]   Temp Heart Rate Resp BP SpO2   98 °F (36.7 °C) 73 20 137/75 99 %      Temp src Heart Rate Source Patient Position BP Location FiO2 (%)   Tympanic -- -- -- --       Physical Exam  GENERAL: alert, moderate acute distress, actively vomiting  SKIN:  Warm, dry  HENT: Normocephalic, atraumatic  EYES: no scleral icterus  CV: regular rhythm, regular rate  RESPIRATORY: normal effort, lungs clear  ABDOMEN: soft, diffuse bilateral upper quadrant tenderness there is guarding but no rebound, nondistended  MUSCULOSKELETAL: no deformity  NEURO: alert, moves all extremities, follows commands                                                               LAB RESULTS  Recent Results (from the past 24 hours)   Comprehensive Metabolic Panel    Collection Time: 01/29/25  1:45 AM    Specimen: Blood   Result Value Ref Range    Glucose 174 (H) 65 - 99 mg/dL    BUN 25 (H) 8 - 23 mg/dL    Creatinine 0.77 0.57 - 1.00 mg/dL    Sodium 135 (L) 136 - 145 mmol/L    Potassium 4.2 3.5 - 5.2 mmol/L    Chloride 97 (L) 98 - 107 mmol/L    CO2 24.5 22.0 - 29.0 mmol/L    Calcium 10.0 8.6 - 10.5 mg/dL    Total Protein 7.5 6.0 - 8.5 g/dL    Albumin 4.2 3.5 - 5.2 g/dL    ALT (SGPT) 17 1 - 33 U/L    AST (SGOT) 24 1 - 32 U/L    Alkaline Phosphatase 71 39 - 117 U/L    Total Bilirubin 0.6 0.0 - 1.2 mg/dL    Globulin 3.3 gm/dL    A/G Ratio 1.3 g/dL    BUN/Creatinine Ratio 32.5 (H) 7.0 - 25.0    Anion Gap 13.5 5.0 - 15.0 mmol/L    eGFR 78.1 >60.0 mL/min/1.73   Lipase    Collection Time: 01/29/25  1:45 AM    Specimen: Blood   Result Value Ref Range    Lipase 45 13 - 60 U/L   Lactic Acid, Plasma    Collection Time: 01/29/25  1:45 AM    Specimen: Blood   Result Value Ref Range    Lactate 2.0 0.5 - 2.0 mmol/L   Magnesium    Collection Time: 01/29/25  1:45 AM    Specimen: Blood   Result Value Ref Range    Magnesium 1.9 1.6 - 2.4 mg/dL   High Sensitivity Troponin T    Collection Time: 01/29/25  1:45 AM    Specimen: Blood   Result Value Ref Range    HS Troponin T <6 <14 ng/L   CBC Auto Differential    Collection Time: 01/29/25  1:45 AM    Specimen: Blood   Result Value Ref Range    WBC 16.81 (H) 3.40 - 10.80 10*3/mm3    RBC 4.38 3.77 - 5.28 10*6/mm3    Hemoglobin 13.1 12.0 - 15.9 g/dL    Hematocrit 40.0 34.0 -  46.6 %    MCV 91.3 79.0 - 97.0 fL    MCH 29.9 26.6 - 33.0 pg    MCHC 32.8 31.5 - 35.7 g/dL    RDW 13.4 12.3 - 15.4 %    RDW-SD 44.7 37.0 - 54.0 fl    MPV 12.4 (H) 6.0 - 12.0 fL    Platelets 214 140 - 450 10*3/mm3    Neutrophil % 74.5 42.7 - 76.0 %    Lymphocyte % 17.1 (L) 19.6 - 45.3 %    Monocyte % 7.3 5.0 - 12.0 %    Eosinophil % 0.4 0.3 - 6.2 %    Basophil % 0.2 0.0 - 1.5 %    Immature Grans % 0.5 0.0 - 0.5 %    Neutrophils, Absolute 12.52 (H) 1.70 - 7.00 10*3/mm3    Lymphocytes, Absolute 2.88 0.70 - 3.10 10*3/mm3    Monocytes, Absolute 1.22 (H) 0.10 - 0.90 10*3/mm3    Eosinophils, Absolute 0.07 0.00 - 0.40 10*3/mm3    Basophils, Absolute 0.03 0.00 - 0.20 10*3/mm3    Immature Grans, Absolute 0.09 (H) 0.00 - 0.05 10*3/mm3    nRBC 0.0 0.0 - 0.2 /100 WBC   Respiratory Panel PCR w/COVID-19(SARS-CoV-2) JEREMIAH/MARGARITO/TILA/PAD/COR/PEDRO In-House, NP Swab in UTM/VTM, 2 HR TAT - Swab, Nasopharynx    Collection Time: 01/29/25  1:49 AM    Specimen: Nasopharynx; Swab   Result Value Ref Range    ADENOVIRUS, PCR Not Detected Not Detected    Coronavirus 229E Not Detected Not Detected    Coronavirus HKU1 Not Detected Not Detected    Coronavirus NL63 Not Detected Not Detected    Coronavirus OC43 Not Detected Not Detected    COVID19 Not Detected Not Detected - Ref. Range    Human Metapneumovirus Not Detected Not Detected    Human Rhinovirus/Enterovirus Not Detected Not Detected    Influenza A PCR Not Detected Not Detected    Influenza B PCR Not Detected Not Detected    Parainfluenza Virus 1 Not Detected Not Detected    Parainfluenza Virus 2 Not Detected Not Detected    Parainfluenza Virus 3 Not Detected Not Detected    Parainfluenza Virus 4 Not Detected Not Detected    RSV, PCR Not Detected Not Detected    Bordetella pertussis pcr Not Detected Not Detected    Bordetella parapertussis PCR Not Detected Not Detected    Chlamydophila pneumoniae PCR Not Detected Not Detected    Mycoplasma pneumo by PCR Not Detected Not Detected   ECG 12 Lead  Chest Pain    Collection Time: 01/29/25  2:01 AM   Result Value Ref Range    QT Interval 435 ms    QTC Interval 431 ms   Urinalysis With Microscopic If Indicated (No Culture) - Urine, Clean Catch    Collection Time: 01/29/25  3:21 AM    Specimen: Urine, Clean Catch   Result Value Ref Range    Color, UA Yellow Yellow, Straw    Appearance, UA Clear Clear    pH, UA 5.5 5.0 - 8.0    Specific Gravity, UA >1.030 (H) 1.005 - 1.030    Glucose, UA Negative Negative    Ketones, UA 15 mg/dL (1+) (A) Negative    Bilirubin, UA Negative Negative    Blood, UA Negative Negative    Protein, UA Negative Negative    Leuk Esterase, UA Negative Negative    Nitrite, UA Negative Negative    Urobilinogen, UA 0.2 E.U./dL 0.2 - 1.0 E.U./dL   High Sensitivity Troponin T 1Hr    Collection Time: 01/29/25  3:26 AM    Specimen: Blood   Result Value Ref Range    HS Troponin T 9 <14 ng/L    Troponin T Numeric Delta         I ordered the above labs and independently reviewed the results.        RADIOLOGY  XR Abdomen KUB    Result Date: 1/29/2025  Patient: TOVA CAMPOVERDE  Time Out: 05:04 Exam(s): XR ABDOMEN EXAM:   XR Abdomen, 2 Views CLINICAL HISTORY:    Reason for exam: ng tube placement verification. TECHNIQUE:   Frontal view of the abdomen pelvis with upright view of the abdomen. COMPARISON:   same day CT FINDINGS:   Lower thorax:  Hypoinflation, consider interstitial edema or atypical infection.  Cardiomegaly or prominence due to technique.   Intraperitoneal space:  No free air.   Gastrointestinal tract:  Obstructed bowel loops better seen on CT.   Tubes, lines and devices:  Enteric tube adequate position in the stomach. IMPRESSION:     1.  Hypoinflation, consider interstitial edema or atypical infection. 2.  Enteric tube adequate position in the stomach. 3.  Obstructed bowel loops better seen on CT. 4.  Cardiomegaly or prominence due to technique.     Electronically signed by Juan Kaplan MD on 01-29-25 at 0504    CT Abdomen Pelvis With  Contrast    Result Date: 1/29/2025  Patient: TOVA CAMPOVERDE  Time Out: 03:21 Exam(s): CT ABDOMEN + PELVIS With Contrast EXAM:   CT Abdomen and Pelvis With Intravenous Contrast CLINICAL HISTORY:    Reason for exam: upper abd pain. TECHNIQUE:   Axial computed tomography images of the abdomen and pelvis with intravenous contrast with coronal and sagittal reformats.  CTDI is 16.14 mGy and DLP is 752.5 mGy-cm.  This CT exam was performed according to the principle of ALARA (As Low As Reasonably Achievable) by using one or more of the following dose reduction techniques: automated exposure control, adjustment of the mA and or kV according to patient size, and or use of iterative reconstruction technique. COMPARISON:   6 2 2022 FINDINGS:   Limitations:  Limited due to motion.  ABDOMEN:   Liver:  Unremarkable.   Gallbladder and bile ducts:  Cholecystectomy.   Pancreas:  Unremarkable.   Spleen:  Unremarkable.   Adrenals:  Unremarkable.   Kidneys and ureters:  Unremarkable.   Stomach and bowel:  Small bowel obstruction.  Colonic diverticula. Otherwise unremarkable bowel.  No bowel pneumatosis.  PELVIS:   Appendix:  No findings to suggest acute appendicitis.   Bladder:  Unremarkable.   Reproductive:  Hysterectomy.  ABDOMEN and PELVIS:   Intraperitoneal space:  No pneumoperitoneum.   Bones joints:  Degenerative spine findings.   Soft tissues:  Unremarkable.   Vasculature:  Atherosclerotic arterial calcifications: moderate. No aortic aneurysm or dissection. Arterial structures otherwise unremarkable.   Lymph nodes:  Unremarkable. IMPRESSION:     1.  Motion degraded study. 2.  Small bowel obstruction. 3.  No bowel perforation or portal venous gas. 4.  See additional findings above.     Electronically signed by Juan Kpalan MD on 01-29-25 at 0321     I ordered the above noted radiological studies. Reviewed by me and discussed with radiologist.  See dictation for official radiology  interpretation.      PROCEDURES    Procedures      MEDICATIONS GIVEN IN ER    Medications   sodium chloride 0.9 % flush 10 mL (has no administration in time range)   nitroglycerin (NITROSTAT) SL tablet 0.4 mg (has no administration in time range)   sodium chloride 0.9 % flush 10 mL (has no administration in time range)   sodium chloride 0.9 % flush 10 mL (has no administration in time range)   sodium chloride 0.9 % infusion 40 mL (has no administration in time range)   acetaminophen (TYLENOL) tablet 650 mg (has no administration in time range)     Or   acetaminophen (TYLENOL) 160 MG/5ML oral solution 650 mg (has no administration in time range)     Or   acetaminophen (TYLENOL) suppository 650 mg (has no administration in time range)   famotidine (PEPCID) tablet 20 mg (has no administration in time range)   ondansetron (ZOFRAN) injection 4 mg (has no administration in time range)   sodium chloride 0.9 % infusion (has no administration in time range)   morphine injection 2 mg (2 mg Intravenous Given 1/29/25 0143)   ondansetron (ZOFRAN) injection 4 mg (4 mg Intravenous Given 1/29/25 0142)   morphine injection 2 mg (2 mg Intravenous Given 1/29/25 0233)   prochlorperazine (COMPAZINE) injection 10 mg (10 mg Intravenous Given 1/29/25 0233)   iopamidol (ISOVUE-300) 61 % injection 100 mL (85 mL Intravenous Given 1/29/25 0242)         ORDERS PLACED DURING THIS VISIT:  Orders Placed This Encounter   Procedures    Respiratory Panel PCR w/COVID-19(SARS-CoV-2) JEREMIAH/MARGARITO/TILA/PAD/COR/PEDRO In-House, NP Swab in UTM/VTM, 2 HR TAT - Swab, Nasopharynx    CT Abdomen Pelvis With Contrast    XR Abdomen KUB    Comprehensive Metabolic Panel    Lipase    Urinalysis With Microscopic If Indicated (No Culture) - Urine, Clean Catch    Lactic Acid, Plasma    Magnesium    High Sensitivity Troponin T    CBC Auto Differential    High Sensitivity Troponin T 1Hr    CBC (No Diff)    Comprehensive Metabolic Panel    NPO Diet NPO Type: Strict NPO    Monitor  Blood Pressure    Nasogastric tube insertion    Vital Signs    Telemetry - Place Orders & Notify Provider of Results When Patient Experiences Acute Chest Pain, Dysrhythmia or Respiratory Distress    May Be Off Telemetry for Tests    Notify Provider (With Default Parameters)    Up With Assistance    Intake & Output    Weigh Patient    Oral Care    Place Sequential Compression Device    Maintain Sequential Compression Device    Saline Lock & Maintain IV Access    Continuous Pulse Oximetry    Code Status and Medical Interventions: CPR (Attempt to Resuscitate); Full Support    [facility] (on-call MD unless specified) Details    Inpatient General Surgery Consult    PT Consult: Eval & Treat Discharge Placement Assessment    Oxygen Therapy- Nasal Cannula; Titrate 1-6 LPM Per SpO2; 90 - 95%    ECG 12 Lead Chest Pain    Insert Peripheral IV    Insert Peripheral IV    Inpatient Admission    Fall Precautions    CBC & Differential         PROGRESS, DATA ANALYSIS, CONSULTS, AND MEDICAL DECISION MAKING    All labs have been independently interpreted by me.  All radiology studies have been reviewed by me and discussed with radiologist dictating the report.   EKG's independently viewed and interpreted by me.  Discussion below represents my analysis of pertinent findings related to patient's condition, differential diagnosis, treatment plan and final disposition.    Differential diagnosis includes but is not limited to:  Gastritis, gastroenteritis, peptic ulcer disease, GERD, esophageal perforation, acute appendicitis, mesenteric adenitis, Meckel’s diverticulum, epiploic appendagitis, diverticulitis, colon cancer, ulcerative colitis, Crohn’s disease, intussusception, small bowel obstruction, adhesions, ischemic bowel, perforated viscus, ileus, obstipation, biliary colic, cholecystitis, cholelithiasis, Candido-Jax Jair, hepatitis, pancreatitis, common bile duct obstruction, cholangitis, bile leak, splenic trauma, splenic rupture, splenic  infarction, splenic abscess, abdominal abscess, ascites, spontaneous bacterial peritonitis, hernia, UTI, cystitis, prostatitis, ureterolithiasis, urinary obstruction, ovarian cyst, torsion, pregnancy, ectopic pregnancy, PID, pelvic abscess, mittelschmerz, endometriosis, AAA, myocardial infarction, pneumonia, cancer, porphyria, DKA, medications, sickle cell, viral syndrome, zoster  .    Clinical Scores:              ED Course as of 01/29/25 0521   Wed Jan 29, 2025   0209 EKG          EKG time: 0201  Rhythm/Rate: Sinus rhythm rate 59  P waves and ID: Normal  QRS, axis: Narrow regular  ST and T waves: Within normal limits    Interpreted Contemporaneously by me, independently viewed [TJ]   0209 WBC(!): 16.81 [TJ]   0209 Hemoglobin: 13.1 [TJ]   0209 Platelets: 214 [TJ]   0235 Lactate: 2.0 [TJ]   0235 Lipase: 45 [TJ]   0235 Creatinine: 0.77 [TJ]   0235 BUN(!): 25 [TJ]   0235 Anion Gap: 13.5 [TJ]      ED Course User Index  [TJ] Dong Osborn MD             PPE: The patient wore a mask and I wore an N95 mask throughout the entire patient encounter.       AS OF 05:21 EST VITALS:    BP - 120/48  HR - 57  TEMP - 98 °F (36.7 °C) (Tympanic)  O2 SATS - 90%        DIAGNOSIS  Final diagnoses:   SBO (small bowel obstruction)         DISPOSITION  ED Disposition       ED Disposition   Decision to Admit    Condition   --    Comment   Level of Care: Telemetry [5]   Diagnosis: Small bowel obstruction [866347]   Admitting Physician: LANDON BENSON [[id number]]   Certification: I Certify That Inpatient Hospital Services Are Medically Necessary For Greater Than 2 Midnights                    Note Disclaimer: At [facility], we believe that sharing information builds trust and better relationships. You are receiving this note because you recently visited [facility]. It is possible you will see health information before a provider has talked with you about it. This kind of information can be easy to misunderstand. To help you  fully understand what it means for your health, we urge you to discuss this note with your provider.         Dong Osborn MD  01/29/25 0585

## 2025-01-30 ENCOUNTER — APPOINTMENT (OUTPATIENT)
Dept: GENERAL RADIOLOGY | Facility: HOSPITAL | Age: 80
End: 2025-01-30
Payer: MEDICARE

## 2025-01-30 LAB
ALBUMIN SERPL-MCNC: 3.5 G/DL (ref 3.5–5.2)
ANION GAP SERPL CALCULATED.3IONS-SCNC: 10.4 MMOL/L (ref 5–15)
BASOPHILS # BLD AUTO: 0.02 10*3/MM3 (ref 0–0.2)
BASOPHILS NFR BLD AUTO: 0.2 % (ref 0–1.5)
BUN SERPL-MCNC: 26 MG/DL (ref 8–23)
BUN/CREAT SERPL: 37.7 (ref 7–25)
CALCIUM SPEC-SCNC: 9.3 MG/DL (ref 8.6–10.5)
CHLORIDE SERPL-SCNC: 105 MMOL/L (ref 98–107)
CO2 SERPL-SCNC: 27.6 MMOL/L (ref 22–29)
CREAT SERPL-MCNC: 0.69 MG/DL (ref 0.57–1)
DEPRECATED RDW RBC AUTO: 43.6 FL (ref 37–54)
EGFRCR SERPLBLD CKD-EPI 2021: 87.9 ML/MIN/1.73
EOSINOPHIL # BLD AUTO: 0.02 10*3/MM3 (ref 0–0.4)
EOSINOPHIL NFR BLD AUTO: 0.2 % (ref 0.3–6.2)
ERYTHROCYTE [DISTWIDTH] IN BLOOD BY AUTOMATED COUNT: 13.3 % (ref 12.3–15.4)
GLUCOSE SERPL-MCNC: 122 MG/DL (ref 65–99)
HCT VFR BLD AUTO: 35.4 % (ref 34–46.6)
HGB BLD-MCNC: 12.2 G/DL (ref 12–15.9)
IMM GRANULOCYTES # BLD AUTO: 0.07 10*3/MM3 (ref 0–0.05)
IMM GRANULOCYTES NFR BLD AUTO: 0.7 % (ref 0–0.5)
LYMPHOCYTES # BLD AUTO: 1.42 10*3/MM3 (ref 0.7–3.1)
LYMPHOCYTES NFR BLD AUTO: 13.2 % (ref 19.6–45.3)
MAGNESIUM SERPL-MCNC: 2 MG/DL (ref 1.6–2.4)
MCH RBC QN AUTO: 31.2 PG (ref 26.6–33)
MCHC RBC AUTO-ENTMCNC: 34.5 G/DL (ref 31.5–35.7)
MCV RBC AUTO: 90.5 FL (ref 79–97)
MONOCYTES # BLD AUTO: 1.09 10*3/MM3 (ref 0.1–0.9)
MONOCYTES NFR BLD AUTO: 10.1 % (ref 5–12)
NEUTROPHILS NFR BLD AUTO: 75.6 % (ref 42.7–76)
NEUTROPHILS NFR BLD AUTO: 8.13 10*3/MM3 (ref 1.7–7)
NRBC BLD AUTO-RTO: 0 /100 WBC (ref 0–0.2)
PHOSPHATE SERPL-MCNC: 3 MG/DL (ref 2.5–4.5)
PLATELET # BLD AUTO: 186 10*3/MM3 (ref 140–450)
PMV BLD AUTO: 12.1 FL (ref 6–12)
POTASSIUM SERPL-SCNC: 3.7 MMOL/L (ref 3.5–5.2)
RBC # BLD AUTO: 3.91 10*6/MM3 (ref 3.77–5.28)
SODIUM SERPL-SCNC: 143 MMOL/L (ref 136–145)
WBC NRBC COR # BLD AUTO: 10.75 10*3/MM3 (ref 3.4–10.8)

## 2025-01-30 PROCEDURE — 99232 SBSQ HOSP IP/OBS MODERATE 35: CPT | Performed by: NURSE PRACTITIONER

## 2025-01-30 PROCEDURE — 85025 COMPLETE CBC W/AUTO DIFF WBC: CPT | Performed by: HOSPITALIST

## 2025-01-30 PROCEDURE — 80069 RENAL FUNCTION PANEL: CPT | Performed by: HOSPITALIST

## 2025-01-30 PROCEDURE — 99232 SBSQ HOSP IP/OBS MODERATE 35: CPT | Performed by: SURGERY

## 2025-01-30 PROCEDURE — 71046 X-RAY EXAM CHEST 2 VIEWS: CPT

## 2025-01-30 PROCEDURE — 83735 ASSAY OF MAGNESIUM: CPT | Performed by: HOSPITALIST

## 2025-01-30 RX ADMIN — Medication 10 ML: at 21:22

## 2025-01-30 RX ADMIN — ACETAMINOPHEN 650 MG: 325 TABLET, FILM COATED ORAL at 21:22

## 2025-01-30 RX ADMIN — ACETAMINOPHEN 650 MG: 325 TABLET, FILM COATED ORAL at 08:19

## 2025-01-30 RX ADMIN — Medication 10 ML: at 08:19

## 2025-01-30 RX ADMIN — PANTOPRAZOLE SODIUM 40 MG: 40 INJECTION, POWDER, FOR SOLUTION INTRAVENOUS at 08:19

## 2025-01-30 RX ADMIN — PANTOPRAZOLE SODIUM 40 MG: 40 INJECTION, POWDER, FOR SOLUTION INTRAVENOUS at 21:22

## 2025-01-30 RX ADMIN — METOPROLOL TARTRATE 2.5 MG: 5 INJECTION INTRAVENOUS at 08:20

## 2025-01-30 RX ADMIN — FLECAINIDE ACETATE TABLET 50 MG: 50 TABLET ORAL at 21:22

## 2025-01-30 NOTE — PROGRESS NOTES
Ded[facility] to Hospital Care    [phone]   LOS: 1 day     Name: Kiley Last  Age/Sex: 79 y.o. female  :  1945        PCP: Miranda Morgan MD  Chief Complaint   Patient presents with    Abdominal Pain      Subjective   Feeling much better today.  NG tube has been removed.  Still no return of bowel movements or flatus.  General: No Fever or Chills, Cardiac: No Chest Pain or Palpitations, Resp: No Cough or SOA, GI: No Nausea, Vomiting, or Diarrhea, and Other: No bleeding    [Held by provider] apixaban, 5 mg, Oral, Q12H  [Held by provider] citalopram, 10 mg, Oral, Daily  [Held by provider] flecainide, 50 mg, Oral, BID  [Held by provider] metoprolol succinate XL, 50 mg, Oral, Daily  metoprolol tartrate, 2.5 mg, Intravenous, Q6H  pantoprazole, 40 mg, Intravenous, Q12H  [Held by provider] rosuvastatin, 20 mg, Oral, Daily  sodium chloride, 10 mL, Intravenous, Q12H           Objective   Vital Signs  Temp:  [97.3 °F (36.3 °C)-98.2 °F (36.8 °C)] 97.3 °F (36.3 °C)  Heart Rate:  [] 91  Resp:  [18] 18  BP: (134-145)/(54-80) 139/64  Body mass index is 29.72 kg/m².  No intake or output data in the 24 hours ending 25 0939    Physical Exam  Vitals and nursing note reviewed.   Constitutional:       Appearance: She is obese. She is ill-appearing.   Cardiovascular:      Rate and Rhythm: Normal rate. Rhythm irregular.   Pulmonary:      Effort: No respiratory distress.      Breath sounds: Normal breath sounds.   Abdominal:      General: Bowel sounds are normal. There is no distension.      Palpations: Abdomen is soft.      Tenderness: There is abdominal tenderness.   Neurological:      General: No focal deficit present.      Mental Status: She is alert and oriented to person, place, and time. Mental status is at baseline.           Results Review:       I reviewed the patient's new clinical results.  Results from last 7 days   Lab Units 25  0743 25  0609 25  0145   WBC 10*3/mm3 10.75 15.68*  16.81*   HEMOGLOBIN g/dL 12.2 12.2 13.1   PLATELETS 10*3/mm3 186 195 214     Results from last 7 days   Lab Units 01/30/25  0743 01/29/25  0609 01/29/25  0145   SODIUM mmol/L 143 139 135*   POTASSIUM mmol/L 3.7 4.9 4.2   CHLORIDE mmol/L 105 98 97*   CO2 mmol/L 27.6 28.0 24.5   BUN mg/dL 26* 25* 25*   CREATININE mg/dL 0.69 0.70 0.77   CALCIUM mg/dL 9.3 9.8 10.0   MAGNESIUM mg/dL 2.0  --  1.9   PHOSPHORUS mg/dL 3.0  --   --    Estimated Creatinine Clearance: 68.4 mL/min (by C-G formula based on SCr of 0.69 mg/dL).      Assessment & Plan   Active Hospital Problems    Diagnosis  POA    Small bowel obstruction [K56.609]  Yes    Mixed hyperlipidemia [E78.2]  Yes    Paroxysmal atrial fibrillation [I48.0]  Yes    Essential hypertension [I10]  Yes      Resolved Hospital Problems   No resolved problems to display.       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   -Likely partial small bowel obstruction based on small bowel follow-through  - NG tube removed and tolerating clear liquids.  - Recommend continue to hold Eliquis for now but can probably restart her oral beta-blocker and flecainide.  -Continue IV Protonix  - Mechanical DVT prophylaxis  Full code      Disposition  Expected Discharge Date: 2/2/2025; Expected Discharge Time:        Cyrus Olivas MD  [facility]  01/30/25  09:39 EST

## 2025-01-30 NOTE — PLAN OF CARE
Goal Outcome Evaluation:  Plan of Care Reviewed With: patient        Progress: improving  Outcome Evaluation: Patient resting well this shift.  VSS, although DBP slightly low.  No pain noted.  Pt reports liquids stools.  Nominal output from NG tube this shift.  Will monitor labs and reassess as needed.

## 2025-01-30 NOTE — PROGRESS NOTES
Discharge Planning Assessment  [facility]     Patient Name: Kiley Last  MRN: [id number]  Today's Date: 1/30/2025    Admit Date: 1/29/2025    Plan: Return home with her adult children   Discharge Needs Assessment       Row Name 01/30/25 1530       Living Environment    People in Home child(claire), adult    Name(s) of People in Home are lencho Last [phone] lives with her    Current Living Arrangements home    Family Caregiver if Needed child(claire), adult    Family Caregiver Names johana Salas [phone] and son Drew Last [phone]    Quality of Family Relationships involved;supportive;helpful    Able to Return to Prior Arrangements yes       Transition Planning    Patient/Family Anticipates Transition to home with family    Patient/Family Anticipated Services at Transition none    Transportation Anticipated family or friend will provide       Discharge Needs Assessment    Equipment Currently Used at Home none    Concerns to be Addressed no discharge needs identified    Equipment Needed After Discharge none    Provided Post Acute Provider List? N/A    N/A Provider List Comment denies any discharge needs    Patient's Choice of Community Agency(s) denies any discharge needs      Row Name 01/30/25 1527       Living Environment    Current Living Arrangements home    Primary Care Provided by self                   Discharge Plan       Row Name 01/30/25 1534       Plan    Plan Return home with her adult children    Plan Comments spoke with patient at bedside face sheet verified. She stated her adult son Drew Last [phone] lives with her. She is alone during the day. She stated she is independent with ADL's and denies using any medical equipment. She stated she has good family support. She denies the need for home health. [facility]                  Continued Care and Services - Admitted Since 1/29/2025    No active coordination exists for this encounter.       Expected Discharge Date and Time        Expected Discharge Date Expected Discharge Time    Feb 2, 2025            Demographic Summary       Row Name 01/30/25 1529       General Information    Arrived From emergency department      Row Name 01/30/25 1526       General Information    Admission Type inpatient    Arrived From emergency department    Referral Source admission list    Reason for Consult discharge planning    Preferred Language English                   Functional Status       Row Name 01/30/25 1530       Functional Status    Usual Activity Tolerance moderate    Current Activity Tolerance moderate      Row Name 01/30/25 1527       Functional Status    Usual Activity Tolerance moderate    Current Activity Tolerance moderate       Functional Status, IADL    Medications independent    Meal Preparation independent    Housekeeping independent    Laundry independent    Shopping independent                   Psychosocial    No documentation.                  Abuse/Neglect    No documentation.                  Legal    No documentation.                  Substance Abuse    No documentation.                  Patient Forms    No documentation.                     Nettie Martinez, RN

## 2025-01-30 NOTE — PLAN OF CARE
Problem: Adult Inpatient Plan of Care  Goal: Plan of Care Review  1/30/2025 1505 by Loren Goss RN  Outcome: Progressing  Flowsheets (Taken 1/30/2025 1504)  Outcome Evaluation: VSS, room air, up ad yessica, tolerating clear liquid diet, NGT removed, no complaints of nausea, advance diet to regular in AM, in good spirits.  1/30/2025 1504 by Loren Goss RN  Outcome: Progressing  Flowsheets (Taken 1/30/2025 1504)  Progress: improving  Outcome Evaluation: VSS, room air, up ad yessica, tolerating clear liquid diet, advance diet to regular in AM, in good spirits.  Plan of Care Reviewed With: patient  Goal: Patient-Specific Goal (Individualized)  1/30/2025 1505 by Loren Goss RN  Outcome: Progressing  1/30/2025 1504 by Loren Goss RN  Outcome: Progressing  Goal: Absence of Hospital-Acquired Illness or Injury  1/30/2025 1505 by Loren Goss RN  Outcome: Progressing  1/30/2025 1504 by Loren Goss RN  Outcome: Progressing  Intervention: Identify and Manage Fall Risk  Recent Flowsheet Documentation  Taken 1/30/2025 1420 by Loren Goss RN  Safety Promotion/Fall Prevention:   assistive device/personal items within reach   clutter free environment maintained   nonskid shoes/slippers when out of bed   room organization consistent   safety round/check completed  Taken 1/30/2025 1232 by Loren Goss RN  Safety Promotion/Fall Prevention:   assistive device/personal items within reach   clutter free environment maintained   nonskid shoes/slippers when out of bed   room organization consistent   safety round/check completed  Taken 1/30/2025 1000 by Loren Goss RN  Safety Promotion/Fall Prevention:   assistive device/personal items within reach   clutter free environment maintained   nonskid shoes/slippers when out of bed   room organization consistent   safety round/check completed  Taken 1/30/2025 0816 by Loren Goss RN  Safety Promotion/Fall Prevention:    activity supervised   assistive device/personal items within reach   clutter free environment maintained   fall prevention program maintained   nonskid shoes/slippers when out of bed   room organization consistent   safety round/check completed  Intervention: Prevent Skin Injury  Recent Flowsheet Documentation  Taken 1/30/2025 1420 by Loren Goss RN  Body Position: supine  Taken 1/30/2025 1232 by Loren Goss RN  Body Position: supine  Taken 1/30/2025 1000 by Loren Goss RN  Body Position: supine  Taken 1/30/2025 0816 by Loren Goss RN  Body Position:   supine   position changed independently  Goal: Optimal Comfort and Wellbeing  1/30/2025 1505 by Loren Goss RN  Outcome: Progressing  1/30/2025 1504 by Loren Goss RN  Outcome: Progressing  Goal: Readiness for Transition of Care  1/30/2025 1505 by Loren Goss RN  Outcome: Progressing  1/30/2025 1504 by Loren Goss RN  Outcome: Progressing     Problem: Hospitalized Older Adult  Goal: Optimal Cognitive Function  1/30/2025 1505 by Loren Goss RN  Outcome: Progressing  1/30/2025 1504 by Loren Goss RN  Outcome: Progressing  Goal: Effective Bowel Elimination  1/30/2025 1505 by Loren Goss RN  Outcome: Progressing  1/30/2025 1504 by Loren Goss RN  Outcome: Progressing  Goal: Optimal Coping  1/30/2025 1505 by Loren Goss RN  Outcome: Progressing  1/30/2025 1504 by Loren Goss RN  Outcome: Progressing  Goal: Fluid and Electrolyte Balance  1/30/2025 1505 by Loren Goss RN  Outcome: Progressing  1/30/2025 1504 by Loren Goss RN  Outcome: Progressing  Goal: Optimal Functional Ability  1/30/2025 1505 by Loren Goss RN  Outcome: Progressing  1/30/2025 1504 by Loren Goss RN  Outcome: Progressing  Intervention: Promote Functional Ability  Recent Flowsheet Documentation  Taken 1/30/2025 1420 by Loren Goss, RN  Activity Management:  up in chair  Taken 1/30/2025 1000 by Loren Goss RN  Activity Management:   up ad yessica   activity encouraged  Taken 1/30/2025 0816 by Loren Goss RN  Activity Management: up ad yessica  Goal: Improved Oral Intake  1/30/2025 1505 by Loren Goss RN  Outcome: Progressing  1/30/2025 1504 by Loren Goss RN  Outcome: Progressing  Goal: Adequate Sleep/Rest  1/30/2025 1505 by Loren Goss RN  Outcome: Progressing  1/30/2025 1504 by Loren Goss RN  Outcome: Progressing  Goal: Effective Urinary Elimination  1/30/2025 1505 by Loren Goss RN  Outcome: Progressing  1/30/2025 1504 by Loren Goss RN  Outcome: Progressing   Goal Outcome Evaluation:

## 2025-01-30 NOTE — PROGRESS NOTES
"General Surgery  Progress Note    CC: Follow-up small bowel obstruction    S: Her abdominal pain has resolved as has her nausea and vomiting.  She has had multiple loose stools after undergoing Gastrografin small bowel follow-through yesterday.  The small bowel follow-through showed quick passage of contrast into the ascending colon suggesting this is only a mild and partial obstruction.    ROS: Denies nausea, vomiting, or abdominal pain today    O:/61 (BP Location: Left arm, Patient Position: Lying)   Pulse 99   Temp 97.9 °F (36.6 °C) (Oral)   Resp 18   Ht 165.1 cm (65\")   Wt 81 kg (178 lb 9.2 oz)   LMP  (LMP Unknown)   SpO2 97%   BMI 29.72 kg/m²     GENERAL: alert, well appearing, and in no distress  HEENT: normocephalic, atraumatic, moist mucous membranes, clear sclerae   CHEST: clear to auscultation, no wheezes, rales or rhonchi, symmetric air entry  CARDIAC: regular rate and rhythm    ABDOMEN: Soft, nontender, nondistended  EXTREMITIES: no cyanosis, clubbing, or edema   SKIN: Warm and moist, no rashes    LABS  Results from last 7 days   Lab Units 01/30/25  0743 01/29/25  0609 01/29/25  0145   WBC 10*3/mm3 10.75 15.68* 16.81*   HEMOGLOBIN g/dL 12.2 12.2 13.1   HEMATOCRIT % 35.4 36.7 40.0   PLATELETS 10*3/mm3 186 195 214     Results from last 7 days   Lab Units 01/30/25  0743 01/29/25  0609 01/29/25  0145   SODIUM mmol/L 143 139 135*   POTASSIUM mmol/L 3.7 4.9 4.2   CHLORIDE mmol/L 105 98 97*   CO2 mmol/L 27.6 28.0 24.5   BUN mg/dL 26* 25* 25*   CREATININE mg/dL 0.69 0.70 0.77   CALCIUM mg/dL 9.3 9.8 10.0   BILIRUBIN mg/dL  --  0.5 0.6   ALK PHOS U/L  --  65 71   ALT (SGPT) U/L  --  16 17   AST (SGOT) U/L  --  21 24   GLUCOSE mg/dL 122* 154* 174*             A/P: 79 y.o. female with partial small bowel obstruction, resolving    Discontinue nasogastric tube and begin clear liquid diet.  I will advance her to a regular diet starting with breakfast tomorrow.  There is nothing at this time that would " warrant urgent surgical intervention and she was relieved.  I will continue to follow along and am hopeful she might be ready for discharge home as early as tomorrow afternoon.    Trini Wade MD  General, Robotic, and Endoscopic Surgery  [facility]    [address], [address]  [place] [address]  P: [phone]  F: [phone]

## 2025-01-30 NOTE — PROGRESS NOTES
LOS: 1 day   Patient Care Team:  Miranda Morgan MD as PCP - General (Internal Medicine)  Miguelina Hurst APRN as Nurse Practitioner (Nurse Practitioner)  Rich Kearns MD as Consulting Physician (Cardiology)  Patrick Weeks MD as Consulting Physician (Otolaryngology)  Rossana Evans MD as Consulting Physician (Infectious Diseases)  Patrick Dong MD as Consulting Physician (Pulmonary Disease)      Chief Complaint: Follow up atrial fibrillation, abdominal pain, SBO       Interval History: She is doing better today.  She does report chest congestion and coughing.  Reportedly she completed antibiotics last week for rhinovirus.        Objective   Vital Signs  Temp:  [97.3 °F (36.3 °C)-98.2 °F (36.8 °C)] 97.3 °F (36.3 °C)  Heart Rate:  [] 99  Resp:  [18] 18  BP: (136-145)/(54-80) 139/64  No intake or output data in the 24 hours ending 01/30/25 1015      Physical Exam:  Constitutional: Well appearing, well developed, no acute distress   HENT: Oropharynx clear and membrane moist  Eyes: Normal conjunctiva, no sclera icterus.  Neck: Supple, no carotid bruit bilaterally.  Cardiovascular: Regular rate and rhythm, No Murmur, No bilateral lower extremity edema.  Pulmonary: Normal respiratory effort, rales in the lower bases, no wheezing.  Abdominal: Soft, nontender, no hepatosplenomegaly, liver is non-pulsatile.  Neurological: Alert and orient x 3.   Skin: Warm, dry, no ecchymosis, no rash.  Psych: Appropriate mood and affect. Normal judgment and insight.      Results Review:      Results from last 7 days   Lab Units 01/30/25  0743 01/29/25  0609 01/29/25  0145   SODIUM mmol/L 143 139 135*   POTASSIUM mmol/L 3.7 4.9 4.2   CHLORIDE mmol/L 105 98 97*   CO2 mmol/L 27.6 28.0 24.5   BUN mg/dL 26* 25* 25*   CREATININE mg/dL 0.69 0.70 0.77   GLUCOSE mg/dL 122* 154* 174*   CALCIUM mg/dL 9.3 9.8 10.0     Results from last 7 days   Lab Units 01/29/25  0326 01/29/25  0145   HSTROP T ng/L 9 <6     Results from last 7  days   Lab Units 01/30/25  0743 01/29/25  0609 01/29/25  0145   WBC 10*3/mm3 10.75 15.68* 16.81*   HEMOGLOBIN g/dL 12.2 12.2 13.1   HEMATOCRIT % 35.4 36.7 40.0   PLATELETS 10*3/mm3 186 195 214             Results from last 7 days   Lab Units 01/30/25  0743   MAGNESIUM mg/dL 2.0           I reviewed the patient's new clinical results.  I personally viewed and interpreted the patient's EKG/Telemetry data        Medication Review:   [Held by provider] apixaban, 5 mg, Oral, Q12H  [Held by provider] citalopram, 10 mg, Oral, Daily  [Held by provider] flecainide, 50 mg, Oral, BID  [Held by provider] metoprolol succinate XL, 50 mg, Oral, Daily  metoprolol tartrate, 2.5 mg, Intravenous, Q6H  pantoprazole, 40 mg, Intravenous, Q12H  [Held by provider] rosuvastatin, 20 mg, Oral, Daily  sodium chloride, 10 mL, Intravenous, Q12H             Assessment & Plan       Essential hypertension    Paroxysmal atrial fibrillation    Mixed hyperlipidemia    Small bowel obstruction    1.  Small bowel obstruction.  Surgery recommends conservative management.  NG tube removed. She is now on liquid diet.   2.  Paroxysmal atrial fibrillation.  She is maintaining sinus rhythm.  Flecainide and Eliquis currently on hold secondary to n.p.o. status.  3.  Hypertension.  Stable.  4.  Chest congestion/cough. She has rales in the lower bases, and I ordered a CXR.      SARITHA Kat  01/30/25  10:15 EST

## 2025-01-31 ENCOUNTER — READMISSION MANAGEMENT (OUTPATIENT)
Dept: CALL CENTER | Facility: HOSPITAL | Age: 80
End: 2025-01-31
Payer: MEDICARE

## 2025-01-31 VITALS
HEIGHT: 65 IN | SYSTOLIC BLOOD PRESSURE: 136 MMHG | WEIGHT: 178.57 LBS | HEART RATE: 87 BPM | DIASTOLIC BLOOD PRESSURE: 57 MMHG | BODY MASS INDEX: 29.75 KG/M2 | RESPIRATION RATE: 18 BRPM | OXYGEN SATURATION: 93 % | TEMPERATURE: 98.1 F

## 2025-01-31 LAB
ALBUMIN SERPL-MCNC: 3.6 G/DL (ref 3.5–5.2)
ANION GAP SERPL CALCULATED.3IONS-SCNC: 7.3 MMOL/L (ref 5–15)
BASOPHILS # BLD AUTO: 0.03 10*3/MM3 (ref 0–0.2)
BASOPHILS NFR BLD AUTO: 0.4 % (ref 0–1.5)
BUN SERPL-MCNC: 16 MG/DL (ref 8–23)
BUN/CREAT SERPL: 25.4 (ref 7–25)
CALCIUM SPEC-SCNC: 9.2 MG/DL (ref 8.6–10.5)
CHLORIDE SERPL-SCNC: 104 MMOL/L (ref 98–107)
CO2 SERPL-SCNC: 27.7 MMOL/L (ref 22–29)
CREAT SERPL-MCNC: 0.63 MG/DL (ref 0.57–1)
DEPRECATED RDW RBC AUTO: 44.7 FL (ref 37–54)
EGFRCR SERPLBLD CKD-EPI 2021: 89.8 ML/MIN/1.73
EOSINOPHIL # BLD AUTO: 0.14 10*3/MM3 (ref 0–0.4)
EOSINOPHIL NFR BLD AUTO: 1.7 % (ref 0.3–6.2)
ERYTHROCYTE [DISTWIDTH] IN BLOOD BY AUTOMATED COUNT: 13.1 % (ref 12.3–15.4)
GLUCOSE SERPL-MCNC: 95 MG/DL (ref 65–99)
HCT VFR BLD AUTO: 36.1 % (ref 34–46.6)
HGB BLD-MCNC: 11.9 G/DL (ref 12–15.9)
IMM GRANULOCYTES # BLD AUTO: 0.04 10*3/MM3 (ref 0–0.05)
IMM GRANULOCYTES NFR BLD AUTO: 0.5 % (ref 0–0.5)
LYMPHOCYTES # BLD AUTO: 2.7 10*3/MM3 (ref 0.7–3.1)
LYMPHOCYTES NFR BLD AUTO: 32.1 % (ref 19.6–45.3)
MAGNESIUM SERPL-MCNC: 1.9 MG/DL (ref 1.6–2.4)
MCH RBC QN AUTO: 30.4 PG (ref 26.6–33)
MCHC RBC AUTO-ENTMCNC: 33 G/DL (ref 31.5–35.7)
MCV RBC AUTO: 92.1 FL (ref 79–97)
MONOCYTES # BLD AUTO: 0.78 10*3/MM3 (ref 0.1–0.9)
MONOCYTES NFR BLD AUTO: 9.3 % (ref 5–12)
NEUTROPHILS NFR BLD AUTO: 4.73 10*3/MM3 (ref 1.7–7)
NEUTROPHILS NFR BLD AUTO: 56 % (ref 42.7–76)
NRBC BLD AUTO-RTO: 0 /100 WBC (ref 0–0.2)
PHOSPHATE SERPL-MCNC: 2.9 MG/DL (ref 2.5–4.5)
PLATELET # BLD AUTO: 183 10*3/MM3 (ref 140–450)
PMV BLD AUTO: 12.2 FL (ref 6–12)
POTASSIUM SERPL-SCNC: 3.5 MMOL/L (ref 3.5–5.2)
RBC # BLD AUTO: 3.92 10*6/MM3 (ref 3.77–5.28)
SODIUM SERPL-SCNC: 139 MMOL/L (ref 136–145)
WBC NRBC COR # BLD AUTO: 8.42 10*3/MM3 (ref 3.4–10.8)

## 2025-01-31 PROCEDURE — 99231 SBSQ HOSP IP/OBS SF/LOW 25: CPT

## 2025-01-31 PROCEDURE — 99231 SBSQ HOSP IP/OBS SF/LOW 25: CPT | Performed by: INTERNAL MEDICINE

## 2025-01-31 PROCEDURE — 85025 COMPLETE CBC W/AUTO DIFF WBC: CPT | Performed by: HOSPITALIST

## 2025-01-31 PROCEDURE — 80069 RENAL FUNCTION PANEL: CPT | Performed by: HOSPITALIST

## 2025-01-31 PROCEDURE — 83735 ASSAY OF MAGNESIUM: CPT | Performed by: HOSPITALIST

## 2025-01-31 RX ADMIN — FLECAINIDE ACETATE TABLET 50 MG: 50 TABLET ORAL at 09:26

## 2025-01-31 RX ADMIN — METOPROLOL SUCCINATE 50 MG: 50 TABLET, EXTENDED RELEASE ORAL at 09:26

## 2025-01-31 RX ADMIN — Medication 10 ML: at 09:26

## 2025-01-31 RX ADMIN — ROSUVASTATIN CALCIUM 20 MG: 20 TABLET, FILM COATED ORAL at 09:26

## 2025-01-31 RX ADMIN — CITALOPRAM HYDROBROMIDE 10 MG: 10 TABLET ORAL at 09:26

## 2025-01-31 RX ADMIN — PANTOPRAZOLE SODIUM 40 MG: 40 INJECTION, POWDER, FOR SOLUTION INTRAVENOUS at 09:25

## 2025-01-31 NOTE — DISCHARGE SUMMARY
Patient Name: Kiley Last  : 1945  MRN: [id number]    Date of Admission: 2025  Date of Discharge:  2025  Primary Care Physician: Miranda Morgan MD      Chief Complaint:   Abdominal Pain      Discharge Diagnoses     Active Hospital Problems    Diagnosis  POA    Small bowel obstruction [K56.609]  Yes    Mixed hyperlipidemia [E78.2]  Yes    Paroxysmal atrial fibrillation [I48.0]  Yes    Essential hypertension [I10]  Yes      Resolved Hospital Problems   No resolved problems to display.        Hospital Course     Ms. Last is a 79 y.o. female with a history of previous abdominal surgeries, hypertension, hyperlipidemia and paroxysmal A-fib who presented to [facility] initially complaining of abdominal pain nausea and vomiting.  Please see the admitting history and physical for further details.  She was found to have small bowel obstruction and was admitted to the hospital for further evaluation and treatment.  She is admitted to the hospital NG tube was placed and was started on IV fluids.  She had medications for pain provided for pain and discomfort.  An upper GI and small bowel follow-through was performed and contrast passed all the way through to the colon consistent with a partial small bowel obstruction.  Her NG tube was removed she was continued on IV fluids and diet was initiated.  It has been slowly advanced and at this point she is tolerating a regular diet as had return of GI function.  The plan is to allow her to return home.  We discussed dehydration and other things that can bring about these partial obstructions.  All questions were addressed and answered.  She can follow-up with general surgery as needed and follow-up with her primary care physician in 2 to 3 weeks.      Day of Discharge     Subjective:  She feels well today other than she has some congestion and cough.  She has had this since she came in respiratory viral panel was negative.  She is not  having any fevers.    Physical Exam:  Temp:  [97.9 °F (36.6 °C)-98.4 °F (36.9 °C)] 98.1 °F (36.7 °C)  Heart Rate:  [71-81] 71  Resp:  [18] 18  BP: (136-150)/(57-64) 136/57  Body mass index is 29.72 kg/m².  Physical Exam  Vitals and nursing note reviewed.   Constitutional:       Appearance: She is obese. She is ill-appearing.   Cardiovascular:      Rate and Rhythm: Normal rate and regular rhythm.   Pulmonary:      Effort: No respiratory distress.      Breath sounds: Normal breath sounds.   Abdominal:      General: Bowel sounds are normal. There is no distension.      Palpations: Abdomen is soft.      Tenderness: There is no abdominal tenderness.   Neurological:      Mental Status: She is alert.         Consultants     Consult Orders (all) (From admission, onward)       Start     Ordered    01/29/25 1223  Inpatient Case Management  Consult  Once        Provider:  (Not yet assigned)    01/29/25 1222    01/29/25 0916  Inpatient Cardiology Consult  Once        Specialty:  Cardiology  Provider:  Kvng Wall MD    01/29/25 0915    01/29/25 0702  Inpatient General Surgery Consult  IN AM        Specialty:  General Surgery  Provider:  Trini Wade MD    01/29/25 0519    01/29/25 0324  [facility] (on-call MD unless specified) Details  Once        Specialty:  Hospitalist  Provider:  (Not yet assigned)    01/29/25 0323                  Procedures     * Surgery not found *    Imaging Results (All)       Procedure Component Value Units Date/Time    XR Chest PA & Lateral [[id number]] Collected: 01/30/25 1245     Updated: 01/30/25 1250    Narrative:      XR CHEST PA AND LATERAL-     HISTORY: Female who is 79 years-old, congestion     TECHNIQUE: Frontal and lateral views of the chest     COMPARISON: 10/8/2024     FINDINGS: Heart, mediastinum and pulmonary vasculature are unremarkable.  Chronic interstitial prominence is again noted in the lungs,  predominantly at the right midlung and left base, and appears  similar to  prior exam. No definite superimposed acute infiltrate, pleural effusion,  or pneumothorax is seen, follow-up as indications persist. No acute  osseous process.       Impression:      As described.     This report was finalized on 1/30/2025 12:47 PM by Dr. Dane Bright M.D on Workstation: [id number]       FL Small Bowel Follow Through Single-Contrast [[id number]] Collected: 01/29/25 1450     Updated: 01/29/25 1551    Narrative:      FLUOROSCOPIC WATER-SOLUBLE SMALL BOWEL FOLLOW-THROUGH     FLUOROSCOPY TIME: 4 minutes 6 seconds, 35 images     CLINICAL INFORMATION: Small bowel obstruction.     Gastrografin was given to the patient through an indwelling nasogastric  tube. Serial imaging of the abdomen performed.     FINDINGS: On the preliminary  radiograph, there are bibasilar  infiltrates, much greater on the left than the right. Nasogastric tube  position is satisfactory.     Moderately dilated loops of proximal jejunum seen within the upper mid  and left abdomen. No fold thickening. Transition to normal-caliber  distal jejunum and ileum seen within the mid right abdomen. By 60  minutes there is filling of ileum and by 90 minutes there is contrast  within the ascending colon. The ileum/terminal ileum are satisfactory in  appearance. The remainder is unremarkable.     This report was finalized on 1/29/2025 3:48 PM by Dr. Bora Tate M.D  on Workstation: [id number]       XR Abdomen KUB [[id number]] Collected: 01/29/25 0505     Updated: 01/29/25 0505    Narrative:        Patient: TOVA CAMPOVERDE  Time Out: 05:04  Exam(s): XR ABDOMEN     EXAM:    XR Abdomen, 2 Views    CLINICAL HISTORY:     Reason for exam: ng tube placement verification.    TECHNIQUE:    Frontal view of the abdomen pelvis with upright view of the abdomen.    COMPARISON:    same day CT    FINDINGS:    Lower thorax:  Hypoinflation, consider interstitial edema or atypical   infection.  Cardiomegaly or prominence due to technique.     Intraperitoneal space:  No free air.    Gastrointestinal tract:  Obstructed bowel loops better seen on CT.    Tubes, lines and devices:  Enteric tube adequate position in the   stomach.    IMPRESSION:       1.  Hypoinflation, consider interstitial edema or atypical infection.  2.  Enteric tube adequate position in the stomach.  3.  Obstructed bowel loops better seen on CT.  4.  Cardiomegaly or prominence due to technique.      Impression:          Electronically signed by Juan Kaplan MD on 01-29-25 at 0504    CT Abdomen Pelvis With Contrast [[id number]] Collected: 01/29/25 0322     Updated: 01/29/25 0322    Narrative:        Patient: TOVA CAMPOVERDE  Time Out: 03:21  Exam(s): CT ABDOMEN + PELVIS With Contrast     EXAM:    CT Abdomen and Pelvis With Intravenous Contrast    CLINICAL HISTORY:     Reason for exam: upper abd pain.    TECHNIQUE:    Axial computed tomography images of the abdomen and pelvis with   intravenous contrast with coronal and sagittal reformats.  CTDI is 16.14   mGy and DLP is 752.5 mGy-cm.  This CT exam was performed according to the   principle of ALARA (As Low As Reasonably Achievable) by using one or more   of the following dose reduction techniques: automated exposure control,   adjustment of the mA and or kV according to patient size, and or use of   iterative reconstruction technique.    COMPARISON:    6 2 2022    FINDINGS:    Limitations:  Limited due to motion.     ABDOMEN:    Liver:  Unremarkable.    Gallbladder and bile ducts:  Cholecystectomy.    Pancreas:  Unremarkable.    Spleen:  Unremarkable.    Adrenals:  Unremarkable.    Kidneys and ureters:  Unremarkable.    Stomach and bowel:  Small bowel obstruction.  Colonic diverticula.   Otherwise unremarkable bowel.  No bowel pneumatosis.     PELVIS:    Appendix:  No findings to suggest acute appendicitis.    Bladder:  Unremarkable.    Reproductive:  Hysterectomy.     ABDOMEN and PELVIS:    Intraperitoneal space:  No pneumoperitoneum.     Bones joints:  Degenerative spine findings.    Soft tissues:  Unremarkable.    Vasculature:  Atherosclerotic arterial calcifications: moderate. No   aortic aneurysm or dissection. Arterial structures otherwise unremarkable.      Lymph nodes:  Unremarkable.    IMPRESSION:       1.  Motion degraded study.  2.  Small bowel obstruction.  3.  No bowel perforation or portal venous gas.  4.  See additional findings above.      Impression:          Electronically signed by Juan Kaplan MD on 01-29-25 at 0321          Results for orders placed during the hospital encounter of 12/23/19    Vascular screening (bundle) CAR    Interpretation Summary  Normal right carotid artery screening.  Plaque without stenosis left carotid artery on screening.  Normal aorta screening.  Normal peripheral artery screening.    Results for orders placed during the hospital encounter of 03/19/24    Adult Transthoracic Echo Complete W/ Cont if Necessary Per Protocol    Interpretation Summary    Left ventricular systolic function is normal. Calculated left ventricular EF = 63.1% Normal left ventricular cavity size and wall thickness noted. All left ventricular wall segments contract normally. Left ventricular diastolic function was normal.    The left atrial cavity is moderately dilated. Right AtriumThe right atrial cavity is mildly dilated.    No aortic valve regurgitation or stenosis is present. The aortic valve is abnormal in structure. There is mild calcification of the aortic valve.    Mild mitral annular calcification is present. Trace mitral valve regurgitation is present.    Trace tricuspid valve regurgitation is present. Estimated right ventricular systolic pressure from tricuspid regurgitation is normal (<35 mmHg). Calculated right ventricular systolic pressure from tricuspid regurgitation is 16 mmHg.    Pertinent Labs     Results from last 7 days   Lab Units 01/31/25  0535 01/30/25  0743 01/29/25  0609 01/29/25  0145   WBC 10*3/mm3  "8.42 10.75 15.68* 16.81*   HEMOGLOBIN g/dL 11.9* 12.2 12.2 13.1   PLATELETS 10*3/mm3 183 186 195 214     Results from last 7 days   Lab Units 01/31/25  0535 01/30/25  0743 01/29/25  0609 01/29/25  0145   SODIUM mmol/L 139 143 139 135*   POTASSIUM mmol/L 3.5 3.7 4.9 4.2   CHLORIDE mmol/L 104 105 98 97*   CO2 mmol/L 27.7 27.6 28.0 24.5   BUN mg/dL 16 26* 25* 25*   CREATININE mg/dL 0.63 0.69 0.70 0.77   GLUCOSE mg/dL 95 122* 154* 174*   EGFR mL/min/1.73 89.8 87.9 87.6 78.1     Results from last 7 days   Lab Units 01/31/25  0535 01/30/25  0743 01/29/25  0609 01/29/25  0145   ALBUMIN g/dL 3.6 3.5 3.9 4.2   BILIRUBIN mg/dL  --   --  0.5 0.6   ALK PHOS U/L  --   --  65 71   AST (SGOT) U/L  --   --  21 24   ALT (SGPT) U/L  --   --  16 17     Results from last 7 days   Lab Units 01/31/25  0535 01/30/25  0743 01/29/25  0609 01/29/25  0145   CALCIUM mg/dL 9.2 9.3 9.8 10.0   ALBUMIN g/dL 3.6 3.5 3.9 4.2   MAGNESIUM mg/dL 1.9 2.0  --  1.9   PHOSPHORUS mg/dL 2.9 3.0  --   --      Results from last 7 days   Lab Units 01/29/25  0145   LIPASE U/L 45     Results from last 7 days   Lab Units 01/29/25  0326 01/29/25  0145   HSTROP T ng/L 9 <6           Invalid input(s): \"LDLCALC\"      Results from last 7 days   Lab Units 01/29/25  0149   COVID19  Not Detected       Test Results Pending at Discharge     Pending Results       None              Discharge Details        Discharge Medications        Changes to Medications        Instructions Start Date   cyanocobalamin 1000 MCG/ML injection  What changed: See the new instructions.   INJECT 1ML INTO THE APPROPRIATE MUSCLE AS DIRECTED BY PRESCRIBER EVERY 28 DAYS      flecainide 50 MG tablet  Commonly known as: TAMBOCOR  What changed: when to take this   TAKE ONE TABLET BY MOUTH EVERY 12 HOURS      meclizine 25 MG tablet  Commonly known as: ANTIVERT  What changed: reasons to take this   TAKE ONE TABLET BY MOUTH THREE TIMES A DAY AS NEEDED FOR DIZZINESS             Continue These Medications  "       Instructions Start Date   apixaban 5 MG tablet tablet  Commonly known as: Eliquis   5 mg, Oral, Every 12 Hours      citalopram 10 MG tablet  Commonly known as: CeleXA   10 mg, Oral, Daily      fluticasone 50 MCG/ACT nasal spray  Commonly known as: FLONASE   2 sprays, Nasal, Daily      losartan 100 MG tablet  Commonly known as: COZAAR   100 mg, Oral, Daily      Magnesium 100 MG capsule   1 capsule, Nightly      metoprolol succinate XL 50 MG 24 hr tablet  Commonly known as: TOPROL-XL   TAKE ONE TABLET BY MOUTH DAILY      pantoprazole 40 MG EC tablet  Commonly known as: Protonix   40 mg, Oral, Daily      rosuvastatin 20 MG tablet  Commonly known as: CRESTOR   20 mg, Oral, Daily      TYLENOL ARTHRITIS PAIN PO   650 mg, 3 Times Daily PRN      ZINC 15 PO   1 tablet, Nightly               Allergies   Allergen Reactions    Epinephrine Palpitations    Penicillins Other (See Comments)     BLISTERS IN MOUTH    Patient tolerated ceftriaxone during 5/2017 admission.          Discharge Disposition:  Home or Self Care      Discharge Diet:  Diet Order   Procedures    Diet: Regular/House; Fluid Consistency: Thin (IDDSI 0)       Discharge Activity:   Activity Instructions       Activity as Tolerated              CODE STATUS:    Code Status and Medical Interventions: CPR (Attempt to Resuscitate); Full Support   Ordered at: 01/29/25 0519     Code Status (Patient has no pulse and is not breathing):    CPR (Attempt to Resuscitate)     Medical Interventions (Patient has pulse or is breathing):    Full Support       Future Appointments   Date Time Provider Department Center   3/6/2025  1:30 PM Miranda Morgan MD [facility]   4/30/2025  2:20 PM Rich Kearns MD [facility]     Additional Instructions for the Follow-ups that You Need to Schedule       Discharge Follow-up with PCP   As directed       Currently Documented PCP:    Miranda Morgan MD    PCP Phone Number:    [phone]     Follow Up Details: 2-3 weeks         Discharge Follow-up with Specified Provider: Dr Wade as needed   As directed      To: Dr Wade as needed               Follow-up Information       Miranda Morgan MD .    Specialty: Internal Medicine  Why: 2-3 weeks  Contact information:  Inez Walker  [facility] [address]  [place] [address]  [phone]                             Additional Instructions for the Follow-ups that You Need to Schedule       Discharge Follow-up with PCP   As directed       Currently Documented PCP:    Miranda Morgan MD    PCP Phone Number:    [phone]     Follow Up Details: 2-3 weeks        Discharge Follow-up with Specified Provider: Dr Wade as needed   As directed      To: Dr Wade as needed            Time Spent on Discharge:  Greater than 30 minutes      Cyrus Olivas MD  [facility]  01/31/25  08:32 EST

## 2025-01-31 NOTE — PROGRESS NOTES
Acute Care General Surgery Progress Note    Patient: Kiley Last  YOB: 1945  MRN: [id number]      Assessment  Kiley Last is a 79 y.o. female with small bowel obstruction that is clinically resolved.  Patient tolerating regular diet, continues to have bowel function, symptoms have completely resolved.  Abdominal exam is benign.  AVSS.    Plan  Okay for discharge from a general surgery standpoint      Subjective  Denies nausea, vomiting, abdominal pain, having bowel function    Objective    Vitals:    01/31/25 0741   BP: 136/57   Pulse:    Resp: 18   Temp: 98.1 °F (36.7 °C)   SpO2:         Physical Exam  Constitutional: Alert, oriented, no acute distress  Respiratory: Normal work of breathing, Symmetric excursion  Cardiovascular: Well pefused, no jugular venous distention evident   Abdominal: Soft, nondistended, nontender  Skin: Warm, dry      Laboratory Results  Results from last 7 days   Lab Units 01/31/25  0535 01/30/25  0743 01/29/25  0609 01/29/25  0145   WBC 10*3/mm3 8.42 10.75 15.68* 16.81*   HEMOGLOBIN g/dL 11.9* 12.2 12.2 13.1   HEMATOCRIT % 36.1 35.4 36.7 40.0   PLATELETS 10*3/mm3 183 186 195 214     Results from last 7 days   Lab Units 01/31/25  0535 01/30/25  0743 01/29/25  0609 01/29/25  0145   SODIUM mmol/L 139 143 139 135*   POTASSIUM mmol/L 3.5 3.7 4.9 4.2   CHLORIDE mmol/L 104 105 98 97*   CO2 mmol/L 27.7 27.6 28.0 24.5   BUN mg/dL 16 26* 25* 25*   CREATININE mg/dL 0.63 0.69 0.70 0.77   CALCIUM mg/dL 9.2 9.3 9.8 10.0   BILIRUBIN mg/dL  --   --  0.5 0.6   ALK PHOS U/L  --   --  65 71   ALT (SGPT) U/L  --   --  16 17   AST (SGOT) U/L  --   --  21 24   GLUCOSE mg/dL 95 122* 154* 174*     I have personally reviewed 1/31 labs        SARITHA Escudero  Acute [facility] General Surgery  [facility]    [address], [address]  [place], [place], [address]  P: [phone]  F: [phone]

## 2025-01-31 NOTE — PROGRESS NOTES
"Kiley Last  1945 79 y.o.  [id number]      Patient Care Team:  Miranda Morgan MD as PCP - General (Internal Medicine)  Miguelina Hurst APRN as Nurse Practitioner (Nurse Practitioner)  Rich Kearns MD as Consulting Physician (Cardiology)  Patrick Weeks MD as Consulting Physician (Otolaryngology)  Rossana Evans MD as Consulting Physician (Infectious Diseases)  Patrick Dong MD as Consulting Physician (Pulmonary Disease)    CC: Paroxysmal A-fib    Interval History: Eating doing better      Objective   Vital Signs  Temp:  [97.9 °F (36.6 °C)-98.4 °F (36.9 °C)] 98.1 °F (36.7 °C)  Heart Rate:  [71-81] 71  Resp:  [18] 18  BP: (136-150)/(57-64) 136/57    Intake/Output Summary (Last 24 hours) at 1/31/2025 0950  Last data filed at 1/30/2025 1721  Gross per 24 hour   Intake 720 ml   Output --   Net 720 ml     Flowsheet Rows      Flowsheet Row First Filed Value   Admission Height 165.1 cm (65\") Documented at 01/29/2025 0124   Admission Weight 79.4 kg (175 lb) Documented at 01/29/2025 0124            Physical Exam:   General Appearance:    Alert,oriented, in no acute distress   Lungs:     Clear to auscultation,BS are equal    Heart:    Normal S1 and S2, RRR without murmur, gallop or rub   HEENT:    Sclerae are clear, no JVD or adenopathy   Abdomen:     Normal bowel sounds, soft nontender, nondistended, no HSM   Extremities:   Moves all extremities well, no edema, no cyanosis, no             Redness, no rash     Medication Review:      [Held by provider] apixaban, 5 mg, Oral, Q12H  citalopram, 10 mg, Oral, Daily  flecainide, 50 mg, Oral, BID  metoprolol succinate XL, 50 mg, Oral, Daily  pantoprazole, 40 mg, Intravenous, Q12H  rosuvastatin, 20 mg, Oral, Daily  sodium chloride, 10 mL, Intravenous, Q12H             I reviewed the patient's new clinical results.  I personally viewed and interpreted the patient's EKG/Telemetry data    Assessment/Plan  Active Hospital Problems    Diagnosis  POA    Small " bowel obstruction [K56.609]  Yes    Mixed hyperlipidemia [E78.2]  Yes    Paroxysmal atrial fibrillation [I48.0]  Yes    Essential hypertension [I10]  Yes      Resolved Hospital Problems   No resolved problems to display.       She seems like she is doing well she had a small bowel obstruction that resolved with conservative therapy.  Cardiac wise she has been stable she had remote history of paroxysmal A-fib from our standpoint she can go home and follow-up with us as scheduled    Rich Kearns MD  01/31/25  09:50 EST

## 2025-02-01 NOTE — OUTREACH NOTE
Prep Survey      Flowsheet Row Responses   [facility] patient discharged from? [place]   Is LACE score < 7 ? Yes   Eligibility [facility]   Date of Admission 01/29/25   Date of Discharge 01/31/25   Discharge diagnosis Small bowel obstruction   Does the patient have one of the following disease processes/diagnoses(primary or secondary)? Other   Prep survey completed? Yes            Faith GAVIRIA - Registered Nurse

## 2025-02-03 ENCOUNTER — TRANSITIONAL CARE MANAGEMENT TELEPHONE ENCOUNTER (OUTPATIENT)
Dept: CALL CENTER | Facility: HOSPITAL | Age: 80
End: 2025-02-03
Payer: MEDICARE

## 2025-02-03 NOTE — OUTREACH NOTE
Call Center TCM Note      Flowsheet Row Responses   [facility] [place] patient discharged from? [place]   Does the patient have one of the following disease processes/diagnoses(primary or secondary)? Other   TCM attempt successful? Yes   Call start time 1517   Call end time 1520   Discharge diagnosis Small bowel obstruction   Person spoke with today (if not patient) and relationship pt   Meds reviewed with patient/caregiver? Yes   Is the patient having any side effects they believe may be caused by any medication additions or changes? No   Does the patient have all medications ordered at discharge? N/A   Is the patient taking all medications as directed (includes completed medication regime)? Yes   Does the patient have an appointment with their PCP within 7-14 days of discharge? No appointments available   Nursing Interventions Routed TCM call to PCP office, PCP office requested to make appointment - message sent   Psychosocial issues? No   Did the patient receive a copy of their discharge instructions? Yes   Nursing interventions Reviewed instructions with patient   What is the patient's perception of their health status since discharge? Improving   Is the patient/caregiver able to teach back signs and symptoms related to disease process for when to call PCP? Yes   Is the patient/caregiver able to teach back signs and symptoms related to disease process for when to call 911? Yes   Is the patient/caregiver able to teach back the hierarchy of who to call/visit for symptoms/problems? PCP, Specialist, Home health nurse, Urgent Care, ED, 911 Yes   If the patient is a current smoker, are they able to teach back resources for cessation? Not a smoker   TCM call completed? Yes   Wrap up additional comments Pt reports regular BMs since hospital dc, and denies any issues at this time. No medication changes.   Call end time 1520   Would this patient benefit from a Referral to Amb Social Work? No   Is the patient interested  in additional calls from an ambulatory ? No            Rosette Joy RN    2/3/2025, 15:21 EST

## 2025-02-03 NOTE — PROGRESS NOTES
Case Management Discharge Note      Final Note: DC'd home 1/31    Provided Post Acute Provider List?: N/A  N/A Provider List Comment: denies any discharge needs    Selected Continued Care - Discharged on 1/31/2025 Admission date: 1/29/2025 - Discharge disposition: Home or Self Care             Transportation Services  Private: Car    Final Discharge Disposition Code: 01 - home or self-care

## 2025-02-10 ENCOUNTER — OFFICE VISIT (OUTPATIENT)
Dept: INTERNAL MEDICINE | Facility: CLINIC | Age: 80
End: 2025-02-10
Payer: MEDICARE

## 2025-02-10 VITALS
HEIGHT: 65 IN | DIASTOLIC BLOOD PRESSURE: 80 MMHG | WEIGHT: 177 LBS | SYSTOLIC BLOOD PRESSURE: 128 MMHG | BODY MASS INDEX: 29.49 KG/M2 | HEART RATE: 76 BPM

## 2025-02-10 DIAGNOSIS — F33.41 MAJOR DEPRESSIVE DISORDER, RECURRENT EPISODE, IN PARTIAL REMISSION: ICD-10-CM

## 2025-02-10 DIAGNOSIS — K56.609 SMALL BOWEL OBSTRUCTION: Primary | ICD-10-CM

## 2025-02-10 DIAGNOSIS — I10 ESSENTIAL HYPERTENSION: Chronic | ICD-10-CM

## 2025-02-10 DIAGNOSIS — E53.8 B12 DEFICIENCY: ICD-10-CM

## 2025-02-10 PROBLEM — R07.9 CHEST PAIN: Status: RESOLVED | Noted: 2024-10-08 | Resolved: 2025-02-10

## 2025-02-10 PROBLEM — Z87.19 HX OF SMALL BOWEL OBSTRUCTION: Status: RESOLVED | Noted: 2020-01-23 | Resolved: 2025-02-10

## 2025-02-10 PROCEDURE — 1125F AMNT PAIN NOTED PAIN PRSNT: CPT | Performed by: INTERNAL MEDICINE

## 2025-02-10 PROCEDURE — 99495 TRANSJ CARE MGMT MOD F2F 14D: CPT | Performed by: INTERNAL MEDICINE

## 2025-02-10 PROCEDURE — 3079F DIAST BP 80-89 MM HG: CPT | Performed by: INTERNAL MEDICINE

## 2025-02-10 PROCEDURE — 1159F MED LIST DOCD IN RCRD: CPT | Performed by: INTERNAL MEDICINE

## 2025-02-10 PROCEDURE — 1160F RVW MEDS BY RX/DR IN RCRD: CPT | Performed by: INTERNAL MEDICINE

## 2025-02-10 PROCEDURE — 1111F DSCHRG MED/CURRENT MED MERGE: CPT | Performed by: INTERNAL MEDICINE

## 2025-02-10 PROCEDURE — 3074F SYST BP LT 130 MM HG: CPT | Performed by: INTERNAL MEDICINE

## 2025-02-10 NOTE — PROGRESS NOTES
"Transitional Care Follow Up Visit  Subjective     Kiley Last is a 79 y.o. female who presents for a transitional care management visit.    Within 48 business hours after discharge our office contacted her via telephone to coordinate her care and needs.      I reviewed and discussed the details of that call along with the discharge summary, hospital problems, inpatient lab results, inpatient diagnostic studies, and consultation reports with Kiley.     Current outpatient and discharge medications have been reconciled for the patient.  Reviewed by: Miranda Morgan MD          2025     7:57 PM   Date of TCM Phone Call   [facility]   Date of Admission 2025   Date of Discharge 2025     Risk for Readmission (LACE) Score: 6 (2025  6:00 AM)      History of Present Illness   Course During Hospital Stay:  admitted with SBO- acute onset of abdominal pain- 2 days prior to her husbands . Luckily, relieved with NG tube/suction. She was discharged home and they had the . She has been having no pain, normal appetite, normal bowel habits.   Exposed to several ill people over the weekend with  but she feels fine.  Plans on volunteering her time and staying active since husbands death.      The following portions of the patient's history were reviewed and updated as appropriate: allergies, current medications, past family history, past medical history, past social history, past surgical history, and problem list.    Review of Systems    Objective   /80   Pulse 76   Ht 165.1 cm (65\")   Wt 80.3 kg (177 lb)   BMI 29.45 kg/m²   Physical Exam  Constitutional:       Appearance: Normal appearance. She is not ill-appearing.   Cardiovascular:      Rate and Rhythm: Normal rate.   Pulmonary:      Effort: Pulmonary effort is normal.   Abdominal:      General: Abdomen is flat. Bowel sounds are normal.      Palpations: Abdomen is soft.   Musculoskeletal:      Right lower " leg: No edema.      Left lower leg: No edema.   Psychiatric:         Mood and Affect: Mood normal.         Thought Content: Thought content normal.         Assessment & Plan   Problems Addressed this Visit       Essential hypertension    Small bowel obstruction - Primary     Other Visit Diagnoses       Major depressive disorder, recurrent episode, in partial remission        actually doing great since husbands death- took excellent care of him-    B12 deficiency              Diagnoses         Codes Comments    Small bowel obstruction    -  Primary ICD-10-CM: K56.609  ICD-9-CM: 560.9 resolved- keep bowels moving- monitor for recurrent symptoms- screening are up to date    Essential hypertension     ICD-10-CM: I10  ICD-9-CM: 401.9 controlled, no change.    Major depressive disorder, recurrent episode, in partial remission     ICD-10-CM: F33.41  ICD-9-CM: 296.35 actually doing great since husbands death- took excellent care of him-    B12 deficiency     ICD-10-CM: E53.8  ICD-9-CM: 266.2

## 2025-02-24 RX ORDER — ROSUVASTATIN CALCIUM 20 MG/1
20 TABLET, COATED ORAL DAILY
Qty: 90 TABLET | Refills: 0 | Status: SHIPPED | OUTPATIENT
Start: 2025-02-24

## 2025-03-20 RX ORDER — LOSARTAN POTASSIUM 100 MG/1
100 TABLET ORAL DAILY
Qty: 90 TABLET | Refills: 1 | Status: SHIPPED | OUTPATIENT
Start: 2025-03-20

## 2025-03-20 RX ORDER — FLECAINIDE ACETATE 50 MG/1
50 TABLET ORAL EVERY 12 HOURS SCHEDULED
Qty: 90 TABLET | Refills: 1 | Status: SHIPPED | OUTPATIENT
Start: 2025-03-20

## 2025-03-21 DIAGNOSIS — R42 VERTIGO: ICD-10-CM

## 2025-03-21 RX ORDER — MECLIZINE HYDROCHLORIDE 25 MG/1
TABLET ORAL
Qty: 90 TABLET | Refills: 0 | Status: SHIPPED | OUTPATIENT
Start: 2025-03-21

## 2025-04-07 ENCOUNTER — TELEPHONE (OUTPATIENT)
Dept: INTERNAL MEDICINE | Facility: CLINIC | Age: 80
End: 2025-04-07
Payer: MEDICARE

## 2025-04-07 DIAGNOSIS — R25.1 TREMOR: Primary | ICD-10-CM

## 2025-04-07 NOTE — TELEPHONE ENCOUNTER
PATIENT CALLED FOR A REFERRAL FOR TREMORS IN HANDS AND LEGS.    SHE WOULD LIKE TO SEE DR MAX WILSON, NEUROLOGIST    CALL BACK NUMBER [phone]    DR. WILSON'S [phone]  PHONE NUMBER [phone]

## 2025-04-09 NOTE — PROGRESS NOTES
Patient: Kiley Last  YOB: 1945  Date of Service: 4/9/2025    Chief Complaints:   Bilateral shoulder bilateral knee and neck pain  Subjective:    History of Present Illness: Pt is seen in the office today with complaints of bilateral shoulder bilateral knees and neck pain.  She is here really to see me for her shoulders in the past we have done conservative measures and she has done well I last saw her about a year ago we thought her symptoms at that time are more rotator cuff.  Unfortunately, her  passed away in January and she has been dealing with that she is also complaining of bilateral knee pain which we can address in the near future.  She states her neck is one of the worst with pain in the neck that radiates to the upper traps.  She does have A-fib she is on chronic Eliquis        Allergies:   Allergies   Allergen Reactions    Epinephrine Palpitations    Penicillins Other (See Comments)     BLISTERS IN MOUTH    Patient tolerated ceftriaxone during 5/2017 admission.          Medications:   Home Medications:  Current Outpatient Medications on File Prior to Visit   Medication Sig    Acetaminophen (TYLENOL ARTHRITIS PAIN PO) Take 650 mg by mouth 3 (Three) Times a Day As Needed.    apixaban (Eliquis) 5 MG tablet tablet Take 1 tablet by mouth Every 12 (Twelve) Hours.    citalopram (CeleXA) 10 MG tablet TAKE 1 TABLET BY MOUTH DAILY    cyanocobalamin 1000 MCG/ML injection INJECT 1ML INTO THE APPROPRIATE MUSCLE AS DIRECTED BY PRESCRIBER EVERY 28 DAYS (Patient taking differently: Inject 1 mL into the appropriate muscle as directed by prescriber Every 28 (Twenty-Eight) Days.)    flecainide (TAMBOCOR) 50 MG tablet TAKE ONE TABLET BY MOUTH EVERY 12 HOURS    fluticasone (FLONASE) 50 MCG/ACT nasal spray 2 sprays into the nostril(s) as directed by provider Daily.    losartan (COZAAR) 100 MG tablet Take 1 tablet by mouth Daily.    Magnesium 100 MG capsule Take 1 capsule by mouth Every Night.     meclizine (ANTIVERT) 25 MG tablet TAKE 1 TABLET BY MOUTH 3 TIMES A DAY AS NEEDED FOR DIZZINESS    metoprolol succinate XL (TOPROL-XL) 50 MG 24 hr tablet TAKE ONE TABLET BY MOUTH DAILY    pantoprazole (Protonix) 40 MG EC tablet Take 1 tablet by mouth Daily.    rosuvastatin (CRESTOR) 20 MG tablet TAKE 1 TABLET BY MOUTH DAILY    Zinc Sulfate (ZINC 15 PO) Take 1 tablet by mouth Every Night.     Current Facility-Administered Medications on File Prior to Visit   Medication    cyanocobalamin injection 1,000 mcg     Current Medications:  Scheduled Meds:cyanocobalamin, 1,000 mcg, Intramuscular, Q28 Days      Continuous Infusions:   PRN Meds:.    I have reviewed the patient's medical history in detail and updated the computerized patient record.  Review and summarization of old records include:    Past Medical History:   Diagnosis Date    Arthritis     Asthma     NO INHALERS    Atrial fibrillation     Clostridium difficile infection 09/20/2019    Colon polyps 08/01/2019    Transverse colon: tubular adenoma, descending colon: fragments of tubular adenoma, sigmoid colon: ischemic eroded hyperplastic polyp    COPD (chronic obstructive pulmonary disease)     Diverticulitis     Diverticulosis     ESBL (extended spectrum beta-lactamase) producing bacteria infection     GERD (gastroesophageal reflux disease)     History of abscess of skin and subcutaneous tissue     ABD WOUND 5/2017    History of atrial fibrillation      X1 POST OP FROM COLOSTOMY 5/2017 - NO PROBLEMS SINCE    Hypertension     Incisional hernia     MDRO (multiple drug resistant organisms) resistance     Occasional tremors     NEW (obstructive sleep apnea) 05/20/2021    Overnight polysomnogram.  Weight 190 pounds.  Mild NEW with AHI 7 events per hour for total sleep time.  However, during REM moderate NEW with AHI 16.7 events per hour.  No sleep-related hypoxia.  The patient snored 5% of total sleep time.    PONV (postoperative nausea and vomiting)     Psoriasis      UTI (urinary tract infection)     Vertigo         Past Surgical History:   Procedure Laterality Date    BELPHAROPTOSIS REPAIR Bilateral 04/27/2017    Bilateral brow ptosis repair via the anterior hairline approach under monitored local anesthesia-Andrez Craven    BLEPHAROPLASTY Bilateral 04/27/2017    BREAST BIOPSY      CATARACT EXTRACTION      COLON RESECTION N/A 5/3/2017    Procedure: OPEN SIMOID COLECTOMY WITH COLOSTOMY;  Surgeon: Renato Delgado MD;  Location: [facility] MAIN OR;  Service:     COLONOSCOPY N/A 9/13/2017    Procedure: COLONOSCOPY VIA COLOSTOMY TO CECUM;  Surgeon: Renato Delgado MD;  Location: [facility] ENDOSCOPY;  Service:     COLONOSCOPY N/A 8/1/2019    Procedure: COLONOSCOPY to cecum and TI with biopsy / hot snare polypectomies;  Surgeon: Dalton Vela Jr., MD;  Location: [facility] ENDOSCOPY;  Service: General    COLONOSCOPY N/A 01/04/2010    Andrez Trinidad    COLONOSCOPY N/A 11/3/2022    Procedure: COLONOSCOPY to cecum and TI with biopsy / hot snare polypectomy;  Surgeon: Trini Wade MD;  Location: [facility] ENDOSCOPY;  Service: General;  Laterality: N/A;  pre-hx diverticulitis, screen, fam hx colon ca, personal hx polyps  post-polyp, diverticulosis, small hemorrhoids    COLOSTOMY CLOSURE N/A 9/14/2017    Procedure: COLOSTOMY TAKEDOWN AND CLOSURE, WITH RESECTION;  Surgeon: Renato Delgado MD;  Location: [facility] MAIN OR;  Service:     ENDOSCOPY AND COLONOSCOPY N/A 10/31/2014    Normal EGD and colonoscopy, repeat c-scope in 5 years-Andrez Trinidad    EXPLORATORY LAPAROTOMY N/A 9/8/2019    Procedure: Exploratory laparotomy with lysis of adhesions;  Surgeon: Trini Wade MD;  Location: [facility] MAIN OR;  Service: General    FINGER SURGERY Left     4TH FINGER LEFT HAND    HYSTERECTOMY Bilateral     LAPAROSCOPIC CHOLECYSTECTOMY W/ CHOLANGIOGRAPHY N/A 07/15/2003    Dr. Amrit Martinez, [facility]    SALPINGO OOPHORECTOMY Bilateral 02/02/2006    Abdominal sacral  colpopexy, bilateral salpingo-oophorectomy, tension free vaginal tape, cystoscopy-Dr. Devika Bradley, [facility]    THORACENTESIS Right 2017    US-guided right thoracentesis-Dr. Juan Yuen, [facility]    TONSILLECTOMY Bilateral     UPPER GASTROINTESTINAL ENDOSCOPY N/A 10/08/2007    Esophageal mucosal changes suspicious for short-segment Olivera's esphagus, gastric mucosal abnormality characterized by erythema: biopsied, NERD disease present, high likelihood of gastritis-Dr. Mayank Knapp, [facility]    VENTRAL/INCISIONAL HERNIA REPAIR N/A 2023    Procedure: Open incisional hernia repair with mesh;  Surgeon: Trini Wade MD;  Location: [facility] OR;  Service: General;  Laterality: N/A;        Social History     Occupational History    Not on file   Tobacco Use    Smoking status: Former     Current packs/day: 0.00     Types: Cigarettes     Quit date:      Years since quittin.3     Passive exposure: Past    Smokeless tobacco: Never    Tobacco comments:     SOCIAL SMOKING ONLY   Vaping Use    Vaping status: Never Used   Substance and Sexual Activity    Alcohol use: Yes     Comment: RARE    Drug use: No    Sexual activity: Defer      Social History     Social History Narrative    Not on file        Family History   Problem Relation Age of Onset    Cancer Mother     Colon cancer Mother     Diabetes Father     Diabetes Son     Ulcerative colitis Son     No Known Problems Maternal Grandmother     No Known Problems Maternal Grandfather     No Known Problems Paternal Grandmother     No Known Problems Paternal Grandfather     Malig Hyperthermia Neg Hx     Breast cancer Neg Hx        ROS: 14 point review of systems was performed and was negative except for documented findings in HPI and today's encounter.     Allergies:   Allergies   Allergen Reactions    Epinephrine Palpitations    Penicillins Other (See Comments)     BLISTERS IN MOUTH    Patient tolerated ceftriaxone during 2017 admission.        Constitutional:   Denies fever, shaking or chills   Eyes:  Denies change in visual acuity   HENT:  Denies nasal congestion or sore throat   Respiratory:  Denies cough or shortness of breath   Cardiovascular:  Denies chest pain or severe LE edema   GI:  Denies abdominal pain, nausea, vomiting, bloody stools or diarrhea   Musculoskeletal:  Numbness, tingling, or loss of motor function only as noted above in history of present illness.  : Denies painful urination or hematuria  Integument:  Denies rash, lesion or ulceration   Neurologic:  Denies headache or focal weakness  Endocrine:  Denies lymphadenopathy  Psych:  Denies confusion or change in mental status   Hem:  Denies active bleeding      Physical Exam: 79 y.o. female  Wt Readings from Last 3 Encounters:   02/10/25 80.3 kg (177 lb)   01/29/25 81 kg (178 lb 9.2 oz)   01/09/25 79.4 kg (175 lb)       There is no height or weight on file to calculate BMI.    There were no vitals filed for this visit.  Vital signs reviewed.   General Appearance:    Alert, cooperative, in no acute distress                    Ortho exam    Physical exam of the right and left shoulder reveals no overlying skin changes no lymphedema no lymphadenopathy.  Patient has active flexion 180 with mild symptoms abduction is similar external rotation is to 50 and internal rotation to the upper lumbar spine with mild symptoms.  Patient has good rotator cuff strength 4+ over 5 with isometric strength testing with pain.  Patient has a positive impingement and a positive Frederick sign.    Patient has a normal elbow exam.  Good distal pulses are present  Patient has pain with overhead activity and a positive Neer sign and a positive empty can sign , a positive drop arm and a definitive painful arc     She does have decrease in neck range of motion sidebending and rotation they do give rise to pain in the area the upper traps and the neck itself she is neurologically intact       X-rays AP scapular Y and x-ray lateral  shoulders were taken to evaluate her symptoms and compared x-rays done last year she has some acromioclavicular arthritis no other acute pathology  Assessment: Bilateral shoulder pain I think this is rotator cuff I think an injection is quite reasonable we will inject both she understands with her Eliquis her risk is higher.  I will see her back in 3 weeks at we will x-ray her knees and address those as deemed appropriate perhaps with injections.  Her neck is really bothering her I will have her see Eveline.  I did reach out to her primary care to see if it would be okay to start her on a Medrol Dosepak starting this weekend just in the effort to give her some relief she did okay that patient understands    Plan: As above  Follow up as indicated.  Ice, elevate, and rest as needed.  Discussed conservative measures of pain control including ice, bracing. Large Joint Arthrocentesis: R subacromial bursa  Date/Time: 4/28/2025 2:52 PM  Consent given by: patient  Site marked: site marked  Timeout: Immediately prior to procedure a time out was called to verify the correct patient, procedure, equipment, support staff and site/side marked as required   Supporting Documentation  Indications: pain   Procedure Details  Location: shoulder - R subacromial bursa  Preparation: Patient was prepped and draped in the usual sterile fashion  Needle gauge: 21G.  Approach: posterior  Medications administered: 80 mg methylPREDNISolone acetate 80 MG/ML; 2 mL lidocaine PF 1% 1 %  Patient tolerance: patient tolerated the procedure well with no immediate complications      Large Joint Arthrocentesis: L subacromial bursa  Date/Time: 4/28/2025 2:52 PM  Consent given by: patient  Site marked: site marked  Timeout: Immediately prior to procedure a time out was called to verify the correct patient, procedure, equipment, support staff and site/side marked as required   Supporting Documentation  Indications: pain   Procedure Details  Location:  shoulder - L subacromial bursa  Preparation: Patient was prepped and draped in the usual sterile fashion  Needle gauge: 21G.  Approach: posterior  Medications administered: 80 mg methylPREDNISolone acetate 80 MG/ML; 2 mL lidocaine PF 1% 1 %  Patient tolerance: patient tolerated the procedure well with no immediate complications      Lety Vela M.D.

## 2025-04-22 ENCOUNTER — TELEPHONE (OUTPATIENT)
Dept: CARDIOLOGY | Age: 80
End: 2025-04-22

## 2025-04-22 ENCOUNTER — OFFICE VISIT (OUTPATIENT)
Dept: INTERNAL MEDICINE | Facility: CLINIC | Age: 80
End: 2025-04-22
Payer: MEDICARE

## 2025-04-22 VITALS
SYSTOLIC BLOOD PRESSURE: 128 MMHG | HEIGHT: 65 IN | HEART RATE: 68 BPM | WEIGHT: 181 LBS | BODY MASS INDEX: 30.16 KG/M2 | DIASTOLIC BLOOD PRESSURE: 64 MMHG

## 2025-04-22 DIAGNOSIS — F43.21 SITUATIONAL DEPRESSION: Primary | ICD-10-CM

## 2025-04-22 DIAGNOSIS — G25.0 ESSENTIAL TREMOR: ICD-10-CM

## 2025-04-22 DIAGNOSIS — I10 ESSENTIAL HYPERTENSION: Chronic | ICD-10-CM

## 2025-04-22 RX ORDER — DULOXETIN HYDROCHLORIDE 30 MG/1
CAPSULE, DELAYED RELEASE ORAL
Qty: 42 CAPSULE | Refills: 0 | Status: SHIPPED | OUTPATIENT
Start: 2025-04-22 | End: 2025-05-20

## 2025-04-22 RX ORDER — DULOXETIN HYDROCHLORIDE 60 MG/1
60 CAPSULE, DELAYED RELEASE ORAL DAILY
Qty: 90 CAPSULE | Refills: 1 | Status: SHIPPED | OUTPATIENT
Start: 2025-04-22

## 2025-04-22 NOTE — PROGRESS NOTES
"Chief Complaint  Tremors    Subjective        Kiley Last presents to [facility]  History of Present Illness over the last 2 years she has lost, 2 siblings, EDDIE and - feels a bit overwhelmed- feels like she is doing the minimum to get thru the days. Feels overwhelmed with grief and wonders if it could affect her physical stamina.  Was always doing something taking care of  but now feels like she doesn't she has the energy she had.  Sees Dr. Vela next week for repeat inj in her shoulder- told her years ago she didn't want surgery but now not sure she can avoid.   She has been taking Tylenol 8h arthritis- doesn't do much.   Feels like her tremors are getting worse- interfering with eating, activity, makeup. Mother, brother and son with tremors.   Has been on citalopram but doesn't feel much benefit.    Objective   Vital Signs:  /64   Pulse 68   Ht 165.1 cm (65\")   Wt 82.1 kg (181 lb)   BMI 30.12 kg/m²   Estimated body mass index is 30.12 kg/m² as calculated from the following:    Height as of this encounter: 165.1 cm (65\").    Weight as of this encounter: 82.1 kg (181 lb).            Physical Exam  Constitutional:       Appearance: Normal appearance. She is not ill-appearing.   Cardiovascular:      Rate and Rhythm: Normal rate. Rhythm irregular.   Pulmonary:      Effort: Pulmonary effort is normal.   Musculoskeletal:      Right lower leg: No edema.      Left lower leg: No edema.   Neurological:      Comments: Fine tremor- both hands- finger to nose normal          Result Review :                Assessment and Plan   Diagnoses and all orders for this visit:    1. Situational depression (Primary)  Comments:  feel there is some somatization- will d/c citalopram and add duloxetine, titrate as written. Will hold off on change of pain meds, etc.  Orders:  -     DULoxetine (CYMBALTA) 30 MG capsule; Take 1 capsule by mouth Daily for 14 days, THEN 2 capsules Daily " for 14 days.  Dispense: 42 capsule; Refill: 0  -     DULoxetine (CYMBALTA) 60 MG capsule; Take 1 capsule by mouth Daily.  Dispense: 90 capsule; Refill: 1    2. Essential tremor  Comments:  family hisoryr and exam are reassuring- will hold off on meds-    3. Essential hypertension  Comments:  Excellent, no change.             Follow Up   Return in about 8 weeks (around 6/17/2025) for Recheck.  Patient was given instructions and counseling regarding her condition or for health maintenance advice. Please see specific information pulled into the AVS if appropriate.

## 2025-04-22 NOTE — TELEPHONE ENCOUNTER
"    Caller: Kiley Last \"Anna\"    Relationship to patient: Self    Best call back number:     Patient is needing: HUB RESCHEDULED PATIENT'S APPOINTMENT 04.30.25 WITH DR. MCCONNELL @ 2:20 PM TO FIRST AVAILABLE 06.13.25 @ 10:20 AM WITH DR. MCCONNELL. PATIENT WANTED TO SEE DR. MCCONNELL. PATIENT WAS ADDED TO WAIT LIST. IF PATIENT NEEDS TO BE SEEN SOONER PLEASE CONTACT PATIENT. THANK YOU.         "

## 2025-04-28 ENCOUNTER — OFFICE VISIT (OUTPATIENT)
Dept: ORTHOPEDIC SURGERY | Facility: CLINIC | Age: 80
End: 2025-04-28
Payer: MEDICARE

## 2025-04-28 VITALS — WEIGHT: 178.6 LBS | TEMPERATURE: 98.2 F | BODY MASS INDEX: 29.76 KG/M2 | HEIGHT: 65 IN

## 2025-04-28 DIAGNOSIS — R52 PAIN: Primary | ICD-10-CM

## 2025-04-28 DIAGNOSIS — M54.2 NECK PAIN: ICD-10-CM

## 2025-04-28 DIAGNOSIS — M75.40 IMPINGEMENT SYNDROME OF SHOULDER REGION, UNSPECIFIED LATERALITY: ICD-10-CM

## 2025-04-28 RX ORDER — METHYLPREDNISOLONE 4 MG/1
TABLET ORAL
Qty: 21 TABLET | Refills: 0 | Status: SHIPPED | OUTPATIENT
Start: 2025-04-28

## 2025-04-28 RX ADMIN — METHYLPREDNISOLONE ACETATE 80 MG: 80 INJECTION, SUSPENSION INTRA-ARTICULAR; INTRALESIONAL; INTRAMUSCULAR; SOFT TISSUE at 14:52

## 2025-04-28 RX ADMIN — LIDOCAINE HYDROCHLORIDE 2 ML: 10 INJECTION, SOLUTION EPIDURAL; INFILTRATION; INTRACAUDAL; PERINEURAL at 14:52

## 2025-04-29 RX ORDER — LIDOCAINE HYDROCHLORIDE 10 MG/ML
2 INJECTION, SOLUTION EPIDURAL; INFILTRATION; INTRACAUDAL; PERINEURAL
Status: COMPLETED | OUTPATIENT
Start: 2025-04-28 | End: 2025-04-28

## 2025-04-29 RX ORDER — METHYLPREDNISOLONE ACETATE 80 MG/ML
80 INJECTION, SUSPENSION INTRA-ARTICULAR; INTRALESIONAL; INTRAMUSCULAR; SOFT TISSUE
Status: COMPLETED | OUTPATIENT
Start: 2025-04-28 | End: 2025-04-28

## 2025-05-09 ENCOUNTER — TELEPHONE (OUTPATIENT)
Dept: CARDIOLOGY | Age: 80
End: 2025-05-09
Payer: MEDICARE

## 2025-05-09 NOTE — TELEPHONE ENCOUNTER
Kiley Last called to f/u on request for apixaban samples.  She states she left a voicemail for Bre about this.  Will return call to patient.    Placed 2 boxes of samples on the 6th floor of the [facility] office for .  LOT: [id number]  Exp: 7/2026    Returned call to Kiley Last and notified that samples are ready for  on the 6th floor of the [facility] office.  She verbalized understanding.    Thank you,  Tyesha DENNISON RN  Triage Nurse [facility]  05/09/25 13:52 EDT

## 2025-05-12 ENCOUNTER — OFFICE VISIT (OUTPATIENT)
Dept: ORTHOPEDIC SURGERY | Facility: CLINIC | Age: 80
End: 2025-05-12
Payer: MEDICARE

## 2025-05-12 VITALS — BODY MASS INDEX: 29.89 KG/M2 | WEIGHT: 179.4 LBS | TEMPERATURE: 97.9 F | HEIGHT: 65 IN

## 2025-05-12 DIAGNOSIS — M50.30 DDD (DEGENERATIVE DISC DISEASE), CERVICAL: Primary | ICD-10-CM

## 2025-05-12 PROCEDURE — 1160F RVW MEDS BY RX/DR IN RCRD: CPT | Performed by: NURSE PRACTITIONER

## 2025-05-12 PROCEDURE — 99213 OFFICE O/P EST LOW 20 MIN: CPT | Performed by: NURSE PRACTITIONER

## 2025-05-12 PROCEDURE — 1159F MED LIST DOCD IN RCRD: CPT | Performed by: NURSE PRACTITIONER

## 2025-05-12 NOTE — PROGRESS NOTES
Patient Name: Kiley Last   YOB: 1945  Referring Primary Care Physician: Miranda Morgan MD      Chief Complaint:    Chief Complaint   Patient presents with    Cervical Spine - Follow-up, Pain          HPI:  Kiley Last is a 79 y.o. female who presents to [facility] for evaluation of neck pain.  She was last seen in the office for similar complaints 11/17/2023.  At that point we discussed physical therapy as her cervical MRI was not overly concerning.  She did not have time to complete physical therapy as she was caring for her .  Unfortunately he passed earlier this year.  She does report today that the neck pain has actually improved since having her shoulders injected on 04/28/2025 by Dr. Vela.  She is unaccompanied today.  Prior pertinent records were reviewed.    PFSH:  See attached    ROS: As per HPI, otherwise negative    Objective:      79 y.o. female  Body mass index is 29.85 kg/m²., 81.4 kg (179 lb 6.4 oz), @HT@  Vitals:    05/12/25 1354   Temp: 97.9 °F (36.6 °C)     Pain Score    05/12/25 1354   PainSc: 10-Worst pain ever  Comment: when it's at it's worse   PainLoc: Neck            Spine Musculoskeletal Exam    Gait    Gait is normal.    Palpation    Cervical Spine    Tenderness: present      Trapezius: right and left    Right      Muscle tone: normal    Left      Muscle tone: normal    Strength    Cervical Spine    Cervical spine motor exam is normal.    Sensory    Cervical Spine    Cervical spine sensation is normal.    Reflexes    Right        Biceps: 2/4      Brachioradialis: 2/4      Triceps: 2/4      Jordan: absent    Left        Biceps: 2/4        Brachioradialis: 2/4      Triceps: 2/4      Jordan: absent    General      Constitutional: well-developed and well-nourished    Scleral icterus: no    Labored breathing: no    Psychiatric: normal mood and affect and no acute distress    Neurological: alert and oriented x3    Skin:  intact        IMAGING:     Indication: pain related symptoms,  Views: 2V AP&LAT cervical  Findings: Reviewed and reveals multilevel degenerative disc and facet changes, mild retrolisthesis C5 on C6, no fractures  Comparison views: Similar to 08/30/2023  Cervical MRI 11/07/2023 revealed advanced degenerative change throughout with endplate osteophytes contribute to moderate right foraminal narrowing C4-5 with only mild central stenosis at any level.    Official radiology interpretation will follow and be available in the patient medical records. Patient will be notified of any pertinent discrepancies.    Assessment:           Diagnoses and all orders for this visit:    1. DDD (degenerative disc disease), cervical (Primary)             Plan:  She is doing well with the neck and shoulder pain since having her shoulders injected about 2 weeks ago.  She has no loss of nerve function or myelopathic findings on exam.  Discussed referral to physical therapy for the neck pain, but will hold off for now since she is not symptomatic.  She will be given a home exercise program to do.  If symptoms return before she can have injections in the shoulders again, she will notify the office at that point we will either try formal physical therapy versus cervical epidural injections.  She understands and agrees with the plan.      Return if symptoms worsen or fail to improve.    EMR Dragon/Transcription Disclaimer:   Much of this encounter note is an electronic transcription/translation of spoken language to printed text utilizing Dragon dictation.  Red flags have been discussed at this or previous visits to include but not limited weakness in extremities, worsening pain that does not respond to conservative treatment and bowel or bladder dysfunction. These are reasons to present to ER and patient has been informed.    The diagnosis(es), natural history, pathophysiology and treatment for diagnosis(es) were discussed. Opportunity given  and questions answered. Biomechanics of pertinent body areas discussed.    EXERCISES:  Advice on benefits of, and types of regular/moderate exercise pertaining to diagnosis.  Continue HEP. For back or neck pain, recommend pilates and or yoga as tolerated. Generally it is best to start any new exercise under the guidance of a  or therapist.   MEDICATIONS:  When prescribe, the risks, benefits, warnings,side effects and alternatives of medications discussed. Advised against long term use of narcotics.   PAIN CONTROL:  Cold, heat, OTC lidocaine patches and/or ointment as needed. Avoid direct skin contact with ice. Ice 15-20 minutes 3-4 times daily as needed. For SI joint pain, recommend ice bath in water about 50 degrees for 5 consecutive days, add ice slowly to help with adjustment and may cover with warm towel or robe to help with cold tolerance. If using lidocaine, do not apply heat in conjunction as this can cause a burn.   MEDICAL RECORDS reviewed from other provider(s) for past and current medical history pertinent to this visit..

## 2025-05-17 DIAGNOSIS — F43.21 SITUATIONAL DEPRESSION: ICD-10-CM

## 2025-05-19 RX ORDER — DULOXETIN HYDROCHLORIDE 30 MG/1
CAPSULE, DELAYED RELEASE ORAL
Qty: 42 CAPSULE | Refills: 2 | Status: SHIPPED | OUTPATIENT
Start: 2025-05-19

## 2025-05-20 ENCOUNTER — TELEPHONE (OUTPATIENT)
Dept: INTERNAL MEDICINE | Facility: CLINIC | Age: 80
End: 2025-05-20
Payer: MEDICARE

## 2025-05-20 NOTE — TELEPHONE ENCOUNTER
"  Caller: Kiley Last \"Anna\"    Relationship to patient: Self    Best call back number:[phone]      Patient is needing: SHE HAS BEEN FEELING A BIT OFF BALANCE AND VERY TIRED LATELY.  CAN SHE GET SOME BLOOD WORK DONE?  PLEASE CALL AND ADVISE.         "

## 2025-05-21 ENCOUNTER — TELEPHONE (OUTPATIENT)
Dept: INTERNAL MEDICINE | Facility: CLINIC | Age: 80
End: 2025-05-21
Payer: MEDICARE

## 2025-05-21 DIAGNOSIS — I10 ESSENTIAL HYPERTENSION: Chronic | ICD-10-CM

## 2025-05-21 DIAGNOSIS — E53.8 B12 DEFICIENCY: ICD-10-CM

## 2025-05-21 NOTE — TELEPHONE ENCOUNTER
FYI:   Patient returning call to Miranda BERNARD), however she said she was to schedule an appointment to have fasting labs done, patient scheduled for tomorrow 5/22/25.

## 2025-05-21 NOTE — PROGRESS NOTES
New Knee      Patient: Kiley Last        YOB: 1945    Medical Record Number: [id number]        Chief Complaints: Bilateral knee pain      History of Present Illness: This is a 79-year-old female presents complaining of bilateral knee pain I saw her last for these January of last year recently seen her for her shoulders and her neck she states her knees have been bothering her worse the last 2 weeks she fell in a tub on the anterior aspect of both knees they hurt anterior and her medial she has had some swelling increased pain with increased activity past medical history is listed below and reviewed by me        Allergies:   Allergies   Allergen Reactions    Epinephrine Palpitations    Penicillins Other (See Comments)     BLISTERS IN MOUTH    Patient tolerated ceftriaxone during 5/2017 admission.          Medications:   Home Medications:  Current Outpatient Medications on File Prior to Visit   Medication Sig    Acetaminophen (TYLENOL ARTHRITIS PAIN PO) Take 650 mg by mouth 3 (Three) Times a Day As Needed.    apixaban (Eliquis) 5 MG tablet tablet Take 1 tablet by mouth Every 12 (Twelve) Hours.    busPIRone (BUSPAR) 5 MG tablet Take 1 tablet by mouth 3 (Three) Times a Day As Needed (anxiety).    desvenlafaxine (PRISTIQ) 50 MG 24 hr tablet Take 1 tablet by mouth Daily.    flecainide (TAMBOCOR) 50 MG tablet TAKE ONE TABLET BY MOUTH EVERY 12 HOURS    fluticasone (FLONASE) 50 MCG/ACT nasal spray SPRAY 2 SPRAYS IN EACH NOSTRIL DAILY AS DIRECTED    losartan (COZAAR) 100 MG tablet Take 1 tablet by mouth Daily.    Magnesium 100 MG capsule Take 1 capsule by mouth Every Night.    meclizine (ANTIVERT) 25 MG tablet TAKE 1 TABLET BY MOUTH 3 TIMES A DAY AS NEEDED FOR DIZZINESS    metoprolol succinate XL (TOPROL-XL) 50 MG 24 hr tablet TAKE ONE TABLET BY MOUTH DAILY    pantoprazole (Protonix) 40 MG EC tablet Take 1 tablet by mouth Daily.    rosuvastatin (CRESTOR) 20 MG tablet TAKE 1 TABLET BY MOUTH DAILY    Zinc  Sulfate (ZINC 15 PO) Take 1 tablet by mouth Every Night.     Current Facility-Administered Medications on File Prior to Visit   Medication    cyanocobalamin injection 1,000 mcg     Current Medications:  Scheduled Meds:cyanocobalamin, 1,000 mcg, Intramuscular, Q28 Days      Continuous Infusions:   PRN Meds:.    Past Medical History:   Diagnosis Date    Arthritis     Asthma     NO INHALERS    Atrial fibrillation     Clostridium difficile infection 09/20/2019    Colon polyps 08/01/2019    Transverse colon: tubular adenoma, descending colon: fragments of tubular adenoma, sigmoid colon: ischemic eroded hyperplastic polyp    COPD (chronic obstructive pulmonary disease)     Diverticulitis     Diverticulosis     ESBL (extended spectrum beta-lactamase) producing bacteria infection     GERD (gastroesophageal reflux disease)     History of abscess of skin and subcutaneous tissue     ABD WOUND 5/2017    History of atrial fibrillation      X1 POST OP FROM COLOSTOMY 5/2017 - NO PROBLEMS SINCE    Hypertension     Incisional hernia     MDRO (multiple drug resistant organisms) resistance     Occasional tremors     NEW (obstructive sleep apnea) 05/20/2021    Overnight polysomnogram.  Weight 190 pounds.  Mild NEW with AHI 7 events per hour for total sleep time.  However, during REM moderate NEW with AHI 16.7 events per hour.  No sleep-related hypoxia.  The patient snored 5% of total sleep time.    PONV (postoperative nausea and vomiting)     Psoriasis     UTI (urinary tract infection)     Vertigo         Past Surgical History:   Procedure Laterality Date    BELPHAROPTOSIS REPAIR Bilateral 04/27/2017    Bilateral brow ptosis repair via the anterior hairline approach under monitored local anesthesia-Andrez Craven    BLEPHAROPLASTY Bilateral 04/27/2017    BREAST BIOPSY      CATARACT EXTRACTION      COLON RESECTION N/A 5/3/2017    Procedure: OPEN SIMOID COLECTOMY WITH COLOSTOMY;  Surgeon: Renato Delgado MD;  Location:   [facility];  Service:     COLONOSCOPY N/A 9/13/2017    Procedure: COLONOSCOPY VIA COLOSTOMY TO CECUM;  Surgeon: Renato Delgado MD;  Location: [facility] ENDOSCOPY;  Service:     COLONOSCOPY N/A 8/1/2019    Procedure: COLONOSCOPY to cecum and TI with biopsy / hot snare polypectomies;  Surgeon: Dalton Vela Jr., MD;  Location: [facility] ENDOSCOPY;  Service: General    COLONOSCOPY N/A 01/04/2010    Andrez Trinidad    COLONOSCOPY N/A 11/3/2022    Procedure: COLONOSCOPY to cecum and TI with biopsy / hot snare polypectomy;  Surgeon: Trini Wade MD;  Location: [facility] ENDOSCOPY;  Service: General;  Laterality: N/A;  pre-hx diverticulitis, screen, fam hx colon ca, personal hx polyps  post-polyp, diverticulosis, small hemorrhoids    COLOSTOMY CLOSURE N/A 9/14/2017    Procedure: COLOSTOMY TAKEDOWN AND CLOSURE, WITH RESECTION;  Surgeon: Renato Delgado MD;  Location: [facility] MAIN OR;  Service:     ENDOSCOPY AND COLONOSCOPY N/A 10/31/2014    Normal EGD and colonoscopy, repeat c-scope in 5 years-Andrez Trinidad    EXPLORATORY LAPAROTOMY N/A 9/8/2019    Procedure: Exploratory laparotomy with lysis of adhesions;  Surgeon: Trini Wade MD;  Location: [facility] MAIN OR;  Service: General    FINGER SURGERY Left     4TH FINGER LEFT HAND    HYSTERECTOMY Bilateral     LAPAROSCOPIC CHOLECYSTECTOMY W/ CHOLANGIOGRAPHY N/A 07/15/2003    Dr. Amrit Martinez, [facility]    SALPINGO OOPHORECTOMY Bilateral 02/02/2006    Abdominal sacral colpopexy, bilateral salpingo-oophorectomy, tension free vaginal tape, cystoscopy-Dr. Devika Bradley, [facility]    THORACENTESIS Right 05/21/2017    US-guided right thoracentesis-Dr. Juan Yuen, [facility]    TONSILLECTOMY Bilateral     UPPER GASTROINTESTINAL ENDOSCOPY N/A 10/08/2007    Esophageal mucosal changes suspicious for short-segment Olivera's esphagus, gastric mucosal abnormality characterized by erythema: biopsied, NERD disease present, high likelihood of gastritis-Dr. Mayank Knapp, [facility]  "   VENTRAL/INCISIONAL HERNIA REPAIR N/A 2023    Procedure: Open incisional hernia repair with mesh;  Surgeon: Trini Wade MD;  Location: [facility];  Service: General;  Laterality: N/A;        Social History     Occupational History    Not on file   Tobacco Use    Smoking status: Former     Current packs/day: 0.00     Types: Cigarettes     Quit date:      Years since quittin.4     Passive exposure: Past    Smokeless tobacco: Never    Tobacco comments:     SOCIAL SMOKING ONLY   Vaping Use    Vaping status: Never Used   Substance and Sexual Activity    Alcohol use: Yes     Comment: RARE    Drug use: No    Sexual activity: Defer      Social History     Social History Narrative    Not on file        Family History   Problem Relation Age of Onset    Cancer Mother     Colon cancer Mother     Diabetes Father     Diabetes Son     Ulcerative colitis Son     No Known Problems Maternal Grandmother     No Known Problems Maternal Grandfather     No Known Problems Paternal Grandmother     No Known Problems Paternal Grandfather     Malig Hyperthermia Neg Hx     Breast cancer Neg Hx              Review of Systems:     Review of Systems      Physical Exam: 79 y.o. female  General Appearance:    Alert, cooperative, in no acute distress                   Vitals:    25 1524   Temp: 98 °F (36.7 °C)   TempSrc: Temporal   Weight: 81.3 kg (179 lb 3.2 oz)   Height: 165.1 cm (65\")   PainSc: 3    PainLoc: Knee      Patient is alert and read ×3 no acute distress appears her above-listed at height weight and age.  Affect is normal respiratory rate is normal unlabored. Heart rate regular rate rhythm, sclera, dentition and hearing are normal for the purpose of this exam.        Ortho Exam  Physical exam of the right knee reveals no effusion, no erythema.  It mild loss of extension and full flexion  Patient has mild varus alignment.  They have mild tenderness to palpation about the medial compartment, no tenderness " laterally..  The patient has a negative bounce home, negative Felton and a stable ligamentous exam.  Quad tone is reasonable and symmetric.  There are no overlying skin changes no lymphedema no lymphadenopathy.  There is good hip range of motion which is full symmetric and asymptomatic and a normal ankle exam.     Physical exam of the left knee reveals no effusion, no erythema.  It mild loss of extension and full flexion  Patient has mild varus alignment.  They have mild tenderness to palpation about the medial compartment, no tenderness laterally..  The patient has a negative bounce home, negative Felton and a stable ligamentous exam.  Quad tone is reasonable and symmetric.  There are no overlying skin changes no lymphedema no lymphadenopathy.  There is good hip range of motion which is full symmetric and asymptomatic and a normal ankle exam.     Large Joint Arthrocentesis: R knee  Date/Time: 6/3/2025 3:46 PM  Consent given by: patient  Site marked: site marked  Timeout: Immediately prior to procedure a time out was called to verify the correct patient, procedure, equipment, support staff and site/side marked as required   Supporting Documentation  Indications: pain   Procedure Details  Location: knee - R knee  Preparation: Patient was prepped and draped in the usual sterile fashion  Needle gauge: 21G.  Approach: anteromedial  Medications administered: 80 mg methylPREDNISolone acetate 80 MG/ML; 2 mL lidocaine PF 1% 1 %  Patient tolerance: patient tolerated the procedure well with no immediate complications      Large Joint Arthrocentesis: L knee  Date/Time: 6/3/2025 3:46 PM  Consent given by: patient  Site marked: site marked  Timeout: Immediately prior to procedure a time out was called to verify the correct patient, procedure, equipment, support staff and site/side marked as required   Supporting Documentation  Indications: pain   Procedure Details  Location: knee - L knee  Preparation: Patient was prepped  and draped in the usual sterile fashion  Needle gauge: 21G.  Approach: anteromedial  Medications administered: 1 mL methylPREDNISolone acetate 80 MG/ML; 2 mL lidocaine PF 1% 1 %  Patient tolerance: patient tolerated the procedure well with no immediate complications                 Radiology:   AP, Lateral and merchant views of the right and left knee  were ordered/reviewed to evauateknee pain.  I have compared x-rays done January of last year she has significant medial and patellofemoral arthritis near complete loss of joint space medially no change  Imaging Results (Most Recent)       Procedure Component Value Units Date/Time    XR Knee 3 View Bilateral [[id number]] Resulted: 06/03/25 1536     Updated: 06/03/25 1536    Impression:      Ordering physician's impression is located in the Encounter Note dated 06/03/25. X-ray performed in the DR room.          Assessment/Plan:        Bilateral knee pain this is degenerative in origin she understands her options I think injections are reasonable she understands the importance of strengthening activity modification she also understands the option of arthroplasty which she is not interested in at this time

## 2025-05-22 LAB
ALBUMIN SERPL-MCNC: 4 G/DL (ref 3.5–5.2)
ALBUMIN/GLOB SERPL: 1.5 G/DL
ALP SERPL-CCNC: 72 U/L (ref 39–117)
ALT SERPL-CCNC: 21 U/L (ref 1–33)
AST SERPL-CCNC: 22 U/L (ref 1–32)
BASOPHILS # BLD AUTO: 0.02 10*3/MM3 (ref 0–0.2)
BASOPHILS NFR BLD AUTO: 0.3 % (ref 0–1.5)
BILIRUB SERPL-MCNC: 0.6 MG/DL (ref 0–1.2)
BUN SERPL-MCNC: 17 MG/DL (ref 8–23)
BUN/CREAT SERPL: 27.4 (ref 7–25)
CALCIUM SERPL-MCNC: 9.4 MG/DL (ref 8.6–10.5)
CHLORIDE SERPL-SCNC: 103 MMOL/L (ref 98–107)
CHOLEST SERPL-MCNC: 174 MG/DL (ref 0–200)
CHOLEST/HDLC SERPL: 2.6 {RATIO}
CO2 SERPL-SCNC: 28.7 MMOL/L (ref 22–29)
CREAT SERPL-MCNC: 0.62 MG/DL (ref 0.57–1)
EGFRCR SERPLBLD CKD-EPI 2021: 90.2 ML/MIN/1.73
EOSINOPHIL # BLD AUTO: 0.24 10*3/MM3 (ref 0–0.4)
EOSINOPHIL NFR BLD AUTO: 3.1 % (ref 0.3–6.2)
ERYTHROCYTE [DISTWIDTH] IN BLOOD BY AUTOMATED COUNT: 12.4 % (ref 12.3–15.4)
GLOBULIN SER CALC-MCNC: 2.6 GM/DL
GLUCOSE SERPL-MCNC: 99 MG/DL (ref 65–99)
HCT VFR BLD AUTO: 38.9 % (ref 34–46.6)
HDLC SERPL-MCNC: 67 MG/DL (ref 40–60)
HGB BLD-MCNC: 12.5 G/DL (ref 12–15.9)
IMM GRANULOCYTES # BLD AUTO: 0.03 10*3/MM3 (ref 0–0.05)
IMM GRANULOCYTES NFR BLD AUTO: 0.4 % (ref 0–0.5)
LDLC SERPL CALC-MCNC: 81 MG/DL (ref 0–100)
LYMPHOCYTES # BLD AUTO: 2.24 10*3/MM3 (ref 0.7–3.1)
LYMPHOCYTES NFR BLD AUTO: 28.5 % (ref 19.6–45.3)
MCH RBC QN AUTO: 29.4 PG (ref 26.6–33)
MCHC RBC AUTO-ENTMCNC: 32.1 G/DL (ref 31.5–35.7)
MCV RBC AUTO: 91.5 FL (ref 79–97)
MONOCYTES # BLD AUTO: 0.74 10*3/MM3 (ref 0.1–0.9)
MONOCYTES NFR BLD AUTO: 9.4 % (ref 5–12)
NEUTROPHILS # BLD AUTO: 4.59 10*3/MM3 (ref 1.7–7)
NEUTROPHILS NFR BLD AUTO: 58.3 % (ref 42.7–76)
NRBC BLD AUTO-RTO: 0 /100 WBC (ref 0–0.2)
PLATELET # BLD AUTO: 163 10*3/MM3 (ref 140–450)
POTASSIUM SERPL-SCNC: 4.3 MMOL/L (ref 3.5–5.2)
PROT SERPL-MCNC: 6.6 G/DL (ref 6–8.5)
RBC # BLD AUTO: 4.25 10*6/MM3 (ref 3.77–5.28)
SODIUM SERPL-SCNC: 141 MMOL/L (ref 136–145)
TRIGL SERPL-MCNC: 152 MG/DL (ref 0–150)
VIT B12 SERPL-MCNC: 1068 PG/ML (ref 211–946)
VLDLC SERPL CALC-MCNC: 26 MG/DL (ref 5–40)
WBC # BLD AUTO: 7.86 10*3/MM3 (ref 3.4–10.8)

## 2025-05-27 RX ORDER — ROSUVASTATIN CALCIUM 20 MG/1
20 TABLET, COATED ORAL DAILY
Qty: 90 TABLET | Refills: 0 | Status: SHIPPED | OUTPATIENT
Start: 2025-05-27

## 2025-05-28 ENCOUNTER — OFFICE VISIT (OUTPATIENT)
Dept: INTERNAL MEDICINE | Facility: CLINIC | Age: 80
End: 2025-05-28
Payer: MEDICARE

## 2025-05-28 VITALS
SYSTOLIC BLOOD PRESSURE: 118 MMHG | HEIGHT: 65 IN | HEART RATE: 78 BPM | BODY MASS INDEX: 29.49 KG/M2 | WEIGHT: 177 LBS | DIASTOLIC BLOOD PRESSURE: 74 MMHG

## 2025-05-28 DIAGNOSIS — F41.9 ANXIETY AND DEPRESSION: Primary | Chronic | ICD-10-CM

## 2025-05-28 DIAGNOSIS — G25.0 ESSENTIAL TREMOR: Chronic | ICD-10-CM

## 2025-05-28 DIAGNOSIS — F32.A ANXIETY AND DEPRESSION: Primary | Chronic | ICD-10-CM

## 2025-05-28 RX ORDER — BUSPIRONE HYDROCHLORIDE 5 MG/1
5 TABLET ORAL 3 TIMES DAILY PRN
Qty: 90 TABLET | Refills: 1 | Status: SHIPPED | OUTPATIENT
Start: 2025-05-28

## 2025-05-28 RX ORDER — DESVENLAFAXINE 50 MG/1
50 TABLET, FILM COATED, EXTENDED RELEASE ORAL DAILY
Qty: 90 TABLET | Refills: 1 | Status: SHIPPED | OUTPATIENT
Start: 2025-05-28

## 2025-05-28 NOTE — PROGRESS NOTES
"Chief Complaint  Dizziness    Subjective        Kiley Last presents to [facility]  History of Present Illness  here with son- she is feeling off balance- more shaking, fell a few times last week. She thinks the tremor is essential- has had for a long time but this seems worse- spilling when she's eating. Son thinks some of her symptoms have gotten worse since being on duloxetine- more since increase to 60 mg dose.   Overall feels weak, dysuria on and off, confused. Son says she's \"scatterbrained\"  Dr. Vela injected her shoulders and gave her a steroid pack due to diffuse OA changes-      Objective   Vital Signs:  /74   Pulse 78   Ht 165.1 cm (65\")   Wt 80.3 kg (177 lb)   BMI 29.45 kg/m²   Estimated body mass index is 29.45 kg/m² as calculated from the following:    Height as of this encounter: 165.1 cm (65\").    Weight as of this encounter: 80.3 kg (177 lb).            Physical Exam  Constitutional:       Appearance: Normal appearance. She is not ill-appearing.   Cardiovascular:      Rate and Rhythm: Normal rate and regular rhythm.   Pulmonary:      Effort: Pulmonary effort is normal.   Musculoskeletal:      Right lower leg: No edema.      Left lower leg: No edema.   Neurological:      General: No focal deficit present.      Comments: Diffuse fine tremor- improves with intention        Result Review :  The following data was reviewed by: Miranda Morgan MD on 05/28/2025:  CMP          1/30/2025    07:43 1/31/2025    05:35 5/22/2025    09:29   CMP   Glucose 122  95  99    BUN 26  16  17    Creatinine 0.69  0.63  0.62    EGFR 87.9  89.8  90.2    Sodium 143  139  141    Potassium 3.7  3.5  4.3    Chloride 105  104  103    Calcium 9.3  9.2  9.4    Total Protein   6.6    Albumin 3.5  3.6  4.0    Globulin   2.6    Total Bilirubin   0.6    Alkaline Phosphatase   72    AST (SGOT)   22    ALT (SGPT)   21    Albumin/Globulin Ratio   1.5    BUN/Creatinine Ratio 37.7  25.4  27.4  "   Anion Gap 10.4  7.3       CBC w/diff          1/30/2025    07:43 1/31/2025    05:35 5/22/2025    09:29   CBC w/Diff   WBC 10.75  8.42  7.86    RBC 3.91  3.92  4.25    Hemoglobin 12.2  11.9  12.5    Hematocrit 35.4  36.1  38.9    MCV 90.5  92.1  91.5    MCH 31.2  30.4  29.4    MCHC 34.5  33.0  32.1    RDW 13.3  13.1  12.4    Platelets 186  183  163    Neutrophil Rel % 75.6  56.0  58.3    Immature Granulocyte Rel % 0.7  0.5     Lymphocyte Rel % 13.2  32.1  28.5    Monocyte Rel % 10.1  9.3  9.4    Eosinophil Rel % 0.2  1.7  3.1    Basophil Rel % 0.2  0.4  0.3    B12 > 1000            Assessment and Plan   Diagnoses and all orders for this visit:    1. Anxiety and depression (Primary)  Comments:  with side effects with duloxetine- will d/c and start desvenlafaxine 50 mg, watch for SE. also add buspirone for prn use  Orders:  -     desvenlafaxine (PRISTIQ) 50 MG 24 hr tablet; Take 1 tablet by mouth Daily.  Dispense: 90 tablet; Refill: 1  -     busPIRone (BUSPAR) 5 MG tablet; Take 1 tablet by mouth 3 (Three) Times a Day As Needed (anxiety).  Dispense: 90 tablet; Refill: 1    2. Essential tremor  Comments:  I thnk worse with her anxiety, etc- has neurology f/u, same meds for now- gabapentin???             Follow Up   No follow-ups on file.  Patient was given instructions and counseling regarding her condition or for health maintenance advice. Please see specific information pulled into the AVS if appropriate.

## 2025-05-30 ENCOUNTER — TELEPHONE (OUTPATIENT)
Dept: CARDIOLOGY | Age: 80
End: 2025-05-30
Payer: MEDICARE

## 2025-05-30 RX ORDER — FLUTICASONE PROPIONATE 50 MCG
SPRAY, SUSPENSION (ML) NASAL
Qty: 16 G | Refills: 0 | Status: SHIPPED | OUTPATIENT
Start: 2025-05-30

## 2025-05-30 NOTE — TELEPHONE ENCOUNTER
Pt called the office requesting samples of medication.    I've placed 2 week's worth of Eliquis 5 mg BID at the  for pickup on the 6th floor.  Pt is aware that these are ready for them to pickup at their convenience.    LOT: [id number]  EXP: July 2026    Kinga MCDOWELL RN  Triage [facility] – [place], [place]  05/30/25 10:16 EDT

## 2025-06-02 RX ORDER — CITALOPRAM HYDROBROMIDE 10 MG/1
10 TABLET ORAL DAILY
Qty: 90 TABLET | Refills: 1 | OUTPATIENT
Start: 2025-06-02

## 2025-06-03 ENCOUNTER — OFFICE VISIT (OUTPATIENT)
Dept: ORTHOPEDIC SURGERY | Facility: CLINIC | Age: 80
End: 2025-06-03
Payer: MEDICARE

## 2025-06-03 VITALS — HEIGHT: 65 IN | TEMPERATURE: 98 F | BODY MASS INDEX: 29.85 KG/M2 | WEIGHT: 179.2 LBS

## 2025-06-03 DIAGNOSIS — R52 PAIN: Primary | ICD-10-CM

## 2025-06-03 DIAGNOSIS — M17.0 PRIMARY OSTEOARTHRITIS OF BOTH KNEES: ICD-10-CM

## 2025-06-03 RX ADMIN — LIDOCAINE HYDROCHLORIDE 2 ML: 10 INJECTION, SOLUTION EPIDURAL; INFILTRATION; INTRACAUDAL; PERINEURAL at 15:46

## 2025-06-03 RX ADMIN — METHYLPREDNISOLONE ACETATE 80 MG: 80 INJECTION, SUSPENSION INTRA-ARTICULAR; INTRALESIONAL; INTRAMUSCULAR; SOFT TISSUE at 15:46

## 2025-06-03 RX ADMIN — METHYLPREDNISOLONE ACETATE 1 ML: 80 INJECTION, SUSPENSION INTRA-ARTICULAR; INTRALESIONAL; INTRAMUSCULAR; SOFT TISSUE at 15:46

## 2025-06-04 RX ORDER — LIDOCAINE HYDROCHLORIDE 10 MG/ML
2 INJECTION, SOLUTION EPIDURAL; INFILTRATION; INTRACAUDAL; PERINEURAL
Status: COMPLETED | OUTPATIENT
Start: 2025-06-03 | End: 2025-06-03

## 2025-06-04 RX ORDER — METHYLPREDNISOLONE ACETATE 80 MG/ML
80 INJECTION, SUSPENSION INTRA-ARTICULAR; INTRALESIONAL; INTRAMUSCULAR; SOFT TISSUE
Status: COMPLETED | OUTPATIENT
Start: 2025-06-03 | End: 2025-06-03

## 2025-06-04 RX ORDER — METHYLPREDNISOLONE ACETATE 80 MG/ML
1 INJECTION, SUSPENSION INTRA-ARTICULAR; INTRALESIONAL; INTRAMUSCULAR; SOFT TISSUE
Status: COMPLETED | OUTPATIENT
Start: 2025-06-03 | End: 2025-06-03

## 2025-06-10 ENCOUNTER — TELEPHONE (OUTPATIENT)
Dept: INTERNAL MEDICINE | Facility: CLINIC | Age: 80
End: 2025-06-10

## 2025-06-10 NOTE — TELEPHONE ENCOUNTER
"Caller: Berhane Kiley TINO \"Anna\"    Relationship: Self    Best call back number: [phone]     Which medication are you concerned about: busPIRone (BUSPAR) 5 MG tablet /     desvenlafaxine (PRISTIQ) 50 MG 24 hr tablet       Who prescribed you this medication:     When did you start taking this medication:     What are your concerns: PATIENT STATES THAT THESE MEDICATION MAKE HER FEEL OFF BALANCE SHE WOULD LIKE TO STOP TAKING THE MEDICATION TO SEE IF IT HELPS.    How long have you had these concerns:         "

## 2025-06-19 ENCOUNTER — TELEPHONE (OUTPATIENT)
Dept: INTERNAL MEDICINE | Facility: CLINIC | Age: 80
End: 2025-06-19

## 2025-06-19 ENCOUNTER — OFFICE VISIT (OUTPATIENT)
Dept: INTERNAL MEDICINE | Facility: CLINIC | Age: 80
End: 2025-06-19
Payer: MEDICARE

## 2025-06-19 ENCOUNTER — HOSPITAL ENCOUNTER (OUTPATIENT)
Dept: CT IMAGING | Facility: HOSPITAL | Age: 80
Discharge: HOME | End: 2025-06-19
Admitting: INTERNAL MEDICINE
Payer: MEDICARE

## 2025-06-19 VITALS — BODY MASS INDEX: 31.32 KG/M2 | HEIGHT: 65 IN | WEIGHT: 188 LBS

## 2025-06-19 DIAGNOSIS — S09.90XA TRAUMATIC INJURY OF HEAD, INITIAL ENCOUNTER: Primary | ICD-10-CM

## 2025-06-19 PROCEDURE — 70450 CT HEAD/BRAIN W/O DYE: CPT

## 2025-06-19 NOTE — NURSING NOTE
Per Dr Mckinnon head CT negative. Results called to Dr Morgan and pt ok to d/c. Pt directed to entrance A with son for d/c.

## 2025-06-19 NOTE — TELEPHONE ENCOUNTER
"  Caller: Kiley Last \"Anna\"    Relationship: Self    Best call back number: [phone]    What orders are you requesting (i.e. lab or imaging): MRI OR CT SCAN OF HEAD    In what timeframe would the patient need to come in: ASAP    Where will you receive your lab/imaging services: [facility]    Additional notes: PATIENT FELL TUESDAY AND HIT HER FOREHEAD AND FACE.    PATIENT WANTED TO BE SURE SHE DOES NOT HAVE ANY BLEEDS AS SHE IS ON ELIQUIS.    PATIENT DID NOT GO TO ER FOR INJURIES AS SHE STATED SHE IS CUT UP AND BRUISED MOSTLY BUT HAS BEEN TOLD TO BE CAREFUL OF BRAIN BLEEDS.    PATIENT STATED SHE DIDN'T FEEL IT WAS AN EMERGENCY BUT IS CONCERNED THAT SHE FELL AND DID HIT HER FACE.    PATIENT ALSO HAS BRUISING ON HANDS AND KNEES.    "

## 2025-06-19 NOTE — PROGRESS NOTES
"Chief Complaint  Fall    Subjective        Kiley Last presents to [facility]  History of Present Illness  2 days ago, she tripped on the deck and hit her face on a metal strip on the door frame- other scrapes and bruises on hands and knees. Made her shoulders and neck sore but not terrible. No LOC, no headaches, light sensitivity. Can feel a sensation on R side of head but not a pain- has been taking Tylenol 2-3x per day.     Objective   Vital Signs:  Ht 165.1 cm (65\")   Wt 85.3 kg (188 lb)   BMI 31.28 kg/m²   Estimated body mass index is 31.28 kg/m² as calculated from the following:    Height as of this encounter: 165.1 cm (65\").    Weight as of this encounter: 85.3 kg (188 lb).            Physical Exam  Constitutional:       Appearance: She is not ill-appearing.   HENT:      Right Ear: Tympanic membrane and external ear normal.      Left Ear: Tympanic membrane and external ear normal.      Nose: No congestion or rhinorrhea.      Mouth/Throat:      Mouth: Mucous membranes are moist.      Pharynx: Oropharynx is clear.   Eyes:      Conjunctiva/sclera: Conjunctivae normal.      Pupils: Pupils are equal, round, and reactive to light.   Cardiovascular:      Rate and Rhythm: Normal rate.   Pulmonary:      Effort: Pulmonary effort is normal.   Musculoskeletal:      Cervical back: No rigidity or tenderness.   Lymphadenopathy:      Cervical: No cervical adenopathy.   Skin:     General: Skin is warm and dry.      Comments: Laceration and swelling on forehead  Bruising around both eyes, EOMI  Scrapes and bruising L knee wrist        Result Review :                Assessment and Plan   Diagnoses and all orders for this visit:    1. Traumatic injury of head, initial encounter (Primary)  Comments:  anticoagulated and over 65 yo. will send for stat CT head to r/o bleed- to ER with any neurologic symptoms.  Orders:  -     CT Head Without Contrast             Follow Up   No follow-ups on " file.  Patient was given instructions and counseling regarding her condition or for health maintenance advice. Please see specific information pulled into the AVS if appropriate.

## 2025-06-20 RX ORDER — FLECAINIDE ACETATE 50 MG/1
50 TABLET ORAL EVERY 12 HOURS SCHEDULED
Qty: 90 TABLET | Refills: 1 | Status: SHIPPED | OUTPATIENT
Start: 2025-06-20

## 2025-07-01 ENCOUNTER — TELEPHONE (OUTPATIENT)
Dept: NEUROLOGY | Facility: CLINIC | Age: 80
End: 2025-07-01
Payer: MEDICARE

## 2025-07-01 NOTE — TELEPHONE ENCOUNTER
"Caller: Kiley Last \"Anna\"    Relationship:  Self    Best call back number: [phone]     PATIENT CALLED REQUESTING TO CANCEL SAME DAY APPT.    Did the patient call AFTER the start time of their scheduled appointment?  []YES  [x]NO    Was the patient's appointment rescheduled? [x]YES  []NO    Any additional information: PATIENT IS SICK   "

## 2025-07-03 DIAGNOSIS — E53.8 B12 DEFICIENCY: ICD-10-CM

## 2025-07-03 RX ORDER — SYRINGE WITH NEEDLE, 1 ML 25GX5/8"
SYRINGE, EMPTY DISPOSABLE MISCELLANEOUS
Qty: 3 EACH | Refills: 12 | Status: SHIPPED | OUTPATIENT
Start: 2025-07-03

## 2025-07-07 ENCOUNTER — TELEPHONE (OUTPATIENT)
Dept: CARDIOLOGY | Age: 80
End: 2025-07-07
Payer: MEDICARE

## 2025-07-07 NOTE — TELEPHONE ENCOUNTER
Kiley Last called to request samples of apixaban (eliqius) 5 mg BID.  Will check for availability and return call to patient.    Thank you,  Tyesha DENNISON RN  Triage Nurse JESIKA  07/07/25 13:33 EDT

## 2025-07-07 NOTE — TELEPHONE ENCOUNTER
Placed 2 boxes of sample of apixaban with medication instructions at the  on the 5th floor for .    LOT [id number]  Exp: June 2027    Returned call to Kiley Last and notified that samples are ready for  at the  in [address].  She verbalized understanding and stated her son will be by to pick them up.    Thank you,  Tyesha DENNISON RN  Triage Nurse JESIKA  07/07/25 13:40 EDT

## 2025-07-15 ENCOUNTER — OFFICE VISIT (OUTPATIENT)
Age: 80
End: 2025-07-15
Payer: MEDICARE

## 2025-07-15 VITALS
DIASTOLIC BLOOD PRESSURE: 76 MMHG | WEIGHT: 181 LBS | SYSTOLIC BLOOD PRESSURE: 120 MMHG | HEIGHT: 65 IN | HEART RATE: 57 BPM | BODY MASS INDEX: 30.16 KG/M2

## 2025-07-15 DIAGNOSIS — I10 ESSENTIAL HYPERTENSION: Primary | ICD-10-CM

## 2025-07-15 DIAGNOSIS — I48.0 PAROXYSMAL ATRIAL FIBRILLATION: ICD-10-CM

## 2025-07-15 PROCEDURE — 3074F SYST BP LT 130 MM HG: CPT | Performed by: INTERNAL MEDICINE

## 2025-07-15 PROCEDURE — 3078F DIAST BP <80 MM HG: CPT | Performed by: INTERNAL MEDICINE

## 2025-07-15 PROCEDURE — 93000 ELECTROCARDIOGRAM COMPLETE: CPT | Performed by: INTERNAL MEDICINE

## 2025-07-15 PROCEDURE — 99214 OFFICE O/P EST MOD 30 MIN: CPT | Performed by: INTERNAL MEDICINE

## 2025-07-15 NOTE — PROGRESS NOTES
Date of Office Visit: 07/15/25  Encounter Provider: Rich Kearns MD  Place of Service: [facility] CARDIOLOGY  Patient Name: Kiley Last  :1945  [id number]    Chief Complaint   Patient presents with    Atrial Fibrillation   :     HPI: Kiley Last is a 79 y.o. female she has a history of paroxysmal A. fib she is had an recent stress and echo that are both normal she went into A. fib in the hospital when she had bad vertigo and we put her on Rythmol to help control it as well as increase her metoprolol.  She is also been on Eliquis.  She did not feel well on the Rythmol and so we switched over to flecainide.      She is here for follow-up and is doing okay been tough since her   which we took care of him also but she is doing well.  She is not having chest pain no palpitations does not think she is having A-fib currently no bleeding difficulty    Past Medical History:   Diagnosis Date    Arthritis     Asthma     NO INHALERS    Atrial fibrillation     Clostridium difficile infection 2019    Colon polyps 2019    Transverse colon: tubular adenoma, descending colon: fragments of tubular adenoma, sigmoid colon: ischemic eroded hyperplastic polyp    COPD (chronic obstructive pulmonary disease)     Diverticulitis     Diverticulosis     ESBL (extended spectrum beta-lactamase) producing bacteria infection     GERD (gastroesophageal reflux disease)     History of abscess of skin and subcutaneous tissue     ABD WOUND 2017    History of atrial fibrillation      X1 POST OP FROM COLOSTOMY 2017 - NO PROBLEMS SINCE    Hypertension     Incisional hernia     MDRO (multiple drug resistant organisms) resistance     Occasional tremors     NEW (obstructive sleep apnea) 2021    Overnight polysomnogram.  Weight 190 pounds.  Mild NEW with AHI 7 events per hour for total sleep time.  However, during REM moderate NEW with AHI 16.7 events per hour.  No  sleep-related hypoxia.  The patient snored 5% of total sleep time.    PONV (postoperative nausea and vomiting)     Psoriasis     UTI (urinary tract infection)     Vertigo        Past Surgical History:   Procedure Laterality Date    BELPHAROPTOSIS REPAIR Bilateral 04/27/2017    Bilateral brow ptosis repair via the anterior hairline approach under monitored local anesthesia-Andrez Craven    BLEPHAROPLASTY Bilateral 04/27/2017    BREAST BIOPSY      CATARACT EXTRACTION      COLON RESECTION N/A 5/3/2017    Procedure: OPEN SIMOID COLECTOMY WITH COLOSTOMY;  Surgeon: Renato Delgado MD;  Location: [facility] MAIN OR;  Service:     COLONOSCOPY N/A 9/13/2017    Procedure: COLONOSCOPY VIA COLOSTOMY TO CECUM;  Surgeon: Renato Delgado MD;  [facility] END[facility];  Service:     COLONOSCOPY N/A 8/1/2019    Procedure: COLONOSCOPY to cecum and TI with biopsy / hot snare polypectomies;  Surgeon: Dalton Vela Jr., MD;  Location[facility] ENDOS[facility];  Service: General    COLONOSCOPY N/A 01/04/2010    Andrez Trinidad    COLONOSCOPY N/A 11/3/2022    Procedure: COLONOSCOPY to cecum and TI with biopsy / hot snare polypectomy;  Surgeon: Trini Wade MD;  Location: [facility] ENDOSCOPY;  Service: General;  Laterality: N/A;  pre-hx diverticulitis, screen, fam hx colon ca, personal hx polyps  post-polyp, diverticulosis, small hemorrhoids    COLOSTOMY CLOSURE N/A 9/14/2017    Procedure: COLOSTOMY TAKEDOWN AND CLOSURE, WITH RESECTION;  Surgeon: Renato Delgado MD;  Location: [facility] MAIN OR;  Service:     ENDOSCOPY AND COLONOSCOPY N/A 10/31/2014    Normal EGD and colonoscopy, repeat c-scope in 5 years-Andrez Trinidad    EXPLORATORY LAPAROTOMY N/A 9/8/2019    Procedure: Exploratory laparotomy with lysis of adhesions;  Surgeon: Trini Wade MD;  Location: [facility] MAIN OR;  Service: General    FINGER SURGERY Left     4TH FINGER LEFT HAND    HYSTERECTOMY Bilateral     LAPAROSCOPIC CHOLECYSTECTOMY W/  CHOLANGIOGRAPHY N/A 07/15/2003    Dr. Amrit Martinez, [facility]    SALPINGO OOPHORECTOMY Bilateral 2006    Abdominal sacral colpopexy, bilateral salpingo-oophorectomy, tension free vaginal tape, cystoscopy-Dr. Devika Bradley, [facility]    THORACENTESIS Right 2017    US-guided right thoracentesis-Dr. Juan Yuen, [facility]    TONSILLECTOMY Bilateral     UPPER GASTROINTESTINAL ENDOSCOPY N/A 10/08/2007    Esophageal mucosal changes suspicious for short-segment Olivera's esphagus, gastric mucosal abnormality characterized by erythema: biopsied, NERD disease present, high likelihood of gastritis-Dr. Mayank Knapp, [facility]    VENTRAL/INCISIONAL HERNIA REPAIR N/A 2023    Procedure: Open incisional hernia repair with mesh;  Surgeon: Trini Wade MD;  Location: [facility];  Service: General;  Laterality: N/A;       Social History     Socioeconomic History    Marital status:    Tobacco Use    Smoking status: Former     Current packs/day: 0.00     Types: Cigarettes     Quit date:      Years since quittin.5     Passive exposure: Past    Smokeless tobacco: Never    Tobacco comments:     SOCIAL SMOKING ONLY   Vaping Use    Vaping status: Never Used   Substance and Sexual Activity    Alcohol use: Yes     Comment: RARE    Drug use: No    Sexual activity: Defer       Family History   Problem Relation Age of Onset    Cancer Mother     Colon cancer Mother     Diabetes Father     Diabetes Son     Ulcerative colitis Son     No Known Problems Maternal Grandmother     No Known Problems Maternal Grandfather     No Known Problems Paternal Grandmother     No Known Problems Paternal Grandfather     Malig Hyperthermia Neg Hx     Breast cancer Neg Hx        Review of Systems   Constitutional: Negative for decreased appetite, fever, malaise/fatigue and weight loss.   HENT:  Negative for nosebleeds.    Eyes:  Negative for double vision.   Cardiovascular:  Negative for chest pain, claudication, cyanosis, dyspnea on exertion,  "irregular heartbeat, leg swelling, near-syncope, orthopnea, palpitations, paroxysmal nocturnal dyspnea and syncope.   Respiratory:  Negative for cough, hemoptysis and shortness of breath.    Hematologic/Lymphatic: Negative for bleeding problem.   Skin:  Negative for rash.   Musculoskeletal:  Negative for falls and myalgias.   Gastrointestinal:  Negative for hematochezia, jaundice, melena, nausea and vomiting.   Genitourinary:  Negative for hematuria.   Neurological:  Negative for dizziness and seizures.   Psychiatric/Behavioral:  Negative for altered mental status and memory loss.        Allergies   Allergen Reactions    Epinephrine Palpitations    Penicillins Other (See Comments)     BLISTERS IN MOUTH    Patient tolerated ceftriaxone during 5/2017 admission.            Current Outpatient Medications:     Acetaminophen (TYLENOL ARTHRITIS PAIN PO), Take 650 mg by mouth 3 (Three) Times a Day As Needed., Disp: , Rfl:     apixaban (Eliquis) 5 MG tablet tablet, Take 1 tablet by mouth Every 12 (Twelve) Hours., Disp: 28 tablet, Rfl: 0    flecainide (TAMBOCOR) 50 MG tablet, TAKE ONE TABLET BY MOUTH EVERY 12 HOURS, Disp: 90 tablet, Rfl: 1    fluticasone (FLONASE) 50 MCG/ACT nasal spray, SPRAY 2 SPRAYS IN EACH NOSTRIL DAILY AS DIRECTED, Disp: 16 g, Rfl: 0    losartan (COZAAR) 100 MG tablet, Take 1 tablet by mouth Daily., Disp: 90 tablet, Rfl: 1    Magnesium 100 MG capsule, Take 1 capsule by mouth Every Night., Disp: , Rfl:     meclizine (ANTIVERT) 25 MG tablet, TAKE 1 TABLET BY MOUTH 3 TIMES A DAY AS NEEDED FOR DIZZINESS, Disp: 90 tablet, Rfl: 0    metoprolol succinate XL (TOPROL-XL) 50 MG 24 hr tablet, TAKE ONE TABLET BY MOUTH DAILY, Disp: 30 tablet, Rfl: 11    rosuvastatin (CRESTOR) 20 MG tablet, TAKE 1 TABLET BY MOUTH DAILY, Disp: 90 tablet, Rfl: 0    Syringe/Needle, Disp, (B-D 3CC LUER-DARVIN SYR 23GX1\") 23G X 1\" 3 ML misc, USE ONCE A MONTH INTRAMUSCULARLY FOR VITAMIN B12 INJECTIONS, Disp: 3 each, Rfl: 12    Zinc Sulfate " "(ZINC 15 PO), Take 1 tablet by mouth Every Night., Disp: , Rfl:     busPIRone (BUSPAR) 5 MG tablet, Take 1 tablet by mouth 3 (Three) Times a Day As Needed (anxiety). (Patient not taking: Reported on 6/19/2025), Disp: 90 tablet, Rfl: 1    desvenlafaxine (PRISTIQ) 50 MG 24 hr tablet, Take 1 tablet by mouth Daily. (Patient not taking: Reported on 6/19/2025), Disp: 90 tablet, Rfl: 1    pantoprazole (Protonix) 40 MG EC tablet, Take 1 tablet by mouth Daily., Disp: 90 tablet, Rfl: 1    Current Facility-Administered Medications:     cyanocobalamin injection 1,000 mcg, 1,000 mcg, Intramuscular, Q28 Days, Miranda Morgan MD, 1,000 mcg at 08/23/23 1431      Objective:     Vitals:    07/15/25 1153   BP: 120/76   Pulse: 57   Weight: 82.1 kg (181 lb)   Height: 165.1 cm (65\")     Body mass index is 30.12 kg/m².    Physical Exam  Constitutional:       Appearance: She is well-developed.   HENT:      Head: Normocephalic.   Eyes:      General: No scleral icterus.  Neck:      Thyroid: No thyromegaly.      Vascular: No JVD.   Cardiovascular:      Rate and Rhythm: Normal rate and regular rhythm.      Heart sounds: Normal heart sounds. No murmur heard.    No friction rub. No gallop.   Pulmonary:      Effort: Pulmonary effort is normal.      Breath sounds: Normal breath sounds. No wheezing or rales.   Abdominal:      Palpations: Abdomen is soft.      Tenderness: There is no abdominal tenderness.   Musculoskeletal:         General: Normal range of motion.   Lymphadenopathy:      Cervical: No cervical adenopathy.   Skin:     General: Skin is warm and dry.      Findings: No rash.   Neurological:      Mental Status: She is alert and oriented to person, place, and time.           ECG 12 Lead    Date/Time: 7/15/2025 12:16 PM  Performed by: Rich Kearns MD    Authorized by: Rich Kearns MD  Comparison: compared with previous ECG   Similar to previous ECG  Rhythm: sinus bradycardia    Clinical impression: normal ECG           Assessment: "       Diagnosis Plan   1. Essential hypertension        2. Paroxysmal atrial fibrillation               Plan:       I think she is doing pretty well I do not think she is having much A-fib she is on flecainide that worked really well for her we will have to watch as she gets older with that.  Her rates well-controlled her blood pressures are good I am not can change anything today we will have her come back and see Yaneil in a year see me in 2    As always, it has been a pleasure to participate in your patient's care.      Sincerely,       Rich Kearns MD

## 2025-07-23 ENCOUNTER — OFFICE VISIT (OUTPATIENT)
Dept: INTERNAL MEDICINE | Facility: CLINIC | Age: 80
End: 2025-07-23
Payer: MEDICARE

## 2025-07-23 VITALS
HEART RATE: 74 BPM | DIASTOLIC BLOOD PRESSURE: 68 MMHG | BODY MASS INDEX: 29.99 KG/M2 | HEIGHT: 65 IN | SYSTOLIC BLOOD PRESSURE: 124 MMHG | WEIGHT: 180 LBS

## 2025-07-23 DIAGNOSIS — R73.03 PREDIABETES: ICD-10-CM

## 2025-07-23 DIAGNOSIS — I10 ESSENTIAL HYPERTENSION: Primary | Chronic | ICD-10-CM

## 2025-07-23 DIAGNOSIS — F41.9 ANXIETY AND DEPRESSION: Chronic | ICD-10-CM

## 2025-07-23 DIAGNOSIS — F32.A ANXIETY AND DEPRESSION: Chronic | ICD-10-CM

## 2025-07-23 NOTE — PROGRESS NOTES
"Chief Complaint  Anxiety    Subjective        Kiley Last presents to [facility]  History of Present Illness  here to f/u her anxiety issues- she didn't take the medications for anxiety very long- wanted to see if she could be ok without anything. She plans to hang onto them and restart if needed. Thinks she's doing ok.   Saw Dr. Kearns- no med change, monitoring flecanide as she ages.     Objective   Vital Signs:  /68   Pulse 74   Ht 165.1 cm (65\")   Wt 81.6 kg (180 lb)   BMI 29.95 kg/m²   Estimated body mass index is 29.95 kg/m² as calculated from the following:    Height as of this encounter: 165.1 cm (65\").    Weight as of this encounter: 81.6 kg (180 lb).            Physical Exam  Constitutional:       Appearance: Normal appearance.   Cardiovascular:      Rate and Rhythm: Normal rate.   Pulmonary:      Effort: Pulmonary effort is normal.   Musculoskeletal:      Right lower leg: No edema.      Left lower leg: No edema.   Psychiatric:         Mood and Affect: Mood normal.        Result Review :                Assessment and Plan   Diagnoses and all orders for this visit:    1. Essential hypertension (Primary)  Comments:  controlled, a fib stable- does not appear to be in a fib today    2. Anxiety and depression  Comments:  feels she is doinng well off meds now- lots of stressors but leans on family and friends- has meds at home if needed.    3. Prediabetes             Follow Up   Return in about 6 months (around 1/23/2026) for Medicare Wellness, Lab Before FUP.  Patient was given instructions and counseling regarding her condition or for health maintenance advice. Please see specific information pulled into the AVS if appropriate.           "

## 2025-07-27 DIAGNOSIS — E53.8 B12 DEFICIENCY: ICD-10-CM

## 2025-07-28 RX ORDER — CYANOCOBALAMIN 1000 UG/ML
INJECTION, SOLUTION INTRAMUSCULAR; SUBCUTANEOUS
Qty: 3 ML | Refills: 3 | Status: SHIPPED | OUTPATIENT
Start: 2025-07-28

## 2025-08-12 ENCOUNTER — OFFICE VISIT (OUTPATIENT)
Dept: NEUROLOGY | Facility: CLINIC | Age: 80
End: 2025-08-12
Payer: MEDICARE

## 2025-08-12 VITALS
DIASTOLIC BLOOD PRESSURE: 82 MMHG | HEIGHT: 65 IN | BODY MASS INDEX: 30.16 KG/M2 | WEIGHT: 181 LBS | OXYGEN SATURATION: 96 % | HEART RATE: 60 BPM | SYSTOLIC BLOOD PRESSURE: 126 MMHG

## 2025-08-12 DIAGNOSIS — R42 VERTIGO: ICD-10-CM

## 2025-08-12 DIAGNOSIS — M50.90 CERVICAL DISC DISORDER: ICD-10-CM

## 2025-08-12 DIAGNOSIS — G25.0 ESSENTIAL TREMOR: Primary | ICD-10-CM

## 2025-08-12 DIAGNOSIS — M54.12 CERVICAL RADICULOPATHY: ICD-10-CM

## 2025-08-12 PROCEDURE — 3074F SYST BP LT 130 MM HG: CPT | Performed by: NURSE PRACTITIONER

## 2025-08-12 PROCEDURE — 99214 OFFICE O/P EST MOD 30 MIN: CPT | Performed by: NURSE PRACTITIONER

## 2025-08-12 PROCEDURE — 1159F MED LIST DOCD IN RCRD: CPT | Performed by: NURSE PRACTITIONER

## 2025-08-12 PROCEDURE — 3079F DIAST BP 80-89 MM HG: CPT | Performed by: NURSE PRACTITIONER

## 2025-08-12 PROCEDURE — 1160F RVW MEDS BY RX/DR IN RCRD: CPT | Performed by: NURSE PRACTITIONER

## 2025-08-12 RX ORDER — GABAPENTIN 100 MG/1
100 CAPSULE ORAL 3 TIMES DAILY
Qty: 90 CAPSULE | Refills: 2 | Status: SHIPPED | OUTPATIENT
Start: 2025-08-12 | End: 2025-11-10

## 2025-08-12 RX ORDER — MULTIPLE VITAMINS W/ MINERALS TAB 9MG-400MCG
1 TAB ORAL DAILY
COMMUNITY

## 2025-08-13 ENCOUNTER — PATIENT ROUNDING (BHMG ONLY) (OUTPATIENT)
Dept: NEUROLOGY | Facility: CLINIC | Age: 80
End: 2025-08-13
Payer: MEDICARE

## 2025-08-25 RX ORDER — ROSUVASTATIN CALCIUM 20 MG/1
20 TABLET, COATED ORAL DAILY
Qty: 90 TABLET | Refills: 0 | Status: SHIPPED | OUTPATIENT
Start: 2025-08-25

## 2025-08-28 ENCOUNTER — TELEPHONE (OUTPATIENT)
Dept: INTERNAL MEDICINE | Facility: CLINIC | Age: 80
End: 2025-08-28
Payer: MEDICARE

## 2025-08-28 RX ORDER — CYCLOBENZAPRINE HCL 5 MG
5 TABLET ORAL 3 TIMES DAILY PRN
Qty: 30 TABLET | Refills: 0 | Status: SHIPPED | OUTPATIENT
Start: 2025-08-28 | End: 2025-09-28

## (undated) DEVICE — ANTIBACTERIAL UNDYED BRAIDED (POLYGLACTIN 910), SYNTHETIC ABSORBABLE SUTURE: Brand: COATED VICRYL

## (undated) DEVICE — SUT SILK 3/0 TIES 18IN A184H

## (undated) DEVICE — SNAR POLYP SENSATION STDOVL 27 240 BX40

## (undated) DEVICE — SUT ETHIB 0/0 MO6 I8IN CX45D

## (undated) DEVICE — TUBING, SUCTION, 1/4" X 10', STRAIGHT: Brand: MEDLINE

## (undated) DEVICE — BANDAGE,GAUZE,BULKEE II,4.5"X4.1YD,STRL: Brand: MEDLINE

## (undated) DEVICE — ELECTRD BLD EXT EDGE 1P COAT 6.5IN STRL

## (undated) DEVICE — SUT SILK 2/0 TIES 18IN A185H

## (undated) DEVICE — APPL CHLORAPREP W/TINT 26ML ORNG

## (undated) DEVICE — APPL CHLORAPREP HI/LITE 26ML ORNG

## (undated) DEVICE — SYR LUERLOK 50ML

## (undated) DEVICE — SENSR O2 OXIMAX FNGR A/ 18IN NONSTR

## (undated) DEVICE — SUT PDS 1 XLH LP 99IN Z881G

## (undated) DEVICE — ADAPT CLN BIOGUARD AIR/H2O DISP

## (undated) DEVICE — THE TORRENT IRRIGATION SCOPE CONNECTOR IS USED WITH THE TORRENT IRRIGATION TUBING TO PROVIDE IRRIGATION FLUIDS SUCH AS STERILE WATER DURING GASTROINTESTINAL ENDOSCOPIC PROCEDURES WHEN USED IN CONJUNCTION WITH AN IRRIGATION PUMP (OR ELECTROSURGICAL UNIT).: Brand: TORRENT

## (undated) DEVICE — STPLR SKIN VISISTAT WD 35CT

## (undated) DEVICE — THE SINGLE USE ETRAP – POLYP TRAP IS USED FOR SUCTION RETRIEVAL OF ENDOSCOPICALLY REMOVED POLYPS.: Brand: ETRAP

## (undated) DEVICE — GOWN,NON-REINFORCED,SIRUS,SET IN SLV,XL: Brand: MEDLINE

## (undated) DEVICE — TOTAL TRAY, 16FR 10ML SIL FOLEY, URN: Brand: MEDLINE

## (undated) DEVICE — STRIP PACKING W IODOFORM 1/4

## (undated) DEVICE — SYRINGE, LUER LOCK, 60ML: Brand: MEDLINE

## (undated) DEVICE — SUT PROLN 0 CT1 30IN 8424H

## (undated) DEVICE — GLV SURG BIOGEL LTX PF 7

## (undated) DEVICE — GLV SURG BIOGEL LTX PF 6

## (undated) DEVICE — ELECTRD BLD EZ CLN MOD 6.5IN

## (undated) DEVICE — SUT SILK 3/0 SH CR5 18IN C0135

## (undated) DEVICE — NDL HYPO PRECISIONGLIDE REG 20G 1 1/2

## (undated) DEVICE — IRRIGATOR BULB ASEPTO 60CC STRL

## (undated) DEVICE — MARKR SKIN W/RULR AND LBL

## (undated) DEVICE — DRP SLUSH WARMR MACH 52X66IN OM-ORS-301

## (undated) DEVICE — GOWN ,SIRUS,NONREINFORCED SMALL: Brand: MEDLINE

## (undated) DEVICE — COVER,MAYO STAND,STERILE: Brand: MEDLINE

## (undated) DEVICE — PK PROC MAJ 40

## (undated) DEVICE — PAD GRND REM POLYHESIVE A/ DISP

## (undated) DEVICE — ENSEAL 20 CM SHAFT, LARGE JAW: Brand: ENSEAL X1

## (undated) DEVICE — 2S 2-0 BK MONO NYL 30"/75CM: Brand: 2S 2-0 BK MONO NYL 30"/75CM

## (undated) DEVICE — Device: Brand: DEFENDO AIR/WATER/SUCTION AND BIOPSY VALVE

## (undated) DEVICE — TBG PENCL TELESCP MEGADYNE SMOKE EVAC 10FT

## (undated) DEVICE — TRAP FLD MINIVAC MEGADYNE 100ML

## (undated) DEVICE — SPNG GZ WOVN 4X4IN 12PLY 10/BX STRL

## (undated) DEVICE — SYR LUERLOK 30CC

## (undated) DEVICE — TBG 02 CRUSH RESIST LF CLR 7FT

## (undated) DEVICE — LEGGINGS, PAIR, CLEAR, STERILE: Brand: MEDLINE

## (undated) DEVICE — GLV SURG SENSICARE POLYISPRN W/ALOE PF LF 6.5 GRN STRL

## (undated) DEVICE — SUT SILK 2/0 SH CR8 18IN CR8 C012D

## (undated) DEVICE — Device

## (undated) DEVICE — CANN NASL CO2 TRULINK W/O2 A/

## (undated) DEVICE — DRSNG WND BORDR/ADHS NONADHR/GZ LF 4X10IN STRL

## (undated) DEVICE — DRSNG WND GZ PAD BORDERED 4X8IN STRL

## (undated) DEVICE — POOLE SUCTION HANDLE: Brand: CARDINAL HEALTH

## (undated) DEVICE — GOWN,SIRUS,NON REINFRCD,LARGE,SET IN SL: Brand: MEDLINE

## (undated) DEVICE — SUT SILK 0 TIES 18IN SA66G

## (undated) DEVICE — SINGLE-USE BIOPSY FORCEPS: Brand: RADIAL JAW 4

## (undated) DEVICE — CANN O2 ETCO2 FITS ALL CONN CO2 SMPL A/ 7IN DISP LF

## (undated) DEVICE — SUT PROLN 1 CT1 30IN 8425H

## (undated) DEVICE — ADHS SKIN SURG TISS VISC PREMIERPRO EXOFIN HI/VISC FAST/DRY

## (undated) DEVICE — KT ORCA ORCAPOD DISP STRL

## (undated) DEVICE — SUT ETHLN 2/0 PSLX 30IN 1697H

## (undated) DEVICE — MEDI-VAC YANKAUER SUCTION HANDLE W/BULBOUS TIP: Brand: CARDINAL HEALTH

## (undated) DEVICE — SUT VIC 0 CT1 36IN J946H

## (undated) DEVICE — DRSNG WND BORDR/ADHS NONADHR/GZ LF 4X14IN STRL